# Patient Record
Sex: FEMALE | Race: WHITE | NOT HISPANIC OR LATINO | Employment: OTHER | URBAN - METROPOLITAN AREA
[De-identification: names, ages, dates, MRNs, and addresses within clinical notes are randomized per-mention and may not be internally consistent; named-entity substitution may affect disease eponyms.]

---

## 2017-02-14 ENCOUNTER — OFFICE VISIT (OUTPATIENT)
Dept: FAMILY MEDICINE | Facility: CLINIC | Age: 77
End: 2017-02-14
Attending: FAMILY MEDICINE
Payer: MEDICARE

## 2017-02-14 VITALS
WEIGHT: 121.19 LBS | OXYGEN SATURATION: 99 % | HEIGHT: 63 IN | BODY MASS INDEX: 21.47 KG/M2 | RESPIRATION RATE: 16 BRPM | DIASTOLIC BLOOD PRESSURE: 52 MMHG | SYSTOLIC BLOOD PRESSURE: 124 MMHG | HEART RATE: 77 BPM

## 2017-02-14 DIAGNOSIS — R05.9 COUGH: Primary | ICD-10-CM

## 2017-02-14 PROCEDURE — 99213 OFFICE O/P EST LOW 20 MIN: CPT | Mod: PBBFAC,PO | Performed by: FAMILY MEDICINE

## 2017-02-14 PROCEDURE — 99213 OFFICE O/P EST LOW 20 MIN: CPT | Mod: S$PBB,,, | Performed by: FAMILY MEDICINE

## 2017-02-14 PROCEDURE — 99999 PR PBB SHADOW E&M-EST. PATIENT-LVL III: CPT | Mod: PBBFAC,,, | Performed by: FAMILY MEDICINE

## 2017-02-14 RX ORDER — PROMETHAZINE HYDROCHLORIDE AND DEXTROMETHORPHAN HYDROBROMIDE 6.25; 15 MG/5ML; MG/5ML
SYRUP ORAL
Qty: 180 ML | Refills: 0 | Status: SHIPPED | OUTPATIENT
Start: 2017-02-14 | End: 2017-04-12

## 2017-02-14 RX ORDER — AZITHROMYCIN 250 MG/1
TABLET, FILM COATED ORAL
Qty: 6 TABLET | Refills: 0 | Status: SHIPPED | OUTPATIENT
Start: 2017-02-14 | End: 2017-02-19

## 2017-02-14 NOTE — MR AVS SNAPSHOT
"    [address]  [address]  [facility] [address]  Phone: [phone]                  Katelyn Caba   2017 10:00 AM   Office Visit    Description:  Female : 1940   Provider:  Brooklyn Burnham MD   Department:  [facility]           Reason for Visit     URI     Loss of Consciousness                To Do List           Goals (5 Years of Data)     None      [facility]     [facility] Nurse Care Line -  Assistance  Registered nurses in the [facility] Center provide clinical advisement, health education, appointment booking, and other advisory services.  Call for this free service at [phone].             Medications           Message regarding Medications     Verify the changes and/or additions to your medication regime listed below are the same as discussed with your clinician today.  If any of these changes or additions are incorrect, please notify your healthcare provider.             Verify that the below list of medications is an accurate representation of the medications you are currently taking.  If none reported, the list may be blank. If incorrect, please contact your healthcare provider. Carry this list with you in case of emergency.           Current Medications     pantoprazole (PROTONIX) 20 MG tablet Take 1 tablet (20 mg total) by mouth once daily.           Clinical Reference Information           Your Vitals Were     BP Pulse Resp Height Weight SpO2    124/52 (BP Location: Right arm, Patient Position: Sitting, BP Method: Manual) 77 16 5' 3" (1.6 m) 55 kg (121 lb 3.2 oz) 99%    BMI                21.47 kg/m2          Blood Pressure          Most Recent Value    BP  (!)  124/52      Allergies as of 2017     No Known Allergies      Immunizations Administered on Date of Encounter - 2017     None      [facility] Sign-Up     Activating your [facility] account is as easy as 1-2-3!     1) Visit " [email or web address], select Sign Up Now, enter this activation code and your date of birth, then select Next.  [email or web address]  Expires: 3/31/2017 11:01 AM      2) Create a username and password to use when you visit [facility] in the future and select a security question in case you lose your password and select Next.    3) Enter your e-mail address and click Sign Up!    Additional Information  If you have questions, please e-mail [email or web address] or call [phone] to talk to our [facility] staff. Remember, [facility] is NOT to be used for urgent needs. For medical emergencies, dial 911.         Instructions    Your test results will be communicated to you via : [facility], Telephone or Letter.   If you have not received your test results in one week, please contact the clinic at [phone].       Language Assistance Services     ATTENTION: Language assistance services are available, free of charge. Please call [phone].      ATENCIÓN: Si habla español, tiene a leyva disposición servicios gratuitos de asistencia lingüística. Llame al [phone].     CHÚ Ý: N?u b?n nói Ti?ng Vi?t, có các d?ch v? h? tr? ngôn ng? mi?n phí dành cho b?n. G?i s? [phone].         [facility] complies with applicable Federal civil rights laws and does not discriminate on the basis of race, color, national origin, age, disability, or sex.

## 2017-02-14 NOTE — PATIENT INSTRUCTIONS
Your test results will be communicated to you via : [facility], Telephone or Letter.   If you have not received your test results in one week, please contact the clinic at [phone].

## 2017-02-14 NOTE — PROGRESS NOTES
"Subjective:       Patient ID: Katelyn Caba is a 76 y.o. female.    Chief Complaint: URI    HPI   Pt is here for cough of several weeks duration this is associated with sputum production over the past few days no acute sob/cp   denies fever/chills   Review of Systems   Constitutional: Negative for chills, fatigue and fever.   Respiratory: Positive for cough. Negative for chest tightness and shortness of breath.    Cardiovascular: Negative for chest pain and palpitations.   Gastrointestinal: Negative for abdominal distention, abdominal pain and blood in stool.       Objective:      Physical Exam   Constitutional: She appears well-developed and well-nourished. No distress.   Cardiovascular: Normal rate and regular rhythm.  Exam reveals no gallop.    Pulmonary/Chest: Effort normal and breath sounds normal. No respiratory distress. She has no rales.   Abdominal: Soft. Bowel sounds are normal. She exhibits no distension. There is no tenderness.       Assessment:       1. Cough        Plan:     order cxr pt declines will schedule if worsens  Rest  Increase fluids  Start zpack, cough syrup     rtc prn     "This note will not be shared with the patient."   "

## 2017-04-10 ENCOUNTER — TELEPHONE (OUTPATIENT)
Dept: NEUROLOGY | Facility: HOSPITAL | Age: 77
End: 2017-04-10

## 2017-04-10 NOTE — TELEPHONE ENCOUNTER
----- Message from Jules Stahl MD sent at 4/10/2017 11:03 AM CDT -----  Can you add this patient on to my clinic this Wednesday afternoon? She has abdominal pain. Her son is a friend of mine. Please call him with the appointment.   Lizandro Meza

## 2017-04-10 NOTE — TELEPHONE ENCOUNTER
Called pts son, Lizandro, to inform that pt has been added on to schedule for this Wednesday, 4/12 at 9:30. No answer. LVM for pts son with this information. Awaiting call back.

## 2017-04-12 ENCOUNTER — OFFICE VISIT (OUTPATIENT)
Dept: NEUROLOGY | Facility: HOSPITAL | Age: 77
End: 2017-04-12
Attending: INTERNAL MEDICINE
Payer: MEDICARE

## 2017-04-12 VITALS
HEART RATE: 70 BPM | DIASTOLIC BLOOD PRESSURE: 57 MMHG | WEIGHT: 117 LBS | TEMPERATURE: 98 F | HEIGHT: 63 IN | SYSTOLIC BLOOD PRESSURE: 139 MMHG | BODY MASS INDEX: 20.73 KG/M2

## 2017-04-12 DIAGNOSIS — R19.7 DIARRHEA, UNSPECIFIED TYPE: Primary | ICD-10-CM

## 2017-04-12 DIAGNOSIS — Z12.11 SCREENING FOR COLON CANCER: ICD-10-CM

## 2017-04-12 PROCEDURE — 99214 OFFICE O/P EST MOD 30 MIN: CPT | Performed by: INTERNAL MEDICINE

## 2017-04-12 RX ORDER — ONDANSETRON 4 MG/1
TABLET, FILM COATED ORAL
COMMUNITY
Start: 2017-04-07 | End: 2017-04-12

## 2017-04-12 RX ORDER — CEFUROXIME AXETIL 500 MG/1
TABLET ORAL
COMMUNITY
Start: 2017-04-07 | End: 2017-04-12

## 2017-04-12 NOTE — PROGRESS NOTES
"[facility]    CC: diarrhea     HPI 76 y.o. female with a previous history of basal cell carcinoma but otherwise healthy presents for evaluation of a recent episode of severe diarrhea over a 24 hour period associated with nausea. She was treated in the ED at [facility] with IVF's and diagnosed with a "stomach virus". Her symptoms are now completely resolved and she feels well. She has no other complaints. She has never undergone screening for colon cancer.       Past Medical History:   Diagnosis Date    Cancer     basal cell carcinoma         Review of Systems  General ROS: negative for chills, fever or weight loss  Cardiovascular ROS: no chest pain or dyspnea on exertion  Gastrointestinal ROS: no abdominal pain; positive recent diarrheal illness     Physical Examination  BP (!) 139/57  Pulse 70  Temp 97.5 °F (36.4 °C) (Oral)   Ht 5' 3" (1.6 m)  Wt 53.1 kg (117 lb)  BMI 20.73 kg/m2  General appearance: alert, cooperative, no distress  HENT: Normocephalic, atraumatic, neck symmetrical, no nasal discharge   Lungs: clear to auscultation bilaterally, no dullness to percussion bilaterally  Heart: regular rate and rhythm without rub; no displacement of the PMI   Abdomen: soft, non-tender; bowel sounds normoactive; no organomegaly  Extremities: extremities symmetric; no clubbing, cyanosis, or edema  Neurologic: Alert and oriented X 3, normal strength, normal coordination and gait      Assessment:   Recent acute diarrheal illness - now completely resolved; typical for acute infectious gastroenteritis   Colon cancer screening - not currently interested in colonoscopy but is agreeable to stool FIT test     Plan:  Stool FIT     Note: patient is the mother of my neighbor (Lizandro Meza)     Jules Stahl MD   [address], [address]   [place] [address] [phone]    "

## 2017-04-12 NOTE — MR AVS SNAPSHOT
"    [facility]  [address] [place] [address]  Phone: [phone]  Fax: [phone]                  Katelyn Caba   2017 9:30 AM   Office Visit    Description:  Female : 1940   Provider:  Jules Stahl MD   Department:  [facility]           Reason for Visit     Follow-up           Diagnoses this Visit        Comments    Screening for colon cancer    -  Primary            To Do List           Goals (5 Years of Data)     None      [facility]     [facility] Nurse Care [facility] -  Assistance  Unless otherwise directed by your provider, please contact [facility], our nurse care line that is available for  assistance.     Registered nurses in the [facility] provide: appointment scheduling, clinical advisement, health education, and other advisory services.  Call: [phone] (toll free)               Medications           Message regarding Medications     Verify the changes and/or additions to your medication regime listed below are the same as discussed with your clinician today.  If any of these changes or additions are incorrect, please notify your healthcare provider.        STOP taking these medications     ondansetron (ZOFRAN) 4 MG tablet     cefUROXime (CEFTIN) 500 MG tablet     promethazine-dextromethorphan (PROMETHAZINE-DM) 6.25-15 mg/5 mL Syrp 1-2 tsp po qhs prn           Verify that the below list of medications is an accurate representation of the medications you are currently taking.  If none reported, the list may be blank. If incorrect, please contact your healthcare provider. Carry this list with you in case of emergency.           Current Medications     pantoprazole (PROTONIX) 20 MG tablet Take 1 tablet (20 mg total) by mouth once daily.           Clinical Reference Information           Your Vitals Were     BP Pulse Temp Height Weight BMI    139/57 70 97.5 °F (36.4 °C) (Oral) 5' 3" (1.6 m) 53.1 kg " (117 lb) 20.73 kg/m2      Blood Pressure          Most Recent Value    BP  (!)  139/57      Allergies as of 4/12/2017     No Known Allergies      Immunizations Administered on Date of Encounter - 4/12/2017     None      Orders Placed During Today's Visit     Future Labs/Procedures Expected by Expires    Occult Blood Stool, CA Screen  4/12/2017 4/12/2018      [facility] Sign-Up     Activating your [facility] account is as easy as 1-2-3!     1) Visit [email or web address], select Sign Up Now, enter this activation code and your date of birth, then select Next.  [email or web address]  Expires: 5/27/2017 10:15 AM      2) Create a username and password to use when you visit [facility] in the future and select a security question in case you lose your password and select Next.    3) Enter your e-mail address and click Sign Up!    Additional Information  If you have questions, please e-mail [email or web address] or call [phone] to talk to our [facility] staff. Remember, [facility] is NOT to be used for urgent needs. For medical emergencies, dial 911.         Instructions    Proceed to 1st floor outpatient diagnostic center to  stool collection kit          Language Assistance Services     ATTENTION: Language assistance services are available, free of charge. Please call [phone].      ATENCIÓN: Si habla español, tiene a leyva disposición servicios gratuitos de asistencia lingüística. Llame al [phone].     CHÚ Ý: N?u b?n nói Ti?ng Vi?t, có các d?ch v? h? tr? ngôn ng? mi?n phí dành cho b?n. G?i s? [phone].         [facility] complies with applicable Federal civil rights laws and does not discriminate on the basis of race, color, national origin, age, disability, or sex.

## 2018-11-05 ENCOUNTER — PATIENT OUTREACH (OUTPATIENT)
Dept: ADMINISTRATIVE | Facility: HOSPITAL | Age: 78
End: 2018-11-05

## 2018-11-05 NOTE — PROGRESS NOTES
Patient contacted to schedule appointment with their PCP Brooklyn Burnham MD. Appointment was scheduled and appointment reminder slip mailed.     Patient schedule for annual exam.

## 2018-12-03 ENCOUNTER — OFFICE VISIT (OUTPATIENT)
Dept: FAMILY MEDICINE | Facility: CLINIC | Age: 78
End: 2018-12-03
Attending: FAMILY MEDICINE
Payer: MEDICARE

## 2018-12-03 VITALS
HEART RATE: 76 BPM | DIASTOLIC BLOOD PRESSURE: 72 MMHG | OXYGEN SATURATION: 99 % | SYSTOLIC BLOOD PRESSURE: 134 MMHG | BODY MASS INDEX: 21.74 KG/M2 | WEIGHT: 122.69 LBS | HEIGHT: 63 IN

## 2018-12-03 DIAGNOSIS — R53.83 FATIGUE, UNSPECIFIED TYPE: ICD-10-CM

## 2018-12-03 DIAGNOSIS — J40 BRONCHITIS: ICD-10-CM

## 2018-12-03 DIAGNOSIS — R05.9 COUGH: ICD-10-CM

## 2018-12-03 DIAGNOSIS — Z00.00 ANNUAL PHYSICAL EXAM: Primary | ICD-10-CM

## 2018-12-03 PROCEDURE — 99999 PR PBB SHADOW E&M-EST. PATIENT-LVL III: CPT | Mod: PBBFAC,,, | Performed by: FAMILY MEDICINE

## 2018-12-03 PROCEDURE — 99214 OFFICE O/P EST MOD 30 MIN: CPT | Mod: S$PBB,,, | Performed by: FAMILY MEDICINE

## 2018-12-03 PROCEDURE — 99213 OFFICE O/P EST LOW 20 MIN: CPT | Mod: PBBFAC,PO | Performed by: FAMILY MEDICINE

## 2018-12-03 RX ORDER — METHYLPREDNISOLONE 4 MG/1
TABLET ORAL
Qty: 1 PACKAGE | Refills: 0 | Status: SHIPPED | OUTPATIENT
Start: 2018-12-03 | End: 2018-12-24

## 2018-12-03 RX ORDER — LEVOCETIRIZINE DIHYDROCHLORIDE 5 MG/1
5 TABLET, FILM COATED ORAL NIGHTLY
Qty: 30 TABLET | Refills: 0 | Status: SHIPPED | OUTPATIENT
Start: 2018-12-03 | End: 2019-04-13

## 2018-12-03 NOTE — PATIENT INSTRUCTIONS
Your test results will be communicated to you via : [facility], Telephone or Letter.   If you have not received your test results in one week, please contact the clinic at [phone].

## 2018-12-03 NOTE — PROGRESS NOTES
Subjective:       Patient ID: Katelyn Caba is a 78 y.o. female.    Chief Complaint: Annual Exam    HPI   Pt is here for annual exam pt is well no sob/cp no change in bowel h abits no brbpr no vaginal bleeding no dysuria or hematuria pt is feeling tired coughing over the past few weeks no weight loss     Review of Systems   Constitutional: Positive for fatigue. Negative for activity change, chills, diaphoresis and fever.   HENT: Positive for postnasal drip and sore throat. Negative for congestion, ear discharge, ear pain, hearing loss, rhinorrhea, sinus pressure, sneezing, trouble swallowing and voice change.    Eyes: Negative for photophobia, discharge, redness, itching and visual disturbance.   Respiratory: Positive for cough. Negative for chest tightness, shortness of breath and wheezing.    Cardiovascular: Negative for chest pain, palpitations and leg swelling.   Gastrointestinal: Negative for abdominal distention, abdominal pain, anal bleeding, blood in stool, constipation, diarrhea, nausea, rectal pain and vomiting.   Genitourinary: Negative for dyspareunia, dysuria, flank pain, frequency, hematuria, menstrual problem, pelvic pain, urgency, vaginal bleeding and vaginal discharge.   Musculoskeletal: Negative for arthralgias, back pain, joint swelling and neck pain.   Skin: Negative for color change and rash.   Neurological: Negative for dizziness, speech difficulty, weakness, light-headedness, numbness and headaches.   Hematological: Does not bruise/bleed easily.   Psychiatric/Behavioral: Negative for agitation, confusion, decreased concentration, sleep disturbance and suicidal ideas. The patient is not nervous/anxious.        Objective:      Physical Exam   Constitutional: She appears well-developed and well-nourished. No distress.   HENT:   Head: Normocephalic and atraumatic.   Right Ear: External ear normal.   Left Ear: External ear normal.   Nose: Nose normal.   Mouth/Throat: Oropharynx is clear and  "moist. No oropharyngeal exudate.   Nares patent bilaterally with thin clear mucus no facial tenderness   Eyes: Conjunctivae and EOM are normal. Pupils are equal, round, and reactive to light. Right eye exhibits no discharge. Left eye exhibits no discharge.   Neck: Normal range of motion. Neck supple. No thyromegaly present.   Cardiovascular: Normal rate, regular rhythm and intact distal pulses. Exam reveals no gallop and no friction rub.   Pulmonary/Chest: Effort normal and breath sounds normal. No respiratory distress. She has no wheezes. She has no rales.   Abdominal: Soft. Bowel sounds are normal. She exhibits no distension. There is no tenderness. There is no rebound and no guarding.   Musculoskeletal: Normal range of motion. She exhibits no edema or tenderness.   Lymphadenopathy:     She has no cervical adenopathy.   Neurological: She is alert. No cranial nerve deficit. She exhibits normal muscle tone. Coordination normal.   Skin: Skin is warm and dry. No rash noted. No erythema.   Psychiatric: She has a normal mood and affect. Her behavior is normal. Judgment and thought content normal.       Assessment:       1. Annual physical exam    2. Bronchitis    3. Cough    4. Fatigue, unspecified type        Plan:     orders cmp cbc tsh ua lipid not covered by pt insurance  Start medrol dose pack and xyzal  Low salt diet  Graded exercise  rtc annually and prn    Health maintenance  Colonoscopy discussed  Flu discussed  tetanus q 10 years  Mammogram discussed   pneumonia discussed    "This note will not be shared with the patient."   "

## 2018-12-06 ENCOUNTER — LAB VISIT (OUTPATIENT)
Dept: LAB | Facility: HOSPITAL | Age: 78
End: 2018-12-06
Attending: FAMILY MEDICINE
Payer: MEDICARE

## 2018-12-06 DIAGNOSIS — J40 BRONCHITIS: ICD-10-CM

## 2018-12-06 DIAGNOSIS — Z00.00 ANNUAL PHYSICAL EXAM: ICD-10-CM

## 2018-12-06 LAB
ALBUMIN SERPL BCP-MCNC: 3.8 G/DL
ALP SERPL-CCNC: 77 U/L
ALT SERPL W/O P-5'-P-CCNC: 9 U/L
ANION GAP SERPL CALC-SCNC: 8 MMOL/L
AST SERPL-CCNC: 16 U/L
BASOPHILS # BLD AUTO: 0.06 K/UL
BASOPHILS NFR BLD: 0.5 %
BILIRUB SERPL-MCNC: 0.3 MG/DL
BUN SERPL-MCNC: 17 MG/DL
CALCIUM SERPL-MCNC: 9.5 MG/DL
CHLORIDE SERPL-SCNC: 105 MMOL/L
CO2 SERPL-SCNC: 26 MMOL/L
CREAT SERPL-MCNC: 0.8 MG/DL
DIFFERENTIAL METHOD: ABNORMAL
EOSINOPHIL # BLD AUTO: 0 K/UL
EOSINOPHIL NFR BLD: 0.3 %
ERYTHROCYTE [DISTWIDTH] IN BLOOD BY AUTOMATED COUNT: 16.6 %
EST. GFR  (AFRICAN AMERICAN): >60 ML/MIN/1.73 M^2
EST. GFR  (NON AFRICAN AMERICAN): >60 ML/MIN/1.73 M^2
GLUCOSE SERPL-MCNC: 98 MG/DL
HCT VFR BLD AUTO: 26 %
HGB BLD-MCNC: 6.8 G/DL
IMM GRANULOCYTES # BLD AUTO: 0.06 K/UL
IMM GRANULOCYTES NFR BLD AUTO: 0.5 %
LYMPHOCYTES # BLD AUTO: 2.6 K/UL
LYMPHOCYTES NFR BLD: 22.8 %
MCH RBC QN AUTO: 18 PG
MCHC RBC AUTO-ENTMCNC: 26.2 G/DL
MCV RBC AUTO: 69 FL
MONOCYTES # BLD AUTO: 1.1 K/UL
MONOCYTES NFR BLD: 9.3 %
NEUTROPHILS # BLD AUTO: 7.6 K/UL
NEUTROPHILS NFR BLD: 66.6 %
NRBC BLD-RTO: 0 /100 WBC
PLATELET # BLD AUTO: 619 K/UL
PMV BLD AUTO: 10.4 FL
POTASSIUM SERPL-SCNC: 4 MMOL/L
PROT SERPL-MCNC: 7.3 G/DL
RBC # BLD AUTO: 3.78 M/UL
SODIUM SERPL-SCNC: 139 MMOL/L
WBC # BLD AUTO: 11.45 K/UL

## 2018-12-06 PROCEDURE — 85025 COMPLETE CBC W/AUTO DIFF WBC: CPT

## 2018-12-06 PROCEDURE — 36415 COLL VENOUS BLD VENIPUNCTURE: CPT | Mod: PO

## 2018-12-06 PROCEDURE — 80053 COMPREHEN METABOLIC PANEL: CPT

## 2018-12-13 DIAGNOSIS — Z12.11 SCREEN FOR COLON CANCER: ICD-10-CM

## 2018-12-13 DIAGNOSIS — Z83.2: Primary | ICD-10-CM

## 2018-12-20 ENCOUNTER — TELEPHONE (OUTPATIENT)
Dept: FAMILY MEDICINE | Facility: CLINIC | Age: 78
End: 2018-12-20

## 2018-12-20 NOTE — TELEPHONE ENCOUNTER
----- Message from Brooklyn Burnham MD sent at 12/13/2018  1:03 PM CST -----  Please let pt know her blood count has dropped since last blood test.  Id like her to increase her water intake as well as iron rich foods im sending a script for iron to her pharmacy however she can take it otc as well.  Please have her do a fit kit order is in

## 2018-12-20 NOTE — TELEPHONE ENCOUNTER
Patient was given test results and was also mailed a copy of an approved list with iron foods on them for her to choose from.Patient states she will get an otc iron pill.

## 2019-04-13 ENCOUNTER — OFFICE VISIT (OUTPATIENT)
Dept: FAMILY MEDICINE | Facility: CLINIC | Age: 79
End: 2019-04-13
Attending: FAMILY MEDICINE
Payer: MEDICARE

## 2019-04-13 VITALS
BODY MASS INDEX: 22.26 KG/M2 | RESPIRATION RATE: 16 BRPM | DIASTOLIC BLOOD PRESSURE: 63 MMHG | WEIGHT: 125.63 LBS | HEART RATE: 68 BPM | HEIGHT: 63 IN | SYSTOLIC BLOOD PRESSURE: 142 MMHG

## 2019-04-13 DIAGNOSIS — I83.813 VARICOSE VEINS OF BOTH LOWER EXTREMITIES WITH PAIN: Primary | ICD-10-CM

## 2019-04-13 DIAGNOSIS — D50.9 MICROCYTIC ANEMIA: ICD-10-CM

## 2019-04-13 PROCEDURE — 99214 PR OFFICE/OUTPT VISIT, EST, LEVL IV, 30-39 MIN: ICD-10-PCS | Mod: S$PBB,,, | Performed by: FAMILY MEDICINE

## 2019-04-13 PROCEDURE — 99999 PR PBB SHADOW E&M-EST. PATIENT-LVL IV: ICD-10-PCS | Mod: PBBFAC,,, | Performed by: FAMILY MEDICINE

## 2019-04-13 PROCEDURE — 99214 OFFICE O/P EST MOD 30 MIN: CPT | Mod: S$PBB,,, | Performed by: FAMILY MEDICINE

## 2019-04-13 PROCEDURE — 99999 PR PBB SHADOW E&M-EST. PATIENT-LVL IV: CPT | Mod: PBBFAC,,, | Performed by: FAMILY MEDICINE

## 2019-04-13 PROCEDURE — 99214 OFFICE O/P EST MOD 30 MIN: CPT | Mod: PBBFAC,PO | Performed by: FAMILY MEDICINE

## 2019-04-13 NOTE — PATIENT INSTRUCTIONS
Katelyn,     We are always striving for excellence. Should you receive a patient experience survey in the mail, we would appreciate if you would take a few moments to give us your feedback. These surveys let us know our strengths as well as areas of opportunity for improvement to better serve you.    Thank you for your time,  Macy Mendoza LPN    Your test results will be communicated to you via : [facility], Telephone or Letter.   If you have not received your test results in one week, please contact the clinic at [phone].

## 2019-04-13 NOTE — PROGRESS NOTES
"Subjective:       Patient ID: Katelyn Caba is a 78 y.o. female.    Chief Complaint: Varicose Veins    HPI   Pt is here for c/o painful legs varicose veins with swelling over the past few weeks changing her lifestyle avoiding stairs moving to single level home.   Pt has anemia no sob/cp no palpitations no vaginal bleeding no rectal bleeding no hematuria   Review of Systems   Constitutional: Negative for chills, fatigue and fever.   Respiratory: Negative for cough, chest tightness and shortness of breath.    Cardiovascular: Positive for leg swelling. Negative for chest pain and palpitations.   Gastrointestinal: Negative for abdominal distention and abdominal pain.   Hematological: Negative for adenopathy. Does not bruise/bleed easily.       Objective:      Physical Exam   Constitutional: She appears well-developed and well-nourished. No distress.   Cardiovascular: Normal rate and regular rhythm. Exam reveals no gallop.   Bilateral lower leg/feet prominent veins    Pulmonary/Chest: Effort normal and breath sounds normal. She has no rales.   Abdominal: Soft. Bowel sounds are normal. She exhibits no distension. There is no tenderness.   Musculoskeletal: She exhibits edema.     labs discussed with pt   Assessment:       1. Varicose veins of both lower extremities with pain    2. Microcytic anemia        Plan:     orders cmp cbc iron studies  F/u vasc surgery        "This note will not be shared with the patient."   "

## 2019-04-15 ENCOUNTER — LAB VISIT (OUTPATIENT)
Dept: LAB | Facility: HOSPITAL | Age: 79
End: 2019-04-15
Attending: FAMILY MEDICINE
Payer: MEDICARE

## 2019-04-15 DIAGNOSIS — I83.813 VARICOSE VEINS OF BOTH LOWER EXTREMITIES WITH PAIN: ICD-10-CM

## 2019-04-15 DIAGNOSIS — R53.83 FATIGUE, UNSPECIFIED TYPE: ICD-10-CM

## 2019-04-15 DIAGNOSIS — D50.9 MICROCYTIC ANEMIA: ICD-10-CM

## 2019-04-15 LAB
ALBUMIN SERPL BCP-MCNC: 3.5 G/DL (ref 3.5–5.2)
ALP SERPL-CCNC: 78 U/L (ref 55–135)
ALT SERPL W/O P-5'-P-CCNC: 14 U/L (ref 10–44)
ANION GAP SERPL CALC-SCNC: 7 MMOL/L (ref 8–16)
AST SERPL-CCNC: 22 U/L (ref 10–40)
BASOPHILS # BLD AUTO: 0.07 K/UL (ref 0–0.2)
BASOPHILS NFR BLD: 1.1 % (ref 0–1.9)
BILIRUB SERPL-MCNC: 0.2 MG/DL (ref 0.1–1)
BUN SERPL-MCNC: 17 MG/DL (ref 8–23)
CALCIUM SERPL-MCNC: 9.5 MG/DL (ref 8.7–10.5)
CHLORIDE SERPL-SCNC: 107 MMOL/L (ref 95–110)
CO2 SERPL-SCNC: 27 MMOL/L (ref 23–29)
CREAT SERPL-MCNC: 0.7 MG/DL (ref 0.5–1.4)
DIFFERENTIAL METHOD: ABNORMAL
EOSINOPHIL # BLD AUTO: 0.3 K/UL (ref 0–0.5)
EOSINOPHIL NFR BLD: 4.3 % (ref 0–8)
ERYTHROCYTE [DISTWIDTH] IN BLOOD BY AUTOMATED COUNT: 20.7 % (ref 11.5–14.5)
EST. GFR  (AFRICAN AMERICAN): >60 ML/MIN/1.73 M^2
EST. GFR  (NON AFRICAN AMERICAN): >60 ML/MIN/1.73 M^2
GLUCOSE SERPL-MCNC: 85 MG/DL (ref 70–110)
HCT VFR BLD AUTO: 27.4 % (ref 37–48.5)
HGB BLD-MCNC: 7.9 G/DL (ref 12–16)
IMM GRANULOCYTES # BLD AUTO: 0.03 K/UL (ref 0–0.04)
IMM GRANULOCYTES NFR BLD AUTO: 0.5 % (ref 0–0.5)
LYMPHOCYTES # BLD AUTO: 2.2 K/UL (ref 1–4.8)
LYMPHOCYTES NFR BLD: 32.9 % (ref 18–48)
MCH RBC QN AUTO: 21.6 PG (ref 27–31)
MCHC RBC AUTO-ENTMCNC: 28.8 G/DL (ref 32–36)
MCV RBC AUTO: 75 FL (ref 82–98)
MONOCYTES # BLD AUTO: 0.9 K/UL (ref 0.3–1)
MONOCYTES NFR BLD: 13.4 % (ref 4–15)
NEUTROPHILS # BLD AUTO: 3.1 K/UL (ref 1.8–7.7)
NEUTROPHILS NFR BLD: 47.8 % (ref 38–73)
NRBC BLD-RTO: 0 /100 WBC
PLATELET # BLD AUTO: 493 K/UL (ref 150–350)
PMV BLD AUTO: 10.6 FL (ref 9.2–12.9)
POTASSIUM SERPL-SCNC: 4.4 MMOL/L (ref 3.5–5.1)
PROT SERPL-MCNC: 6.5 G/DL (ref 6–8.4)
RBC # BLD AUTO: 3.65 M/UL (ref 4–5.4)
SODIUM SERPL-SCNC: 141 MMOL/L (ref 136–145)
TSH SERPL DL<=0.005 MIU/L-ACNC: 1.12 UIU/ML (ref 0.4–4)
WBC # BLD AUTO: 6.56 K/UL (ref 3.9–12.7)

## 2019-04-15 PROCEDURE — 85025 COMPLETE CBC W/AUTO DIFF WBC: CPT

## 2019-04-15 PROCEDURE — 36415 COLL VENOUS BLD VENIPUNCTURE: CPT | Mod: PO

## 2019-04-15 PROCEDURE — 84443 ASSAY THYROID STIM HORMONE: CPT

## 2019-04-15 PROCEDURE — 80053 COMPREHEN METABOLIC PANEL: CPT

## 2019-04-18 DIAGNOSIS — D50.9 MICROCYTIC ANEMIA: Primary | ICD-10-CM

## 2019-04-18 RX ORDER — FERROUS SULFATE 325(65) MG
325 TABLET ORAL 2 TIMES DAILY
Qty: 180 TABLET | Refills: 0 | Status: SHIPPED | OUTPATIENT
Start: 2019-04-18 | End: 2021-10-12 | Stop reason: SDUPTHER

## 2019-04-23 ENCOUNTER — TELEPHONE (OUTPATIENT)
Dept: FAMILY MEDICINE | Facility: CLINIC | Age: 79
End: 2019-04-23

## 2019-04-23 NOTE — TELEPHONE ENCOUNTER
"Notified patient per Dr Burnham' note "Let pt know her blood count is still low I sent iron to her pharmacy or she can take OTC equivalent. Please have her see me in 3 months to recheck please verify pt has a fit kit and does her iron studies the orders are already in."    Pt understood and agreed.  "

## 2019-04-23 NOTE — TELEPHONE ENCOUNTER
----- Message from Brooklyn Burnham MD sent at 4/18/2019  4:47 AM CDT -----  Please let pt know her blood count is still low I sent iron to her pharmacy or she can take otc equivalent.  Please have her see me in 3 months to recheck please verify pt has a fit kit and does her iron studies the orders are already in

## 2019-04-25 DIAGNOSIS — I87.2 VENOUS INSUFFICIENCY: Primary | ICD-10-CM

## 2019-04-26 ENCOUNTER — TELEPHONE (OUTPATIENT)
Dept: VASCULAR SURGERY | Facility: CLINIC | Age: 79
End: 2019-04-26

## 2019-04-26 NOTE — TELEPHONE ENCOUNTER
Call patient times two, to schedule an ultrasound for clinic appointment 4/29/2019 no answer left message with a call back number [phone].

## 2019-04-29 ENCOUNTER — HOSPITAL ENCOUNTER (OUTPATIENT)
Dept: VASCULAR SURGERY | Facility: CLINIC | Age: 79
Discharge: HOME | End: 2019-04-29
Attending: SURGERY
Payer: MEDICARE

## 2019-04-29 ENCOUNTER — OFFICE VISIT (OUTPATIENT)
Dept: VASCULAR SURGERY | Facility: CLINIC | Age: 79
End: 2019-04-29
Payer: MEDICARE

## 2019-04-29 VITALS
BODY MASS INDEX: 21.87 KG/M2 | TEMPERATURE: 98 F | WEIGHT: 123.44 LBS | HEART RATE: 65 BPM | SYSTOLIC BLOOD PRESSURE: 137 MMHG | HEIGHT: 63 IN | DIASTOLIC BLOOD PRESSURE: 63 MMHG

## 2019-04-29 DIAGNOSIS — I87.2 VENOUS INSUFFICIENCY: Primary | ICD-10-CM

## 2019-04-29 DIAGNOSIS — I87.2 VENOUS INSUFFICIENCY: ICD-10-CM

## 2019-04-29 PROCEDURE — 99201 PR OFFICE/OUTPT VISIT,NEW,LEVL I: ICD-10-PCS | Mod: S$PBB,,, | Performed by: SURGERY

## 2019-04-29 PROCEDURE — 99999 PR PBB SHADOW E&M-EST. PATIENT-LVL III: CPT | Mod: PBBFAC,,, | Performed by: SURGERY

## 2019-04-29 PROCEDURE — 99201 PR OFFICE/OUTPT VISIT,NEW,LEVL I: CPT | Mod: S$PBB,,, | Performed by: SURGERY

## 2019-04-29 PROCEDURE — 93970 EXTREMITY STUDY: CPT | Mod: PBBFAC | Performed by: SURGERY

## 2019-04-29 PROCEDURE — 99999 PR PBB SHADOW E&M-EST. PATIENT-LVL III: ICD-10-PCS | Mod: PBBFAC,,, | Performed by: SURGERY

## 2019-04-29 PROCEDURE — 93970 PR US DUPLEX, UPPER OR LOWER EXT VENOUS,COMPLETE BILAT: ICD-10-PCS | Mod: 26,S$PBB,, | Performed by: SURGERY

## 2019-04-29 PROCEDURE — 99213 OFFICE O/P EST LOW 20 MIN: CPT | Mod: PBBFAC,25 | Performed by: SURGERY

## 2019-04-29 PROCEDURE — 93970 EXTREMITY STUDY: CPT | Mod: 26,S$PBB,, | Performed by: SURGERY

## 2019-04-29 NOTE — LETTER
April 29, 2019      Brooklyn Burnham MD  [address]  [address]  [facility] [address]           [facility] - Vascular Surgery  [address]  [place] [address]  Phone: [phone]  Fax: [phone]          Patient: Katelyn Caba   MR Number: [id number]   YOB: 1940   Date of Visit: 4/29/2019       Dear Dr. Brooklyn Burnham:    Thank you for referring Katelyn Caba to me for evaluation. Attached you will find relevant portions of my assessment and plan of care.    If you have questions, please do not hesitate to call me. I look forward to following Katelyn Caba along with you.    Sincerely,    Lorin Salvador MD    Enclosure  CC:  No Recipients    If you would like to receive this communication electronically, please contact [email or web address] or [phone] to request more information on [employer] Link access.    For providers and/or their staff who would like to refer a patient to [facility], please contact us through our one-stop-shop provider referral line, [facility], at [phone].    If you feel you have received this communication in error or would no longer like to receive these types of communications, please e-mail [email or web address]

## 2019-04-29 NOTE — PROGRESS NOTES
Katelyn Caba is a 78 y.o. female referred from Dr. Brooklyn Burnham for evaluation and management of lower leg varicose veins. Noticed at a recent physical and wanted to have them checked out. No significant pain or swelling. No history of DVT or prior venous ablation procedures. No history of venous ulceration. Denies claudication or rest pain. Has not tried compression stockings. 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  No inflammation or skin changes  2+ PT bilaterally    Duplex - No DVT, Right GSV reflux >500ms, 4.7mm    Katelyn Caba is a 78 y.o. female with significant venous reflux.   Recommend compression stocking and follow-up as needed.  Continue regular exercise.    Lorin Salvador MD FACS [facility]  Vascular & Endovascular Surgery    >10 minute visit with more than half in counseling

## 2020-05-29 ENCOUNTER — HOSPITAL ENCOUNTER (OUTPATIENT)
Dept: RADIOLOGY | Facility: HOSPITAL | Age: 80
Discharge: HOME | End: 2020-05-29
Attending: NURSE PRACTITIONER
Payer: MEDICARE

## 2020-05-29 ENCOUNTER — OFFICE VISIT (OUTPATIENT)
Dept: FAMILY MEDICINE | Facility: CLINIC | Age: 80
End: 2020-05-29
Payer: MEDICARE

## 2020-05-29 VITALS
TEMPERATURE: 99 F | HEART RATE: 61 BPM | OXYGEN SATURATION: 98 % | DIASTOLIC BLOOD PRESSURE: 60 MMHG | HEIGHT: 63 IN | SYSTOLIC BLOOD PRESSURE: 140 MMHG | WEIGHT: 122.25 LBS | BODY MASS INDEX: 21.66 KG/M2

## 2020-05-29 DIAGNOSIS — M79.672 ACUTE FOOT PAIN, LEFT: Primary | ICD-10-CM

## 2020-05-29 DIAGNOSIS — M79.672 ACUTE FOOT PAIN, LEFT: ICD-10-CM

## 2020-05-29 PROCEDURE — 73630 X-RAY EXAM OF FOOT: CPT | Mod: 26,LT,, | Performed by: RADIOLOGY

## 2020-05-29 PROCEDURE — 73630 X-RAY EXAM OF FOOT: CPT | Mod: TC,FY,PO,LT

## 2020-05-29 PROCEDURE — 99999 PR PBB SHADOW E&M-EST. PATIENT-LVL III: CPT | Mod: PBBFAC,,, | Performed by: NURSE PRACTITIONER

## 2020-05-29 PROCEDURE — 99999 PR PBB SHADOW E&M-EST. PATIENT-LVL III: ICD-10-PCS | Mod: PBBFAC,,, | Performed by: NURSE PRACTITIONER

## 2020-05-29 PROCEDURE — 73630 XR FOOT COMPLETE 3 VIEW LEFT: ICD-10-PCS | Mod: 26,LT,, | Performed by: RADIOLOGY

## 2020-05-29 PROCEDURE — 99214 OFFICE O/P EST MOD 30 MIN: CPT | Mod: S$PBB,,, | Performed by: NURSE PRACTITIONER

## 2020-05-29 PROCEDURE — 99214 PR OFFICE/OUTPT VISIT, EST, LEVL IV, 30-39 MIN: ICD-10-PCS | Mod: S$PBB,,, | Performed by: NURSE PRACTITIONER

## 2020-05-29 PROCEDURE — 99213 OFFICE O/P EST LOW 20 MIN: CPT | Mod: PBBFAC,25,PO | Performed by: NURSE PRACTITIONER

## 2020-05-29 NOTE — PROGRESS NOTES
Subjective:       Patient ID: Katelyn Caba is a 79 y.o. female.    Chief Complaint: Foot Pain (left...sore no pain that started 1 week ago)    Patient who is new to me presents for foot pain. She does not recall any injury. She states the pain has improved but is still present and this concerns her.     Foot Injury    The incident occurred more than 1 week ago. The incident occurred at home. There was no injury mechanism. The pain is present in the left foot. The quality of the pain is described as aching. The pain is mild. Pertinent negatives include no numbness. She reports no foreign bodies present. The symptoms are aggravated by movement and palpation. She has tried elevation and rest for the symptoms. The treatment provided mild relief.     Review of Systems   Constitutional: Negative for chills, fatigue and fever.   HENT: Negative for congestion, sinus pressure, sinus pain, sneezing and sore throat.    Respiratory: Negative for cough, chest tightness, shortness of breath and wheezing.    Cardiovascular: Negative for chest pain, palpitations and leg swelling.   Gastrointestinal: Negative for abdominal distention, abdominal pain, constipation, diarrhea, nausea and vomiting.   Genitourinary: Negative for decreased urine volume, difficulty urinating, dysuria, frequency and urgency.   Musculoskeletal: Positive for arthralgias. Negative for gait problem, joint swelling and myalgias.   Skin: Negative for rash and wound.   Neurological: Negative for dizziness, light-headedness, numbness and headaches.       Objective:      Physical Exam   Constitutional: She is oriented to person, place, and time. She appears well-developed and well-nourished.   HENT:   Head: Normocephalic and atraumatic.   Right Ear: External ear normal.   Left Ear: External ear normal.   Nose: Nose normal.   Mouth/Throat: Oropharynx is clear and moist.   Eyes: Pupils are equal, round, and reactive to light.   Neck: Normal range of motion.    Cardiovascular: Normal rate, regular rhythm, normal heart sounds and intact distal pulses.   Pulmonary/Chest: Effort normal and breath sounds normal.   Abdominal: Soft. Bowel sounds are normal.   Musculoskeletal: Normal range of motion.        Feet:    Neurological: She is alert and oriented to person, place, and time.   Skin: Skin is warm and dry.   Nursing note and vitals reviewed.      Assessment:       1. Acute foot pain, left        Plan:       Katelyn was seen today for foot pain.    Diagnoses and all orders for this visit:    Acute foot pain, left  -     X-Ray Foot Complete Left; Future    Advised RICE therapy.   Discussed worsening signs/symptoms and when to return to clinic or go to ED.   Patient expresses understanding and agrees with treatment plan.

## 2020-06-01 ENCOUNTER — TELEPHONE (OUTPATIENT)
Dept: FAMILY MEDICINE | Facility: CLINIC | Age: 80
End: 2020-06-01

## 2020-06-01 NOTE — TELEPHONE ENCOUNTER
----- Message from Trini Yamil sent at 6/1/2020 11:12 AM CDT -----  Contact: pt  Type: Needs Medical Advice    Who Called:      Best Call Back Number:   Additional Information: Requesting a call back from Nurse, regarding pt would like her test results ,please call back

## 2020-06-02 NOTE — PROGRESS NOTES
She can take tylenol for the pain and use a foot brace to limit the movement of the foot, especially for a ligament injury. If the pain does not improve in 1-2 weeks let us know so we can refer to podiatry. Thanks.

## 2020-06-03 ENCOUNTER — TELEPHONE (OUTPATIENT)
Dept: FAMILY MEDICINE | Facility: CLINIC | Age: 80
End: 2020-06-03

## 2020-06-03 ENCOUNTER — PATIENT OUTREACH (OUTPATIENT)
Dept: ADMINISTRATIVE | Facility: OTHER | Age: 80
End: 2020-06-03

## 2020-06-03 DIAGNOSIS — M79.672 LEFT FOOT PAIN: Primary | ICD-10-CM

## 2020-06-03 NOTE — TELEPHONE ENCOUNTER
----- Message from Kira Camilo sent at 6/3/2020 12:00 PM CDT -----  Contact: pt  Pt states that she just missed a call thinks from the office..[phone]

## 2020-06-04 ENCOUNTER — OFFICE VISIT (OUTPATIENT)
Dept: PODIATRY | Facility: CLINIC | Age: 80
End: 2020-06-04
Payer: MEDICARE

## 2020-06-04 VITALS
TEMPERATURE: 98 F | SYSTOLIC BLOOD PRESSURE: 149 MMHG | BODY MASS INDEX: 20.53 KG/M2 | HEART RATE: 72 BPM | HEIGHT: 63 IN | WEIGHT: 115.88 LBS | DIASTOLIC BLOOD PRESSURE: 61 MMHG

## 2020-06-04 DIAGNOSIS — M84.375A STRESS FRACTURE OF METATARSAL BONE OF LEFT FOOT, INITIAL ENCOUNTER: Primary | ICD-10-CM

## 2020-06-04 DIAGNOSIS — M79.672 LEFT FOOT PAIN: ICD-10-CM

## 2020-06-04 PROCEDURE — 99202 OFFICE O/P NEW SF 15 MIN: CPT | Mod: S$PBB,,, | Performed by: PODIATRIST

## 2020-06-04 PROCEDURE — 99214 OFFICE O/P EST MOD 30 MIN: CPT | Mod: PBBFAC,PN | Performed by: PODIATRIST

## 2020-06-04 PROCEDURE — 99202 PR OFFICE/OUTPT VISIT, NEW, LEVL II, 15-29 MIN: ICD-10-PCS | Mod: S$PBB,,, | Performed by: PODIATRIST

## 2020-06-04 PROCEDURE — 99999 PR PBB SHADOW E&M-EST. PATIENT-LVL IV: CPT | Mod: PBBFAC,,, | Performed by: PODIATRIST

## 2020-06-04 PROCEDURE — 99999 PR PBB SHADOW E&M-EST. PATIENT-LVL IV: ICD-10-PCS | Mod: PBBFAC,,, | Performed by: PODIATRIST

## 2020-06-04 NOTE — LETTER
Honey 15, 2020      Courtney Jacobs, ZENAIDA  [address] [facility] [address] [place] [address]           [facility]  [address] [place] [address]  Phone: [phone]          Patient: Katelyn Caba   MR Number: [id number]   YOB: 1940   Date of Visit: 6/4/2020       Dear Courtney Jacobs:    Thank you for referring Katelyn Caba to me for evaluation. Attached you will find relevant portions of my assessment and plan of care.    If you have questions, please do not hesitate to call me. I look forward to following Katelyn Caba along with you.    Sincerely,    Robert Ryan, VENKAT    Enclosure  CC:  No Recipients    If you would like to receive this communication electronically, please contact [email or web address] or [phone] to request more information on [facility] Link access.    For providers and/or their staff who would like to refer a patient to [facility], please contact us through our one-stop-shop provider referral line, Megan Good, at [phone].    If you feel you have received this communication in error or would no longer like to receive these types of communications, please e-mail [email or web address]

## 2020-06-15 NOTE — PROGRESS NOTES
Subjective:      Patient ID: Katelyn Caba is a 79 y.o. female.    Chief Complaint: Foot Pain (left) and Foot Problem (left - swelling)    Katelyn is a 79 y.o. female who presents to the podiatry clinic  with complaint of  left foot pain. Onset of the symptoms was over a week ago. Precipitating event: none known. Current symptoms include: ability to bear weight, but with some pain, swelling and worsening symptoms after a period of activity. Aggravating factors: any weight bearing. Symptoms have gradually worsened. Patient has had no prior foot problems. Evaluation to date: none. Treatment to date: none. Patients rates pain 3/10 on pain scale.        Review of Systems   Constitution: Negative for chills and fever.   Cardiovascular: Negative for claudication and leg swelling.   Respiratory: Negative for shortness of breath.    Skin: Negative for itching, nail changes and rash.   Musculoskeletal: Negative for muscle cramps, muscle weakness and myalgias.        Left foot pain   Gastrointestinal: Negative for nausea and vomiting.   Neurological: Negative for focal weakness, loss of balance, numbness and paresthesias.           Objective:      Physical Exam  Constitutional:       General: She is not in acute distress.     Appearance: She is well-developed. She is not diaphoretic.   Cardiovascular:      Pulses:           Dorsalis pedis pulses are 2+ on the right side and 2+ on the left side.        Posterior tibial pulses are 2+ on the right side and 2+ on the left side.      Comments: < 3 sec capillary refill time to toes 1-5 bilateral. Toes and feet are warm to touch proximally with normal distal cooling b/l. There is some hair growth on the feet and toes b/l. There is no edema b/l. No spider veins or varicosities present b/l.     Musculoskeletal:      Comments: Equinus noted b/l ankles with < 10 deg DF noted. MMT 5/5 in DF/PF/Inv/Ev resistance with no reproduction of pain in any direction. Passive range of motion of  ankle and pedal joints is painless b/l.    Left foot focal edema to the forefoot, pain with palpation dorsal and plantar around the fourth metatarsal     Skin:     General: Skin is warm and dry.      Coloration: Skin is not pale.      Findings: No abrasion, bruising, burn, ecchymosis, erythema, laceration, lesion, petechiae or rash.      Nails: There is no clubbing.        Comments: Skin temperature, texture and turgor within normal limits.   Neurological:      Mental Status: She is alert and oriented to person, place, and time.      Sensory: No sensory deficit.      Motor: No tremor, atrophy or abnormal muscle tone.      Comments: Negative tinel sign bilateral.   Psychiatric:         Behavior: Behavior normal.               Assessment:       Encounter Diagnoses   Name Primary?    Left foot pain     Stress fracture of metatarsal bone of left foot, initial encounter Yes         Plan:       Katelyn was seen today for foot pain and foot problem.    Diagnoses and all orders for this visit:    Stress fracture of metatarsal bone of left foot, initial encounter    Left foot pain  -     Ambulatory referral/consult to Podiatry      I counseled the patient on her conditions, their implications and medical management.    .Dispensed and fit with a darco shoe to offload the metatarsals with ambulation remain in this for 4-6 weeks until symptoms resolve      I checked x-rays with patient and discussed that x-rays often do not show a stress fracture, if the pain is not resolving with offloading will consider an MRI    Return in 1 month follow up    Robert Ryan DPM

## 2020-06-23 ENCOUNTER — PATIENT OUTREACH (OUTPATIENT)
Dept: ADMINISTRATIVE | Facility: OTHER | Age: 80
End: 2020-06-23

## 2020-06-25 ENCOUNTER — OFFICE VISIT (OUTPATIENT)
Dept: PODIATRY | Facility: CLINIC | Age: 80
End: 2020-06-25
Payer: MEDICARE

## 2020-06-25 VITALS
HEIGHT: 63 IN | SYSTOLIC BLOOD PRESSURE: 160 MMHG | DIASTOLIC BLOOD PRESSURE: 66 MMHG | HEART RATE: 70 BPM | BODY MASS INDEX: 22.27 KG/M2 | WEIGHT: 125.69 LBS

## 2020-06-25 DIAGNOSIS — M77.42 METATARSALGIA, LEFT FOOT: Primary | ICD-10-CM

## 2020-06-25 PROCEDURE — 99999 PR PBB SHADOW E&M-EST. PATIENT-LVL III: CPT | Mod: PBBFAC,,, | Performed by: PODIATRIST

## 2020-06-25 PROCEDURE — 99212 PR OFFICE/OUTPT VISIT, EST, LEVL II, 10-19 MIN: ICD-10-PCS | Mod: S$PBB,,, | Performed by: PODIATRIST

## 2020-06-25 PROCEDURE — 99212 OFFICE O/P EST SF 10 MIN: CPT | Mod: S$PBB,,, | Performed by: PODIATRIST

## 2020-06-25 PROCEDURE — 99999 PR PBB SHADOW E&M-EST. PATIENT-LVL III: ICD-10-PCS | Mod: PBBFAC,,, | Performed by: PODIATRIST

## 2020-06-25 PROCEDURE — 99213 OFFICE O/P EST LOW 20 MIN: CPT | Mod: PBBFAC,PN | Performed by: PODIATRIST

## 2020-06-25 NOTE — PROGRESS NOTES
Subjective:      Patient ID: Katelyn Caba is a 79 y.o. female.    Chief Complaint: Follow-up (1 mo ck) and Foot Pain (left - improved - swelling at times )    Katelyn is a 79 y.o. female who presents to the podiatry clinic  with complaint of  left foot pain. Onset of the symptoms was over a week ago. Precipitating event: none known. Current symptoms include: ability to bear weight, but with some pain, swelling and worsening symptoms after a period of activity. Aggravating factors: any weight bearing. Symptoms have gradually worsened. Patient has had no prior foot problems. Evaluation to date: none. Treatment to date: none. Patients rates pain 3/10 on pain scale.    6/25/20: Patient returns for follow up left foot pain, improved with less swelling and pain, occasionally still hurts 3/10 at times. Admits she only wore the darco shoe on and off, no continuously. No other pedal complaints.    Review of Systems   Constitution: Negative for chills and fever.   Cardiovascular: Negative for claudication and leg swelling.   Respiratory: Negative for shortness of breath.    Skin: Negative for itching, nail changes and rash.   Musculoskeletal: Negative for muscle cramps, muscle weakness and myalgias.        Left foot pain   Gastrointestinal: Negative for nausea and vomiting.   Neurological: Negative for focal weakness, loss of balance, numbness and paresthesias.           Objective:      Physical Exam  Constitutional:       General: She is not in acute distress.     Appearance: She is well-developed. She is not diaphoretic.   Cardiovascular:      Pulses:           Dorsalis pedis pulses are 2+ on the right side and 2+ on the left side.        Posterior tibial pulses are 2+ on the right side and 2+ on the left side.      Comments: < 3 sec capillary refill time to toes 1-5 bilateral. Toes and feet are warm to touch proximally with normal distal cooling b/l. There is some hair growth on the feet and toes b/l. There is no edema  b/l. No spider veins or varicosities present b/l.     Musculoskeletal:      Comments: Equinus noted b/l ankles with < 10 deg DF noted. MMT 5/5 in DF/PF/Inv/Ev resistance with no reproduction of pain in any direction. Passive range of motion of ankle and pedal joints is painless b/l.    Left foot less edema to the forefoot, pain still present with palpation dorsal and plantar around the fourth metatarsal     Skin:     General: Skin is warm and dry.      Coloration: Skin is not pale.      Findings: No abrasion, bruising, burn, ecchymosis, erythema, laceration, lesion, petechiae or rash.      Nails: There is no clubbing.        Comments: Skin temperature, texture and turgor within normal limits.   Neurological:      Mental Status: She is alert and oriented to person, place, and time.      Sensory: No sensory deficit.      Motor: No tremor, atrophy or abnormal muscle tone.      Comments: Negative tinel sign bilateral.   Psychiatric:         Behavior: Behavior normal.               Assessment:       Encounter Diagnosis   Name Primary?    Metatarsalgia, left foot Yes         Plan:       Katelyn was seen today for follow-up and foot pain.    Diagnoses and all orders for this visit:    Metatarsalgia, left foot      I counseled the patient on her conditions, their implications and medical management.      Patient will obtain over the counter arch supports and wear them in shoes whenever possible.  Athletic shoes intended for walking or running are usually best.    Avoid barefoot or flats    Consider MRI if pain persists or worsens    Return SILVANA Ryan DPM

## 2020-07-15 ENCOUNTER — TELEPHONE (OUTPATIENT)
Dept: PODIATRY | Facility: CLINIC | Age: 80
End: 2020-07-15

## 2020-07-15 NOTE — TELEPHONE ENCOUNTER
Patient stated that she is still having soreness and swelling left foot.  Pain about 2 out of 10.  Asking what the next step is due to the swelling. Please advise.

## 2020-07-15 NOTE — TELEPHONE ENCOUNTER
----- Message from Vianca Ott sent at 7/15/2020 12:47 PM CDT -----  Regarding: questions  Pt needing a call regarding her foot is still swelling and pt will like to know what should she do . Please contact pt     Pt contact #[phone]

## 2020-07-15 NOTE — TELEPHONE ENCOUNTER
----- Message from Seema Gregg sent at 7/15/2020 12:54 PM CDT -----  Type: Needs Medical Advice  Who Called:  patient  Best Call Back Number: [phone]  Additional Information: patient had OV on 06/25/2020. Pt states that she was told to call office if problem does not get better. She states that he foot is still swollen. Please give call back

## 2020-07-16 DIAGNOSIS — M77.42 METATARSALGIA, LEFT FOOT: Primary | ICD-10-CM

## 2020-07-16 DIAGNOSIS — M84.375A STRESS FRACTURE OF METATARSAL BONE OF LEFT FOOT, INITIAL ENCOUNTER: ICD-10-CM

## 2020-07-16 DIAGNOSIS — M79.672 LEFT FOOT PAIN: ICD-10-CM

## 2020-07-16 NOTE — TELEPHONE ENCOUNTER
----- Message from Evelin Amaral sent at 7/16/2020 11:02 AM CDT -----  Regarding: returning call  Contact: Patient  Type:  Patient Returning Call    Who Called:  Patient   Who Left Message for Patient:  Justa  Does the patient know what this is regarding?:  No  Best Call Back Number:  [phone] ([place]) or [phone]    Additional Information:  Patient returning call

## 2020-07-16 NOTE — TELEPHONE ENCOUNTER
Spoke with patient.  Appointments made.        Robert Ryan, VENKAT Kim LPN   Caller: Unspecified (Yesterday, 12:50 PM)             I put in an order for an MRI please schedule the MRI and make an appointment to come see me after the MRI

## 2020-07-30 ENCOUNTER — HOSPITAL ENCOUNTER (OUTPATIENT)
Dept: RADIOLOGY | Facility: HOSPITAL | Age: 80
Discharge: HOME | End: 2020-07-30
Attending: PODIATRIST
Payer: MEDICARE

## 2020-07-30 DIAGNOSIS — M77.42 METATARSALGIA, LEFT FOOT: ICD-10-CM

## 2020-07-30 DIAGNOSIS — M84.375A STRESS FRACTURE OF METATARSAL BONE OF LEFT FOOT, INITIAL ENCOUNTER: ICD-10-CM

## 2020-07-30 PROCEDURE — 73718 MRI LOWER EXTREMITY W/O DYE: CPT | Mod: 26,LT,, | Performed by: RADIOLOGY

## 2020-07-30 PROCEDURE — 73718 MRI FOOT (FOREFOOT) LEFT WITHOUT CONTRAST: ICD-10-PCS | Mod: 26,LT,, | Performed by: RADIOLOGY

## 2020-07-30 PROCEDURE — 73718 MRI LOWER EXTREMITY W/O DYE: CPT | Mod: TC,PO,LT

## 2020-08-03 ENCOUNTER — PATIENT OUTREACH (OUTPATIENT)
Dept: ADMINISTRATIVE | Facility: OTHER | Age: 80
End: 2020-08-03

## 2020-08-03 NOTE — PROGRESS NOTES
Requested updates within Care Everywhere.  Patient's chart was reviewed for overdue [facility] topics.  Immunizations reconciled.

## 2020-08-05 ENCOUNTER — OFFICE VISIT (OUTPATIENT)
Dept: PODIATRY | Facility: CLINIC | Age: 80
End: 2020-08-05
Payer: MEDICARE

## 2020-08-05 VITALS — BODY MASS INDEX: 22.27 KG/M2 | WEIGHT: 125.69 LBS | HEIGHT: 63 IN

## 2020-08-05 DIAGNOSIS — M84.375G STRESS FRACTURE OF METATARSAL BONE OF LEFT FOOT WITH DELAYED HEALING, SUBSEQUENT ENCOUNTER: Primary | ICD-10-CM

## 2020-08-05 PROCEDURE — 99213 OFFICE O/P EST LOW 20 MIN: CPT | Mod: S$PBB,,, | Performed by: PODIATRIST

## 2020-08-05 PROCEDURE — 99999 PR PBB SHADOW E&M-EST. PATIENT-LVL III: CPT | Mod: PBBFAC,,, | Performed by: PODIATRIST

## 2020-08-05 PROCEDURE — 99213 PR OFFICE/OUTPT VISIT, EST, LEVL III, 20-29 MIN: ICD-10-PCS | Mod: S$PBB,,, | Performed by: PODIATRIST

## 2020-08-05 PROCEDURE — 99213 OFFICE O/P EST LOW 20 MIN: CPT | Mod: PBBFAC,PN | Performed by: PODIATRIST

## 2020-08-05 PROCEDURE — 99999 PR PBB SHADOW E&M-EST. PATIENT-LVL III: ICD-10-PCS | Mod: PBBFAC,,, | Performed by: PODIATRIST

## 2020-08-05 NOTE — PROGRESS NOTES
Subjective:      Patient ID: Katelyn Caba is a 79 y.o. female.    Chief Complaint: Foot Pain (left - discuss MRI results)    Katelyn is a 79 y.o. female who presents to the podiatry clinic  with complaint of  left foot pain. Onset of the symptoms was over a week ago. Precipitating event: none known. Current symptoms include: ability to bear weight, but with some pain, swelling and worsening symptoms after a period of activity. Aggravating factors: any weight bearing. Symptoms have gradually worsened. Patient has had no prior foot problems. Evaluation to date: none. Treatment to date: none. Patients rates pain 3/10 on pain scale.    6/25/20: Patient returns for follow up left foot pain, improved with less swelling and pain, occasionally still hurts 3/10 at times. Admits she only wore the darco shoe on and off, no continuously. No other pedal complaints.    8/5/20: Patient returns for follow up left foot pain still painful and swells at times. MRI was done and here to discuss results and further treatment options.    Review of Systems   Constitution: Negative for chills and fever.   Cardiovascular: Negative for claudication and leg swelling.   Respiratory: Negative for shortness of breath.    Skin: Negative for itching, nail changes and rash.   Musculoskeletal: Negative for muscle cramps, muscle weakness and myalgias.        Left foot pain   Gastrointestinal: Negative for nausea and vomiting.   Neurological: Negative for focal weakness, loss of balance, numbness and paresthesias.           Objective:      Physical Exam  Constitutional:       General: She is not in acute distress.     Appearance: She is well-developed. She is not diaphoretic.   Cardiovascular:      Pulses:           Dorsalis pedis pulses are 2+ on the right side and 2+ on the left side.        Posterior tibial pulses are 2+ on the right side and 2+ on the left side.      Comments: < 3 sec capillary refill time to toes 1-5 bilateral. Toes and feet  are warm to touch proximally with normal distal cooling b/l. There is some hair growth on the feet and toes b/l. There is no edema b/l. No spider veins or varicosities present b/l.     Musculoskeletal:      Comments: Equinus noted b/l ankles with < 10 deg DF noted. MMT 5/5 in DF/PF/Inv/Ev resistance with no reproduction of pain in any direction. Passive range of motion of ankle and pedal joints is painless b/l.    Left foot pain present with palpation dorsal and plantar around the fourth metatarsal still present     Skin:     General: Skin is warm and dry.      Coloration: Skin is not pale.      Findings: No abrasion, bruising, burn, ecchymosis, erythema, laceration, lesion, petechiae or rash.      Nails: There is no clubbing.        Comments: Skin temperature, texture and turgor within normal limits.   Neurological:      Mental Status: She is alert and oriented to person, place, and time.      Sensory: No sensory deficit.      Motor: No tremor, atrophy or abnormal muscle tone.      Comments: Negative tinel sign bilateral.   Psychiatric:         Behavior: Behavior normal.               Assessment:       Encounter Diagnosis   Name Primary?    Stress fracture of metatarsal bone of left foot with delayed healing, subsequent encounter Yes         Plan:       Katelyn was seen today for foot pain.    Diagnoses and all orders for this visit:    Stress fracture of metatarsal bone of left foot with delayed healing, subsequent encounter      I counseled the patient on her conditions, their implications and medical management.    MRI reveals a stress fracture to the 4-5 metatarsals and the lateral cuneiform.     Dispensed, fitted and gait trained with orthopedic boot left foot to be worn at all times when weight bearing    Return in 1 month follow up    If pain is still present will have to consider casting     Robert Ryan DPM

## 2020-08-12 ENCOUNTER — PATIENT OUTREACH (OUTPATIENT)
Dept: ADMINISTRATIVE | Facility: HOSPITAL | Age: 80
End: 2020-08-12

## 2020-08-12 DIAGNOSIS — Z13.6 ENCOUNTER FOR LIPID SCREENING FOR CARDIOVASCULAR DISEASE: Primary | ICD-10-CM

## 2020-08-12 DIAGNOSIS — Z78.0 POSTMENOPAUSAL: ICD-10-CM

## 2020-08-12 DIAGNOSIS — Z13.220 ENCOUNTER FOR LIPID SCREENING FOR CARDIOVASCULAR DISEASE: Primary | ICD-10-CM

## 2020-09-02 ENCOUNTER — OFFICE VISIT (OUTPATIENT)
Dept: PODIATRY | Facility: CLINIC | Age: 80
End: 2020-09-02
Payer: MEDICARE

## 2020-09-02 VITALS — WEIGHT: 125.69 LBS | BODY MASS INDEX: 22.27 KG/M2 | HEIGHT: 63 IN

## 2020-09-02 DIAGNOSIS — M84.375G STRESS FRACTURE OF METATARSAL BONE OF LEFT FOOT WITH DELAYED HEALING, SUBSEQUENT ENCOUNTER: Primary | ICD-10-CM

## 2020-09-02 PROCEDURE — 99212 OFFICE O/P EST SF 10 MIN: CPT | Mod: S$PBB,,, | Performed by: PODIATRIST

## 2020-09-02 PROCEDURE — 99999 PR PBB SHADOW E&M-EST. PATIENT-LVL III: ICD-10-PCS | Mod: PBBFAC,,, | Performed by: PODIATRIST

## 2020-09-02 PROCEDURE — 99999 PR PBB SHADOW E&M-EST. PATIENT-LVL III: CPT | Mod: PBBFAC,,, | Performed by: PODIATRIST

## 2020-09-02 PROCEDURE — 99212 PR OFFICE/OUTPT VISIT, EST, LEVL II, 10-19 MIN: ICD-10-PCS | Mod: S$PBB,,, | Performed by: PODIATRIST

## 2020-09-02 PROCEDURE — 99213 OFFICE O/P EST LOW 20 MIN: CPT | Mod: PBBFAC,PN | Performed by: PODIATRIST

## 2020-09-02 NOTE — PROGRESS NOTES
Subjective:      Patient ID: Katelyn Caba is a 79 y.o. female.    Chief Complaint: Follow-up (4 wk re-ck - left) and Foot Pain (left - improved)    Katelyn is a 79 y.o. female who presents to the podiatry clinic  with complaint of  left foot pain. Onset of the symptoms was over a week ago. Precipitating event: none known. Current symptoms include: ability to bear weight, but with some pain, swelling and worsening symptoms after a period of activity. Aggravating factors: any weight bearing. Symptoms have gradually worsened. Patient has had no prior foot problems. Evaluation to date: none. Treatment to date: none. Patients rates pain 3/10 on pain scale.    6/25/20: Patient returns for follow up left foot pain, improved with less swelling and pain, occasionally still hurts 3/10 at times. Admits she only wore the darco shoe on and off, no continuously. No other pedal complaints.    8/5/20: Patient returns for follow up left foot pain still painful and swells at times. MRI was done and here to discuss results and further treatment options.    9/2/20: patient returns for follow up left foot stress fractures, has been in the tall orthopedic boot for 4 weeks and is in no pain at this time     Review of Systems   Constitution: Negative for chills and fever.   Cardiovascular: Negative for claudication and leg swelling.   Respiratory: Negative for shortness of breath.    Skin: Negative for itching, nail changes and rash.   Musculoskeletal: Negative for muscle cramps, muscle weakness and myalgias.        Left foot pain   Gastrointestinal: Negative for nausea and vomiting.   Neurological: Negative for focal weakness, loss of balance, numbness and paresthesias.           Objective:      Physical Exam  Constitutional:       General: She is not in acute distress.     Appearance: She is well-developed. She is not diaphoretic.   Cardiovascular:      Pulses:           Dorsalis pedis pulses are 2+ on the right side and 2+ on the  left side.        Posterior tibial pulses are 2+ on the right side and 2+ on the left side.      Comments: < 3 sec capillary refill time to toes 1-5 bilateral. Toes and feet are warm to touch proximally with normal distal cooling b/l. There is some hair growth on the feet and toes b/l. There is no edema b/l. No spider veins or varicosities present b/l.     Musculoskeletal:      Comments: Equinus noted b/l ankles with < 10 deg DF noted. MMT 5/5 in DF/PF/Inv/Ev resistance with no reproduction of pain in any direction. Passive range of motion of ankle and pedal joints is painless b/l.    Left foot pain minimal with palpation     Skin:     General: Skin is warm and dry.      Coloration: Skin is not pale.      Findings: No abrasion, bruising, burn, ecchymosis, erythema, laceration, lesion, petechiae or rash.      Nails: There is no clubbing.        Comments: Skin temperature, texture and turgor within normal limits.   Neurological:      Mental Status: She is alert and oriented to person, place, and time.      Sensory: No sensory deficit.      Motor: No tremor, atrophy or abnormal muscle tone.      Comments: Negative tinel sign bilateral.   Psychiatric:         Behavior: Behavior normal.               Assessment:       Encounter Diagnosis   Name Primary?    Stress fracture of metatarsal bone of left foot with delayed healing, subsequent encounter Yes         Plan:       Katelyn was seen today for follow-up and foot pain.    Diagnoses and all orders for this visit:    Stress fracture of metatarsal bone of left foot with delayed healing, subsequent encounter      I counseled the patient on her conditions, their implications and medical management.    MRI reveals a stress fracture to the 4-5 metatarsals and the lateral cuneiform.     Doing well can begin to transition back to supportive shoes to toleration if the pain returns though I recommend going back into the boot for 2 more weeks and then trying again    Return  SARA DomínguezM

## 2020-12-29 ENCOUNTER — PATIENT OUTREACH (OUTPATIENT)
Dept: ADMINISTRATIVE | Facility: OTHER | Age: 80
End: 2020-12-29

## 2020-12-29 NOTE — PROGRESS NOTES
Health Maintenance Due   Topic Date Due    Shingles Vaccine (1 of 2) 10/08/1988    Pneumococcal Vaccine (65+ Low/Medium Risk) (1 of 2 - PCV13) 10/08/2003    Influenza Vaccine (1) 08/01/2020     Updates were requested from care everywhere.  Chart was reviewed for overdue Proactive [facility] Encounters ([facility]) topics (CRS, Breast Cancer Screening, Eye exam)  Health Maintenance has been updated.  LINKS immunization registry triggered.  Immunizations were reconciled.

## 2021-01-04 ENCOUNTER — HOSPITAL ENCOUNTER (OUTPATIENT)
Dept: RADIOLOGY | Facility: HOSPITAL | Age: 81
Discharge: HOME | End: 2021-01-04
Attending: PODIATRIST
Payer: MEDICARE

## 2021-01-04 ENCOUNTER — TELEPHONE (OUTPATIENT)
Dept: PODIATRY | Facility: CLINIC | Age: 81
End: 2021-01-04

## 2021-01-04 ENCOUNTER — OFFICE VISIT (OUTPATIENT)
Dept: PODIATRY | Facility: CLINIC | Age: 81
End: 2021-01-04
Payer: MEDICARE

## 2021-01-04 VITALS — WEIGHT: 125.69 LBS | HEIGHT: 63 IN | BODY MASS INDEX: 22.27 KG/M2

## 2021-01-04 DIAGNOSIS — M76.822 POSTERIOR TIBIAL TENDONITIS, LEFT: Primary | ICD-10-CM

## 2021-01-04 DIAGNOSIS — M76.822 POSTERIOR TIBIAL TENDONITIS, LEFT: ICD-10-CM

## 2021-01-04 DIAGNOSIS — M84.372A STRESS FRACTURE OF LEFT ANKLE, INITIAL ENCOUNTER: ICD-10-CM

## 2021-01-04 PROCEDURE — 73610 XR ANKLE COMPLETE 3 VIEW LEFT: ICD-10-PCS | Mod: 26,LT,, | Performed by: RADIOLOGY

## 2021-01-04 PROCEDURE — 99213 PR OFFICE/OUTPT VISIT, EST, LEVL III, 20-29 MIN: ICD-10-PCS | Mod: S$PBB,,, | Performed by: PODIATRIST

## 2021-01-04 PROCEDURE — 99999 PR PBB SHADOW E&M-EST. PATIENT-LVL III: ICD-10-PCS | Mod: PBBFAC,,, | Performed by: PODIATRIST

## 2021-01-04 PROCEDURE — 73610 X-RAY EXAM OF ANKLE: CPT | Mod: TC,PO,LT

## 2021-01-04 PROCEDURE — 73610 X-RAY EXAM OF ANKLE: CPT | Mod: 26,LT,, | Performed by: RADIOLOGY

## 2021-01-04 PROCEDURE — 99213 OFFICE O/P EST LOW 20 MIN: CPT | Mod: PBBFAC,PN,25 | Performed by: PODIATRIST

## 2021-01-04 PROCEDURE — 99999 PR PBB SHADOW E&M-EST. PATIENT-LVL III: CPT | Mod: PBBFAC,,, | Performed by: PODIATRIST

## 2021-01-04 PROCEDURE — 99213 OFFICE O/P EST LOW 20 MIN: CPT | Mod: S$PBB,,, | Performed by: PODIATRIST

## 2021-01-25 ENCOUNTER — OFFICE VISIT (OUTPATIENT)
Dept: PODIATRY | Facility: CLINIC | Age: 81
End: 2021-01-25
Payer: MEDICARE

## 2021-01-25 VITALS — HEIGHT: 63 IN | WEIGHT: 125.69 LBS | BODY MASS INDEX: 22.27 KG/M2

## 2021-01-25 DIAGNOSIS — M84.372D STRESS FRACTURE OF LEFT ANKLE WITH ROUTINE HEALING, SUBSEQUENT ENCOUNTER: Primary | ICD-10-CM

## 2021-01-25 PROCEDURE — 99213 OFFICE O/P EST LOW 20 MIN: CPT | Mod: PBBFAC,PN | Performed by: PODIATRIST

## 2021-01-25 PROCEDURE — 99212 PR OFFICE/OUTPT VISIT, EST, LEVL II, 10-19 MIN: ICD-10-PCS | Mod: S$PBB,,, | Performed by: PODIATRIST

## 2021-01-25 PROCEDURE — 99999 PR PBB SHADOW E&M-EST. PATIENT-LVL III: ICD-10-PCS | Mod: PBBFAC,,, | Performed by: PODIATRIST

## 2021-01-25 PROCEDURE — 99212 OFFICE O/P EST SF 10 MIN: CPT | Mod: S$PBB,,, | Performed by: PODIATRIST

## 2021-01-25 PROCEDURE — 99999 PR PBB SHADOW E&M-EST. PATIENT-LVL III: CPT | Mod: PBBFAC,,, | Performed by: PODIATRIST

## 2021-02-01 ENCOUNTER — HOSPITAL ENCOUNTER (OUTPATIENT)
Dept: RADIOLOGY | Facility: HOSPITAL | Age: 81
Discharge: HOME | End: 2021-02-01
Attending: FAMILY MEDICINE
Payer: MEDICARE

## 2021-02-01 DIAGNOSIS — Z78.0 POSTMENOPAUSAL: ICD-10-CM

## 2021-02-01 PROCEDURE — 77080 DXA BONE DENSITY AXIAL: CPT | Mod: TC,PO

## 2021-02-01 PROCEDURE — 77080 DEXA BONE DENSITY SPINE HIP: ICD-10-PCS | Mod: 26,,, | Performed by: RADIOLOGY

## 2021-02-01 PROCEDURE — 77080 DXA BONE DENSITY AXIAL: CPT | Mod: 26,,, | Performed by: RADIOLOGY

## 2021-02-02 RX ORDER — ALENDRONATE SODIUM 70 MG/1
70 TABLET ORAL
Qty: 4 TABLET | Refills: 11 | Status: SHIPPED | OUTPATIENT
Start: 2021-02-02 | End: 2021-10-12 | Stop reason: SDUPTHER

## 2021-02-03 ENCOUNTER — TELEPHONE (OUTPATIENT)
Dept: FAMILY MEDICINE | Facility: CLINIC | Age: 81
End: 2021-02-03

## 2021-10-12 ENCOUNTER — OFFICE VISIT (OUTPATIENT)
Dept: FAMILY MEDICINE | Facility: CLINIC | Age: 81
End: 2021-10-12
Attending: FAMILY MEDICINE
Payer: MEDICARE

## 2021-10-12 ENCOUNTER — LAB VISIT (OUTPATIENT)
Dept: LAB | Facility: HOSPITAL | Age: 81
End: 2021-10-12
Attending: FAMILY MEDICINE
Payer: MEDICARE

## 2021-10-12 VITALS
WEIGHT: 124 LBS | DIASTOLIC BLOOD PRESSURE: 56 MMHG | SYSTOLIC BLOOD PRESSURE: 136 MMHG | HEART RATE: 88 BPM | BODY MASS INDEX: 21.97 KG/M2 | HEIGHT: 63 IN

## 2021-10-12 DIAGNOSIS — D50.9 MICROCYTIC ANEMIA: ICD-10-CM

## 2021-10-12 DIAGNOSIS — Z01.84 ENCOUNTER FOR ANTIBODY RESPONSE EXAMINATION: ICD-10-CM

## 2021-10-12 DIAGNOSIS — Z00.00 ANNUAL PHYSICAL EXAM: Primary | ICD-10-CM

## 2021-10-12 DIAGNOSIS — Z00.00 ANNUAL PHYSICAL EXAM: ICD-10-CM

## 2021-10-12 DIAGNOSIS — Z13.220 SCREENING FOR HYPERCHOLESTEROLEMIA: ICD-10-CM

## 2021-10-12 LAB
ALBUMIN SERPL BCP-MCNC: 4.1 G/DL (ref 3.5–5.2)
ALP SERPL-CCNC: 90 U/L (ref 55–135)
ALT SERPL W/O P-5'-P-CCNC: 8 U/L (ref 10–44)
ANION GAP SERPL CALC-SCNC: 14 MMOL/L (ref 8–16)
AST SERPL-CCNC: 17 U/L (ref 10–40)
BACTERIA #/AREA URNS AUTO: ABNORMAL /HPF
BASOPHILS # BLD AUTO: 0.09 K/UL (ref 0–0.2)
BASOPHILS NFR BLD: 1.5 % (ref 0–1.9)
BILIRUB SERPL-MCNC: 0.2 MG/DL (ref 0.1–1)
BILIRUB UR QL STRIP: NEGATIVE
BUN SERPL-MCNC: 13 MG/DL (ref 8–23)
CALCIUM SERPL-MCNC: 9.7 MG/DL (ref 8.7–10.5)
CHLORIDE SERPL-SCNC: 106 MMOL/L (ref 95–110)
CLARITY UR REFRACT.AUTO: CLEAR
CO2 SERPL-SCNC: 21 MMOL/L (ref 23–29)
COLOR UR AUTO: YELLOW
CREAT SERPL-MCNC: 0.6 MG/DL (ref 0.5–1.4)
DIFFERENTIAL METHOD: ABNORMAL
EOSINOPHIL # BLD AUTO: 0.2 K/UL (ref 0–0.5)
EOSINOPHIL NFR BLD: 3.5 % (ref 0–8)
ERYTHROCYTE [DISTWIDTH] IN BLOOD BY AUTOMATED COUNT: 14.3 % (ref 11.5–14.5)
EST. GFR  (AFRICAN AMERICAN): >60 ML/MIN/1.73 M^2
EST. GFR  (NON AFRICAN AMERICAN): >60 ML/MIN/1.73 M^2
GLUCOSE SERPL-MCNC: 81 MG/DL (ref 70–110)
GLUCOSE UR QL STRIP: NEGATIVE
HCT VFR BLD AUTO: 32.6 % (ref 37–48.5)
HGB BLD-MCNC: 9.9 G/DL (ref 12–16)
HGB UR QL STRIP: NEGATIVE
IMM GRANULOCYTES # BLD AUTO: 0.01 K/UL (ref 0–0.04)
IMM GRANULOCYTES NFR BLD AUTO: 0.2 % (ref 0–0.5)
KETONES UR QL STRIP: NEGATIVE
LEUKOCYTE ESTERASE UR QL STRIP: ABNORMAL
LYMPHOCYTES # BLD AUTO: 1.8 K/UL (ref 1–4.8)
LYMPHOCYTES NFR BLD: 30.3 % (ref 18–48)
MCH RBC QN AUTO: 26.5 PG (ref 27–31)
MCHC RBC AUTO-ENTMCNC: 30.4 G/DL (ref 32–36)
MCV RBC AUTO: 87 FL (ref 82–98)
MICROSCOPIC COMMENT: ABNORMAL
MONOCYTES # BLD AUTO: 0.7 K/UL (ref 0.3–1)
MONOCYTES NFR BLD: 12.2 % (ref 4–15)
NEUTROPHILS # BLD AUTO: 3.2 K/UL (ref 1.8–7.7)
NEUTROPHILS NFR BLD: 52.3 % (ref 38–73)
NITRITE UR QL STRIP: NEGATIVE
NRBC BLD-RTO: 0 /100 WBC
PH UR STRIP: 5 [PH] (ref 5–8)
PLATELET # BLD AUTO: 440 K/UL (ref 150–450)
PMV BLD AUTO: 10.6 FL (ref 9.2–12.9)
POTASSIUM SERPL-SCNC: 4.5 MMOL/L (ref 3.5–5.1)
PROT SERPL-MCNC: 7.4 G/DL (ref 6–8.4)
PROT UR QL STRIP: NEGATIVE
RBC # BLD AUTO: 3.73 M/UL (ref 4–5.4)
RBC #/AREA URNS AUTO: 1 /HPF (ref 0–4)
SODIUM SERPL-SCNC: 141 MMOL/L (ref 136–145)
SP GR UR STRIP: 1.01 (ref 1–1.03)
SQUAMOUS #/AREA URNS AUTO: 1 /HPF
URN SPEC COLLECT METH UR: ABNORMAL
WBC # BLD AUTO: 6.07 K/UL (ref 3.9–12.7)
WBC #/AREA URNS AUTO: 3 /HPF (ref 0–5)

## 2021-10-12 PROCEDURE — 36415 COLL VENOUS BLD VENIPUNCTURE: CPT | Mod: PO | Performed by: FAMILY MEDICINE

## 2021-10-12 PROCEDURE — 80053 COMPREHEN METABOLIC PANEL: CPT | Performed by: FAMILY MEDICINE

## 2021-10-12 PROCEDURE — 99999 PR PBB SHADOW E&M-EST. PATIENT-LVL III: ICD-10-PCS | Mod: PBBFAC,,, | Performed by: FAMILY MEDICINE

## 2021-10-12 PROCEDURE — 99214 OFFICE O/P EST MOD 30 MIN: CPT | Mod: S$PBB,,, | Performed by: FAMILY MEDICINE

## 2021-10-12 PROCEDURE — 99214 PR OFFICE/OUTPT VISIT, EST, LEVL IV, 30-39 MIN: ICD-10-PCS | Mod: S$PBB,,, | Performed by: FAMILY MEDICINE

## 2021-10-12 PROCEDURE — 99999 PR PBB SHADOW E&M-EST. PATIENT-LVL III: CPT | Mod: PBBFAC,,, | Performed by: FAMILY MEDICINE

## 2021-10-12 PROCEDURE — 86769 SARS-COV-2 COVID-19 ANTIBODY: CPT | Performed by: FAMILY MEDICINE

## 2021-10-12 PROCEDURE — 99213 OFFICE O/P EST LOW 20 MIN: CPT | Mod: PBBFAC,PO | Performed by: FAMILY MEDICINE

## 2021-10-12 PROCEDURE — 85025 COMPLETE CBC W/AUTO DIFF WBC: CPT | Performed by: FAMILY MEDICINE

## 2021-10-12 PROCEDURE — 81001 URINALYSIS AUTO W/SCOPE: CPT | Performed by: FAMILY MEDICINE

## 2021-10-12 RX ORDER — ALENDRONATE SODIUM 70 MG/1
70 TABLET ORAL
Qty: 4 TABLET | Refills: 11 | Status: SHIPPED | OUTPATIENT
Start: 2021-10-12 | End: 2023-07-11

## 2021-10-12 RX ORDER — FERROUS SULFATE 325(65) MG
325 TABLET ORAL 2 TIMES DAILY
Qty: 180 TABLET | Refills: 0 | Status: SHIPPED | OUTPATIENT
Start: 2021-10-12 | End: 2022-08-15 | Stop reason: SDUPTHER

## 2021-10-13 LAB
SARS-COV-2 IGG SERPL IA-ACNC: 398.1 AU/ML
SARS-COV-2 IGG SERPL QL IA: POSITIVE

## 2021-10-18 ENCOUNTER — TELEPHONE (OUTPATIENT)
Dept: FAMILY MEDICINE | Facility: CLINIC | Age: 81
End: 2021-10-18

## 2021-10-22 ENCOUNTER — HOSPITAL ENCOUNTER (OUTPATIENT)
Dept: CARDIOLOGY | Facility: OTHER | Age: 81
Discharge: HOME | End: 2021-10-22
Attending: FAMILY MEDICINE
Payer: MEDICARE

## 2021-10-22 ENCOUNTER — HOSPITAL ENCOUNTER (OUTPATIENT)
Dept: RADIOLOGY | Facility: OTHER | Age: 81
Discharge: HOME | End: 2021-10-22
Attending: FAMILY MEDICINE
Payer: MEDICARE

## 2021-10-22 DIAGNOSIS — Z00.00 ANNUAL PHYSICAL EXAM: ICD-10-CM

## 2021-10-22 DIAGNOSIS — D50.9 MICROCYTIC ANEMIA: ICD-10-CM

## 2021-10-22 PROCEDURE — 93005 ELECTROCARDIOGRAM TRACING: CPT

## 2021-10-22 PROCEDURE — 93010 EKG 12-LEAD: ICD-10-PCS | Mod: ,,, | Performed by: INTERNAL MEDICINE

## 2021-10-22 PROCEDURE — 71046 X-RAY EXAM CHEST 2 VIEWS: CPT | Mod: TC,FY

## 2021-10-22 PROCEDURE — 71046 XR CHEST PA AND LATERAL: ICD-10-PCS | Mod: 26,,, | Performed by: STUDENT IN AN ORGANIZED HEALTH CARE EDUCATION/TRAINING PROGRAM

## 2021-10-22 PROCEDURE — 71046 X-RAY EXAM CHEST 2 VIEWS: CPT | Mod: 26,,, | Performed by: STUDENT IN AN ORGANIZED HEALTH CARE EDUCATION/TRAINING PROGRAM

## 2021-10-22 PROCEDURE — 93010 ELECTROCARDIOGRAM REPORT: CPT | Mod: ,,, | Performed by: INTERNAL MEDICINE

## 2022-06-30 DIAGNOSIS — N64.9 DISORDER OF BREAST, UNSPECIFIED: ICD-10-CM

## 2022-06-30 DIAGNOSIS — N63.0 BREAST LUMP IN FEMALE: Primary | ICD-10-CM

## 2022-07-01 ENCOUNTER — TELEPHONE (OUTPATIENT)
Dept: FAMILY MEDICINE | Facility: CLINIC | Age: 82
End: 2022-07-01
Payer: MEDICARE

## 2022-07-01 NOTE — TELEPHONE ENCOUNTER
----- Message from Brooklyn Burnham MD sent at 6/30/2022  3:07 PM CDT -----  Regarding: mammogram  Please help pt schedule a diagnostic mammogram asap

## 2022-07-01 NOTE — TELEPHONE ENCOUNTER
----- Message from Shreya Zamudio sent at 7/1/2022 12:11 PM CDT -----  Regarding: Requesting a sooner appt  Contact: DANIELLE MENDOZA [[id number]]  Name of Who is Calling:DANIELLE MENDOZA [[id number]]          What is the request in detail:   Pt states she is is calling in regards to being seen, she is looking for a sooner appt. Please advise         Can the clinic reply by [facility]:  No         What Number to Call Back if not in [facility]:[phone]

## 2022-07-14 ENCOUNTER — HOSPITAL ENCOUNTER (OUTPATIENT)
Dept: RADIOLOGY | Facility: HOSPITAL | Age: 82
Discharge: HOME | End: 2022-07-14
Attending: FAMILY MEDICINE
Payer: MEDICARE

## 2022-07-14 VITALS — HEIGHT: 63 IN | BODY MASS INDEX: 21.79 KG/M2 | WEIGHT: 123 LBS

## 2022-07-14 DIAGNOSIS — N64.9 DISORDER OF BREAST, UNSPECIFIED: ICD-10-CM

## 2022-07-14 DIAGNOSIS — N63.0 BREAST LUMP IN FEMALE: ICD-10-CM

## 2022-07-14 PROCEDURE — 77062 BREAST TOMOSYNTHESIS BI: CPT | Mod: 26,,, | Performed by: RADIOLOGY

## 2022-07-14 PROCEDURE — 76642 ULTRASOUND BREAST LIMITED: CPT | Mod: 26,RT,, | Performed by: RADIOLOGY

## 2022-07-14 PROCEDURE — 76642 US BREAST RIGHT LIMITED: ICD-10-PCS | Mod: 26,RT,, | Performed by: RADIOLOGY

## 2022-07-14 PROCEDURE — 77062 BREAST TOMOSYNTHESIS BI: CPT | Mod: TC

## 2022-07-14 PROCEDURE — 77066 MAMMO DIGITAL DIAGNOSTIC BILAT WITH TOMO: ICD-10-PCS | Mod: 26,,, | Performed by: RADIOLOGY

## 2022-07-14 PROCEDURE — 77066 DX MAMMO INCL CAD BI: CPT | Mod: 26,,, | Performed by: RADIOLOGY

## 2022-07-14 PROCEDURE — 77062 MAMMO DIGITAL DIAGNOSTIC BILAT WITH TOMO: ICD-10-PCS | Mod: 26,,, | Performed by: RADIOLOGY

## 2022-07-15 ENCOUNTER — TELEPHONE (OUTPATIENT)
Dept: FAMILY MEDICINE | Facility: CLINIC | Age: 82
End: 2022-07-15
Payer: MEDICARE

## 2022-07-15 NOTE — TELEPHONE ENCOUNTER
----- Message from Brooklyn Burnham MD sent at 7/15/2022 12:55 PM CDT -----  Please let pt know the breast ultrasound is normal but a mammogram in 1 year is suggested

## 2022-07-20 ENCOUNTER — TELEPHONE (OUTPATIENT)
Dept: FAMILY MEDICINE | Facility: CLINIC | Age: 82
End: 2022-07-20
Payer: MEDICARE

## 2022-07-20 ENCOUNTER — TELEPHONE (OUTPATIENT)
Dept: NEUROLOGY | Facility: CLINIC | Age: 82
End: 2022-07-20
Payer: MEDICARE

## 2022-07-20 DIAGNOSIS — R47.9 SPEAKING DIFFICULTY: Primary | ICD-10-CM

## 2022-07-20 PROBLEM — G45.9 TIA (TRANSIENT ISCHEMIC ATTACK): Status: ACTIVE | Noted: 2022-07-20

## 2022-07-20 NOTE — TELEPHONE ENCOUNTER
Spoke with patient's son appointment scheduled for July 20 at 8 am with Raisa Mcenal NP. Patient's son v/u of date and location.

## 2022-07-20 NOTE — TELEPHONE ENCOUNTER
----- Message from Scott Reyna sent at 7/20/2022 12:34 PM CDT -----  Regarding: new patient tanvi  Name of Who is Calling: Merry (son)on behalf DANIELLE MENDOZA [[id number]]           What is the request in detail: patient request call back for appointment to be scheduled sooner than next available appointment           Can the clinic reply by [facility]: no           What Number to Call Back if not in [facility]: merry(son) [phone]

## 2022-07-20 NOTE — TELEPHONE ENCOUNTER
----- Message from Brooklyn Burnham MD sent at 7/20/2022 12:28 PM CDT -----  Regarding: neurology appt  Please call son merry [phone] to set up an appt with neurology as his mother zunilda neri is having intermittent trouble speaking she refuses ED visit.

## 2022-07-20 NOTE — TELEPHONE ENCOUNTER
----- Message from Aidee Pichardo sent at 7/20/2022  2:06 PM CDT -----   Name of Who is Calling:     What is the request in detail:  request call back in reference to new patient appointment / patient referred by fidel fine  Please contact to further discuss and advise      Can the clinic reply by [facility]:     [facility]:  merry / son/ [phone]

## 2022-07-21 PROBLEM — E78.00 PURE HYPERCHOLESTEROLEMIA: Chronic | Status: ACTIVE | Noted: 2022-07-21

## 2022-07-21 PROBLEM — I63.512 ACUTE ISCHEMIC LEFT MCA STROKE: Status: ACTIVE | Noted: 2022-07-20

## 2022-07-21 PROBLEM — H53.461: Status: ACTIVE | Noted: 2022-07-21

## 2022-07-22 ENCOUNTER — TELEPHONE (OUTPATIENT)
Dept: NEUROLOGY | Facility: CLINIC | Age: 82
End: 2022-07-22
Payer: MEDICARE

## 2022-07-22 NOTE — TELEPHONE ENCOUNTER
----- Message from Lars Khalil sent at 7/22/2022  3:59 PM CDT -----  Regarding: Hosp F/U [facility] Stroke discharge 7/22/22  Type:  Sooner Appointment Request    Caller is requesting a sooner appointment.  Caller declined first available appointment listed below.  Caller will not accept being placed on the waitlist and is requesting a message be sent to doctor.    Name of Caller:  [facility] Scheduling Discharge    When is the first available appointment?  9/26/22    Symptoms:  Hosp F/U [facility] Stroke discharge 7/22/22    Best Call Back Number:  [phone]      Additional Information:  pt did not make 7/22 appt with ZENAIDA Mcneal b/c was in hospital

## 2022-07-28 ENCOUNTER — OFFICE VISIT (OUTPATIENT)
Dept: NEUROLOGY | Facility: CLINIC | Age: 82
End: 2022-07-28
Payer: MEDICARE

## 2022-07-28 ENCOUNTER — TELEPHONE (OUTPATIENT)
Dept: FAMILY MEDICINE | Facility: CLINIC | Age: 82
End: 2022-07-28
Payer: MEDICARE

## 2022-07-28 VITALS
HEIGHT: 63 IN | SYSTOLIC BLOOD PRESSURE: 141 MMHG | RESPIRATION RATE: 16 BRPM | DIASTOLIC BLOOD PRESSURE: 57 MMHG | BODY MASS INDEX: 21.48 KG/M2 | HEART RATE: 63 BPM | WEIGHT: 121.25 LBS

## 2022-07-28 DIAGNOSIS — R47.9 SPEAKING DIFFICULTY: ICD-10-CM

## 2022-07-28 DIAGNOSIS — E78.00 PURE HYPERCHOLESTEROLEMIA: Chronic | ICD-10-CM

## 2022-07-28 DIAGNOSIS — H53.461 RIGHT HOMONYMOUS INFERIOR QUADRANTANOPIA: ICD-10-CM

## 2022-07-28 DIAGNOSIS — Z86.73 H/O ISCHEMIC LEFT MCA STROKE: Primary | ICD-10-CM

## 2022-07-28 PROCEDURE — 99999 PR PBB SHADOW E&M-EST. PATIENT-LVL V: ICD-10-PCS | Mod: PBBFAC,,, | Performed by: NURSE PRACTITIONER

## 2022-07-28 PROCEDURE — 99215 OFFICE O/P EST HI 40 MIN: CPT | Mod: S$PBB,,, | Performed by: NURSE PRACTITIONER

## 2022-07-28 PROCEDURE — 99999 PR PBB SHADOW E&M-EST. PATIENT-LVL V: CPT | Mod: PBBFAC,,, | Performed by: NURSE PRACTITIONER

## 2022-07-28 PROCEDURE — 99215 PR OFFICE/OUTPT VISIT, EST, LEVL V, 40-54 MIN: ICD-10-PCS | Mod: S$PBB,,, | Performed by: NURSE PRACTITIONER

## 2022-07-28 PROCEDURE — 99215 OFFICE O/P EST HI 40 MIN: CPT | Mod: PBBFAC,PO | Performed by: NURSE PRACTITIONER

## 2022-07-28 NOTE — ASSESSMENT & PLAN NOTE
Patient is a 80 y/o female that presents for hospital follow-up. She presented to the ED with difficulty expressing her thoughts and word finding difficulty.    - now resolved  CTH neg acute  MRI brain with multifocal cortical strokes within the left MCA territory, posterior division   - possibly atheroembolic from focal stenosis verses watershed infarct  CTA without LVO  TTE without PFO or atrial enlargement.     Recommend DAPT x 21 days, followed by ASA monotherapy   Control stroke risk factors:  BP management as per PCP   Cont high intensity statin for LDL goal <70 (149); recommend recheck in 6 mths and consider lower dose. 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    Stroke education provided

## 2022-07-28 NOTE — PATIENT INSTRUCTIONS
You have been diagnosed with a stroke, or with a TIA (transient ischemic attack). Or you have been identified as having a high risk for stroke. During a stroke, blood stops flowing to part of your brain. This can damage areas in the brain that control other parts of the body. Symptoms after a stroke depend on which part of the brain has been affected.      Stroke risk factors  Once youve had a stroke, youre at greater risk for another one. Listed below are some other factors that can increase your risk for a stroke:  High blood pressure  High cholesterol  Cigarette or cigar smoking  Diabetes  Carotid or other artery disease  Atrial fibrillation, atrial flutter, or other heart disease  Not being physically active  Obesity  Certain blood disorders (such as sickle cell anemia)  Excessive alcohol use  Abuse of street drugs  Race  Gender  Family history of stroke  Diet high in salty, fried, or greasy foods    Changes in daily living  Doing your regular tasks may be difficult after youve had a stroke, but you can learn new ways to manage your daily activities. In fact, doing daily activities may help you to regain muscle strength and bring back function to affected limbs. Be patient, give yourself time to adjust, and appreciate the progress you make.    Daily activities  You may be at risk of falling. Make changes to your home to help you walk more easily. A therapist will decide if you need an assistive device to walk safely.  You may need to see an occupational therapist or physical therapist to learn new ways of doing things. For example, you may need to make adjustments when bathing or dressing:    Tips for showering or bathing  Test the water temperature with a hand or foot that was not affected by the stroke.  Use grab bars, a shower seat, a hand-held showerhead, and a long-handled brush.    Tips for getting dressed  Dress while sitting, starting with the affected side or limb.  Wear shirts that pull easily over  your head. Wear pants or skirts with elastic waistbands.  Use zippers with loops attached to the pull tabs.    Lifestyle changes  Take your medicines exactly as directed. Dont skip doses.  Begin an exercise program. Ask your provider how to get started. Also ask how much activity you should try to get on a daily or weekly basis. You can benefit from simple activities such as walking or gardening.  Limit alcohol intake. Men should have no more than 2 alcoholic drinks a day. Women should limit themselves to 1 alcoholic drink per day.  Know your cholesterol level. Follow your providers recommendations about how to keep cholesterol under control.  If you are a smoker, quit now. Join a stop-smoking program to improve your chances of success. Ask your provider about medicines or other methods to help you quit.  Learn stress management techniques to help you deal with stress in your home and work life.    Diet  Your healthcare provider will give you information on dietary changes that you may need to make, based on your situation. Your provider may recommend that you see a registered dietitian for help with diet changes. Changes may include:  Reducing fat and cholesterol intake  Reducing salt (sodium) intake, especially if you have high blood pressure  Eating more fresh vegetables and fruits  Eating more lean proteins, such as fish, poultry, and beans and peas (legumes)  Eating less red meat and processed meats  Using low-fat dairy products  Limiting vegetable oils and nut oils  Limiting sweets and processed foods such as chips, cookies, and baked goods    Follow-up care  Keep your medical appointments. Close follow-up is important to stroke rehabilitation and recovery.  Some medicines require blood tests to check for progress or problems. Keep follow-up appointments for any blood tests ordered by your providers    When to call 911  Call 911 right away if you have any of the following symptoms of stroke:  Weakness,  tingling, or loss of feeling on one side of your face or body  Sudden double vision or trouble seeing in one or both eyes  Sudden trouble talking or slurred speech  Trouble understanding others  Sudden, severe headache  Dizziness, loss of balance, or a sense of falling  Blackouts or seizures      F.A.S.T. is an easy way to remember the signs of stroke. When you see these signs, you know that you need to call 911 fast.  F.A.S.T. stands for:  F is for face drooping. One side of the face is drooping or numb. When the person smiles, the smile is uneven.  A is for arm weakness. One arm is weak or numb. When the person lifts both arms at the same time, one arm may drift downward.  S is for speech difficulty. You may notice slurred speech or trouble speaking. The person can't repeat a simple sentence correctly when asked.  T is for time to call 911. If someone shows any of these symptoms, even if they go away, call 911 immediately. Make note of the time the symptoms first appeared.     © 0459-2738 [employer]. [address] [place] [address], [place] [address]. All rights reserved. This information is not intended as a substitute for professional medical care. Always follow your healthcare professional's instructions.

## 2022-07-28 NOTE — PROGRESS NOTES
"  NEUROLOGY  Outpatient Follow Up    [facility]  [address] [facility], [place] [address]  [phone] (office) / [phone] (fax)    Patient Name:  Katelyn Caba  :  1940  MR #:  [id number]  Acct #:  [id number]    Date of Neurology Visit: 2022  Name of Provider: NEAL Rodríguez    Other Physicians:  Brooklyn Burnham MD (Primary Care Physician); [facility]* (Referring)      Chief Complaint: Stroke      History of Present Illness (HPI):  As per Epic chart review  2022  "Patient presents the emergency department with reports of difficulty expressing her thoughts. History is primarily obtained from the patient's son who is at bedside. Son says that he was with her yesterday and the patient was trying to work on taxes on the computer. About 10 30 in the morning the patient became very anxious but was also having difficulty expressing thoughts and getting words out. Symptoms lasted for few hours but then seemed to resolve. Patient says that since that time she has been still having issues with remembering things and getting certain thoughts out such as remembering her birthday when she arrived. Patient states she has also had problems finding phone numbers on her phone throughout the day today. Patient did not have any episodes of weakness, slurred speech. Patient has no known past medical history."          Interval Hx 2022:   Patient is new to me  Patient is here today for hospital follow-up. She is accompanied by her spouse and son. Since her discharge she reports not ever realizing her vision was affected. Patient believes her speech is improved. Son and spouse state her speech distrubance has resolved. She is currently not driving. All neurological testing discussed with the patient at length.             Past Medical, Surgical, Family & Social History:   Reviewed and updated.    Home Medications:     Current Outpatient Medications:     alendronate " "(FOSAMAX) 70 MG tablet, Take 1 tablet (70 mg total) by mouth every 7 days. (Patient taking differently: Take 70 mg by mouth every Monday.), Disp: 4 tablet, Rfl: 11    aspirin (ECOTRIN) 81 MG EC tablet, Take 1 tablet (81 mg total) by mouth once daily., Disp: , Rfl: 0    atorvastatin (LIPITOR) 80 MG tablet, Take 1 tablet (80 mg total) by mouth every evening., Disp: 30 tablet, Rfl: 3    clopidogreL (PLAVIX) 75 mg tablet, Take 1 tablet (75 mg total) by mouth once daily. for 21 days, Disp: 21 tablet, Rfl: 0    cyanocobalamin (VITAMIN B-12) 1000 MCG tablet, Take 1,000 mcg by mouth once daily., Disp: , Rfl:     ferrous sulfate (FEOSOL) 325 mg (65 mg iron) Tab tablet, Take 1 tablet (325 mg total) by mouth 2 (two) times daily., Disp: 180 tablet, Rfl: 0    mv,Ca,min-folic acid-vit K1 (ONE-A-DAY WOMEN'S 50 PLUS) 400-20 mcg Tab, Take 1 tablet by mouth once daily., Disp: , Rfl:     RESTASIS 0.05 % ophthalmic emulsion, Place 1 drop into both eyes 2 (two) times daily., Disp: , Rfl:     vitamin D (VITAMIN D3) 1000 units Tab, Take 1,000 Units by mouth once daily., Disp: , Rfl:     Physical Examination:  BP (!) 141/57 (BP Location: Left arm, Patient Position: Sitting, BP Method: Medium (Automatic))   Pulse 63   Resp 16   Ht 5' 3" (1.6 m)   Wt 55 kg (121 lb 4.1 oz)   BMI 21.48 kg/m²     GENERAL:  General appearance: Well, non-toxic appearing.  No apparent distress.  Neck: supple.    MENTAL STATUS:  Alertness, attention span & concentration: normal.  Language: normal.  Orientation to self, place & time:  normal.  Memory, recent & remote: normal.  Fund of knowledge: normal.      SPEECH:  Clear and fluent.  Follows complex commands.    CRANIAL NERVES:  Cranial Nerves II-XII were examined.  II - Visual fields: right visual field deficit  III, IV, VI: PERRL, EOMI, No ptosis, No nystagmus.  V - Facial sensation: normal.  VII - Face symmetry & mobility: normal.  VIII - Hearing: normal  IX, X - Palate: mobile & midline.  XI - " Shoulder shrug: normal.  XII - Tongue protrusion: normal.      GROSS MOTOR:  Gait & station: non focal  Tone: normal.  Abnormal movements: none.  Finger-nose : normal.  Rapid alternating movements: normal.  Pronator drift: normal      MUSCLE STRENGTH:   Hand grasp:   - right:5/5   - left:5/5    Fascics Atrophy RIGHT    LEFT Atrophy Fascics     5 Neck Ext. 5       5 Neck Flex 5       5 Deltoids 5       5 Biceps 5       5 Triceps 5       5 Forearm.Pr. 5                5 Iliopsoas flex    5       5 Hip Abduct 5       5 Hip Adduct 5       5 Quads 5       5 Hams 5       5 Dorsiflex 5       5 Plantar Flex 5       REFLEXES:    RIGHT Reflex   LEFT   2+ Biceps 2+   2+ Brachiorad. 2+        2+ Patellar 2+     SENSORY:  Light touch: Normal throughout.             Diagnostic Data Reviewed:   Component      Latest Ref Rng & Units 7/22/2022 7/21/2022 7/20/2022   WBC      3.90 - 12.70 K/uL 8.79     RBC      4.00 - 5.40 M/uL 3.96 (L)     Hemoglobin      12.0 - 16.0 g/dL 10.7 (L)     Hematocrit      37.0 - 48.5 % 33.9 (L)     MCV      82 - 98 fL 86     MCH      27.0 - 31.0 pg 27.0     MCHC      32.0 - 36.0 g/dL 31.6 (L)     RDW      11.5 - 14.5 % 17.4 (H)     Platelets      150 - 450 K/uL 392     MPV      9.2 - 12.9 fL 10.4     Immature Granulocytes      0.0 - 0.5 % 0.2     Gran # (ANC)      1.8 - 7.7 K/uL 5.7     Immature Grans (Abs)      0.00 - 0.04 K/uL 0.02     Lymph #      1.0 - 4.8 K/uL 1.8     Mono #      0.3 - 1.0 K/uL 1.0     Eos #      0.0 - 0.5 K/uL 0.3     Baso #      0.00 - 0.20 K/uL 0.07     nRBC      0 /100 WBC 0     Gran %      38.0 - 73.0 % 65.1     Lymph %      18.0 - 48.0 % 19.9     Mono %      4.0 - 15.0 % 11.0     Eosinophil %      0.0 - 8.0 % 3.0     Basophil %      0.0 - 1.9 % 0.8     Differential Method       Automated     Sodium      136 - 145 mmol/L 142     Potassium      3.5 - 5.1 mmol/L 3.9     Chloride      95 - 110 mmol/L 103     CO2      22 - 31 mmol/L 33 (H)     Glucose      70 - 110 mg/dL 112 (H)      BUN      7 - 18 mg/dL 23 (H)     Creatinine      0.50 - 1.40 mg/dL 0.63     Calcium      8.4 - 10.2 mg/dL 9.2     Anion Gap      8 - 16 mmol/L 6 (L)     eGFR if African American      >60 mL/min/1.73 m:2 >60     eGFR if non African American      >60 mL/min/1.73 m:2 >60     Cholesterol      120 - 199 mg/dL   238 (H)   Triglycerides      30 - 150 mg/dL   167 (H)   HDL      40 - 75 mg/dL   55   LDL Cholesterol External      63.0 - 159.0 mg/dL   149.6   HDL/Cholesterol Ratio      20.0 - 50.0 %   23.1   Total Cholesterol/HDL Ratio      2.0 - 5.0   4.3   Non-HDL Cholesterol      mg/dL   183   Hemoglobin A1C External      0.0 - 5.6 %  5.0    Estimated Avg Glucose      68 - 131 mg/dL  97        CT head 7/20/22:  Impression:     No acute intracranial findings.  Findings are compatible with the preliminary report.     MRI brain 7/20/22:  FINDINGS:  There are multiple left temporoparietal foci of acute diffusion restriction with some mild T2 shine through.  No hemorrhage or mass effect.  Moderate changes of chronic microangiopathy are noted elsewhere.  Ventricles and sulci are proportionate.     No abnormal extra-axial fluid collections.     Flow voids are maintained in the intracranial arteries and dural venous sinuses.     Skull base and calvarium have a normal signal.  Bilateral lens replacement has been performed.     Impression:  1. Multiple acute infarcts in the left temporal and parietal lobes, MCA territory.  No hemorrhage or mass effect.  2. Moderate senescent changes.  3.  Findings are compatible with the preliminary report.         Carotid US 7/20/22:  Impression:  No evidence of a hemodynamically significant carotid stenosis.    CTA head/neck 7/2022:  Impression:     No significant stenosis or abnormalities is seen.             TTE 7/2022:  · There is no evidence of intracardiac shunting.  · The left ventricle is normal in size with normal systolic function.  · The estimated PA systolic pressure is 25  mmHg.  · Normal right ventricular size with normal right ventricular systolic function.  · Normal central venous pressure (3 mmHg).  · The estimated ejection fraction is 55%.  · Normal left ventricular diastolic function.  · Mild tricuspid regurgitation.                   Assessment and Plan:      Problem List Items Addressed This Visit        Neuro    H/O ischemic left MCA stroke - Primary    Current Assessment & Plan     Patient is a 82 y/o female that presents for hospital follow-up. She presented to the ED with difficulty expressing her thoughts and word finding difficulty.    - now resolved  CTH neg acute  MRI brain with multifocal cortical strokes within the left MCA territory, posterior division   - possibly atheroembolic from focal stenosis verses watershed infarct  CTA without LVO  TTE without PFO or atrial enlargement.     Recommend DAPT x 21 days, followed by ASA monotherapy   Control stroke risk factors:  BP management as per PCP   Cont high intensity statin for LDL goal <70 (149); recommend recheck in 6 mths and consider lower dose. 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    Stroke education provided               Speaking difficulty    Current Assessment & Plan     resolved              Ophtho    Right homonymous inferior quadrantanopia    Current Assessment & Plan     Referral to ophtho for Drew visual field testing  No driving for now              Cardiac/Vascular    Pure hypercholesterolemia (Chronic)    Current Assessment & Plan     Stroke risk factor  Cont high dose statin for LDL goal <70  PCP to manage                                   Important to note, also  has a past medical history of Cancer.          The patient will return to clinic as needed.    All questions were answered and patient is comfortable with the plan.         Thank you  very much for the opportunity to assist in this patient's care.    If you have any questions or concerns, please do not hesitate to contact me at any time.      Sincerely,     NEAL Rodríguez  [facility] [place]         I spent a total of 62 minutes on the day of the visit.This includes face to face time and non-face to face time preparing to see the patient (eg, review of tests), Obtaining and/or reviewing separately obtained history, Documenting clinical information in the electronic or other health record, Independently interpreting resultsand communicating results to the patient/family/caregiver, or Care coordination.

## 2022-07-29 ENCOUNTER — OFFICE VISIT (OUTPATIENT)
Dept: FAMILY MEDICINE | Facility: CLINIC | Age: 82
End: 2022-07-29
Payer: MEDICARE

## 2022-07-29 VITALS
BODY MASS INDEX: 21.09 KG/M2 | HEART RATE: 65 BPM | HEIGHT: 63 IN | SYSTOLIC BLOOD PRESSURE: 118 MMHG | DIASTOLIC BLOOD PRESSURE: 64 MMHG | OXYGEN SATURATION: 98 % | WEIGHT: 119 LBS

## 2022-07-29 DIAGNOSIS — E78.00 PURE HYPERCHOLESTEROLEMIA: ICD-10-CM

## 2022-07-29 DIAGNOSIS — Z86.73 H/O ISCHEMIC LEFT MCA STROKE: Primary | ICD-10-CM

## 2022-07-29 PROCEDURE — 99999 PR PBB SHADOW E&M-EST. PATIENT-LVL IV: ICD-10-PCS | Mod: PBBFAC,,, | Performed by: NURSE PRACTITIONER

## 2022-07-29 PROCEDURE — 99214 PR OFFICE/OUTPT VISIT, EST, LEVL IV, 30-39 MIN: ICD-10-PCS | Mod: S$PBB,,, | Performed by: NURSE PRACTITIONER

## 2022-07-29 PROCEDURE — 99999 PR PBB SHADOW E&M-EST. PATIENT-LVL IV: CPT | Mod: PBBFAC,,, | Performed by: NURSE PRACTITIONER

## 2022-07-29 PROCEDURE — 99214 OFFICE O/P EST MOD 30 MIN: CPT | Mod: S$PBB,,, | Performed by: NURSE PRACTITIONER

## 2022-07-29 PROCEDURE — 99214 OFFICE O/P EST MOD 30 MIN: CPT | Mod: PBBFAC,PO | Performed by: NURSE PRACTITIONER

## 2022-07-29 NOTE — PROGRESS NOTES
Subjective:       Patient ID: Katelyn Caba is a 81 y.o. female.    Chief Complaint: Hospital Follow Up  Katelyn Caba presents today for hospital follow-up. Previous patient of FREDY Burnham MD. Last seen in 10/12/2021. This is her first visit with me.     Ms. Caba presented to the ED 7/20/2022 with expressive aphasia. MRI brain with multifocal cortical strokes within the left MCA territory, posterior division   - possibly atheroembolic from focal stenosis verses watershed infarct  CTA without LVO  TTE without PFO or atrial enlargement. She saw neurology yesterday who recommended DAPT x 21 days, followed by asa monotherapy; high intensity statin for LDL goal <70 and BP management.     Patient Active Problem List   Diagnosis    Venous insufficiency    H/O ischemic left MCA stroke    Pure hypercholesterolemia    Right homonymous inferior quadrantanopia    Speaking difficulty       Current Outpatient Medications:     alendronate (FOSAMAX) 70 MG tablet, Take 1 tablet (70 mg total) by mouth every 7 days. (Patient taking differently: Take 70 mg by mouth every Monday.), Disp: 4 tablet, Rfl: 11    aspirin (ECOTRIN) 81 MG EC tablet, Take 1 tablet (81 mg total) by mouth once daily., Disp: , Rfl: 0    atorvastatin (LIPITOR) 80 MG tablet, Take 1 tablet (80 mg total) by mouth every evening., Disp: 30 tablet, Rfl: 3    clopidogreL (PLAVIX) 75 mg tablet, Take 1 tablet (75 mg total) by mouth once daily. for 21 days, Disp: 21 tablet, Rfl: 0    cyanocobalamin (VITAMIN B-12) 1000 MCG tablet, Take 1,000 mcg by mouth once daily., Disp: , Rfl:     ferrous sulfate (FEOSOL) 325 mg (65 mg iron) Tab tablet, Take 1 tablet (325 mg total) by mouth 2 (two) times daily., Disp: 180 tablet, Rfl: 0    mv,Ca,min-folic acid-vit K1 (ONE-A-DAY WOMEN'S 50 PLUS) 400-20 mcg Tab, Take 1 tablet by mouth once daily., Disp: , Rfl:     RESTASIS 0.05 % ophthalmic emulsion, Place 1 drop into both eyes 2 (two) times daily., Disp: , Rfl:      "vitamin D (VITAMIN D3) 1000 units Tab, Take 1,000 Units by mouth once daily., Disp: , Rfl:     The following portions of the patient's history were reviewed and updated as appropriate: allergies, past family history, past medical history, past social history and past surgical history.    Review of Systems   Constitutional: Negative for appetite change, fatigue, fever and unexpected weight change.   HENT: Negative for hearing loss, rhinorrhea, sneezing, sore throat and trouble swallowing.    Eyes: Negative for visual disturbance.   Respiratory: Negative for cough, shortness of breath and wheezing.    Cardiovascular: Negative for chest pain and palpitations.   Gastrointestinal: Negative for abdominal pain, constipation, diarrhea, nausea and vomiting.   Genitourinary: Negative for difficulty urinating, dysuria, frequency and hematuria.   Musculoskeletal: Negative for arthralgias and myalgias.   Neurological: Negative for dizziness, weakness and numbness.   Psychiatric/Behavioral: Negative for sleep disturbance. The patient is not nervous/anxious.        Objective:      /64   Pulse 65   Ht 5' 3" (1.6 m)   Wt 54 kg (119 lb)   SpO2 98%   BMI 21.08 kg/m²     Physical Exam  Constitutional:       General: She is not in acute distress.     Appearance: Normal appearance.   Cardiovascular:      Rate and Rhythm: Normal rate and regular rhythm.      Pulses: Normal pulses.      Heart sounds: Normal heart sounds.   Pulmonary:      Effort: Pulmonary effort is normal.      Breath sounds: Normal breath sounds.   Musculoskeletal:         General: Normal range of motion.   Skin:     General: Skin is warm and dry.   Neurological:      Mental Status: She is alert and oriented to person, place, and time.   Psychiatric:         Mood and Affect: Mood normal.         Behavior: Behavior normal.         Assessment:       1. H/O ischemic left MCA stroke    2. Pure hypercholesterolemia        Plan:   Continue home BP monitoring    F/U " labs in 3-4 mos

## 2022-08-02 ENCOUNTER — TELEPHONE (OUTPATIENT)
Dept: FAMILY MEDICINE | Facility: CLINIC | Age: 82
End: 2022-08-02
Payer: MEDICARE

## 2022-08-02 NOTE — TELEPHONE ENCOUNTER
----- Message from Aidee Pichardo sent at 8/2/2022  3:24 PM CDT -----  Name of Who is Calling:      What is the request in detail: Patient would like a call back regarding a sooner appointment first available....patient  had stroke on  07/21/22  Please contact to further discuss and advise       Can the clinic reply by [facility]:       What Number to Call Back if not in [facility]: [phone]

## 2022-08-04 ENCOUNTER — LAB VISIT (OUTPATIENT)
Dept: LAB | Facility: HOSPITAL | Age: 82
End: 2022-08-04
Attending: FAMILY MEDICINE
Payer: MEDICARE

## 2022-08-04 ENCOUNTER — OFFICE VISIT (OUTPATIENT)
Dept: FAMILY MEDICINE | Facility: CLINIC | Age: 82
End: 2022-08-04
Attending: FAMILY MEDICINE
Payer: MEDICARE

## 2022-08-04 VITALS
HEART RATE: 75 BPM | BODY MASS INDEX: 21.97 KG/M2 | WEIGHT: 124 LBS | OXYGEN SATURATION: 99 % | DIASTOLIC BLOOD PRESSURE: 68 MMHG | HEIGHT: 63 IN | SYSTOLIC BLOOD PRESSURE: 153 MMHG

## 2022-08-04 DIAGNOSIS — R03.0 ELEVATED BP WITHOUT DIAGNOSIS OF HYPERTENSION: ICD-10-CM

## 2022-08-04 DIAGNOSIS — E78.2 MIXED HYPERLIPIDEMIA: ICD-10-CM

## 2022-08-04 DIAGNOSIS — Z12.11 SCREEN FOR COLON CANCER: ICD-10-CM

## 2022-08-04 DIAGNOSIS — D50.9 MICROCYTIC ANEMIA: ICD-10-CM

## 2022-08-04 DIAGNOSIS — E78.2 MIXED HYPERLIPIDEMIA: Primary | ICD-10-CM

## 2022-08-04 LAB
ALBUMIN SERPL BCP-MCNC: 3.5 G/DL (ref 3.5–5.2)
ALP SERPL-CCNC: 63 U/L (ref 55–135)
ALT SERPL W/O P-5'-P-CCNC: 15 U/L (ref 10–44)
ANION GAP SERPL CALC-SCNC: 8 MMOL/L (ref 8–16)
AST SERPL-CCNC: 20 U/L (ref 10–40)
BILIRUB SERPL-MCNC: 0.3 MG/DL (ref 0.1–1)
BUN SERPL-MCNC: 15 MG/DL (ref 8–23)
CALCIUM SERPL-MCNC: 8.8 MG/DL (ref 8.7–10.5)
CHLORIDE SERPL-SCNC: 106 MMOL/L (ref 95–110)
CHOLEST SERPL-MCNC: 123 MG/DL (ref 120–199)
CHOLEST/HDLC SERPL: 2.7 {RATIO} (ref 2–5)
CO2 SERPL-SCNC: 25 MMOL/L (ref 23–29)
CREAT SERPL-MCNC: 0.6 MG/DL (ref 0.5–1.4)
EST. GFR  (NO RACE VARIABLE): >60 ML/MIN/1.73 M^2
FERRITIN SERPL-MCNC: 7 NG/ML (ref 20–300)
GLUCOSE SERPL-MCNC: 91 MG/DL (ref 70–110)
HDLC SERPL-MCNC: 45 MG/DL (ref 40–75)
HDLC SERPL: 36.6 % (ref 20–50)
IRON SERPL-MCNC: <10 UG/DL (ref 30–160)
LDLC SERPL CALC-MCNC: 60.4 MG/DL (ref 63–159)
NONHDLC SERPL-MCNC: 78 MG/DL
POTASSIUM SERPL-SCNC: 4.7 MMOL/L (ref 3.5–5.1)
PROT SERPL-MCNC: 6.1 G/DL (ref 6–8.4)
SATURATED IRON: ABNORMAL % (ref 20–50)
SODIUM SERPL-SCNC: 139 MMOL/L (ref 136–145)
TOTAL IRON BINDING CAPACITY: 423 UG/DL (ref 250–450)
TRANSFERRIN SERPL-MCNC: 286 MG/DL (ref 200–375)
TRIGL SERPL-MCNC: 88 MG/DL (ref 30–150)

## 2022-08-04 PROCEDURE — 84466 ASSAY OF TRANSFERRIN: CPT | Performed by: FAMILY MEDICINE

## 2022-08-04 PROCEDURE — 99999 PR PBB SHADOW E&M-EST. PATIENT-LVL III: CPT | Mod: PBBFAC,,, | Performed by: FAMILY MEDICINE

## 2022-08-04 PROCEDURE — 80053 COMPREHEN METABOLIC PANEL: CPT | Performed by: FAMILY MEDICINE

## 2022-08-04 PROCEDURE — 36415 COLL VENOUS BLD VENIPUNCTURE: CPT | Mod: PO | Performed by: FAMILY MEDICINE

## 2022-08-04 PROCEDURE — 82728 ASSAY OF FERRITIN: CPT | Performed by: FAMILY MEDICINE

## 2022-08-04 PROCEDURE — 99999 PR PBB SHADOW E&M-EST. PATIENT-LVL III: ICD-10-PCS | Mod: PBBFAC,,, | Performed by: FAMILY MEDICINE

## 2022-08-04 PROCEDURE — 99214 OFFICE O/P EST MOD 30 MIN: CPT | Mod: S$PBB,,, | Performed by: FAMILY MEDICINE

## 2022-08-04 PROCEDURE — 99214 PR OFFICE/OUTPT VISIT, EST, LEVL IV, 30-39 MIN: ICD-10-PCS | Mod: S$PBB,,, | Performed by: FAMILY MEDICINE

## 2022-08-04 PROCEDURE — 99213 OFFICE O/P EST LOW 20 MIN: CPT | Mod: PBBFAC,PO | Performed by: FAMILY MEDICINE

## 2022-08-04 PROCEDURE — 80061 LIPID PANEL: CPT | Performed by: FAMILY MEDICINE

## 2022-08-15 ENCOUNTER — TELEPHONE (OUTPATIENT)
Dept: FAMILY MEDICINE | Facility: CLINIC | Age: 82
End: 2022-08-15
Payer: MEDICARE

## 2022-08-15 RX ORDER — FERROUS SULFATE 325(65) MG
325 TABLET ORAL DAILY
Qty: 90 TABLET | Refills: 3 | Status: ON HOLD | OUTPATIENT
Start: 2022-08-15 | End: 2022-08-23 | Stop reason: HOSPADM

## 2022-08-15 NOTE — PROGRESS NOTES
"Subjective:       Patient ID: Katelyn Caba is a 81 y.o. female.    Chief Complaint: Fatigue    HPI   Pt in clinic with c/o of fatigue several weeks pt has anemia she denies vaginal bleeding no brbpr pt has hypercholesterolemia on a statin no muscle aches pos fatigue   Pt has elevated bp pt is not on bp meds her bp is not typically elevated she is not on a low salt diet   Review of Systems   Constitutional: Positive for fatigue. Negative for chills and fever.   Respiratory: Negative for cough, chest tightness and shortness of breath.    Cardiovascular: Negative for chest pain and palpitations.   Gastrointestinal: Negative for abdominal distention, abdominal pain and blood in stool.   Hematological: Negative for adenopathy. Does not bruise/bleed easily.       Objective:     BP (!) 153/68   Pulse 75   Ht 5' 3" (1.6 m)   Wt 56.2 kg (124 lb)   SpO2 99%   BMI 21.97 kg/m²     Physical Exam  Constitutional:       Appearance: Normal appearance. She is not ill-appearing.   Cardiovascular:      Rate and Rhythm: Normal rate and regular rhythm.      Heart sounds:     No gallop.   Pulmonary:      Effort: Pulmonary effort is normal.      Breath sounds: Normal breath sounds. No rales.   Abdominal:      General: There is no distension.      Palpations: Abdomen is soft.      Tenderness: There is no abdominal tenderness.   Neurological:      General: No focal deficit present.      Mental Status: She is alert and oriented to person, place, and time.      Cranial Nerves: No cranial nerve deficit.      Coordination: Coordination normal.   Psychiatric:         Mood and Affect: Mood normal.         Behavior: Behavior normal.         Thought Content: Thought content normal.         Judgment: Judgment normal.         Assessment:       1. Mixed hyperlipidemia    2. Screen for colon cancer    3. Microcytic anemia      4. High bp  Plan:     orders cbc cmp lipid  Cont meds  Increase water intake  Decrease salt in diet  Check bp at " "home  Graded exercise  Recheck labs 3-6 months  rtc virtual bp f/u 2 weeks       "This note will not be shared with the patient."     "

## 2022-08-15 NOTE — TELEPHONE ENCOUNTER
----- Message from Brooklyn Burnham MD sent at 8/15/2022  5:21 AM CDT -----  Regarding: bp follow up  Please help pt schedule a follow up for bp and lab virtual is fine for this

## 2022-08-21 ENCOUNTER — HOSPITAL ENCOUNTER (INPATIENT)
Facility: HOSPITAL | Age: 82
LOS: 1 days | Discharge: HOME | DRG: 377 | End: 2022-08-23
Attending: EMERGENCY MEDICINE | Admitting: STUDENT IN AN ORGANIZED HEALTH CARE EDUCATION/TRAINING PROGRAM
Payer: MEDICARE

## 2022-08-21 DIAGNOSIS — R06.02 SOB (SHORTNESS OF BREATH): ICD-10-CM

## 2022-08-21 DIAGNOSIS — R07.9 CHEST PAIN: ICD-10-CM

## 2022-08-21 DIAGNOSIS — D64.9 SYMPTOMATIC ANEMIA: ICD-10-CM

## 2022-08-21 DIAGNOSIS — E87.70 VOLUME OVERLOAD: ICD-10-CM

## 2022-08-21 DIAGNOSIS — D64.9 SEVERE ANEMIA: Primary | ICD-10-CM

## 2022-08-21 DIAGNOSIS — R06.02 SHORTNESS OF BREATH: ICD-10-CM

## 2022-08-21 PROBLEM — K92.2 GIB (GASTROINTESTINAL BLEEDING): Status: ACTIVE | Noted: 2022-08-21

## 2022-08-21 PROBLEM — J96.01 ACUTE RESPIRATORY FAILURE WITH HYPOXIA AND HYPERCAPNIA: Status: ACTIVE | Noted: 2022-08-21

## 2022-08-21 PROBLEM — R03.0 ELEVATED BLOOD PRESSURE READING: Status: ACTIVE | Noted: 2022-08-21

## 2022-08-21 PROBLEM — J96.02 ACUTE RESPIRATORY FAILURE WITH HYPOXIA AND HYPERCAPNIA: Status: ACTIVE | Noted: 2022-08-21

## 2022-08-21 LAB
ABO + RH BLD: NORMAL
ALBUMIN SERPL BCP-MCNC: 3.7 G/DL (ref 3.5–5.2)
ALP SERPL-CCNC: 88 U/L (ref 55–135)
ALT SERPL W/O P-5'-P-CCNC: 19 U/L (ref 10–44)
ANION GAP SERPL CALC-SCNC: 10 MMOL/L (ref 8–16)
ANION GAP SERPL CALC-SCNC: 8 MMOL/L (ref 8–16)
ANISOCYTOSIS BLD QL SMEAR: SLIGHT
ANISOCYTOSIS BLD QL SMEAR: SLIGHT
ASCENDING AORTA: 2.57 CM
AST SERPL-CCNC: 23 U/L (ref 10–40)
AV INDEX (PROSTH): 0.7
AV MEAN GRADIENT: 7 MMHG
AV PEAK GRADIENT: 12 MMHG
AV VALVE AREA: 2.06 CM2
AV VELOCITY RATIO: 0.78
BACTERIA #/AREA URNS AUTO: ABNORMAL /HPF
BASO STIPL BLD QL SMEAR: ABNORMAL
BASOPHILS # BLD AUTO: 0.06 K/UL (ref 0–0.2)
BASOPHILS # BLD AUTO: 0.07 K/UL (ref 0–0.2)
BASOPHILS # BLD AUTO: 0.09 K/UL (ref 0–0.2)
BASOPHILS # BLD AUTO: 0.12 K/UL (ref 0–0.2)
BASOPHILS NFR BLD: 0.5 % (ref 0–1.9)
BASOPHILS NFR BLD: 0.5 % (ref 0–1.9)
BASOPHILS NFR BLD: 0.9 % (ref 0–1.9)
BASOPHILS NFR BLD: 0.9 % (ref 0–1.9)
BILIRUB SERPL-MCNC: 0.5 MG/DL (ref 0.1–1)
BILIRUB UR QL STRIP: NEGATIVE
BLD GP AB SCN CELLS X3 SERPL QL: NORMAL
BLD PROD TYP BPU: NORMAL
BLD PROD TYP BPU: NORMAL
BLOOD UNIT EXPIRATION DATE: NORMAL
BLOOD UNIT EXPIRATION DATE: NORMAL
BLOOD UNIT TYPE CODE: 600
BLOOD UNIT TYPE CODE: 600
BLOOD UNIT TYPE: NORMAL
BLOOD UNIT TYPE: NORMAL
BNP SERPL-MCNC: 577 PG/ML (ref 0–99)
BSA FOR ECHO PROCEDURE: 1.57 M2
BUN SERPL-MCNC: 14 MG/DL (ref 8–23)
BUN SERPL-MCNC: 15 MG/DL (ref 8–23)
BURR CELLS BLD QL SMEAR: ABNORMAL
CALCIUM SERPL-MCNC: 8.6 MG/DL (ref 8.7–10.5)
CALCIUM SERPL-MCNC: 8.8 MG/DL (ref 8.7–10.5)
CHLORIDE SERPL-SCNC: 104 MMOL/L (ref 95–110)
CHLORIDE SERPL-SCNC: 105 MMOL/L (ref 95–110)
CLARITY UR REFRACT.AUTO: CLEAR
CO2 SERPL-SCNC: 22 MMOL/L (ref 23–29)
CO2 SERPL-SCNC: 23 MMOL/L (ref 23–29)
CODING SYSTEM: NORMAL
CODING SYSTEM: NORMAL
COLOR UR AUTO: YELLOW
CREAT SERPL-MCNC: 0.6 MG/DL (ref 0.5–1.4)
CREAT SERPL-MCNC: 0.6 MG/DL (ref 0.5–1.4)
CV ECHO LV RWT: 0.42 CM
D DIMER PPP IA.FEU-MCNC: 0.95 MG/L FEU
DIFFERENTIAL METHOD: ABNORMAL
DISPENSE STATUS: NORMAL
DISPENSE STATUS: NORMAL
DOP CALC AO PEAK VEL: 1.74 M/S
DOP CALC AO VTI: 41.61 CM
DOP CALC LVOT AREA: 3 CM2
DOP CALC LVOT DIAMETER: 1.94 CM
DOP CALC LVOT PEAK VEL: 1.35 M/S
DOP CALC LVOT STROKE VOLUME: 85.59 CM3
DOP CALCLVOT PEAK VEL VTI: 28.97 CM
E WAVE DECELERATION TIME: 200.34 MSEC
E/A RATIO: 1.83
E/E' RATIO: 19.38 M/S
ECHO LV POSTERIOR WALL: 0.81 CM (ref 0.6–1.1)
EJECTION FRACTION: 65 %
EOSINOPHIL # BLD AUTO: 0.1 K/UL (ref 0–0.5)
EOSINOPHIL # BLD AUTO: 0.2 K/UL (ref 0–0.5)
EOSINOPHIL # BLD AUTO: 0.2 K/UL (ref 0–0.5)
EOSINOPHIL # BLD AUTO: 0.3 K/UL (ref 0–0.5)
EOSINOPHIL NFR BLD: 0.7 % (ref 0–8)
EOSINOPHIL NFR BLD: 0.7 % (ref 0–8)
EOSINOPHIL NFR BLD: 2.7 % (ref 0–8)
EOSINOPHIL NFR BLD: 4.3 % (ref 0–8)
ERYTHROCYTE [DISTWIDTH] IN BLOOD BY AUTOMATED COUNT: 17.8 % (ref 11.5–14.5)
ERYTHROCYTE [DISTWIDTH] IN BLOOD BY AUTOMATED COUNT: 18.3 % (ref 11.5–14.5)
ERYTHROCYTE [DISTWIDTH] IN BLOOD BY AUTOMATED COUNT: 20.6 % (ref 11.5–14.5)
ERYTHROCYTE [DISTWIDTH] IN BLOOD BY AUTOMATED COUNT: 20.6 % (ref 11.5–14.5)
EST. GFR  (NO RACE VARIABLE): >60 ML/MIN/1.73 M^2
EST. GFR  (NO RACE VARIABLE): >60 ML/MIN/1.73 M^2
FERRITIN SERPL-MCNC: 17 NG/ML (ref 20–300)
FRACTIONAL SHORTENING: 32 % (ref 28–44)
GLUCOSE SERPL-MCNC: 103 MG/DL (ref 70–110)
GLUCOSE SERPL-MCNC: 108 MG/DL (ref 70–110)
GLUCOSE UR QL STRIP: NEGATIVE
HCT VFR BLD AUTO: 16.1 % (ref 37–48.5)
HCT VFR BLD AUTO: 17.4 % (ref 37–48.5)
HCT VFR BLD AUTO: 26.3 % (ref 37–48.5)
HCT VFR BLD AUTO: 26.3 % (ref 37–48.5)
HGB BLD-MCNC: 4.6 G/DL (ref 12–16)
HGB BLD-MCNC: 4.9 G/DL (ref 12–16)
HGB BLD-MCNC: 8.3 G/DL (ref 12–16)
HGB BLD-MCNC: 8.4 G/DL (ref 12–16)
HGB UR QL STRIP: NEGATIVE
HYPOCHROMIA BLD QL SMEAR: ABNORMAL
HYPOCHROMIA BLD QL SMEAR: ABNORMAL
IMM GRANULOCYTES # BLD AUTO: 0.03 K/UL (ref 0–0.04)
IMM GRANULOCYTES # BLD AUTO: 0.04 K/UL (ref 0–0.04)
IMM GRANULOCYTES # BLD AUTO: 0.15 K/UL (ref 0–0.04)
IMM GRANULOCYTES # BLD AUTO: 0.35 K/UL (ref 0–0.04)
IMM GRANULOCYTES NFR BLD AUTO: 0.4 % (ref 0–0.5)
IMM GRANULOCYTES NFR BLD AUTO: 0.6 % (ref 0–0.5)
IMM GRANULOCYTES NFR BLD AUTO: 0.9 % (ref 0–0.5)
IMM GRANULOCYTES NFR BLD AUTO: 1.4 % (ref 0–0.5)
INTERVENTRICULAR SEPTUM: 0.92 CM (ref 0.6–1.1)
IRON SERPL-MCNC: <10 UG/DL (ref 30–160)
IVRT: 97.05 MSEC
KETONES UR QL STRIP: NEGATIVE
LA MAJOR: 5.42 CM
LA MINOR: 5.3 CM
LA WIDTH: 3.91 CM
LEFT ATRIUM SIZE: 3.68 CM
LEFT ATRIUM VOLUME INDEX MOD: 35.8 ML/M2
LEFT ATRIUM VOLUME INDEX: 41.7 ML/M2
LEFT ATRIUM VOLUME MOD: 56.14 CM3
LEFT ATRIUM VOLUME: 65.55 CM3
LEFT INTERNAL DIMENSION IN SYSTOLE: 2.64 CM (ref 2.1–4)
LEFT VENTRICLE DIASTOLIC VOLUME INDEX: 41.32 ML/M2
LEFT VENTRICLE DIASTOLIC VOLUME: 64.87 ML
LEFT VENTRICLE MASS INDEX: 63 G/M2
LEFT VENTRICLE SYSTOLIC VOLUME INDEX: 16.2 ML/M2
LEFT VENTRICLE SYSTOLIC VOLUME: 25.45 ML
LEFT VENTRICULAR INTERNAL DIMENSION IN DIASTOLE: 3.87 CM (ref 3.5–6)
LEFT VENTRICULAR MASS: 98.49 G
LEUKOCYTE ESTERASE UR QL STRIP: ABNORMAL
LV LATERAL E/E' RATIO: 18 M/S
LV SEPTAL E/E' RATIO: 21 M/S
LYMPHOCYTES # BLD AUTO: 1 K/UL (ref 1–4.8)
LYMPHOCYTES # BLD AUTO: 1.3 K/UL (ref 1–4.8)
LYMPHOCYTES NFR BLD: 14.5 % (ref 18–48)
LYMPHOCYTES NFR BLD: 16.5 % (ref 18–48)
LYMPHOCYTES NFR BLD: 4.3 % (ref 18–48)
LYMPHOCYTES NFR BLD: 5.6 % (ref 18–48)
MAGNESIUM SERPL-MCNC: 2.2 MG/DL (ref 1.6–2.6)
MCH RBC QN AUTO: 22.6 PG (ref 27–31)
MCH RBC QN AUTO: 23 PG (ref 27–31)
MCH RBC QN AUTO: 25.2 PG (ref 27–31)
MCH RBC QN AUTO: 25.9 PG (ref 27–31)
MCHC RBC AUTO-ENTMCNC: 28.2 G/DL (ref 32–36)
MCHC RBC AUTO-ENTMCNC: 28.6 G/DL (ref 32–36)
MCHC RBC AUTO-ENTMCNC: 31.6 G/DL (ref 32–36)
MCHC RBC AUTO-ENTMCNC: 31.9 G/DL (ref 32–36)
MCV RBC AUTO: 80 FL (ref 82–98)
MCV RBC AUTO: 80 FL (ref 82–98)
MCV RBC AUTO: 81 FL (ref 82–98)
MCV RBC AUTO: 81 FL (ref 82–98)
MICROSCOPIC COMMENT: ABNORMAL
MONOCYTES # BLD AUTO: 0.7 K/UL (ref 0.3–1)
MONOCYTES # BLD AUTO: 0.9 K/UL (ref 0.3–1)
MONOCYTES # BLD AUTO: 1.4 K/UL (ref 0.3–1)
MONOCYTES # BLD AUTO: 1.6 K/UL (ref 0.3–1)
MONOCYTES NFR BLD: 10.8 % (ref 4–15)
MONOCYTES NFR BLD: 6.4 % (ref 4–15)
MONOCYTES NFR BLD: 7.9 % (ref 4–15)
MONOCYTES NFR BLD: 9.8 % (ref 4–15)
MV PEAK A VEL: 0.69 M/S
MV PEAK E VEL: 1.26 M/S
MV STENOSIS PRESSURE HALF TIME: 58.1 MS
MV VALVE AREA P 1/2 METHOD: 3.79 CM2
NEUTROPHILS # BLD AUTO: 14.9 K/UL (ref 1.8–7.7)
NEUTROPHILS # BLD AUTO: 21.1 K/UL (ref 1.8–7.7)
NEUTROPHILS # BLD AUTO: 5 K/UL (ref 1.8–7.7)
NEUTROPHILS # BLD AUTO: 5.3 K/UL (ref 1.8–7.7)
NEUTROPHILS NFR BLD: 67.1 % (ref 38–73)
NEUTROPHILS NFR BLD: 71.5 % (ref 38–73)
NEUTROPHILS NFR BLD: 84.4 % (ref 38–73)
NEUTROPHILS NFR BLD: 86.7 % (ref 38–73)
NITRITE UR QL STRIP: POSITIVE
NRBC BLD-RTO: 0 /100 WBC
OVALOCYTES BLD QL SMEAR: ABNORMAL
PH UR STRIP: 6 [PH] (ref 5–8)
PISA TR MAX VEL: 3.23 M/S
PLATELET # BLD AUTO: 436 K/UL (ref 150–450)
PLATELET # BLD AUTO: 439 K/UL (ref 150–450)
PLATELET # BLD AUTO: 451 K/UL (ref 150–450)
PLATELET # BLD AUTO: 544 K/UL (ref 150–450)
PLATELET BLD QL SMEAR: ABNORMAL
PLATELET BLD QL SMEAR: ABNORMAL
PMV BLD AUTO: 10.1 FL (ref 9.2–12.9)
PMV BLD AUTO: 10.3 FL (ref 9.2–12.9)
PMV BLD AUTO: 10.3 FL (ref 9.2–12.9)
PMV BLD AUTO: 9.8 FL (ref 9.2–12.9)
POIKILOCYTOSIS BLD QL SMEAR: SLIGHT
POIKILOCYTOSIS BLD QL SMEAR: SLIGHT
POLYCHROMASIA BLD QL SMEAR: ABNORMAL
POTASSIUM SERPL-SCNC: 3.7 MMOL/L (ref 3.5–5.1)
POTASSIUM SERPL-SCNC: 3.8 MMOL/L (ref 3.5–5.1)
PROT SERPL-MCNC: 6.6 G/DL (ref 6–8.4)
PROT UR QL STRIP: NEGATIVE
RA MAJOR: 4.36 CM
RA PRESSURE: 3 MMHG
RA WIDTH: 3.59 CM
RBC # BLD AUTO: 2 M/UL (ref 4–5.4)
RBC # BLD AUTO: 2.17 M/UL (ref 4–5.4)
RBC # BLD AUTO: 3.24 M/UL (ref 4–5.4)
RBC # BLD AUTO: 3.3 M/UL (ref 4–5.4)
RBC #/AREA URNS AUTO: 0 /HPF (ref 0–4)
RIGHT VENTRICULAR END-DIASTOLIC DIMENSION: 2.48 CM
RV TISSUE DOPPLER FREE WALL SYSTOLIC VELOCITY 1 (APICAL 4 CHAMBER VIEW): 14.8 CM/S
SARS-COV-2 RDRP RESP QL NAA+PROBE: NEGATIVE
SATURATED IRON: ABNORMAL % (ref 20–50)
SCHISTOCYTES BLD QL SMEAR: ABNORMAL
SCHISTOCYTES BLD QL SMEAR: PRESENT
SINUS: 2.79 CM
SODIUM SERPL-SCNC: 136 MMOL/L (ref 136–145)
SODIUM SERPL-SCNC: 136 MMOL/L (ref 136–145)
SP GR UR STRIP: 1.01 (ref 1–1.03)
STJ: 2.38 CM
TARGETS BLD QL SMEAR: ABNORMAL
TDI LATERAL: 0.07 M/S
TDI SEPTAL: 0.06 M/S
TDI: 0.07 M/S
TOTAL IRON BINDING CAPACITY: 447 UG/DL (ref 250–450)
TR MAX PG: 42 MMHG
TRANS ERYTHROCYTES VOL PATIENT: NORMAL ML
TRANS ERYTHROCYTES VOL PATIENT: NORMAL ML
TRANSFERRIN SERPL-MCNC: 302 MG/DL (ref 200–375)
TRICUSPID ANNULAR PLANE SYSTOLIC EXCURSION: 2.2 CM
TROPONIN I SERPL DL<=0.01 NG/ML-MCNC: 0.01 NG/ML (ref 0–0.03)
TROPONIN I SERPL DL<=0.01 NG/ML-MCNC: 0.01 NG/ML (ref 0–0.03)
TV REST PULMONARY ARTERY PRESSURE: 45 MMHG
URN SPEC COLLECT METH UR: ABNORMAL
WBC # BLD AUTO: 17.59 K/UL (ref 3.9–12.7)
WBC # BLD AUTO: 24.3 K/UL (ref 3.9–12.7)
WBC # BLD AUTO: 7.02 K/UL (ref 3.9–12.7)
WBC # BLD AUTO: 7.93 K/UL (ref 3.9–12.7)
WBC #/AREA URNS AUTO: 2 /HPF (ref 0–5)

## 2022-08-21 PROCEDURE — 99223 PR INITIAL HOSPITAL CARE,LEVL III: ICD-10-PCS | Mod: ,,, | Performed by: INTERNAL MEDICINE

## 2022-08-21 PROCEDURE — G0378 HOSPITAL OBSERVATION PER HR: HCPCS

## 2022-08-21 PROCEDURE — 99291 CRITICAL CARE FIRST HOUR: CPT | Mod: CS,,, | Performed by: EMERGENCY MEDICINE

## 2022-08-21 PROCEDURE — P9021 RED BLOOD CELLS UNIT: HCPCS | Performed by: EMERGENCY MEDICINE

## 2022-08-21 PROCEDURE — 84484 ASSAY OF TROPONIN QUANT: CPT | Mod: 91 | Performed by: PHYSICIAN ASSISTANT

## 2022-08-21 PROCEDURE — 63600175 PHARM REV CODE 636 W HCPCS: Performed by: PHYSICIAN ASSISTANT

## 2022-08-21 PROCEDURE — 93010 EKG 12-LEAD: ICD-10-PCS | Mod: 76,,, | Performed by: INTERNAL MEDICINE

## 2022-08-21 PROCEDURE — 36415 COLL VENOUS BLD VENIPUNCTURE: CPT | Performed by: STUDENT IN AN ORGANIZED HEALTH CARE EDUCATION/TRAINING PROGRAM

## 2022-08-21 PROCEDURE — 99223 1ST HOSP IP/OBS HIGH 75: CPT | Mod: ,,, | Performed by: INTERNAL MEDICINE

## 2022-08-21 PROCEDURE — C9113 INJ PANTOPRAZOLE SODIUM, VIA: HCPCS | Performed by: EMERGENCY MEDICINE

## 2022-08-21 PROCEDURE — C9113 INJ PANTOPRAZOLE SODIUM, VIA: HCPCS | Performed by: PHYSICIAN ASSISTANT

## 2022-08-21 PROCEDURE — 85379 FIBRIN DEGRADATION QUANT: CPT | Performed by: EMERGENCY MEDICINE

## 2022-08-21 PROCEDURE — 82728 ASSAY OF FERRITIN: CPT | Performed by: PHYSICIAN ASSISTANT

## 2022-08-21 PROCEDURE — 93010 ELECTROCARDIOGRAM REPORT: CPT | Mod: 76,,, | Performed by: INTERNAL MEDICINE

## 2022-08-21 PROCEDURE — 80053 COMPREHEN METABOLIC PANEL: CPT | Performed by: EMERGENCY MEDICINE

## 2022-08-21 PROCEDURE — 99220 PR INITIAL OBSERVATION CARE,LEVL III: ICD-10-PCS | Mod: ,,, | Performed by: PHYSICIAN ASSISTANT

## 2022-08-21 PROCEDURE — 84466 ASSAY OF TRANSFERRIN: CPT | Performed by: PHYSICIAN ASSISTANT

## 2022-08-21 PROCEDURE — 93005 ELECTROCARDIOGRAM TRACING: CPT

## 2022-08-21 PROCEDURE — 25000003 PHARM REV CODE 250: Performed by: PHYSICIAN ASSISTANT

## 2022-08-21 PROCEDURE — 85025 COMPLETE CBC W/AUTO DIFF WBC: CPT | Performed by: EMERGENCY MEDICINE

## 2022-08-21 PROCEDURE — 25000003 PHARM REV CODE 250: Performed by: STUDENT IN AN ORGANIZED HEALTH CARE EDUCATION/TRAINING PROGRAM

## 2022-08-21 PROCEDURE — 86920 COMPATIBILITY TEST SPIN: CPT | Performed by: EMERGENCY MEDICINE

## 2022-08-21 PROCEDURE — 99220 PR INITIAL OBSERVATION CARE,LEVL III: CPT | Mod: ,,, | Performed by: PHYSICIAN ASSISTANT

## 2022-08-21 PROCEDURE — 80048 BASIC METABOLIC PNL TOTAL CA: CPT | Mod: XB | Performed by: PHYSICIAN ASSISTANT

## 2022-08-21 PROCEDURE — 84484 ASSAY OF TROPONIN QUANT: CPT | Performed by: EMERGENCY MEDICINE

## 2022-08-21 PROCEDURE — 81001 URINALYSIS AUTO W/SCOPE: CPT | Performed by: PHYSICIAN ASSISTANT

## 2022-08-21 PROCEDURE — 86850 RBC ANTIBODY SCREEN: CPT | Performed by: EMERGENCY MEDICINE

## 2022-08-21 PROCEDURE — 83735 ASSAY OF MAGNESIUM: CPT | Performed by: PHYSICIAN ASSISTANT

## 2022-08-21 PROCEDURE — 85025 COMPLETE CBC W/AUTO DIFF WBC: CPT | Mod: 91 | Performed by: PHYSICIAN ASSISTANT

## 2022-08-21 PROCEDURE — 36430 TRANSFUSION BLD/BLD COMPNT: CPT

## 2022-08-21 PROCEDURE — 86900 BLOOD TYPING SEROLOGIC ABO: CPT | Performed by: EMERGENCY MEDICINE

## 2022-08-21 PROCEDURE — U0002 COVID-19 LAB TEST NON-CDC: HCPCS | Performed by: EMERGENCY MEDICINE

## 2022-08-21 PROCEDURE — 87389 HIV-1 AG W/HIV-1&-2 AB AG IA: CPT | Performed by: PHYSICIAN ASSISTANT

## 2022-08-21 PROCEDURE — 99291 PR CRITICAL CARE, E/M 30-74 MINUTES: ICD-10-PCS | Mod: CS,,, | Performed by: EMERGENCY MEDICINE

## 2022-08-21 PROCEDURE — 99291 CRITICAL CARE FIRST HOUR: CPT | Mod: 25

## 2022-08-21 PROCEDURE — 83880 ASSAY OF NATRIURETIC PEPTIDE: CPT | Performed by: EMERGENCY MEDICINE

## 2022-08-21 PROCEDURE — 85025 COMPLETE CBC W/AUTO DIFF WBC: CPT | Mod: 91 | Performed by: STUDENT IN AN ORGANIZED HEALTH CARE EDUCATION/TRAINING PROGRAM

## 2022-08-21 PROCEDURE — 63600175 PHARM REV CODE 636 W HCPCS: Performed by: EMERGENCY MEDICINE

## 2022-08-21 RX ORDER — FUROSEMIDE 10 MG/ML
40 INJECTION INTRAMUSCULAR; INTRAVENOUS ONCE
Status: DISCONTINUED | OUTPATIENT
Start: 2022-08-21 | End: 2022-08-21

## 2022-08-21 RX ORDER — ONDANSETRON 2 MG/ML
4 INJECTION INTRAMUSCULAR; INTRAVENOUS EVERY 8 HOURS PRN
Status: DISCONTINUED | OUTPATIENT
Start: 2022-08-21 | End: 2022-08-23 | Stop reason: HOSPADM

## 2022-08-21 RX ORDER — IBUPROFEN 200 MG
16 TABLET ORAL
Status: DISCONTINUED | OUTPATIENT
Start: 2022-08-21 | End: 2022-08-23 | Stop reason: HOSPADM

## 2022-08-21 RX ORDER — HYDROCODONE BITARTRATE AND ACETAMINOPHEN 500; 5 MG/1; MG/1
TABLET ORAL
Status: DISCONTINUED | OUTPATIENT
Start: 2022-08-21 | End: 2022-08-23 | Stop reason: HOSPADM

## 2022-08-21 RX ORDER — TALC
6 POWDER (GRAM) TOPICAL NIGHTLY PRN
Status: DISCONTINUED | OUTPATIENT
Start: 2022-08-21 | End: 2022-08-23 | Stop reason: HOSPADM

## 2022-08-21 RX ORDER — HYDRALAZINE HYDROCHLORIDE 25 MG/1
25 TABLET, FILM COATED ORAL EVERY 8 HOURS PRN
Status: DISCONTINUED | OUTPATIENT
Start: 2022-08-21 | End: 2022-08-23 | Stop reason: HOSPADM

## 2022-08-21 RX ORDER — PANTOPRAZOLE SODIUM 40 MG/10ML
80 INJECTION, POWDER, LYOPHILIZED, FOR SOLUTION INTRAVENOUS
Status: COMPLETED | OUTPATIENT
Start: 2022-08-21 | End: 2022-08-21

## 2022-08-21 RX ORDER — NALOXONE HCL 0.4 MG/ML
0.02 VIAL (ML) INJECTION
Status: DISCONTINUED | OUTPATIENT
Start: 2022-08-21 | End: 2022-08-23 | Stop reason: HOSPADM

## 2022-08-21 RX ORDER — ATORVASTATIN CALCIUM 40 MG/1
80 TABLET, FILM COATED ORAL NIGHTLY
Status: DISCONTINUED | OUTPATIENT
Start: 2022-08-21 | End: 2022-08-23 | Stop reason: HOSPADM

## 2022-08-21 RX ORDER — POLYETHYLENE GLYCOL 3350 17 G/17G
17 POWDER, FOR SOLUTION ORAL DAILY PRN
Status: DISCONTINUED | OUTPATIENT
Start: 2022-08-21 | End: 2022-08-23 | Stop reason: HOSPADM

## 2022-08-21 RX ORDER — ATORVASTATIN CALCIUM 40 MG/1
TABLET, FILM COATED ORAL
Status: DISPENSED
Start: 2022-08-21 | End: 2022-08-21

## 2022-08-21 RX ORDER — IBUPROFEN 200 MG
24 TABLET ORAL
Status: DISCONTINUED | OUTPATIENT
Start: 2022-08-21 | End: 2022-08-23 | Stop reason: HOSPADM

## 2022-08-21 RX ORDER — PROCHLORPERAZINE EDISYLATE 5 MG/ML
5 INJECTION INTRAMUSCULAR; INTRAVENOUS EVERY 6 HOURS PRN
Status: DISCONTINUED | OUTPATIENT
Start: 2022-08-21 | End: 2022-08-23 | Stop reason: HOSPADM

## 2022-08-21 RX ORDER — BISACODYL 10 MG
10 SUPPOSITORY, RECTAL RECTAL DAILY PRN
Status: DISCONTINUED | OUTPATIENT
Start: 2022-08-21 | End: 2022-08-23 | Stop reason: HOSPADM

## 2022-08-21 RX ORDER — HYDRALAZINE HYDROCHLORIDE 25 MG/1
25 TABLET, FILM COATED ORAL EVERY 8 HOURS PRN
Status: DISCONTINUED | OUTPATIENT
Start: 2022-08-21 | End: 2022-08-21

## 2022-08-21 RX ORDER — ACETAMINOPHEN 325 MG/1
650 TABLET ORAL EVERY 4 HOURS PRN
Status: DISCONTINUED | OUTPATIENT
Start: 2022-08-21 | End: 2022-08-23 | Stop reason: HOSPADM

## 2022-08-21 RX ORDER — AMLODIPINE BESYLATE 5 MG/1
5 TABLET ORAL DAILY
Status: DISCONTINUED | OUTPATIENT
Start: 2022-08-21 | End: 2022-08-22

## 2022-08-21 RX ORDER — FUROSEMIDE 10 MG/ML
INJECTION INTRAMUSCULAR; INTRAVENOUS
Status: DISPENSED
Start: 2022-08-21 | End: 2022-08-21

## 2022-08-21 RX ORDER — FUROSEMIDE 10 MG/ML
40 INJECTION INTRAMUSCULAR; INTRAVENOUS ONCE
Status: COMPLETED | OUTPATIENT
Start: 2022-08-21 | End: 2022-08-21

## 2022-08-21 RX ORDER — LANOLIN ALCOHOL/MO/W.PET/CERES
1 CREAM (GRAM) TOPICAL DAILY
Refills: 3 | Status: DISCONTINUED | OUTPATIENT
Start: 2022-08-21 | End: 2022-08-22

## 2022-08-21 RX ORDER — IPRATROPIUM BROMIDE AND ALBUTEROL SULFATE 2.5; .5 MG/3ML; MG/3ML
3 SOLUTION RESPIRATORY (INHALATION) EVERY 4 HOURS PRN
Status: DISCONTINUED | OUTPATIENT
Start: 2022-08-21 | End: 2022-08-23 | Stop reason: HOSPADM

## 2022-08-21 RX ORDER — SODIUM CHLORIDE 0.9 % (FLUSH) 0.9 %
10 SYRINGE (ML) INJECTION EVERY 8 HOURS PRN
Status: DISCONTINUED | OUTPATIENT
Start: 2022-08-21 | End: 2022-08-23 | Stop reason: HOSPADM

## 2022-08-21 RX ORDER — PANTOPRAZOLE SODIUM 40 MG/10ML
40 INJECTION, POWDER, LYOPHILIZED, FOR SOLUTION INTRAVENOUS 2 TIMES DAILY
Status: DISCONTINUED | OUTPATIENT
Start: 2022-08-21 | End: 2022-08-22

## 2022-08-21 RX ORDER — CHOLECALCIFEROL (VITAMIN D3) 25 MCG
1000 TABLET ORAL DAILY
Status: DISCONTINUED | OUTPATIENT
Start: 2022-08-21 | End: 2022-08-23 | Stop reason: HOSPADM

## 2022-08-21 RX ORDER — GLUCAGON 1 MG
1 KIT INJECTION
Status: DISCONTINUED | OUTPATIENT
Start: 2022-08-21 | End: 2022-08-23 | Stop reason: HOSPADM

## 2022-08-21 RX ADMIN — ATORVASTATIN CALCIUM 80 MG: 40 TABLET, FILM COATED ORAL at 03:08

## 2022-08-21 RX ADMIN — ATORVASTATIN CALCIUM 80 MG: 40 TABLET, FILM COATED ORAL at 08:08

## 2022-08-21 RX ADMIN — FUROSEMIDE 40 MG: 10 INJECTION, SOLUTION INTRAMUSCULAR; INTRAVENOUS at 11:08

## 2022-08-21 RX ADMIN — FERROUS SULFATE TAB 325 MG (65 MG ELEMENTAL FE) 1 EACH: 325 (65 FE) TAB at 09:08

## 2022-08-21 RX ADMIN — PANTOPRAZOLE SODIUM 40 MG: 40 INJECTION, POWDER, FOR SOLUTION INTRAVENOUS at 09:08

## 2022-08-21 RX ADMIN — Medication 1000 UNITS: at 09:08

## 2022-08-21 RX ADMIN — NITROGLYCERIN 0.5 INCH: 20 OINTMENT TOPICAL at 03:08

## 2022-08-21 RX ADMIN — PANTOPRAZOLE SODIUM 80 MG: 40 INJECTION, POWDER, FOR SOLUTION INTRAVENOUS at 02:08

## 2022-08-21 RX ADMIN — AMLODIPINE BESYLATE 5 MG: 5 TABLET ORAL at 01:08

## 2022-08-21 RX ADMIN — PANTOPRAZOLE SODIUM 40 MG: 40 INJECTION, POWDER, FOR SOLUTION INTRAVENOUS at 08:08

## 2022-08-21 NOTE — PROGRESS NOTES
Hospital Medicine Plan of Care Note     Admission H&P dated earlier this morning reviewed, and agree with assessment and plan as documented in addition to following:     S:  Pt seen and examined this morning on rounds, DELMY.   PRBCs transfusing.  Notes that her shortness of breath and fatigue are improved from admission.   at bedside.  Patient and  updated on plan of care.     O:  Vitals:    08/21/22 1045 08/21/22 1100 08/21/22 1114 08/21/22 1136   BP:   (!) 163/79 (!) 185/75   BP Location:   Right arm    Patient Position:   Lying    Pulse:  75 74 74   Resp:   18 18   Temp:   96.7 °F (35.9 °C) 98 °F (36.7 °C)   TempSrc:   Oral    SpO2: 95% (!) 94% (!) 91% (!) 91%   Weight:       Height:              Physical exam largely unchanged from admission, however tachypnea improved.        A/P:  Contacted GI, no acute intervention today; will initiate on clear liquids and make NPO at midnight  Continue IV Protonix 40 mg b.i.d.  Serial CBC monitoring following transfusions; transfuse as needed for hemoglobin of 7  Iron panel reviewed   Echo pending; hold on further Lasix pending course and workup   Wean supplemental oxygen as clinically tolerated  Will continue to monitor blood pressure trend closely and adjust antihypertensive regimen as clinically indicated and tolerated

## 2022-08-21 NOTE — NURSING
2 units PRBC given, Hgb went up to 8.3. But WBC also went up to 24.39,  notified. Lasix given after 2 units of blood is done. Pt voided with large amount yellow urine after Lasix given.    Check with PCP regarding starting these abx and possibly check INR more frequently in the next few weeks, or consider topical agents such as metrogel 0.75%1 applicator full intravaginally QHS x 5 days 50grams, no refills and terazol 7 day, use as directed.

## 2022-08-21 NOTE — ASSESSMENT & PLAN NOTE
Patient with Hypercapnic and Hypoxic Respiratory failure which is Acute.  she is not on home oxygen. Supplemental oxygen was provided and noted-SpO2 >94% on 2L NC.    Signs/symptoms of respiratory failure include- tachypnea, increased work of breathing and rales. Contributing diagnoses includes - Pleural effusion and symptomatic anemia and pulmonary edema Labs and images were reviewed. Patient Has not had a recent ABG. Will treat underlying causes and adjust management of respiratory failure   - symptomatic anemia vs. Flash edema from HTN contributing to symptoms  - 2 units pRBCs transfusing  - lasix 40mg IV x1 for now, reassess clinically for additional doses  - Last Echo from 7/20/22 - See volume overload; repeat pending  - wean supplemental oxygen as tolerated

## 2022-08-21 NOTE — HPI
Katelyn Caba is a 81 y.o. female with PMHx significant for recent CVA (7/20/22) admitted to hospital medicine for symptomatic anemia and volume overload. Patient reports dyspnea of exertion for ~ 1 month with associated palpitations for the last 2 days. She has been checking her oxygen saturation at home for a few days, and today noted an SpO2 of 90% which prompted her visit to the ED. Endorses nonproductive cough and intermittent chronic BLE edema that is unchanged from baseline. Patient's  at bedside states that he monitors her BP daily, and the highest it has ever gotten was around 140-150 SBP. Denies orthopnea, bloody stool (endorses black stool since start on iron pills), hematemesis, fever/chills, HA, blurred vision, CP, abdominal pain, n/v, diarrhea, constipation, numbness/tingling. Patient completed her 21 day course of plavix on 8/13.     In the ED, hypertensive to max 221/114. Tachypneic to max 28. SpO2 >94% on 2L NC. H/H 4.9/17.4 (H/H 10.7/33.9 ~1 month ago). Plt 544. Co2 22. . Troponin 0.009. EKG with NSR with nonspecific ST wave abnormalities. CXR with cardiomegaly with interstitial edema and new bilateral pleural effusions. Rectal exam by ED provider revealed brown/green stool. 2 units pRBCs ordered.

## 2022-08-21 NOTE — ASSESSMENT & PLAN NOTE
Patient is chronically on statin.will continue for now. Monitor clinically. Last LDL was   Lab Results   Component Value Date    LDLCALC 60.4 (L) 08/04/2022

## 2022-08-21 NOTE — HOSPITAL COURSE
Patient received 2 units packed red blood cells upon admission with improvement of hemoglobin from 4.6-> 8.4.  Gastroenterology consulted.  Patient denies any dysuria, urinary frequency or urgency.  EGD completed 8/22. Notable for  Rodriguez's stage C7-M7 per Eugene criteria (Not biopsied at this time due to anemia and bleeding evaluation with gastric ulcer present), 3 cm type-I sliding hiatal hernia, multiple fundic gland polyps, gastritis (biopsied), non-bleeding gastric ulcer with a clean ulcer base, multiple non-bleeding angioectasias in the duodenum s/p APC.  Patient to continue oral PPI indefinitely and repeat EGD in 12 week with GI. Per GI, okay to resume aspirin and Plavix on 08/22.  Patient weaned off of oxygen to room air.  Walk test negative for home oxygen needs.  Patient deemed medically stable for discharge 08/23.  Hemoglobin stable.  Ambulatory referral placed to Hematology for future consideration of IV iron transfusions and to neurology per patient and family request.  Return precautions advised and questions addressed with patient and family.  Patient with standing PCP appointment on Friday.    Vitals:    08/22/22 2332 08/23/22 0501 08/23/22 0727 08/23/22 1101   BP: 132/60  (!) 165/67 (!) 140/61   BP Location:       Patient Position:       Pulse: 72 73 69 72   Resp: 18 18     Temp: 97.5 °F (36.4 °C) 98.1 °F (36.7 °C)     TempSrc:       SpO2: (!) 94% (!) 94%  (!) 94%   Weight:       Height:           Physical Exam  Vitals and nursing note reviewed.   Constitutional:       General: She is not in acute distress.     Appearance: Normal appearance. She is not ill-appearing.   HENT:      Head: Normocephalic and atraumatic.      Right Ear: External ear normal.      Left Ear: External ear normal.   Eyes:      Extraocular Movements: Extraocular movements intact.      Conjunctiva/sclera: Conjunctivae normal.      Pupils: Pupils are equal, round, and reactive to light.   Cardiovascular:      Rate and Rhythm:  Normal rate and regular rhythm.      Pulses: Normal pulses.      Heart sounds: Normal heart sounds. No murmur heard.  Pulmonary:      Effort: Pulmonary effort is normal. No respiratory distress.      Breath sounds: No wheezing, rhonchi or rales.   Abdominal:      General: Abdomen is flat. Bowel sounds are normal.      Palpations: Abdomen is soft.      Tenderness: There is no abdominal tenderness.   Musculoskeletal:         General: Normal range of motion.      Cervical back: Normal range of motion and neck supple. No tenderness.      Right lower leg: No edema.      Left lower leg: No edema.   Skin:     General: Skin is warm and dry.      Capillary Refill: Capillary refill takes less than 2 seconds.      Coloration: Skin is not jaundiced.   Neurological:      General: No focal deficit present.      Mental Status: She is alert and oriented to person, place, and time. Mental status is at baseline.   Psychiatric:         Mood and Affect: Mood normal.         Behavior: Behavior normal.

## 2022-08-21 NOTE — CONSULTS
[facility]  Gastroenterology  Consult Note    Patient Name: Katelyn Caba  MRN: [id number]  Admission Date: 8/21/2022  Hospital Length of Stay: 0 days  Code Status: Full Code   Attending Provider: Patti Junior MD   Consulting Provider: Taiwo Mendez MD  Primary Care Physician: Brooklyn Burnham MD  Principal Problem:Symptomatic anemia    Inpatient consult to Gastroenterology  Consult performed by: Taiwo Mendez MD  Consult ordered by: Rhea Carlos PA-C        Subjective:     HPI: Katelyn Caba is a 81 y.o. female with history of recent CVA (7/20/22) who presents with SOB, onset shortly after stroke and worsened last night. Worse with ambulation. Associated with palpitations. Has unchanged chronic BLE edema. Found to have Hgb 4.9 down from 10.7 one month ago. CXR shows interstitial edema and new bilateral pleural effusions. Rectal exam in ED reportedly with brown/green stool. Has had black stools for a while while on iron supplementation. Hypertensive to 221/114 in ED, tachypneic, on 2L O2. GI consulted for possible GI bleed.        Past Medical History:   Diagnosis Date    Cancer     basal cell carcinoma       Past Surgical History:   Procedure Laterality Date    CARPAL TUNNEL RELEASE      EYE SURGERY      left eye cataract    TONSILLECTOMY      TUBAL LIGATION         History reviewed. No pertinent family history.    Social History     Socioeconomic History    Marital status:    Tobacco Use    Smoking status: Never Smoker    Smokeless tobacco: Never Used   Substance and Sexual Activity    Alcohol use: No    Drug use: No       No current facility-administered medications on file prior to encounter.     Current Outpatient Medications on File Prior to Encounter   Medication Sig Dispense Refill    alendronate (FOSAMAX) 70 MG tablet Take 1 tablet (70 mg total) by mouth every 7 days. (Patient taking differently: Take 70 mg by mouth every Monday.) 4 tablet 11    aspirin  (ECOTRIN) 81 MG EC tablet Take 1 tablet (81 mg total) by mouth once daily.  0    atorvastatin (LIPITOR) 80 MG tablet Take 1 tablet (80 mg total) by mouth every evening. 30 tablet 3    clopidogreL (PLAVIX) 75 mg tablet Take 1 tablet (75 mg total) by mouth once daily. for 21 days 21 tablet 0    cyanocobalamin (VITAMIN B-12) 1000 MCG tablet Take 1,000 mcg by mouth once daily.      ferrous sulfate (FEOSOL) 325 mg (65 mg iron) Tab tablet Take 1 tablet (325 mg total) by mouth once daily. 90 tablet 3    mv,Ca,min-folic acid-vit K1 (ONE-A-DAY WOMEN'S 50 PLUS) 400-20 mcg Tab Take 1 tablet by mouth once daily.      RESTASIS 0.05 % ophthalmic emulsion Place 1 drop into both eyes 2 (two) times daily.      vitamin D (VITAMIN D3) 1000 units Tab Take 1,000 Units by mouth once daily.         Review of patient's allergies indicates:  No Known Allergies    Review of Systems   Constitutional: Negative for chills and fever.   HENT: Negative for congestion and sore throat.    Eyes: Negative for blurred vision and double vision.   Respiratory: Positive for shortness of breath. Negative for cough.    Cardiovascular: Negative for chest pain and palpitations.   Gastrointestinal: Negative for abdominal pain, nausea and vomiting.   Genitourinary: Negative for frequency and urgency.   Musculoskeletal: Negative for joint pain and myalgias.   Skin: Negative for itching and rash.   Neurological: Negative for sensory change and focal weakness.        Objective:     Vitals:    08/21/22 0600   BP: (!) 148/68   Pulse: 74   Resp: 18   Temp: 97.7 °F (36.5 °C)         Constitutional:  not in acute distress and well developed  HENT: Head: Normal, normocephalic, atraumatic.  Eyes: conjunctiva clear and sclera nonicteric  Cardiovascular: regular rate and rhythm and no murmur  Respiratory: normal chest expansion & respiratory effort   and no accessory muscle use  GI: soft, non-tender, without masses or organomegaly  Musculoskeletal: no muscular  tenderness noted  Skin: normal color  Neurological: alert, oriented x3  Psychiatric: mood and affect are within normal limits, pt is a good historian; no memory problems were noted      Significant Labs:  Recent Labs   Lab 08/21/22  0058 08/21/22  0339   HGB 4.9* 4.6*       Lab Results   Component Value Date    WBC 7.02 08/21/2022    HGB 4.6 (LL) 08/21/2022    HCT 16.1 (LL) 08/21/2022    MCV 81 (L) 08/21/2022     08/21/2022       Lab Results   Component Value Date     08/21/2022    K 3.7 08/21/2022     08/21/2022    CO2 23 08/21/2022    BUN 14 08/21/2022    CREATININE 0.6 08/21/2022    CALCIUM 8.6 (L) 08/21/2022    ANIONGAP 8 08/21/2022    ESTGFRAFRICA >60 07/22/2022    EGFRNONAA >60 07/22/2022       Lab Results   Component Value Date    ALT 19 08/21/2022    AST 23 08/21/2022    ALKPHOS 88 08/21/2022    BILITOT 0.5 08/21/2022       Lab Results   Component Value Date    INR 0.9 07/20/2022    INR 1.0 06/17/2015       Significant Imaging:  Reviewed pertinent radiology findings.       Assessment/Plan:     Katelyn Caba is a 81 y.o. female with history of  recent CVA (7/20/22) who presents for exertional dyspnea. Clinically volume overloaded. GI consulted for black stools in setting of worsened acute on chronic anemia. Reportedly black stools since being on iron. Brown stool on MAGALYS. Recently completed 21d course of DAPT after CVA. NPO at midnight for possible EGD.    Problem List:  1. Acute on chronic anemia        Recommendations:  - Plan for EGD 8/22  - Trend Hgb q8 hrs. Transfuse for Hgb <7, unless otherwise indicated  - Maintain IV access with 2 large bore Ivs  - Intravascular resuscitation/support with IVFs   - NPO at midnight  - Hold all NSAIDs and anticoagulants, unless contraindicated  - Bolus IV pantoprazole 80mg followed by 40mg BID  - Please correct any coagulopathy with platelets and FFP for goal of platelets >50K and INR <2.0  - Please notify GI team if there is significant change in  patient's clinical status      Thank you for involving us in the care of Katelyn Caba. Please call with any additional questions, concerns or changes in the patient's clinical status. We will continue to follow.     Taiwo Mendez MD  Gastroenterology Fellow PGY V  [facility]

## 2022-08-21 NOTE — SUBJECTIVE & OBJECTIVE
Interval History:   Patient seen and examined at bedside.    No acute events overnight. Shortness of breath and fatigue improved status post blood transfusion  Patient has no acute complaints this morning.  Patient and family updated regarding care plan. EGD today w/ GI.    Review of Systems   Constitutional:  Negative for activity change, chills and fever.   HENT:  Negative for nosebleeds, sinus pain and trouble swallowing.    Eyes:  Negative for photophobia and visual disturbance.   Respiratory:  Negative for cough, chest tightness and shortness of breath.    Cardiovascular:  Negative for chest pain, palpitations and leg swelling.   Gastrointestinal:  Negative for abdominal pain, blood in stool, constipation, diarrhea, nausea and vomiting.   Genitourinary:  Negative for dysuria, frequency and hematuria.   Musculoskeletal:  Negative for back pain, gait problem and neck pain.   Skin:  Negative for rash and wound.   Neurological:  Negative for dizziness, syncope, speech difficulty and light-headedness.   Psychiatric/Behavioral:  Negative for agitation and confusion. The patient is not nervous/anxious.      Objective:     Vital Signs (Most Recent):  Temp: 98 °F (36.7 °C) (08/21/22 1608)  Pulse: 73 (08/21/22 1800)  Resp: 18 (08/21/22 1608)  BP: (!) 153/67 (08/21/22 1608)  SpO2: 95 % (08/21/22 1800)   Vital Signs (24h Range):  Temp:  [96.7 °F (35.9 °C)-98 °F (36.7 °C)] 98 °F (36.7 °C)  Pulse:  [69-88] 73  Resp:  [16-28] 18  SpO2:  [90 %-100 %] 95 %  BP: (148-221)/() 153/67     Weight: 55.8 kg (123 lb)  Body mass index is 21.79 kg/m².    Intake/Output Summary (Last 24 hours) at 8/21/2022 1813  Last data filed at 8/21/2022 1805  Gross per 24 hour   Intake 1157.83 ml   Output 2600 ml   Net -1442.17 ml      Physical Exam  Vitals and nursing note reviewed.   Constitutional:       General: She is not in acute distress.     Appearance: Normal appearance. She is not ill-appearing.   HENT:      Head: Normocephalic and  atraumatic.      Right Ear: External ear normal.      Left Ear: External ear normal.   Eyes:      Extraocular Movements: Extraocular movements intact.      Conjunctiva/sclera: Conjunctivae normal.      Pupils: Pupils are equal, round, and reactive to light.   Cardiovascular:      Rate and Rhythm: Normal rate and regular rhythm.      Pulses: Normal pulses.      Heart sounds: Normal heart sounds. No murmur heard.  Pulmonary:      Effort: Pulmonary effort is normal. No respiratory distress.      Breath sounds: No wheezing, rhonchi or rales.   Abdominal:      General: Abdomen is flat. Bowel sounds are normal.      Palpations: Abdomen is soft.      Tenderness: There is no abdominal tenderness.   Musculoskeletal:         General: Normal range of motion.      Cervical back: Normal range of motion and neck supple. No tenderness.      Right lower leg: No edema.      Left lower leg: No edema.   Skin:     General: Skin is warm and dry.      Capillary Refill: Capillary refill takes less than 2 seconds.      Coloration: Skin is not jaundiced.   Neurological:      General: No focal deficit present.      Mental Status: She is alert and oriented to person, place, and time. Mental status is at baseline.   Psychiatric:         Mood and Affect: Mood normal.         Behavior: Behavior normal.       Significant Labs: All pertinent labs within the past 24 hours have been reviewed.    Recent Results (from the past 24 hour(s))   Comprehensive metabolic panel    Collection Time: 08/21/22 12:58 AM   Result Value Ref Range    Sodium 136 136 - 145 mmol/L    Potassium 3.8 3.5 - 5.1 mmol/L    Chloride 104 95 - 110 mmol/L    CO2 22 (L) 23 - 29 mmol/L    Glucose 103 70 - 110 mg/dL    BUN 15 8 - 23 mg/dL    Creatinine 0.6 0.5 - 1.4 mg/dL    Calcium 8.8 8.7 - 10.5 mg/dL    Total Protein 6.6 6.0 - 8.4 g/dL    Albumin 3.7 3.5 - 5.2 g/dL    Total Bilirubin 0.5 0.1 - 1.0 mg/dL    Alkaline Phosphatase 88 55 - 135 U/L    AST 23 10 - 40 U/L    ALT 19 10 -  44 U/L    Anion Gap 10 8 - 16 mmol/L    eGFR >60.0 >60 mL/min/1.73 m^2   CBC auto differential    Collection Time: 08/21/22 12:58 AM   Result Value Ref Range    WBC 7.93 3.90 - 12.70 K/uL    RBC 2.17 (L) 4.00 - 5.40 M/uL    Hemoglobin 4.9 (LL) 12.0 - 16.0 g/dL    Hematocrit 17.4 (LL) 37.0 - 48.5 %    MCV 80 (L) 82 - 98 fL    MCH 22.6 (L) 27.0 - 31.0 pg    MCHC 28.2 (L) 32.0 - 36.0 g/dL    RDW 20.6 (H) 11.5 - 14.5 %    Platelets 544 (H) 150 - 450 K/uL    MPV 10.3 9.2 - 12.9 fL    Immature Granulocytes 0.4 0.0 - 0.5 %    Gran # (ANC) 5.3 1.8 - 7.7 K/uL    Immature Grans (Abs) 0.03 0.00 - 0.04 K/uL    Lymph # 1.3 1.0 - 4.8 K/uL    Mono # 0.9 0.3 - 1.0 K/uL    Eos # 0.3 0.0 - 0.5 K/uL    Baso # 0.07 0.00 - 0.20 K/uL    nRBC 0 0 /100 WBC    Gran % 67.1 38.0 - 73.0 %    Lymph % 16.5 (L) 18.0 - 48.0 %    Mono % 10.8 4.0 - 15.0 %    Eosinophil % 4.3 0.0 - 8.0 %    Basophil % 0.9 0.0 - 1.9 %    Platelet Estimate Increased (A)     Aniso Slight     Poik Slight     Poly Moderate     Hypo Moderate     Reno Cells Occasional     Differential Method Automated    Troponin I    Collection Time: 08/21/22 12:58 AM   Result Value Ref Range    Troponin I 0.009 0.000 - 0.026 ng/mL   Brain natriuretic peptide    Collection Time: 08/21/22 12:58 AM   Result Value Ref Range     (H) 0 - 99 pg/mL   D dimer, quantitative    Collection Time: 08/21/22 12:58 AM   Result Value Ref Range    D-Dimer 0.95 (H) <0.50 mg/L FEU   COVID-19 Rapid Screening    Collection Time: 08/21/22  1:02 AM   Result Value Ref Range    SARS-CoV-2 RNA, Amplification, Qual Negative Negative   Type & Screen    Collection Time: 08/21/22  1:55 AM   Result Value Ref Range    Group & Rh A NEG     Indirect Kimberlyn NEG    Prepare RBC 2 Units; severe anemia    Collection Time: 08/21/22  1:55 AM   Result Value Ref Range    UNIT NUMBER [id number]     Product Code B7435U40     DISPENSE STATUS ISSUED     CODING SYSTEM [id number]     Unit Blood Type Code 0600     Unit Blood Type A  NEG     Unit Expiration [id number]     UNIT NUMBER [id number]     Product Code Q3115S81     DISPENSE STATUS ISSUED     CODING SYSTEM [id number]     Unit Blood Type Code 0600     Unit Blood Type A NEG     Unit Expiration [id number]    Basic Metabolic Panel (BMP)    Collection Time: 08/21/22  3:39 AM   Result Value Ref Range    Sodium 136 136 - 145 mmol/L    Potassium 3.7 3.5 - 5.1 mmol/L    Chloride 105 95 - 110 mmol/L    CO2 23 23 - 29 mmol/L    Glucose 108 70 - 110 mg/dL    BUN 14 8 - 23 mg/dL    Creatinine 0.6 0.5 - 1.4 mg/dL    Calcium 8.6 (L) 8.7 - 10.5 mg/dL    Anion Gap 8 8 - 16 mmol/L    eGFR >60.0 >60 mL/min/1.73 m^2   Magnesium    Collection Time: 08/21/22  3:39 AM   Result Value Ref Range    Magnesium 2.2 1.6 - 2.6 mg/dL   CBC with Automated Differential    Collection Time: 08/21/22  3:39 AM   Result Value Ref Range    WBC 7.02 3.90 - 12.70 K/uL    RBC 2.00 (L) 4.00 - 5.40 M/uL    Hemoglobin 4.6 (LL) 12.0 - 16.0 g/dL    Hematocrit 16.1 (LL) 37.0 - 48.5 %    MCV 81 (L) 82 - 98 fL    MCH 23.0 (L) 27.0 - 31.0 pg    MCHC 28.6 (L) 32.0 - 36.0 g/dL    RDW 20.6 (H) 11.5 - 14.5 %    Platelets 439 150 - 450 K/uL    MPV 10.1 9.2 - 12.9 fL    Immature Granulocytes 0.6 (H) 0.0 - 0.5 %    Gran # (ANC) 5.0 1.8 - 7.7 K/uL    Immature Grans (Abs) 0.04 0.00 - 0.04 K/uL    Lymph # 1.0 1.0 - 4.8 K/uL    Mono # 0.7 0.3 - 1.0 K/uL    Eos # 0.2 0.0 - 0.5 K/uL    Baso # 0.06 0.00 - 0.20 K/uL    nRBC 0 0 /100 WBC    Gran % 71.5 38.0 - 73.0 %    Lymph % 14.5 (L) 18.0 - 48.0 %    Mono % 9.8 4.0 - 15.0 %    Eosinophil % 2.7 0.0 - 8.0 %    Basophil % 0.9 0.0 - 1.9 %    Differential Method Automated    Iron and TIBC    Collection Time: 08/21/22  3:39 AM   Result Value Ref Range    Iron <10 (L) 30 - 160 ug/dL    Transferrin 302 200 - 375 mg/dL    TIBC 447 250 - 450 ug/dL    Saturated Iron Unable to calculate 20 - 50 %   Ferritin    Collection Time: 08/21/22  3:39 AM   Result Value Ref Range    Ferritin 17 (L) 20.0 - 300.0 ng/mL    Troponin I    Collection Time: 08/21/22  3:39 AM   Result Value Ref Range    Troponin I 0.010 0.000 - 0.026 ng/mL   Urinalysis, Reflex to Urine Culture Urine, Clean Catch    Collection Time: 08/21/22  9:15 AM    Specimen: Urine   Result Value Ref Range    Specimen UA Urine, Clean Catch     Color, UA Yellow Yellow, Straw, Alejandra    Appearance, UA Clear Clear    pH, UA 6.0 5.0 - 8.0    Specific Gravity, UA 1.010 1.005 - 1.030    Protein, UA Negative Negative    Glucose, UA Negative Negative    Ketones, UA Negative Negative    Bilirubin (UA) Negative Negative    Occult Blood UA Negative Negative    Nitrite, UA Positive (A) Negative    Leukocytes, UA 1+ (A) Negative   Urinalysis Microscopic    Collection Time: 08/21/22  9:15 AM   Result Value Ref Range    RBC, UA 0 0 - 4 /hpf    WBC, UA 2 0 - 5 /hpf    Bacteria Many (A) None-Occ /hpf    Microscopic Comment SEE COMMENT    CBC auto differential    Collection Time: 08/21/22 12:05 PM   Result Value Ref Range    WBC 24.30 (H) 3.90 - 12.70 K/uL    RBC 3.30 (L) 4.00 - 5.40 M/uL    Hemoglobin 8.3 (L) 12.0 - 16.0 g/dL    Hematocrit 26.3 (L) 37.0 - 48.5 %    MCV 80 (L) 82 - 98 fL    MCH 25.2 (L) 27.0 - 31.0 pg    MCHC 31.6 (L) 32.0 - 36.0 g/dL    RDW 18.3 (H) 11.5 - 14.5 %    Platelets 451 (H) 150 - 450 K/uL    MPV 9.8 9.2 - 12.9 fL    Immature Granulocytes 1.4 (H) 0.0 - 0.5 %    Gran # (ANC) 21.1 (H) 1.8 - 7.7 K/uL    Immature Grans (Abs) 0.35 (H) 0.00 - 0.04 K/uL    Lymph # 1.0 1.0 - 4.8 K/uL    Mono # 1.6 (H) 0.3 - 1.0 K/uL    Eos # 0.2 0.0 - 0.5 K/uL    Baso # 0.12 0.00 - 0.20 K/uL    nRBC 0 0 /100 WBC    Gran % 86.7 (H) 38.0 - 73.0 %    Lymph % 4.3 (L) 18.0 - 48.0 %    Mono % 6.4 4.0 - 15.0 %    Eosinophil % 0.7 0.0 - 8.0 %    Basophil % 0.5 0.0 - 1.9 %    Platelet Estimate Increased (A)     Aniso Slight     Poik Slight     Hypo Occasional     Ovalocytes Occasional     Target Cells Occasional     Schistocytes Present     Basophilic Stippling Occasional     Fragmented Cells  Occasional     Differential Method Automated    Echo    Collection Time: 08/21/22  3:35 PM   Result Value Ref Range    Ascending aorta 2.57 cm    STJ 2.38 cm    AV mean gradient 7 mmHg    Ao peak georges 1.74 m/s    Ao VTI 41.61 cm    IVRT 97.05 msec    IVS 0.92 0.6 - 1.1 cm    LA size 3.68 cm    Left Atrium Major Axis 5.42 cm    Left Atrium Minor Axis 5.30 cm    LVIDd 3.87 3.5 - 6.0 cm    LVIDs 2.64 2.1 - 4.0 cm    LVOT diameter 1.94 cm    LVOT peak VTI 28.97 cm    Posterior Wall 0.81 0.6 - 1.1 cm    MV Peak A Georges 0.69 m/s    E wave deceleration time 200.34 msec    MV Peak E Georges 1.26 m/s    RA Major Axis 4.36 cm    RA Width 3.59 cm    RVDD 2.48 cm    Sinus 2.79 cm    TAPSE 2.20 cm    TR Max Georges 3.23 m/s    TDI LATERAL 0.07 m/s    TDI SEPTAL 0.06 m/s    LA WIDTH 3.91 cm    MV stenosis pressure 1/2 time 58.10 ms    LV Diastolic Volume 64.87 mL    LV Systolic Volume 25.45 mL    RV S' 14.80 cm/s    LVOT peak georges 1.35 m/s    LA volume (mod) 56.14 cm3    LV LATERAL E/E' RATIO 18.00 m/s    LV SEPTAL E/E' RATIO 21.00 m/s    FS 32 %    LA volume 65.55 cm3    LV mass 98.49 g    Left Ventricle Relative Wall Thickness 0.42 cm    AV valve area 2.06 cm2    AV Velocity Ratio 0.78     AV index (prosthetic) 0.70     MV valve area p 1/2 method 3.79 cm2    E/A ratio 1.83     Mean e' 0.07 m/s    LVOT area 3.0 cm2    LVOT stroke volume 85.59 cm3    AV peak gradient 12 mmHg    E/E' ratio 19.38 m/s    LV Systolic Volume Index 16.2 mL/m2    LV Diastolic Volume Index 41.32 mL/m2    LA Volume Index 41.7 mL/m2    LV Mass Index 63 g/m2    Triscuspid Valve Regurgitation Peak Gradient 42 mmHg    LA Volume Index (Mod) 35.8 mL/m2    BSA 1.57 m2   CBC auto differential    Collection Time: 08/21/22  4:12 PM   Result Value Ref Range    WBC 17.59 (H) 3.90 - 12.70 K/uL    RBC 3.24 (L) 4.00 - 5.40 M/uL    Hemoglobin 8.4 (L) 12.0 - 16.0 g/dL    Hematocrit 26.3 (L) 37.0 - 48.5 %    MCV 81 (L) 82 - 98 fL    MCH 25.9 (L) 27.0 - 31.0 pg    MCHC 31.9 (L) 32.0 -  36.0 g/dL    RDW 17.8 (H) 11.5 - 14.5 %    Platelets 436 150 - 450 K/uL    MPV 10.3 9.2 - 12.9 fL    Immature Granulocytes 0.9 (H) 0.0 - 0.5 %    Gran # (ANC) 14.9 (H) 1.8 - 7.7 K/uL    Immature Grans (Abs) 0.15 (H) 0.00 - 0.04 K/uL    Lymph # 1.0 1.0 - 4.8 K/uL    Mono # 1.4 (H) 0.3 - 1.0 K/uL    Eos # 0.1 0.0 - 0.5 K/uL    Baso # 0.09 0.00 - 0.20 K/uL    nRBC 0 0 /100 WBC    Gran % 84.4 (H) 38.0 - 73.0 %    Lymph % 5.6 (L) 18.0 - 48.0 %    Mono % 7.9 4.0 - 15.0 %    Eosinophil % 0.7 0.0 - 8.0 %    Basophil % 0.5 0.0 - 1.9 %    Differential Method Automated          Significant Imaging: I have reviewed all pertinent imaging results/findings within the past 24 hours.    Imaging Results              US Lower Extremity Veins Bilateral (Final result)  Result time 08/21/22 17:09:43      Final result by Troy Yeung MD (08/21/22 17:09:43)                   Impression:      No evidence of acute deep venous thrombosis in the left or right lower extremity veins.    Electronically signed by resident: Carlos Enrique Ugalde  Date:    08/21/2022  Time:    17:04    Electronically signed by: Troy Yeung MD  Date:    08/21/2022  Time:    17:09               Narrative:    EXAMINATION:  US LOWER EXTREMITY VEINS BILATERAL    CLINICAL HISTORY:  Edema with elevated d dimer;    TECHNIQUE:  Duplex and color flow Doppler and dynamic compression was performed of the bilateral lower extremity veins was performed.    COMPARISON:  None    FINDINGS:  Right thigh veins: The common femoral, femoral, popliteal, upper greater saphenous, and deep femoral veins are patent and free of thrombus. The veins are normally compressible and have normal phasic flow and augmentation response.    Right calf veins: The visualized calf veins are patent with normal color flow.    Left thigh veins: The common femoral, femoral, popliteal, upper greater saphenous, and deep femoral veins are patent and free of thrombus. The veins are normally compressible and have normal  "phasic flow and augmentation response.    Left calf veins: The visualized calf veins are patent with normal color flow.    Miscellaneous: None                                       X-Ray Chest AP Portable (Final result)  Result time 08/21/22 01:22:01      Final result by Richard Chavez MD (08/21/22 01:22:01)                   Impression:      Cardiomegaly with interstitial edema and new bilateral pleural effusions, suggestive of CHF exacerbation and/or volume overload.      Electronically signed by: Richard Chavez MD  Date:    08/21/2022  Time:    01:22               Narrative:    EXAMINATION:  XR CHEST AP PORTABLE    CLINICAL HISTORY:  Provided history is "  Shortness of breath".    TECHNIQUE:  One view of the chest.    COMPARISON:  07/20/2022.    FINDINGS:  Cardiac wires overlie the chest.  Cardiac silhouette is enlarged and similar to the prior study.  Atherosclerotic calcifications overlie the aortic arch.  There is central vascular congestion with worsening diffuse bilateral coarsened interstitial lung markings suggestive of interstitial edema.  New bilateral pleural effusions with adjacent passive atelectasis or airspace disease in the lung bases.  No distinct pneumothorax.                                      "

## 2022-08-21 NOTE — ED TRIAGE NOTES
"Katelyn Caba, an 81 y.o. female presents to the ED from home.  Pt c/o SOB with pulse ox "in the low 90's" this evening.  Hx of CVA in 7/22      Chief Complaint   Patient presents with    Shortness of Breath     Pt c/o increased SOB x2 days. Pt.'s O2 sat is 90% on RA in triage. Pt placed on 2 L O2 per NC in triage with an O2 sat of 98%. -chest pain. PMH of CVA in July.      Review of patient's allergies indicates:  No Known Allergies  Past Medical History:   Diagnosis Date    Cancer     basal cell carcinoma     "

## 2022-08-21 NOTE — H&P (VIEW-ONLY)
[facility]  Gastroenterology  Consult Note    Patient Name: Katelyn Caba  MRN: [id number]  Admission Date: 8/21/2022  Hospital Length of Stay: 0 days  Code Status: Full Code   Attending Provider: Patti Junior MD   Consulting Provider: Taiwo Mendez MD  Primary Care Physician: Brooklyn Burnham MD  Principal Problem:Symptomatic anemia    Inpatient consult to Gastroenterology  Consult performed by: Taiwo Mendez MD  Consult ordered by: Rhea Carlos PA-C        Subjective:     HPI: Katelyn Caba is a 81 y.o. female with history of recent CVA (7/20/22) who presents with SOB, onset shortly after stroke and worsened last night. Worse with ambulation. Associated with palpitations. Has unchanged chronic BLE edema. Found to have Hgb 4.9 down from 10.7 one month ago. CXR shows interstitial edema and new bilateral pleural effusions. Rectal exam in ED reportedly with brown/green stool. Has had black stools for a while while on iron supplementation. Hypertensive to 221/114 in ED, tachypneic, on 2L O2. GI consulted for possible GI bleed.        Past Medical History:   Diagnosis Date    Cancer     basal cell carcinoma       Past Surgical History:   Procedure Laterality Date    CARPAL TUNNEL RELEASE      EYE SURGERY      left eye cataract    TONSILLECTOMY      TUBAL LIGATION         History reviewed. No pertinent family history.    Social History     Socioeconomic History    Marital status:    Tobacco Use    Smoking status: Never Smoker    Smokeless tobacco: Never Used   Substance and Sexual Activity    Alcohol use: No    Drug use: No       No current facility-administered medications on file prior to encounter.     Current Outpatient Medications on File Prior to Encounter   Medication Sig Dispense Refill    alendronate (FOSAMAX) 70 MG tablet Take 1 tablet (70 mg total) by mouth every 7 days. (Patient taking differently: Take 70 mg by mouth every Monday.) 4 tablet 11    aspirin  (ECOTRIN) 81 MG EC tablet Take 1 tablet (81 mg total) by mouth once daily.  0    atorvastatin (LIPITOR) 80 MG tablet Take 1 tablet (80 mg total) by mouth every evening. 30 tablet 3    clopidogreL (PLAVIX) 75 mg tablet Take 1 tablet (75 mg total) by mouth once daily. for 21 days 21 tablet 0    cyanocobalamin (VITAMIN B-12) 1000 MCG tablet Take 1,000 mcg by mouth once daily.      ferrous sulfate (FEOSOL) 325 mg (65 mg iron) Tab tablet Take 1 tablet (325 mg total) by mouth once daily. 90 tablet 3    mv,Ca,min-folic acid-vit K1 (ONE-A-DAY WOMEN'S 50 PLUS) 400-20 mcg Tab Take 1 tablet by mouth once daily.      RESTASIS 0.05 % ophthalmic emulsion Place 1 drop into both eyes 2 (two) times daily.      vitamin D (VITAMIN D3) 1000 units Tab Take 1,000 Units by mouth once daily.         Review of patient's allergies indicates:  No Known Allergies    Review of Systems   Constitutional: Negative for chills and fever.   HENT: Negative for congestion and sore throat.    Eyes: Negative for blurred vision and double vision.   Respiratory: Positive for shortness of breath. Negative for cough.    Cardiovascular: Negative for chest pain and palpitations.   Gastrointestinal: Negative for abdominal pain, nausea and vomiting.   Genitourinary: Negative for frequency and urgency.   Musculoskeletal: Negative for joint pain and myalgias.   Skin: Negative for itching and rash.   Neurological: Negative for sensory change and focal weakness.        Objective:     Vitals:    08/21/22 0600   BP: (!) 148/68   Pulse: 74   Resp: 18   Temp: 97.7 °F (36.5 °C)         Constitutional:  not in acute distress and well developed  HENT: Head: Normal, normocephalic, atraumatic.  Eyes: conjunctiva clear and sclera nonicteric  Cardiovascular: regular rate and rhythm and no murmur  Respiratory: normal chest expansion & respiratory effort   and no accessory muscle use  GI: soft, non-tender, without masses or organomegaly  Musculoskeletal: no muscular  tenderness noted  Skin: normal color  Neurological: alert, oriented x3  Psychiatric: mood and affect are within normal limits, pt is a good historian; no memory problems were noted      Significant Labs:  Recent Labs   Lab 08/21/22  0058 08/21/22  0339   HGB 4.9* 4.6*       Lab Results   Component Value Date    WBC 7.02 08/21/2022    HGB 4.6 (LL) 08/21/2022    HCT 16.1 (LL) 08/21/2022    MCV 81 (L) 08/21/2022     08/21/2022       Lab Results   Component Value Date     08/21/2022    K 3.7 08/21/2022     08/21/2022    CO2 23 08/21/2022    BUN 14 08/21/2022    CREATININE 0.6 08/21/2022    CALCIUM 8.6 (L) 08/21/2022    ANIONGAP 8 08/21/2022    ESTGFRAFRICA >60 07/22/2022    EGFRNONAA >60 07/22/2022       Lab Results   Component Value Date    ALT 19 08/21/2022    AST 23 08/21/2022    ALKPHOS 88 08/21/2022    BILITOT 0.5 08/21/2022       Lab Results   Component Value Date    INR 0.9 07/20/2022    INR 1.0 06/17/2015       Significant Imaging:  Reviewed pertinent radiology findings.       Assessment/Plan:     Katelyn Caba is a 81 y.o. female with history of  recent CVA (7/20/22) who presents for exertional dyspnea. Clinically volume overloaded. GI consulted for black stools in setting of worsened acute on chronic anemia. Reportedly black stools since being on iron. Brown stool on MAGALYS. Recently completed 21d course of DAPT after CVA. NPO at midnight for possible EGD.    Problem List:  1. Acute on chronic anemia        Recommendations:  - Plan for EGD 8/22  - Trend Hgb q8 hrs. Transfuse for Hgb <7, unless otherwise indicated  - Maintain IV access with 2 large bore Ivs  - Intravascular resuscitation/support with IVFs   - NPO at midnight  - Hold all NSAIDs and anticoagulants, unless contraindicated  - Bolus IV pantoprazole 80mg followed by 40mg BID  - Please correct any coagulopathy with platelets and FFP for goal of platelets >50K and INR <2.0  - Please notify GI team if there is significant change in  patient's clinical status      Thank you for involving us in the care of Katelyn Caba. Please call with any additional questions, concerns or changes in the patient's clinical status. We will continue to follow.     Taiwo Mendez MD  Gastroenterology Fellow PGY V  [facility]

## 2022-08-21 NOTE — SUBJECTIVE & OBJECTIVE
Past Medical History:   Diagnosis Date    Cancer     basal cell carcinoma       Past Surgical History:   Procedure Laterality Date    CARPAL TUNNEL RELEASE      EYE SURGERY      left eye cataract    TONSILLECTOMY      TUBAL LIGATION         Review of patient's allergies indicates:  No Known Allergies    No current facility-administered medications on file prior to encounter.     Current Outpatient Medications on File Prior to Encounter   Medication Sig    alendronate (FOSAMAX) 70 MG tablet Take 1 tablet (70 mg total) by mouth every 7 days. (Patient taking differently: Take 70 mg by mouth every Monday.)    aspirin (ECOTRIN) 81 MG EC tablet Take 1 tablet (81 mg total) by mouth once daily.    atorvastatin (LIPITOR) 80 MG tablet Take 1 tablet (80 mg total) by mouth every evening.    clopidogreL (PLAVIX) 75 mg tablet Take 1 tablet (75 mg total) by mouth once daily. for 21 days    cyanocobalamin (VITAMIN B-12) 1000 MCG tablet Take 1,000 mcg by mouth once daily.    ferrous sulfate (FEOSOL) 325 mg (65 mg iron) Tab tablet Take 1 tablet (325 mg total) by mouth once daily.    mv,Ca,min-folic acid-vit K1 (ONE-A-DAY WOMEN'S 50 PLUS) 400-20 mcg Tab Take 1 tablet by mouth once daily.    RESTASIS 0.05 % ophthalmic emulsion Place 1 drop into both eyes 2 (two) times daily.    vitamin D (VITAMIN D3) 1000 units Tab Take 1,000 Units by mouth once daily.     Family History    None       Tobacco Use    Smoking status: Never Smoker    Smokeless tobacco: Never Used   Substance and Sexual Activity    Alcohol use: No    Drug use: No    Sexual activity: Not on file     Review of Systems   Constitutional:  Negative for activity change, chills and fever.   HENT:  Negative for nosebleeds, sinus pain and trouble swallowing.    Eyes:  Negative for photophobia and visual disturbance.   Respiratory:  Positive for shortness of breath. Negative for cough and chest tightness.    Cardiovascular:  Positive for palpitations and leg swelling. Negative for  chest pain.   Gastrointestinal:  Negative for abdominal pain, blood in stool, constipation, diarrhea, nausea and vomiting.   Genitourinary:  Negative for dysuria, frequency and hematuria.   Musculoskeletal:  Negative for back pain, gait problem and neck pain.   Skin:  Negative for rash and wound.   Neurological:  Negative for dizziness, syncope, speech difficulty and light-headedness.   Psychiatric/Behavioral:  Negative for agitation and confusion. The patient is not nervous/anxious.    Objective:     Vital Signs (Most Recent):  Temp: 98 °F (36.7 °C) (08/21/22 0015)  Pulse: 75 (08/21/22 0200)  Resp: (!) 28 (08/21/22 0200)  BP: (!) 188/79 (08/21/22 0200)  SpO2: 100 % (08/21/22 0200)   Vital Signs (24h Range):  Temp:  [98 °F (36.7 °C)] 98 °F (36.7 °C)  Pulse:  [69-88] 75  Resp:  [16-28] 28  SpO2:  [90 %-100 %] 100 %  BP: (173-221)/() 188/79     Weight: 56.2 kg (124 lb)  Body mass index is 21.97 kg/m².    Physical Exam  Vitals and nursing note reviewed.   Constitutional:       General: She is not in acute distress.     Appearance: Normal appearance. She is not ill-appearing.   HENT:      Head: Normocephalic and atraumatic.      Right Ear: External ear normal.      Left Ear: External ear normal.   Eyes:      Extraocular Movements: Extraocular movements intact.      Conjunctiva/sclera: Conjunctivae normal.      Pupils: Pupils are equal, round, and reactive to light.   Cardiovascular:      Rate and Rhythm: Normal rate and regular rhythm.      Pulses: Normal pulses.      Heart sounds: Normal heart sounds. No murmur heard.  Pulmonary:      Effort: Pulmonary effort is normal. No respiratory distress.      Breath sounds: Rales present.      Comments: Tachypneic during exam right after ambulating to restroom.  Abdominal:      General: Abdomen is flat. Bowel sounds are normal.      Palpations: Abdomen is soft.      Tenderness: There is no abdominal tenderness.   Musculoskeletal:         General: Normal range of motion.       Cervical back: Normal range of motion and neck supple. No tenderness.      Right lower leg: Edema (trace) present.      Left lower leg: Edema (trace) present.   Skin:     General: Skin is warm and dry.      Capillary Refill: Capillary refill takes less than 2 seconds.      Coloration: Skin is not jaundiced.   Neurological:      General: No focal deficit present.      Mental Status: She is alert and oriented to person, place, and time. Mental status is at baseline.   Psychiatric:         Mood and Affect: Mood normal.         Behavior: Behavior normal.         CRANIAL NERVES     CN III, IV, VI   Pupils are equal, round, and reactive to light.     Significant Labs: All pertinent labs within the past 24 hours have been reviewed.  BMP:   Recent Labs   Lab 08/21/22 0058         K 3.8      CO2 22*   BUN 15   CREATININE 0.6   CALCIUM 8.8     CBC:   Recent Labs   Lab 08/21/22 0058   WBC 7.93   HGB 4.9*   HCT 17.4*   *     CMP:   Recent Labs   Lab 08/21/22 0058      K 3.8      CO2 22*      BUN 15   CREATININE 0.6   CALCIUM 8.8   PROT 6.6   ALBUMIN 3.7   BILITOT 0.5   ALKPHOS 88   AST 23   ALT 19   ANIONGAP 10     Cardiac Markers:   Recent Labs   Lab 08/21/22 0058   *     Troponin:   Recent Labs   Lab 08/21/22 0058   TROPONINI 0.009       Significant Imaging: I have reviewed all pertinent imaging results/findings within the past 24 hours.

## 2022-08-21 NOTE — PLAN OF CARE
Plan of care reviewed with pt:  -AAOx4, on 1L of O2 via NC with SpO2 =92-96%%. VS stable   -pt stated  she only has SOB with walking to the bathroom despites being on 2L of O2 with walking. Pt stated she has been having SOB at home since after she had stroke. Has intermittent dry cough.   -Received 2 units PRBC, latest Hgb is 8.4  -Voided with large amount of urine after Lasix IV given once  -Denied pain and nausea  -Diet changed from NPO to clear liquid and now cardiac diet. Will be NPO after midnight for EGD tmr, pt and family is awared  -No BM  -US of BLE done  -IS encouraged to use, can barely get it up to 500cc.   -Pt cannot tolerate wearing SCD, ROSITA applied  -Call light in reach. WCTM

## 2022-08-21 NOTE — ASSESSMENT & PLAN NOTE
GIB  Patient's anemia is currently uncontrolled. Currently receiving 2 units of PRBCs.   Current CBC reviewed-   Lab Results   Component Value Date    HGB 4.9 (LL) 08/21/2022    HCT 17.4 (LL) 08/21/2022     Monitor serial CBC and transfuse if patient becomes hemodynamically unstable, symptomatic or H/H drops below 7/21.   -Anemia panel  -Continue oral iron supplement  - Keep patient NPO  - Type and screen and blood consent  - IV Protonix 40mg BID  - 2 large bore IV access  - anti-emetics as needed  - GI consulted, appreciate recs

## 2022-08-21 NOTE — H&P
[facility] - Emergency Dept  Hospital Medicine  History & Physical    Patient Name: Katelyn Caba  MRN: [id number]  Patient Class: OP- Observation  Admission Date: 8/21/2022  Attending Physician: Patti Juinor MD   Primary Care Provider: Brooklyn Burnham MD         Patient information was obtained from patient, spouse/SO and ER records.     Subjective:     Principal Problem:Symptomatic anemia    Chief Complaint:   Chief Complaint   Patient presents with    Shortness of Breath     Pt c/o increased SOB x2 days. Pt.'s O2 sat is 90% on RA in triage. Pt placed on 2 L O2 per NC in triage with an O2 sat of 98%. -chest pain. PMH of CVA in July.         HPI: Katelyn Caba is a 81 y.o. female with PMHx significant for recent CVA (7/20/22) admitted to hospital medicine for symptomatic anemia and volume overload. Patient reports dyspnea of exertion for ~ 1 month with associated palpitations for the last 2 days. She has been checking her oxygen saturation at home for a few days, and today noted an SpO2 of 90% which prompted her visit to the ED. Endorses nonproductive cough and intermittent chronic BLE edema that is unchanged from baseline. Patient's  at bedside states that he monitors her BP daily, and the highest it has ever gotten was around 140-150 SBP. Denies orthopnea, bloody stool (endorses black stool since start on iron pills), hematemesis, fever/chills, HA, blurred vision, CP, abdominal pain, n/v, diarrhea, constipation, numbness/tingling. Patient completed her 21 day course of plavix on 8/13.     In the ED, hypertensive to max 221/114. Tachypneic to max 28. SpO2 >94% on 2L NC. H/H 4.9/17.4 (H/H 10.7/33.9 ~1 month ago). Plt 544. Co2 22. . Troponin 0.009. EKG with NSR with nonspecific ST wave abnormalities. CXR with cardiomegaly with interstitial edema and new bilateral pleural effusions. Rectal exam by ED provider revealed brown/green stool. 2 units pRBCs ordered.      Past Medical History:   Diagnosis  Date    Cancer     basal cell carcinoma       Past Surgical History:   Procedure Laterality Date    CARPAL TUNNEL RELEASE      EYE SURGERY      left eye cataract    TONSILLECTOMY      TUBAL LIGATION         Review of patient's allergies indicates:  No Known Allergies    No current facility-administered medications on file prior to encounter.     Current Outpatient Medications on File Prior to Encounter   Medication Sig    alendronate (FOSAMAX) 70 MG tablet Take 1 tablet (70 mg total) by mouth every 7 days. (Patient taking differently: Take 70 mg by mouth every Monday.)    aspirin (ECOTRIN) 81 MG EC tablet Take 1 tablet (81 mg total) by mouth once daily.    atorvastatin (LIPITOR) 80 MG tablet Take 1 tablet (80 mg total) by mouth every evening.    clopidogreL (PLAVIX) 75 mg tablet Take 1 tablet (75 mg total) by mouth once daily. for 21 days    cyanocobalamin (VITAMIN B-12) 1000 MCG tablet Take 1,000 mcg by mouth once daily.    ferrous sulfate (FEOSOL) 325 mg (65 mg iron) Tab tablet Take 1 tablet (325 mg total) by mouth once daily.    mv,Ca,min-folic acid-vit K1 (ONE-A-DAY WOMEN'S 50 PLUS) 400-20 mcg Tab Take 1 tablet by mouth once daily.    RESTASIS 0.05 % ophthalmic emulsion Place 1 drop into both eyes 2 (two) times daily.    vitamin D (VITAMIN D3) 1000 units Tab Take 1,000 Units by mouth once daily.     Family History    None       Tobacco Use    Smoking status: Never Smoker    Smokeless tobacco: Never Used   Substance and Sexual Activity    Alcohol use: No    Drug use: No    Sexual activity: Not on file     Review of Systems   Constitutional:  Negative for activity change, chills and fever.   HENT:  Negative for nosebleeds, sinus pain and trouble swallowing.    Eyes:  Negative for photophobia and visual disturbance.   Respiratory:  Positive for shortness of breath. Negative for cough and chest tightness.    Cardiovascular:  Positive for palpitations and leg swelling. Negative for chest pain.    Gastrointestinal:  Negative for abdominal pain, blood in stool, constipation, diarrhea, nausea and vomiting.   Genitourinary:  Negative for dysuria, frequency and hematuria.   Musculoskeletal:  Negative for back pain, gait problem and neck pain.   Skin:  Negative for rash and wound.   Neurological:  Negative for dizziness, syncope, speech difficulty and light-headedness.   Psychiatric/Behavioral:  Negative for agitation and confusion. The patient is not nervous/anxious.    Objective:     Vital Signs (Most Recent):  Temp: 98 °F (36.7 °C) (08/21/22 0015)  Pulse: 75 (08/21/22 0200)  Resp: (!) 28 (08/21/22 0200)  BP: (!) 188/79 (08/21/22 0200)  SpO2: 100 % (08/21/22 0200)   Vital Signs (24h Range):  Temp:  [98 °F (36.7 °C)] 98 °F (36.7 °C)  Pulse:  [69-88] 75  Resp:  [16-28] 28  SpO2:  [90 %-100 %] 100 %  BP: (173-221)/() 188/79     Weight: 56.2 kg (124 lb)  Body mass index is 21.97 kg/m².    Physical Exam  Vitals and nursing note reviewed.   Constitutional:       General: She is not in acute distress.     Appearance: Normal appearance. She is not ill-appearing.   HENT:      Head: Normocephalic and atraumatic.      Right Ear: External ear normal.      Left Ear: External ear normal.   Eyes:      Extraocular Movements: Extraocular movements intact.      Conjunctiva/sclera: Conjunctivae normal.      Pupils: Pupils are equal, round, and reactive to light.   Cardiovascular:      Rate and Rhythm: Normal rate and regular rhythm.      Pulses: Normal pulses.      Heart sounds: Normal heart sounds. No murmur heard.  Pulmonary:      Effort: Pulmonary effort is normal. No respiratory distress.      Breath sounds: Rales present.      Comments: Tachypneic during exam right after ambulating to restroom.  Abdominal:      General: Abdomen is flat. Bowel sounds are normal.      Palpations: Abdomen is soft.      Tenderness: There is no abdominal tenderness.   Musculoskeletal:         General: Normal range of motion.      Cervical  back: Normal range of motion and neck supple. No tenderness.      Right lower leg: Edema (trace) present.      Left lower leg: Edema (trace) present.   Skin:     General: Skin is warm and dry.      Capillary Refill: Capillary refill takes less than 2 seconds.      Coloration: Skin is not jaundiced.   Neurological:      General: No focal deficit present.      Mental Status: She is alert and oriented to person, place, and time. Mental status is at baseline.   Psychiatric:         Mood and Affect: Mood normal.         Behavior: Behavior normal.         CRANIAL NERVES     CN III, IV, VI   Pupils are equal, round, and reactive to light.     Significant Labs: All pertinent labs within the past 24 hours have been reviewed.  BMP:   Recent Labs   Lab 08/21/22 0058         K 3.8      CO2 22*   BUN 15   CREATININE 0.6   CALCIUM 8.8     CBC:   Recent Labs   Lab 08/21/22 0058   WBC 7.93   HGB 4.9*   HCT 17.4*   *     CMP:   Recent Labs   Lab 08/21/22 0058      K 3.8      CO2 22*      BUN 15   CREATININE 0.6   CALCIUM 8.8   PROT 6.6   ALBUMIN 3.7   BILITOT 0.5   ALKPHOS 88   AST 23   ALT 19   ANIONGAP 10     Cardiac Markers:   Recent Labs   Lab 08/21/22 0058   *     Troponin:   Recent Labs   Lab 08/21/22 0058   TROPONINI 0.009       Significant Imaging: I have reviewed all pertinent imaging results/findings within the past 24 hours.    Assessment/Plan:     * Symptomatic anemia  GIB  Patient's anemia is currently uncontrolled. Currently receiving 2 units of PRBCs.   Current CBC reviewed-   Lab Results   Component Value Date    HGB 4.9 (LL) 08/21/2022    HCT 17.4 (LL) 08/21/2022     Monitor serial CBC and transfuse if patient becomes hemodynamically unstable, symptomatic or H/H drops below 7/21.   -Anemia panel  -Continue oral iron supplement  - Keep patient NPO  - Type and screen and blood consent  - IV Protonix 40mg BID  - 2 large bore IV access  - anti-emetics as needed  -  GI consulted, appreciate recs    Acute respiratory failure with hypoxia and hypercapnia  Patient with Hypercapnic and Hypoxic Respiratory failure which is Acute.  she is not on home oxygen. Supplemental oxygen was provided and noted-SpO2 >94% on 2L NC.    Signs/symptoms of respiratory failure include- tachypnea, increased work of breathing and rales. Contributing diagnoses includes - Pleural effusion and symptomatic anemia and pulmonary edema Labs and images were reviewed. Patient Has not had a recent ABG. Will treat underlying causes and adjust management of respiratory failure   - symptomatic anemia vs. Flash edema from HTN contributing to symptoms  - 2 units pRBCs transfusing  - lasix 40mg IV x1 for now, reassess clinically for additional doses  - Last Echo from 7/20/22 - See volume overload; repeat pending  - wean supplemental oxygen as tolerated    Volume overload  High blood pressure readings without Hx of HTN  -/114 on arrival, improved to 173/70 without intervention  -  -Troponin 0.009, repeat pending  -CXR revealed cardiomegaly with interstitial edema and new bilateral pleural effusions.  -Denies hx of CHF or HTN  -ECHO 7/22/22: EF 55% with normal diastolic function  -Repeat ECHO ordered  -nitro paste  -Will give lasix 40mg IV x1, reassess for additional diuresis    Pure hypercholesterolemia   Patient is chronically on statin.will continue for now. Monitor clinically. Last LDL was   Lab Results   Component Value Date    LDLCALC 60.4 (L) 08/04/2022       H/O ischemic left MCA stroke  - completed plavix on 8/13  - holding daily ASA in setting of anemia  - continue home statin      VTE Risk Mitigation (From admission, onward)         Ordered     Reason for No Pharmacological VTE Prophylaxis  Once        Question:  Reasons:  Answer:  Risk of Bleeding    08/21/22 0233     IP VTE HIGH RISK PATIENT  Once         08/21/22 0233     Place sequential compression device  Until discontinued         08/21/22  0234                   Rhea Carlos PA-C  Department of Hospital Medicine   [facility] - Emergency Dept

## 2022-08-21 NOTE — PLAN OF CARE
[id number]  Pt AO x4, VSS except BP elevated at first. Here d/t SOB x1 mo, anemia. GI consulted. Bilat AC PIVs. Started blood transfusion @ 0545, no SS of reaction. Pt denies N/V/CP/or pain. Still c/o slight SOB, on 2L NC, satting in mid 90s. A x1 OOB.  at bedside attentive to pt. Safety maintained, hourly rounding performed. Bed in lowest position, call light in reach. [facility]      Problem: Adult Inpatient Plan of Care  Goal: Plan of Care Review  Outcome: Ongoing, Progressing  Goal: Patient-Specific Goal (Individualized)  Outcome: Ongoing, Progressing  Goal: Absence of Hospital-Acquired Illness or Injury  Outcome: Ongoing, Progressing  Goal: Optimal Comfort and Wellbeing  Outcome: Ongoing, Progressing  Goal: Readiness for Transition of Care  Outcome: Ongoing, Progressing     Problem: Adjustment to Illness (Gastrointestinal Bleeding)  Goal: Optimal Coping with Acute Illness  Outcome: Ongoing, Progressing     Problem: Bleeding (Gastrointestinal Bleeding)  Goal: Hemostasis  Outcome: Ongoing, Progressing

## 2022-08-21 NOTE — ED PROVIDER NOTES
Encounter Date: 8/21/2022       History     Chief Complaint   Patient presents with    Shortness of Breath     Pt c/o increased SOB x2 days. Pt.'s O2 sat is 90% on RA in triage. Pt placed on 2 L O2 per NC in triage with an O2 sat of 98%. -chest pain. PMH of CVA in July.      Katelyn Root is an 81-year-old female s/p recent stroke ( 7/20/2022) here for SOB.  Sx started shortly after her stroke but became acutely worse last night.  She reports shortness of breath mostly with ambulation, associated with palpitations.  She does report a cough that is nonproductive but no fevers or chills.  Denies chest pain.  Has intermittent chronic leg edema, no changes.  Sleeping well at night lying flat without orthopnea.  Has been checking her oxygen saturation at home for the past couple days, noted to have an oxygen saturation of 90% today which prompted her visit.        Review of patient's allergies indicates:  No Known Allergies  Past Medical History:   Diagnosis Date    Cancer     basal cell carcinoma     Past Surgical History:   Procedure Laterality Date    CARPAL TUNNEL RELEASE      EYE SURGERY      left eye cataract    TONSILLECTOMY      TUBAL LIGATION       History reviewed. No pertinent family history.  Social History     Tobacco Use    Smoking status: Never Smoker    Smokeless tobacco: Never Used   Substance Use Topics    Alcohol use: No    Drug use: No     Review of Systems   Constitutional: Negative for chills and fever.   HENT: Negative for congestion and sore throat.    Respiratory: Positive for cough and shortness of breath. Negative for chest tightness.    Cardiovascular: Positive for palpitations and leg swelling. Negative for chest pain.   Gastrointestinal: Positive for constipation (due to iron). Negative for abdominal pain, nausea and vomiting.   Genitourinary: Negative for dysuria.   Musculoskeletal: Negative for back pain.   Skin: Negative for rash.   Neurological: Negative for weakness.    Hematological: Does not bruise/bleed easily.       Physical Exam     Initial Vitals [08/21/22 0015]   BP Pulse Resp Temp SpO2   (!) 221/114 88 16 98 °F (36.7 °C) (!) 90 %      MAP       --         Physical Exam    Nursing note and vitals reviewed.  Constitutional: She appears well-developed and well-nourished. No distress.   HENT:   Mouth/Throat: Oropharynx is clear and moist.   Eyes: Conjunctivae are normal. No scleral icterus.   Neck: No JVD present.   Cardiovascular: Normal rate, regular rhythm and intact distal pulses.   Pulmonary/Chest: No respiratory distress. She has no wheezes. She has rales.   B/l lower lobes     Abdominal: Abdomen is soft. Bowel sounds are normal. She exhibits no distension. There is no abdominal tenderness. There is no rebound.   Musculoskeletal:         General: No edema.     Lymphadenopathy:     She has no cervical adenopathy.   Neurological: She is alert and oriented to person, place, and time.   Skin: Skin is warm. No rash noted.         ED Course   Procedures  Labs Reviewed   COMPREHENSIVE METABOLIC PANEL - Abnormal; Notable for the following components:       Result Value    CO2 22 (*)     All other components within normal limits    Narrative:     Release to patient->Immediate   CBC W/ AUTO DIFFERENTIAL - Abnormal; Notable for the following components:    RBC 2.17 (*)     Hemoglobin 4.9 (*)     Hematocrit 17.4 (*)     MCV 80 (*)     MCH 22.6 (*)     MCHC 28.2 (*)     RDW 20.6 (*)     Platelets 544 (*)     Lymph % 16.5 (*)     Platelet Estimate Increased (*)     All other components within normal limits    Narrative:     Release to patient->Immediate  H&H    critical result(s) called and verbal readback obtained from   Hesham Nicole RN. by LEDY 08/21/2022 01:29   B-TYPE NATRIURETIC PEPTIDE - Abnormal; Notable for the following components:     (*)     All other components within normal limits    Narrative:     Release to patient->Immediate   D DIMER, QUANTITATIVE - Abnormal;  Notable for the following components:    D-Dimer 0.95 (*)     All other components within normal limits    Narrative:     Release to patient->Immediate   BASIC METABOLIC PANEL - Abnormal; Notable for the following components:    Calcium 8.6 (*)     All other components within normal limits   CBC W/ AUTO DIFFERENTIAL - Abnormal; Notable for the following components:    RBC 2.00 (*)     Hemoglobin 4.6 (*)     Hematocrit 16.1 (*)     MCV 81 (*)     MCH 23.0 (*)     MCHC 28.6 (*)     RDW 20.6 (*)     Immature Granulocytes 0.6 (*)     Lymph % 14.5 (*)     All other components within normal limits    Narrative:     H&H    critical result(s) called and verbal readback obtained from   Hesham Nicole RN. by MKR 08/21/2022 04:12   IRON AND TIBC - Abnormal; Notable for the following components:    Iron <10 (*)     All other components within normal limits   TROPONIN I    Narrative:     Release to patient->Immediate   SARS-COV-2 RNA AMPLIFICATION, QUAL   MAGNESIUM   TROPONIN I   HIV 1 / 2 ANTIBODY   URINALYSIS, REFLEX TO URINE CULTURE   TYPE & SCREEN     EKG Readings: (Independently Interpreted)   EKG:  Sinus rhythm at 76, low-voltage QRS, nonspecific ST wave changes in inferior lateral leads     ECG Results          Repeat EKG 12-lead (Final result)  Result time 08/21/22 10:22:16    Final result by Interface, Lab In [facility] (08/21/22 10:22:16)                 Narrative:    Test Reason :     Vent. Rate : 074 BPM     Atrial Rate : 074 BPM     P-R Int : 136 ms          QRS Dur : 082 ms      QT Int : 398 ms       P-R-T Axes : 070 053 074 degrees     QTc Int : 441 ms    Normal sinus rhythm  Nonspecific ST abnormality  Abnormal ECG  When compared with ECG of 21-AUG-2022 00:19,  No significant change was found  Confirmed by Nikko CORNELL MD ([id number]) on 8/21/2022 10:22:07 AM    Referred By: [email or web address]   SELF           Confirmed By:Nikko CORNELL MD                             EKG 12-lead (Final result)  Result time 08/21/22 10:23:10    Final  "result by Interface, Lab In [facility] (08/21/22 10:23:10)                 Narrative:    Test Reason : R06.02,    Vent. Rate : 076 BPM     Atrial Rate : 076 BPM     P-R Int : 130 ms          QRS Dur : 082 ms      QT Int : 392 ms       P-R-T Axes : 059 056 067 degrees     QTc Int : 441 ms    Normal sinus rhythm  Low voltage QRS  Low anterior forces and R wave progression  Abnormal ECG  When compared with ECG of 20-JUL-2022 20:15,  No significant change was found  Confirmed by Nikko CORNELL MD ([id number]) on 8/21/2022 10:23:02 AM    Referred By: [email or web address]   SELF           Confirmed By:Nikko CORNELL MD                            Imaging Results          X-Ray Chest AP Portable (Final result)  Result time 08/21/22 01:22:01    Final result by Richard Chavez MD (08/21/22 01:22:01)                 Impression:      Cardiomegaly with interstitial edema and new bilateral pleural effusions, suggestive of CHF exacerbation and/or volume overload.      Electronically signed by: Richard hCavez MD  Date:    08/21/2022  Time:    01:22             Narrative:    EXAMINATION:  XR CHEST AP PORTABLE    CLINICAL HISTORY:  Provided history is "  Shortness of breath".    TECHNIQUE:  One view of the chest.    COMPARISON:  07/20/2022.    FINDINGS:  Cardiac wires overlie the chest.  Cardiac silhouette is enlarged and similar to the prior study.  Atherosclerotic calcifications overlie the aortic arch.  There is central vascular congestion with worsening diffuse bilateral coarsened interstitial lung markings suggestive of interstitial edema.  New bilateral pleural effusions with adjacent passive atelectasis or airspace disease in the lung bases.  No distinct pneumothorax.                                 Medications   0.9%  NaCl infusion (for blood administration) (has no administration in time range)   atorvastatin tablet 80 mg (80 mg Oral Given 8/21/22 4270)   ferrous sulfate tablet 1 each (1 each Oral Given 8/21/22 5964)   vitamin D 1000 units tablet " 1,000 Units (1,000 Units Oral Given 8/21/22 0923)   sodium chloride 0.9% flush 10 mL (has no administration in time range)   albuterol-ipratropium 2.5 mg-0.5 mg/3 mL nebulizer solution 3 mL (has no administration in time range)   melatonin tablet 6 mg (has no administration in time range)   polyethylene glycol packet 17 g (has no administration in time range)   bisacodyL suppository 10 mg (has no administration in time range)   acetaminophen tablet 650 mg (has no administration in time range)   naloxone 0.4 mg/mL injection 0.02 mg (has no administration in time range)   glucose chewable tablet 16 g (has no administration in time range)   glucose chewable tablet 24 g (has no administration in time range)   glucagon (human recombinant) injection 1 mg (has no administration in time range)   dextrose 10% bolus 125 mL (has no administration in time range)   dextrose 10% bolus 250 mL (has no administration in time range)   ondansetron injection 4 mg (has no administration in time range)   prochlorperazine injection Soln 5 mg (has no administration in time range)   pantoprazole injection 40 mg (40 mg Intravenous Given 8/21/22 0923)   atorvastatin (LIPITOR) 40 MG tablet (  Canceled Entry 8/21/22 0315)   amLODIPine tablet 5 mg (5 mg Oral Given 8/21/22 1321)   pantoprazole injection 80 mg (80 mg Intravenous Given 8/21/22 0215)   nitroGLYCERIN 2% TD oint ointment 0.5 inch (0.5 inches Topical (Top) Given 8/21/22 0317)   furosemide injection 40 mg (40 mg Intravenous Given 8/21/22 1138)     Medical Decision Making:   History:   Old Medical Records: I decided to obtain old medical records.  Initial Assessment:   81-year-old female history of recent stroke presenting today with shortness of breath, hypoxia.  Vitals reviewed, saturation 90 % on RA, 98 % on 2 L NC. Hypertension noted but feels anxious.    Differential Diagnosis:   PNA, covid, CHF, PE  Independently Interpreted Test(s):   I have ordered and independently interpreted  X-rays - see prior notes.  I have ordered and independently interpreted EKG Reading(s) - see prior notes  Clinical Tests:   Lab Tests: Ordered and Reviewed  Radiological Study: Reviewed and Ordered  Medical Tests: Ordered and Reviewed  ED Management:  - labs  - CXR  - EKG              Attending Attestation:         Attending Critical Care:   Critical Care Times:   ==============================================================  · Total Critical Care Time - exclusive of procedural time: 35 minutes.  ==============================================================  Critical care reasons: severe anemia requiring transfusion.   Critical care was time spent personally by me on the following activities: ordering lab, x-rays, and/or EKG, review of old charts, examination of patient, discussion with consultants and re-evaluation of patient's conition.   Critical Care Condition: potentially life-threatening           ED Course as of 08/21/22 1435   Sun Aug 21, 2022   0135 Hemoglobin(!!): 4.9 [GM]   0136 Hematocrit(!!): 17.4 [GM]   0136 Type and screen order, blood consent obtained.  2 units PRBC ordered   [GM]   0144 Rectal exam performed with small non thrombosed hemorrhoid, green/brown stool. Low suspicion for GI Bleed.    MCV low, is on iron tablets.     Trop negative.  CMP stable.    Admit to  [GM]      ED Course User Index  [GM] Jaqueline Cornejo MD             Clinical Impression:   Final diagnoses:  [R06.02] SOB (shortness of breath)  [R06.02] Shortness of breath  [D64.9] Severe anemia (Primary)          ED Disposition Condition    Observation               Jaqueline Cornejo MD  08/21/22 1436

## 2022-08-21 NOTE — PROVIDER PROGRESS NOTES - EMERGENCY DEPT.
Encounter Date: 8/21/2022    ED Physician Progress Notes         EKG - STEMI Decision  Initial Reading: No STEMI present.

## 2022-08-21 NOTE — ASSESSMENT & PLAN NOTE
Patient with Hypercapnic and Hypoxic Respiratory failure which is Acute.  she is not on home oxygen. Supplemental oxygen was provided and noted-SpO2 >94% on 2L NC.    Signs/symptoms of respiratory failure include- tachypnea, increased work of breathing and rales. Contributing diagnoses includes - Pleural effusion and symptomatic anemia and pulmonary edema Labs and images were reviewed. Patient Has not had a recent ABG. Will treat underlying causes and adjust management of respiratory failure     - symptomatic anemia vs. Flash edema from HTN contributing to symptoms  - s/p 2 units pRBCs upon admission with improvement of hemoglobin  - s/p lasix 40mg IV x1  - Last Echo from 7/20/22 - See volume overload; repeat bellow:  Notable for diastolic dysfunction  -patient euvolemic will hold on further diuresis  - wean supplemental oxygen as tolerated    Results for orders placed during the hospital encounter of 08/21/22    Echo    Interpretation Summary  · Moderate left atrial enlargement.  · The left ventricle is normal in size with normal systolic function.  · The estimated ejection fraction is 65%.  · Grade II left ventricular diastolic dysfunction.  · Normal right ventricular size with normal right ventricular systolic function.  · Mild mitral regurgitation.  · Mild tricuspid regurgitation.  · Normal central venous pressure (3 mmHg).  · There is pulmonary hypertension. The estimated PA systolic pressure is 45 mmHg.

## 2022-08-21 NOTE — NURSING
6815  Pt Tx from ED, Pt AO x4, belongings at bedside, claims no valuables,  at bedside. POC reviewed w/ pt/. Awaiting blood transfusion, pending delivery of blood. Blood bank informed pt is here. [facility]

## 2022-08-21 NOTE — ASSESSMENT & PLAN NOTE
High blood pressure readings without Hx of HTN  -/114 on arrival, improved to 173/70 without intervention  -  -Troponin 0.009, repeat pending  -CXR revealed cardiomegaly with interstitial edema and new bilateral pleural effusions.  -Denies hx of CHF or HTN  -ECHO 7/22/22: EF 55% with normal diastolic function  -Repeat ECHO ordered  -nitro paste  -Will give lasix 40mg IV x1, reassess for additional diuresis

## 2022-08-22 ENCOUNTER — ANESTHESIA EVENT (OUTPATIENT)
Dept: ENDOSCOPY | Facility: HOSPITAL | Age: 82
DRG: 377 | End: 2022-08-22
Payer: MEDICARE

## 2022-08-22 ENCOUNTER — ANESTHESIA (OUTPATIENT)
Dept: ENDOSCOPY | Facility: HOSPITAL | Age: 82
DRG: 377 | End: 2022-08-22
Payer: MEDICARE

## 2022-08-22 LAB
ANION GAP SERPL CALC-SCNC: 8 MMOL/L (ref 8–16)
BACTERIA #/AREA URNS AUTO: ABNORMAL /HPF
BASOPHILS # BLD AUTO: 0.09 K/UL (ref 0–0.2)
BASOPHILS NFR BLD: 1.1 % (ref 0–1.9)
BILIRUB UR QL STRIP: NEGATIVE
BUN SERPL-MCNC: 11 MG/DL (ref 8–23)
CALCIUM SERPL-MCNC: 8.7 MG/DL (ref 8.7–10.5)
CHLORIDE SERPL-SCNC: 102 MMOL/L (ref 95–110)
CLARITY UR REFRACT.AUTO: ABNORMAL
CO2 SERPL-SCNC: 26 MMOL/L (ref 23–29)
COLOR UR AUTO: YELLOW
CREAT SERPL-MCNC: 0.7 MG/DL (ref 0.5–1.4)
DIFFERENTIAL METHOD: ABNORMAL
EOSINOPHIL # BLD AUTO: 0.5 K/UL (ref 0–0.5)
EOSINOPHIL NFR BLD: 5.8 % (ref 0–8)
ERYTHROCYTE [DISTWIDTH] IN BLOOD BY AUTOMATED COUNT: 18 % (ref 11.5–14.5)
EST. GFR  (NO RACE VARIABLE): >60 ML/MIN/1.73 M^2
GLUCOSE SERPL-MCNC: 87 MG/DL (ref 70–110)
GLUCOSE UR QL STRIP: NEGATIVE
HCT VFR BLD AUTO: 25.9 % (ref 37–48.5)
HGB BLD-MCNC: 8.1 G/DL (ref 12–16)
HGB UR QL STRIP: NEGATIVE
HIV 1+2 AB+HIV1 P24 AG SERPL QL IA: NEGATIVE
IMM GRANULOCYTES # BLD AUTO: 0.03 K/UL (ref 0–0.04)
IMM GRANULOCYTES NFR BLD AUTO: 0.4 % (ref 0–0.5)
KETONES UR QL STRIP: NEGATIVE
LEUKOCYTE ESTERASE UR QL STRIP: ABNORMAL
LYMPHOCYTES # BLD AUTO: 1.1 K/UL (ref 1–4.8)
LYMPHOCYTES NFR BLD: 12.5 % (ref 18–48)
MAGNESIUM SERPL-MCNC: 2.2 MG/DL (ref 1.6–2.6)
MCH RBC QN AUTO: 25.8 PG (ref 27–31)
MCHC RBC AUTO-ENTMCNC: 31.3 G/DL (ref 32–36)
MCV RBC AUTO: 83 FL (ref 82–98)
MICROSCOPIC COMMENT: ABNORMAL
MONOCYTES # BLD AUTO: 1.1 K/UL (ref 0.3–1)
MONOCYTES NFR BLD: 12.5 % (ref 4–15)
NEUTROPHILS # BLD AUTO: 5.7 K/UL (ref 1.8–7.7)
NEUTROPHILS NFR BLD: 67.7 % (ref 38–73)
NITRITE UR QL STRIP: POSITIVE
NRBC BLD-RTO: 0 /100 WBC
PH UR STRIP: 7 [PH] (ref 5–8)
PLATELET # BLD AUTO: 392 K/UL (ref 150–450)
PMV BLD AUTO: 10.7 FL (ref 9.2–12.9)
POTASSIUM SERPL-SCNC: 3.4 MMOL/L (ref 3.5–5.1)
PROT UR QL STRIP: ABNORMAL
RBC # BLD AUTO: 3.14 M/UL (ref 4–5.4)
RBC #/AREA URNS AUTO: 1 /HPF (ref 0–4)
SARS-COV-2 RNA RESP QL NAA+PROBE: NOT DETECTED
SODIUM SERPL-SCNC: 136 MMOL/L (ref 136–145)
SP GR UR STRIP: 1.01 (ref 1–1.03)
SQUAMOUS #/AREA URNS AUTO: 0 /HPF
URN SPEC COLLECT METH UR: ABNORMAL
WBC # BLD AUTO: 8.4 K/UL (ref 3.9–12.7)
WBC #/AREA URNS AUTO: 61 /HPF (ref 0–5)

## 2022-08-22 PROCEDURE — 27202087 HC PROBE, APC: Performed by: INTERNAL MEDICINE

## 2022-08-22 PROCEDURE — C9113 INJ PANTOPRAZOLE SODIUM, VIA: HCPCS | Performed by: PHYSICIAN ASSISTANT

## 2022-08-22 PROCEDURE — 63600175 PHARM REV CODE 636 W HCPCS: Performed by: PHYSICIAN ASSISTANT

## 2022-08-22 PROCEDURE — 81001 URINALYSIS AUTO W/SCOPE: CPT | Performed by: EMERGENCY MEDICINE

## 2022-08-22 PROCEDURE — 37000008 HC ANESTHESIA 1ST 15 MINUTES: Performed by: INTERNAL MEDICINE

## 2022-08-22 PROCEDURE — 25000003 PHARM REV CODE 250: Performed by: PHYSICIAN ASSISTANT

## 2022-08-22 PROCEDURE — 88305 TISSUE EXAM BY PATHOLOGIST: CPT | Mod: 26,,, | Performed by: PATHOLOGY

## 2022-08-22 PROCEDURE — 43255 EGD CONTROL BLEEDING ANY: CPT | Mod: 59,,, | Performed by: INTERNAL MEDICINE

## 2022-08-22 PROCEDURE — 87186 SC STD MICRODIL/AGAR DIL: CPT | Performed by: EMERGENCY MEDICINE

## 2022-08-22 PROCEDURE — 36415 COLL VENOUS BLD VENIPUNCTURE: CPT | Performed by: PHYSICIAN ASSISTANT

## 2022-08-22 PROCEDURE — 43239 EGD BIOPSY SINGLE/MULTIPLE: CPT | Performed by: INTERNAL MEDICINE

## 2022-08-22 PROCEDURE — 43255 EGD CONTROL BLEEDING ANY: CPT | Mod: 59 | Performed by: INTERNAL MEDICINE

## 2022-08-22 PROCEDURE — 63600175 PHARM REV CODE 636 W HCPCS: Mod: JG | Performed by: PHYSICIAN ASSISTANT

## 2022-08-22 PROCEDURE — 87088 URINE BACTERIA CULTURE: CPT | Performed by: EMERGENCY MEDICINE

## 2022-08-22 PROCEDURE — 27201012 HC FORCEPS, HOT/COLD, DISP: Performed by: INTERNAL MEDICINE

## 2022-08-22 PROCEDURE — 43255 PR EGD, FLEX, W/CTRL BLEED, ANY METHOD: ICD-10-PCS | Mod: 59,,, | Performed by: INTERNAL MEDICINE

## 2022-08-22 PROCEDURE — 99232 SBSQ HOSP IP/OBS MODERATE 35: CPT | Mod: ,,, | Performed by: STUDENT IN AN ORGANIZED HEALTH CARE EDUCATION/TRAINING PROGRAM

## 2022-08-22 PROCEDURE — D9220A PRA ANESTHESIA: Mod: CRNA,,, | Performed by: NURSE ANESTHETIST, CERTIFIED REGISTERED

## 2022-08-22 PROCEDURE — 80048 BASIC METABOLIC PNL TOTAL CA: CPT | Performed by: PHYSICIAN ASSISTANT

## 2022-08-22 PROCEDURE — 37000009 HC ANESTHESIA EA ADD 15 MINS: Performed by: INTERNAL MEDICINE

## 2022-08-22 PROCEDURE — D9220A PRA ANESTHESIA: ICD-10-PCS | Mod: CRNA,,, | Performed by: NURSE ANESTHETIST, CERTIFIED REGISTERED

## 2022-08-22 PROCEDURE — 85025 COMPLETE CBC W/AUTO DIFF WBC: CPT | Performed by: PHYSICIAN ASSISTANT

## 2022-08-22 PROCEDURE — U0003 INFECTIOUS AGENT DETECTION BY NUCLEIC ACID (DNA OR RNA); SEVERE ACUTE RESPIRATORY SYNDROME CORONAVIRUS 2 (SARS-COV-2) (CORONAVIRUS DISEASE [COVID-19]), AMPLIFIED PROBE TECHNIQUE, MAKING USE OF HIGH THROUGHPUT TECHNOLOGIES AS DESCRIBED BY CMS-2020-01-R: HCPCS | Performed by: INTERNAL MEDICINE

## 2022-08-22 PROCEDURE — 87077 CULTURE AEROBIC IDENTIFY: CPT | Performed by: EMERGENCY MEDICINE

## 2022-08-22 PROCEDURE — 20600001 HC STEP DOWN PRIVATE ROOM

## 2022-08-22 PROCEDURE — 99232 PR SUBSEQUENT HOSPITAL CARE,LEVL II: ICD-10-PCS | Mod: ,,, | Performed by: STUDENT IN AN ORGANIZED HEALTH CARE EDUCATION/TRAINING PROGRAM

## 2022-08-22 PROCEDURE — 43239 PR EGD, FLEX, W/BIOPSY, SGL/MULTI: ICD-10-PCS | Mod: ,,, | Performed by: INTERNAL MEDICINE

## 2022-08-22 PROCEDURE — 25000003 PHARM REV CODE 250: Performed by: NURSE ANESTHETIST, CERTIFIED REGISTERED

## 2022-08-22 PROCEDURE — U0005 INFEC AGEN DETEC AMPLI PROBE: HCPCS | Performed by: INTERNAL MEDICINE

## 2022-08-22 PROCEDURE — 88305 TISSUE EXAM BY PATHOLOGIST: ICD-10-PCS | Mod: 26,,, | Performed by: PATHOLOGY

## 2022-08-22 PROCEDURE — 87086 URINE CULTURE/COLONY COUNT: CPT | Performed by: EMERGENCY MEDICINE

## 2022-08-22 PROCEDURE — D9220A PRA ANESTHESIA: Mod: ANES,,, | Performed by: ANESTHESIOLOGY

## 2022-08-22 PROCEDURE — 63600175 PHARM REV CODE 636 W HCPCS: Performed by: NURSE ANESTHETIST, CERTIFIED REGISTERED

## 2022-08-22 PROCEDURE — 25000003 PHARM REV CODE 250: Performed by: STUDENT IN AN ORGANIZED HEALTH CARE EDUCATION/TRAINING PROGRAM

## 2022-08-22 PROCEDURE — D9220A PRA ANESTHESIA: ICD-10-PCS | Mod: ANES,,, | Performed by: ANESTHESIOLOGY

## 2022-08-22 PROCEDURE — 43239 EGD BIOPSY SINGLE/MULTIPLE: CPT | Mod: ,,, | Performed by: INTERNAL MEDICINE

## 2022-08-22 PROCEDURE — 88305 TISSUE EXAM BY PATHOLOGIST: CPT | Performed by: PATHOLOGY

## 2022-08-22 PROCEDURE — 83735 ASSAY OF MAGNESIUM: CPT | Performed by: PHYSICIAN ASSISTANT

## 2022-08-22 RX ORDER — PROPOFOL 10 MG/ML
VIAL (ML) INTRAVENOUS
Status: DISCONTINUED | OUTPATIENT
Start: 2022-08-22 | End: 2022-08-22

## 2022-08-22 RX ORDER — POTASSIUM CHLORIDE 20 MEQ/1
40 TABLET, EXTENDED RELEASE ORAL ONCE
Status: COMPLETED | OUTPATIENT
Start: 2022-08-22 | End: 2022-08-22

## 2022-08-22 RX ORDER — NAPROXEN SODIUM 220 MG/1
81 TABLET, FILM COATED ORAL DAILY
Status: DISCONTINUED | OUTPATIENT
Start: 2022-08-22 | End: 2022-08-23 | Stop reason: HOSPADM

## 2022-08-22 RX ORDER — SODIUM CHLORIDE 0.9 % (FLUSH) 0.9 %
10 SYRINGE (ML) INJECTION
Status: DISCONTINUED | OUTPATIENT
Start: 2022-08-22 | End: 2022-08-23 | Stop reason: HOSPADM

## 2022-08-22 RX ORDER — HYDROMORPHONE HYDROCHLORIDE 1 MG/ML
0.2 INJECTION, SOLUTION INTRAMUSCULAR; INTRAVENOUS; SUBCUTANEOUS EVERY 5 MIN PRN
Status: DISCONTINUED | OUTPATIENT
Start: 2022-08-22 | End: 2022-08-22 | Stop reason: HOSPADM

## 2022-08-22 RX ORDER — PROPOFOL 10 MG/ML
VIAL (ML) INTRAVENOUS CONTINUOUS PRN
Status: DISCONTINUED | OUTPATIENT
Start: 2022-08-22 | End: 2022-08-22

## 2022-08-22 RX ORDER — LIDOCAINE HYDROCHLORIDE 10 MG/ML
INJECTION, SOLUTION INTRAVENOUS
Status: DISCONTINUED | OUTPATIENT
Start: 2022-08-22 | End: 2022-08-22

## 2022-08-22 RX ORDER — PANTOPRAZOLE SODIUM 40 MG/1
40 FOR SUSPENSION ORAL DAILY
Status: DISCONTINUED | OUTPATIENT
Start: 2022-08-23 | End: 2022-08-23 | Stop reason: HOSPADM

## 2022-08-22 RX ORDER — POTASSIUM CHLORIDE 20 MEQ/1
40 TABLET, EXTENDED RELEASE ORAL ONCE
Status: DISCONTINUED | OUTPATIENT
Start: 2022-08-22 | End: 2022-08-22

## 2022-08-22 RX ORDER — FERROUS SULFATE 300 MG/5ML
300 LIQUID (ML) ORAL DAILY
Status: DISCONTINUED | OUTPATIENT
Start: 2022-08-23 | End: 2022-08-23 | Stop reason: HOSPADM

## 2022-08-22 RX ADMIN — LIDOCAINE HYDROCHLORIDE 50 MG: 10 INJECTION, SOLUTION INTRAVENOUS at 11:08

## 2022-08-22 RX ADMIN — POTASSIUM CHLORIDE 40 MEQ: 1500 TABLET, EXTENDED RELEASE ORAL at 09:08

## 2022-08-22 RX ADMIN — GLUCAGON HYDROCHLORIDE 0.5 MG: KIT at 11:08

## 2022-08-22 RX ADMIN — PROPOFOL 100 MG: 10 INJECTION, EMULSION INTRAVENOUS at 11:08

## 2022-08-22 RX ADMIN — Medication 125 MCG/KG/MIN: at 11:08

## 2022-08-22 RX ADMIN — FERROUS SULFATE TAB 325 MG (65 MG ELEMENTAL FE) 1 EACH: 325 (65 FE) TAB at 09:08

## 2022-08-22 RX ADMIN — ASPIRIN 81 MG CHEWABLE TABLET 81 MG: 81 TABLET CHEWABLE at 01:08

## 2022-08-22 RX ADMIN — SODIUM CHLORIDE: 0.9 INJECTION, SOLUTION INTRAVENOUS at 11:08

## 2022-08-22 RX ADMIN — AMLODIPINE BESYLATE 5 MG: 5 TABLET ORAL at 09:08

## 2022-08-22 RX ADMIN — Medication 1000 UNITS: at 09:08

## 2022-08-22 RX ADMIN — PANTOPRAZOLE SODIUM 40 MG: 40 INJECTION, POWDER, FOR SOLUTION INTRAVENOUS at 09:08

## 2022-08-22 RX ADMIN — ATORVASTATIN CALCIUM 80 MG: 40 TABLET, FILM COATED ORAL at 08:08

## 2022-08-22 NOTE — ANESTHESIA PREPROCEDURE EVALUATION
08/22/2022  Katelyn Caba is a 81 y.o., female.      Pre-op Assessment    I have reviewed the Patient Summary Reports.     I have reviewed the Nursing Notes. I have reviewed the NPO Status.   I have reviewed the Medications.     Review of Systems  Anesthesia Hx:  No problems with previous Anesthesia    Social:  Non-Smoker    Hematology/Oncology:     Oncology Normal    -- Anemia:   EENT/Dental:EENT/Dental Normal   Cardiovascular:   Exercise tolerance: poor hyperlipidemia ECG has been reviewed.    Pulmonary:  Pulmonary Normal    Renal/:  Renal/ Normal     Musculoskeletal:  Musculoskeletal Normal    Neurological:   CVA (7/2022 - no significant weakness/deficits byeond lack of energy), no residual symptoms    Endocrine:  Endocrine Normal    Dermatological:  Skin Normal    Psych:  Psychiatric Normal           Physical Exam  General: Cooperative, Alert and Oriented    Airway:  Mallampati: II   Mouth Opening: Small, but > 3cm  TM Distance: 4 - 6 cm  Tongue: Normal  Neck ROM: Normal ROM    Dental:  Dentures  Top row dentures removed, lower bridge in place.  Denies loose  Chest/Lungs:  Normal Respiratory Rate    Heart:  Rate: Normal        Anesthesia Plan  Type of Anesthesia, risks & benefits discussed:    Anesthesia Type: Gen Natural Airway, MAC  Intra-op Monitoring Plan: Standard ASA Monitors  Post Op Pain Control Plan: multimodal analgesia and IV/PO Opioids PRN  Induction:  IV  Informed Consent: Informed consent signed with the Patient and all parties understand the risks and agree with anesthesia plan.  All questions answered.   ASA Score: 3    Ready For Surgery From Anesthesia Perspective.     .

## 2022-08-22 NOTE — PROGRESS NOTES
[facility] Medicine  Progress Note    Patient Name: Katelyn Caba  MRN: [id number]  Patient Class: IP- Inpatient   Admission Date: 8/21/2022  Length of Stay: 0 days  Attending Physician: Patti Junior MD  Primary Care Provider: Brooklyn Burnham MD        Subjective:     Principal Problem:Symptomatic anemia        HPI:  Katelyn Caba is a 81 y.o. female with PMHx significant for recent CVA (7/20/22) admitted to hospital medicine for symptomatic anemia and volume overload. Patient reports dyspnea of exertion for ~ 1 month with associated palpitations for the last 2 days. She has been checking her oxygen saturation at home for a few days, and today noted an SpO2 of 90% which prompted her visit to the ED. Endorses nonproductive cough and intermittent chronic BLE edema that is unchanged from baseline. Patient's  at bedside states that he monitors her BP daily, and the highest it has ever gotten was around 140-150 SBP. Denies orthopnea, bloody stool (endorses black stool since start on iron pills), hematemesis, fever/chills, HA, blurred vision, CP, abdominal pain, n/v, diarrhea, constipation, numbness/tingling. Patient completed her 21 day course of plavix on 8/13.     In the ED, hypertensive to max 221/114. Tachypneic to max 28. SpO2 >94% on 2L NC. H/H 4.9/17.4 (H/H 10.7/33.9 ~1 month ago). Plt 544. Co2 22. . Troponin 0.009. EKG with NSR with nonspecific ST wave abnormalities. CXR with cardiomegaly with interstitial edema and new bilateral pleural effusions. Rectal exam by ED provider revealed brown/green stool. 2 units pRBCs ordered.      Overview/Hospital Course:  Patient received 2 units packed red blood cells upon admission with improvement of hemoglobin from 4.6-> 8.4.  Gastroenterology consulted. EGD completed 8/22. 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  Patient to continue oral PPI indefinitely and repeat EGD in 12 week with GI. Per GI, okay to resume aspirin and Plavix on 08/22.      Interval History:   Patient seen and examined at bedside.    No acute events overnight. Shortness of breath and fatigue improved status post blood transfusion  Patient has no acute complaints this morning.  Patient and family updated regarding care plan. EGD today w/ GI.    Review of Systems   Constitutional:  Negative for activity change, chills and fever.   HENT:  Negative for nosebleeds, sinus pain and trouble swallowing.    Eyes:  Negative for photophobia and visual disturbance.   Respiratory:  Negative for cough, chest tightness and shortness of breath.    Cardiovascular:  Negative for chest pain, palpitations and leg swelling.   Gastrointestinal:  Negative for abdominal pain, blood in stool, constipation, diarrhea, nausea and vomiting.   Genitourinary:  Negative for dysuria, frequency and hematuria.   Musculoskeletal:  Negative for back pain, gait problem and neck pain.   Skin:  Negative for rash and wound.   Neurological:  Negative for dizziness, syncope, speech difficulty and light-headedness.   Psychiatric/Behavioral:  Negative for agitation and confusion. The patient is not nervous/anxious.      Objective:     Vital Signs (Most Recent):  Temp: 98 °F (36.7 °C) (08/21/22 1608)  Pulse: 73 (08/21/22 1800)  Resp: 18 (08/21/22 1608)  BP: (!) 153/67 (08/21/22 1608)  SpO2: 95 % (08/21/22 1800)   Vital Signs (24h Range):  Temp:  [96.7 °F (35.9 °C)-98 °F (36.7 °C)] 98 °F (36.7 °C)  Pulse:  [69-88] 73  Resp:  [16-28] 18  SpO2:  [90 %-100 %] 95 %  BP: (148-221)/() 153/67     Weight: 55.8 kg (123 lb)  Body mass index is 21.79 kg/m².    Intake/Output Summary (Last 24 hours) at 8/21/2022 1813  Last data  filed at 8/21/2022 1805  Gross per 24 hour   Intake 1157.83 ml   Output 2600 ml   Net -1442.17 ml      Physical Exam  Vitals and nursing note reviewed.   Constitutional:       General: She is not in acute distress.     Appearance: Normal appearance. She is not ill-appearing.   HENT:      Head: Normocephalic and atraumatic.      Right Ear: External ear normal.      Left Ear: External ear normal.   Eyes:      Extraocular Movements: Extraocular movements intact.      Conjunctiva/sclera: Conjunctivae normal.      Pupils: Pupils are equal, round, and reactive to light.   Cardiovascular:      Rate and Rhythm: Normal rate and regular rhythm.      Pulses: Normal pulses.      Heart sounds: Normal heart sounds. No murmur heard.  Pulmonary:      Effort: Pulmonary effort is normal. No respiratory distress.      Breath sounds: No wheezing, rhonchi or rales.   Abdominal:      General: Abdomen is flat. Bowel sounds are normal.      Palpations: Abdomen is soft.      Tenderness: There is no abdominal tenderness.   Musculoskeletal:         General: Normal range of motion.      Cervical back: Normal range of motion and neck supple. No tenderness.      Right lower leg: No edema.      Left lower leg: No edema.   Skin:     General: Skin is warm and dry.      Capillary Refill: Capillary refill takes less than 2 seconds.      Coloration: Skin is not jaundiced.   Neurological:      General: No focal deficit present.      Mental Status: She is alert and oriented to person, place, and time. Mental status is at baseline.   Psychiatric:         Mood and Affect: Mood normal.         Behavior: Behavior normal.       Significant Labs: All pertinent labs within the past 24 hours have been reviewed.    Recent Results (from the past 24 hour(s))   Comprehensive metabolic panel    Collection Time: 08/21/22 12:58 AM   Result Value Ref Range    Sodium 136 136 - 145 mmol/L    Potassium 3.8 3.5 - 5.1 mmol/L    Chloride 104 95 - 110 mmol/L    CO2 22 (L) 23  - 29 mmol/L    Glucose 103 70 - 110 mg/dL    BUN 15 8 - 23 mg/dL    Creatinine 0.6 0.5 - 1.4 mg/dL    Calcium 8.8 8.7 - 10.5 mg/dL    Total Protein 6.6 6.0 - 8.4 g/dL    Albumin 3.7 3.5 - 5.2 g/dL    Total Bilirubin 0.5 0.1 - 1.0 mg/dL    Alkaline Phosphatase 88 55 - 135 U/L    AST 23 10 - 40 U/L    ALT 19 10 - 44 U/L    Anion Gap 10 8 - 16 mmol/L    eGFR >60.0 >60 mL/min/1.73 m^2   CBC auto differential    Collection Time: 08/21/22 12:58 AM   Result Value Ref Range    WBC 7.93 3.90 - 12.70 K/uL    RBC 2.17 (L) 4.00 - 5.40 M/uL    Hemoglobin 4.9 (LL) 12.0 - 16.0 g/dL    Hematocrit 17.4 (LL) 37.0 - 48.5 %    MCV 80 (L) 82 - 98 fL    MCH 22.6 (L) 27.0 - 31.0 pg    MCHC 28.2 (L) 32.0 - 36.0 g/dL    RDW 20.6 (H) 11.5 - 14.5 %    Platelets 544 (H) 150 - 450 K/uL    MPV 10.3 9.2 - 12.9 fL    Immature Granulocytes 0.4 0.0 - 0.5 %    Gran # (ANC) 5.3 1.8 - 7.7 K/uL    Immature Grans (Abs) 0.03 0.00 - 0.04 K/uL    Lymph # 1.3 1.0 - 4.8 K/uL    Mono # 0.9 0.3 - 1.0 K/uL    Eos # 0.3 0.0 - 0.5 K/uL    Baso # 0.07 0.00 - 0.20 K/uL    nRBC 0 0 /100 WBC    Gran % 67.1 38.0 - 73.0 %    Lymph % 16.5 (L) 18.0 - 48.0 %    Mono % 10.8 4.0 - 15.0 %    Eosinophil % 4.3 0.0 - 8.0 %    Basophil % 0.9 0.0 - 1.9 %    Platelet Estimate Increased (A)     Aniso Slight     Poik Slight     Poly Moderate     Hypo Moderate     Reno Cells Occasional     Differential Method Automated    Troponin I    Collection Time: 08/21/22 12:58 AM   Result Value Ref Range    Troponin I 0.009 0.000 - 0.026 ng/mL   Brain natriuretic peptide    Collection Time: 08/21/22 12:58 AM   Result Value Ref Range     (H) 0 - 99 pg/mL   D dimer, quantitative    Collection Time: 08/21/22 12:58 AM   Result Value Ref Range    D-Dimer 0.95 (H) <0.50 mg/L FEU   COVID-19 Rapid Screening    Collection Time: 08/21/22  1:02 AM   Result Value Ref Range    SARS-CoV-2 RNA, Amplification, Qual Negative Negative   Type & Screen    Collection Time: 08/21/22  1:55 AM   Result Value  Ref Range    Group & Rh A NEG     Indirect Kimberlyn NEG    Prepare RBC 2 Units; severe anemia    Collection Time: 08/21/22  1:55 AM   Result Value Ref Range    UNIT NUMBER [id number]     Product Code [id number]     DISPENSE STATUS ISSUED     CODING SYSTEM [id number]     Unit Blood Type Code 0600     Unit Blood Type A NEG     Unit Expiration [id number]     UNIT NUMBER [id number]     Product Code [id number]     DISPENSE STATUS ISSUED     CODING SYSTEM [id number]     Unit Blood Type Code 0600     Unit Blood Type A NEG     Unit Expiration [id number]    Basic Metabolic Panel (BMP)    Collection Time: 08/21/22  3:39 AM   Result Value Ref Range    Sodium 136 136 - 145 mmol/L    Potassium 3.7 3.5 - 5.1 mmol/L    Chloride 105 95 - 110 mmol/L    CO2 23 23 - 29 mmol/L    Glucose 108 70 - 110 mg/dL    BUN 14 8 - 23 mg/dL    Creatinine 0.6 0.5 - 1.4 mg/dL    Calcium 8.6 (L) 8.7 - 10.5 mg/dL    Anion Gap 8 8 - 16 mmol/L    eGFR >60.0 >60 mL/min/1.73 m^2   Magnesium    Collection Time: 08/21/22  3:39 AM   Result Value Ref Range    Magnesium 2.2 1.6 - 2.6 mg/dL   CBC with Automated Differential    Collection Time: 08/21/22  3:39 AM   Result Value Ref Range    WBC 7.02 3.90 - 12.70 K/uL    RBC 2.00 (L) 4.00 - 5.40 M/uL    Hemoglobin 4.6 (LL) 12.0 - 16.0 g/dL    Hematocrit 16.1 (LL) 37.0 - 48.5 %    MCV 81 (L) 82 - 98 fL    MCH 23.0 (L) 27.0 - 31.0 pg    MCHC 28.6 (L) 32.0 - 36.0 g/dL    RDW 20.6 (H) 11.5 - 14.5 %    Platelets 439 150 - 450 K/uL    MPV 10.1 9.2 - 12.9 fL    Immature Granulocytes 0.6 (H) 0.0 - 0.5 %    Gran # (ANC) 5.0 1.8 - 7.7 K/uL    Immature Grans (Abs) 0.04 0.00 - 0.04 K/uL    Lymph # 1.0 1.0 - 4.8 K/uL    Mono # 0.7 0.3 - 1.0 K/uL    Eos # 0.2 0.0 - 0.5 K/uL    Baso # 0.06 0.00 - 0.20 K/uL    nRBC 0 0 /100 WBC    Gran % 71.5 38.0 - 73.0 %    Lymph % 14.5 (L) 18.0 - 48.0 %    Mono % 9.8 4.0 - 15.0 %    Eosinophil % 2.7 0.0 - 8.0 %    Basophil % 0.9 0.0 - 1.9 %    Differential Method Automated    Iron and TIBC     Collection Time: 08/21/22  3:39 AM   Result Value Ref Range    Iron <10 (L) 30 - 160 ug/dL    Transferrin 302 200 - 375 mg/dL    TIBC 447 250 - 450 ug/dL    Saturated Iron Unable to calculate 20 - 50 %   Ferritin    Collection Time: 08/21/22  3:39 AM   Result Value Ref Range    Ferritin 17 (L) 20.0 - 300.0 ng/mL   Troponin I    Collection Time: 08/21/22  3:39 AM   Result Value Ref Range    Troponin I 0.010 0.000 - 0.026 ng/mL   Urinalysis, Reflex to Urine Culture Urine, Clean Catch    Collection Time: 08/21/22  9:15 AM    Specimen: Urine   Result Value Ref Range    Specimen UA Urine, Clean Catch     Color, UA Yellow Yellow, Straw, Alejandra    Appearance, UA Clear Clear    pH, UA 6.0 5.0 - 8.0    Specific Gravity, UA 1.010 1.005 - 1.030    Protein, UA Negative Negative    Glucose, UA Negative Negative    Ketones, UA Negative Negative    Bilirubin (UA) Negative Negative    Occult Blood UA Negative Negative    Nitrite, UA Positive (A) Negative    Leukocytes, UA 1+ (A) Negative   Urinalysis Microscopic    Collection Time: 08/21/22  9:15 AM   Result Value Ref Range    RBC, UA 0 0 - 4 /hpf    WBC, UA 2 0 - 5 /hpf    Bacteria Many (A) None-Occ /hpf    Microscopic Comment SEE COMMENT    CBC auto differential    Collection Time: 08/21/22 12:05 PM   Result Value Ref Range    WBC 24.30 (H) 3.90 - 12.70 K/uL    RBC 3.30 (L) 4.00 - 5.40 M/uL    Hemoglobin 8.3 (L) 12.0 - 16.0 g/dL    Hematocrit 26.3 (L) 37.0 - 48.5 %    MCV 80 (L) 82 - 98 fL    MCH 25.2 (L) 27.0 - 31.0 pg    MCHC 31.6 (L) 32.0 - 36.0 g/dL    RDW 18.3 (H) 11.5 - 14.5 %    Platelets 451 (H) 150 - 450 K/uL    MPV 9.8 9.2 - 12.9 fL    Immature Granulocytes 1.4 (H) 0.0 - 0.5 %    Gran # (ANC) 21.1 (H) 1.8 - 7.7 K/uL    Immature Grans (Abs) 0.35 (H) 0.00 - 0.04 K/uL    Lymph # 1.0 1.0 - 4.8 K/uL    Mono # 1.6 (H) 0.3 - 1.0 K/uL    Eos # 0.2 0.0 - 0.5 K/uL    Baso # 0.12 0.00 - 0.20 K/uL    nRBC 0 0 /100 WBC    Gran % 86.7 (H) 38.0 - 73.0 %    Lymph % 4.3 (L) 18.0 - 48.0 %     Mono % 6.4 4.0 - 15.0 %    Eosinophil % 0.7 0.0 - 8.0 %    Basophil % 0.5 0.0 - 1.9 %    Platelet Estimate Increased (A)     Aniso Slight     Poik Slight     Hypo Occasional     Ovalocytes Occasional     Target Cells Occasional     Schistocytes Present     Basophilic Stippling Occasional     Fragmented Cells Occasional     Differential Method Automated    Echo    Collection Time: 08/21/22  3:35 PM   Result Value Ref Range    Ascending aorta 2.57 cm    STJ 2.38 cm    AV mean gradient 7 mmHg    Ao peak georges 1.74 m/s    Ao VTI 41.61 cm    IVRT 97.05 msec    IVS 0.92 0.6 - 1.1 cm    LA size 3.68 cm    Left Atrium Major Axis 5.42 cm    Left Atrium Minor Axis 5.30 cm    LVIDd 3.87 3.5 - 6.0 cm    LVIDs 2.64 2.1 - 4.0 cm    LVOT diameter 1.94 cm    LVOT peak VTI 28.97 cm    Posterior Wall 0.81 0.6 - 1.1 cm    MV Peak A Georges 0.69 m/s    E wave deceleration time 200.34 msec    MV Peak E Georges 1.26 m/s    RA Major Axis 4.36 cm    RA Width 3.59 cm    RVDD 2.48 cm    Sinus 2.79 cm    TAPSE 2.20 cm    TR Max Georges 3.23 m/s    TDI LATERAL 0.07 m/s    TDI SEPTAL 0.06 m/s    LA WIDTH 3.91 cm    MV stenosis pressure 1/2 time 58.10 ms    LV Diastolic Volume 64.87 mL    LV Systolic Volume 25.45 mL    RV S' 14.80 cm/s    LVOT peak georges 1.35 m/s    LA volume (mod) 56.14 cm3    LV LATERAL E/E' RATIO 18.00 m/s    LV SEPTAL E/E' RATIO 21.00 m/s    FS 32 %    LA volume 65.55 cm3    LV mass 98.49 g    Left Ventricle Relative Wall Thickness 0.42 cm    AV valve area 2.06 cm2    AV Velocity Ratio 0.78     AV index (prosthetic) 0.70     MV valve area p 1/2 method 3.79 cm2    E/A ratio 1.83     Mean e' 0.07 m/s    LVOT area 3.0 cm2    LVOT stroke volume 85.59 cm3    AV peak gradient 12 mmHg    E/E' ratio 19.38 m/s    LV Systolic Volume Index 16.2 mL/m2    LV Diastolic Volume Index 41.32 mL/m2    LA Volume Index 41.7 mL/m2    LV Mass Index 63 g/m2    Triscuspid Valve Regurgitation Peak Gradient 42 mmHg    LA Volume Index (Mod) 35.8 mL/m2    BSA 1.57  m2   CBC auto differential    Collection Time: 08/21/22  4:12 PM   Result Value Ref Range    WBC 17.59 (H) 3.90 - 12.70 K/uL    RBC 3.24 (L) 4.00 - 5.40 M/uL    Hemoglobin 8.4 (L) 12.0 - 16.0 g/dL    Hematocrit 26.3 (L) 37.0 - 48.5 %    MCV 81 (L) 82 - 98 fL    MCH 25.9 (L) 27.0 - 31.0 pg    MCHC 31.9 (L) 32.0 - 36.0 g/dL    RDW 17.8 (H) 11.5 - 14.5 %    Platelets 436 150 - 450 K/uL    MPV 10.3 9.2 - 12.9 fL    Immature Granulocytes 0.9 (H) 0.0 - 0.5 %    Gran # (ANC) 14.9 (H) 1.8 - 7.7 K/uL    Immature Grans (Abs) 0.15 (H) 0.00 - 0.04 K/uL    Lymph # 1.0 1.0 - 4.8 K/uL    Mono # 1.4 (H) 0.3 - 1.0 K/uL    Eos # 0.1 0.0 - 0.5 K/uL    Baso # 0.09 0.00 - 0.20 K/uL    nRBC 0 0 /100 WBC    Gran % 84.4 (H) 38.0 - 73.0 %    Lymph % 5.6 (L) 18.0 - 48.0 %    Mono % 7.9 4.0 - 15.0 %    Eosinophil % 0.7 0.0 - 8.0 %    Basophil % 0.5 0.0 - 1.9 %    Differential Method Automated          Significant Imaging: I have reviewed all pertinent imaging results/findings within the past 24 hours.    Imaging Results              US Lower Extremity Veins Bilateral (Final result)  Result time 08/21/22 17:09:43      Final result by Troy Yeung MD (08/21/22 17:09:43)                   Impression:      No evidence of acute deep venous thrombosis in the left or right lower extremity veins.    Electronically signed by resident: Carlos Enrique Ugalde  Date:    08/21/2022  Time:    17:04    Electronically signed by: Troy Yeung MD  Date:    08/21/2022  Time:    17:09               Narrative:    EXAMINATION:  US LOWER EXTREMITY VEINS BILATERAL    CLINICAL HISTORY:  Edema with elevated d dimer;    TECHNIQUE:  Duplex and color flow Doppler and dynamic compression was performed of the bilateral lower extremity veins was performed.    COMPARISON:  None    FINDINGS:  Right thigh veins: The common femoral, femoral, popliteal, upper greater saphenous, and deep femoral veins are patent and free of thrombus. The veins are normally compressible and have normal  "phasic flow and augmentation response.    Right calf veins: The visualized calf veins are patent with normal color flow.    Left thigh veins: The common femoral, femoral, popliteal, upper greater saphenous, and deep femoral veins are patent and free of thrombus. The veins are normally compressible and have normal phasic flow and augmentation response.    Left calf veins: The visualized calf veins are patent with normal color flow.    Miscellaneous: None                                       X-Ray Chest AP Portable (Final result)  Result time 08/21/22 01:22:01      Final result by Richard Chavez MD (08/21/22 01:22:01)                   Impression:      Cardiomegaly with interstitial edema and new bilateral pleural effusions, suggestive of CHF exacerbation and/or volume overload.      Electronically signed by: Richard Chavez MD  Date:    08/21/2022  Time:    01:22               Narrative:    EXAMINATION:  XR CHEST AP PORTABLE    CLINICAL HISTORY:  Provided history is "  Shortness of breath".    TECHNIQUE:  One view of the chest.    COMPARISON:  07/20/2022.    FINDINGS:  Cardiac wires overlie the chest.  Cardiac silhouette is enlarged and similar to the prior study.  Atherosclerotic calcifications overlie the aortic arch.  There is central vascular congestion with worsening diffuse bilateral coarsened interstitial lung markings suggestive of interstitial edema.  New bilateral pleural effusions with adjacent passive atelectasis or airspace disease in the lung bases.  No distinct pneumothorax.                                          Assessment/Plan:      * Symptomatic anemia  GIB  Patient's anemia is currently uncontrolled.S/p  2 units of PRBCs w/ improvement.   Current CBC reviewed-   Lab Results   Component Value Date    HGB 8.1 (L) 08/22/2022    HCT 25.9 (L) 08/22/2022     -Monitor serial CBC and transfuse if patient becomes hemodynamically unstable, symptomatic or H/H drops below 7/21.   -Anemia panel " reviewed  -Continue oral iron supplement  - GI consulted, appreciate recs  -S/p EGD 8/22 w/ findings per hospital course  -Continue daily PPI    Acute respiratory failure with hypoxia and hypercapnia  Patient with Hypercapnic and Hypoxic Respiratory failure which is Acute.  she is not on home oxygen. Supplemental oxygen was provided and noted-SpO2 >94% on 2L NC.    Signs/symptoms of respiratory failure include- tachypnea, increased work of breathing and rales. Contributing diagnoses includes - Pleural effusion and symptomatic anemia and pulmonary edema Labs and images were reviewed. Patient Has not had a recent ABG. Will treat underlying causes and adjust management of respiratory failure     - symptomatic anemia vs. Flash edema from HTN contributing to symptoms  - s/p 2 units pRBCs upon admission with improvement of hemoglobin  - s/p lasix 40mg IV x1  - Last Echo from 7/20/22 - See volume overload; repeat bellow:  Notable for diastolic dysfunction  -patient euvolemic will hold on further diuresis  - wean supplemental oxygen as tolerated    Results for orders placed during the hospital encounter of 08/21/22    Echo    Interpretation Summary  · Moderate left atrial enlargement.  · The left ventricle is normal in size with normal systolic function.  · The estimated ejection fraction is 65%.  · Grade II left ventricular diastolic dysfunction.  · Normal right ventricular size with normal right ventricular systolic function.  · Mild mitral regurgitation.  · Mild tricuspid regurgitation.  · Normal central venous pressure (3 mmHg).  · There is pulmonary hypertension. The estimated PA systolic pressure is 45 mmHg.      Volume overload  High blood pressure readings without Hx of HTN  -/114 on arrival, improved to 173/70 without intervention  -  -Troponin 0.009, repeat 0.010  -CXR revealed cardiomegaly with interstitial edema and new bilateral pleural effusions.  -Denies hx of CHF or HTN  -ECHO 7/22/22: EF 55%  with normal diastolic function  -Repeat ECHO ordered w/ DD  -S/p diuresis per acute resp failure  -amlodipine initiated   -Will continue to monitor blood pressure trend closely and adjust antihypertensive regimen as clinically indicated and tolerated.      Pure hypercholesterolemia   Patient is chronically on statin.will continue for now. Monitor clinically. Last LDL was   Lab Results   Component Value Date    LDLCALC 60.4 (L) 08/04/2022       H/O ischemic left MCA stroke  - completed plavix on 8/13  - continue home asa  - continue home statin      VTE Risk Mitigation (From admission, onward)         Ordered     Reason for No Pharmacological VTE Prophylaxis  Once        Question:  Reasons:  Answer:  Risk of Bleeding    08/21/22 0233     IP VTE HIGH RISK PATIENT  Once         08/21/22 0233     Place sequential compression device  Until discontinued         08/21/22 0233                Discharge Planning   TERE: 8/23/2022     Code Status: Full Code   Is the patient medically ready for discharge?: No    Reason for patient still in hospital (select all that apply): Patient trending condition and Laboratory test  Discharge Plan A: Home                  Patti Junior MD  Department of Hospital Medicine   [facility]

## 2022-08-22 NOTE — PLAN OF CARE
Vital signs stable. Afebrile. Alert, oriented and following commands. Denies pain/nausea. POC reviewed and understanding verbalized. Dr Lopez at bedside to speak with pt.

## 2022-08-22 NOTE — PLAN OF CARE
[facility]  Initial Discharge Assessment       Primary Care Provider: Brooklyn Burnham MD    Admission Diagnosis: Shortness of breath [R06.02]  SOB (shortness of breath) [R06.02]  Volume overload [E87.70]  Chest pain [R07.9]  Severe anemia [D64.9]    Admission Date: 8/21/2022  Expected Discharge Date: 8/23/2022    Discharge Barriers Identified: None    Payor: MEDICARE / Plan: MEDICARE PART A & B / Product Type: Government /     Extended Emergency Contact Information  Primary Emergency Contact: Keegan Meza  Address: [address]           [place] [address] [place]  Mobile Phone: [phone]  Relation: Son  Secondary Emergency Contact: Serafin Meza   [place]  Mobile Phone: [phone]  Relation: Son    Discharge Plan A: Home  Discharge Plan B: Home Health      [id number] - [place] [id number] [facility] [address] [facility]  [address] [place] [address]  Phone: [phone] Fax: [phone]      Initial Assessment (most recent)     Adult Discharge Assessment - 08/22/22 1149        Discharge Assessment    Assessment Type Discharge Planning Assessment     Confirmed/corrected address, phone number and insurance Yes     Confirmed Demographics Correct on Facesheet     Source of Information family     If unable to respond/provide information was family/caregiver contacted? Yes     Contact Name/Number Patient's      Communicated TERE with patient/caregiver Yes     Lives With spouse     Do you expect to return to your current living situation? Yes     Do you have help at home or someone to help you manage your care at home? Yes     Prior to hospitilization cognitive status: Alert/Oriented     Current cognitive status: Alert/Oriented     Walking or Climbing Stairs Difficulty none     Dressing/Bathing Difficulty none     Equipment Currently Used at Home none     Readmission within 30 days? Yes     Do you currently have  service(s) that help you manage your care at home? No     Do you take prescription medications? Yes     Do you have prescription coverage? Yes     Do you have any problems affording any of your prescribed medications? No     Is the patient taking medications as prescribed? yes     Who is going to help you get home at discharge? Family     Are you on dialysis? No     Do you take coumadin? No     Discharge Plan A Home     Discharge Plan B Home Health     DME Needed Upon Discharge  none     Discharge Plan discussed with: Spouse/sig other     Discharge Barriers Identified None                  Iqra Mcgovern RN, CM   Ext: [phone]

## 2022-08-22 NOTE — PLAN OF CARE
Problem: Adult Inpatient Plan of Care  Goal: Plan of Care Review  Outcome: Ongoing, Progressing     Problem: Bleeding (Gastrointestinal Bleeding)  Goal: Hemostasis  Outcome: Ongoing, Progressing     Problem: Fall Injury Risk  Goal: Absence of Fall and Fall-Related Injury  Outcome: Ongoing, Progressing     Patient AAO x 1-3, with periods of forgetfulness. Patient denies pain and NPO for EGD.  COVID nasal swab ordered and : not detected.  Pills changed to liquids since patient can not swallow pills and refuses for pills to be crushed.  Potassium supplement for K+ level of 3.4.  Patient educated that a UA reflex culture is needed.  New orders:  Cardiac diet, GI consult and chloraseptic spray administered for dry , nonproductive cough after EGD.  Results of EGD in EMR.  Patient informed of 6 minute walk test ordered and patient and family are in agreement.   POC reviewed with patient and family, discharge date anticipated 8/23/22.

## 2022-08-22 NOTE — TREATMENT PLAN
GI Treatment Plan:    Impression:            - Esophageal mucosal changes consistent with                          long-segment Rodriguez's esophagus, classified as                          Rodriguez's stage C7-M7 per [place] criteria. Not                          biopsied at this time due to anemia and bleeding                          evaluation with gastric ulcer present.                          - 3 cm type-I sliding hiatal hernia.                          - Multiple fundic gland polyps.                          - Gastritis. Biopsied.                          - Non-bleeding gastric ulcer with a clean ulcer                          base (Vinh Class III).                          - Three non-bleeding angioectasias in the                          duodenum. Treated with argon plasma coagulation                          (APC).                          - Three non-bleeding angioectasias in the                          duodenum. Treated with argon plasma coagulation                          (APC).   Recommendation:        - Return patient to hospital diop for ongoing care.                          - Advance diet as tolerated.                          - Continue present medications. Continue oral                          daily PPI indefinitely.                          - Await pathology results.                          - Repeat EGD will be needed in 12 weeks to                          follow-up ulcer healing. Rodriguez's esophagus may                          be addressed during repeat EGD. Consider doing                          colonoscopy at same time as EGD. Will have the                          patient come to GI clinic to follow-up prior to                          scheudling procedures.     Please call back with questions or if patient has change in clinical status.    Todd Kaur MD  Gastroenterology/Hepatology, PGY IV  [facility]

## 2022-08-22 NOTE — ASSESSMENT & PLAN NOTE
High blood pressure readings without Hx of HTN  -/114 on arrival, improved to 173/70 without intervention  -  -Troponin 0.009, repeat 0.010  -CXR revealed cardiomegaly with interstitial edema and new bilateral pleural effusions.  -Denies hx of CHF or HTN  -ECHO 7/22/22: EF 55% with normal diastolic function  -Repeat ECHO ordered w/ DD  -S/p diuresis per acute resp failure  -amlodipine initiated   -Will continue to monitor blood pressure trend closely and adjust antihypertensive regimen as clinically indicated and tolerated.

## 2022-08-22 NOTE — PLAN OF CARE
[id number]  Pt AO x4, VSS on 1L NC for comfort w/ SOB. O2 mid 90s. Bilat AC PIVs. Pt denies N/V/CP/or pain. A x1 OOB, BSC at bedside, voiding spontaneously.  at bedside attentive to pt. NPO at MN for EGD in AM. Pt aware. Safety maintained, hourly rounding performed. Bed in lowest position, call light in reach. WCTM        Problem: Adult Inpatient Plan of Care  Goal: Plan of Care Review  8/21/2022 2140 by Shanelle Cuello RN  Outcome: Ongoing, Progressing  8/21/2022 0743 by Shanelle Cuello RN  Outcome: Ongoing, Progressing  Goal: Patient-Specific Goal (Individualized)  8/21/2022 2140 by Shanelle Cuello RN  Outcome: Ongoing, Progressing  8/21/2022 0743 by Shanelle Cuello RN  Outcome: Ongoing, Progressing  Goal: Absence of Hospital-Acquired Illness or Injury  8/21/2022 2140 by Shanelle Cuello RN  Outcome: Ongoing, Progressing  8/21/2022 0743 by Shanelle Cuello RN  Outcome: Ongoing, Progressing  Goal: Optimal Comfort and Wellbeing  8/21/2022 2140 by Shanelle Cuello RN  Outcome: Ongoing, Progressing  8/21/2022 0743 by Shanelle Cuello RN  Outcome: Ongoing, Progressing  Goal: Readiness for Transition of Care  8/21/2022 2140 by Shanelle Cuello, RN  Outcome: Ongoing, Progressing  8/21/2022 0743 by Shanelle Cuello RN  Outcome: Ongoing, Progressing     Problem: Adjustment to Illness (Gastrointestinal Bleeding)  Goal: Optimal Coping with Acute Illness  8/21/2022 2140 by Shanelle Cuello, RN  Outcome: Ongoing, Progressing  8/21/2022 0743 by Shanelle Cuello RN  Outcome: Ongoing, Progressing     Problem: Bleeding (Gastrointestinal Bleeding)  Goal: Hemostasis  8/21/2022 2140 by Shanelle Cuello RN  Outcome: Ongoing, Progressing  8/21/2022 0743 by Shanelle Cuello RN  Outcome: Ongoing, Progressing     Problem: Skin Injury Risk Increased  Goal: Skin Health and Integrity  Outcome: Ongoing, Progressing

## 2022-08-22 NOTE — ASSESSMENT & PLAN NOTE
GIB  Patient's anemia is currently uncontrolled.S/p  2 units of PRBCs w/ improvement.   Current CBC reviewed-   Lab Results   Component Value Date    HGB 8.1 (L) 08/22/2022    HCT 25.9 (L) 08/22/2022     -Monitor serial CBC and transfuse if patient becomes hemodynamically unstable, symptomatic or H/H drops below 7/21.   -Anemia panel reviewed  -Continue oral iron supplement  - GI consulted, appreciate recs  -S/p EGD 8/22 w/ findings per hospital course  -Continue daily PPI

## 2022-08-22 NOTE — NURSING
Patient completed 6 minute walk test. Supplemental O2 applied and patient is lying in bed with family at the bedside.

## 2022-08-22 NOTE — INTERVAL H&P NOTE
The patient has been examined and the H&P has been reviewed:    Pre-Procedure H and P Addendum    Patient seen and examined.  History and exam unchanged from prior history and physical.      Procedure: EGD  Indication: Iron deficiency anemia  ASA Class: per anesthesiology  Airway: normal  Neck Mobility: full range of motion  Mallampatti score: per anesthesia  History of anesthesia problems: no  Family history of anesthesia problems: no  Anesthesia Plan: MAC      Active Hospital Problems    Diagnosis  POA    *Symptomatic anemia [D64.9]  Yes    Volume overload [E87.70]  Yes    GIB (gastrointestinal bleeding) [K92.2]  Yes    Acute respiratory failure with hypoxia and hypercapnia [J96.01, J96.02]  Yes    Elevated blood pressure reading [R03.0]  Yes    Pure hypercholesterolemia [E78.00]  Yes     Chronic    H/O ischemic left MCA stroke [Z86.73]  Not Applicable      Resolved Hospital Problems   No resolved problems to display.

## 2022-08-22 NOTE — PROVATION PATIENT INSTRUCTIONS
Discharge Summary/Instructions after an Endoscopic Procedure  Patient Name: Katelyn Caba  Patient MRN: [id number]  Patient YOB: 1940  Monday, August 22, 2022  Steven Lopez MD  Dear patient,  As a result of recent federal legislation (The Federal Cures Act), you may   receive lab or pathology results from your procedure in your [facility]   account before your physician is able to contact you. Your physician or   their representative will relay the results to you with their   recommendations at their soonest availability.  Thank you,  RESTRICTIONS:  During your procedure today, you received medications for sedation.  These   medications may affect your judgment, balance and coordination.  Therefore,   for 24 hours, you have the following restrictions:   - DO NOT drive a car, operate machinery, make legal/financial decisions,   sign important papers or drink alcohol.    ACTIVITY:  Today: no heavy lifting, straining or running due to procedural   sedation/anesthesia.  The following day: return to full activity including work.  DIET:  Eat and drink normally unless instructed otherwise.     TREATMENT FOR COMMON SIDE EFFECTS:  - Mild abdominal pain, nausea, belching, bloating or excessive gas:  rest,   eat lightly and use a heating pad.  - Sore Throat: treat with throat lozenges and/or gargle with warm salt   water.  - Because air was used during the procedure, expelling large amounts of air   from your rectum or belching is normal.  - If a bowel prep was taken, you may not have a bowel movement for 1-3 days.    This is normal.  SYMPTOMS TO WATCH FOR AND REPORT TO YOUR PHYSICIAN:  1. Abdominal pain or bloating, other than gas cramps.  2. Chest pain.  3. Back pain.  4. Signs of infection such as: chills or fever occurring within 24 hours   after the procedure.  5. Rectal bleeding, which would show as bright red, maroon, or black stools.   (A tablespoon of blood from the rectum is not serious, especially if    hemorrhoids are present.)  6. Vomiting.  7. Weakness or dizziness.  GO DIRECTLY TO THE NEAREST EMERGENCY ROOM IF YOU HAVE ANY OF THE FOLLOWING:      Difficulty breathing              Chills and/or fever over 101 F   Persistent vomiting and/or vomiting blood   Severe abdominal pain   Severe chest pain   Black, tarry stools   Bleeding- more than one tablespoon   Any other symptom or condition that you feel may need urgent attention  Your doctor recommends these additional instructions:  If any biopsies were taken, your doctors clinic will contact you in 1 to 2   weeks with any results.  - Return patient to hospital diop for ongoing care.   - Advance diet as tolerated.   - Continue present medications.  Continue oral daily PPI indefinitely.    - Await pathology results.   - Repeat EGD will be needed in 12 weeks to follow-up ulcer healing.    Rodriguez's esophagus may be addressed during repeat EGD.  Consider doing   colonoscopy at same time as EGD.  Will have the patient come to GI clinic   to follow-up prior to scheudling procedures.  For questions, problems or results please call your physician - Steven Lopez MD at Work:  [phone].  [facility], EMERGENCY ROOM PHONE NUMBER: [phone]  IF A COMPLICATION OR EMERGENCY SITUATION ARISES AND YOU ARE UNABLE TO REACH   YOUR PHYSICIAN - GO DIRECTLY TO THE EMERGENCY ROOM.  Steven Lopez MD  8/22/2022 11:48:32 AM  This report has been verified and signed electronically.  Dear patient,  As a result of recent federal legislation (The Federal Cures Act), you may   receive lab or pathology results from your procedure in your [facility]   account before your physician is able to contact you. Your physician or   their representative will relay the results to you with their   recommendations at their soonest availability.  Thank you,  PROVATION

## 2022-08-22 NOTE — NURSING TRANSFER
Nursing Transfer Note      8/22/2022     Reason patient is being transferred: per md order    Transfer to 1001    Transfer via stretcher    Transfer with mask    Transported by pct    Medicines sent: n/a    Any special needs or follow-up needed: n/a    Chart send with patient: yes    Notified: family via text

## 2022-08-23 ENCOUNTER — TELEPHONE (OUTPATIENT)
Dept: PHARMACY | Facility: CLINIC | Age: 82
End: 2022-08-23
Payer: MEDICARE

## 2022-08-23 VITALS
TEMPERATURE: 98 F | DIASTOLIC BLOOD PRESSURE: 61 MMHG | BODY MASS INDEX: 21.76 KG/M2 | OXYGEN SATURATION: 94 % | HEART RATE: 72 BPM | HEIGHT: 63 IN | RESPIRATION RATE: 18 BRPM | SYSTOLIC BLOOD PRESSURE: 140 MMHG | WEIGHT: 122.81 LBS

## 2022-08-23 DIAGNOSIS — K92.2 GASTROINTESTINAL HEMORRHAGE, UNSPECIFIED GASTROINTESTINAL HEMORRHAGE TYPE: Primary | ICD-10-CM

## 2022-08-23 DIAGNOSIS — R03.0 ELEVATED BLOOD PRESSURE READING: ICD-10-CM

## 2022-08-23 LAB
ANION GAP SERPL CALC-SCNC: 9 MMOL/L (ref 8–16)
BASOPHILS # BLD AUTO: 0.09 K/UL (ref 0–0.2)
BASOPHILS NFR BLD: 0.9 % (ref 0–1.9)
BUN SERPL-MCNC: 14 MG/DL (ref 8–23)
CALCIUM SERPL-MCNC: 8.6 MG/DL (ref 8.7–10.5)
CHLORIDE SERPL-SCNC: 109 MMOL/L (ref 95–110)
CO2 SERPL-SCNC: 21 MMOL/L (ref 23–29)
CREAT SERPL-MCNC: 0.6 MG/DL (ref 0.5–1.4)
DIFFERENTIAL METHOD: ABNORMAL
EOSINOPHIL # BLD AUTO: 0.5 K/UL (ref 0–0.5)
EOSINOPHIL NFR BLD: 4.9 % (ref 0–8)
ERYTHROCYTE [DISTWIDTH] IN BLOOD BY AUTOMATED COUNT: 18.8 % (ref 11.5–14.5)
EST. GFR  (NO RACE VARIABLE): >60 ML/MIN/1.73 M^2
GLUCOSE SERPL-MCNC: 108 MG/DL (ref 70–110)
HCT VFR BLD AUTO: 26.4 % (ref 37–48.5)
HGB BLD-MCNC: 8 G/DL (ref 12–16)
IMM GRANULOCYTES # BLD AUTO: 0.05 K/UL (ref 0–0.04)
IMM GRANULOCYTES NFR BLD AUTO: 0.5 % (ref 0–0.5)
LYMPHOCYTES # BLD AUTO: 0.9 K/UL (ref 1–4.8)
LYMPHOCYTES NFR BLD: 8.8 % (ref 18–48)
MAGNESIUM SERPL-MCNC: 2.3 MG/DL (ref 1.6–2.6)
MCH RBC QN AUTO: 25.3 PG (ref 27–31)
MCHC RBC AUTO-ENTMCNC: 30.3 G/DL (ref 32–36)
MCV RBC AUTO: 84 FL (ref 82–98)
MONOCYTES # BLD AUTO: 1.2 K/UL (ref 0.3–1)
MONOCYTES NFR BLD: 11.5 % (ref 4–15)
NEUTROPHILS # BLD AUTO: 7.6 K/UL (ref 1.8–7.7)
NEUTROPHILS NFR BLD: 73.4 % (ref 38–73)
NRBC BLD-RTO: 0 /100 WBC
PLATELET # BLD AUTO: 362 K/UL (ref 150–450)
PMV BLD AUTO: 10.2 FL (ref 9.2–12.9)
POTASSIUM SERPL-SCNC: 4.2 MMOL/L (ref 3.5–5.1)
RBC # BLD AUTO: 3.16 M/UL (ref 4–5.4)
SODIUM SERPL-SCNC: 139 MMOL/L (ref 136–145)
WBC # BLD AUTO: 10.32 K/UL (ref 3.9–12.7)

## 2022-08-23 PROCEDURE — 83735 ASSAY OF MAGNESIUM: CPT | Performed by: PHYSICIAN ASSISTANT

## 2022-08-23 PROCEDURE — 25000003 PHARM REV CODE 250: Performed by: STUDENT IN AN ORGANIZED HEALTH CARE EDUCATION/TRAINING PROGRAM

## 2022-08-23 PROCEDURE — 99239 HOSP IP/OBS DSCHRG MGMT >30: CPT | Mod: ,,, | Performed by: STUDENT IN AN ORGANIZED HEALTH CARE EDUCATION/TRAINING PROGRAM

## 2022-08-23 PROCEDURE — 80048 BASIC METABOLIC PNL TOTAL CA: CPT | Performed by: PHYSICIAN ASSISTANT

## 2022-08-23 PROCEDURE — 85025 COMPLETE CBC W/AUTO DIFF WBC: CPT | Performed by: PHYSICIAN ASSISTANT

## 2022-08-23 PROCEDURE — 99239 PR HOSPITAL DISCHARGE DAY,>30 MIN: ICD-10-PCS | Mod: ,,, | Performed by: STUDENT IN AN ORGANIZED HEALTH CARE EDUCATION/TRAINING PROGRAM

## 2022-08-23 PROCEDURE — 99232 SBSQ HOSP IP/OBS MODERATE 35: CPT | Mod: ,,, | Performed by: FAMILY MEDICINE

## 2022-08-23 PROCEDURE — 99232 PR SUBSEQUENT HOSPITAL CARE,LEVL II: ICD-10-PCS | Mod: ,,, | Performed by: FAMILY MEDICINE

## 2022-08-23 PROCEDURE — 36415 COLL VENOUS BLD VENIPUNCTURE: CPT | Performed by: PHYSICIAN ASSISTANT

## 2022-08-23 PROCEDURE — 25000003 PHARM REV CODE 250: Performed by: PHYSICIAN ASSISTANT

## 2022-08-23 RX ORDER — PANTOPRAZOLE SODIUM 40 MG/1
40 FOR SUSPENSION ORAL DAILY
Qty: 30 PACKET | Refills: 1 | Status: SHIPPED | OUTPATIENT
Start: 2022-08-24 | End: 2022-08-23 | Stop reason: ALTCHOICE

## 2022-08-23 RX ORDER — PANTOPRAZOLE SODIUM 40 MG/1
40 TABLET, DELAYED RELEASE ORAL DAILY
Qty: 30 TABLET | Refills: 1 | Status: SHIPPED | OUTPATIENT
Start: 2022-08-23 | End: 2023-04-05

## 2022-08-23 RX ORDER — NAPROXEN SODIUM 220 MG/1
81 TABLET, FILM COATED ORAL ONCE
Qty: 30 TABLET | Refills: 1 | Status: ON HOLD | OUTPATIENT
Start: 2022-08-23 | End: 2024-03-27 | Stop reason: HOSPADM

## 2022-08-23 RX ORDER — FERROUS SULFATE 220 (44)/5
300 ELIXIR ORAL DAILY
Qty: 205 ML | Refills: 1 | Status: ON HOLD | OUTPATIENT
Start: 2022-08-24 | End: 2024-03-27 | Stop reason: HOSPADM

## 2022-08-23 RX ORDER — AMLODIPINE BESYLATE 5 MG/1
5 TABLET ORAL DAILY
Qty: 30 TABLET | Refills: 1 | Status: SHIPPED | OUTPATIENT
Start: 2022-08-23 | End: 2022-08-26 | Stop reason: SDUPTHER

## 2022-08-23 RX ADMIN — PANTOPRAZOLE SODIUM 40 MG: 40 GRANULE, DELAYED RELEASE ORAL at 09:08

## 2022-08-23 RX ADMIN — AMLODIPINE 5 MG: 1 SUSPENSION ORAL at 09:08

## 2022-08-23 RX ADMIN — Medication 1000 UNITS: at 09:08

## 2022-08-23 RX ADMIN — MINERAL SUPPLEMENT IRON 300 MG / 5 ML STRENGTH LIQUID 100 PER BOX UNFLAVORED 300 MG: at 09:08

## 2022-08-23 RX ADMIN — ASPIRIN 81 MG CHEWABLE TABLET 81 MG: 81 TABLET CHEWABLE at 09:08

## 2022-08-23 NOTE — HPI
Katelyn Caba is a 81 y.o. female with history of recent CVA (7/20/22) who presents with SOB, onset shortly after stroke and worsened last night. Worse with ambulation. Associated with palpitations. Has unchanged chronic BLE edema. Found to have Hgb 4.9 down from 10.7 one month ago. CXR shows interstitial edema and new bilateral pleural effusions. Rectal exam in ED reportedly with brown/green stool. Has had black stools for a while while on iron supplementation. Hypertensive to 221/114 in ED, tachypneic, on 2L O2. GI consulted for possible GI bleed.

## 2022-08-23 NOTE — PLAN OF CARE
[address] [facility]  Discharge Final Note    Primary Care Provider: Brooklyn Burnham MD    Expected Discharge Date: 8/23/2022    Final Discharge Note (most recent)     Final Note - 08/23/22 0901        Final Note    Assessment Type Final Discharge Note     Anticipated Discharge Disposition Home or Self Care     Hospital Resources/Appts/Education Provided Appointments scheduled and added to AVS        Post-Acute Status    Post-Acute Authorization Other     Other Status No Post-Acute Service Needs               Contact Info     Brooklyn Burnham MD   Specialty: Family Medicine   Relationship: PCP - General    [facility]  [address]  [facility] [address]   Phone: [phone]       Next Steps: Follow up on 8/26/2022    Instructions: hospital follow up at 11AM        Iqra Mcgovern RN, CM   Ext: [phone]

## 2022-08-23 NOTE — SUBJECTIVE & OBJECTIVE
Subjective:   GI initially consulted on 8/21 for anemia.    Interval History: S/p EGD 8/22 with esophageal changes suspecting of Rodriguez's Esophagus, hiatal hernia, gastric antral erythema, non-bleeding cratered gastric ulcer, duodenal AVMs (treated with APC).  Today, reports dark BM overnight.    Hgb this AM 8.0.  Last received 2 units RBCs 8/21.    Review of Systems   Gastrointestinal:  Negative for abdominal pain.        Questionable blood in stool - reports black BM   Objective:     Vital Signs (Most Recent):  Temp: 98.1 °F (36.7 °C) (08/23/22 0501)  Pulse: 72 (08/23/22 1101)  Resp: 18 (08/23/22 0501)  BP: (!) 140/61 (08/23/22 1101)  SpO2: (!) 94 % (08/23/22 1101)   Vital Signs (24h Range):  Temp:  [97.1 °F (36.2 °C)-98.1 °F (36.7 °C)] 98.1 °F (36.7 °C)  Pulse:  [66-80] 72  Resp:  [12-23] 18  SpO2:  [94 %-99 %] 94 %  BP: ()/(49-67) 140/61     Weight: 55.7 kg (122 lb 12.7 oz) (08/22/22 0841)  Body mass index is 21.75 kg/m².      Intake/Output Summary (Last 24 hours) at 8/23/2022 1133  Last data filed at 8/22/2022 1728  Gross per 24 hour   Intake 720 ml   Output 300 ml   Net 420 ml       Lines/Drains/Airways       Peripheral Intravenous Line  Duration                  Peripheral IV - Single Lumen 08/21/22 0153 20 G Right Antecubital 2 days         Peripheral IV - Single Lumen 08/21/22 0300 20 G Left Antecubital 2 days                    Physical Exam  Vitals reviewed.   Constitutional:       General: She is not in acute distress.     Appearance: She is not ill-appearing.   HENT:      Head: Normocephalic and atraumatic.   Eyes:      Extraocular Movements: Extraocular movements intact.   Pulmonary:      Effort: Pulmonary effort is normal. No respiratory distress.   Skin:     General: Skin is warm and dry.      Capillary Refill: Capillary refill takes less than 2 seconds.   Neurological:      General: No focal deficit present.      Mental Status: She is alert and oriented to person, place, and time. Mental  status is at baseline.   Psychiatric:         Mood and Affect: Mood normal.         Behavior: Behavior normal.         Thought Content: Thought content normal.       Significant Labs:  CBC:   Recent Labs   Lab 08/21/22  1612 08/22/22  0353 08/23/22  0250   WBC 17.59* 8.40 10.32   HGB 8.4* 8.1* 8.0*   HCT 26.3* 25.9* 26.4*    392 362     CMP:   Recent Labs   Lab 08/23/22  0250      CALCIUM 8.6*      K 4.2   CO2 21*      BUN 14   CREATININE 0.6     Coagulation: No results for input(s): PT, INR, APTT in the last 48 hours.  All pertinent lab results from the last 24 hours have been reviewed.      Significant Imaging:  Imaging results within the past 24 hours have been reviewed.    8/22/2022 EGD for ISAIAH, Melena  Impression:              - Esophageal mucosal changes consistent with long-segment Rodriguez's esophagus, classified as Rodriguez's stage C7-M7 per [place] criteria. Not biopsied at this time due to anemia and bleeding evaluation with gastric ulcer present.   - 3 cm type-I sliding hiatal hernia.   - Multiple fundic gland polyps.   - Gastritis. Biopsied.   - Non-bleeding gastric ulcer with a clean ulcer base (Vinh Class III).   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 Treated with argon plasma coagulation (APC).   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 Treated with argon plasma coagulation (APC).   Recommendation:          - Return patient to hospital diop for ongoing care.   - Advance diet as tolerated.   - Continue present medications. Continue oral daily PPI indefinitely.   - Await pathology results.   - Repeat EGD will be needed in 12 weeks to follow-up ulcer healing. Rodriguez's esophagus may be addressed during repeat EGD. Consider doing colonoscopy at same time as EGD. Will have the patient come to GI clinic to follow-up prior to scheduling procedures.

## 2022-08-23 NOTE — ANESTHESIA POSTPROCEDURE EVALUATION
Anesthesia Post Evaluation    Patient: Katelyn Caba    Procedure(s) Performed: Procedure(s) (LRB):  EGD (ESOPHAGOGASTRODUODENOSCOPY) (N/A)    Final Anesthesia Type: general      Patient location during evaluation: PACU  Patient participation: Yes- Able to Participate  Level of consciousness: awake  Post-procedure vital signs: reviewed and stable  Pain management: adequate  Airway patency: patent    PONV status at discharge: No PONV  Anesthetic complications: no      Cardiovascular status: blood pressure returned to baseline  Respiratory status: unassisted  Hydration status: euvolemic  Follow-up not needed.          Vitals Value Taken Time   /60 08/22/22 2332   Temp 36.7 °C (98.1 °F) 08/23/22 0501   Pulse 73 08/23/22 0501   Resp 18 08/23/22 0501   SpO2 94 % 08/23/22 0501         Event Time   Out of Recovery 12:07:00         Pain/Salena Score: Salena Score: 10 (8/22/2022 12:00 PM)

## 2022-08-23 NOTE — DISCHARGE SUMMARY
[facility] Medicine  Discharge Summary      Patient Name: Katelyn Caba  MRN: [id number]  Patient Class: IP- Inpatient  Admission Date: 8/21/2022  Hospital Length of Stay: 1 days  Discharge Date and Time:  08/23/2022 2:51 PM  Attending Physician: No att. providers found   Discharging Provider: Patit Junior MD  Primary Care Provider: Brooklyn Burnham MD  [facility] Medicine Team: [facility] [place] [facility] MED D Patti Junior MD    HPI:   Katelyn Caba is a 81 y.o. female with PMHx significant for recent CVA (7/20/22) admitted to hospital medicine for symptomatic anemia and volume overload. Patient reports dyspnea of exertion for ~ 1 month with associated palpitations for the last 2 days. She has been checking her oxygen saturation at home for a few days, and today noted an SpO2 of 90% which prompted her visit to the ED. Endorses nonproductive cough and intermittent chronic BLE edema that is unchanged from baseline. Patient's  at bedside states that he monitors her BP daily, and the highest it has ever gotten was around 140-150 SBP. Denies orthopnea, bloody stool (endorses black stool since start on iron pills), hematemesis, fever/chills, HA, blurred vision, CP, abdominal pain, n/v, diarrhea, constipation, numbness/tingling. Patient completed her 21 day course of plavix on 8/13.     In the ED, hypertensive to max 221/114. Tachypneic to max 28. SpO2 >94% on 2L NC. H/H 4.9/17.4 (H/H 10.7/33.9 ~1 month ago). Plt 544. Co2 22. . Troponin 0.009. EKG with NSR with nonspecific ST wave abnormalities. CXR with cardiomegaly with interstitial edema and new bilateral pleural effusions. Rectal exam by ED provider revealed brown/green stool. 2 units pRBCs ordered.      Procedure(s) (LRB):  EGD (ESOPHAGOGASTRODUODENOSCOPY) (N/A)      Hospital Course:   Patient received 2 units packed red blood cells upon admission with improvement of hemoglobin from 4.6-> 8.4.  Gastroenterology consulted.  Patient denies any dysuria,  urinary frequency or urgency.  EGD completed 8/22. 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  Patient to continue oral PPI indefinitely and repeat EGD in 12 week with GI. Per GI, okay to resume aspirin and Plavix on 08/22.  Patient weaned off of oxygen to room air.  Walk test negative for home oxygen needs.  Patient deemed medically stable for discharge 08/23.  Hemoglobin stable.  Ambulatory referral placed to Hematology for future consideration of IV iron transfusions and to neurology per patient and family request.  Return precautions advised and questions addressed with patient and family.  Patient with standing PCP appointment on Friday.    Vitals:    08/22/22 2332 08/23/22 0501 08/23/22 0727 08/23/22 1101   BP: 132/60  (!) 165/67 (!) 140/61   BP Location:       Patient Position:       Pulse: 72 73 69 72   Resp: 18 18     Temp: 97.5 °F (36.4 °C) 98.1 °F (36.7 °C)     TempSrc:       SpO2: (!) 94% (!) 94%  (!) 94%   Weight:       Height:           Physical Exam  Vitals and nursing note reviewed.   Constitutional:       General: She is not in acute distress.     Appearance: Normal appearance. She is not ill-appearing.   HENT:      Head: Normocephalic and atraumatic.      Right Ear: External ear normal.      Left Ear: External ear normal.   Eyes:      Extraocular Movements: Extraocular movements intact.      Conjunctiva/sclera: Conjunctivae normal.      Pupils: Pupils are equal, round, and reactive to light.   Cardiovascular:      Rate and Rhythm: Normal rate and regular rhythm.      Pulses: Normal pulses.      Heart sounds: Normal heart sounds. No murmur heard.  Pulmonary:      Effort: Pulmonary effort is normal.  No respiratory distress.      Breath sounds: No wheezing, rhonchi or rales.   Abdominal:      General: Abdomen is flat. Bowel sounds are normal.      Palpations: Abdomen is soft.      Tenderness: There is no abdominal tenderness.   Musculoskeletal:         General: Normal range of motion.      Cervical back: Normal range of motion and neck supple. No tenderness.      Right lower leg: No edema.      Left lower leg: No edema.   Skin:     General: Skin is warm and dry.      Capillary Refill: Capillary refill takes less than 2 seconds.      Coloration: Skin is not jaundiced.   Neurological:      General: No focal deficit present.      Mental Status: She is alert and oriented to person, place, and time. Mental status is at baseline.   Psychiatric:         Mood and Affect: Mood normal.         Behavior: Behavior normal.           Goals of Care Treatment Preferences:  Code Status: Full Code      Consults:   Consults (From admission, onward)        Status Ordering Provider     Inpatient consult to Gastroenterology  Once        Provider:  (Not yet assigned)    Completed LUZMARIA MAYNARD          No new Assessment & Plan notes have been filed under this hospital service since the last note was generated.  Service: Hospital Medicine    Final Active Diagnoses:    Diagnosis Date Noted POA    PRINCIPAL PROBLEM:  Symptomatic anemia [D64.9] 08/21/2022 Yes    Volume overload [E87.70] 08/21/2022 Yes    GIB (gastrointestinal bleeding) [K92.2] 08/21/2022 Yes    Acute respiratory failure with hypoxia and hypercapnia [J96.01, J96.02] 08/21/2022 Yes    Elevated blood pressure reading [R03.0] 08/21/2022 Yes    Pure hypercholesterolemia [E78.00] 07/21/2022 Yes     Chronic    H/O ischemic left MCA stroke [Z86.73] 07/20/2022 Not Applicable      Problems Resolved During this Admission:       Discharged Condition: stable    Disposition: Home or Self Care    Follow Up:   Follow-up Information     Brooklyn Burnham MD Follow up on  8/26/2022.    Specialty: Family Medicine  Why: hospital follow up at 11AM  Contact information:  [address]  [address]  [facility] [address]  [phone]                       Patient Instructions:      Ambulatory referral/consult to Gastroenterology   Standing Status: Future   Referral Priority: Urgent Referral Type: Consultation   Referral Reason: Specialty Services Required   Requested Specialty: Gastroenterology     Ambulatory referral/consult to Neurology   Standing Status: Future   Referral Priority: Urgent Referral Type: Consultation   Referral Reason: Specialty Services Required   Requested Specialty: Neurology     Ambulatory referral/consult to Hematology / Oncology   Standing Status: Future   Referral Priority: Urgent Referral Type: Consultation   Referral Reason: Specialty Services Required   Requested Specialty: Hematology and Oncology     Diet Cardiac     Notify your health care provider if you experience any of the following:  difficulty breathing or increased cough     Notify your health care provider if you experience any of the following:  persistent dizziness, light-headedness, or visual disturbances     Notify your health care provider if you experience any of the following:  increased confusion or weakness     Activity as tolerated       Significant Diagnostic Studies: Labs:   CMP   Recent Labs   Lab 08/22/22  0353 08/23/22  0250    139   K 3.4* 4.2    109   CO2 26 21*   GLU 87 108   BUN 11 14   CREATININE 0.7 0.6   CALCIUM 8.7 8.6*   ANIONGAP 8 9    and CBC   Recent Labs   Lab 08/21/22  1612 08/22/22  0353 08/23/22  0250   WBC 17.59* 8.40 10.32   HGB 8.4* 8.1* 8.0*   HCT 26.3* 25.9* 26.4*    392 362     Microbiology: Blood Culture No results found for: LABBLOO    Pending Diagnostic Studies:     Procedure Component Value Units Date/Time    Specimen to Pathology, Surgery Gastrointestinal tract [[id number]] Collected: 08/22/22 1131    Order Status: Sent Lab Status: In  process Updated: 08/22/22 8202    Specimen: Tissue          Medications:  Reconciled Home Medications:      Medication List      START taking these medications    amLODIPine benzoate 1 mg/mL Susp  Take 5 mLs (5 mg total) by mouth once daily.  Start taking on: August 24, 2022     aspirin 81 MG Chew  Chew and swallow 1 tablet (81 mg total) by mouth once. for 1 dose  Replaces: aspirin 81 MG EC tablet     ferrous sulfate 220 mg (44 mg iron)/5 mL Elix  Commonly known as: FEROSUL  Take 6.82 mLs (300.08 mg total) by mouth once daily.  Start taking on: August 24, 2022  Replaces: ferrous sulfate 325 mg (65 mg iron) Tab tablet     pantoprazole 40 mg suspension  Commonly known as: PROTONIX  Mix 1 packet (40 mg total) per package instruction and take by mouth once daily.  Start taking on: August 24, 2022        CHANGE how you take these medications    alendronate 70 MG tablet  Commonly known as: FOSAMAX  Take 1 tablet (70 mg total) by mouth every 7 days.  What changed: when to take this        CONTINUE taking these medications    atorvastatin 80 MG tablet  Commonly known as: LIPITOR  Take 1 tablet (80 mg total) by mouth every evening.     cyanocobalamin 1000 MCG tablet  Commonly known as: VITAMIN B-12  Take 1,000 mcg by mouth once daily.     ONE-A-DAY WOMEN'S 50 PLUS 400-20 mcg Tab  Generic drug: mv,Ca,min-folic acid-vit K1  Take 1 tablet by mouth once daily.     RESTASIS 0.05 % ophthalmic emulsion  Generic drug: cycloSPORINE  Place 1 drop into both eyes 2 (two) times daily.     vitamin D 1000 units Tab  Commonly known as: VITAMIN D3  Take 1,000 Units by mouth once daily.        STOP taking these medications    aspirin 81 MG EC tablet  Commonly known as: ECOTRIN  Replaced by: aspirin 81 MG Chew     clopidogreL 75 mg tablet  Commonly known as: PLAVIX     ferrous sulfate 325 mg (65 mg iron) Tab tablet  Commonly known as: FEOSOL  Replaced by: ferrous sulfate 220 mg (44 mg iron)/5 mL Elix            Indwelling Lines/Drains at time  of discharge:   Lines/Drains/Airways     None                 Time spent on the discharge of patient: 35 minutes         Patti Junior MD  Department of Hospital Medicine  [facility]

## 2022-08-23 NOTE — PROGRESS NOTES
[facility]  Gastroenterology  Progress Note    Patient Name: Katelyn Caba  MRN: [id number]  Admission Date: 8/21/2022  Hospital Length of Stay: 1 days  Code Status: Full Code   Attending Provider: Patti Junior MD  Consulting Provider: Kallie Sanchez DNP  Primary Care Physician: Brooklyn Burnham MD  Principal Problem: Symptomatic anemia      Subjective:   GI initially consulted on 8/21 for anemia.    Interval History: S/p EGD 8/22 with esophageal changes suspecting of Rodriguez's Esophagus, hiatal hernia, gastric antral erythema, non-bleeding cratered gastric ulcer, duodenal AVMs (treated with APC).  Today, reports dark BM overnight.    Hgb this AM 8.0.  Last received 2 units RBCs 8/21.    Review of Systems   Gastrointestinal:  Negative for abdominal pain.        Questionable blood in stool - reports black BM   Objective:     Vital Signs (Most Recent):  Temp: 98.1 °F (36.7 °C) (08/23/22 0501)  Pulse: 72 (08/23/22 1101)  Resp: 18 (08/23/22 0501)  BP: (!) 140/61 (08/23/22 1101)  SpO2: (!) 94 % (08/23/22 1101)   Vital Signs (24h Range):  Temp:  [97.1 °F (36.2 °C)-98.1 °F (36.7 °C)] 98.1 °F (36.7 °C)  Pulse:  [66-80] 72  Resp:  [12-23] 18  SpO2:  [94 %-99 %] 94 %  BP: ()/(49-67) 140/61     Weight: 55.7 kg (122 lb 12.7 oz) (08/22/22 0841)  Body mass index is 21.75 kg/m².      Intake/Output Summary (Last 24 hours) at 8/23/2022 1133  Last data filed at 8/22/2022 1728  Gross per 24 hour   Intake 720 ml   Output 300 ml   Net 420 ml       Lines/Drains/Airways       Peripheral Intravenous Line  Duration                  Peripheral IV - Single Lumen 08/21/22 0153 20 G Right Antecubital 2 days         Peripheral IV - Single Lumen 08/21/22 0300 20 G Left Antecubital 2 days                    Physical Exam  Vitals reviewed.   Constitutional:       General: She is not in acute distress.     Appearance: She is not ill-appearing.   HENT:      Head: Normocephalic and atraumatic.   Eyes:      Extraocular Movements:  Extraocular movements intact.   Pulmonary:      Effort: Pulmonary effort is normal. No respiratory distress.   Skin:     General: Skin is warm and dry.      Capillary Refill: Capillary refill takes less than 2 seconds.   Neurological:      General: No focal deficit present.      Mental Status: She is alert and oriented to person, place, and time. Mental status is at baseline.   Psychiatric:         Mood and Affect: Mood normal.         Behavior: Behavior normal.         Thought Content: Thought content normal.       Significant Labs:  CBC:   Recent Labs   Lab 08/21/22  1612 08/22/22  0353 08/23/22  0250   WBC 17.59* 8.40 10.32   HGB 8.4* 8.1* 8.0*   HCT 26.3* 25.9* 26.4*    392 362     CMP:   Recent Labs   Lab 08/23/22  0250      CALCIUM 8.6*      K 4.2   CO2 21*      BUN 14   CREATININE 0.6     Coagulation: No results for input(s): PT, INR, APTT in the last 48 hours.  All pertinent lab results from the last 24 hours have been reviewed.      Significant Imaging:  Imaging results within the past 24 hours have been reviewed.    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 Not biopsied at this time due to anemia and bleeding evaluation with gastric ulcer present.   - 3 cm type-I sliding hiatal hernia.   - Multiple fundic gland polyps.   - Gastritis. Biopsied.   - Non-bleeding gastric ulcer with a clean ulcer base (Vinh Class III).   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 Treated with argon plasma coagulation (APC).   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 Treated with argon plasma coagulation (APC).   Recommendation:          - Return patient to hospital diop for ongoing care.   - Advance diet as tolerated.   - Continue present medications. Continue oral daily PPI indefinitely.   - Await pathology results.   - Repeat EGD  will be needed in 12 weeks to follow-up ulcer healing. Holloway's esophagus may be addressed during repeat EGD. Consider doing colonoscopy at same time as EGD. Will have the patient come to GI clinic to follow-up prior to scheduling procedures.     Assessment/Plan:     GIB (gastrointestinal bleeding)  Katelyn Caba is a 81 y.o. female with history of  recent CVA (7/20/22) who presents for exertional dyspnea. Clinically volume overloaded. GI consulted for black stools in setting of worsened acute on chronic anemia. Reportedly black stools since being on iron. Brown stool on MAGALYS. Recently completed 21d course of DAPT after CVA. NPO at midnight for possible EGD.     Problem List:  1. Acute on chronic anemia     8/23: s/p EGD 8/22 with findings suggestive of holloway's esophagus, HH, non-bleeding cratered gastric ulcer, duodenal AVMs (APC'ed).  hgb 8.0 today, last received 2 units PRBCs 8/21.  Reports 1 dark stool overnight.       Recommendations:  - PPI daily indefinitely.  - avoid NSAIDs  - if hgb remains stable, OK for d/c with close PCP follow-up to re-check hgb levels.  - follow-up in GI clinic for further discussion of repeat endoscopy, consider screening colonoscopy, holloway's monitoring.  - Please notify GI team if there is significant change in patient's clinical status           Thank you for your consult. I will sign off. Please contact us if you have any additional questions.    Kallie Sanchez, THELMA  Gastroenterology  [facility]

## 2022-08-23 NOTE — ASSESSMENT & PLAN NOTE
Katelyn Caba is a 81 y.o. female with history of  recent CVA (7/20/22) who presents for exertional dyspnea. Clinically volume overloaded. GI consulted for black stools in setting of worsened acute on chronic anemia. Reportedly black stools since being on iron. Brown stool on MAGALYS. Recently completed 21d course of DAPT after CVA. NPO at midnight for possible EGD.     Problem List:  1. Acute on chronic anemia     8/23: s/p EGD 8/22 with findings suggestive of holloway's esophagus, HH, non-bleeding cratered gastric ulcer, duodenal AVMs (APC'ed).  hgb 8.0 today, last received 2 units PRBCs 8/21.  Reports 1 dark stool overnight.       Recommendations:  - PPI daily indefinitely.  - avoid NSAIDs  - if hgb remains stable, OK for d/c with close PCP follow-up to re-check hgb levels.  - follow-up in GI clinic for further discussion of repeat endoscopy, consider screening colonoscopy, holloway's monitoring.  - Please notify GI team if there is significant change in patient's clinical status

## 2022-08-24 ENCOUNTER — PATIENT OUTREACH (OUTPATIENT)
Dept: ADMINISTRATIVE | Facility: CLINIC | Age: 82
End: 2022-08-24
Payer: MEDICARE

## 2022-08-24 LAB — BACTERIA UR CULT: ABNORMAL

## 2022-08-24 NOTE — PROGRESS NOTES
C3 nurse spoke with Katelyn Caba for a TCC post hospital discharge follow up call. The patient has a scheduled HOS appointment with Brooklyn Burnham MD on 08/26/22 @ 1100.

## 2022-08-24 NOTE — PROGRESS NOTES
C3 nurse attempted to contact Katelyn Caba for a TCC post hospital discharge follow up call. No answer. Left voicemail with callback information. The patient has a scheduled HOSFU appointment with Brooklyn Burnham MD on 8/26/22  @ 1100.

## 2022-08-26 ENCOUNTER — OFFICE VISIT (OUTPATIENT)
Dept: FAMILY MEDICINE | Facility: CLINIC | Age: 82
End: 2022-08-26
Attending: FAMILY MEDICINE
Payer: MEDICARE

## 2022-08-26 DIAGNOSIS — D50.9 MICROCYTIC ANEMIA: ICD-10-CM

## 2022-08-26 DIAGNOSIS — Z12.11 SCREEN FOR COLON CANCER: ICD-10-CM

## 2022-08-26 DIAGNOSIS — N30.90 CYSTITIS: ICD-10-CM

## 2022-08-26 DIAGNOSIS — R03.0 ELEVATED BLOOD PRESSURE READING: ICD-10-CM

## 2022-08-26 DIAGNOSIS — D62 ACUTE BLOOD LOSS ANEMIA: Primary | ICD-10-CM

## 2022-08-26 LAB
FINAL PATHOLOGIC DIAGNOSIS: NORMAL
Lab: NORMAL

## 2022-08-26 PROCEDURE — 99443 PR PHYSICIAN TELEPHONE EVALUATION 21-30 MIN: CPT | Mod: 95,,, | Performed by: FAMILY MEDICINE

## 2022-08-26 PROCEDURE — 99443 PR PHYSICIAN TELEPHONE EVALUATION 21-30 MIN: ICD-10-PCS | Mod: 95,,, | Performed by: FAMILY MEDICINE

## 2022-08-26 RX ORDER — CIPROFLOXACIN 500 MG/1
500 TABLET ORAL 2 TIMES DAILY
Qty: 20 TABLET | Refills: 0 | Status: SHIPPED | OUTPATIENT
Start: 2022-08-26 | End: 2023-03-17 | Stop reason: ALTCHOICE

## 2022-08-26 RX ORDER — AMLODIPINE BESYLATE 5 MG/1
5 TABLET ORAL DAILY
Qty: 30 TABLET | Refills: 1 | Status: SHIPPED | OUTPATIENT
Start: 2022-08-26

## 2022-08-26 NOTE — PROGRESS NOTES
Established Patient - Audio Only Telehealth Visit     The patient location is: home  The chief complaint leading to consultation is: anemia  Visit type: Virtual visit with audio only (telephone)  Total time spent with patient:        The reason for the audio only service rather than synchronous audio and video virtual visit was related to technical difficulties or patient preference/necessity.     Each patient to whom I provide medical services by telemedicine is:  (1) informed of the relationship between the physician and patient and the respective role of any other health care provider with respect to management of the patient; and (2) notified that they may decline to receive medical services by telemedicine and may withdraw from such care at any time. Patient verbally consented to receive this service via voice-only telephone call.       HPI: pt in audio visit for follow up of hosp stay for acute h/h drop pt had been tired knew of anemia with fit kit ordered however this was an acute change she is feeling much better post transfusion  Pt has htn pt bp mildly elevated but no sob/cp not on low salt diet consistently  Pt has abnl urine culture no dysuria hematuria no flank pain no n/v/f/c/d/c      Assessment and plan:  anemia - return fit kit colonoscopy not ordered by GI will order today  Increase water intake  Htn - restart meds low salt diet   Cystitis - start abx rtc 2 weeks urine culture pta  Rtc 3 months and 2 weeks audio with bp log                          This service was not originating from a related E/M service provided within the previous 7 days nor will  to an E/M service or procedure within the next 24 hours or my soonest available appointment.  Prevailing standard of care was able to be met in this audio-only visit.

## 2022-08-29 ENCOUNTER — TELEPHONE (OUTPATIENT)
Dept: HEMATOLOGY/ONCOLOGY | Facility: CLINIC | Age: 82
End: 2022-08-29
Payer: MEDICARE

## 2022-08-29 NOTE — TELEPHONE ENCOUNTER
Called and spoke to pt.  Informed pt we will keep her appt as scheduled to tomorrow to disregard the previous msg to cora.  Pt said she will be here, confirmed location.

## 2022-08-30 ENCOUNTER — OFFICE VISIT (OUTPATIENT)
Dept: HEMATOLOGY/ONCOLOGY | Facility: CLINIC | Age: 82
End: 2022-08-30
Payer: MEDICARE

## 2022-08-30 VITALS
OXYGEN SATURATION: 98 % | HEIGHT: 63 IN | BODY MASS INDEX: 21.54 KG/M2 | RESPIRATION RATE: 16 BRPM | WEIGHT: 121.56 LBS | HEART RATE: 70 BPM | TEMPERATURE: 98 F | SYSTOLIC BLOOD PRESSURE: 125 MMHG | DIASTOLIC BLOOD PRESSURE: 64 MMHG

## 2022-08-30 DIAGNOSIS — D50.9 MICROCYTIC ANEMIA: Primary | ICD-10-CM

## 2022-08-30 DIAGNOSIS — K92.2 GASTROINTESTINAL HEMORRHAGE, UNSPECIFIED GASTROINTESTINAL HEMORRHAGE TYPE: ICD-10-CM

## 2022-08-30 DIAGNOSIS — Z86.73 H/O ISCHEMIC LEFT MCA STROKE: ICD-10-CM

## 2022-08-30 PROCEDURE — 99204 OFFICE O/P NEW MOD 45 MIN: CPT | Mod: S$PBB,,, | Performed by: STUDENT IN AN ORGANIZED HEALTH CARE EDUCATION/TRAINING PROGRAM

## 2022-08-30 PROCEDURE — 99204 PR OFFICE/OUTPT VISIT, NEW, LEVL IV, 45-59 MIN: ICD-10-PCS | Mod: S$PBB,,, | Performed by: STUDENT IN AN ORGANIZED HEALTH CARE EDUCATION/TRAINING PROGRAM

## 2022-08-30 PROCEDURE — 99999 PR PBB SHADOW E&M-EST. PATIENT-LVL IV: ICD-10-PCS | Mod: PBBFAC,,, | Performed by: STUDENT IN AN ORGANIZED HEALTH CARE EDUCATION/TRAINING PROGRAM

## 2022-08-30 PROCEDURE — 99999 PR PBB SHADOW E&M-EST. PATIENT-LVL IV: CPT | Mod: PBBFAC,,, | Performed by: STUDENT IN AN ORGANIZED HEALTH CARE EDUCATION/TRAINING PROGRAM

## 2022-08-30 PROCEDURE — 99214 OFFICE O/P EST MOD 30 MIN: CPT | Mod: PBBFAC,PN | Performed by: STUDENT IN AN ORGANIZED HEALTH CARE EDUCATION/TRAINING PROGRAM

## 2022-08-30 RX ORDER — AMOXICILLIN AND CLAVULANATE POTASSIUM 875; 125 MG/1; MG/1
1 TABLET, FILM COATED ORAL 2 TIMES DAILY
COMMUNITY
Start: 2022-08-20 | End: 2023-04-05

## 2022-08-30 NOTE — PROGRESS NOTES
Subjective:                                                                                    Consult note   Patient ID:    Name: Katelyn Caba  : 1940  MRN: [id number]      HPI:   Katelyn Caba is a 81 y.o. female presents for evaluation of Anemia    Patient as referred to us for anemia, sever enough that around 22 needed 2u pRBC blood transfusion, Hb 4.6/4.9.Looking back at her blood work , she is being anemia since 2018, normal CBC in  (no blood work in between). Significant history, 22; had an admissions for TIA/ stroke/expressive aphasia and seems that patient was given plavix for 21 days followed by aspirin and patient noticed that her generalized weakness and fatigued started worsening around that time . 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 Biopsy were negative for any malignancy     Past Medical History:   Diagnosis Date    Cancer     basal cell carcinoma       Past Surgical History:   Procedure Laterality Date    CARPAL TUNNEL RELEASE      ESOPHAGOGASTRODUODENOSCOPY N/A 2022    Procedure: EGD (ESOPHAGOGASTRODUODENOSCOPY);  Surgeon: Steven Lopez MD;  Location: [address];  Service: Endoscopy;  Laterality: N/A;    EYE SURGERY      left eye cataract    TONSILLECTOMY      TUBAL LIGATION         History reviewed. No pertinent family history.    Social History     Socioeconomic History    Marital status:    Tobacco Use    Smoking status: Never    Smokeless tobacco: Never   Substance and Sexual Activity    Alcohol use: No    Drug use: No       Review of patient's allergies indicates:  No Known Allergies    Review of Systems   Constitutional: Positive for malaise/fatigue. Negative for decreased appetite, fever and night sweats.   Eyes:  Negative for blurred vision, double vision, pain and visual disturbance.   Cardiovascular:  Negative for  "chest pain.   Respiratory:  Negative for cough and shortness of breath.    Endocrine: Positive for cold intolerance.   Hematologic/Lymphatic: Negative for adenopathy and bleeding problem. Does not bruise/bleed easily.   Skin:  Negative for rash.   Musculoskeletal:  Negative for back pain.   Gastrointestinal:  Negative for abdominal pain and diarrhea.   Genitourinary:  Negative for frequency.   Neurological:  Negative for headaches.   Psychiatric/Behavioral:  The patient is not nervous/anxious.           Objective:     Vitals:    08/30/22 1247   BP: 125/64   BP Location: Left arm   Patient Position: Sitting   Pulse: 70   Resp: 16   Temp: 97.9 °F (36.6 °C)   TempSrc: Temporal   SpO2: 98%   Weight: 55.2 kg (121 lb 9.3 oz)   Height: 5' 3" (1.6 m)        Physical Exam  Constitutional:       General: She is not in acute distress.     Appearance: Normal appearance. She is normal weight.   HENT:      Head: Normocephalic and atraumatic.   Eyes:      General: No scleral icterus.  Cardiovascular:      Rate and Rhythm: Normal rate and regular rhythm.      Heart sounds: Normal heart sounds.   Pulmonary:      Effort: Pulmonary effort is normal.      Breath sounds: Normal breath sounds. No wheezing.   Chest:      Chest wall: No tenderness.   Abdominal:      General: Bowel sounds are normal. There is no distension.      Palpations: Abdomen is soft. There is no mass.   Musculoskeletal:         General: No swelling or tenderness.   Lymphadenopathy:      Cervical: No cervical adenopathy.   Skin:     Coloration: Skin is not jaundiced or pale.   Neurological:      General: No focal deficit present.      Mental Status: She is oriented to person, place, and time.   Psychiatric:         Mood and Affect: Mood normal.         Behavior: Behavior normal.         Current Outpatient Medications on File Prior to Visit   Medication Sig    alendronate (FOSAMAX) 70 MG tablet Take 1 tablet (70 mg total) by mouth every 7 days.    amLODIPine (NORVASC) " 5 MG tablet Take 1 tablet (5 mg total) by mouth once daily.    amoxicillin-clavulanate 875-125mg (AUGMENTIN) 875-125 mg per tablet Take 1 tablet by mouth 2 (two) times daily.    aspirin 81 MG Chew Chew and swallow 1 tablet (81 mg total) by mouth once. for 1 dose    atorvastatin (LIPITOR) 80 MG tablet Take 1 tablet (80 mg total) by mouth every evening.    cyanocobalamin (VITAMIN B-12) 1000 MCG tablet Take 1,000 mcg by mouth once daily.    ferrous sulfate (FEROSUL) 220 mg (44 mg iron)/5 mL Elix Take 6.82 mLs (300.08 mg total) by mouth once daily.    mv,Ca,min-folic acid-vit K1 (ONE-A-DAY WOMEN'S 50 PLUS) 400-20 mcg Tab Take 1 tablet by mouth once daily.    pantoprazole (PROTONIX) 40 MG tablet Take 1 tablet (40 mg total) by mouth once daily.    RESTASIS 0.05 % ophthalmic emulsion Place 1 drop into both eyes 2 (two) times daily.    vitamin D (VITAMIN D3) 1000 units Tab Take 1,000 Units by mouth once daily.    ciprofloxacin HCl (CIPRO) 500 MG tablet Take 1 tablet (500 mg total) by mouth 2 (two) times daily. (Patient not taking: Reported on 8/30/2022)    [DISCONTINUED] clopidogreL (PLAVIX) 75 mg tablet Take 1 tablet (75 mg total) by mouth once daily. for 21 days     No current facility-administered medications on file prior to visit.       CBC:  Lab Results   Component Value Date    WBC 10.32 08/23/2022    HGB 8.0 (L) 08/23/2022    HCT 26.4 (L) 08/23/2022    MCV 84 08/23/2022     08/23/2022         CMP:  Sodium   Date Value Ref Range Status   08/23/2022 139 136 - 145 mmol/L Final     Potassium   Date Value Ref Range Status   08/23/2022 4.2 3.5 - 5.1 mmol/L Final     Chloride   Date Value Ref Range Status   08/23/2022 109 95 - 110 mmol/L Final     CO2   Date Value Ref Range Status   08/23/2022 21 (L) 23 - 29 mmol/L Final     Glucose   Date Value Ref Range Status   08/23/2022 108 70 - 110 mg/dL Final     BUN   Date Value Ref Range Status   08/23/2022 14 8 - 23 mg/dL Final     Creatinine   Date Value Ref Range Status    08/23/2022 0.6 0.5 - 1.4 mg/dL Final     Calcium   Date Value Ref Range Status   08/23/2022 8.6 (L) 8.7 - 10.5 mg/dL Final     Total Protein   Date Value Ref Range Status   08/21/2022 6.6 6.0 - 8.4 g/dL Final     Albumin   Date Value Ref Range Status   08/21/2022 3.7 3.5 - 5.2 g/dL Final     Total Bilirubin   Date Value Ref Range Status   08/21/2022 0.5 0.1 - 1.0 mg/dL Final     Comment:     For infants and newborns, interpretation of results should be based  on gestational age, weight and in agreement with clinical  observations.    Premature Infant recommended reference ranges:  Up to 24 hours.............<8.0 mg/dL  Up to 48 hours............<12.0 mg/dL  3-5 days..................<15.0 mg/dL  6-29 days.................<15.0 mg/dL       Alkaline Phosphatase   Date Value Ref Range Status   08/21/2022 88 55 - 135 U/L Final     AST   Date Value Ref Range Status   08/21/2022 23 10 - 40 U/L Final     ALT   Date Value Ref Range Status   08/21/2022 19 10 - 44 U/L Final     Anion Gap   Date Value Ref Range Status   08/23/2022 9 8 - 16 mmol/L Final     eGFR if    Date Value Ref Range Status   07/22/2022 >60 >60 mL/min/1.73 m^2 Final     eGFR if non    Date Value Ref Range Status   07/22/2022 >60 >60 mL/min/1.73 m^2 Final     Comment:     Calculation used to obtain the estimated glomerular filtration  rate (eGFR) is the CKD-EPI equation.          Lab Results   Component Value Date    IRON <10 (L) 08/21/2022    TIBC 447 08/21/2022    FERRITIN 17 (L) 08/21/2022       No results found for: KDCOQYBU50,No results found for: FOLATE    Echo  · Moderate left atrial enlargement.  · The left ventricle is normal in size with normal systolic function.  · The estimated ejection fraction is 65%.  · Grade II left ventricular diastolic dysfunction.  · Normal right ventricular size with normal right ventricular systolic   function.  · Mild mitral regurgitation.  · Mild tricuspid regurgitation.  · Normal  "central venous pressure (3 mmHg).  · There is pulmonary hypertension. The estimated PA systolic pressure is   45 mmHg.     US Lower Extremity Veins Bilateral  Narrative: EXAMINATION:  US LOWER EXTREMITY VEINS BILATERAL    CLINICAL HISTORY:  Edema with elevated d dimer;    TECHNIQUE:  Duplex and color flow Doppler and dynamic compression was performed of the bilateral lower extremity veins was performed.    COMPARISON:  None    FINDINGS:  Right thigh veins: The common femoral, femoral, popliteal, upper greater saphenous, and deep femoral veins are patent and free of thrombus. The veins are normally compressible and have normal phasic flow and augmentation response.    Right calf veins: The visualized calf veins are patent with normal color flow.    Left thigh veins: The common femoral, femoral, popliteal, upper greater saphenous, and deep femoral veins are patent and free of thrombus. The veins are normally compressible and have normal phasic flow and augmentation response.    Left calf veins: The visualized calf veins are patent with normal color flow.    Miscellaneous: None  Impression: No evidence of acute deep venous thrombosis in the left or right lower extremity veins.    Electronically signed by resident: Carlos Enrique Ugalde  Date:    08/21/2022  Time:    17:04    Electronically signed by: Troy Yeung MD  Date:    08/21/2022  Time:    17:09  X-Ray Chest AP Portable  Narrative: EXAMINATION:  XR CHEST AP PORTABLE    CLINICAL HISTORY:  Provided history is "  Shortness of breath".    TECHNIQUE:  One view of the chest.    COMPARISON:  07/20/2022.    FINDINGS:  Cardiac wires overlie the chest.  Cardiac silhouette is enlarged and similar to the prior study.  Atherosclerotic calcifications overlie the aortic arch.  There is central vascular congestion with worsening diffuse bilateral coarsened interstitial lung markings suggestive of interstitial edema.  New bilateral pleural effusions with adjacent passive atelectasis or " airspace disease in the lung bases.  No distinct pneumothorax.  Impression: Cardiomegaly with interstitial edema and new bilateral pleural effusions, suggestive of CHF exacerbation and/or volume overload.    Electronically signed by: Richard Chavez MD  Date:    08/21/2022  Time:    01:22       All pertinent labs and imaging reviewed.    Assessment:       1. Microcytic anemia    2. H/O ischemic left MCA stroke    3. Gastrointestinal hemorrhage, unspecified gastrointestinal hemorrhage type      # SEVER /symptomatic microcytic anemia  - likely due to GI bleeding; EGD with gastric ulcer and duodenum angiectasis, path negative for malignancy  - following up with GI closely, still need colonoscopy to rule out colon cancer  - ferritin <20, will need IV iron and possible might need it more than 1 round  - will start with ferumyoxytol x2 and repeat blood work afterward   - no need for transfusions, off  plavix, on aspirin     # Hx of Stroke w a recent TIA  - was on plavix and aspirin, now on on aspirin only   - following up with neurology      Plan:     Microcytic anemia  -     Ambulatory referral/consult to Hematology / Oncology  -     CBC w/ DIFF; Future; Expected date: 08/30/2022  -     Ferritin; Future; Expected date: 08/30/2022  -     Iron and TIBC; Future; Expected date: 08/30/2022    H/O ischemic left MCA stroke    Gastrointestinal hemorrhage, unspecified gastrointestinal hemorrhage type       Patient queried and all questions were answered.        Recommend COVID guidelines being followed     Recommend  exercise, nutrition and weight management and health maintenance activities and follow-up with PCPs recommendations       Route Chart for Scheduling    Med Onc Chart Routing      Follow up with physician 6 weeks. needs IV iron and blood work 2-3 days prior to next tanvi   Follow up with [facility]    Infusion scheduling note    Injection scheduling note    Labs CBC, ferritin and iron and TIBC   Lab interval:     Imaging     Pharmacy appointment    Other referrals          Supportive Plan Information  OP FERUMOXYTOL   Katherine Marie MD   Upcoming Treatment Dates - OP FERUMOXYTOL    8/30/2022       Medications       FERUMOXYTOL 510 MG/117 ML D5W (READY TO MIX SYSTEM) IVPB 510 mg  9/6/2022       Medications       FERUMOXYTOL 510 MG/117 ML D5W (READY TO MIX SYSTEM) IVPB 510 mg    Signed:  Katherine Marie MD  Hematology and Oncology  [facility]  [facility] [place] [facility]

## 2022-09-12 ENCOUNTER — INFUSION (OUTPATIENT)
Dept: INFUSION THERAPY | Facility: HOSPITAL | Age: 82
End: 2022-09-12
Attending: STUDENT IN AN ORGANIZED HEALTH CARE EDUCATION/TRAINING PROGRAM
Payer: MEDICARE

## 2022-09-12 VITALS
DIASTOLIC BLOOD PRESSURE: 64 MMHG | RESPIRATION RATE: 16 BRPM | TEMPERATURE: 99 F | HEART RATE: 64 BPM | SYSTOLIC BLOOD PRESSURE: 131 MMHG | WEIGHT: 123.69 LBS | HEIGHT: 63 IN | BODY MASS INDEX: 21.91 KG/M2

## 2022-09-12 DIAGNOSIS — K92.2 GASTROINTESTINAL HEMORRHAGE, UNSPECIFIED GASTROINTESTINAL HEMORRHAGE TYPE: ICD-10-CM

## 2022-09-12 DIAGNOSIS — D64.9 SYMPTOMATIC ANEMIA: Primary | ICD-10-CM

## 2022-09-12 PROCEDURE — 96365 THER/PROPH/DIAG IV INF INIT: CPT | Mod: PN

## 2022-09-12 PROCEDURE — 63600175 PHARM REV CODE 636 W HCPCS: Mod: JG,PN | Performed by: STUDENT IN AN ORGANIZED HEALTH CARE EDUCATION/TRAINING PROGRAM

## 2022-09-12 PROCEDURE — 25000003 PHARM REV CODE 250: Mod: PN | Performed by: STUDENT IN AN ORGANIZED HEALTH CARE EDUCATION/TRAINING PROGRAM

## 2022-09-12 RX ADMIN — SODIUM CHLORIDE: 0.9 INJECTION, SOLUTION INTRAVENOUS at 09:09

## 2022-09-12 RX ADMIN — FERUMOXYTOL 510 MG: 510 INJECTION INTRAVENOUS at 09:09

## 2022-09-12 NOTE — PLAN OF CARE
PT tolerated 1st Feraheme infusion well.  Pt stayed for observation for 30 minutes post infusion.  No s/s of infusion reaction noted.  Instructed to call MD with any problems

## 2022-09-16 ENCOUNTER — OFFICE VISIT (OUTPATIENT)
Dept: FAMILY MEDICINE | Facility: CLINIC | Age: 82
End: 2022-09-16
Attending: FAMILY MEDICINE
Payer: MEDICARE

## 2022-09-16 ENCOUNTER — LAB VISIT (OUTPATIENT)
Dept: LAB | Facility: HOSPITAL | Age: 82
End: 2022-09-16
Attending: FAMILY MEDICINE
Payer: MEDICARE

## 2022-09-16 VITALS
HEART RATE: 66 BPM | SYSTOLIC BLOOD PRESSURE: 148 MMHG | HEIGHT: 63 IN | WEIGHT: 122.88 LBS | DIASTOLIC BLOOD PRESSURE: 67 MMHG | RESPIRATION RATE: 16 BRPM | BODY MASS INDEX: 21.77 KG/M2

## 2022-09-16 DIAGNOSIS — D50.9 MICROCYTIC ANEMIA: Primary | ICD-10-CM

## 2022-09-16 DIAGNOSIS — D50.9 MICROCYTIC ANEMIA: ICD-10-CM

## 2022-09-16 LAB
ALBUMIN SERPL BCP-MCNC: 3.9 G/DL (ref 3.5–5.2)
ALP SERPL-CCNC: 100 U/L (ref 55–135)
ALT SERPL W/O P-5'-P-CCNC: 15 U/L (ref 10–44)
ANION GAP SERPL CALC-SCNC: 8 MMOL/L (ref 8–16)
AST SERPL-CCNC: 21 U/L (ref 10–40)
BASOPHILS # BLD AUTO: 0.11 K/UL (ref 0–0.2)
BASOPHILS NFR BLD: 1.9 % (ref 0–1.9)
BILIRUB SERPL-MCNC: 0.3 MG/DL (ref 0.1–1)
BUN SERPL-MCNC: 14 MG/DL (ref 8–23)
CALCIUM SERPL-MCNC: 9.8 MG/DL (ref 8.7–10.5)
CHLORIDE SERPL-SCNC: 105 MMOL/L (ref 95–110)
CO2 SERPL-SCNC: 28 MMOL/L (ref 23–29)
CREAT SERPL-MCNC: 0.7 MG/DL (ref 0.5–1.4)
DIFFERENTIAL METHOD: ABNORMAL
EOSINOPHIL # BLD AUTO: 0.2 K/UL (ref 0–0.5)
EOSINOPHIL NFR BLD: 3.6 % (ref 0–8)
ERYTHROCYTE [DISTWIDTH] IN BLOOD BY AUTOMATED COUNT: 18.4 % (ref 11.5–14.5)
EST. GFR  (NO RACE VARIABLE): >60 ML/MIN/1.73 M^2
FERRITIN SERPL-MCNC: 678 NG/ML (ref 20–300)
GLUCOSE SERPL-MCNC: 85 MG/DL (ref 70–110)
HCT VFR BLD AUTO: 29.1 % (ref 37–48.5)
HGB BLD-MCNC: 8.7 G/DL (ref 12–16)
IMM GRANULOCYTES # BLD AUTO: 0.02 K/UL (ref 0–0.04)
IMM GRANULOCYTES NFR BLD AUTO: 0.3 % (ref 0–0.5)
IRON SERPL-MCNC: 216 UG/DL (ref 30–160)
LYMPHOCYTES # BLD AUTO: 1.4 K/UL (ref 1–4.8)
LYMPHOCYTES NFR BLD: 24.7 % (ref 18–48)
MCH RBC QN AUTO: 25.5 PG (ref 27–31)
MCHC RBC AUTO-ENTMCNC: 29.9 G/DL (ref 32–36)
MCV RBC AUTO: 85 FL (ref 82–98)
MONOCYTES # BLD AUTO: 0.7 K/UL (ref 0.3–1)
MONOCYTES NFR BLD: 12.1 % (ref 4–15)
NEUTROPHILS # BLD AUTO: 3.3 K/UL (ref 1.8–7.7)
NEUTROPHILS NFR BLD: 57.4 % (ref 38–73)
NRBC BLD-RTO: 0 /100 WBC
PLATELET # BLD AUTO: 527 K/UL (ref 150–450)
PMV BLD AUTO: 10.2 FL (ref 9.2–12.9)
POTASSIUM SERPL-SCNC: 4.7 MMOL/L (ref 3.5–5.1)
PROT SERPL-MCNC: 6.8 G/DL (ref 6–8.4)
RBC # BLD AUTO: 3.41 M/UL (ref 4–5.4)
SATURATED IRON: 51 % (ref 20–50)
SODIUM SERPL-SCNC: 141 MMOL/L (ref 136–145)
TOTAL IRON BINDING CAPACITY: 422 UG/DL (ref 250–450)
TRANSFERRIN SERPL-MCNC: 285 MG/DL (ref 200–375)
WBC # BLD AUTO: 5.78 K/UL (ref 3.9–12.7)

## 2022-09-16 PROCEDURE — 80053 COMPREHEN METABOLIC PANEL: CPT | Performed by: FAMILY MEDICINE

## 2022-09-16 PROCEDURE — 84466 ASSAY OF TRANSFERRIN: CPT | Performed by: FAMILY MEDICINE

## 2022-09-16 PROCEDURE — 36415 COLL VENOUS BLD VENIPUNCTURE: CPT | Mod: PO | Performed by: FAMILY MEDICINE

## 2022-09-16 PROCEDURE — 99214 OFFICE O/P EST MOD 30 MIN: CPT | Mod: PBBFAC,PO | Performed by: FAMILY MEDICINE

## 2022-09-16 PROCEDURE — 99999 PR PBB SHADOW E&M-EST. PATIENT-LVL IV: CPT | Mod: PBBFAC,,, | Performed by: FAMILY MEDICINE

## 2022-09-16 PROCEDURE — 85025 COMPLETE CBC W/AUTO DIFF WBC: CPT | Performed by: FAMILY MEDICINE

## 2022-09-16 PROCEDURE — 82728 ASSAY OF FERRITIN: CPT | Performed by: FAMILY MEDICINE

## 2022-09-16 PROCEDURE — 99499 UNLISTED E&M SERVICE: CPT | Mod: S$PBB,,, | Performed by: FAMILY MEDICINE

## 2022-09-16 NOTE — PATIENT INSTRUCTIONS
Katelyn,     We are always striving for excellence. Should you receive a patient experience survey via text message, electronically, or by mail, we would appreciate if you would take a few moments to give us your feedback. These surveys let us know our strengths as well as areas of opportunity for improvement to better serve you.    Thank you for your time,  Macy Mendoza LPN    Your test results will be communicated to you via : [facility], Telephone or Letter.   If you have not received your test results in one week, please contact the clinic at [phone].

## 2022-09-19 ENCOUNTER — INFUSION (OUTPATIENT)
Dept: INFUSION THERAPY | Facility: HOSPITAL | Age: 82
End: 2022-09-19
Attending: STUDENT IN AN ORGANIZED HEALTH CARE EDUCATION/TRAINING PROGRAM
Payer: MEDICARE

## 2022-09-19 VITALS
DIASTOLIC BLOOD PRESSURE: 62 MMHG | WEIGHT: 123 LBS | BODY MASS INDEX: 21.79 KG/M2 | HEIGHT: 63 IN | RESPIRATION RATE: 16 BRPM | TEMPERATURE: 98 F | SYSTOLIC BLOOD PRESSURE: 143 MMHG | HEART RATE: 61 BPM

## 2022-09-19 DIAGNOSIS — D64.9 SYMPTOMATIC ANEMIA: Primary | ICD-10-CM

## 2022-09-19 DIAGNOSIS — K92.2 GASTROINTESTINAL HEMORRHAGE, UNSPECIFIED GASTROINTESTINAL HEMORRHAGE TYPE: ICD-10-CM

## 2022-09-19 PROCEDURE — 63600175 PHARM REV CODE 636 W HCPCS: Mod: JG,PN | Performed by: STUDENT IN AN ORGANIZED HEALTH CARE EDUCATION/TRAINING PROGRAM

## 2022-09-19 PROCEDURE — 25000003 PHARM REV CODE 250: Mod: PN | Performed by: STUDENT IN AN ORGANIZED HEALTH CARE EDUCATION/TRAINING PROGRAM

## 2022-09-19 PROCEDURE — 96365 THER/PROPH/DIAG IV INF INIT: CPT | Mod: PN

## 2022-09-19 RX ORDER — SODIUM CHLORIDE 0.9 % (FLUSH) 0.9 %
10 SYRINGE (ML) INJECTION
Status: DISCONTINUED | OUTPATIENT
Start: 2022-09-19 | End: 2022-09-19 | Stop reason: HOSPADM

## 2022-09-19 RX ADMIN — SODIUM CHLORIDE: 0.9 INJECTION, SOLUTION INTRAVENOUS at 10:09

## 2022-09-19 RX ADMIN — FERUMOXYTOL 510 MG: 510 INJECTION INTRAVENOUS at 10:09

## 2022-09-19 NOTE — PLAN OF CARE
Problem: Adult Inpatient Plan of Care  Goal: Plan of Care Review  Outcome: Ongoing, Progressing  Flowsheets (Taken 9/19/2022 1000)  Plan of Care Reviewed With:   patient   spouse  Goal: Patient-Specific Goal (Individualized)  Outcome: Ongoing, Progressing  Flowsheets (Taken 9/19/2022 1000)  Anxieties, Fears or Concerns: None  Individualized Care Needs: Recliner, coffee, tv, education  Patient-Specific Goals (Include Timeframe): Free of S/S of reaction with infusion.     Problem: Fatigue  Goal: Improved Activity Tolerance  Outcome: Ongoing, Progressing  Intervention: Promote Improved Energy  Flowsheets (Taken 9/19/2022 1000)  Fatigue Management:   frequent rest breaks encouraged   paced activity encouraged  Sleep/Rest Enhancement: regular sleep/rest pattern promoted  Activity Management: Ambulated -L4    Patient to Infusion for Feraheme. Accompanied by her . Treatment plan reviewed with patient. VSS. Tolerated infusion. Provided with copy of upcoming appointment schedule. Escorted to the front lobby for discharge to home.

## 2022-09-20 ENCOUNTER — TELEPHONE (OUTPATIENT)
Dept: ENDOSCOPY | Facility: HOSPITAL | Age: 82
End: 2022-09-20
Payer: MEDICARE

## 2022-09-20 NOTE — TELEPHONE ENCOUNTER
Patient refused to schedule for a colonoscopy. Patient states a colonoscopy is not recommended at my age.  I will be 82 next month.  Informed patient I will cancel order/referral for a colonoscopy.

## 2022-09-23 ENCOUNTER — LAB VISIT (OUTPATIENT)
Dept: LAB | Facility: HOSPITAL | Age: 82
End: 2022-09-23
Attending: FAMILY MEDICINE
Payer: MEDICARE

## 2022-09-23 DIAGNOSIS — D50.9 MICROCYTIC ANEMIA: ICD-10-CM

## 2022-09-23 PROCEDURE — 82274 ASSAY TEST FOR BLOOD FECAL: CPT | Performed by: FAMILY MEDICINE

## 2022-09-27 LAB — HEMOCCULT STL QL IA: NEGATIVE

## 2022-10-13 ENCOUNTER — OFFICE VISIT (OUTPATIENT)
Dept: GASTROENTEROLOGY | Facility: CLINIC | Age: 82
End: 2022-10-13
Payer: MEDICARE

## 2022-10-13 VITALS
WEIGHT: 124.31 LBS | HEIGHT: 63 IN | SYSTOLIC BLOOD PRESSURE: 149 MMHG | DIASTOLIC BLOOD PRESSURE: 80 MMHG | BODY MASS INDEX: 22.03 KG/M2 | HEART RATE: 69 BPM

## 2022-10-13 DIAGNOSIS — K21.9 GASTROESOPHAGEAL REFLUX DISEASE, UNSPECIFIED WHETHER ESOPHAGITIS PRESENT: ICD-10-CM

## 2022-10-13 DIAGNOSIS — D64.9 NORMOCYTIC ANEMIA: Primary | ICD-10-CM

## 2022-10-13 DIAGNOSIS — K25.9 GASTRIC ULCER, UNSPECIFIED CHRONICITY, UNSPECIFIED WHETHER GASTRIC ULCER HEMORRHAGE OR PERFORATION PRESENT: ICD-10-CM

## 2022-10-13 PROCEDURE — 99214 OFFICE O/P EST MOD 30 MIN: CPT | Mod: PBBFAC | Performed by: STUDENT IN AN ORGANIZED HEALTH CARE EDUCATION/TRAINING PROGRAM

## 2022-10-13 PROCEDURE — 99999 PR PBB SHADOW E&M-EST. PATIENT-LVL IV: ICD-10-PCS | Mod: PBBFAC,GC,, | Performed by: STUDENT IN AN ORGANIZED HEALTH CARE EDUCATION/TRAINING PROGRAM

## 2022-10-13 PROCEDURE — 99214 PR OFFICE/OUTPT VISIT, EST, LEVL IV, 30-39 MIN: ICD-10-PCS | Mod: S$PBB,GC,, | Performed by: STUDENT IN AN ORGANIZED HEALTH CARE EDUCATION/TRAINING PROGRAM

## 2022-10-13 PROCEDURE — 99214 OFFICE O/P EST MOD 30 MIN: CPT | Mod: S$PBB,GC,, | Performed by: STUDENT IN AN ORGANIZED HEALTH CARE EDUCATION/TRAINING PROGRAM

## 2022-10-13 PROCEDURE — 99999 PR PBB SHADOW E&M-EST. PATIENT-LVL IV: CPT | Mod: PBBFAC,GC,, | Performed by: STUDENT IN AN ORGANIZED HEALTH CARE EDUCATION/TRAINING PROGRAM

## 2022-10-13 NOTE — PATIENT INSTRUCTIONS
We will schedule you for an EGD next month. If you change your mind and would like a colonoscopy as well, please notify our schedulers ASAP.    Start taking the pantoprazole once daily.

## 2022-10-13 NOTE — PROGRESS NOTES
[facility]    Reason for visit: The primary encounter diagnosis was Normocytic anemia. Diagnoses of Gastric ulcer, unspecified chronicity, unspecified whether gastric ulcer hemorrhage or perforation present and Gastroesophageal reflux disease, unspecified whether esophagitis present were also pertinent to this visit.  Referring Provider/PCP: Brooklyn Burnham MD    History of Present Illness:  Katelyn Caba is a 82 y.o. female with a history of HTN, recent TIA who is presenting for post-hospitalization follow-up of melena and Rodriguez's esophagus. Admitted in 08/2022 for acute on chronic anemia (Hgb 4.9 from around 10), was on ASA/plavix for 21d course after recent TIA at that time. EGD showed duodenal AVMs, single nonbleeding gastric ulcer, and hiatal hernia with long segment Rodriguez's esophagus (C7M7).     Since then, she feels back to baseline. CBC checked last month showed Hgb around 8. Reports heartburn in the evenings that is relieved by avoiding any meals after 4pm. Does not take PPI. Denies dyspepsia. Denies any recurrent melena or hematochezia. Had FIT test in 09/2022 that was negative. Never had colonoscopy    PEndoHx:  EGD as above 08/2022  No prior colonoscopies    Review of Systems   Constitutional:  Negative for chills and fever.   HENT:  Negative for congestion and sore throat.    Eyes:  Negative for blurred vision and double vision.   Respiratory:  Negative for cough and shortness of breath.    Cardiovascular:  Negative for chest pain and palpitations.   Gastrointestinal:         See HPI   Genitourinary:  Negative for frequency and urgency.   Musculoskeletal:  Negative for joint pain and myalgias.   Skin:  Negative for itching and rash.   Neurological:  Negative for sensory change and focal weakness.     Medical History:  Past Medical History:   Diagnosis Date    Cancer     basal cell carcinoma       Past Surgical History:   Procedure Laterality Date    CARPAL TUNNEL RELEASE       ESOPHAGOGASTRODUODENOSCOPY N/A 8/22/2022    Procedure: EGD (ESOPHAGOGASTRODUODENOSCOPY);  Surgeon: Steven Lopez MD;  Location: [address]);  Service: Endoscopy;  Laterality: N/A;    EYE SURGERY      left eye cataract    TONSILLECTOMY      TUBAL LIGATION         History reviewed. No pertinent family history.    Social History     Socioeconomic History    Marital status:    Tobacco Use    Smoking status: Never    Smokeless tobacco: Never   Substance and Sexual Activity    Alcohol use: No    Drug use: No       Current Outpatient Medications on File Prior to Visit   Medication Sig Dispense Refill    alendronate (FOSAMAX) 70 MG tablet Take 1 tablet (70 mg total) by mouth every 7 days. 4 tablet 11    amLODIPine (NORVASC) 5 MG tablet Take 1 tablet (5 mg total) by mouth once daily. 30 tablet 1    amoxicillin-clavulanate 875-125mg (AUGMENTIN) 875-125 mg per tablet Take 1 tablet by mouth 2 (two) times daily.      aspirin 81 MG Chew Chew and swallow 1 tablet (81 mg total) by mouth once. for 1 dose 30 tablet 1    atorvastatin (LIPITOR) 80 MG tablet Take 1 tablet (80 mg total) by mouth every evening. 30 tablet 3    cyanocobalamin (VITAMIN B-12) 1000 MCG tablet Take 1,000 mcg by mouth once daily.      mv,Ca,min-folic acid-vit K1 (ONE-A-DAY WOMEN'S 50 PLUS) 400-20 mcg Tab Take 1 tablet by mouth once daily.      pantoprazole (PROTONIX) 40 MG tablet Take 1 tablet (40 mg total) by mouth once daily. 30 tablet 1    RESTASIS 0.05 % ophthalmic emulsion Place 1 drop into both eyes 2 (two) times daily.      vitamin D (VITAMIN D3) 1000 units Tab Take 1,000 Units by mouth once daily.      ciprofloxacin HCl (CIPRO) 500 MG tablet Take 1 tablet (500 mg total) by mouth 2 (two) times daily. (Patient not taking: Reported on 10/13/2022) 20 tablet 0    ferrous sulfate (FEROSUL) 220 mg (44 mg iron)/5 mL Elix Take 6.82 mLs (300.08 mg total) by mouth once daily. (Patient not taking: No sig reported) 205 mL 1    [DISCONTINUED]  clopidogreL (PLAVIX) 75 mg tablet Take 1 tablet (75 mg total) by mouth once daily. for 21 days 21 tablet 0     No current facility-administered medications on file prior to visit.       Review of patient's allergies indicates:  No Known Allergies    Physical Exam:  Constitutional:  not in acute distress and well developed  HENT: Head: Normal, normocephalic, atraumatic.  Eyes: conjunctiva clear and sclera nonicteric  Cardiovascular: regular rate and rhythm and no murmur  Respiratory: normal chest expansion & respiratory effort   and no accessory muscle use  GI: soft, non-tender, without masses or organomegaly  Musculoskeletal: no muscular tenderness noted  Skin: normal color  Neurological: alert, oriented x3  Psychiatric: mood and affect are within normal limits, pt is a good historian; no memory problems were noted    Laboratory:  Lab Results   Component Value Date     09/16/2022    K 4.7 09/16/2022     09/16/2022    CO2 28 09/16/2022    BUN 14 09/16/2022    CREATININE 0.7 09/16/2022    CALCIUM 9.8 09/16/2022    ANIONGAP 8 09/16/2022    ESTGFRAFRICA >60 07/22/2022    EGFRNONAA >60 07/22/2022       Lab Results   Component Value Date    ALT 15 09/16/2022    AST 21 09/16/2022    ALKPHOS 100 09/16/2022    BILITOT 0.3 09/16/2022       Lab Results   Component Value Date    WBC 5.78 09/16/2022    HGB 8.7 (L) 09/16/2022    HCT 29.1 (L) 09/16/2022    MCV 85 09/16/2022     (H) 09/16/2022       Microbiology:  No Pertinent Microbiology    Imaging:  No Pertinent Imaging    Assessment:  Katelyn Caba is a 82 y.o. female who is presenting for post-hospitalization follow-up of melena and acute blood loss anemia requiring hospitalization. EGD showed duodenal AVMs and single non-bleeding gastric ulcer (Braxton III) along with long-segment esophageal changes concerning for Rodriguez's although biopsies negative for metaplasia/dysplasia. Has persistent chronic anemia, now s/p outpatient IV iron by heme/onc. No prior  colonoscopies, offered to schedule along with EGD but patient declined colonoscopy. Will proceed with repeat EGD to evaluate gastric ulcer healing and evaluate possible Rodriguez's esophagus.    Problems:  Esophageal changes suspicious for Rodriguez's  Persistent chronic normocytic anemia  Prior non-bleeding [place] Class III gastric ulcer  Duodenal AVMs      Plan:  Repeat EGD with Dr. Lopez for 11/28/22 (roughly 12 weeks from initial EGD). Checking for gastric ulcer healing and re-evaluate esophageal changes  Offered colonoscopy for persistent anemia, prior iron studies showing iron deficiency, but patient declined. Never had colonoscopy.  Continue daily PPI  Follow-up pending EGD results    Taiwo Mendez MD  Gastroenterology Fellow    Orders Placed This Encounter   Procedures    Ambulatory referral/consult to Endo Procedure

## 2022-10-14 NOTE — PROGRESS NOTES
I have seen the patient, reviewed Taiwo Mendez MD the GI fellow's history and physical, assessment, and plan. I have personally interviewed and examined the patient at bedside and I discussed the case with the GI fellow and I agree with the findings and plan as documented in the fellow's note.

## 2022-11-01 ENCOUNTER — PATIENT MESSAGE (OUTPATIENT)
Dept: ENDOSCOPY | Facility: HOSPITAL | Age: 82
End: 2022-11-01

## 2022-11-01 ENCOUNTER — CLINICAL SUPPORT (OUTPATIENT)
Dept: ENDOSCOPY | Facility: HOSPITAL | Age: 82
End: 2022-11-01
Payer: MEDICARE

## 2022-11-01 VITALS — BODY MASS INDEX: 21.26 KG/M2 | WEIGHT: 120 LBS | HEIGHT: 63 IN

## 2022-11-01 DIAGNOSIS — K25.9 GASTRIC ULCER, UNSPECIFIED CHRONICITY, UNSPECIFIED WHETHER GASTRIC ULCER HEMORRHAGE OR PERFORATION PRESENT: ICD-10-CM

## 2022-11-01 DIAGNOSIS — K21.9 GASTROESOPHAGEAL REFLUX DISEASE, UNSPECIFIED WHETHER ESOPHAGITIS PRESENT: ICD-10-CM

## 2022-11-01 DIAGNOSIS — D64.9 NORMOCYTIC ANEMIA: ICD-10-CM

## 2022-11-04 ENCOUNTER — LAB VISIT (OUTPATIENT)
Dept: LAB | Facility: HOSPITAL | Age: 82
End: 2022-11-04
Attending: STUDENT IN AN ORGANIZED HEALTH CARE EDUCATION/TRAINING PROGRAM
Payer: MEDICARE

## 2022-11-04 DIAGNOSIS — D50.9 MICROCYTIC ANEMIA: ICD-10-CM

## 2022-11-04 LAB
BASOPHILS # BLD AUTO: 0.07 K/UL (ref 0–0.2)
BASOPHILS NFR BLD: 1.6 % (ref 0–1.9)
DIFFERENTIAL METHOD: ABNORMAL
EOSINOPHIL # BLD AUTO: 0.2 K/UL (ref 0–0.5)
EOSINOPHIL NFR BLD: 5.6 % (ref 0–8)
ERYTHROCYTE [DISTWIDTH] IN BLOOD BY AUTOMATED COUNT: 17.4 % (ref 11.5–14.5)
HCT VFR BLD AUTO: 34.2 % (ref 37–48.5)
HGB BLD-MCNC: 11.3 G/DL (ref 12–16)
IMM GRANULOCYTES # BLD AUTO: 0.02 K/UL (ref 0–0.04)
IMM GRANULOCYTES NFR BLD AUTO: 0.5 % (ref 0–0.5)
LYMPHOCYTES # BLD AUTO: 1.2 K/UL (ref 1–4.8)
LYMPHOCYTES NFR BLD: 26.9 % (ref 18–48)
MCH RBC QN AUTO: 29.4 PG (ref 27–31)
MCHC RBC AUTO-ENTMCNC: 33 G/DL (ref 32–36)
MCV RBC AUTO: 89 FL (ref 82–98)
MONOCYTES # BLD AUTO: 0.6 K/UL (ref 0.3–1)
MONOCYTES NFR BLD: 12.7 % (ref 4–15)
NEUTROPHILS # BLD AUTO: 2.3 K/UL (ref 1.8–7.7)
NEUTROPHILS NFR BLD: 52.7 % (ref 38–73)
NRBC BLD-RTO: 0 /100 WBC
PLATELET # BLD AUTO: 342 K/UL (ref 150–450)
PMV BLD AUTO: 10 FL (ref 9.2–12.9)
RBC # BLD AUTO: 3.84 M/UL (ref 4–5.4)
WBC # BLD AUTO: 4.32 K/UL (ref 3.9–12.7)

## 2022-11-04 PROCEDURE — 36415 COLL VENOUS BLD VENIPUNCTURE: CPT | Mod: PN | Performed by: STUDENT IN AN ORGANIZED HEALTH CARE EDUCATION/TRAINING PROGRAM

## 2022-11-04 PROCEDURE — 85025 COMPLETE CBC W/AUTO DIFF WBC: CPT | Mod: PN | Performed by: STUDENT IN AN ORGANIZED HEALTH CARE EDUCATION/TRAINING PROGRAM

## 2022-11-09 ENCOUNTER — TELEPHONE (OUTPATIENT)
Dept: HEMATOLOGY/ONCOLOGY | Facility: CLINIC | Age: 82
End: 2022-11-09
Payer: MEDICARE

## 2022-11-09 ENCOUNTER — OFFICE VISIT (OUTPATIENT)
Dept: HEMATOLOGY/ONCOLOGY | Facility: CLINIC | Age: 82
End: 2022-11-09
Payer: MEDICARE

## 2022-11-09 VITALS
HEIGHT: 63 IN | WEIGHT: 125.25 LBS | RESPIRATION RATE: 18 BRPM | SYSTOLIC BLOOD PRESSURE: 138 MMHG | HEART RATE: 77 BPM | OXYGEN SATURATION: 97 % | DIASTOLIC BLOOD PRESSURE: 60 MMHG | BODY MASS INDEX: 22.19 KG/M2

## 2022-11-09 DIAGNOSIS — Z86.73 H/O ISCHEMIC LEFT MCA STROKE: ICD-10-CM

## 2022-11-09 DIAGNOSIS — K92.2 GASTROINTESTINAL HEMORRHAGE, UNSPECIFIED GASTROINTESTINAL HEMORRHAGE TYPE: ICD-10-CM

## 2022-11-09 DIAGNOSIS — D64.9 SEVERE ANEMIA: Primary | ICD-10-CM

## 2022-11-09 PROCEDURE — 99999 PR PBB SHADOW E&M-EST. PATIENT-LVL V: ICD-10-PCS | Mod: PBBFAC,,, | Performed by: STUDENT IN AN ORGANIZED HEALTH CARE EDUCATION/TRAINING PROGRAM

## 2022-11-09 PROCEDURE — 99214 OFFICE O/P EST MOD 30 MIN: CPT | Mod: S$PBB,,, | Performed by: STUDENT IN AN ORGANIZED HEALTH CARE EDUCATION/TRAINING PROGRAM

## 2022-11-09 PROCEDURE — 99999 PR PBB SHADOW E&M-EST. PATIENT-LVL V: CPT | Mod: PBBFAC,,, | Performed by: STUDENT IN AN ORGANIZED HEALTH CARE EDUCATION/TRAINING PROGRAM

## 2022-11-09 PROCEDURE — 99215 OFFICE O/P EST HI 40 MIN: CPT | Mod: PBBFAC,PN | Performed by: STUDENT IN AN ORGANIZED HEALTH CARE EDUCATION/TRAINING PROGRAM

## 2022-11-09 PROCEDURE — 99214 PR OFFICE/OUTPT VISIT, EST, LEVL IV, 30-39 MIN: ICD-10-PCS | Mod: S$PBB,,, | Performed by: STUDENT IN AN ORGANIZED HEALTH CARE EDUCATION/TRAINING PROGRAM

## 2022-11-09 NOTE — TELEPHONE ENCOUNTER
F/U with Geovanna from Dr Auguste office regarding patients dental clearance that needed to be signed and faxed back to our office., Dr Jones will not be in the office today per his office , when he gets in the office tomorrow morning Geovanna will have him sign dental clearance and fax back to our office .

## 2022-11-15 NOTE — PROGRESS NOTES
Answers submitted by the patient for this visit:  Review of Systems Questionnaire (Submitted on 11/7/2022)  appetite change : No  mouth sores: No  cough: No  shortness of breath: No  chest pain: No  diarrhea: No  rash: No  headaches: No  adenopathy: No

## 2022-11-15 NOTE — PROGRESS NOTES
Subjective:                                                                                     Patient ID:    Name: Katelyn Caba  : 1940  MRN: [id number]      HPI:   Katelyn Caba is a 82 y.o. female presents for evaluation of Microcytic anemia    Patient as referred to us for anemia, sever enough that around 22 needed 2u pRBC blood transfusion, Hb 4.6/4.9.Looking back at her blood work , she is being anemia since 2018, normal CBC in 2015 (no blood work in between). Significant history, 22; had an admissions for TIA/ stroke/expressive aphasia and seems that patient was given plavix for 21 days followed by aspirin and patient noticed that her generalized weakness and fatigued started worsening around that time . 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 Biopsy were negative for any malignancy     Today, s/p IV iron ferumoxytol 2022, stable Hb/ferritin/Hct, symptoms improved significantly, getting  next month.    Past Medical History:   Diagnosis Date    Cancer     basal cell carcinoma       Past Surgical History:   Procedure Laterality Date    CARPAL TUNNEL RELEASE      ESOPHAGOGASTRODUODENOSCOPY N/A 2022    Procedure: EGD (ESOPHAGOGASTRODUODENOSCOPY);  Surgeon: Steven Lopez MD;  Location: [address];  Service: Endoscopy;  Laterality: N/A;    EYE SURGERY      left eye cataract    TONSILLECTOMY      TUBAL LIGATION         History reviewed. No pertinent family history.    Social History     Socioeconomic History    Marital status:    Tobacco Use    Smoking status: Never    Smokeless tobacco: Never   Substance and Sexual Activity    Alcohol use: No    Drug use: No       Review of patient's allergies indicates:  No Known Allergies    Review of Systems   Constitutional: Positive for malaise/fatigue. Negative for decreased appetite, fever and night sweats.  "  Eyes:  Negative for blurred vision, double vision, pain and visual disturbance.   Cardiovascular:  Negative for chest pain.   Respiratory:  Negative for cough and shortness of breath.    Endocrine: Positive for cold intolerance.   Hematologic/Lymphatic: Negative for adenopathy and bleeding problem. Does not bruise/bleed easily.   Skin:  Negative for rash.   Musculoskeletal:  Negative for back pain.   Gastrointestinal:  Negative for abdominal pain and diarrhea.   Genitourinary:  Negative for frequency.   Neurological:  Negative for headaches.   Psychiatric/Behavioral:  The patient is not nervous/anxious.           Objective:     Vitals:    11/09/22 1416   BP: 138/60   BP Location: Right arm   Patient Position: Sitting   BP Method: Medium (Manual)   Pulse: 77   Resp: 18   SpO2: 97%   Weight: 56.8 kg (125 lb 3.5 oz)   Height: 5' 3" (1.6 m)          Physical Exam  Constitutional:       General: She is not in acute distress.     Appearance: Normal appearance. She is normal weight.   HENT:      Head: Normocephalic and atraumatic.   Eyes:      General: No scleral icterus.  Cardiovascular:      Rate and Rhythm: Normal rate and regular rhythm.      Heart sounds: Normal heart sounds.   Pulmonary:      Effort: Pulmonary effort is normal.      Breath sounds: Normal breath sounds. No wheezing.   Chest:      Chest wall: No tenderness.   Abdominal:      General: Bowel sounds are normal. There is no distension.      Palpations: Abdomen is soft. There is no mass.   Musculoskeletal:         General: No swelling or tenderness.   Lymphadenopathy:      Cervical: No cervical adenopathy.   Skin:     Coloration: Skin is not jaundiced or pale.   Neurological:      General: No focal deficit present.      Mental Status: She is oriented to person, place, and time.   Psychiatric:         Mood and Affect: Mood normal.         Behavior: Behavior normal.         Current Outpatient Medications on File Prior to Visit   Medication Sig    amLODIPine " (NORVASC) 5 MG tablet Take 1 tablet (5 mg total) by mouth once daily.    atorvastatin (LIPITOR) 80 MG tablet Take 1 tablet (80 mg total) by mouth every evening.    cyanocobalamin (VITAMIN B-12) 1000 MCG tablet Take 1,000 mcg by mouth once daily.    mv,Ca,min-folic acid-vit K1 (ONE-A-DAY WOMEN'S 50 PLUS) 400-20 mcg Tab Take 1 tablet by mouth once daily.    pantoprazole (PROTONIX) 40 MG tablet Take 1 tablet (40 mg total) by mouth once daily.    RESTASIS 0.05 % ophthalmic emulsion Place 1 drop into both eyes 2 (two) times daily.    vitamin D (VITAMIN D3) 1000 units Tab Take 1,000 Units by mouth once daily.    alendronate (FOSAMAX) 70 MG tablet Take 1 tablet (70 mg total) by mouth every 7 days.    amoxicillin-clavulanate 875-125mg (AUGMENTIN) 875-125 mg per tablet Take 1 tablet by mouth 2 (two) times daily.    aspirin 81 MG Chew Chew and swallow 1 tablet (81 mg total) by mouth once. for 1 dose    ciprofloxacin HCl (CIPRO) 500 MG tablet Take 1 tablet (500 mg total) by mouth 2 (two) times daily. (Patient not taking: Reported on 11/9/2022)    ferrous sulfate (FEROSUL) 220 mg (44 mg iron)/5 mL Elix Take 6.82 mLs (300.08 mg total) by mouth once daily. (Patient not taking: No sig reported)    [DISCONTINUED] clopidogreL (PLAVIX) 75 mg tablet Take 1 tablet (75 mg total) by mouth once daily. for 21 days     No current facility-administered medications on file prior to visit.       CBC:  Lab Results   Component Value Date    WBC 4.32 11/04/2022    HGB 11.3 (L) 11/04/2022    HCT 34.2 (L) 11/04/2022    MCV 89 11/04/2022     11/04/2022         CMP:  Sodium   Date Value Ref Range Status   09/16/2022 141 136 - 145 mmol/L Final     Potassium   Date Value Ref Range Status   09/16/2022 4.7 3.5 - 5.1 mmol/L Final     Chloride   Date Value Ref Range Status   09/16/2022 105 95 - 110 mmol/L Final     CO2   Date Value Ref Range Status   09/16/2022 28 23 - 29 mmol/L Final     Glucose   Date Value Ref Range Status   09/16/2022 85 70 -  110 mg/dL Final     BUN   Date Value Ref Range Status   09/16/2022 14 8 - 23 mg/dL Final     Creatinine   Date Value Ref Range Status   09/16/2022 0.7 0.5 - 1.4 mg/dL Final     Calcium   Date Value Ref Range Status   09/16/2022 9.8 8.7 - 10.5 mg/dL Final     Total Protein   Date Value Ref Range Status   09/16/2022 6.8 6.0 - 8.4 g/dL Final     Albumin   Date Value Ref Range Status   09/16/2022 3.9 3.5 - 5.2 g/dL Final     Total Bilirubin   Date Value Ref Range Status   09/16/2022 0.3 0.1 - 1.0 mg/dL Final     Comment:     For infants and newborns, interpretation of results should be based  on gestational age, weight and in agreement with clinical  observations.    Premature Infant recommended reference ranges:  Up to 24 hours.............<8.0 mg/dL  Up to 48 hours............<12.0 mg/dL  3-5 days..................<15.0 mg/dL  6-29 days.................<15.0 mg/dL       Alkaline Phosphatase   Date Value Ref Range Status   09/16/2022 100 55 - 135 U/L Final     AST   Date Value Ref Range Status   09/16/2022 21 10 - 40 U/L Final     ALT   Date Value Ref Range Status   09/16/2022 15 10 - 44 U/L Final     Anion Gap   Date Value Ref Range Status   09/16/2022 8 8 - 16 mmol/L Final     eGFR if    Date Value Ref Range Status   07/22/2022 >60 >60 mL/min/1.73 m^2 Final     eGFR if non    Date Value Ref Range Status   07/22/2022 >60 >60 mL/min/1.73 m^2 Final     Comment:     Calculation used to obtain the estimated glomerular filtration  rate (eGFR) is the CKD-EPI equation.          Lab Results   Component Value Date    IRON 216 (H) 09/16/2022    TIBC 422 09/16/2022    FERRITIN 678 (H) 09/16/2022       No results found for: GIAFHZRS71,No results found for: FOLATE    Echo  · Moderate left atrial enlargement.  · The left ventricle is normal in size with normal systolic function.  · The estimated ejection fraction is 65%.  · Grade II left ventricular diastolic dysfunction.  · Normal right ventricular  "size with normal right ventricular systolic   function.  · Mild mitral regurgitation.  · Mild tricuspid regurgitation.  · Normal central venous pressure (3 mmHg).  · There is pulmonary hypertension. The estimated PA systolic pressure is   45 mmHg.     US Lower Extremity Veins Bilateral  Narrative: EXAMINATION:  US LOWER EXTREMITY VEINS BILATERAL    CLINICAL HISTORY:  Edema with elevated d dimer;    TECHNIQUE:  Duplex and color flow Doppler and dynamic compression was performed of the bilateral lower extremity veins was performed.    COMPARISON:  None    FINDINGS:  Right thigh veins: The common femoral, femoral, popliteal, upper greater saphenous, and deep femoral veins are patent and free of thrombus. The veins are normally compressible and have normal phasic flow and augmentation response.    Right calf veins: The visualized calf veins are patent with normal color flow.    Left thigh veins: The common femoral, femoral, popliteal, upper greater saphenous, and deep femoral veins are patent and free of thrombus. The veins are normally compressible and have normal phasic flow and augmentation response.    Left calf veins: The visualized calf veins are patent with normal color flow.    Miscellaneous: None  Impression: No evidence of acute deep venous thrombosis in the left or right lower extremity veins.    Electronically signed by resident: Carlos Enrique Ugalde  Date:    08/21/2022  Time:    17:04    Electronically signed by: Troy Yeung MD  Date:    08/21/2022  Time:    17:09  X-Ray Chest AP Portable  Narrative: EXAMINATION:  XR CHEST AP PORTABLE    CLINICAL HISTORY:  Provided history is "  Shortness of breath".    TECHNIQUE:  One view of the chest.    COMPARISON:  07/20/2022.    FINDINGS:  Cardiac wires overlie the chest.  Cardiac silhouette is enlarged and similar to the prior study.  Atherosclerotic calcifications overlie the aortic arch.  There is central vascular congestion with worsening diffuse bilateral coarsened " interstitial lung markings suggestive of interstitial edema.  New bilateral pleural effusions with adjacent passive atelectasis or airspace disease in the lung bases.  No distinct pneumothorax.  Impression: Cardiomegaly with interstitial edema and new bilateral pleural effusions, suggestive of CHF exacerbation and/or volume overload.    Electronically signed by: Richard Chavez MD  Date:    08/21/2022  Time:    01:22       All pertinent labs and imaging reviewed.    Assessment:       1. Severe anemia    2. H/O ischemic left MCA stroke    3. Gastrointestinal hemorrhage, unspecified gastrointestinal hemorrhage type      # SEVER /symptomatic microcytic anemia  - likely due to GI bleeding; EGD with gastric ulcer and duodenum angiectasis, path negative for malignancy  - following up with GI closely, still need colonoscopy to rule out colon cancer  - ferritin <20, will need IV iron and possible might need it more than 1 round  - s/p ferumyoxytol x2 in Sep/2022, still stable Hb/hct/Ferritin   - no need for transfusions, off  plavix, on aspirin  - getting EGD next month      # Hx of Stroke w a recent TIA  - was on plavix and aspirin, now on on aspirin only   - following up with neurology      Plan:     Severe anemia  -     Ambulatory referral/consult to Hematology / Oncology  -     CBC w/ DIFF; Future; Expected date: 11/09/2022  -     Ferritin; Future; Expected date: 11/09/2022  -     Iron and TIBC; Future; Expected date: 11/09/2022    H/O ischemic left MCA stroke    Gastrointestinal hemorrhage, unspecified gastrointestinal hemorrhage type       Patient queried and all questions were answered.        Recommend COVID guidelines being followed     Recommend  exercise, nutrition and weight management and health maintenance activities and follow-up with PCPs recommendations       Route Chart for Scheduling  Med Onc Route Chart for Scheduling      Supportive Plan Information  OP JULITAUMOXYTOL   Katherine Marie MD   Upcoming  Treatment Dates - OP FERUMOXYTOL    No upcoming days in selected categories.    Signed:  Katherine Marie MD  Hematology and Oncology  [facility]  [facility] [place] [facility]      Answers submitted by the patient for this visit:  Review of Systems Questionnaire (Submitted on 11/7/2022)  appetite change : No  mouth sores: No  cough: No  shortness of breath: No  chest pain: No  diarrhea: No  rash: No  headaches: No  adenopathy: No

## 2022-11-15 NOTE — PROGRESS NOTES
Subjective:                                                                                     Patient ID:    Name: Katelyn Caba  : 1940  MRN: [id number]      HPI:   Katelyn Caba is a 82 y.o. female presents for evaluation of Microcytic anemia    Patient as referred to us for anemia, sever enough that around 22 needed 2u pRBC blood transfusion, Hb 4.6/4.9.Looking back at her blood work , she is being anemia since 2018, normal CBC in 2015 (no blood work in between). Significant history, 22; had an admissions for TIA/ stroke/expressive aphasia and seems that patient was given plavix for 21 days followed by aspirin and patient noticed that her generalized weakness and fatigued started worsening around that time . 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 Biopsy were negative for any malignancy     Today, s/p IV iron ferumoxytol 2022, stable Hb/ferritin/Hct, symptoms improved significantly, getting  next month.    Past Medical History:   Diagnosis Date    Cancer     basal cell carcinoma       Past Surgical History:   Procedure Laterality Date    CARPAL TUNNEL RELEASE      ESOPHAGOGASTRODUODENOSCOPY N/A 2022    Procedure: EGD (ESOPHAGOGASTRODUODENOSCOPY);  Surgeon: Steven Lopez MD;  Location: [address];  Service: Endoscopy;  Laterality: N/A;    EYE SURGERY      left eye cataract    TONSILLECTOMY      TUBAL LIGATION         History reviewed. No pertinent family history.    Social History     Socioeconomic History    Marital status:    Tobacco Use    Smoking status: Never    Smokeless tobacco: Never   Substance and Sexual Activity    Alcohol use: No    Drug use: No       Review of patient's allergies indicates:  No Known Allergies    Review of Systems   Constitutional: Positive for malaise/fatigue. Negative for decreased appetite, fever and night sweats.  "  Eyes:  Negative for blurred vision, double vision, pain and visual disturbance.   Cardiovascular:  Negative for chest pain.   Respiratory:  Negative for cough and shortness of breath.    Endocrine: Positive for cold intolerance.   Hematologic/Lymphatic: Negative for adenopathy and bleeding problem. Does not bruise/bleed easily.   Skin:  Negative for rash.   Musculoskeletal:  Negative for back pain.   Gastrointestinal:  Negative for abdominal pain and diarrhea.   Genitourinary:  Negative for frequency.   Neurological:  Negative for headaches.   Psychiatric/Behavioral:  The patient is not nervous/anxious.           Objective:     Vitals:    11/09/22 1416   BP: 138/60   BP Location: Right arm   Patient Position: Sitting   BP Method: Medium (Manual)   Pulse: 77   Resp: 18   SpO2: 97%   Weight: 56.8 kg (125 lb 3.5 oz)   Height: 5' 3" (1.6 m)          Physical Exam  Constitutional:       General: She is not in acute distress.     Appearance: Normal appearance. She is normal weight.   HENT:      Head: Normocephalic and atraumatic.   Eyes:      General: No scleral icterus.  Cardiovascular:      Rate and Rhythm: Normal rate and regular rhythm.      Heart sounds: Normal heart sounds.   Pulmonary:      Effort: Pulmonary effort is normal.      Breath sounds: Normal breath sounds. No wheezing.   Chest:      Chest wall: No tenderness.   Abdominal:      General: Bowel sounds are normal. There is no distension.      Palpations: Abdomen is soft. There is no mass.   Musculoskeletal:         General: No swelling or tenderness.   Lymphadenopathy:      Cervical: No cervical adenopathy.   Skin:     Coloration: Skin is not jaundiced or pale.   Neurological:      General: No focal deficit present.      Mental Status: She is oriented to person, place, and time.   Psychiatric:         Mood and Affect: Mood normal.         Behavior: Behavior normal.         Current Outpatient Medications on File Prior to Visit   Medication Sig    amLODIPine " (NORVASC) 5 MG tablet Take 1 tablet (5 mg total) by mouth once daily.    atorvastatin (LIPITOR) 80 MG tablet Take 1 tablet (80 mg total) by mouth every evening.    cyanocobalamin (VITAMIN B-12) 1000 MCG tablet Take 1,000 mcg by mouth once daily.    mv,Ca,min-folic acid-vit K1 (ONE-A-DAY WOMEN'S 50 PLUS) 400-20 mcg Tab Take 1 tablet by mouth once daily.    pantoprazole (PROTONIX) 40 MG tablet Take 1 tablet (40 mg total) by mouth once daily.    RESTASIS 0.05 % ophthalmic emulsion Place 1 drop into both eyes 2 (two) times daily.    vitamin D (VITAMIN D3) 1000 units Tab Take 1,000 Units by mouth once daily.    alendronate (FOSAMAX) 70 MG tablet Take 1 tablet (70 mg total) by mouth every 7 days.    amoxicillin-clavulanate 875-125mg (AUGMENTIN) 875-125 mg per tablet Take 1 tablet by mouth 2 (two) times daily.    aspirin 81 MG Chew Chew and swallow 1 tablet (81 mg total) by mouth once. for 1 dose    ciprofloxacin HCl (CIPRO) 500 MG tablet Take 1 tablet (500 mg total) by mouth 2 (two) times daily. (Patient not taking: Reported on 11/9/2022)    ferrous sulfate (FEROSUL) 220 mg (44 mg iron)/5 mL Elix Take 6.82 mLs (300.08 mg total) by mouth once daily. (Patient not taking: No sig reported)    [DISCONTINUED] clopidogreL (PLAVIX) 75 mg tablet Take 1 tablet (75 mg total) by mouth once daily. for 21 days     No current facility-administered medications on file prior to visit.       CBC:  Lab Results   Component Value Date    WBC 4.32 11/04/2022    HGB 11.3 (L) 11/04/2022    HCT 34.2 (L) 11/04/2022    MCV 89 11/04/2022     11/04/2022         CMP:  Sodium   Date Value Ref Range Status   09/16/2022 141 136 - 145 mmol/L Final     Potassium   Date Value Ref Range Status   09/16/2022 4.7 3.5 - 5.1 mmol/L Final     Chloride   Date Value Ref Range Status   09/16/2022 105 95 - 110 mmol/L Final     CO2   Date Value Ref Range Status   09/16/2022 28 23 - 29 mmol/L Final     Glucose   Date Value Ref Range Status   09/16/2022 85 70 -  110 mg/dL Final     BUN   Date Value Ref Range Status   09/16/2022 14 8 - 23 mg/dL Final     Creatinine   Date Value Ref Range Status   09/16/2022 0.7 0.5 - 1.4 mg/dL Final     Calcium   Date Value Ref Range Status   09/16/2022 9.8 8.7 - 10.5 mg/dL Final     Total Protein   Date Value Ref Range Status   09/16/2022 6.8 6.0 - 8.4 g/dL Final     Albumin   Date Value Ref Range Status   09/16/2022 3.9 3.5 - 5.2 g/dL Final     Total Bilirubin   Date Value Ref Range Status   09/16/2022 0.3 0.1 - 1.0 mg/dL Final     Comment:     For infants and newborns, interpretation of results should be based  on gestational age, weight and in agreement with clinical  observations.    Premature Infant recommended reference ranges:  Up to 24 hours.............<8.0 mg/dL  Up to 48 hours............<12.0 mg/dL  3-5 days..................<15.0 mg/dL  6-29 days.................<15.0 mg/dL       Alkaline Phosphatase   Date Value Ref Range Status   09/16/2022 100 55 - 135 U/L Final     AST   Date Value Ref Range Status   09/16/2022 21 10 - 40 U/L Final     ALT   Date Value Ref Range Status   09/16/2022 15 10 - 44 U/L Final     Anion Gap   Date Value Ref Range Status   09/16/2022 8 8 - 16 mmol/L Final     eGFR if    Date Value Ref Range Status   07/22/2022 >60 >60 mL/min/1.73 m^2 Final     eGFR if non    Date Value Ref Range Status   07/22/2022 >60 >60 mL/min/1.73 m^2 Final     Comment:     Calculation used to obtain the estimated glomerular filtration  rate (eGFR) is the CKD-EPI equation.          Lab Results   Component Value Date    IRON 216 (H) 09/16/2022    TIBC 422 09/16/2022    FERRITIN 678 (H) 09/16/2022       No results found for: OSOKMSWP56,No results found for: FOLATE    Echo  · Moderate left atrial enlargement.  · The left ventricle is normal in size with normal systolic function.  · The estimated ejection fraction is 65%.  · Grade II left ventricular diastolic dysfunction.  · Normal right ventricular  "size with normal right ventricular systolic   function.  · Mild mitral regurgitation.  · Mild tricuspid regurgitation.  · Normal central venous pressure (3 mmHg).  · There is pulmonary hypertension. The estimated PA systolic pressure is   45 mmHg.     US Lower Extremity Veins Bilateral  Narrative: EXAMINATION:  US LOWER EXTREMITY VEINS BILATERAL    CLINICAL HISTORY:  Edema with elevated d dimer;    TECHNIQUE:  Duplex and color flow Doppler and dynamic compression was performed of the bilateral lower extremity veins was performed.    COMPARISON:  None    FINDINGS:  Right thigh veins: The common femoral, femoral, popliteal, upper greater saphenous, and deep femoral veins are patent and free of thrombus. The veins are normally compressible and have normal phasic flow and augmentation response.    Right calf veins: The visualized calf veins are patent with normal color flow.    Left thigh veins: The common femoral, femoral, popliteal, upper greater saphenous, and deep femoral veins are patent and free of thrombus. The veins are normally compressible and have normal phasic flow and augmentation response.    Left calf veins: The visualized calf veins are patent with normal color flow.    Miscellaneous: None  Impression: No evidence of acute deep venous thrombosis in the left or right lower extremity veins.    Electronically signed by resident: Carlos Enrique Ugalde  Date:    08/21/2022  Time:    17:04    Electronically signed by: Troy Yeung MD  Date:    08/21/2022  Time:    17:09  X-Ray Chest AP Portable  Narrative: EXAMINATION:  XR CHEST AP PORTABLE    CLINICAL HISTORY:  Provided history is "  Shortness of breath".    TECHNIQUE:  One view of the chest.    COMPARISON:  07/20/2022.    FINDINGS:  Cardiac wires overlie the chest.  Cardiac silhouette is enlarged and similar to the prior study.  Atherosclerotic calcifications overlie the aortic arch.  There is central vascular congestion with worsening diffuse bilateral coarsened " interstitial lung markings suggestive of interstitial edema.  New bilateral pleural effusions with adjacent passive atelectasis or airspace disease in the lung bases.  No distinct pneumothorax.  Impression: Cardiomegaly with interstitial edema and new bilateral pleural effusions, suggestive of CHF exacerbation and/or volume overload.    Electronically signed by: Richard Chavez MD  Date:    08/21/2022  Time:    01:22       All pertinent labs and imaging reviewed.    Assessment:       1. Severe anemia    2. H/O ischemic left MCA stroke    3. Gastrointestinal hemorrhage, unspecified gastrointestinal hemorrhage type      # SEVER /symptomatic microcytic anemia  - likely due to GI bleeding; EGD with gastric ulcer and duodenum angiectasis, path negative for malignancy  - following up with GI closely, still need colonoscopy to rule out colon cancer  - ferritin <20, will need IV iron and possible might need it more than 1 round  - s/p ferumyoxytol x2 in Sep/2022, still stable Hb/hct/Ferritin   - no need for transfusions, off  plavix, on aspirin  - getting EGD next month      # Hx of Stroke w a recent TIA  - was on plavix and aspirin, now on on aspirin only   - following up with neurology      Plan:     Severe anemia  -     Ambulatory referral/consult to Hematology / Oncology  -     CBC w/ DIFF; Future; Expected date: 11/09/2022  -     Ferritin; Future; Expected date: 11/09/2022  -     Iron and TIBC; Future; Expected date: 11/09/2022    H/O ischemic left MCA stroke    Gastrointestinal hemorrhage, unspecified gastrointestinal hemorrhage type       Patient queried and all questions were answered.        Recommend COVID guidelines being followed     Recommend  exercise, nutrition and weight management and health maintenance activities and follow-up with PCPs recommendations       Route Chart for Scheduling  Med Onc Route Chart for Scheduling      Supportive Plan Information  OP JULITAUMOXYTOL   Katherine Marie MD   Upcoming  Treatment Dates - OP FERUMOXYTOL    No upcoming days in selected categories.    Signed:  Katherine Marie MD  Hematology and Oncology  [facility]  [facility] [place] [facility]      Answers submitted by the patient for this visit:  Review of Systems Questionnaire (Submitted on 11/7/2022)  appetite change : No  mouth sores: No  cough: No  shortness of breath: No  chest pain: No  diarrhea: No  rash: No  headaches: No  adenopathy: No

## 2022-11-15 NOTE — PROGRESS NOTES
Subjective:                                                                                     Patient ID:    Name: Katelyn Caba  : 1940  MRN: [id number]      HPI:   Katelyn Caba is a 82 y.o. female presents for evaluation of Microcytic anemia    Patient as referred to us for anemia, sever enough that around 22 needed 2u pRBC blood transfusion, Hb 4.6/4.9.Looking back at her blood work , she is being anemia since 2018, normal CBC in 2015 (no blood work in between). Significant history, 22; had an admissions for TIA/ stroke/expressive aphasia and seems that patient was given plavix for 21 days followed by aspirin and patient noticed that her generalized weakness and fatigued started worsening around that time . 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 Biopsy were negative for any malignancy     Today, s/p IV iron ferumoxytol 2022, stable Hb/ferritin/Hct, symptoms improved significantly, getting  next month. EGD.     Past Medical History:   Diagnosis Date    Cancer     basal cell carcinoma       Past Surgical History:   Procedure Laterality Date    CARPAL TUNNEL RELEASE      ESOPHAGOGASTRODUODENOSCOPY N/A 2022    Procedure: EGD (ESOPHAGOGASTRODUODENOSCOPY);  Surgeon: Steven Lopez MD;  Location: [address];  Service: Endoscopy;  Laterality: N/A;    EYE SURGERY      left eye cataract    TONSILLECTOMY      TUBAL LIGATION         History reviewed. No pertinent family history.    Social History     Socioeconomic History    Marital status:    Tobacco Use    Smoking status: Never    Smokeless tobacco: Never   Substance and Sexual Activity    Alcohol use: No    Drug use: No       Review of patient's allergies indicates:  No Known Allergies    Review of Systems   Constitutional: Positive for malaise/fatigue. Negative for decreased appetite, fever and night  "sweats.   Eyes:  Negative for blurred vision, double vision, pain and visual disturbance.   Cardiovascular:  Negative for chest pain.   Respiratory:  Negative for cough and shortness of breath.    Endocrine: Positive for cold intolerance.   Hematologic/Lymphatic: Negative for adenopathy and bleeding problem. Does not bruise/bleed easily.   Skin:  Negative for rash.   Musculoskeletal:  Negative for back pain.   Gastrointestinal:  Negative for abdominal pain and diarrhea.   Genitourinary:  Negative for frequency.   Neurological:  Negative for headaches.   Psychiatric/Behavioral:  The patient is not nervous/anxious.           Objective:     Vitals:    11/09/22 1416   BP: 138/60   BP Location: Right arm   Patient Position: Sitting   BP Method: Medium (Manual)   Pulse: 77   Resp: 18   SpO2: 97%   Weight: 56.8 kg (125 lb 3.5 oz)   Height: 5' 3" (1.6 m)          Physical Exam  Constitutional:       General: She is not in acute distress.     Appearance: Normal appearance. She is normal weight.   HENT:      Head: Normocephalic and atraumatic.   Eyes:      General: No scleral icterus.  Cardiovascular:      Rate and Rhythm: Normal rate and regular rhythm.      Heart sounds: Normal heart sounds.   Pulmonary:      Effort: Pulmonary effort is normal.      Breath sounds: Normal breath sounds. No wheezing.   Chest:      Chest wall: No tenderness.   Abdominal:      General: Bowel sounds are normal. There is no distension.      Palpations: Abdomen is soft. There is no mass.   Musculoskeletal:         General: No swelling or tenderness.   Lymphadenopathy:      Cervical: No cervical adenopathy.   Skin:     Coloration: Skin is not jaundiced or pale.   Neurological:      General: No focal deficit present.      Mental Status: She is oriented to person, place, and time.   Psychiatric:         Mood and Affect: Mood normal.         Behavior: Behavior normal.         Current Outpatient Medications on File Prior to Visit   Medication Sig    " amLODIPine (NORVASC) 5 MG tablet Take 1 tablet (5 mg total) by mouth once daily.    atorvastatin (LIPITOR) 80 MG tablet Take 1 tablet (80 mg total) by mouth every evening.    cyanocobalamin (VITAMIN B-12) 1000 MCG tablet Take 1,000 mcg by mouth once daily.    mv,Ca,min-folic acid-vit K1 (ONE-A-DAY WOMEN'S 50 PLUS) 400-20 mcg Tab Take 1 tablet by mouth once daily.    pantoprazole (PROTONIX) 40 MG tablet Take 1 tablet (40 mg total) by mouth once daily.    RESTASIS 0.05 % ophthalmic emulsion Place 1 drop into both eyes 2 (two) times daily.    vitamin D (VITAMIN D3) 1000 units Tab Take 1,000 Units by mouth once daily.    alendronate (FOSAMAX) 70 MG tablet Take 1 tablet (70 mg total) by mouth every 7 days.    amoxicillin-clavulanate 875-125mg (AUGMENTIN) 875-125 mg per tablet Take 1 tablet by mouth 2 (two) times daily.    aspirin 81 MG Chew Chew and swallow 1 tablet (81 mg total) by mouth once. for 1 dose    ciprofloxacin HCl (CIPRO) 500 MG tablet Take 1 tablet (500 mg total) by mouth 2 (two) times daily. (Patient not taking: Reported on 11/9/2022)    ferrous sulfate (FEROSUL) 220 mg (44 mg iron)/5 mL Elix Take 6.82 mLs (300.08 mg total) by mouth once daily. (Patient not taking: No sig reported)    [DISCONTINUED] clopidogreL (PLAVIX) 75 mg tablet Take 1 tablet (75 mg total) by mouth once daily. for 21 days     No current facility-administered medications on file prior to visit.       CBC:  Lab Results   Component Value Date    WBC 4.32 11/04/2022    HGB 11.3 (L) 11/04/2022    HCT 34.2 (L) 11/04/2022    MCV 89 11/04/2022     11/04/2022         CMP:  Sodium   Date Value Ref Range Status   09/16/2022 141 136 - 145 mmol/L Final     Potassium   Date Value Ref Range Status   09/16/2022 4.7 3.5 - 5.1 mmol/L Final     Chloride   Date Value Ref Range Status   09/16/2022 105 95 - 110 mmol/L Final     CO2   Date Value Ref Range Status   09/16/2022 28 23 - 29 mmol/L Final     Glucose   Date Value Ref Range Status   09/16/2022  85 70 - 110 mg/dL Final     BUN   Date Value Ref Range Status   09/16/2022 14 8 - 23 mg/dL Final     Creatinine   Date Value Ref Range Status   09/16/2022 0.7 0.5 - 1.4 mg/dL Final     Calcium   Date Value Ref Range Status   09/16/2022 9.8 8.7 - 10.5 mg/dL Final     Total Protein   Date Value Ref Range Status   09/16/2022 6.8 6.0 - 8.4 g/dL Final     Albumin   Date Value Ref Range Status   09/16/2022 3.9 3.5 - 5.2 g/dL Final     Total Bilirubin   Date Value Ref Range Status   09/16/2022 0.3 0.1 - 1.0 mg/dL Final     Comment:     For infants and newborns, interpretation of results should be based  on gestational age, weight and in agreement with clinical  observations.    Premature Infant recommended reference ranges:  Up to 24 hours.............<8.0 mg/dL  Up to 48 hours............<12.0 mg/dL  3-5 days..................<15.0 mg/dL  6-29 days.................<15.0 mg/dL       Alkaline Phosphatase   Date Value Ref Range Status   09/16/2022 100 55 - 135 U/L Final     AST   Date Value Ref Range Status   09/16/2022 21 10 - 40 U/L Final     ALT   Date Value Ref Range Status   09/16/2022 15 10 - 44 U/L Final     Anion Gap   Date Value Ref Range Status   09/16/2022 8 8 - 16 mmol/L Final     eGFR if    Date Value Ref Range Status   07/22/2022 >60 >60 mL/min/1.73 m^2 Final     eGFR if non    Date Value Ref Range Status   07/22/2022 >60 >60 mL/min/1.73 m^2 Final     Comment:     Calculation used to obtain the estimated glomerular filtration  rate (eGFR) is the CKD-EPI equation.          Lab Results   Component Value Date    IRON 216 (H) 09/16/2022    TIBC 422 09/16/2022    FERRITIN 678 (H) 09/16/2022       No results found for: ITOKVDOV51,No results found for: FOLATE    Echo  · Moderate left atrial enlargement.  · The left ventricle is normal in size with normal systolic function.  · The estimated ejection fraction is 65%.  · Grade II left ventricular diastolic dysfunction.  · Normal right  "ventricular size with normal right ventricular systolic   function.  · Mild mitral regurgitation.  · Mild tricuspid regurgitation.  · Normal central venous pressure (3 mmHg).  · There is pulmonary hypertension. The estimated PA systolic pressure is   45 mmHg.     US Lower Extremity Veins Bilateral  Narrative: EXAMINATION:  US LOWER EXTREMITY VEINS BILATERAL    CLINICAL HISTORY:  Edema with elevated d dimer;    TECHNIQUE:  Duplex and color flow Doppler and dynamic compression was performed of the bilateral lower extremity veins was performed.    COMPARISON:  None    FINDINGS:  Right thigh veins: The common femoral, femoral, popliteal, upper greater saphenous, and deep femoral veins are patent and free of thrombus. The veins are normally compressible and have normal phasic flow and augmentation response.    Right calf veins: The visualized calf veins are patent with normal color flow.    Left thigh veins: The common femoral, femoral, popliteal, upper greater saphenous, and deep femoral veins are patent and free of thrombus. The veins are normally compressible and have normal phasic flow and augmentation response.    Left calf veins: The visualized calf veins are patent with normal color flow.    Miscellaneous: None  Impression: No evidence of acute deep venous thrombosis in the left or right lower extremity veins.    Electronically signed by resident: Carlos Enrique Ugalde  Date:    08/21/2022  Time:    17:04    Electronically signed by: Troy Yeung MD  Date:    08/21/2022  Time:    17:09  X-Ray Chest AP Portable  Narrative: EXAMINATION:  XR CHEST AP PORTABLE    CLINICAL HISTORY:  Provided history is "  Shortness of breath".    TECHNIQUE:  One view of the chest.    COMPARISON:  07/20/2022.    FINDINGS:  Cardiac wires overlie the chest.  Cardiac silhouette is enlarged and similar to the prior study.  Atherosclerotic calcifications overlie the aortic arch.  There is central vascular congestion with worsening diffuse bilateral " coarsened interstitial lung markings suggestive of interstitial edema.  New bilateral pleural effusions with adjacent passive atelectasis or airspace disease in the lung bases.  No distinct pneumothorax.  Impression: Cardiomegaly with interstitial edema and new bilateral pleural effusions, suggestive of CHF exacerbation and/or volume overload.    Electronically signed by: Richard Chavez MD  Date:    08/21/2022  Time:    01:22       All pertinent labs and imaging reviewed.    Assessment:       1. Severe anemia    2. H/O ischemic left MCA stroke    3. Gastrointestinal hemorrhage, unspecified gastrointestinal hemorrhage type      # SEVER /symptomatic microcytic anemia  - likely due to GI bleeding; EGD with gastric ulcer and duodenum angiectasis, path negative for malignancy  - following up with GI closely, still need colonoscopy to rule out colon cancer  - ferritin <20, will need IV iron and possible might need it more than 1 round  - s/p ferumyoxytol x2 in Sep/2022, still stable Hb/hct/Ferritin   - no need for transfusions, off  plavix, on aspirin  - getting EGD next month      # Hx of Stroke w a recent TIA  - was on plavix and aspirin, now on on aspirin only   - following up with neurology      Plan:     Severe anemia  -     Ambulatory referral/consult to Hematology / Oncology  -     CBC w/ DIFF; Future; Expected date: 11/09/2022  -     Ferritin; Future; Expected date: 11/09/2022  -     Iron and TIBC; Future; Expected date: 11/09/2022    H/O ischemic left MCA stroke    Gastrointestinal hemorrhage, unspecified gastrointestinal hemorrhage type       Patient queried and all questions were answered.        Recommend COVID guidelines being followed     Recommend  exercise, nutrition and weight management and health maintenance activities and follow-up with PCPs recommendations       Route Chart for Scheduling  Med Onc Route Chart for Scheduling      Supportive Plan Information  OP JULITAUMOXYTOL   Katherine Marie MD    Upcoming Treatment Dates - OP FERUMOXYTOL    No upcoming days in selected categories.    Signed:  Katherine Marie MD  Hematology and Oncology  [facility]  [facility] [place] [facility]      Answers submitted by the patient for this visit:  Review of Systems Questionnaire (Submitted on 11/7/2022)  appetite change : No  mouth sores: No  cough: No  shortness of breath: No  chest pain: No  diarrhea: No  rash: No  headaches: No  adenopathy: No

## 2022-11-21 PROBLEM — J96.01 ACUTE RESPIRATORY FAILURE WITH HYPOXIA AND HYPERCAPNIA: Status: RESOLVED | Noted: 2022-08-21 | Resolved: 2022-11-21

## 2022-11-21 PROBLEM — J96.02 ACUTE RESPIRATORY FAILURE WITH HYPOXIA AND HYPERCAPNIA: Status: RESOLVED | Noted: 2022-08-21 | Resolved: 2022-11-21

## 2022-12-05 ENCOUNTER — HOSPITAL ENCOUNTER (OUTPATIENT)
Facility: HOSPITAL | Age: 82
Discharge: HOME | End: 2022-12-05
Attending: INTERNAL MEDICINE | Admitting: INTERNAL MEDICINE
Payer: MEDICARE

## 2022-12-05 ENCOUNTER — ANESTHESIA (OUTPATIENT)
Dept: ENDOSCOPY | Facility: HOSPITAL | Age: 82
End: 2022-12-05
Payer: MEDICARE

## 2022-12-05 ENCOUNTER — ANESTHESIA EVENT (OUTPATIENT)
Dept: ENDOSCOPY | Facility: HOSPITAL | Age: 82
End: 2022-12-05
Payer: MEDICARE

## 2022-12-05 VITALS
OXYGEN SATURATION: 98 % | HEIGHT: 63 IN | HEART RATE: 64 BPM | SYSTOLIC BLOOD PRESSURE: 159 MMHG | WEIGHT: 122 LBS | DIASTOLIC BLOOD PRESSURE: 70 MMHG | BODY MASS INDEX: 21.62 KG/M2 | TEMPERATURE: 98 F | RESPIRATION RATE: 16 BRPM

## 2022-12-05 DIAGNOSIS — K22.70 BARRETT'S ESOPHAGUS WITHOUT DYSPLASIA: ICD-10-CM

## 2022-12-05 DIAGNOSIS — Z87.11 HISTORY OF GASTRIC ULCER: Primary | ICD-10-CM

## 2022-12-05 PROCEDURE — D9220A PRA ANESTHESIA: Mod: ANES,,, | Performed by: ANESTHESIOLOGY

## 2022-12-05 PROCEDURE — D9220A PRA ANESTHESIA: Mod: CRNA,,, | Performed by: NURSE ANESTHETIST, CERTIFIED REGISTERED

## 2022-12-05 PROCEDURE — 43239 EGD BIOPSY SINGLE/MULTIPLE: CPT | Performed by: INTERNAL MEDICINE

## 2022-12-05 PROCEDURE — 25000003 PHARM REV CODE 250: Performed by: NURSE ANESTHETIST, CERTIFIED REGISTERED

## 2022-12-05 PROCEDURE — 88305 TISSUE EXAM BY PATHOLOGIST: CPT | Performed by: STUDENT IN AN ORGANIZED HEALTH CARE EDUCATION/TRAINING PROGRAM

## 2022-12-05 PROCEDURE — 63600175 PHARM REV CODE 636 W HCPCS: Performed by: NURSE ANESTHETIST, CERTIFIED REGISTERED

## 2022-12-05 PROCEDURE — 43239 EGD BIOPSY SINGLE/MULTIPLE: CPT | Mod: ,,, | Performed by: INTERNAL MEDICINE

## 2022-12-05 PROCEDURE — 37000009 HC ANESTHESIA EA ADD 15 MINS: Performed by: INTERNAL MEDICINE

## 2022-12-05 PROCEDURE — 88305 TISSUE EXAM BY PATHOLOGIST: CPT | Mod: 26,,, | Performed by: STUDENT IN AN ORGANIZED HEALTH CARE EDUCATION/TRAINING PROGRAM

## 2022-12-05 PROCEDURE — D9220A PRA ANESTHESIA: ICD-10-PCS | Mod: CRNA,,, | Performed by: NURSE ANESTHETIST, CERTIFIED REGISTERED

## 2022-12-05 PROCEDURE — 37000008 HC ANESTHESIA 1ST 15 MINUTES: Performed by: INTERNAL MEDICINE

## 2022-12-05 PROCEDURE — 88305 TISSUE EXAM BY PATHOLOGIST: ICD-10-PCS | Mod: 26,,, | Performed by: STUDENT IN AN ORGANIZED HEALTH CARE EDUCATION/TRAINING PROGRAM

## 2022-12-05 PROCEDURE — 27201012 HC FORCEPS, HOT/COLD, DISP: Performed by: INTERNAL MEDICINE

## 2022-12-05 PROCEDURE — 43239 PR EGD, FLEX, W/BIOPSY, SGL/MULTI: ICD-10-PCS | Mod: ,,, | Performed by: INTERNAL MEDICINE

## 2022-12-05 PROCEDURE — D9220A PRA ANESTHESIA: ICD-10-PCS | Mod: ANES,,, | Performed by: ANESTHESIOLOGY

## 2022-12-05 RX ORDER — SODIUM CHLORIDE 9 MG/ML
INJECTION, SOLUTION INTRAVENOUS CONTINUOUS
Status: DISCONTINUED | OUTPATIENT
Start: 2022-12-05 | End: 2022-12-05 | Stop reason: HOSPADM

## 2022-12-05 RX ORDER — LIDOCAINE HYDROCHLORIDE 20 MG/ML
INJECTION INTRAVENOUS
Status: DISCONTINUED | OUTPATIENT
Start: 2022-12-05 | End: 2022-12-05

## 2022-12-05 RX ORDER — DROPERIDOL 2.5 MG/ML
0.62 INJECTION, SOLUTION INTRAMUSCULAR; INTRAVENOUS ONCE AS NEEDED
Status: DISCONTINUED | OUTPATIENT
Start: 2022-12-05 | End: 2022-12-05 | Stop reason: HOSPADM

## 2022-12-05 RX ORDER — PROPOFOL 10 MG/ML
VIAL (ML) INTRAVENOUS
Status: DISCONTINUED | OUTPATIENT
Start: 2022-12-05 | End: 2022-12-05

## 2022-12-05 RX ORDER — HYDROMORPHONE HYDROCHLORIDE 1 MG/ML
0.2 INJECTION, SOLUTION INTRAMUSCULAR; INTRAVENOUS; SUBCUTANEOUS EVERY 5 MIN PRN
Status: DISCONTINUED | OUTPATIENT
Start: 2022-12-05 | End: 2022-12-05 | Stop reason: HOSPADM

## 2022-12-05 RX ORDER — PROPOFOL 10 MG/ML
VIAL (ML) INTRAVENOUS CONTINUOUS PRN
Status: DISCONTINUED | OUTPATIENT
Start: 2022-12-05 | End: 2022-12-05

## 2022-12-05 RX ORDER — ONDANSETRON 2 MG/ML
4 INJECTION INTRAMUSCULAR; INTRAVENOUS ONCE AS NEEDED
Status: DISCONTINUED | OUTPATIENT
Start: 2022-12-05 | End: 2022-12-05 | Stop reason: HOSPADM

## 2022-12-05 RX ADMIN — Medication 175 MCG/KG/MIN: at 01:12

## 2022-12-05 RX ADMIN — LIDOCAINE HYDROCHLORIDE 50 MG: 20 INJECTION INTRAVENOUS at 01:12

## 2022-12-05 RX ADMIN — SODIUM CHLORIDE: 0.9 INJECTION, SOLUTION INTRAVENOUS at 01:12

## 2022-12-05 RX ADMIN — PROPOFOL 100 MG: 10 INJECTION, EMULSION INTRAVENOUS at 01:12

## 2022-12-05 NOTE — TRANSFER OF CARE
"Anesthesia Transfer of Care Note    Patient: Katelyn Caba    Procedure(s) Performed: Procedure(s) (LRB):  EGD (ESOPHAGOGASTRODUODENOSCOPY) (N/A)    Patient location: PACU    Anesthesia Type: general    Transport from OR: Transported from OR on room air with adequate spontaneous ventilation    Post pain: adequate analgesia    Post assessment: no apparent anesthetic complications and tolerated procedure well    Post vital signs: stable    Level of consciousness: sedated and responds to stimulation    Nausea/Vomiting: no nausea/vomiting    Complications: none    Transfer of care protocol was followed      Last vitals:   Visit Vitals  BP (!) 98/47 (BP Location: Left arm, Patient Position: Lying)   Pulse 62   Temp 36.5 °C (97.7 °F) (Temporal)   Resp 16   Ht 5' 3" (1.6 m)   Wt 55.3 kg (122 lb)   SpO2 95%   Breastfeeding No   BMI 21.61 kg/m²     "

## 2022-12-05 NOTE — ANESTHESIA PREPROCEDURE EVALUATION
12/05/2022  Katelyn Caba is a 82 y.o., female.      Pre-op Assessment          Review of Systems  Anesthesia Hx:  No problems with previous Anesthesia    Cardiovascular:   Hypertension Denies CAD.     Functional Capacity Can you climb two flights of stairs? ==> Yes    Pulmonary:   Denies Asthma.  Denies Sleep Apnea.    Renal/:   Denies Chronic Renal Disease.     Hepatic/GI:   Denies PUD. GERD Denies Liver Disease.    Neurological:   Denies CVA. Denies Seizures.    Endocrine:   Denies Diabetes. Denies Hypothyroidism.        Physical Exam  General: Alert    Airway:  Mallampati: I   Mouth Opening: Normal  TM Distance: Normal  Tongue: Normal  Neck ROM: Normal ROM    Dental:  Intact, Dentures        Anesthesia Plan  Type of Anesthesia, risks & benefits discussed:    Anesthesia Type: Gen ETT  Intra-op Monitoring Plan: Standard ASA Monitors  Post Op Pain Control Plan: multimodal analgesia and IV/PO Opioids PRN  Induction:  IV  Airway Plan: Direct  Informed Consent: Informed consent signed with the Patient and all parties understand the risks and agree with anesthesia plan.  All questions answered.   ASA Score: 2    Ready For Surgery From Anesthesia Perspective.     .

## 2022-12-05 NOTE — H&P
Short Stay Endoscopy History and Physical    PCP - Brooklyn Burnham MD    Procedure - EGD  ASA - per anesthesia  Mallampati - per anesthesia  History of Anesthesia problems - no  Family history Anesthesia problems -  no   Plan of anesthesia - MAC    HPI:  This is a 82 y.o. female here for evaluation of suspected Rodriguez's esophagus, and also for follow-up of acute gastric ulcer.  She is no longer on Plavix, with no plans to resume.  She takes Protonix intermittently, about 3 times per week.  She has intermittent heartburn symptoms.         ROS:  Constitutional: No fevers, chills  CV: No chest pain  Pulm: No cough, No shortness of breath  Ophtho: No vision changes  GI: see HPI    Medical History:  has a past medical history of Cancer.    Surgical History:  has a past surgical history that includes Carpal tunnel release; Eye surgery; Tonsillectomy; Tubal ligation; and Esophagogastroduodenoscopy (N/A, 8/22/2022).    Family History: family history is not on file.. Otherwise no colon cancer, inflammatory bowel disease, or GI malignancies.    Social History:  reports that she has never smoked. She has never used smokeless tobacco. She reports that she does not drink alcohol and does not use drugs.    Review of patient's allergies indicates:  No Known Allergies    Medications:   Medications Prior to Admission   Medication Sig Dispense Refill Last Dose    amLODIPine (NORVASC) 5 MG tablet Take 1 tablet (5 mg total) by mouth once daily. 30 tablet 1 12/4/2022    amoxicillin-clavulanate 875-125mg (AUGMENTIN) 875-125 mg per tablet Take 1 tablet by mouth 2 (two) times daily.   12/4/2022    atorvastatin (LIPITOR) 80 MG tablet Take 1 tablet (80 mg total) by mouth every evening. 30 tablet 3 12/4/2022    ciprofloxacin HCl (CIPRO) 500 MG tablet Take 1 tablet (500 mg total) by mouth 2 (two) times daily. 20 tablet 0 12/4/2022    cyanocobalamin (VITAMIN B-12) 1000 MCG tablet Take 1,000 mcg by mouth once daily.   12/4/2022    ferrous  sulfate (FEROSUL) 220 mg (44 mg iron)/5 mL Elix Take 6.82 mLs (300.08 mg total) by mouth once daily. 205 mL 1 12/4/2022    mv,Ca,min-folic acid-vit K1 (ONE-A-DAY WOMEN'S 50 PLUS) 400-20 mcg Tab Take 1 tablet by mouth once daily.   12/4/2022    pantoprazole (PROTONIX) 40 MG tablet Take 1 tablet (40 mg total) by mouth once daily. 30 tablet 1 12/4/2022    RESTASIS 0.05 % ophthalmic emulsion Place 1 drop into both eyes 2 (two) times daily.   12/4/2022    vitamin D (VITAMIN D3) 1000 units Tab Take 1,000 Units by mouth once daily.   12/4/2022    alendronate (FOSAMAX) 70 MG tablet Take 1 tablet (70 mg total) by mouth every 7 days. 4 tablet 11     aspirin 81 MG Chew Chew and swallow 1 tablet (81 mg total) by mouth once. for 1 dose 30 tablet 1        Physical Exam:    Vital Signs:   Vitals:    12/05/22 1246   BP: (!) 118/57   Pulse: 63   Resp: 16   Temp: 98.1 °F (36.7 °C)       General Appearance: Well appearing in no acute distress  Eyes:    No scleral icterus  Lungs: CTA anteriorly  Heart:  Regular rate and rhythm   Abdomen: Soft, non tender, non distended with normal bowel sounds.      Labs:  Lab Results   Component Value Date    WBC 4.32 11/04/2022    HGB 11.3 (L) 11/04/2022    HCT 34.2 (L) 11/04/2022    MCV 89 11/04/2022     11/04/2022        BMP  Lab Results   Component Value Date     09/16/2022    K 4.7 09/16/2022     09/16/2022    CO2 28 09/16/2022    BUN 14 09/16/2022    CREATININE 0.7 09/16/2022    CALCIUM 9.8 09/16/2022    ANIONGAP 8 09/16/2022    ESTGFRAFRICA >60 07/22/2022    EGFRNONAA >60 07/22/2022     Lab Results   Component Value Date    INR 0.9 07/20/2022    INR 1.0 06/17/2015          Assessment:  82 y.o. female with GERD, suspected Rodriguez's esophagus, and history of acute gastric ulcer.     Plan:  Proceed with EGD today.  I have explained the risks and benefits of endoscopy procedures to the patient including but not limited to bleeding, perforation, infection, and death.  All  questions answered.      Steven Lopez MD

## 2022-12-05 NOTE — PROVATION PATIENT INSTRUCTIONS
Discharge Summary/Instructions after an Endoscopic Procedure  Patient Name: Katelyn Caba  Patient MRN: [id number]  Patient YOB: 1940  Monday, December 5, 2022  Steven Lopez MD  Dear patient,  As a result of recent federal legislation (The Federal Cures Act), you may   receive lab or pathology results from your procedure in your [facility]   account before your physician is able to contact you. Your physician or   their representative will relay the results to you with their   recommendations at their soonest availability.  Thank you,  RESTRICTIONS:  During your procedure today, you received medications for sedation.  These   medications may affect your judgment, balance and coordination.  Therefore,   for 24 hours, you have the following restrictions:   - DO NOT drive a car, operate machinery, make legal/financial decisions,   sign important papers or drink alcohol.    ACTIVITY:  Today: no heavy lifting, straining or running due to procedural   sedation/anesthesia.  The following day: return to full activity including work.  DIET:  Eat and drink normally unless instructed otherwise.     TREATMENT FOR COMMON SIDE EFFECTS:  - Mild abdominal pain, nausea, belching, bloating or excessive gas:  rest,   eat lightly and use a heating pad.  - Sore Throat: treat with throat lozenges and/or gargle with warm salt   water.  - Because air was used during the procedure, expelling large amounts of air   from your rectum or belching is normal.  - If a bowel prep was taken, you may not have a bowel movement for 1-3 days.    This is normal.  SYMPTOMS TO WATCH FOR AND REPORT TO YOUR PHYSICIAN:  1. Abdominal pain or bloating, other than gas cramps.  2. Chest pain.  3. Back pain.  4. Signs of infection such as: chills or fever occurring within 24 hours   after the procedure.  5. Rectal bleeding, which would show as bright red, maroon, or black stools.   (A tablespoon of blood from the rectum is not serious, especially if    hemorrhoids are present.)  6. Vomiting.  7. Weakness or dizziness.  GO DIRECTLY TO THE NEAREST EMERGENCY ROOM IF YOU HAVE ANY OF THE FOLLOWING:      Difficulty breathing              Chills and/or fever over 101 F   Persistent vomiting and/or vomiting blood   Severe abdominal pain   Severe chest pain   Black, tarry stools   Bleeding- more than one tablespoon   Any other symptom or condition that you feel may need urgent attention  Your doctor recommends these additional instructions:  If any biopsies were taken, your doctors clinic will contact you in 1 to 2   weeks with any results.  - Discharge patient to home.   - Patient has a contact number available for emergencies.  The signs and   symptoms of potential delayed complications were discussed with the   patient.  Return to normal activities tomorrow.  Written discharge   instructions were provided to the patient.   - Resume previous diet.   - Continue present medications.   - Await pathology results.   - Repeat upper endoscopy in 3 years for surveillance of Rodriguez's esophagus.     For questions, problems or results please call your physician - Steven Lopez MD at Work:  [phone].  [facility], EMERGENCY ROOM PHONE NUMBER: [phone]  IF A COMPLICATION OR EMERGENCY SITUATION ARISES AND YOU ARE UNABLE TO REACH   YOUR PHYSICIAN - GO DIRECTLY TO THE EMERGENCY ROOM.  Steven Lopez MD  12/5/2022 2:26:03 PM  This report has been verified and signed electronically.  Dear patient,  As a result of recent federal legislation (The Federal Cures Act), you may   receive lab or pathology results from your procedure in your [facility]   account before your physician is able to contact you. Your physician or   their representative will relay the results to you with their   recommendations at their soonest availability.  Thank you,  PROVATION

## 2022-12-06 NOTE — ANESTHESIA POSTPROCEDURE EVALUATION
Anesthesia Post Evaluation    Patient: Katelyn Caba    Procedure(s) Performed: Procedure(s) (LRB):  EGD (ESOPHAGOGASTRODUODENOSCOPY) (N/A)    Final Anesthesia Type: general      Patient location during evaluation: PACU  Patient participation: Yes- Able to Participate  Level of consciousness: awake  Post-procedure vital signs: reviewed and stable  Pain management: adequate  Airway patency: patent    PONV status at discharge: No PONV  Anesthetic complications: no      Cardiovascular status: blood pressure returned to baseline  Respiratory status: unassisted  Hydration status: euvolemic  Follow-up not needed.          Vitals Value Taken Time   /70 12/05/22 1502   Temp 36.5 °C (97.7 °F) 12/05/22 1409   Pulse 58 12/05/22 1513   Resp 16 12/05/22 1409   SpO2 99 % 12/05/22 1513   Vitals shown include unvalidated device data.      No case tracking events are documented in the log.      Pain/Salena Score: Salena Score: 9 (12/5/2022  3:00 PM)

## 2022-12-16 ENCOUNTER — LAB VISIT (OUTPATIENT)
Dept: LAB | Facility: HOSPITAL | Age: 82
End: 2022-12-16
Attending: STUDENT IN AN ORGANIZED HEALTH CARE EDUCATION/TRAINING PROGRAM
Payer: MEDICARE

## 2022-12-16 DIAGNOSIS — D64.9 SEVERE ANEMIA: ICD-10-CM

## 2022-12-16 LAB
BASOPHILS # BLD AUTO: 0.08 K/UL (ref 0–0.2)
BASOPHILS NFR BLD: 1.7 % (ref 0–1.9)
DIFFERENTIAL METHOD: ABNORMAL
EOSINOPHIL # BLD AUTO: 0.2 K/UL (ref 0–0.5)
EOSINOPHIL NFR BLD: 4.1 % (ref 0–8)
ERYTHROCYTE [DISTWIDTH] IN BLOOD BY AUTOMATED COUNT: 13.7 % (ref 11.5–14.5)
FERRITIN SERPL-MCNC: 14 NG/ML (ref 20–300)
HCT VFR BLD AUTO: 31.6 % (ref 37–48.5)
HGB BLD-MCNC: 10.4 G/DL (ref 12–16)
IMM GRANULOCYTES # BLD AUTO: 0.01 K/UL (ref 0–0.04)
IMM GRANULOCYTES NFR BLD AUTO: 0.2 % (ref 0–0.5)
IRON SERPL-MCNC: 28 UG/DL (ref 30–160)
LYMPHOCYTES # BLD AUTO: 1.3 K/UL (ref 1–4.8)
LYMPHOCYTES NFR BLD: 29 % (ref 18–48)
MCH RBC QN AUTO: 28.9 PG (ref 27–31)
MCHC RBC AUTO-ENTMCNC: 32.9 G/DL (ref 32–36)
MCV RBC AUTO: 88 FL (ref 82–98)
MONOCYTES # BLD AUTO: 0.6 K/UL (ref 0.3–1)
MONOCYTES NFR BLD: 12.8 % (ref 4–15)
NEUTROPHILS # BLD AUTO: 2.4 K/UL (ref 1.8–7.7)
NEUTROPHILS NFR BLD: 52.2 % (ref 38–73)
NRBC BLD-RTO: 0 /100 WBC
PLATELET # BLD AUTO: 366 K/UL (ref 150–450)
PMV BLD AUTO: 9.6 FL (ref 9.2–12.9)
RBC # BLD AUTO: 3.6 M/UL (ref 4–5.4)
SATURATED IRON: 7 % (ref 20–50)
TOTAL IRON BINDING CAPACITY: 388 UG/DL (ref 250–450)
TRANSFERRIN SERPL-MCNC: 262 MG/DL (ref 200–375)
WBC # BLD AUTO: 4.62 K/UL (ref 3.9–12.7)

## 2022-12-16 PROCEDURE — 36415 COLL VENOUS BLD VENIPUNCTURE: CPT | Mod: PN | Performed by: STUDENT IN AN ORGANIZED HEALTH CARE EDUCATION/TRAINING PROGRAM

## 2022-12-16 PROCEDURE — 84466 ASSAY OF TRANSFERRIN: CPT | Performed by: STUDENT IN AN ORGANIZED HEALTH CARE EDUCATION/TRAINING PROGRAM

## 2022-12-16 PROCEDURE — 85025 COMPLETE CBC W/AUTO DIFF WBC: CPT | Mod: PN | Performed by: STUDENT IN AN ORGANIZED HEALTH CARE EDUCATION/TRAINING PROGRAM

## 2022-12-16 PROCEDURE — 82728 ASSAY OF FERRITIN: CPT | Performed by: STUDENT IN AN ORGANIZED HEALTH CARE EDUCATION/TRAINING PROGRAM

## 2022-12-19 ENCOUNTER — TELEPHONE (OUTPATIENT)
Dept: HEMATOLOGY/ONCOLOGY | Facility: CLINIC | Age: 82
End: 2022-12-19
Payer: MEDICARE

## 2022-12-20 ENCOUNTER — TELEPHONE (OUTPATIENT)
Dept: INFUSION THERAPY | Facility: HOSPITAL | Age: 82
End: 2022-12-20
Payer: MEDICARE

## 2022-12-20 ENCOUNTER — TELEPHONE (OUTPATIENT)
Dept: ENDOSCOPY | Facility: HOSPITAL | Age: 82
End: 2022-12-20
Payer: MEDICARE

## 2022-12-20 ENCOUNTER — OFFICE VISIT (OUTPATIENT)
Dept: HEMATOLOGY/ONCOLOGY | Facility: CLINIC | Age: 82
End: 2022-12-20
Payer: MEDICARE

## 2022-12-20 VITALS
TEMPERATURE: 98 F | DIASTOLIC BLOOD PRESSURE: 63 MMHG | OXYGEN SATURATION: 97 % | SYSTOLIC BLOOD PRESSURE: 147 MMHG | HEART RATE: 64 BPM | RESPIRATION RATE: 16 BRPM | BODY MASS INDEX: 23.01 KG/M2 | HEIGHT: 63 IN | WEIGHT: 129.88 LBS

## 2022-12-20 DIAGNOSIS — Z86.73 H/O ISCHEMIC LEFT MCA STROKE: ICD-10-CM

## 2022-12-20 DIAGNOSIS — K92.2 GASTROINTESTINAL HEMORRHAGE, UNSPECIFIED GASTROINTESTINAL HEMORRHAGE TYPE: ICD-10-CM

## 2022-12-20 DIAGNOSIS — D64.9 SEVERE ANEMIA: Primary | ICD-10-CM

## 2022-12-20 LAB
FINAL PATHOLOGIC DIAGNOSIS: NORMAL
GROSS: NORMAL
Lab: NORMAL

## 2022-12-20 PROCEDURE — 99214 PR OFFICE/OUTPT VISIT, EST, LEVL IV, 30-39 MIN: ICD-10-PCS | Mod: S$PBB,,, | Performed by: STUDENT IN AN ORGANIZED HEALTH CARE EDUCATION/TRAINING PROGRAM

## 2022-12-20 PROCEDURE — 99999 PR PBB SHADOW E&M-EST. PATIENT-LVL IV: ICD-10-PCS | Mod: PBBFAC,,, | Performed by: STUDENT IN AN ORGANIZED HEALTH CARE EDUCATION/TRAINING PROGRAM

## 2022-12-20 PROCEDURE — 99214 OFFICE O/P EST MOD 30 MIN: CPT | Mod: PBBFAC,PN | Performed by: STUDENT IN AN ORGANIZED HEALTH CARE EDUCATION/TRAINING PROGRAM

## 2022-12-20 PROCEDURE — 99214 OFFICE O/P EST MOD 30 MIN: CPT | Mod: S$PBB,,, | Performed by: STUDENT IN AN ORGANIZED HEALTH CARE EDUCATION/TRAINING PROGRAM

## 2022-12-20 PROCEDURE — 99999 PR PBB SHADOW E&M-EST. PATIENT-LVL IV: CPT | Mod: PBBFAC,,, | Performed by: STUDENT IN AN ORGANIZED HEALTH CARE EDUCATION/TRAINING PROGRAM

## 2022-12-20 NOTE — TELEPHONE ENCOUNTER
----- Message from Yisel Trevino sent at 12/20/2022  3:18 PM CST -----  Regarding: Appointment  Contact: [phone]  Calling to schedule a follow up appointment. Please call to schedule

## 2022-12-20 NOTE — PROGRESS NOTES
Subjective:                                                                                     Patient ID:    Name: Katelyn Caba  : 1940  MRN: [id number]      HPI:   Katelyn Caba is a 82 y.o. female presents for evaluation of Follow-up and Anemia      Patient as referred to us for anemia, sever enough that around 22 needed 2u pRBC blood transfusion, Hb 4.6/4.9.Looking back at her blood work , she is being anemia since 2018, normal CBC in  (no blood work in between). Significant history, 22; had an admissions for TIA/ stroke/expressive aphasia and seems that patient was given plavix for 21 days followed by aspirin and patient noticed that her generalized weakness and fatigued started worsening around that time . 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 Biopsy were negative for any malignancy, never had a colonoscopy      s/p IV iron ferumoxytol 2022, stable Hb/ferritin/Hct, symptoms improved significantly, planning on getting EGD.     Today, s/p EGD 22; martin's esophagus, hiatal hernia and fundic gland polyps, path metaplasia, no dysplasia , we discussed the drop again in her ferritin and trending down Hb and the need for colonoscopy and possible repeating IV iron , patient denies any dark stool or bleeding or worsening of her symptoms ,explain that we dont have to wait until hb <7 for her to show symptoms like last time     Past Medical History:   Diagnosis Date    Cancer     basal cell carcinoma       Past Surgical History:   Procedure Laterality Date    CARPAL TUNNEL RELEASE      ESOPHAGOGASTRODUODENOSCOPY N/A 2022    Procedure: EGD (ESOPHAGOGASTRODUODENOSCOPY);  Surgeon: Steven Lopez MD;  Location: [address];  Service: Endoscopy;  Laterality: N/A;    ESOPHAGOGASTRODUODENOSCOPY N/A 2022    Procedure: EGD (ESOPHAGOGASTRODUODENOSCOPY);   "Surgeon: Steven Lopez MD;  Location: [facility] ([address] [place] [address]);  Service: Endoscopy;  Laterality: N/A;  Has estimated pulmonary artery pressure 45, schedule location per protocol.   Please schedule with Dr. Darryl Lopez-  Plavix stopped 8/23/22  pt requested to schedule after Thanksgiving/ inst portal-RB  pre call complete; [facility] 11/28/22    EYE SURGERY      left eye cataract    TONSILLECTOMY      TUBAL LIGATION         History reviewed. No pertinent family history.    Social History     Socioeconomic History    Marital status:    Tobacco Use    Smoking status: Never    Smokeless tobacco: Never   Substance and Sexual Activity    Alcohol use: No    Drug use: No       Review of patient's allergies indicates:  No Known Allergies    Review of Systems   Constitutional: Positive for malaise/fatigue. Negative for decreased appetite, fever and night sweats.   Eyes:  Negative for blurred vision, double vision, pain and visual disturbance.   Cardiovascular:  Negative for chest pain.   Respiratory:  Negative for cough and shortness of breath.    Endocrine: Positive for cold intolerance.   Hematologic/Lymphatic: Negative for adenopathy and bleeding problem. Does not bruise/bleed easily.   Skin:  Negative for rash.   Musculoskeletal:  Negative for back pain.   Gastrointestinal:  Negative for abdominal pain and diarrhea.   Genitourinary:  Negative for frequency.   Neurological:  Negative for headaches.   Psychiatric/Behavioral:  The patient is not nervous/anxious.           Objective:     Vitals:    12/20/22 1436   BP: (!) 147/63   BP Location: Left arm   Patient Position: Sitting   BP Method: Medium (Automatic)   Pulse: 64   Resp: 16   Temp: 97.5 °F (36.4 °C)   TempSrc: Temporal   SpO2: 97%   Weight: 58.9 kg (129 lb 13.6 oz)   Height: 5' 3" (1.6 m)          Physical Exam  Constitutional:       General: She is not in acute distress.     Appearance: Normal appearance. She is normal weight.   HENT:      Head: " Normocephalic and atraumatic.   Eyes:      General: No scleral icterus.  Cardiovascular:      Rate and Rhythm: Normal rate and regular rhythm.      Heart sounds: Normal heart sounds.   Pulmonary:      Effort: Pulmonary effort is normal.      Breath sounds: Normal breath sounds. No wheezing.   Chest:      Chest wall: No tenderness.   Abdominal:      General: Bowel sounds are normal. There is no distension.      Palpations: Abdomen is soft. There is no mass.   Musculoskeletal:         General: No swelling or tenderness.   Lymphadenopathy:      Cervical: No cervical adenopathy.   Skin:     Coloration: Skin is not jaundiced or pale.   Neurological:      General: No focal deficit present.      Mental Status: She is oriented to person, place, and time.   Psychiatric:         Mood and Affect: Mood normal.         Behavior: Behavior normal.         Current Outpatient Medications on File Prior to Visit   Medication Sig    amLODIPine (NORVASC) 5 MG tablet Take 1 tablet (5 mg total) by mouth once daily.    atorvastatin (LIPITOR) 80 MG tablet Take 1 tablet (80 mg total) by mouth every evening.    cyanocobalamin (VITAMIN B-12) 1000 MCG tablet Take 1,000 mcg by mouth once daily.    mv,Ca,min-folic acid-vit K1 (ONE-A-DAY WOMEN'S 50 PLUS) 400-20 mcg Tab Take 1 tablet by mouth once daily.    pantoprazole (PROTONIX) 40 MG tablet Take 1 tablet (40 mg total) by mouth once daily.    RESTASIS 0.05 % ophthalmic emulsion Place 1 drop into both eyes 2 (two) times daily.    vitamin D (VITAMIN D3) 1000 units Tab Take 1,000 Units by mouth once daily.    alendronate (FOSAMAX) 70 MG tablet Take 1 tablet (70 mg total) by mouth every 7 days.    amoxicillin-clavulanate 875-125mg (AUGMENTIN) 875-125 mg per tablet Take 1 tablet by mouth 2 (two) times daily.    aspirin 81 MG Chew Chew and swallow 1 tablet (81 mg total) by mouth once. for 1 dose    ciprofloxacin HCl (CIPRO) 500 MG tablet Take 1 tablet (500 mg total) by mouth 2 (two) times daily.     ferrous sulfate (FEROSUL) 220 mg (44 mg iron)/5 mL Elix Take 6.82 mLs (300.08 mg total) by mouth once daily.    [DISCONTINUED] clopidogreL (PLAVIX) 75 mg tablet Take 1 tablet (75 mg total) by mouth once daily. for 21 days     No current facility-administered medications on file prior to visit.       CBC:  Lab Results   Component Value Date    WBC 4.62 12/16/2022    HGB 10.4 (L) 12/16/2022    HCT 31.6 (L) 12/16/2022    MCV 88 12/16/2022     12/16/2022         CMP:  Sodium   Date Value Ref Range Status   09/16/2022 141 136 - 145 mmol/L Final     Potassium   Date Value Ref Range Status   09/16/2022 4.7 3.5 - 5.1 mmol/L Final     Chloride   Date Value Ref Range Status   09/16/2022 105 95 - 110 mmol/L Final     CO2   Date Value Ref Range Status   09/16/2022 28 23 - 29 mmol/L Final     Glucose   Date Value Ref Range Status   09/16/2022 85 70 - 110 mg/dL Final     BUN   Date Value Ref Range Status   09/16/2022 14 8 - 23 mg/dL Final     Creatinine   Date Value Ref Range Status   09/16/2022 0.7 0.5 - 1.4 mg/dL Final     Calcium   Date Value Ref Range Status   09/16/2022 9.8 8.7 - 10.5 mg/dL Final     Total Protein   Date Value Ref Range Status   09/16/2022 6.8 6.0 - 8.4 g/dL Final     Albumin   Date Value Ref Range Status   09/16/2022 3.9 3.5 - 5.2 g/dL Final     Total Bilirubin   Date Value Ref Range Status   09/16/2022 0.3 0.1 - 1.0 mg/dL Final     Comment:     For infants and newborns, interpretation of results should be based  on gestational age, weight and in agreement with clinical  observations.    Premature Infant recommended reference ranges:  Up to 24 hours.............<8.0 mg/dL  Up to 48 hours............<12.0 mg/dL  3-5 days..................<15.0 mg/dL  6-29 days.................<15.0 mg/dL       Alkaline Phosphatase   Date Value Ref Range Status   09/16/2022 100 55 - 135 U/L Final     AST   Date Value Ref Range Status   09/16/2022 21 10 - 40 U/L Final     ALT   Date Value Ref Range Status   09/16/2022  15 10 - 44 U/L Final     Anion Gap   Date Value Ref Range Status   09/16/2022 8 8 - 16 mmol/L Final     eGFR if    Date Value Ref Range Status   07/22/2022 >60 >60 mL/min/1.73 m^2 Final     eGFR if non    Date Value Ref Range Status   07/22/2022 >60 >60 mL/min/1.73 m^2 Final     Comment:     Calculation used to obtain the estimated glomerular filtration  rate (eGFR) is the CKD-EPI equation.          Lab Results   Component Value Date    IRON 28 (L) 12/16/2022    TIBC 388 12/16/2022    FERRITIN 14 (L) 12/16/2022       No results found for: LWHJMBLA09,No results found for: FOLATE    Echo  · Moderate left atrial enlargement.  · The left ventricle is normal in size with normal systolic function.  · The estimated ejection fraction is 65%.  · Grade II left ventricular diastolic dysfunction.  · Normal right ventricular size with normal right ventricular systolic   function.  · Mild mitral regurgitation.  · Mild tricuspid regurgitation.  · Normal central venous pressure (3 mmHg).  · There is pulmonary hypertension. The estimated PA systolic pressure is   45 mmHg.     US Lower Extremity Veins Bilateral  Narrative: EXAMINATION:  US LOWER EXTREMITY VEINS BILATERAL    CLINICAL HISTORY:  Edema with elevated d dimer;    TECHNIQUE:  Duplex and color flow Doppler and dynamic compression was performed of the bilateral lower extremity veins was performed.    COMPARISON:  None    FINDINGS:  Right thigh veins: The common femoral, femoral, popliteal, upper greater saphenous, and deep femoral veins are patent and free of thrombus. The veins are normally compressible and have normal phasic flow and augmentation response.    Right calf veins: The visualized calf veins are patent with normal color flow.    Left thigh veins: The common femoral, femoral, popliteal, upper greater saphenous, and deep femoral veins are patent and free of thrombus. The veins are normally compressible and have normal phasic flow and  "augmentation response.    Left calf veins: The visualized calf veins are patent with normal color flow.    Miscellaneous: None  Impression: No evidence of acute deep venous thrombosis in the left or right lower extremity veins.    Electronically signed by resident: Carlos Enrique Ugalde  Date:    08/21/2022  Time:    17:04    Electronically signed by: Troy Yeung MD  Date:    08/21/2022  Time:    17:09  X-Ray Chest AP Portable  Narrative: EXAMINATION:  XR CHEST AP PORTABLE    CLINICAL HISTORY:  Provided history is "  Shortness of breath".    TECHNIQUE:  One view of the chest.    COMPARISON:  07/20/2022.    FINDINGS:  Cardiac wires overlie the chest.  Cardiac silhouette is enlarged and similar to the prior study.  Atherosclerotic calcifications overlie the aortic arch.  There is central vascular congestion with worsening diffuse bilateral coarsened interstitial lung markings suggestive of interstitial edema.  New bilateral pleural effusions with adjacent passive atelectasis or airspace disease in the lung bases.  No distinct pneumothorax.  Impression: Cardiomegaly with interstitial edema and new bilateral pleural effusions, suggestive of CHF exacerbation and/or volume overload.    Electronically signed by: Richard Chavez MD  Date:    08/21/2022  Time:    01:22         All pertinent labs and imaging reviewed.    Assessment:       1. Severe anemia    2. H/O ischemic left MCA stroke    3. Gastrointestinal hemorrhage, unspecified gastrointestinal hemorrhage type        # SEVER /symptomatic microcytic anemia  - likely due to GI bleeding; EGD with gastric ulcer and duodenum angiectasis, path negative for malignancy  - following up with GI closely, still need colonoscopy to rule out colon cancer  - ferritin <20, will need IV iron and possible might need it more than 1 round  - s/p ferumyoxytol x2 in Sep/2022, still stable Hb/hct/Ferritin   - no need for transfusions, off  plavix, on aspirin  - EGD  with no active bleeding but " polyps, hernia and barrette esophagitis   - Never did a colonoscopy, worried about her age and complication with getting it done from perforation , we discussed that we definitely need to rule out colon cancer , will reach out to GI team if they can at least discuss it with her and if she declined maybe a sigmoidoscopy. Will defer to GI   - scheduling another IV iron and blood work afterwards     # Hx of Stroke w a recent TIA  - was on plavix and aspirin, now on on aspirin only   - following up with neurology      Plan:     Severe anemia  -     heparin, porcine (PF) 100 unit/mL injection flush 500 Units  -     alteplase injection 2 mg  -     heparin, porcine (PF) 100 unit/mL injection flush 500 Units  -     alteplase injection 2 mg    H/O ischemic left MCA stroke    Gastrointestinal hemorrhage, unspecified gastrointestinal hemorrhage type  -     ferumoxytoL (FERAHEME) 510 mg in dextrose 5 % 100 mL IVPB  -     sodium chloride 0.9% 250 mL flush bag  -     sodium chloride 0.9% flush 10 mL  -     heparin, porcine (PF) 100 unit/mL injection flush 500 Units  -     alteplase injection 2 mg  -     ferumoxytoL (FERAHEME) 510 mg in dextrose 5 % 100 mL IVPB  -     sodium chloride 0.9% 250 mL flush bag  -     sodium chloride 0.9% flush 10 mL  -     heparin, porcine (PF) 100 unit/mL injection flush 500 Units  -     alteplase injection 2 mg           Patient queried and all questions were answered.        Recommend COVID guidelines being followed     Recommend  exercise, nutrition and weight management and health maintenance activities and follow-up with PCPs recommendations       Signed:  Katherine Marie MD  Hematology and Oncology  [facility]  [facility] [place] [facility]  Route Chart for Scheduling    Med Onc Chart Routing      Follow up with physician 6 weeks. IV iron as soon as possible . blood work 2-3 days prior to next tanvi CBC/Ferritin/TIBC, need to reach out to Dr Pendleton (Gi) for follow up tanvi and  discussion about colonoscopy   Follow up with LU[facility]    Infusion scheduling note    Injection scheduling note    Labs    Imaging    Pharmacy appointment    Other referrals          Supportive Plan Information  OP FERUMOXYTOL   Katherine Marie MD   Upcoming Treatment Dates - OP FERUMOXYTOL    12/27/2022       Medications       ferumoxytoL (FERAHEME) 510 mg in dextrose 5 % 100 mL IVPB  1/3/2023       Medications       ferumoxytoL (FERAHEME) 510 mg in dextrose 5 % 100 mL IVPB

## 2022-12-21 ENCOUNTER — TELEPHONE (OUTPATIENT)
Dept: ENDOSCOPY | Facility: HOSPITAL | Age: 82
End: 2022-12-21
Payer: MEDICARE

## 2022-12-21 NOTE — TELEPHONE ENCOUNTER
----- Message from Taiwo Mendez MD sent at 12/21/2022  1:15 PM CST -----  Regarding: RE: Appointment  I tried to call her back to discuss if she would now be amenable to a colonoscopy but could not get in touch with her. This would be the next step in her evaluation. She declined a colonoscopy when we discussed it in October but we could certainly schedule her for urgent colonoscopy if she is now amenable to it. She can also schedule an appointment with Dr. Lopez or myself as well, but can happen whenever availability is. Colonoscopy can get done before that.    ----- Message -----  From: Joan Arteaga MA  Sent: 12/20/2022   4:27 PM CST  To: Taiwo Mendez MD  Subject: RE: Appointment                                  You just saw this patient in Oct and she had an EGD  Please see below and advise    Adriana  ----- Message -----  From: Amy Hernandez RN  Sent: 12/20/2022   3:33 PM CST  To: John Harris Staff  Subject: Appointment                                      Good Afternoon, Dr Marie saw the attached patient in clinic today and asked that I reach out to you to see if this patient can get scheduled as soon as possible for an in office visit with Dr Lopez. The patient's ferritin and iron are dropping and she is concerned that she is bleeding and possibly has colon cancer. Please call the patient to get her scheduled. Thank you.

## 2022-12-26 RX ORDER — SODIUM CHLORIDE 0.9 % (FLUSH) 0.9 %
10 SYRINGE (ML) INJECTION
Status: CANCELLED | OUTPATIENT
Start: 2023-01-09

## 2022-12-26 RX ORDER — SODIUM CHLORIDE 0.9 % (FLUSH) 0.9 %
10 SYRINGE (ML) INJECTION
Status: CANCELLED | OUTPATIENT
Start: 2023-01-04

## 2022-12-26 RX ORDER — HEPARIN 100 UNIT/ML
500 SYRINGE INTRAVENOUS
Status: CANCELLED | OUTPATIENT
Start: 2023-01-09

## 2022-12-26 RX ORDER — HEPARIN 100 UNIT/ML
500 SYRINGE INTRAVENOUS
Status: CANCELLED | OUTPATIENT
Start: 2023-01-04

## 2022-12-29 ENCOUNTER — TELEPHONE (OUTPATIENT)
Dept: INFUSION THERAPY | Facility: HOSPITAL | Age: 82
End: 2022-12-29
Payer: MEDICARE

## 2022-12-29 ENCOUNTER — TELEPHONE (OUTPATIENT)
Dept: HEMATOLOGY/ONCOLOGY | Facility: CLINIC | Age: 82
End: 2022-12-29
Payer: MEDICARE

## 2022-12-30 ENCOUNTER — TELEPHONE (OUTPATIENT)
Dept: HEMATOLOGY/ONCOLOGY | Facility: CLINIC | Age: 82
End: 2022-12-30
Payer: MEDICARE

## 2022-12-30 NOTE — TELEPHONE ENCOUNTER
Spoke with the pt and she stated on 12/29 that she needed to change her infusion time on 01/04. I spoke with the pt on 12/30 and she stated that she will keep the appt time for infusion that she already have scheduled. Pt verbalized and understanding.

## 2023-01-04 ENCOUNTER — INFUSION (OUTPATIENT)
Dept: INFUSION THERAPY | Facility: HOSPITAL | Age: 83
End: 2023-01-04
Attending: STUDENT IN AN ORGANIZED HEALTH CARE EDUCATION/TRAINING PROGRAM
Payer: MEDICARE

## 2023-01-04 VITALS
DIASTOLIC BLOOD PRESSURE: 64 MMHG | RESPIRATION RATE: 18 BRPM | TEMPERATURE: 98 F | SYSTOLIC BLOOD PRESSURE: 140 MMHG | HEART RATE: 63 BPM

## 2023-01-04 DIAGNOSIS — K92.2 GASTROINTESTINAL HEMORRHAGE, UNSPECIFIED GASTROINTESTINAL HEMORRHAGE TYPE: ICD-10-CM

## 2023-01-04 DIAGNOSIS — D64.9 SYMPTOMATIC ANEMIA: Primary | ICD-10-CM

## 2023-01-04 DIAGNOSIS — D64.9 SEVERE ANEMIA: ICD-10-CM

## 2023-01-04 PROCEDURE — 96365 THER/PROPH/DIAG IV INF INIT: CPT | Mod: PN

## 2023-01-04 PROCEDURE — 25000003 PHARM REV CODE 250: Mod: PN | Performed by: STUDENT IN AN ORGANIZED HEALTH CARE EDUCATION/TRAINING PROGRAM

## 2023-01-04 PROCEDURE — 63600175 PHARM REV CODE 636 W HCPCS: Mod: JG,PN | Performed by: STUDENT IN AN ORGANIZED HEALTH CARE EDUCATION/TRAINING PROGRAM

## 2023-01-04 RX ORDER — SODIUM CHLORIDE 0.9 % (FLUSH) 0.9 %
10 SYRINGE (ML) INJECTION
Status: DISCONTINUED | OUTPATIENT
Start: 2023-01-04 | End: 2023-01-04 | Stop reason: HOSPADM

## 2023-01-04 RX ORDER — HEPARIN 100 UNIT/ML
500 SYRINGE INTRAVENOUS
Status: DISCONTINUED | OUTPATIENT
Start: 2023-01-04 | End: 2023-01-04 | Stop reason: HOSPADM

## 2023-01-04 RX ADMIN — FERUMOXYTOL 510 MG: 510 INJECTION INTRAVENOUS at 02:01

## 2023-01-04 RX ADMIN — SODIUM CHLORIDE: 9 INJECTION, SOLUTION INTRAVENOUS at 02:01

## 2023-01-04 NOTE — PLAN OF CARE
Tolerated feraheme well.  Observed pt 30 minutes post infusion, no reactions noted.  No questions or concerns at this time.  Ambulated off unit in NAD.

## 2023-01-09 ENCOUNTER — INFUSION (OUTPATIENT)
Dept: INFUSION THERAPY | Facility: HOSPITAL | Age: 83
End: 2023-01-09
Attending: STUDENT IN AN ORGANIZED HEALTH CARE EDUCATION/TRAINING PROGRAM
Payer: MEDICARE

## 2023-01-09 VITALS
SYSTOLIC BLOOD PRESSURE: 134 MMHG | DIASTOLIC BLOOD PRESSURE: 50 MMHG | HEART RATE: 64 BPM | HEIGHT: 63 IN | WEIGHT: 126.13 LBS | TEMPERATURE: 98 F | RESPIRATION RATE: 18 BRPM | BODY MASS INDEX: 22.35 KG/M2

## 2023-01-09 DIAGNOSIS — D64.9 SEVERE ANEMIA: ICD-10-CM

## 2023-01-09 DIAGNOSIS — D64.9 SYMPTOMATIC ANEMIA: Primary | ICD-10-CM

## 2023-01-09 DIAGNOSIS — K92.2 GASTROINTESTINAL HEMORRHAGE, UNSPECIFIED GASTROINTESTINAL HEMORRHAGE TYPE: ICD-10-CM

## 2023-01-09 PROCEDURE — 63600175 PHARM REV CODE 636 W HCPCS: Mod: JG,PN | Performed by: STUDENT IN AN ORGANIZED HEALTH CARE EDUCATION/TRAINING PROGRAM

## 2023-01-09 PROCEDURE — 96365 THER/PROPH/DIAG IV INF INIT: CPT | Mod: PN

## 2023-01-09 PROCEDURE — 25000003 PHARM REV CODE 250: Mod: PN | Performed by: STUDENT IN AN ORGANIZED HEALTH CARE EDUCATION/TRAINING PROGRAM

## 2023-01-09 RX ORDER — HEPARIN 100 UNIT/ML
500 SYRINGE INTRAVENOUS
Status: DISCONTINUED | OUTPATIENT
Start: 2023-01-09 | End: 2023-01-09 | Stop reason: HOSPADM

## 2023-01-09 RX ORDER — SODIUM CHLORIDE 0.9 % (FLUSH) 0.9 %
10 SYRINGE (ML) INJECTION
Status: DISCONTINUED | OUTPATIENT
Start: 2023-01-09 | End: 2023-01-09 | Stop reason: HOSPADM

## 2023-01-09 RX ADMIN — SODIUM CHLORIDE: 9 INJECTION, SOLUTION INTRAVENOUS at 01:01

## 2023-01-09 RX ADMIN — FERUMOXYTOL 510 MG: 510 INJECTION INTRAVENOUS at 01:01

## 2023-01-09 NOTE — PLAN OF CARE
Pt tolerated feraheme well today. Reviewed follow-up appointments. All questions were answered, ambulated independently at d/c.

## 2023-01-09 NOTE — PLAN OF CARE
Problem: Adult Inpatient Plan of Care  Goal: Plan of Care Review  Outcome: Ongoing, Progressing  Flowsheets (Taken 1/9/2023 1429)  Plan of Care Reviewed With: patient  Goal: Patient-Specific Goal (Individualized)  Outcome: Ongoing, Progressing  Flowsheets (Taken 1/9/2023 1429)  Anxieties, Fears or Concerns: none voiced  Individualized Care Needs: recliner, television, conversation  Goal: Absence of Hospital-Acquired Illness or Injury  Outcome: Ongoing, Progressing  Intervention: Identify and Manage Fall Risk  Flowsheets (Taken 1/9/2023 1429)  Safety Promotion/Fall Prevention:   assistive device/personal item within reach   in recliner, wheels locked   nonskid shoes/socks when out of bed   instructed to call staff for mobility  Goal: Optimal Comfort and Wellbeing  Outcome: Ongoing, Progressing  Intervention: Provide Person-Centered Care  Flowsheets (Taken 1/9/2023 1429)  Trust Relationship/Rapport:   care explained   questions encouraged   choices provided   reassurance provided   emotional support provided   thoughts/feelings acknowledged   empathic listening provided   questions answered  Goal: Readiness for Transition of Care  Outcome: Ongoing, Progressing   Pt arrived to clinic today for Feraheme infusion and tolerated well. No changes throughout therapy. Pt aware of follow up appointments and side effects of drugs. Discharged to home. NAD.

## 2023-01-26 ENCOUNTER — LAB VISIT (OUTPATIENT)
Dept: LAB | Facility: HOSPITAL | Age: 83
End: 2023-01-26
Attending: STUDENT IN AN ORGANIZED HEALTH CARE EDUCATION/TRAINING PROGRAM
Payer: MEDICARE

## 2023-01-26 DIAGNOSIS — D50.9 MICROCYTIC ANEMIA: ICD-10-CM

## 2023-01-26 LAB
FERRITIN SERPL-MCNC: 724 NG/ML (ref 20–300)
IRON SERPL-MCNC: 58 UG/DL (ref 30–160)
SATURATED IRON: 21 % (ref 20–50)
TOTAL IRON BINDING CAPACITY: 278 UG/DL (ref 250–450)
TRANSFERRIN SERPL-MCNC: 188 MG/DL (ref 200–375)

## 2023-01-26 PROCEDURE — 36415 COLL VENOUS BLD VENIPUNCTURE: CPT | Mod: PN | Performed by: STUDENT IN AN ORGANIZED HEALTH CARE EDUCATION/TRAINING PROGRAM

## 2023-01-26 PROCEDURE — 84466 ASSAY OF TRANSFERRIN: CPT | Performed by: STUDENT IN AN ORGANIZED HEALTH CARE EDUCATION/TRAINING PROGRAM

## 2023-01-26 PROCEDURE — 82728 ASSAY OF FERRITIN: CPT | Performed by: STUDENT IN AN ORGANIZED HEALTH CARE EDUCATION/TRAINING PROGRAM

## 2023-02-01 ENCOUNTER — OFFICE VISIT (OUTPATIENT)
Dept: HEMATOLOGY/ONCOLOGY | Facility: CLINIC | Age: 83
End: 2023-02-01
Payer: MEDICARE

## 2023-02-01 VITALS
RESPIRATION RATE: 16 BRPM | WEIGHT: 127.31 LBS | SYSTOLIC BLOOD PRESSURE: 150 MMHG | HEART RATE: 68 BPM | DIASTOLIC BLOOD PRESSURE: 59 MMHG | BODY MASS INDEX: 22.56 KG/M2 | HEIGHT: 63 IN | OXYGEN SATURATION: 97 % | TEMPERATURE: 97 F

## 2023-02-01 DIAGNOSIS — K92.2 GASTROINTESTINAL HEMORRHAGE, UNSPECIFIED GASTROINTESTINAL HEMORRHAGE TYPE: ICD-10-CM

## 2023-02-01 DIAGNOSIS — R79.1 ABNORMAL COAGULATION PROFILE: ICD-10-CM

## 2023-02-01 DIAGNOSIS — D64.9 SEVERE ANEMIA: Primary | ICD-10-CM

## 2023-02-01 DIAGNOSIS — R58 BLEEDING: ICD-10-CM

## 2023-02-01 DIAGNOSIS — D53.9 NUTRITIONAL ANEMIA, UNSPECIFIED: ICD-10-CM

## 2023-02-01 PROCEDURE — 99999 PR PBB SHADOW E&M-EST. PATIENT-LVL IV: ICD-10-PCS | Mod: PBBFAC,,, | Performed by: STUDENT IN AN ORGANIZED HEALTH CARE EDUCATION/TRAINING PROGRAM

## 2023-02-01 PROCEDURE — 99999 PR PBB SHADOW E&M-EST. PATIENT-LVL IV: CPT | Mod: PBBFAC,,, | Performed by: STUDENT IN AN ORGANIZED HEALTH CARE EDUCATION/TRAINING PROGRAM

## 2023-02-01 PROCEDURE — 99214 PR OFFICE/OUTPT VISIT, EST, LEVL IV, 30-39 MIN: ICD-10-PCS | Mod: S$PBB,,, | Performed by: STUDENT IN AN ORGANIZED HEALTH CARE EDUCATION/TRAINING PROGRAM

## 2023-02-01 PROCEDURE — 99214 OFFICE O/P EST MOD 30 MIN: CPT | Mod: S$PBB,,, | Performed by: STUDENT IN AN ORGANIZED HEALTH CARE EDUCATION/TRAINING PROGRAM

## 2023-02-01 PROCEDURE — 99214 OFFICE O/P EST MOD 30 MIN: CPT | Mod: PBBFAC,PN | Performed by: STUDENT IN AN ORGANIZED HEALTH CARE EDUCATION/TRAINING PROGRAM

## 2023-02-01 NOTE — PROGRESS NOTES
Subjective:                                                                                     Patient ID:    Name: Katelyn Caba  : 1940  MRN: [id number]      HPI:   Katelyn Caba is a 82 y.o. female presents for evaluation of Severe anemia      Patient as referred to us for anemia, sever enough that around 22 needed 2u pRBC blood transfusion, Hb 4.6/4.9.Looking back at her blood work , she is being anemia since 2018, normal CBC in  (no blood work in between). Significant history, 22; had an admissions for TIA/ stroke/expressive aphasia and seems that patient was given plavix for 21 days followed by aspirin and patient noticed that her generalized weakness and fatigued started worsening around that time . 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 Biopsy were negative for any malignancy, never had a colonoscopy      s/p IV iron ferumoxytol 2022, stable Hb/ferritin/Hct, symptoms improved significant,  EGD 22; rafa's esophagus, hiatal hernia and fundic gland polyps, path metaplasia, no dysplasia . S/p another IV ferumoxytol  and 2023     Today, patient is doing well, symptoms improved, denies any dark stool or bleeding or worsening of her symptoms ,explain that we dont have to wait until hb <7 for her to show symptoms like last time , also patient mention that a lot of her family member have similar situation as her, her cousin is under the care of Dr Orozco for ISAIAH and was wondering if they have a familiar bleeding disorder     Past Medical History:   Diagnosis Date    Cancer     basal cell carcinoma       Past Surgical History:   Procedure Laterality Date    CARPAL TUNNEL RELEASE      ESOPHAGOGASTRODUODENOSCOPY N/A 2022    Procedure: EGD (ESOPHAGOGASTRODUODENOSCOPY);  Surgeon: Steven Lopez MD;  Location: [address];  Service: Endoscopy;   "Laterality: N/A;    ESOPHAGOGASTRODUODENOSCOPY N/A 12/5/2022    Procedure: EGD (ESOPHAGOGASTRODUODENOSCOPY);  Surgeon: Steven Lopez MD;  Location: [address]);  Service: Endoscopy;  Laterality: N/A;  Has estimated pulmonary artery pressure 45, schedule location per protocol.   Please schedule with Dr. Darryl Lopez-  Plavix stopped 8/23/22  pt requested to schedule after Thanksgiving/ inst portal-  pre call complete; [facility] 11/28/22    EYE SURGERY      left eye cataract    TONSILLECTOMY      TUBAL LIGATION         History reviewed. No pertinent family history.    Social History     Socioeconomic History    Marital status:    Tobacco Use    Smoking status: Never    Smokeless tobacco: Never   Substance and Sexual Activity    Alcohol use: No    Drug use: No       Review of patient's allergies indicates:  No Known Allergies    Review of Systems   Constitutional: Positive for malaise/fatigue. Negative for decreased appetite, fever and night sweats.   Eyes:  Negative for blurred vision, double vision, pain and visual disturbance.   Cardiovascular:  Negative for chest pain.   Respiratory:  Negative for cough and shortness of breath.    Endocrine: Positive for cold intolerance.   Hematologic/Lymphatic: Negative for adenopathy and bleeding problem. Does not bruise/bleed easily.   Skin:  Negative for rash.   Musculoskeletal:  Negative for back pain.   Gastrointestinal:  Negative for abdominal pain and diarrhea.   Genitourinary:  Negative for frequency.   Neurological:  Negative for headaches.   Psychiatric/Behavioral:  The patient is not nervous/anxious.           Objective:     Vitals:    02/01/23 1254   BP: (!) 150/59   Pulse: 68   Resp: 16   Temp: 97.3 °F (36.3 °C)   TempSrc: Temporal   SpO2: 97%   Weight: 57.7 kg (127 lb 5.1 oz)   Height: 5' 3" (1.6 m)          Physical Exam  Constitutional:       General: She is not in acute distress.     Appearance: Normal appearance. She is normal weight. "   HENT:      Head: Normocephalic and atraumatic.   Eyes:      General: No scleral icterus.  Cardiovascular:      Rate and Rhythm: Normal rate and regular rhythm.      Heart sounds: Normal heart sounds.   Pulmonary:      Effort: Pulmonary effort is normal.      Breath sounds: Normal breath sounds. No wheezing.   Chest:      Chest wall: No tenderness.   Abdominal:      General: Bowel sounds are normal. There is no distension.      Palpations: Abdomen is soft. There is no mass.   Musculoskeletal:         General: No swelling or tenderness.   Lymphadenopathy:      Cervical: No cervical adenopathy.   Skin:     Coloration: Skin is not jaundiced or pale.   Neurological:      General: No focal deficit present.      Mental Status: She is oriented to person, place, and time.   Psychiatric:         Mood and Affect: Mood normal.         Behavior: Behavior normal.         Current Outpatient Medications on File Prior to Visit   Medication Sig    amLODIPine (NORVASC) 5 MG tablet Take 1 tablet (5 mg total) by mouth once daily.    atorvastatin (LIPITOR) 80 MG tablet Take 1 tablet (80 mg total) by mouth every evening.    cyanocobalamin (VITAMIN B-12) 1000 MCG tablet Take 1,000 mcg by mouth once daily.    mv,Ca,min-folic acid-vit K1 (ONE-A-DAY WOMEN'S 50 PLUS) 400-20 mcg Tab Take 1 tablet by mouth once daily.    pantoprazole (PROTONIX) 40 MG tablet Take 1 tablet (40 mg total) by mouth once daily.    RESTASIS 0.05 % ophthalmic emulsion Place 1 drop into both eyes 2 (two) times daily.    vitamin D (VITAMIN D3) 1000 units Tab Take 1,000 Units by mouth once daily.    alendronate (FOSAMAX) 70 MG tablet Take 1 tablet (70 mg total) by mouth every 7 days.    amoxicillin-clavulanate 875-125mg (AUGMENTIN) 875-125 mg per tablet Take 1 tablet by mouth 2 (two) times daily.    aspirin 81 MG Chew Chew and swallow 1 tablet (81 mg total) by mouth once. for 1 dose    ciprofloxacin HCl (CIPRO) 500 MG tablet Take 1 tablet (500 mg total) by mouth 2 (two)  times daily. (Patient not taking: Reported on 2/1/2023)    ferrous sulfate (FEROSUL) 220 mg (44 mg iron)/5 mL Elix Take 6.82 mLs (300.08 mg total) by mouth once daily.    [DISCONTINUED] clopidogreL (PLAVIX) 75 mg tablet Take 1 tablet (75 mg total) by mouth once daily. for 21 days     No current facility-administered medications on file prior to visit.       CBC:  Lab Results   Component Value Date    WBC 4.62 12/16/2022    HGB 10.4 (L) 12/16/2022    HCT 31.6 (L) 12/16/2022    MCV 88 12/16/2022     12/16/2022         CMP:  Sodium   Date Value Ref Range Status   09/16/2022 141 136 - 145 mmol/L Final     Potassium   Date Value Ref Range Status   09/16/2022 4.7 3.5 - 5.1 mmol/L Final     Chloride   Date Value Ref Range Status   09/16/2022 105 95 - 110 mmol/L Final     CO2   Date Value Ref Range Status   09/16/2022 28 23 - 29 mmol/L Final     Glucose   Date Value Ref Range Status   09/16/2022 85 70 - 110 mg/dL Final     BUN   Date Value Ref Range Status   09/16/2022 14 8 - 23 mg/dL Final     Creatinine   Date Value Ref Range Status   09/16/2022 0.7 0.5 - 1.4 mg/dL Final     Calcium   Date Value Ref Range Status   09/16/2022 9.8 8.7 - 10.5 mg/dL Final     Total Protein   Date Value Ref Range Status   09/16/2022 6.8 6.0 - 8.4 g/dL Final     Albumin   Date Value Ref Range Status   09/16/2022 3.9 3.5 - 5.2 g/dL Final     Total Bilirubin   Date Value Ref Range Status   09/16/2022 0.3 0.1 - 1.0 mg/dL Final     Comment:     For infants and newborns, interpretation of results should be based  on gestational age, weight and in agreement with clinical  observations.    Premature Infant recommended reference ranges:  Up to 24 hours.............<8.0 mg/dL  Up to 48 hours............<12.0 mg/dL  3-5 days..................<15.0 mg/dL  6-29 days.................<15.0 mg/dL       Alkaline Phosphatase   Date Value Ref Range Status   09/16/2022 100 55 - 135 U/L Final     AST   Date Value Ref Range Status   09/16/2022 21 10 - 40 U/L  Final     ALT   Date Value Ref Range Status   09/16/2022 15 10 - 44 U/L Final     Anion Gap   Date Value Ref Range Status   09/16/2022 8 8 - 16 mmol/L Final     eGFR if    Date Value Ref Range Status   07/22/2022 >60 >60 mL/min/1.73 m^2 Final     eGFR if non    Date Value Ref Range Status   07/22/2022 >60 >60 mL/min/1.73 m^2 Final     Comment:     Calculation used to obtain the estimated glomerular filtration  rate (eGFR) is the CKD-EPI equation.          Lab Results   Component Value Date    IRON 58 01/26/2023    TIBC 278 01/26/2023    FERRITIN 724 (H) 01/26/2023       No results found for: RNJYZDHV65,No results found for: FOLATE    Echo  · Moderate left atrial enlargement.  · The left ventricle is normal in size with normal systolic function.  · The estimated ejection fraction is 65%.  · Grade II left ventricular diastolic dysfunction.  · Normal right ventricular size with normal right ventricular systolic   function.  · Mild mitral regurgitation.  · Mild tricuspid regurgitation.  · Normal central venous pressure (3 mmHg).  · There is pulmonary hypertension. The estimated PA systolic pressure is   45 mmHg.     US Lower Extremity Veins Bilateral  Narrative: EXAMINATION:  US LOWER EXTREMITY VEINS BILATERAL    CLINICAL HISTORY:  Edema with elevated d dimer;    TECHNIQUE:  Duplex and color flow Doppler and dynamic compression was performed of the bilateral lower extremity veins was performed.    COMPARISON:  None    FINDINGS:  Right thigh veins: The common femoral, femoral, popliteal, upper greater saphenous, and deep femoral veins are patent and free of thrombus. The veins are normally compressible and have normal phasic flow and augmentation response.    Right calf veins: The visualized calf veins are patent with normal color flow.    Left thigh veins: The common femoral, femoral, popliteal, upper greater saphenous, and deep femoral veins are patent and free of thrombus. The veins are  "normally compressible and have normal phasic flow and augmentation response.    Left calf veins: The visualized calf veins are patent with normal color flow.    Miscellaneous: None  Impression: No evidence of acute deep venous thrombosis in the left or right lower extremity veins.    Electronically signed by resident: Carlos Enrique Ugalde  Date:    08/21/2022  Time:    17:04    Electronically signed by: Troy Yeung MD  Date:    08/21/2022  Time:    17:09  X-Ray Chest AP Portable  Narrative: EXAMINATION:  XR CHEST AP PORTABLE    CLINICAL HISTORY:  Provided history is "  Shortness of breath".    TECHNIQUE:  One view of the chest.    COMPARISON:  07/20/2022.    FINDINGS:  Cardiac wires overlie the chest.  Cardiac silhouette is enlarged and similar to the prior study.  Atherosclerotic calcifications overlie the aortic arch.  There is central vascular congestion with worsening diffuse bilateral coarsened interstitial lung markings suggestive of interstitial edema.  New bilateral pleural effusions with adjacent passive atelectasis or airspace disease in the lung bases.  No distinct pneumothorax.  Impression: Cardiomegaly with interstitial edema and new bilateral pleural effusions, suggestive of CHF exacerbation and/or volume overload.    Electronically signed by: Richard Chavez MD  Date:    08/21/2022  Time:    01:22         All pertinent labs and imaging reviewed.    Assessment:       1. Severe anemia    2. Nutritional anemia, unspecified    3. Bleeding    4. Abnormal coagulation profile    5. Gastrointestinal hemorrhage, unspecified gastrointestinal hemorrhage type        # SEVER /symptomatic microcytic anemia  - likely due to GI bleeding; EGD with gastric ulcer and duodenum angiectasis, path negative for malignancy 8/2022   - following up with GI closely, still need colonoscopy to rule out colon cancer; patient never had one and worry about her age   - ferritin <20, will need IV iron and possible might need it more than 1 " round  - s/p ferumyoxytol x2 in Sep/2022, dropped again s/p IV ferumyoxytol 1/2023   - no need for transfusions, off  plavix, on aspirin  - EGD  with no active bleeding but polyps, hernia and barrette esophagitis   - Never did a colonoscopy, worried about her age and complication with getting it done from perforation , we discussed that we definitely need to rule out colon cancer , will reach out to GI team if they can at least discuss it with her and if she declined maybe a sigmoidoscopy. Will defer to GI, especially if she  cont to trend down and requiring IV iron   - checking bleeding disorder, tho rare at her age but will rule out vWD/PT/PTT     # Hx of Stroke w a recent TIA  - was on plavix and aspirin, now on on aspirin only   - following up with neurology      Plan:     Severe anemia  -     CBC w/ DIFF; Future; Expected date: 02/01/2023  -     PT/INR; Future; Expected date: 02/01/2023  -     PTT; Future; Expected date: 02/01/2023  -     von Willebrand Profile; Future; Expected date: 02/01/2023  -     Ferritin; Future; Expected date: 02/01/2023  -     Iron and TIBC; Future; Expected date: 02/01/2023    Nutritional anemia, unspecified  -     CBC w/ DIFF; Future; Expected date: 02/01/2023  -     PT/INR; Future; Expected date: 02/01/2023  -     PTT; Future; Expected date: 02/01/2023  -     von Willebrand Profile; Future; Expected date: 02/01/2023  -     Ferritin; Future; Expected date: 02/01/2023  -     Iron and TIBC; Future; Expected date: 02/01/2023    Bleeding  -     CBC w/ DIFF; Future; Expected date: 02/01/2023  -     PT/INR; Future; Expected date: 02/01/2023  -     PTT; Future; Expected date: 02/01/2023  -     von Willebrand Profile; Future; Expected date: 02/01/2023  -     Ferritin; Future; Expected date: 02/01/2023  -     Iron and TIBC; Future; Expected date: 02/01/2023    Abnormal coagulation profile  -     PTT; Future; Expected date: 02/01/2023    Gastrointestinal hemorrhage, unspecified  gastrointestinal hemorrhage type  -     CT Abdomen Pelvis  Without Contrast; Future; Expected date: 02/01/2023             Patient queried and all questions were answered.        Recommend COVID guidelines being followed     Recommend  exercise, nutrition and weight management and health maintenance activities and follow-up with PCPs recommendations       Signed:  Katherine Marie MD  Hematology and Oncology  [facility]  [facility]  Route Chart for Scheduling    Med Onc Chart Routing      Follow up with physician 2 months. with CBC/Ferritin/TIBC/iron/PT/PTT/VWD/ CT a/p w oral contrast prior to next tanvi   Follow up with TANVI    Infusion scheduling note    Injection scheduling note    Labs    Imaging    Pharmacy appointment    Other referrals          Supportive Plan Information  OP FERUMOXYTOL   Katherine Marie MD   Upcoming Treatment Dates - OP FERUMOXYTOL    No upcoming days in selected categories.

## 2023-02-24 ENCOUNTER — PATIENT MESSAGE (OUTPATIENT)
Dept: RESEARCH | Facility: HOSPITAL | Age: 83
End: 2023-02-24
Payer: MEDICARE

## 2023-03-02 ENCOUNTER — PATIENT MESSAGE (OUTPATIENT)
Dept: RESEARCH | Facility: HOSPITAL | Age: 83
End: 2023-03-02
Payer: MEDICARE

## 2023-03-10 ENCOUNTER — TELEPHONE (OUTPATIENT)
Dept: FAMILY MEDICINE | Facility: CLINIC | Age: 83
End: 2023-03-10
Payer: MEDICARE

## 2023-03-10 NOTE — TELEPHONE ENCOUNTER
----- Message from Gary Wadsworth sent at 3/10/2023  8:50 AM CST -----  Regarding: CALL BACK  ..Type: Patient Call Back    Who called:  DANIELLE MENDOZA [[id number]]    What is the request in detail: Nurse stated that she is needing some images faxed [place] [facility] or someone to call the office in regards to more notes needed.Please contact to further discuss and advise.     Can the clinic reply by [facility]? NO    Would the patient rather a call back or a response via [facility]? CALL     Best call back number: [phone]     Additional Information: .[phone]

## 2023-03-10 NOTE — TELEPHONE ENCOUNTER
Spoke with representative from [facility] she informed she needed notes and imaging for the pt.     Fax number [phone]    Thanks, Ty

## 2023-03-10 NOTE — TELEPHONE ENCOUNTER
----- Message from Ashley Brooks sent at 3/10/2023 10:10 AM CST -----  Regarding: missed call       [place] [facility]         Who Left Message for Patient:Emmanuel         Does the patient know what this is regarding? Yes         Best Call Back Number: [phone]          Additional Information:

## 2023-03-10 NOTE — TELEPHONE ENCOUNTER
Tried calling [facility] about pt no answering machine to leave message.    Thanks, Ty

## 2023-03-10 NOTE — TELEPHONE ENCOUNTER
----- Message from Ashley Brooks sent at 3/10/2023 10:10 AM CST -----  Regarding: missed call       Who Called: Kami [facility]         Who Left Message for Patient:Emmanuel         Does the patient know what this is regarding? Yes         Best Call Back Number: [phone]          Additional Information:

## 2023-03-17 ENCOUNTER — PATIENT MESSAGE (OUTPATIENT)
Dept: RESEARCH | Facility: HOSPITAL | Age: 83
End: 2023-03-17
Payer: MEDICARE

## 2023-03-17 ENCOUNTER — OFFICE VISIT (OUTPATIENT)
Dept: FAMILY MEDICINE | Facility: CLINIC | Age: 83
End: 2023-03-17
Payer: MEDICARE

## 2023-03-17 VITALS
BODY MASS INDEX: 21.83 KG/M2 | HEART RATE: 78 BPM | DIASTOLIC BLOOD PRESSURE: 70 MMHG | SYSTOLIC BLOOD PRESSURE: 155 MMHG | WEIGHT: 123.19 LBS | RESPIRATION RATE: 16 BRPM | TEMPERATURE: 98 F | HEIGHT: 63 IN

## 2023-03-17 DIAGNOSIS — R05.1 ACUTE COUGH: ICD-10-CM

## 2023-03-17 DIAGNOSIS — J18.9 COMMUNITY ACQUIRED PNEUMONIA OF LEFT LOWER LOBE OF LUNG: Primary | ICD-10-CM

## 2023-03-17 PROCEDURE — 99999 PR PBB SHADOW E&M-EST. PATIENT-LVL V: ICD-10-PCS | Mod: PBBFAC,,, | Performed by: NURSE PRACTITIONER

## 2023-03-17 PROCEDURE — 99213 PR OFFICE/OUTPT VISIT, EST, LEVL III, 20-29 MIN: ICD-10-PCS | Mod: S$PBB,,, | Performed by: NURSE PRACTITIONER

## 2023-03-17 PROCEDURE — 71046 X-RAY EXAM CHEST 2 VIEWS: CPT | Mod: S$GLB,,, | Performed by: RADIOLOGY

## 2023-03-17 PROCEDURE — 71046 XR CHEST PA AND LATERAL: ICD-10-PCS | Mod: S$GLB,,, | Performed by: RADIOLOGY

## 2023-03-17 PROCEDURE — 99999 PR PBB SHADOW E&M-EST. PATIENT-LVL V: CPT | Mod: PBBFAC,,, | Performed by: NURSE PRACTITIONER

## 2023-03-17 PROCEDURE — 99215 OFFICE O/P EST HI 40 MIN: CPT | Mod: PBBFAC,PO | Performed by: NURSE PRACTITIONER

## 2023-03-17 PROCEDURE — 99213 OFFICE O/P EST LOW 20 MIN: CPT | Mod: S$PBB,,, | Performed by: NURSE PRACTITIONER

## 2023-03-17 RX ORDER — BENZONATATE 100 MG/1
100 CAPSULE ORAL 3 TIMES DAILY PRN
Qty: 30 CAPSULE | Refills: 1 | Status: SHIPPED | OUTPATIENT
Start: 2023-03-17 | End: 2023-03-27

## 2023-03-17 RX ORDER — PROMETHAZINE HYDROCHLORIDE AND DEXTROMETHORPHAN HYDROBROMIDE 6.25; 15 MG/5ML; MG/5ML
5 SYRUP ORAL NIGHTLY PRN
Qty: 240 ML | Refills: 0 | Status: SHIPPED | OUTPATIENT
Start: 2023-03-17 | End: 2023-03-27

## 2023-03-17 RX ORDER — LEVOFLOXACIN 750 MG/1
750 TABLET ORAL DAILY
Qty: 5 TABLET | Refills: 0 | Status: SHIPPED | OUTPATIENT
Start: 2023-03-17 | End: 2023-03-22

## 2023-03-17 NOTE — PATIENT INSTRUCTIONS
Katelyn,     We are always striving for excellence. Should you receive a patient experience survey via text message, electronically, or by mail, we would appreciate if you would take a few moments to give us your feedback. These surveys let us know our strengths as well as areas of opportunity for improvement to better serve you.    Thank you for your time,  Macy Mendoza LPN     Your test results will be communicated to you via : [facility], Telephone or Letter.   If you have not received your test results in one week, please contact the clinic at [phone].

## 2023-03-17 NOTE — PROGRESS NOTES
Subjective:       Patient ID: Katelyn Caba is a 82 y.o. female.    Chief Complaint: Cough  Katelyn Caba presents today for Evaluation & management of cough. Last seen in 9/16/2022 by PCP, FREDY Burnham MD.    Cough  This is a new problem. The current episode started 1 to 4 weeks ago. The problem has been gradually improving. The problem occurs every few minutes. The cough is Non-productive. Associated symptoms include postnasal drip. Pertinent negatives include no chest pain, fever, myalgias, rhinorrhea, sore throat, shortness of breath or wheezing. Nothing aggravates the symptoms. She has tried nothing for the symptoms.     Patient Active Problem List   Diagnosis    Venous insufficiency    H/O ischemic left MCA stroke    Pure hypercholesterolemia    Right homonymous inferior quadrantanopia    Speaking difficulty    Severe anemia    Volume overload    Gastrointestinal hemorrhage    Elevated blood pressure reading       Current Outpatient Medications:     alendronate (FOSAMAX) 70 MG tablet, Take 1 tablet (70 mg total) by mouth every 7 days., Disp: 4 tablet, Rfl: 11    amLODIPine (NORVASC) 5 MG tablet, Take 1 tablet (5 mg total) by mouth once daily., Disp: 30 tablet, Rfl: 1    aspirin 81 MG Chew, Chew and swallow 1 tablet (81 mg total) by mouth once. for 1 dose, Disp: 30 tablet, Rfl: 1    atorvastatin (LIPITOR) 80 MG tablet, Take 1 tablet (80 mg total) by mouth every evening., Disp: 30 tablet, Rfl: 3    cyanocobalamin (VITAMIN B-12) 1000 MCG tablet, Take 1,000 mcg by mouth once daily., Disp: , Rfl:     mv,Ca,min-folic acid-vit K1 (ONE-A-DAY WOMEN'S 50 PLUS) 400-20 mcg Tab, Take 1 tablet by mouth once daily., Disp: , Rfl:     pantoprazole (PROTONIX) 40 MG tablet, Take 1 tablet (40 mg total) by mouth once daily., Disp: 30 tablet, Rfl: 1    RESTASIS 0.05 % ophthalmic emulsion, Place 1 drop into both eyes 2 (two) times daily., Disp: , Rfl:     vitamin D (VITAMIN D3) 1000 units Tab, Take 1,000 Units by mouth once  "daily., Disp: , Rfl:     amoxicillin-clavulanate 875-125mg (AUGMENTIN) 875-125 mg per tablet, Take 1 tablet by mouth 2 (two) times daily., Disp: , Rfl:     benzonatate (TESSALON) 100 MG capsule, Take 1 capsule (100 mg total) by mouth 3 (three) times daily as needed for Cough., Disp: 30 capsule, Rfl: 1    ferrous sulfate (FEROSUL) 220 mg (44 mg iron)/5 mL Elix, Take 6.82 mLs (300.08 mg total) by mouth once daily., Disp: 205 mL, Rfl: 1    levoFLOXacin (LEVAQUIN) 750 MG tablet, Take 1 tablet (750 mg total) by mouth once daily. for 5 days, Disp: 5 tablet, Rfl: 0    promethazine-dextromethorphan (PROMETHAZINE-DM) 6.25-15 mg/5 mL Syrp, Take 5 mLs by mouth nightly as needed., Disp: 240 mL, Rfl: 0    The following portions of the patient's history were reviewed and updated as appropriate: allergies, past family history, past medical history, past social history and past surgical history.    Review of Systems   Constitutional:  Negative for appetite change, fatigue, fever and unexpected weight change.   HENT:  Positive for postnasal drip. Negative for hearing loss, rhinorrhea, sneezing, sore throat and trouble swallowing.    Eyes:  Negative for visual disturbance.   Respiratory:  Positive for cough. Negative for shortness of breath and wheezing.    Cardiovascular:  Negative for chest pain and palpitations.   Gastrointestinal:  Negative for abdominal pain, constipation, diarrhea, nausea and vomiting.   Genitourinary:  Negative for difficulty urinating, dysuria, frequency and hematuria.   Musculoskeletal:  Negative for arthralgias and myalgias.   Neurological:  Negative for dizziness, weakness and numbness.   Psychiatric/Behavioral:  Negative for sleep disturbance. The patient is not nervous/anxious.      Objective:      BP (!) 155/70 (BP Location: Right arm, Patient Position: Sitting, BP Method: Medium (Automatic))   Pulse 78   Temp 98.3 °F (36.8 °C) (Oral)   Resp 16   Ht 5' 3" (1.6 m)   Wt 55.9 kg (123 lb 3.2 oz)   BMI " 21.82 kg/m²     Physical Exam  Constitutional:       General: She is not in acute distress.     Appearance: Normal appearance.   Cardiovascular:      Rate and Rhythm: Normal rate and regular rhythm.      Pulses: Normal pulses.      Heart sounds: Normal heart sounds.   Pulmonary:      Effort: Pulmonary effort is normal.      Breath sounds: Normal breath sounds.   Musculoskeletal:         General: Normal range of motion.   Skin:     General: Skin is warm and dry.   Neurological:      Mental Status: She is alert and oriented to person, place, and time.   Psychiatric:         Mood and Affect: Mood normal.         Behavior: Behavior normal.       Assessment:       1. Community acquired pneumonia of left lower lobe of lung    2. Acute cough        Plan:   Katelyn was seen today for cough.    Diagnoses and all orders for this visit:    Community acquired pneumonia of left lower lobe of lung  -     X-Ray Chest PA And Lateral; Future  -     levoFLOXacin (LEVAQUIN) 750 MG tablet; Take 1 tablet (750 mg total) by mouth once daily. for 5 days    Acute cough  -     benzonatate (TESSALON) 100 MG capsule; Take 1 capsule (100 mg total) by mouth 3 (three) times daily as needed for Cough.  -     promethazine-dextromethorphan (PROMETHAZINE-DM) 6.25-15 mg/5 mL Syrp; Take 5 mLs by mouth nightly as needed.    F/U with me in clinic 72 hours.

## 2023-03-20 ENCOUNTER — OFFICE VISIT (OUTPATIENT)
Dept: FAMILY MEDICINE | Facility: CLINIC | Age: 83
End: 2023-03-20
Payer: MEDICARE

## 2023-03-20 VITALS
OXYGEN SATURATION: 98 % | WEIGHT: 121.5 LBS | DIASTOLIC BLOOD PRESSURE: 67 MMHG | HEART RATE: 87 BPM | HEIGHT: 63 IN | BODY MASS INDEX: 21.53 KG/M2 | SYSTOLIC BLOOD PRESSURE: 140 MMHG

## 2023-03-20 DIAGNOSIS — J18.9 COMMUNITY ACQUIRED PNEUMONIA OF LEFT LOWER LOBE OF LUNG: Primary | ICD-10-CM

## 2023-03-20 PROCEDURE — 99999 PR PBB SHADOW E&M-EST. PATIENT-LVL IV: CPT | Mod: PBBFAC,,, | Performed by: NURSE PRACTITIONER

## 2023-03-20 PROCEDURE — 99213 OFFICE O/P EST LOW 20 MIN: CPT | Mod: S$PBB,,, | Performed by: NURSE PRACTITIONER

## 2023-03-20 PROCEDURE — 99999 PR PBB SHADOW E&M-EST. PATIENT-LVL IV: ICD-10-PCS | Mod: PBBFAC,,, | Performed by: NURSE PRACTITIONER

## 2023-03-20 PROCEDURE — 99213 PR OFFICE/OUTPT VISIT, EST, LEVL III, 20-29 MIN: ICD-10-PCS | Mod: S$PBB,,, | Performed by: NURSE PRACTITIONER

## 2023-03-20 PROCEDURE — 99214 OFFICE O/P EST MOD 30 MIN: CPT | Mod: PBBFAC,PO | Performed by: NURSE PRACTITIONER

## 2023-03-20 NOTE — PROGRESS NOTES
Subjective:       Patient ID: Katelyn Caba is a 82 y.o. female.    Chief Complaint: Cough  Katelyn Caba presents today for follow up of cough. Last seen in 3/17/2023.    Cough  This is a new problem. The current episode started 1 to 4 weeks ago. The problem has been gradually improving. Pertinent negatives include no chest pain, chills, ear congestion, ear pain, fever, headaches, nasal congestion, postnasal drip, rash, rhinorrhea, sore throat, shortness of breath or wheezing. Nothing aggravates the symptoms. She has tried prescription cough suppressant (antibiotic) for the symptoms. The treatment provided significant relief. Her past medical history is significant for pneumonia.     [facility].    Patient Active Problem List   Diagnosis    Venous insufficiency    H/O ischemic left MCA stroke    Pure hypercholesterolemia    Right homonymous inferior quadrantanopia    Speaking difficulty    Severe anemia    Volume overload    Gastrointestinal hemorrhage    Elevated blood pressure reading       Current Outpatient Medications:     alendronate (FOSAMAX) 70 MG tablet, Take 1 tablet (70 mg total) by mouth every 7 days., Disp: 4 tablet, Rfl: 11    amLODIPine (NORVASC) 5 MG tablet, Take 1 tablet (5 mg total) by mouth once daily., Disp: 30 tablet, Rfl: 1    amoxicillin-clavulanate 875-125mg (AUGMENTIN) 875-125 mg per tablet, Take 1 tablet by mouth 2 (two) times daily., Disp: , Rfl:     aspirin 81 MG Chew, Chew and swallow 1 tablet (81 mg total) by mouth once. for 1 dose, Disp: 30 tablet, Rfl: 1    atorvastatin (LIPITOR) 80 MG tablet, Take 1 tablet (80 mg total) by mouth every evening., Disp: 30 tablet, Rfl: 3    benzonatate (TESSALON) 100 MG capsule, Take 1 capsule (100 mg total) by mouth 3 (three) times daily as needed for Cough., Disp: 30 capsule, Rfl: 1    cyanocobalamin (VITAMIN B-12) 1000 MCG tablet, Take 1,000 mcg by mouth once daily., Disp: , Rfl:     ferrous sulfate (FEROSUL) 220 mg (44 mg  "iron)/5 mL Elix, Take 6.82 mLs (300.08 mg total) by mouth once daily., Disp: 205 mL, Rfl: 1    levoFLOXacin (LEVAQUIN) 750 MG tablet, Take 1 tablet (750 mg total) by mouth once daily. for 5 days, Disp: 5 tablet, Rfl: 0    mv,Ca,min-folic acid-vit K1 (ONE-A-DAY WOMEN'S 50 PLUS) 400-20 mcg Tab, Take 1 tablet by mouth once daily., Disp: , Rfl:     pantoprazole (PROTONIX) 40 MG tablet, Take 1 tablet (40 mg total) by mouth once daily., Disp: 30 tablet, Rfl: 1    promethazine-dextromethorphan (PROMETHAZINE-DM) 6.25-15 mg/5 mL Syrp, Take 5 mLs by mouth nightly as needed., Disp: 240 mL, Rfl: 0    RESTASIS 0.05 % ophthalmic emulsion, Place 1 drop into both eyes 2 (two) times daily., Disp: , Rfl:     vitamin D (VITAMIN D3) 1000 units Tab, Take 1,000 Units by mouth once daily., Disp: , Rfl:     The following portions of the patient's history were reviewed and updated as appropriate: allergies, past family history, past medical history, past social history and past surgical history.    Review of Systems   Constitutional:  Negative for chills and fever.   HENT:  Negative for ear pain, postnasal drip, rhinorrhea and sore throat.    Respiratory:  Positive for cough. Negative for shortness of breath and wheezing.    Cardiovascular:  Negative for chest pain.   Skin:  Negative for rash.   Neurological:  Negative for headaches.     Objective:      BP (!) 140/67 (BP Location: Left arm, Patient Position: Sitting, BP Method: Small (Manual))   Pulse 87   Ht 5' 3" (1.6 m)   Wt 55.1 kg (121 lb 8 oz)   SpO2 98%   BMI 21.52 kg/m²     Physical Exam  Constitutional:       General: She is not in acute distress.     Appearance: Normal appearance.   Cardiovascular:      Rate and Rhythm: Normal rate and regular rhythm.      Pulses: Normal pulses.      Heart sounds: Normal heart sounds.   Pulmonary:      Effort: Pulmonary effort is normal.      Breath sounds: Normal breath sounds. No wheezing or rhonchi.   Musculoskeletal:         General: " Normal range of motion.   Skin:     General: Skin is warm and dry.   Neurological:      Mental Status: She is alert and oriented to person, place, and time.   Psychiatric:         Mood and Affect: Mood normal.         Behavior: Behavior normal.       Assessment:       1. Community acquired pneumonia of left lower lobe of lung        Plan:   Symptoms have greatly improved; cough has resolved; PE benign.  Complete course of antibiotics.    Follow up if symptoms do not improve and/or worsen.

## 2023-03-29 ENCOUNTER — HOSPITAL ENCOUNTER (OUTPATIENT)
Dept: RADIOLOGY | Facility: HOSPITAL | Age: 83
Discharge: HOME | End: 2023-03-29
Attending: STUDENT IN AN ORGANIZED HEALTH CARE EDUCATION/TRAINING PROGRAM
Payer: MEDICARE

## 2023-03-29 DIAGNOSIS — K92.2 GASTROINTESTINAL HEMORRHAGE, UNSPECIFIED GASTROINTESTINAL HEMORRHAGE TYPE: ICD-10-CM

## 2023-03-29 PROCEDURE — A9698 NON-RAD CONTRAST MATERIALNOC: HCPCS | Mod: PO | Performed by: STUDENT IN AN ORGANIZED HEALTH CARE EDUCATION/TRAINING PROGRAM

## 2023-03-29 PROCEDURE — 74176 CT ABDOMEN PELVIS WITHOUT CONTRAST: ICD-10-PCS | Mod: 26,,, | Performed by: RADIOLOGY

## 2023-03-29 PROCEDURE — 25500020 PHARM REV CODE 255: Mod: PO | Performed by: STUDENT IN AN ORGANIZED HEALTH CARE EDUCATION/TRAINING PROGRAM

## 2023-03-29 PROCEDURE — 74176 CT ABD & PELVIS W/O CONTRAST: CPT | Mod: TC,PO

## 2023-03-29 PROCEDURE — 74176 CT ABD & PELVIS W/O CONTRAST: CPT | Mod: 26,,, | Performed by: RADIOLOGY

## 2023-03-29 RX ADMIN — IOHEXOL 1000 ML: 9 SOLUTION ORAL at 11:03

## 2023-04-05 ENCOUNTER — OFFICE VISIT (OUTPATIENT)
Dept: HEMATOLOGY/ONCOLOGY | Facility: CLINIC | Age: 83
End: 2023-04-05
Payer: MEDICARE

## 2023-04-05 VITALS
TEMPERATURE: 98 F | HEIGHT: 63 IN | DIASTOLIC BLOOD PRESSURE: 66 MMHG | SYSTOLIC BLOOD PRESSURE: 145 MMHG | RESPIRATION RATE: 16 BRPM | BODY MASS INDEX: 21.87 KG/M2 | HEART RATE: 71 BPM | OXYGEN SATURATION: 96 % | WEIGHT: 123.44 LBS

## 2023-04-05 DIAGNOSIS — Z86.73 H/O ISCHEMIC LEFT MCA STROKE: ICD-10-CM

## 2023-04-05 DIAGNOSIS — D64.9 SEVERE ANEMIA: ICD-10-CM

## 2023-04-05 DIAGNOSIS — D64.9 SEVERE ANEMIA: Primary | ICD-10-CM

## 2023-04-05 DIAGNOSIS — R91.1 SOLITARY PULMONARY NODULE: Primary | ICD-10-CM

## 2023-04-05 DIAGNOSIS — D53.9 NUTRITIONAL ANEMIA, UNSPECIFIED: ICD-10-CM

## 2023-04-05 PROCEDURE — 99214 OFFICE O/P EST MOD 30 MIN: CPT | Mod: S$PBB,,, | Performed by: STUDENT IN AN ORGANIZED HEALTH CARE EDUCATION/TRAINING PROGRAM

## 2023-04-05 PROCEDURE — 99999 PR PBB SHADOW E&M-EST. PATIENT-LVL IV: ICD-10-PCS | Mod: PBBFAC,,, | Performed by: STUDENT IN AN ORGANIZED HEALTH CARE EDUCATION/TRAINING PROGRAM

## 2023-04-05 PROCEDURE — 99214 OFFICE O/P EST MOD 30 MIN: CPT | Mod: PBBFAC,PN | Performed by: STUDENT IN AN ORGANIZED HEALTH CARE EDUCATION/TRAINING PROGRAM

## 2023-04-05 PROCEDURE — 99214 PR OFFICE/OUTPT VISIT, EST, LEVL IV, 30-39 MIN: ICD-10-PCS | Mod: S$PBB,,, | Performed by: STUDENT IN AN ORGANIZED HEALTH CARE EDUCATION/TRAINING PROGRAM

## 2023-04-05 PROCEDURE — 99999 PR PBB SHADOW E&M-EST. PATIENT-LVL IV: CPT | Mod: PBBFAC,,, | Performed by: STUDENT IN AN ORGANIZED HEALTH CARE EDUCATION/TRAINING PROGRAM

## 2023-04-05 NOTE — PROGRESS NOTES
Subjective:                                                                                     Patient ID:    Name: Katelyn Caba  : 1940  MRN: [id number]      HPI:   Katelyn Caba is a 82 y.o. female presents for evaluation of Severe anemia  (2 month follow up with labs)      Patient as referred to us for anemia, sever enough that around 22 needed 2u pRBC blood transfusion, Hb 4.6/4.9.Looking back at her blood work , she is being anemia since 2018, normal CBC in  (no blood work in between). Significant history, 22; had an admissions for TIA/ stroke/expressive aphasia and seems that patient was given plavix for 21 days followed by aspirin and patient noticed that her generalized weakness and fatigued started worsening around that time . 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 Biopsy were negative for any malignancy, never had a colonoscopy      s/p IV iron ferumoxytol 2022, stable Hb/ferritin/Hct, symptoms improved significant,  EGD 22; rafa's esophagus, hiatal hernia and fundic gland polyps, path metaplasia, no dysplasia . S/p another IV ferumoxytol  and 2023.   Also patient mention that a lot of her family member have similar situation as her, her cousin is under the care of Dr Orozco for ISAIAH and was wondering if they have a familiar bleeding disorder      Today, patient is doing well, symptoms improved, denies any dark stool or bleeding or worsening of her symptoms ,explain that we dont have to wait until hb <7 for her to show symptoms like last time. Discused her blood results and imaging    Past Medical History:   Diagnosis Date    Cancer     basal cell carcinoma       Past Surgical History:   Procedure Laterality Date    CARPAL TUNNEL RELEASE      ESOPHAGOGASTRODUODENOSCOPY N/A 2022    Procedure: EGD (ESOPHAGOGASTRODUODENOSCOPY);  Surgeon: Steven  "SACHA Lopez MD;  Location: [facility] ([address] [place] [address]);  Service: Endoscopy;  Laterality: N/A;    ESOPHAGOGASTRODUODENOSCOPY N/A 12/5/2022    Procedure: EGD (ESOPHAGOGASTRODUODENOSCOPY);  Surgeon: Steven Lopez MD;  Location: [facility] ([address] [place] [address]);  Service: Endoscopy;  Laterality: N/A;  Has estimated pulmonary artery pressure 45, schedule location per protocol.   Please schedule with Dr. Darryl Lopez-  Plavix stopped 8/23/22  pt requested to schedule after Thanksgiving/ inst portal-[facility]  pre call complete; [facility] 11/28/22    EYE SURGERY      left eye cataract    TONSILLECTOMY      TUBAL LIGATION         History reviewed. No pertinent family history.    Social History     Socioeconomic History    Marital status:    Tobacco Use    Smoking status: Never    Smokeless tobacco: Never   Substance and Sexual Activity    Alcohol use: No    Drug use: No       Review of patient's allergies indicates:  No Known Allergies    Review of Systems   Constitutional: Positive for malaise/fatigue. Negative for decreased appetite, fever and night sweats.   Eyes:  Negative for blurred vision, double vision, pain and visual disturbance.   Cardiovascular:  Negative for chest pain.   Respiratory:  Negative for cough and shortness of breath.    Endocrine: Positive for cold intolerance.   Hematologic/Lymphatic: Negative for adenopathy and bleeding problem. Does not bruise/bleed easily.   Skin:  Negative for rash.   Musculoskeletal:  Negative for back pain.   Gastrointestinal:  Negative for abdominal pain and diarrhea.   Genitourinary:  Negative for frequency.   Neurological:  Negative for headaches.   Psychiatric/Behavioral:  The patient is not nervous/anxious.           Objective:     Vitals:    04/05/23 1425   BP: (!) 145/66   BP Location: Left arm   Patient Position: Sitting   BP Method: Medium (Automatic)   Pulse: 71   Resp: 16   Temp: 97.5 °F (36.4 °C)   TempSrc: Temporal   SpO2: 96%   Weight: 56 kg (123 lb 7.3 oz)   Height: 5' 3" (1.6 " m)          Physical Exam  Constitutional:       General: She is not in acute distress.     Appearance: Normal appearance. She is normal weight.   HENT:      Head: Normocephalic and atraumatic.   Eyes:      General: No scleral icterus.  Cardiovascular:      Rate and Rhythm: Normal rate and regular rhythm.      Heart sounds: Normal heart sounds.   Pulmonary:      Effort: Pulmonary effort is normal.      Breath sounds: Normal breath sounds. No wheezing.   Chest:      Chest wall: No tenderness.   Abdominal:      General: Bowel sounds are normal. There is no distension.      Palpations: Abdomen is soft. There is no mass.   Musculoskeletal:         General: No swelling or tenderness.   Lymphadenopathy:      Cervical: No cervical adenopathy.   Skin:     Coloration: Skin is not jaundiced or pale.   Neurological:      General: No focal deficit present.      Mental Status: She is oriented to person, place, and time.   Psychiatric:         Mood and Affect: Mood normal.         Behavior: Behavior normal.         Current Outpatient Medications on File Prior to Visit   Medication Sig    amLODIPine (NORVASC) 5 MG tablet Take 1 tablet (5 mg total) by mouth once daily.    cyanocobalamin (VITAMIN B-12) 1000 MCG tablet Take 1,000 mcg by mouth once daily.    mv,Ca,min-folic acid-vit K1 (ONE-A-DAY WOMEN'S 50 PLUS) 400-20 mcg Tab Take 1 tablet by mouth once daily.    RESTASIS 0.05 % ophthalmic emulsion Place 1 drop into both eyes 2 (two) times daily.    vitamin D (VITAMIN D3) 1000 units Tab Take 1,000 Units by mouth once daily.    alendronate (FOSAMAX) 70 MG tablet Take 1 tablet (70 mg total) by mouth every 7 days.    aspirin 81 MG Chew Chew and swallow 1 tablet (81 mg total) by mouth once. for 1 dose    ferrous sulfate (FEROSUL) 220 mg (44 mg iron)/5 mL Elix Take 6.82 mLs (300.08 mg total) by mouth once daily. (Patient not taking: Reported on 4/5/2023)    [DISCONTINUED] clopidogreL (PLAVIX) 75 mg tablet Take 1 tablet (75 mg total) by  mouth once daily. for 21 days     No current facility-administered medications on file prior to visit.       CBC:  Lab Results   Component Value Date    WBC 8.57 03/29/2023    HGB 12.1 03/29/2023    HCT 36.7 (L) 03/29/2023    MCV 88 03/29/2023     03/29/2023         CMP:  Sodium   Date Value Ref Range Status   09/16/2022 141 136 - 145 mmol/L Final     Potassium   Date Value Ref Range Status   09/16/2022 4.7 3.5 - 5.1 mmol/L Final     Chloride   Date Value Ref Range Status   09/16/2022 105 95 - 110 mmol/L Final     CO2   Date Value Ref Range Status   09/16/2022 28 23 - 29 mmol/L Final     Glucose   Date Value Ref Range Status   09/16/2022 85 70 - 110 mg/dL Final     BUN   Date Value Ref Range Status   09/16/2022 14 8 - 23 mg/dL Final     Creatinine   Date Value Ref Range Status   09/16/2022 0.7 0.5 - 1.4 mg/dL Final     Calcium   Date Value Ref Range Status   09/16/2022 9.8 8.7 - 10.5 mg/dL Final     Total Protein   Date Value Ref Range Status   09/16/2022 6.8 6.0 - 8.4 g/dL Final     Albumin   Date Value Ref Range Status   09/16/2022 3.9 3.5 - 5.2 g/dL Final     Total Bilirubin   Date Value Ref Range Status   09/16/2022 0.3 0.1 - 1.0 mg/dL Final     Comment:     For infants and newborns, interpretation of results should be based  on gestational age, weight and in agreement with clinical  observations.    Premature Infant recommended reference ranges:  Up to 24 hours.............<8.0 mg/dL  Up to 48 hours............<12.0 mg/dL  3-5 days..................<15.0 mg/dL  6-29 days.................<15.0 mg/dL       Alkaline Phosphatase   Date Value Ref Range Status   09/16/2022 100 55 - 135 U/L Final     AST   Date Value Ref Range Status   09/16/2022 21 10 - 40 U/L Final     ALT   Date Value Ref Range Status   09/16/2022 15 10 - 44 U/L Final     Anion Gap   Date Value Ref Range Status   09/16/2022 8 8 - 16 mmol/L Final     eGFR if    Date Value Ref Range Status   07/22/2022 >60 >60 mL/min/1.73 m^2  Final     eGFR if non    Date Value Ref Range Status   07/22/2022 >60 >60 mL/min/1.73 m^2 Final     Comment:     Calculation used to obtain the estimated glomerular filtration  rate (eGFR) is the CKD-EPI equation.          Lab Results   Component Value Date    IRON 32 03/29/2023    TIBC 283 03/29/2023    FERRITIN 101 03/29/2023       No results found for: KZESDAMM38,No results found for: FOLATE    CT Abdomen Pelvis  Without Contrast  Narrative: EXAMINATION:  CT ABDOMEN PELVIS WITHOUT CONTRAST    CLINICAL HISTORY:  GI bleed; Gastrointestinal hemorrhage, unspecified    TECHNIQUE:  Low dose axial images, sagittal and coronal reformations were obtained from the lung bases to the pubic symphysis.  1000 mL of oral Omnipaque 9 was administered.    COMPARISON:  None    FINDINGS:  Lack of intravenous contrast limits detection for parenchymal organ disease.    Abdomen:    - Lower thorax:Heart is normal in size.  No pericardial effusion.  Coronary artery calcification.    - Lung bases: Patchy bandlike, ground-glass and nodular opacities within the inferior lingula, with the largest nodule measuring 0.7 cm (series 2, image 3), nonspecific and could reflect subsegmental atelectasis or scarring.  Mild bilateral lower lobe tubular bronchiectasis.  Scattered bandlike densities at the left lung base suggestive of subsegmental atelectasis versus scarring.  Superimposed 0.8 cm nodular opacity at the left lung base (series 2, image 22).  No pleural effusion.    - Esophagus/stomach: Mild sliding-type hiatal hernia.  Diffuse wall thickening of the distal esophagus, with some asymmetric wall thickening anteriorly at the level of the hernia.    -Liver: Liver is normal in size and contour.  No definite focal hepatic mass on this limited noncontrast exam.    - Gallbladder: Multiple calcified gallstones are layering within the gallbladder lumen.  No abnormal gallbladder wall thickening or pericholecystic inflammatory  change.    - Bile Ducts: No evidence of intrahepatic or extra hepatic biliary ductal dilation.    - Pancreas: No obvious mass or peripancreatic fat stranding.    - Spleen: Normal size and attenuation.    - Adrenals: Adrenal glands appear within normal limits.    - Kidneys/Ureters: Kidneys are normal in size.  Exophytic 4.4 cm hypodensity arising from the upper pole of the right kidney, which measures fluid attenuation is most compatible with a cyst.  No nephrolithiasis or hydronephrosis.    - Bladder: No abnormal bladder wall thickening.    - Reproductive: No significant abnormality.    - Bowel/Mesentery: Sigmoid diverticulosis.  No evidence of bowel obstruction or inflammation.  No ascites or pneumoperitoneum.  No evidence of appendicitis.  Hazy stranding of the central mesentery with scattered nonenlarged lymph nodes.    - Lymph nodes: No pathologically enlarged lymph nodes.    - Vascular:  No abdominal aortic aneurysm.    - Abdominal Wall/Soft Tissues: Small fat-containing umbilical hernia.    - Bones: Age-appropriate degenerative changes of the osseous structures.  No acute osseous abnormality and no suspicious lytic or blastic lesion.  Impression: 1.  Nonspecific small bowel mesenteric fat-stranding with multiple small bowel mesenteric lymph nodes nonspecific and while this may be idiopathic, may reflect mild enteritis or mesenteric panniculitis, note that mesenteric lymphoma is not excluded. Consider correlation with clinical symptoms, laboratory values and possible follow up in 6-12 months as this is usually self-limiting.  2. Mild sliding-type hiatal hernia.  Diffuse thickening of the distal esophagus with some asymmetric wall thickening anteriorly at the level of the hiatal hernia, nonspecific and may reflect sequelae of gastroesophageal reflux disease.  Correlation with upper endoscopy recommended to exclude underlying lesion.  3. Bronchiectasis with scattered bandlike, ground-glass and nodular opacities  within the inferior lingula and left lung base, nonspecific and could reflect subsegmental atelectasis, scarring, or chronic aspiration.  Largest nodule at the left lung base measures 0.8 cm.  4. Diverticulosis coli without evidence of diverticulitis.  5. Cholelithiasis.  Yellow Pulmonary Nodule 2017 Alert:    Yellow Actionable Finding (Radiology: Volume 284: Number 1-July 2017)    Fleischner Guidelines do not apply in patients younger than 35 years, immunocompromised patients or patients with cancer. Risk assignment based on ACCP with low risk being less than 5% and high risk combining intermediate and high risk categories.    UNEXPECTED FINDINGS:  Incidental Pulmonary Nodule Multiple Solid most suspicious 6-8 mm (4)    RECOMMENDATIONS:  If Low Risk CT Chest without contrast at 3-6 months, then consider CT at 18-24 months, If High Risk CT at 3-6 months, then at 18-24 months    Electronically signed by: Gio Javed  Date:    03/29/2023  Time:    12:28         All pertinent labs and imaging reviewed.    Assessment:       1. Solitary pulmonary nodule    2. Severe anemia    3. Nutritional anemia, unspecified    4. H/O ischemic left MCA stroke          # SEVER /symptomatic microcytic anemia  - likely due to GI bleeding; EGD with gastric ulcer and duodenum angiectasis, path negative for malignancy 8/2022   - following up with GI closely, still need colonoscopy to rule out colon cancer; patient never had one and worry about her age   - ferritin <20, will need IV iron and possible might need it more than 1 round  - s/p ferumyoxytol x2 in Sep/2022, dropped again s/p IV ferumyoxytol 1/2023   - no need for transfusions, off  plavix, on aspirin  - EGD  with no active bleeding but polyps, hernia and barrette esophagitis   - Never did a colonoscopy, worried about her age and complication with getting it done from perforation , we discussed that we definitely need to rule out colon cancer   - checking bleeding disorder, (rule out  vWD/PT/PTT)  -cont montioring if trend down again, will refer to GI     # Hx of Stroke w a recent TIA  - was on plavix and aspirin, now on on aspirin only   - following up with neurology      # Lung nodule, getting CT chest for monitoring   # Abnormal uptake at the esophageal on CT:  requesting EGD, barrette esophagitis     Plan:     Solitary pulmonary nodule  -     CT Chest Without Contrast; Future; Expected date: 04/05/2023    Severe anemia  -     CT Chest Without Contrast; Future; Expected date: 04/05/2023  -     CBC w/ DIFF; Future; Expected date: 04/05/2023  -     Ferritin; Future; Expected date: 04/05/2023  -     Iron and TIBC; Future; Expected date: 04/05/2023    Nutritional anemia, unspecified    H/O ischemic left MCA stroke               Patient queried and all questions were answered.        Recommend COVID guidelines being followed     Recommend  exercise, nutrition and weight management and health maintenance activities and follow-up with PCPs recommendations       Signed:  Katherine Marie MD  Hematology and Oncology  [facility]  [facility] [place] [facility]  Route Chart for Scheduling    Med Onc Chart Routing      Follow up with physician 3 months. with CT chest without contrast and EGD and CBC/FErritin/TIBC/iron   Follow up with [facility]    Infusion scheduling note    Injection scheduling note    Labs    Imaging    Pharmacy appointment    Other referrals             Supportive Plan Information  OP FERUMOXYTOL   Katherine Marie MD   Upcoming Treatment Dates - OP FERUMOXYTOL    No upcoming days in selected categories.

## 2023-04-12 PROBLEM — R91.1 SOLITARY PULMONARY NODULE: Status: ACTIVE | Noted: 2023-04-12

## 2023-04-12 PROBLEM — D53.9 NUTRITIONAL ANEMIA, UNSPECIFIED: Status: ACTIVE | Noted: 2022-08-21

## 2023-05-04 ENCOUNTER — TELEPHONE (OUTPATIENT)
Dept: GASTROENTEROLOGY | Facility: CLINIC | Age: 83
End: 2023-05-04
Payer: MEDICARE

## 2023-05-08 ENCOUNTER — PATIENT OUTREACH (OUTPATIENT)
Dept: ADMINISTRATIVE | Facility: HOSPITAL | Age: 83
End: 2023-05-08
Payer: MEDICARE

## 2023-05-08 DIAGNOSIS — Z00.00 ANNUAL PHYSICAL EXAM: ICD-10-CM

## 2023-05-08 DIAGNOSIS — D50.9 MICROCYTIC ANEMIA: Primary | ICD-10-CM

## 2023-05-17 ENCOUNTER — TELEPHONE (OUTPATIENT)
Dept: GASTROENTEROLOGY | Facility: CLINIC | Age: 83
End: 2023-05-17
Payer: MEDICARE

## 2023-05-18 ENCOUNTER — OFFICE VISIT (OUTPATIENT)
Dept: FAMILY MEDICINE | Facility: CLINIC | Age: 83
End: 2023-05-18
Attending: FAMILY MEDICINE
Payer: MEDICARE

## 2023-05-18 ENCOUNTER — LAB VISIT (OUTPATIENT)
Dept: LAB | Facility: HOSPITAL | Age: 83
End: 2023-05-18
Attending: FAMILY MEDICINE
Payer: MEDICARE

## 2023-05-18 VITALS
BODY MASS INDEX: 21.83 KG/M2 | SYSTOLIC BLOOD PRESSURE: 131 MMHG | WEIGHT: 123.19 LBS | HEART RATE: 62 BPM | HEIGHT: 63 IN | OXYGEN SATURATION: 98 % | DIASTOLIC BLOOD PRESSURE: 60 MMHG

## 2023-05-18 DIAGNOSIS — D50.9 MICROCYTIC ANEMIA: ICD-10-CM

## 2023-05-18 DIAGNOSIS — I10 ESSENTIAL HYPERTENSION: ICD-10-CM

## 2023-05-18 DIAGNOSIS — I70.0 AORTIC ATHEROSCLEROSIS: ICD-10-CM

## 2023-05-18 DIAGNOSIS — Z00.00 ANNUAL PHYSICAL EXAM: Primary | ICD-10-CM

## 2023-05-18 DIAGNOSIS — E78.2 MIXED HYPERLIPIDEMIA: ICD-10-CM

## 2023-05-18 LAB
ALBUMIN SERPL BCP-MCNC: 4.1 G/DL (ref 3.5–5.2)
ALP SERPL-CCNC: 102 U/L (ref 55–135)
ALT SERPL W/O P-5'-P-CCNC: 12 U/L (ref 10–44)
ANION GAP SERPL CALC-SCNC: 12 MMOL/L (ref 8–16)
AST SERPL-CCNC: 21 U/L (ref 10–40)
BACTERIA #/AREA URNS AUTO: NORMAL /HPF
BASOPHILS # BLD AUTO: 0.08 K/UL (ref 0–0.2)
BASOPHILS NFR BLD: 1.2 % (ref 0–1.9)
BILIRUB SERPL-MCNC: 0.3 MG/DL (ref 0.1–1)
BILIRUB UR QL STRIP: NEGATIVE
BUN SERPL-MCNC: 14 MG/DL (ref 8–23)
CALCIUM SERPL-MCNC: 9.7 MG/DL (ref 8.7–10.5)
CAOX CRY UR QL COMP ASSIST: NORMAL
CHLORIDE SERPL-SCNC: 105 MMOL/L (ref 95–110)
CHOLEST SERPL-MCNC: 237 MG/DL (ref 120–199)
CHOLEST/HDLC SERPL: 4.6 {RATIO} (ref 2–5)
CLARITY UR REFRACT.AUTO: CLEAR
CO2 SERPL-SCNC: 24 MMOL/L (ref 23–29)
COLOR UR AUTO: YELLOW
CREAT SERPL-MCNC: 0.7 MG/DL (ref 0.5–1.4)
DIFFERENTIAL METHOD: ABNORMAL
EOSINOPHIL # BLD AUTO: 0.4 K/UL (ref 0–0.5)
EOSINOPHIL NFR BLD: 5.3 % (ref 0–8)
ERYTHROCYTE [DISTWIDTH] IN BLOOD BY AUTOMATED COUNT: 14.2 % (ref 11.5–14.5)
EST. GFR  (NO RACE VARIABLE): >60 ML/MIN/1.73 M^2
FERRITIN SERPL-MCNC: 43 NG/ML (ref 20–300)
GLUCOSE SERPL-MCNC: 92 MG/DL (ref 70–110)
GLUCOSE UR QL STRIP: NEGATIVE
HCT VFR BLD AUTO: 39.9 % (ref 37–48.5)
HDLC SERPL-MCNC: 51 MG/DL (ref 40–75)
HDLC SERPL: 21.5 % (ref 20–50)
HGB BLD-MCNC: 12.5 G/DL (ref 12–16)
HGB UR QL STRIP: NEGATIVE
IMM GRANULOCYTES # BLD AUTO: 0.01 K/UL (ref 0–0.04)
IMM GRANULOCYTES NFR BLD AUTO: 0.2 % (ref 0–0.5)
IRON SERPL-MCNC: 39 UG/DL (ref 30–160)
KETONES UR QL STRIP: NEGATIVE
LDLC SERPL CALC-MCNC: 162.2 MG/DL (ref 63–159)
LEUKOCYTE ESTERASE UR QL STRIP: ABNORMAL
LYMPHOCYTES # BLD AUTO: 1.9 K/UL (ref 1–4.8)
LYMPHOCYTES NFR BLD: 28.5 % (ref 18–48)
MCH RBC QN AUTO: 29.3 PG (ref 27–31)
MCHC RBC AUTO-ENTMCNC: 31.3 G/DL (ref 32–36)
MCV RBC AUTO: 94 FL (ref 82–98)
MICROSCOPIC COMMENT: NORMAL
MONOCYTES # BLD AUTO: 0.7 K/UL (ref 0.3–1)
MONOCYTES NFR BLD: 10.2 % (ref 4–15)
NEUTROPHILS # BLD AUTO: 3.6 K/UL (ref 1.8–7.7)
NEUTROPHILS NFR BLD: 54.6 % (ref 38–73)
NITRITE UR QL STRIP: NEGATIVE
NONHDLC SERPL-MCNC: 186 MG/DL
NRBC BLD-RTO: 0 /100 WBC
PH UR STRIP: 5 [PH] (ref 5–8)
PLATELET # BLD AUTO: 374 K/UL (ref 150–450)
PMV BLD AUTO: 11.3 FL (ref 9.2–12.9)
POTASSIUM SERPL-SCNC: 4.1 MMOL/L (ref 3.5–5.1)
PROT SERPL-MCNC: 7.4 G/DL (ref 6–8.4)
PROT UR QL STRIP: NEGATIVE
RBC # BLD AUTO: 4.26 M/UL (ref 4–5.4)
RBC #/AREA URNS AUTO: 1 /HPF (ref 0–4)
SATURATED IRON: 11 % (ref 20–50)
SODIUM SERPL-SCNC: 141 MMOL/L (ref 136–145)
SP GR UR STRIP: 1.02 (ref 1–1.03)
SQUAMOUS #/AREA URNS AUTO: 2 /HPF
TOTAL IRON BINDING CAPACITY: 360 UG/DL (ref 250–450)
TRANSFERRIN SERPL-MCNC: 243 MG/DL (ref 200–375)
TRIGL SERPL-MCNC: 119 MG/DL (ref 30–150)
TSH SERPL DL<=0.005 MIU/L-ACNC: 0.99 UIU/ML (ref 0.4–4)
URN SPEC COLLECT METH UR: ABNORMAL
WBC # BLD AUTO: 6.57 K/UL (ref 3.9–12.7)
WBC #/AREA URNS AUTO: 3 /HPF (ref 0–5)

## 2023-05-18 PROCEDURE — 99214 PR OFFICE/OUTPT VISIT, EST, LEVL IV, 30-39 MIN: ICD-10-PCS | Mod: S$PBB,,, | Performed by: FAMILY MEDICINE

## 2023-05-18 PROCEDURE — 99214 OFFICE O/P EST MOD 30 MIN: CPT | Mod: PBBFAC,PO | Performed by: FAMILY MEDICINE

## 2023-05-18 PROCEDURE — 99999 PR PBB SHADOW E&M-EST. PATIENT-LVL IV: ICD-10-PCS | Mod: PBBFAC,,, | Performed by: FAMILY MEDICINE

## 2023-05-18 PROCEDURE — 82728 ASSAY OF FERRITIN: CPT | Performed by: FAMILY MEDICINE

## 2023-05-18 PROCEDURE — 99999 PR PBB SHADOW E&M-EST. PATIENT-LVL IV: CPT | Mod: PBBFAC,,, | Performed by: FAMILY MEDICINE

## 2023-05-18 PROCEDURE — 80053 COMPREHEN METABOLIC PANEL: CPT | Performed by: FAMILY MEDICINE

## 2023-05-18 PROCEDURE — 36415 COLL VENOUS BLD VENIPUNCTURE: CPT | Mod: PO | Performed by: FAMILY MEDICINE

## 2023-05-18 PROCEDURE — 84443 ASSAY THYROID STIM HORMONE: CPT | Performed by: FAMILY MEDICINE

## 2023-05-18 PROCEDURE — 99214 OFFICE O/P EST MOD 30 MIN: CPT | Mod: S$PBB,,, | Performed by: FAMILY MEDICINE

## 2023-05-18 PROCEDURE — 81001 URINALYSIS AUTO W/SCOPE: CPT | Performed by: FAMILY MEDICINE

## 2023-05-18 PROCEDURE — 80061 LIPID PANEL: CPT | Performed by: FAMILY MEDICINE

## 2023-05-18 PROCEDURE — 84466 ASSAY OF TRANSFERRIN: CPT | Performed by: FAMILY MEDICINE

## 2023-05-18 PROCEDURE — 85025 COMPLETE CBC W/AUTO DIFF WBC: CPT | Performed by: FAMILY MEDICINE

## 2023-05-18 NOTE — PROGRESS NOTES
"Subjective:       Patient ID: Katelyn Caba is a 82 y.o. female.    Chief Complaint: Annual Exam    HPI  Pt is here for annual exam pt is generally well no sob/cp no cough chest congestion no sore throat no uri symptoms  Pt denies dysuria hematuria no vaginal bleeding  Pt denies n/v/f/c/d/c no change in bowel habits no brbpr    Pt has htn vascular disease no sob/cp stable bp on norvasc  Pt has hypercholesterolemia on statin   Pt has anemia stable on iron supplement no light headedness no vaginal bleeding no blood in stool   Review of Systems   Constitutional:  Negative for activity change, chills, diaphoresis, fatigue and fever.   HENT:  Negative for congestion, ear discharge, ear pain, hearing loss, postnasal drip, rhinorrhea, sinus pressure, sneezing, sore throat, trouble swallowing and voice change.    Eyes:  Negative for photophobia, discharge, redness, itching and visual disturbance.   Respiratory:  Negative for cough, chest tightness, shortness of breath and wheezing.    Cardiovascular:  Negative for chest pain, palpitations and leg swelling.   Gastrointestinal:  Negative for abdominal pain, anal bleeding, blood in stool, constipation, diarrhea, nausea, rectal pain and vomiting.   Genitourinary:  Negative for dyspareunia, dysuria, flank pain, frequency, hematuria, menstrual problem, pelvic pain, urgency, vaginal bleeding and vaginal discharge.   Musculoskeletal:  Negative for arthralgias, back pain, joint swelling and neck pain.   Skin:  Negative for color change and rash.   Neurological:  Negative for dizziness, speech difficulty, weakness, light-headedness, numbness and headaches.   Hematological:  Does not bruise/bleed easily.   Psychiatric/Behavioral:  Negative for agitation, confusion, decreased concentration, sleep disturbance and suicidal ideas. The patient is not nervous/anxious.      Objective:    /60   Pulse 62   Ht 5' 3" (1.6 m)   Wt 55.9 kg (123 lb 3.2 oz)   SpO2 98%   BMI 21.82 kg/m² "     Physical Exam  Constitutional:       Appearance: Normal appearance. She is well-developed. She is not ill-appearing.   HENT:      Head: Normocephalic and atraumatic.      Right Ear: External ear normal.      Left Ear: External ear normal.      Nose: Nose normal.   Eyes:      General:         Right eye: No discharge.         Left eye: No discharge.      Conjunctiva/sclera: Conjunctivae normal.      Pupils: Pupils are equal, round, and reactive to light.   Neck:      Thyroid: No thyromegaly.   Cardiovascular:      Rate and Rhythm: Normal rate and regular rhythm.      Heart sounds: Normal heart sounds. No murmur heard.    No friction rub. No gallop.   Pulmonary:      Effort: Pulmonary effort is normal.      Breath sounds: Normal breath sounds. No wheezing or rales.   Abdominal:      General: Abdomen is flat. Bowel sounds are normal. There is no distension.      Palpations: Abdomen is soft.      Tenderness: There is no abdominal tenderness. There is no guarding or rebound.   Genitourinary:     Vagina: Normal. No vaginal discharge.   Musculoskeletal:         General: No tenderness. Normal range of motion.      Cervical back: Normal range of motion and neck supple.   Lymphadenopathy:      Cervical: No cervical adenopathy.   Skin:     General: Skin is warm and dry.      Findings: No erythema or rash.   Neurological:      General: No focal deficit present.      Mental Status: She is alert and oriented to person, place, and time.      Cranial Nerves: No cranial nerve deficit.      Motor: No abnormal muscle tone.      Coordination: Coordination normal.   Psychiatric:         Mood and Affect: Mood normal.         Behavior: Behavior normal.         Thought Content: Thought content normal.         Judgment: Judgment normal.       Assessment:       1. Annual physical exam    2. Aortic atherosclerosis    3. Microcytic anemia    4. Essential hypertension    5. Mixed hyperlipidemia        Plan:     Orders cbc cmp lipid tsh  "urine  Cont meds  Low fat low salt diet  Graded exercise  Rtc 6 months    Health maintenance  Care gaps discussed       "This note will not be shared with the patient."     "

## 2023-07-06 ENCOUNTER — HOSPITAL ENCOUNTER (OUTPATIENT)
Dept: RADIOLOGY | Facility: HOSPITAL | Age: 83
Discharge: HOME | End: 2023-07-06
Attending: STUDENT IN AN ORGANIZED HEALTH CARE EDUCATION/TRAINING PROGRAM
Payer: MEDICARE

## 2023-07-06 DIAGNOSIS — R91.1 SOLITARY PULMONARY NODULE: ICD-10-CM

## 2023-07-06 DIAGNOSIS — D64.9 SEVERE ANEMIA: ICD-10-CM

## 2023-07-06 PROCEDURE — 71250 CT CHEST WITHOUT CONTRAST: ICD-10-PCS | Mod: 26,,, | Performed by: RADIOLOGY

## 2023-07-06 PROCEDURE — 71250 CT THORAX DX C-: CPT | Mod: 26,,, | Performed by: RADIOLOGY

## 2023-07-06 PROCEDURE — 71250 CT THORAX DX C-: CPT | Mod: TC,PO

## 2023-07-10 ENCOUNTER — TELEPHONE (OUTPATIENT)
Dept: GASTROENTEROLOGY | Facility: CLINIC | Age: 83
End: 2023-07-10
Payer: MEDICARE

## 2023-07-10 NOTE — TELEPHONE ENCOUNTER
Patient:   DAYO TYLER            MRN: [id number]            FIN: [id number]               Age:   41 years     Sex:  MALE     :  [birth year]   Associated Diagnoses:   None   Author:   SANTANA LÓPEZ      Postoperative Information      Postoperative Information   Postoperative Information:          Preoperative Diagnosis:    appendicitis.         Postoperative Diagnosis: Same As Pre-Op Dx,    .         Performed by:    FER HUDSON (Surgeon - Primary)  .         Assistant:    SANTANA LÓPEZ (Resident)  .         Procedures Performed:    Appendectomy Laparoscopic, laparoscopic appendectomy  .         Findings: see operative report.         Specimens Removed:    appendix.         Estimated Blood Loss: 30  ml,    .         Blood Administered: No.         Complications: None.         Grafts/Implants: None.    Anesthetic utilized:     General  .             Electronically Signed On 2018 21:55  __________________________________________________   SANTANA LÓPEZ     [facility] notified

## 2023-07-10 NOTE — TELEPHONE ENCOUNTER
----- Message from Bibi Almanza sent at 7/10/2023 10:50 AM CDT -----  Contact: Patient  Type: Needs Medical Advice    Who Called:  Patient  What is this regarding?:  Pt needs to know what time to come for her endoscopy tomorrow.  Best Call Back Number:  [phone]  Additional Information:  Please call the patient back at the phone number listed above to advise. Thank you!

## 2023-07-11 ENCOUNTER — ANESTHESIA EVENT (OUTPATIENT)
Dept: ENDOSCOPY | Facility: HOSPITAL | Age: 83
End: 2023-07-11
Payer: MEDICARE

## 2023-07-11 ENCOUNTER — ANESTHESIA (OUTPATIENT)
Dept: ENDOSCOPY | Facility: HOSPITAL | Age: 83
End: 2023-07-11
Payer: MEDICARE

## 2023-07-11 ENCOUNTER — HOSPITAL ENCOUNTER (OUTPATIENT)
Facility: HOSPITAL | Age: 83
Discharge: HOME | End: 2023-07-11
Attending: INTERNAL MEDICINE | Admitting: INTERNAL MEDICINE
Payer: MEDICARE

## 2023-07-11 DIAGNOSIS — D64.9 ANEMIA: ICD-10-CM

## 2023-07-11 PROCEDURE — 37000009 HC ANESTHESIA EA ADD 15 MINS: Mod: PO | Performed by: INTERNAL MEDICINE

## 2023-07-11 PROCEDURE — 43239 PR EGD, FLEX, W/BIOPSY, SGL/MULTI: ICD-10-PCS | Mod: ,,, | Performed by: INTERNAL MEDICINE

## 2023-07-11 PROCEDURE — D9220A PRA ANESTHESIA: ICD-10-PCS | Mod: ANES,,, | Performed by: ANESTHESIOLOGY

## 2023-07-11 PROCEDURE — 88305 TISSUE EXAM BY PATHOLOGIST: CPT | Mod: PO | Performed by: PATHOLOGY

## 2023-07-11 PROCEDURE — 88305 TISSUE EXAM BY PATHOLOGIST: ICD-10-PCS | Mod: 26,,, | Performed by: PATHOLOGY

## 2023-07-11 PROCEDURE — 88305 TISSUE EXAM BY PATHOLOGIST: CPT | Mod: 26,,, | Performed by: PATHOLOGY

## 2023-07-11 PROCEDURE — 27201012 HC FORCEPS, HOT/COLD, DISP: Mod: PO | Performed by: INTERNAL MEDICINE

## 2023-07-11 PROCEDURE — D9220A PRA ANESTHESIA: Mod: ANES,,, | Performed by: ANESTHESIOLOGY

## 2023-07-11 PROCEDURE — 43239 EGD BIOPSY SINGLE/MULTIPLE: CPT | Mod: PO | Performed by: INTERNAL MEDICINE

## 2023-07-11 PROCEDURE — 37000008 HC ANESTHESIA 1ST 15 MINUTES: Mod: PO | Performed by: INTERNAL MEDICINE

## 2023-07-11 PROCEDURE — 25000003 PHARM REV CODE 250: Mod: PO | Performed by: NURSE ANESTHETIST, CERTIFIED REGISTERED

## 2023-07-11 PROCEDURE — 63600175 PHARM REV CODE 636 W HCPCS: Mod: PO | Performed by: NURSE ANESTHETIST, CERTIFIED REGISTERED

## 2023-07-11 PROCEDURE — 43239 EGD BIOPSY SINGLE/MULTIPLE: CPT | Mod: ,,, | Performed by: INTERNAL MEDICINE

## 2023-07-11 PROCEDURE — D9220A PRA ANESTHESIA: ICD-10-PCS | Mod: CRNA,,, | Performed by: NURSE ANESTHETIST, CERTIFIED REGISTERED

## 2023-07-11 PROCEDURE — 63600175 PHARM REV CODE 636 W HCPCS: Mod: PO | Performed by: INTERNAL MEDICINE

## 2023-07-11 PROCEDURE — D9220A PRA ANESTHESIA: Mod: CRNA,,, | Performed by: NURSE ANESTHETIST, CERTIFIED REGISTERED

## 2023-07-11 RX ORDER — SODIUM CHLORIDE 0.9 % (FLUSH) 0.9 %
10 SYRINGE (ML) INJECTION
Status: DISCONTINUED | OUTPATIENT
Start: 2023-07-11 | End: 2023-07-11 | Stop reason: HOSPADM

## 2023-07-11 RX ORDER — PROPOFOL 10 MG/ML
VIAL (ML) INTRAVENOUS
Status: DISCONTINUED | OUTPATIENT
Start: 2023-07-11 | End: 2023-07-11

## 2023-07-11 RX ORDER — LIDOCAINE HYDROCHLORIDE 20 MG/ML
INJECTION INTRAVENOUS
Status: DISCONTINUED | OUTPATIENT
Start: 2023-07-11 | End: 2023-07-11

## 2023-07-11 RX ORDER — OMEPRAZOLE 40 MG/1
40 CAPSULE, DELAYED RELEASE ORAL DAILY
Qty: 90 CAPSULE | Refills: 3 | Status: ON HOLD | OUTPATIENT
Start: 2023-07-11 | End: 2024-03-27 | Stop reason: HOSPADM

## 2023-07-11 RX ORDER — SODIUM CHLORIDE, SODIUM LACTATE, POTASSIUM CHLORIDE, CALCIUM CHLORIDE 600; 310; 30; 20 MG/100ML; MG/100ML; MG/100ML; MG/100ML
INJECTION, SOLUTION INTRAVENOUS CONTINUOUS
Status: DISCONTINUED | OUTPATIENT
Start: 2023-07-11 | End: 2023-07-11 | Stop reason: HOSPADM

## 2023-07-11 RX ADMIN — SODIUM CHLORIDE, POTASSIUM CHLORIDE, SODIUM LACTATE AND CALCIUM CHLORIDE: 600; 310; 30; 20 INJECTION, SOLUTION INTRAVENOUS at 09:07

## 2023-07-11 RX ADMIN — PROPOFOL 50 MG: 10 INJECTION, EMULSION INTRAVENOUS at 10:07

## 2023-07-11 RX ADMIN — PROPOFOL 100 MG: 10 INJECTION, EMULSION INTRAVENOUS at 10:07

## 2023-07-11 RX ADMIN — LIDOCAINE HYDROCHLORIDE 100 MG: 20 INJECTION INTRAVENOUS at 10:07

## 2023-07-11 NOTE — TRANSFER OF CARE
"Anesthesia Transfer of Care Note    Patient: Katelyn Caba    Procedure(s) Performed: Procedure(s) (LRB):  EGD (ESOPHAGOGASTRODUODENOSCOPY) (N/A)    Patient location: PACU    Anesthesia Type: general    Transport from OR: Transported from OR on room air with adequate spontaneous ventilation    Post pain: adequate analgesia    Post assessment: no apparent anesthetic complications and tolerated procedure well    Post vital signs: stable    Level of consciousness: sedated    Nausea/Vomiting: no nausea/vomiting    Complications: none    Transfer of care protocol was followed      Last vitals:   Visit Vitals  BP (!) 168/76   Pulse 78   Temp 36.8 °C (98.2 °F)   Resp 16   Ht 5' 3" (1.6 m)   Wt 55.8 kg (123 lb)   SpO2 97%   Breastfeeding No   BMI 21.79 kg/m²     "

## 2023-07-11 NOTE — ANESTHESIA PREPROCEDURE EVALUATION
07/11/2023  Katelyn Caba is a 82 y.o., female.      Pre-op Assessment    I have reviewed the Patient Summary Reports.     I have reviewed the Nursing Notes. I have reviewed the NPO Status.   I have reviewed the Medications.     Review of Systems  Anesthesia Hx:  Denies Family Hx of Anesthesia complications.   Denies Personal Hx of Anesthesia complications.   Hematology/Oncology:         -- Anemia:   Cardiovascular:   hyperlipidemia    Neurological:   TIA, Denies CVA.        Physical Exam  General: Well nourished, Cooperative, Alert and Oriented    Airway:  Mallampati: II   Mouth Opening: Normal  TM Distance: 4 - 6 cm  Tongue: Normal        Anesthesia Plan  Type of Anesthesia, risks & benefits discussed:    Anesthesia Type: Gen Natural Airway  Intra-op Monitoring Plan: Standard ASA Monitors  Induction:  IV  Informed Consent: Informed consent signed with the Patient and all parties understand the risks and agree with anesthesia plan.  All questions answered.   ASA Score: 3    Ready For Surgery From Anesthesia Perspective.     .

## 2023-07-11 NOTE — H&P
"[facility] Note    CC: anemia    HPI 82 y.o. female presents for evaluation of anemia    Past Medical History:   Diagnosis Date    Cancer     basal cell carcinoma       Allergies and Medications reviewed     Review of Systems  General ROS: negative for - chills, fever or weight loss  Cardiovascular ROS: no chest pain or dyspnea on exertion  Gastrointestinal ROS: no hematemesis    Physical Examination  Ht 5' 3" (1.6 m)   Wt 55.8 kg (123 lb)   BMI 21.79 kg/m²   General appearance: alert, cooperative, no distress  HENT: Normocephalic, atraumatic, neck symmetrical, no nasal discharge, sclera anicteric   Lungs: clear to auscultation bilaterally, symmetric chest wall expansion bilaterally  Heart: regular rate and rhythm without rub; no displacement of the PMI   Abdomen: soft  Extremities: extremities symmetric; no clubbing, cyanosis, or edema        Labs:  Lab Results   Component Value Date    WBC 6.44 07/06/2023    HGB 12.0 07/06/2023    HCT 35.8 (L) 07/06/2023    MCV 88 07/06/2023     07/06/2023             Assessment:   82 y.o. female presents for EGD    Plan:  -proceed to EGD    Natalie Fall MD  [facility]  [address], [address]  [place] [address]  Office: [phone]  Fax: [phone]   "

## 2023-07-11 NOTE — [employer]OVATION PATIENT INSTRUCTIONS
Discharge Summary/Instructions after an Endoscopic Procedure  Patient Name: Katelyn Caba  Patient MRN: [id number]  Patient YOB: 1940  Tuesday, July 11, 2023  Natalie Fall MD  Dear patient,  As a result of recent federal legislation (The Federal Cures Act), you may   receive lab or pathology results from your procedure in your [facility]   account before your physician is able to contact you. Your physician or   their representative will relay the results to you with their   recommendations at their soonest availability.  Thank you,  RESTRICTIONS:  During your procedure today, you received medications for sedation.  These   medications may affect your judgment, balance and coordination.  Therefore,   for 24 hours, you have the following restrictions:   - DO NOT drive a car, operate machinery, make legal/financial decisions,   sign important papers or drink alcohol.    ACTIVITY:  Today: no heavy lifting, straining or running due to procedural   sedation/anesthesia.  The following day: return to full activity including work.  DIET:  Eat and drink normally unless instructed otherwise.     TREATMENT FOR COMMON SIDE EFFECTS:  - Mild abdominal pain, nausea, belching, bloating or excessive gas:  rest,   eat lightly and use a heating pad.  - Sore Throat: treat with throat lozenges and/or gargle with warm salt   water.  - Because air was used during the procedure, expelling large amounts of air   from your rectum or belching is normal.  - If a bowel prep was taken, you may not have a bowel movement for 1-3 days.    This is normal.  SYMPTOMS TO WATCH FOR AND REPORT TO YOUR PHYSICIAN:  1. Abdominal pain or bloating, other than gas cramps.  2. Chest pain.  3. Back pain.  4. Signs of infection such as: chills or fever occurring within 24 hours   after the procedure.  5. Rectal bleeding, which would show as bright red, maroon, or black stools.   (A tablespoon of blood from the rectum is not serious,  especially if   hemorrhoids are present.)  6. Vomiting.  7. Weakness or dizziness.  GO DIRECTLY TO THE NEAREST EMERGENCY ROOM IF YOU HAVE ANY OF THE FOLLOWING:      Difficulty breathing              Chills and/or fever over 101 F   Persistent vomiting and/or vomiting blood   Severe abdominal pain   Severe chest pain   Black, tarry stools   Bleeding- more than one tablespoon   Any other symptom or condition that you feel may need urgent attention  Your doctor recommends these additional instructions:  If any biopsies were taken, your doctors clinic will contact you in 1 to 2   weeks with any results.  We are waiting for your pathology results.   You are being discharged to home.   You have a contact number available for emergencies.  The signs and symptoms   of potential delayed complications were discussed with you.  You may return   to normal activities tomorrow.  Written discharge instructions were   provided to you.   Resume your previous diet.   Continue your present medications.  For questions, problems or results please call your physician - Natalie Fall MD at Work:  [phone].  EMERGENCY PHONE NUMBER: [phone], LAB RESULTS: [phone]  IF A COMPLICATION OR EMERGENCY SITUATION ARISES AND YOU ARE UNABLE TO REACH   YOUR PHYSICIAN - GO DIRECTLY TO THE EMERGENCY ROOM.  ___________________________________________  Nurse Signature  ___________________________________________  Patient/Designated Responsible Party Signature  Natalie Fall MD  7/11/2023 10:23:52 AM  This report has been verified and signed electronically.  Dear patient,  As a result of recent federal legislation (The Federal Cures Act), you may   receive lab or pathology results from your procedure in your [facility]   account before your physician is able to contact you. Your physician or   their representative will relay the results to you with their   recommendations at their soonest availability.  Thank  you.  PROVATION

## 2023-07-11 NOTE — ANESTHESIA POSTPROCEDURE EVALUATION
Anesthesia Post Evaluation    Patient: Katelyn Caba    Procedure(s) Performed: Procedure(s) (LRB):  EGD (ESOPHAGOGASTRODUODENOSCOPY) (N/A)    Final Anesthesia Type: general      Patient location during evaluation: PACU  Patient participation: Yes- Able to Participate  Level of consciousness: awake and alert  Post-procedure vital signs: reviewed and stable  Pain management: adequate  Airway patency: patent    PONV status at discharge: No PONV  Anesthetic complications: no      Cardiovascular status: blood pressure returned to baseline  Respiratory status: unassisted  Hydration status: euvolemic  Follow-up not needed.          Vitals Value Taken Time   /72 07/11/23 1050   Temp   07/11/23 1112   Pulse 55 07/11/23 1050   Resp 16 07/11/23 1050   SpO2 98 % 07/11/23 1050         No case tracking events are documented in the log.      Pain/Salena Score: Salena Score: 10 (7/11/2023 10:51 AM)

## 2023-07-12 ENCOUNTER — OFFICE VISIT (OUTPATIENT)
Dept: HEMATOLOGY/ONCOLOGY | Facility: CLINIC | Age: 83
End: 2023-07-12
Payer: MEDICARE

## 2023-07-12 VITALS
HEIGHT: 63 IN | HEART RATE: 63 BPM | RESPIRATION RATE: 16 BRPM | SYSTOLIC BLOOD PRESSURE: 139 MMHG | TEMPERATURE: 98 F | OXYGEN SATURATION: 96 % | WEIGHT: 123.88 LBS | DIASTOLIC BLOOD PRESSURE: 58 MMHG | BODY MASS INDEX: 21.95 KG/M2

## 2023-07-12 VITALS
BODY MASS INDEX: 21.79 KG/M2 | DIASTOLIC BLOOD PRESSURE: 72 MMHG | SYSTOLIC BLOOD PRESSURE: 160 MMHG | TEMPERATURE: 98 F | HEIGHT: 63 IN | WEIGHT: 123 LBS | OXYGEN SATURATION: 98 % | RESPIRATION RATE: 16 BRPM | HEART RATE: 55 BPM

## 2023-07-12 DIAGNOSIS — R91.1 SOLITARY PULMONARY NODULE: ICD-10-CM

## 2023-07-12 DIAGNOSIS — D64.9 SEVERE ANEMIA: Primary | ICD-10-CM

## 2023-07-12 DIAGNOSIS — D53.9 NUTRITIONAL ANEMIA, UNSPECIFIED: ICD-10-CM

## 2023-07-12 PROCEDURE — 99214 OFFICE O/P EST MOD 30 MIN: CPT | Mod: PBBFAC,PN | Performed by: STUDENT IN AN ORGANIZED HEALTH CARE EDUCATION/TRAINING PROGRAM

## 2023-07-12 PROCEDURE — 99999 PR PBB SHADOW E&M-EST. PATIENT-LVL IV: CPT | Mod: PBBFAC,,, | Performed by: STUDENT IN AN ORGANIZED HEALTH CARE EDUCATION/TRAINING PROGRAM

## 2023-07-12 PROCEDURE — 99214 PR OFFICE/OUTPT VISIT, EST, LEVL IV, 30-39 MIN: ICD-10-PCS | Mod: S$PBB,,, | Performed by: STUDENT IN AN ORGANIZED HEALTH CARE EDUCATION/TRAINING PROGRAM

## 2023-07-12 PROCEDURE — 99214 OFFICE O/P EST MOD 30 MIN: CPT | Mod: S$PBB,,, | Performed by: STUDENT IN AN ORGANIZED HEALTH CARE EDUCATION/TRAINING PROGRAM

## 2023-07-12 PROCEDURE — 99999 PR PBB SHADOW E&M-EST. PATIENT-LVL IV: ICD-10-PCS | Mod: PBBFAC,,, | Performed by: STUDENT IN AN ORGANIZED HEALTH CARE EDUCATION/TRAINING PROGRAM

## 2023-07-12 NOTE — PROGRESS NOTES
Subjective:                                                                                     Patient ID:    Name: Katelyn Caba  : 1940  MRN: [id number]      HPI:   Katelyn Caba is a 82 y.o. female presents for evaluation of Anemia      Patient as referred to us for anemia, sever enough that around 22 needed 2u pRBC blood transfusion, Hb 4.6/4.9.Looking back at her blood work , she is being anemia since 2018, normal CBC in  (no blood work in between). Significant history, 22; had an admissions for TIA/ stroke/expressive aphasia and seems that patient was given plavix for 21 days followed by aspirin and patient noticed that her generalized weakness and fatigued started worsening around that time . 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 Biopsy were negative for any malignancy, never had a colonoscopy      s/p IV iron ferumoxytol 2022, stable Hb/ferritin/Hct, symptoms improved significant,  EGD 22; rafa's esophagus, hiatal hernia and fundic gland polyps, path metaplasia, no dysplasia . S/p another IV ferumoxytol  and 2023.   Also patient mention that a lot of her family member have similar situation as her, her cousin is under the care of Dr Orozco for ISAIAH and was wondering if they have a familiar bleeding disorder      Today, patient is doing well, symptoms improved, denies any dark stool or bleeding or worsening of her symptoms doing well, EGD result review, patient not interested in colonoscopy     Past Medical History:   Diagnosis Date    Cancer     basal cell carcinoma       Past Surgical History:   Procedure Laterality Date    CARPAL TUNNEL RELEASE      ESOPHAGOGASTRODUODENOSCOPY N/A 2022    Procedure: EGD (ESOPHAGOGASTRODUODENOSCOPY);  Surgeon: Steven Lopez MD;  Location: [address];  Service: Endoscopy;  Laterality: N/A;     ESOPHAGOGASTRODUODENOSCOPY N/A 12/5/2022    Procedure: EGD (ESOPHAGOGASTRODUODENOSCOPY);  Surgeon: Steven Lopez MD;  Location: [facility] ([address] [place] [address]);  Service: Endoscopy;  Laterality: N/A;  Has estimated pulmonary artery pressure 45, schedule location per protocol.   Please schedule with Dr. Darryl Lopez-  Plavix stopped 8/23/22  pt requested to schedule after Thanksgiving/ inst portal-RB  pre call complete; [facility] 11/28/22    ESOPHAGOGASTRODUODENOSCOPY N/A 7/11/2023    Procedure: EGD (ESOPHAGOGASTRODUODENOSCOPY);  Surgeon: Natalie Fall MD;  Location: [facility];  Service: Endoscopy;  Laterality: N/A;    EYE SURGERY      left eye cataract    TONSILLECTOMY      TUBAL LIGATION         History reviewed. No pertinent family history.    Social History     Socioeconomic History    Marital status:    Tobacco Use    Smoking status: Never    Smokeless tobacco: Never   Substance and Sexual Activity    Alcohol use: No    Drug use: No       Review of patient's allergies indicates:  No Known Allergies    Review of Systems   Constitutional: Positive for malaise/fatigue. Negative for decreased appetite, fever and night sweats.   Eyes:  Negative for blurred vision, double vision, pain and visual disturbance.   Cardiovascular:  Negative for chest pain.   Respiratory:  Negative for cough and shortness of breath.    Endocrine: Positive for cold intolerance.   Hematologic/Lymphatic: Negative for adenopathy and bleeding problem. Does not bruise/bleed easily.   Skin:  Negative for rash.   Musculoskeletal:  Negative for back pain.   Gastrointestinal:  Negative for abdominal pain and diarrhea.   Genitourinary:  Negative for frequency.   Neurological:  Negative for headaches.   Psychiatric/Behavioral:  The patient is not nervous/anxious.           Objective:     Vitals:    07/12/23 1425   BP: (!) 139/58   BP Location: Right arm   Patient Position: Sitting   BP Method: Medium (Manual)   Pulse: 63   Resp: 16   Temp: 97.8  "°F (36.6 °C)   TempSrc: Temporal   SpO2: 96%   Weight: 56.2 kg (123 lb 14.4 oz)   Height: 5' 3" (1.6 m)          Physical Exam  Constitutional:       General: She is not in acute distress.     Appearance: Normal appearance. She is normal weight.   HENT:      Head: Normocephalic and atraumatic.   Eyes:      General: No scleral icterus.  Cardiovascular:      Rate and Rhythm: Normal rate and regular rhythm.      Heart sounds: Normal heart sounds.   Pulmonary:      Effort: Pulmonary effort is normal.      Breath sounds: Normal breath sounds. No wheezing.   Chest:      Chest wall: No tenderness.   Abdominal:      General: Bowel sounds are normal. There is no distension.      Palpations: Abdomen is soft. There is no mass.   Musculoskeletal:         General: No swelling or tenderness.   Lymphadenopathy:      Cervical: No cervical adenopathy.   Skin:     Coloration: Skin is not jaundiced or pale.   Neurological:      General: No focal deficit present.      Mental Status: She is oriented to person, place, and time.   Psychiatric:         Mood and Affect: Mood normal.         Behavior: Behavior normal.         Current Outpatient Medications on File Prior to Visit   Medication Sig    amLODIPine (NORVASC) 5 MG tablet Take 1 tablet (5 mg total) by mouth once daily.    cyanocobalamin (VITAMIN B-12) 1000 MCG tablet Take 1,000 mcg by mouth once daily.    ferrous sulfate (FEROSUL) 220 mg (44 mg iron)/5 mL Elix Take 6.82 mLs (300.08 mg total) by mouth once daily.    mv,Ca,min-folic acid-vit K1 (ONE-A-DAY WOMEN'S 50 PLUS) 400-20 mcg Tab Take 1 tablet by mouth once daily.    omeprazole (PRILOSEC) 40 MG capsule Take 1 capsule (40 mg total) by mouth once daily.    RESTASIS 0.05 % ophthalmic emulsion Place 1 drop into both eyes 2 (two) times daily.    vitamin D (VITAMIN D3) 1000 units Tab Take 1,000 Units by mouth once daily.    alendronate (FOSAMAX) 70 MG tablet Take 1 tablet (70 mg total) by mouth every 7 days.    aspirin 81 MG Chew " Chew and swallow 1 tablet (81 mg total) by mouth once. for 1 dose    [DISCONTINUED] clopidogreL (PLAVIX) 75 mg tablet Take 1 tablet (75 mg total) by mouth once daily. for 21 days     No current facility-administered medications on file prior to visit.       CBC:  Lab Results   Component Value Date    WBC 6.44 07/06/2023    HGB 12.0 07/06/2023    HCT 35.8 (L) 07/06/2023    MCV 88 07/06/2023     07/06/2023         CMP:  Sodium   Date Value Ref Range Status   05/18/2023 141 136 - 145 mmol/L Final     Potassium   Date Value Ref Range Status   05/18/2023 4.1 3.5 - 5.1 mmol/L Final     Chloride   Date Value Ref Range Status   05/18/2023 105 95 - 110 mmol/L Final     CO2   Date Value Ref Range Status   05/18/2023 24 23 - 29 mmol/L Final     Glucose   Date Value Ref Range Status   05/18/2023 92 70 - 110 mg/dL Final     BUN   Date Value Ref Range Status   05/18/2023 14 8 - 23 mg/dL Final     Creatinine   Date Value Ref Range Status   05/18/2023 0.7 0.5 - 1.4 mg/dL Final     Calcium   Date Value Ref Range Status   05/18/2023 9.7 8.7 - 10.5 mg/dL Final     Total Protein   Date Value Ref Range Status   05/18/2023 7.4 6.0 - 8.4 g/dL Final     Albumin   Date Value Ref Range Status   05/18/2023 4.1 3.5 - 5.2 g/dL Final     Total Bilirubin   Date Value Ref Range Status   05/18/2023 0.3 0.1 - 1.0 mg/dL Final     Comment:     For infants and newborns, interpretation of results should be based  on gestational age, weight and in agreement with clinical  observations.    Premature Infant recommended reference ranges:  Up to 24 hours.............<8.0 mg/dL  Up to 48 hours............<12.0 mg/dL  3-5 days..................<15.0 mg/dL  6-29 days.................<15.0 mg/dL       Alkaline Phosphatase   Date Value Ref Range Status   05/18/2023 102 55 - 135 U/L Final     AST   Date Value Ref Range Status   05/18/2023 21 10 - 40 U/L Final     ALT   Date Value Ref Range Status   05/18/2023 12 10 - 44 U/L Final     Anion Gap   Date Value  Ref Range Status   05/18/2023 12 8 - 16 mmol/L Final     eGFR if    Date Value Ref Range Status   07/22/2022 >60 >60 mL/min/1.73 m^2 Final     eGFR if non    Date Value Ref Range Status   07/22/2022 >60 >60 mL/min/1.73 m^2 Final     Comment:     Calculation used to obtain the estimated glomerular filtration  rate (eGFR) is the CKD-EPI equation.          Lab Results   Component Value Date    IRON 47 07/06/2023    TIBC 388 07/06/2023    FERRITIN 22 07/06/2023       No results found for: HRYBIYJJ85,No results found for: FOLATE    CT Chest Without Contrast  Narrative: EXAMINATION:  CT CHEST WITHOUT CONTRAST    CLINICAL HISTORY:  Abnormal xray - lung nodule, < 1 cm, mod-high risk; Solitary pulmonary nodule    TECHNIQUE:  Low dose axial images, sagittal and coronal reformations were obtained from the thoracic inlet to the lung bases. Contrast was not administered.    COMPARISON:  CT abdomen and pelvis of 03/29/2023    FINDINGS:  Normal size axillary nodes are noted bilaterally.  Atherosclerotic calcifications are noted at the aortic arch.  The pulmonary artery appears prominent in comparison to the adjacent aorta measuring 3.2 versus 2.9 cm for the aorta.  Please correlate for possible pulmonary arterial hypertension.  Moderate coronary calcifications are noted increasing the patient's coronary risk.    A hiatal hernia appears to be present that is moderate in size.  Some density may be present along the distal esophagus as was described on the prior examination for which endoscopy was suggested.  Cholelithiasis is noted with multiple stones within the gallbladder.    A hypodensity is noted at the upper pole of the right kidney suggestive a cyst of 4.6 cm without worrisome characteristics.    Mild intra-abdominal fat haziness is noted partially visualized as can be seen with sclerosing mesenteritis as was described on 3/29/23, this can be from multiple etiologies, please refer to that  examination for a description.  This can be from multiple etiologies including history of neoplasm or infection.    Ground-glass consolidation is noted at the left lung apex of 1.8 cm image 65 sequence 4    A part solid ground-glass consolidation is noted in the left lower lobe image 169 above the field of view on the prior of 1.4 cm.  Further follow-up will be necessary.    A ground-glass consolidation is noted image 172 in the left lower lobe of 7 mm.    A part solid ground-glass process is noted in the left lower lobe of 1.5 cm image 192.  This is above the field of view on the prior.    Within the left upper lobe image 192 a nodule is noted of 6 mm favored to be above the field of view on the prior    A 5 mm nodule is noted image 216 sequence 4 in the left upper lobe favored to be stable since the prior.  The 7 mm consolidation in the lingula on the prior appears is if it may have resolved.  A stable 6 mm nodule is noted in the left lower lobe image 263 sequence 4    A stable 5 mm nodule is noted image 275 sequence 4 in the left lower lobe    The band like nodule seen at the left lung base along the hemidiaphragm on the prior that was measured at 8 mm on the prior can be measured at 9 mm in a similar manner to the prior.  This is likely stable given inter study variance.  Continued follow-up for size stability would be necessary.    A 6 mm nodule is noted at the right lung apex image 88 sequence 4.    Multiple smaller pulmonary nodules are noted within the right lung as well that were above the field of view on the prior.  This includes a ground-glass nodule of 6 mm image 147 sequence 4    A ground-glass nodule is noted in the right lower lobe stable since the prior of 9 mm image 265  Impression: 1. Multiple pulmonary nodules some ground-glass in some solid as described above.  The largest ground-glass consolidation is of 1.8 cm, the largest part solid ground-glass nodule is of 1.5 cm, the largest solid nodule  is of approximately 9 mm.  The solid consolidative nodule of 9 mm was within the field of view on the prior and is questioned to be minimally larger.  Continued close follow-up for size stability is necessary.  This is within inter study variance.  The part solid ground-glass nodule will require follow mint in 3-6 months.  Infectious and inflammatory etiologies are on the differential.  Neoplastic disease is not excluded  2. Hiatal hernia with the question of some thickening in the distal esophagus, endoscopy was suggested on the prior as this could be inflammatory or neoplastic and would be reasonable  3. Cholelithiasis  4. Mesenteric haziness partially visualized similar to on the prior described in detail on that examination a can be from multiple etiologies, please correlate for sclerosing mesenteritis.  A history of infectious inflammatory and neoplastic etiologies can give this appearance  5. Prominent pulmonary artery in comparison to the aorta, please correlate for pulmonary arterial hypertension  Yellow Pulmonary Nodule 2017 Alert:    Yellow Actionable Finding (Radiology: Volume 284: Number 1-July 2017)    Fleischner Guidelines do not apply in patients younger than 35 years, immunocompromised patients or patients with cancer. Risk assignment based on ACCP with low risk being less than 5% and high risk combining intermediate and high risk categories.    UNEXPECTED FINDINGS:  Incidental Pulmonary Part Solid Nodule Single >=6mm (7)    RECOMMENDATIONS:  CT Chest without contrast at 3-6 months to confirm persistence.  If unchanged and solid component remains <6 mm, annual CT for 5 years    Electronically signed by: Stephen Stacy MD  Date:    07/06/2023  Time:    12:48         All pertinent labs and imaging reviewed.    Assessment:       1. Severe anemia    2. Nutritional anemia, unspecified    3. Solitary pulmonary nodule        # SEVER /symptomatic microcytic anemia  - likely due to GI bleeding; EGD with  gastric ulcer and duodenum angiectasis, path negative for malignancy 8/2022   - following up with GI closely, still need colonoscopy to rule out colon cancer; patient never had one and worry about her age   - ferritin <20, will need IV iron and possible might need it more than 1 round  - s/p ferumyoxytol x2 in Sep/2022, dropped again s/p IV ferumyoxytol 1/2023   - no need for transfusions, off  plavix, on aspirin  - EGD  with no active bleeding but polyps, hernia and barrette esophagitis   - Never did a colonoscopy, worried about her age and complication with getting it done from perforation , we discussed that we definitely need to rule out colon cancer   - checking bleeding disorder, (rule out vWD/PT/PTT)  -cont montioring if trend down again, will need more IV iron, if ferritin <20, patient is not interested in getting colonoscopy, had an EGD yesterday, no bleeding ulcer but gastric polyps pending biopsy     # Hx of Stroke w a recent TIA  - was on plavix and aspirin, now on on aspirin only   - following up with neurology      # Lung nodule, getting CT chest for monitoring   # Abnormal uptake at the esophageal on CT:  had an EGD yesterday, no bleeding ulcer but gastric polyps pending biopsy      Plan:     Severe anemia  -     CBC w/ DIFF; Future; Expected date: 07/12/2023  -     CMP; Future; Expected date: 07/12/2023  -     Iron and TIBC; Future; Expected date: 07/12/2023  -     Ferritin; Future; Expected date: 07/12/2023    Nutritional anemia, unspecified  -     CBC w/ DIFF; Future; Expected date: 07/12/2023  -     CMP; Future; Expected date: 07/12/2023  -     Iron and TIBC; Future; Expected date: 07/12/2023  -     Ferritin; Future; Expected date: 07/12/2023    Solitary pulmonary nodule        Patient queried and all questions were answered.        Recommend COVID guidelines being followed     Recommend  exercise, nutrition and weight management and health maintenance activities and follow-up with PCPs  recommendations       Signed:  Katherine Marie MD  Hematology and Oncology  [facility]  [facility]  Route Chart for Scheduling    Med Onc Chart Routing      Follow up with physician    Follow up with LUZ 3 months. with Luis, CBC/CMP/Ferritin/TIBC/iron   Infusion scheduling note    Injection scheduling note    Labs    Imaging    Pharmacy appointment    Other referrals              Supportive Plan Information  OP FERUMOXYTOL   Katherine Marie MD   Upcoming Treatment Dates - OP FERUMOXYTOL    No upcoming days in selected categories.

## 2023-07-13 LAB
FINAL PATHOLOGIC DIAGNOSIS: NORMAL
GROSS: NORMAL
Lab: NORMAL

## 2023-09-28 ENCOUNTER — PATIENT MESSAGE (OUTPATIENT)
Dept: HEMATOLOGY/ONCOLOGY | Facility: CLINIC | Age: 83
End: 2023-09-28
Payer: MEDICARE

## 2023-10-19 ENCOUNTER — PATIENT MESSAGE (OUTPATIENT)
Dept: HEMATOLOGY/ONCOLOGY | Facility: CLINIC | Age: 83
End: 2023-10-19
Payer: MEDICARE

## 2023-11-01 ENCOUNTER — TELEPHONE (OUTPATIENT)
Dept: FAMILY MEDICINE | Facility: CLINIC | Age: 83
End: 2023-11-01
Payer: MEDICARE

## 2023-11-01 NOTE — TELEPHONE ENCOUNTER
----- Message from Reinaldo Anand sent at 11/1/2023  2:47 PM CDT -----  Type: Need Medical Advice   Who Called: Patient   Best callback number: [phone]  Additional Information: Patient called to ask about the appointment on 12/19 who scheduled it and why, she was not aware of it  Please call to further assist, Thanks.

## 2023-11-11 ENCOUNTER — HOSPITAL ENCOUNTER (EMERGENCY)
Facility: HOSPITAL | Age: 83
Discharge: HOME | End: 2023-11-11
Attending: EMERGENCY MEDICINE
Payer: MEDICARE

## 2023-11-11 VITALS
BODY MASS INDEX: 21.26 KG/M2 | RESPIRATION RATE: 20 BRPM | OXYGEN SATURATION: 98 % | SYSTOLIC BLOOD PRESSURE: 154 MMHG | TEMPERATURE: 98 F | DIASTOLIC BLOOD PRESSURE: 68 MMHG | WEIGHT: 120 LBS | HEART RATE: 68 BPM

## 2023-11-11 DIAGNOSIS — M79.661 PAIN IN RIGHT LOWER LEG: Primary | ICD-10-CM

## 2023-11-11 DIAGNOSIS — S92.101A CLOSED NONDISPLACED FRACTURE OF RIGHT TALUS, UNSPECIFIED PORTION OF TALUS, INITIAL ENCOUNTER: ICD-10-CM
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
## 2023-11-11 PROCEDURE — 99283 EMERGENCY DEPT VISIT LOW MDM: CPT

## 2023-11-11 RX ORDER — ACETAMINOPHEN 325 MG/1
650 TABLET ORAL
Status: DISCONTINUED | OUTPATIENT
Start: 2023-11-11 | End: 2023-11-11 | Stop reason: HOSPADM

## 2023-11-11 NOTE — ED PROVIDER NOTES
"Encounter Date: 11/11/2023       History     Chief Complaint   Patient presents with    Fall     Pt sustained a mechanical fall injuring her RLE. 8/10 aching pain upon ambulation. Swelling noted without deformity. -LOC or hitting her head. - blood thinners.     This patient is a very pleasant 83-year-old female who presents to ED after ground level fall with injury sustained to her right shin.  She denies loss of consciousness and was witnessed by her .  Negative for blood thinners and denies trauma to any other area of her body other than her right leg.  Patient denies syncopal episode or faintness/dizziness.  She states that the injury was sustained around roughly 7:00 p.m. 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  Patient does not appear acutely distressed and only complaint she has is an 8/10 pain in her right lower leg that is worse when ambulating and accompanied swelling.    The history is provided by the patient, medical records and the spouse. No  was used.     Review of patient's allergies indicates:  No Known Allergies  Past Medical History:   Diagnosis Date    Cancer     basal cell carcinoma     Past Surgical History:   Procedure Laterality Date    CARPAL TUNNEL RELEASE      ESOPHAGOGASTRODUODENOSCOPY N/A 8/22/2022    Procedure: EGD (ESOPHAGOGASTRODUODENOSCOPY);  Surgeon: Steven Lopez MD;  Location: [address]);  Service: Endoscopy;  Laterality: N/A;    ESOPHAGOGASTRODUODENOSCOPY N/A 12/5/2022    Procedure: EGD (ESOPHAGOGASTRODUODENOSCOPY);  Surgeon: Steven Lopez MD;  Location: [address]);  Service: Endoscopy;  Laterality: N/A;  Has estimated pulmonary artery pressure 45, schedule location per protocol.   Please schedule with Dr. Darryl Lopez-  Plavix stopped 8/23/22  pt requested to schedule after Thanksgiving/ [facility] portal-[facility]  pre call " complete; [facility] 11/28/22    ESOPHAGOGASTRODUODENOSCOPY N/A 7/11/2023    Procedure: EGD (ESOPHAGOGASTRODUODENOSCOPY);  Surgeon: Natalie Fall MD;  Location: [facility];  Service: Endoscopy;  Laterality: N/A;    EYE SURGERY      left eye cataract    TONSILLECTOMY      TUBAL LIGATION       History reviewed. No pertinent family history.  Social History     Tobacco Use    Smoking status: Never    Smokeless tobacco: Never   Substance Use Topics    Alcohol use: No    Drug use: No     Review of Systems   All other systems reviewed and are negative.      Physical Exam     Initial Vitals   BP Pulse Resp Temp SpO2   11/11/23 0421 11/11/23 0421 11/11/23 0421 11/11/23 0423 11/11/23 0421   (!) 193/76 70 16 98.3 °F (36.8 °C) 97 %      MAP       --                Physical Exam    Nursing note and vitals reviewed.  Constitutional: She appears well-developed and well-nourished.   Eyes: EOM are normal. Pupils are equal, round, and reactive to light.   Neck: Neck supple.   Normal range of motion.  Cardiovascular:  Normal heart sounds.           Pulmonary/Chest: Breath sounds normal.   Abdominal: Abdomen is soft. Bowel sounds are normal.   Musculoskeletal:         General: Normal range of motion.      Cervical back: Normal range of motion and neck supple.      Comments: Swelling appreciated on right lower extremity and pain elicited with palpation but not passive motion.  No skin breaks or gross deformities appreciated.  Pedal pulses strong and present bilaterally.      Neurological: She is alert and oriented to person, place, and time. She has normal strength.   Skin: Skin is warm and dry. Capillary refill takes less than 2 seconds.   Psychiatric: She has a normal mood and affect. Her behavior is normal. Judgment and thought content normal.         ED Course   Procedures  Labs Reviewed - No data to display       Imaging Results    None       X-Rays:   Independently Interpreted Readings:   Other Readings:  Tibia and fibula x-ray of  right extremity:  Grossly normal with no fractures appreciated on initial read  Ankle x-ray of right extremity:  Mortise without gross separation or appreciable fractures on initial read    Medications - No data to display  Medical Decision Making  83 F status post GLF, negative LOC, negative anticoagulation, negative head trauma non syncopal type episode.  No skin breaks or gross deformities appreciated on physical exam with marked swelling of right lower calf.  Strong pedal pulses bilaterally.  Tylenol for pain control.  Plain films of right lower extremity.  VSS/HDS.     Re-evaluation:  And pain well controlled with ibuprofen.  Patient is signed out to incoming team, Dr. Gramajo and Sonido pending x-ray reads and final dispo.  Likely discharge home depending on imaging.  VSS/HDS.     Amount and/or Complexity of Data Reviewed  Radiology: ordered. Decision-making details documented in ED Course.    Risk  OTC drugs.                               Clinical Impression:     1. Pain in right lower leg    2. Fall                  Blanquita Hernandez MD  Resident  11/11/23 4713

## 2023-11-11 NOTE — ED TRIAGE NOTES
Katelyn Caba, a 83 y.o. female presents to the ED w/ complaint of mechanical fall injuring her RLE. 8/10 aching pain upon ambulation. Swelling noted without deformity. -LOC or hitting her head. - blood thinners.     Triage note:  Chief Complaint   Patient presents with    Fall     Pt sustained a mechanical fall injuring her RLE. 8/10 aching pain upon ambulation. Swelling noted without deformity. -LOC or hitting her head. - blood thinners.     Review of patient's allergies indicates:  No Known Allergies  Past Medical History:   Diagnosis Date    Cancer     basal cell carcinoma   Patient identifiers for Katelyn Caba checked and correct.    LOC: The patient is awake, alert and aware of environment with an appropriate affect, the patient is oriented x 4 and speaking appropriately.    APPEARANCE: Patient resting comfortably and in no acute distress, patient is clean and well groomed, patient's clothing is properly fastened.    SKIN: The skin is warm and dry, color consistent with ethnicity, patient has normal skin turgor and moist mucus membranes, skin intact, no breakdown or bruising noted.    MUSCULOSKELETAL: Patient moving all extremities well, no obvious swelling or deformities noted. RLE pain r/t mechanical fall.    RESPIRATORY: Airway is open and patent, respirations are spontaneous and even, patient has a normal effort and rate.    CARDIAC: Patient has a normal rate and rhythm, no periphreal edema noted, capillary refill < 3 seconds.    ABDOMEN: Soft and non tender to palpation, no distention noted. Patient denies any nausea, vomiting, diarrhea, or constipation.     NEUROLOGIC: Eyes open spontaneously, PERRL, behavior appropriate to situation, follows commands, facial expression symmetrical, bilateral hand grasp equal and even, purposeful motor response noted, normal sensation in all extremities.     HEENT: No abnormalities noted. White sclera and pupils equal round and reactive to light. Denies headache,  dizziness.     : Pt voids independently, denies dysuria, hematuria, frequency.

## 2023-11-11 NOTE — PROVIDER PROGRESS NOTES - EMERGENCY DEPT.
Encounter Date: 11/11/2023    ED Physician Progress Notes          ED Resident HAND-OFF NOTE:  Katelyn Caba is a 83 y.o. female who presented to the ED on 11/11/2023, patient C/O fall. I assumed care of patient from off-going ED physician team patient pending x-ray imaging.    Briefly, 83 female with history of osteopenia presenting after fall.  Patient does pain on the lateral surface of her right shin.  Pending x-rays.    On my evaluation, Katelyn Caba appears well, hemodynamically stable and in NAD. Thus far, Katelyn Caba has received:  Medications   acetaminophen tablet 650 mg (650 mg Oral Not Given 11/11/23 0530)       BP (!) 193/76 (BP Location: Right arm, Patient Position: Sitting)   Pulse 70   Temp 98.3 °F (36.8 °C) (Oral)   Resp 16   Wt 54.4 kg (120 lb)   SpO2 97%   Breastfeeding No   BMI 21.26 kg/m²         Disposition: I anticipate patient will depend on x-rays  ______________________  Payton Head MD   Emergency Medicine Resident      UPDATE:     X-ray imaging showing remote distal talus fracture.  Upon my re-examination, patient exhibits point tenderness in right lateral surface of the shin.  No tibial ridge tenderness.  Leg is neurovascularly intact.  Physical Exam  Musculoskeletal:        Legs:        Will provide patient with walking boot to assist with ambulation.  Discussed strict return precautions, patient voices understanding.  Discussed the importance of following up with primary care so the patient may obtain repeat x-ray imaging due to history of osteopenia.  Patient voices understanding of all these instructions.  Patient is hemodynamically stable well-appearing, she is appropriate for discharge at this time.      :  Fall  Pain in right lower leg (Primary)  Closed nondisplaced fracture of right talus, unspecified portion of talus, initial encounter

## 2023-11-11 NOTE — Clinical Note
"Katelyn"Eva Caba was seen and treated in our emergency department on 11/11/2023.  She may return to work on 11/14/2023.       If you have any questions or concerns, please don't hesitate to call.      Blanquita Hernandez MD"

## 2023-11-11 NOTE — DISCHARGE INSTRUCTIONS
Diagnosis:   1. Pain in right lower leg    2. Fall        Please see your primary care doctor to obtain x-rays of your leg in the next 7-10 days.    Please use walking boot until you are able to obtain follow-up x-rays.    Tests you had showed:   Labs Reviewed - No data to display   X-Ray Tibia Fibula 2 View Right   Final Result      This exam is normal.  Given the diffuse osteopenia, if symptoms persist, consider follow-up imaging in 7-10 days.         Electronically signed by: Jennifer Bowers MD   Date:    11/11/2023   Time:    06:30      X-Ray Ankle Complete Right   Final Result      1. Well corticated osseous fragment off of the distal talus which appears to be degenerative or remote.  Clinical correlation is recommended for pain at this site.   2. Otherwise this exam is within normal limits.  Secondary to the osteopenia, if symptoms persist, repeat radiographs can be performed in 7-10 days.         Electronically signed by: Jennifer Bowers MD   Date:    11/11/2023   Time:    06:32          Treatments you received were:   Medications   acetaminophen tablet 650 mg (650 mg Oral Not Given 11/11/23 0530)       Home Care Instructions:  - Medications: Continue taking your home medications as prescribed. You may use over the counter pain reliever as needed for symptom relief.    Follow-Up Plan:  - Follow-up with: Primary care doctor within 7  days  - Additional testing and/or evaluation will be directed by your primary doctor    Return to the Emergency Department for symptoms including but not limited to: worsening symptoms, severe lower leg pain, shortness of breath or chest pain, fevers greater than 100.4°F, passing out/fainting/unconsciousness, increased swelling, color changes or inability to walk on right leg.     If you do not have a primary care doctor, you may contact the one listed on your discharge paperwork or you may also call the [facility] Appointment Desk at [phone] to schedule an  appointment and establish care with one. It is important to your health that you have a primary care doctor.    Please take all medications as directed. All medications may potentially have side-effects and it is impossible to predict which medications may give you side-effects or what side-effects (if any) they will give you. If you feel that you are having a negative effect or side-effect of any medication you should immediately stop taking them and seek medical attention. If you feel that you are having a life-threatening reaction call 911.

## 2023-12-23 ENCOUNTER — HOSPITAL ENCOUNTER (EMERGENCY)
Facility: HOSPITAL | Age: 83
Discharge: HOME | End: 2023-12-23
Attending: EMERGENCY MEDICINE
Payer: MEDICARE

## 2023-12-23 VITALS
HEIGHT: 63 IN | SYSTOLIC BLOOD PRESSURE: 167 MMHG | OXYGEN SATURATION: 95 % | BODY MASS INDEX: 21.26 KG/M2 | DIASTOLIC BLOOD PRESSURE: 75 MMHG | RESPIRATION RATE: 18 BRPM | HEART RATE: 76 BPM | TEMPERATURE: 98 F | WEIGHT: 120 LBS

## 2023-12-23 DIAGNOSIS — S32.511A CLOSED FRACTURE OF SUPERIOR RAMUS OF RIGHT PUBIS, INITIAL ENCOUNTER: Primary | ICD-10-CM

## 2023-12-23 LAB
ALBUMIN SERPL BCP-MCNC: 3.8 G/DL (ref 3.5–5.2)
ALP SERPL-CCNC: 129 U/L (ref 55–135)
ALT SERPL W/O P-5'-P-CCNC: 14 U/L (ref 10–44)
ANION GAP SERPL CALC-SCNC: 15 MMOL/L (ref 8–16)
AST SERPL-CCNC: 19 U/L (ref 10–40)
BASOPHILS # BLD AUTO: 0.06 K/UL (ref 0–0.2)
BASOPHILS NFR BLD: 0.4 % (ref 0–1.9)
BILIRUB SERPL-MCNC: 0.5 MG/DL (ref 0.1–1)
BUN SERPL-MCNC: 13 MG/DL (ref 8–23)
CALCIUM SERPL-MCNC: 9.1 MG/DL (ref 8.7–10.5)
CHLORIDE SERPL-SCNC: 104 MMOL/L (ref 95–110)
CO2 SERPL-SCNC: 24 MMOL/L (ref 23–29)
CREAT SERPL-MCNC: 0.7 MG/DL (ref 0.5–1.4)
DIFFERENTIAL METHOD: ABNORMAL
EOSINOPHIL # BLD AUTO: 0.1 K/UL (ref 0–0.5)
EOSINOPHIL NFR BLD: 0.6 % (ref 0–8)
ERYTHROCYTE [DISTWIDTH] IN BLOOD BY AUTOMATED COUNT: 13.1 % (ref 11.5–14.5)
EST. GFR  (NO RACE VARIABLE): >60 ML/MIN/1.73 M^2
GLUCOSE SERPL-MCNC: 98 MG/DL (ref 70–110)
HCT VFR BLD AUTO: 29.9 % (ref 37–48.5)
HCV AB SERPL QL IA: NORMAL
HGB BLD-MCNC: 9.5 G/DL (ref 12–16)
HIV 1+2 AB+HIV1 P24 AG SERPL QL IA: NORMAL
IMM GRANULOCYTES # BLD AUTO: 0.09 K/UL (ref 0–0.04)
IMM GRANULOCYTES NFR BLD AUTO: 0.6 % (ref 0–0.5)
LYMPHOCYTES # BLD AUTO: 0.9 K/UL (ref 1–4.8)
LYMPHOCYTES NFR BLD: 5.9 % (ref 18–48)
MCH RBC QN AUTO: 26.8 PG (ref 27–31)
MCHC RBC AUTO-ENTMCNC: 31.8 G/DL (ref 32–36)
MCV RBC AUTO: 85 FL (ref 82–98)
MONOCYTES # BLD AUTO: 0.9 K/UL (ref 0.3–1)
MONOCYTES NFR BLD: 6.2 % (ref 4–15)
NEUTROPHILS # BLD AUTO: 12.4 K/UL (ref 1.8–7.7)
NEUTROPHILS NFR BLD: 86.3 % (ref 38–73)
NRBC BLD-RTO: 0 /100 WBC
PLATELET # BLD AUTO: 363 K/UL (ref 150–450)
PMV BLD AUTO: 10.3 FL (ref 9.2–12.9)
POTASSIUM SERPL-SCNC: 3.6 MMOL/L (ref 3.5–5.1)
PROT SERPL-MCNC: 7.1 G/DL (ref 6–8.4)
RBC # BLD AUTO: 3.54 M/UL (ref 4–5.4)
SODIUM SERPL-SCNC: 143 MMOL/L (ref 136–145)
WBC # BLD AUTO: 14.33 K/UL (ref 3.9–12.7)

## 2023-12-23 PROCEDURE — 25000003 PHARM REV CODE 250: Performed by: EMERGENCY MEDICINE

## 2023-12-23 PROCEDURE — 86803 HEPATITIS C AB TEST: CPT | Performed by: PHYSICIAN ASSISTANT

## 2023-12-23 PROCEDURE — 99285 EMERGENCY DEPT VISIT HI MDM: CPT | Mod: 25

## 2023-12-23 PROCEDURE — 87389 HIV-1 AG W/HIV-1&-2 AB AG IA: CPT | Performed by: PHYSICIAN ASSISTANT

## 2023-12-23 PROCEDURE — 80053 COMPREHEN METABOLIC PANEL: CPT | Performed by: EMERGENCY MEDICINE

## 2023-12-23 PROCEDURE — 85025 COMPLETE CBC W/AUTO DIFF WBC: CPT | Performed by: EMERGENCY MEDICINE

## 2023-12-23 RX ORDER — HYDROCODONE BITARTRATE AND ACETAMINOPHEN 5; 325 MG/1; MG/1
1 TABLET ORAL EVERY 6 HOURS PRN
Qty: 12 TABLET | Refills: 0 | Status: SHIPPED | OUTPATIENT
Start: 2023-12-23

## 2023-12-23 RX ORDER — IBUPROFEN 400 MG/1
800 TABLET ORAL
Status: COMPLETED | OUTPATIENT
Start: 2023-12-23 | End: 2023-12-23

## 2023-12-23 RX ADMIN — IBUPROFEN 800 MG: 400 TABLET ORAL at 08:12

## 2023-12-23 NOTE — ED PROVIDER NOTES
Encounter Date: 12/23/2023       History     Chief Complaint   Patient presents with    Fall     Mechanical trip and fall onto right hip. Patient reporting the inability to ambulate since fall. Denies hitting head, LOC, or blood thinner use      HPI  83-year-old female with history of prior basal cell carcinoma otherwise  healthy presents after mechanical fall. Pt states she was putting something in the car and stepped down a curb from side to street but then when she went to go back up to sidewalk she forgot to step up and then fell onto her right side. States she did not hit her head. No LOC. She was helped up by  and bystanders and was able to ambulate. She went to have lunch after at [employer] but then when she went to get up she had pain and had a hard time bearing weight on her right leg.  No other complaints    Review of patient's allergies indicates:  No Known Allergies    Past Medical History:   Diagnosis Date    Cancer     basal cell carcinoma     Past Surgical History:   Procedure Laterality Date    CARPAL TUNNEL RELEASE      ESOPHAGOGASTRODUODENOSCOPY N/A 8/22/2022    Procedure: EGD (ESOPHAGOGASTRODUODENOSCOPY);  Surgeon: Steven Lopez MD;  Location: [facility] ([address] [place] [address]);  Service: Endoscopy;  Laterality: N/A;    ESOPHAGOGASTRODUODENOSCOPY N/A 12/5/2022    Procedure: EGD (ESOPHAGOGASTRODUODENOSCOPY);  Surgeon: Steven Lopez MD;  Location: [address]);  Service: Endoscopy;  Laterality: N/A;  Has estimated pulmonary artery pressure 45, schedule location per protocol.   Please schedule with Dr. Darryl Lopez-  Plavix stopped 8/23/22  pt requested to schedule after [facility] portal-[facility]  pre call complete; [facility] 11/28/22    ESOPHAGOGASTRODUODENOSCOPY N/A 7/11/2023    Procedure: EGD (ESOPHAGOGASTRODUODENOSCOPY);  Surgeon: Natalie Fall MD;  Location: [facility];  Service: Endoscopy;  Laterality: N/A;    EYE SURGERY      left eye cataract    TONSILLECTOMY      TUBAL LIGATION        History reviewed. No pertinent family history.  Social History     Tobacco Use    Smoking status: Never    Smokeless tobacco: Never   Substance Use Topics    Alcohol use: No    Drug use: No     Review of Systems   Constitutional:  Negative for fatigue.   HENT:  Negative for sore throat.    Respiratory:  Negative for shortness of breath.    Cardiovascular:  Negative for chest pain and leg swelling.   Gastrointestinal:  Negative for abdominal pain.   Musculoskeletal:  Negative for back pain, neck pain and neck stiffness.   Neurological:  Negative for weakness and numbness.       Physical Exam     Initial Vitals [12/23/23 1334]   BP Pulse Resp Temp SpO2   (!) 160/69 72 17 98.1 °F (36.7 °C) 96 %      MAP       --         Physical Exam    Nursing note and vitals reviewed.  Constitutional: She appears well-developed and well-nourished. She is not diaphoretic. No distress.   HENT:   Head: Normocephalic and atraumatic.   Eyes: Conjunctivae are normal.   Neck: Neck supple.   No midline cervical ttp   Cardiovascular:  Normal rate, regular rhythm, normal heart sounds and intact distal pulses.           Pulmonary/Chest: Breath sounds normal. No respiratory distress. She has no wheezes.   Abdominal: Abdomen is soft. She exhibits no distension. There is no abdominal tenderness. There is no rebound.   Musculoskeletal:         General: No edema.      Cervical back: Neck supple.      Comments: No shortening or internal or external rotation, ttp at right lateral hip and in right groin  No ttp or deformity noted to bilateral thigh, knee, calf and ankle     Neurological: She is alert and oriented to person, place, and time. She has normal strength. GCS score is 15. GCS eye subscore is 4. GCS verbal subscore is 5. GCS motor subscore is 6.   Skin: Skin is warm and dry.   Superficial abrasion to Rt mid calf         ED Course   Procedures  Labs Reviewed   CBC W/ AUTO DIFFERENTIAL - Abnormal; Notable for the following components:        Result Value    WBC 14.33 (*)     RBC 3.54 (*)     Hemoglobin 9.5 (*)     Hematocrit 29.9 (*)     MCH 26.8 (*)     MCHC 31.8 (*)     Immature Granulocytes 0.6 (*)     Gran # (ANC) 12.4 (*)     Immature Grans (Abs) 0.09 (*)     Lymph # 0.9 (*)     Gran % 86.3 (*)     Lymph % 5.9 (*)     All other components within normal limits   HIV 1 / 2 ANTIBODY    Narrative:     Release to patient->Immediate   HEPATITIS C ANTIBODY    Narrative:     Release to patient->Immediate   COMPREHENSIVE METABOLIC PANEL          Imaging Results               CT Hip Without Contrast Right (Final result)  Result time 12/23/23 19:30:27      Final result by Epi Guzmán MD (12/23/23 19:30:27)                   Impression:      Acute nondisplaced fracture involving the right superior pubic ramus.  Additional remote fractures involving the right superior and inferior pubic rami.    Partially visualized mesenteric haziness and adenopathy.  While this may be idiopathic, differential considerations are extensive and may be seen in the setting of mild enteritis or mesenteric panniculitis, among other considerations.    Other findings are detailed above.    This report was flagged in Epic as abnormal.    Electronically signed by resident: Jolene Gillespie  Date:    12/23/2023  Time:    19:09    Electronically signed by: Epi Guzmán MD  Date:    12/23/2023  Time:    19:30               Narrative:    EXAMINATION:  CT HIP WITHOUT CONTRAST RIGHT    CLINICAL HISTORY:  Hip trauma, fracture suspected, xray done;    TECHNIQUE:  Axial 0.625-mm images of the right hip obtained without intravenous contrast.  Data submitted for coronal and sagittal reformats.  3D reconstructions were also performed.    COMPARISON:  CT 03/29/2023, 08/24/2023    FINDINGS:  Thin lucency along the right superior pubic ramus adjacent to a remote appearing superior pubic ramus fracture.  This was not definitively visualized on prior CT and is concerning for acute nondisplaced  fracture.  Remote right inferior pubic ramus fracture.  No acute fracture involving the right hip.  Mild degenerative changes of the spine.    Joints maintain appropriate alignment. Bony mineralization is diminished.  Mild DJD at the pubic symphysis and right hip/sacroiliac joint.    Soft tissues: There is mild soft tissue stranding at the space of Retzius on the right which results in slight mass effect upon the anterior right aspect of the urinary bladder which is otherwise moderately distended without wall thickening.  Partially visualized morris mesentery, which is nonspecific finding and appears unchanged from prior exams.  Partially visualized colonic diverticulosis.  Moderate calcific atherosclerosis of the abdominal aorta and iliac vessels.                                       X-Ray Hip 2 or 3 views Right (with Pelvis when performed) (Final result)  Result time 12/23/23 16:20:58      Final result by Epi Guzmán MD (12/23/23 16:20:58)                   Impression:      No acute displaced fracture-dislocation identified.    Right superior and inferior pubic rami remote fractures.      Electronically signed by: Epi Guzmán MD  Date:    12/23/2023  Time:    16:20               Narrative:    EXAMINATION:  XR HIP WITH PELVIS WHEN PERFORMED, 2 OR 3  VIEWS RIGHT    CLINICAL HISTORY:  fall;    TECHNIQUE:  AP view of the pelvis and frog leg lateral view of the right hip were performed.    COMPARISON:  CT abdomen and pelvis 08/24/2023    FINDINGS:  Generalized osteopenia.  Overall alignment is within normal limits.  Healed remote fracture of the right superior and inferior pubic rami.  No acute displaced fracture, dislocation or destructive osseous process.  Age-related minimal to mild DJD.  No subcutaneous emphysema or radiodense retained foreign body.                                    X-Rays:   Independently Interpreted Readings:   Other Readings:  XR rt hip and pelvis no obvious acute hip  fracture    Medications   ibuprofen tablet 800 mg (800 mg Oral Given 12/23/23 2003)       Medical Decision Making  83-year-old female with history of prior basal cell carcinoma otherwise  healthy presents after mechanical fall.  Ddx: hip fracture, contusion, musculoskeletal pain  XR right hip    4:40 PM  XR Rt hip no acute fracture.   Pt reassessed able to stand up but not able to bear weight and walk. Pt still refusing pain meds. Will order CT hip to rule out impacted hip fracture    8:00 PM  CT shows acute superior ramus fracture. Pt offered admission for pain control and PT but pt wants to go home. Pt able to ambulate but painful. Amenable to motrin for pain control at this time. Given rx for norco.  Discharge  home with f/u PCP and ortho. Routine return precautions    Amount and/or Complexity of Data Reviewed  Labs: ordered.  Radiology: ordered and independent interpretation performed. Decision-making details documented in ED Course.    Risk  Prescription drug management.                                  Clinical Impression:  Final diagnoses:  [S32.448A] Closed fracture of superior ramus of right pubis, initial encounter (Primary)          ED Disposition Condition    Discharge Stable          ED Prescriptions       Medication Sig Dispense Start Date End Date Auth. Provider    HYDROcodone-acetaminophen (NORCO) 5-325 mg per tablet Take 1 tablet by mouth every 6 (six) hours as needed for Pain. 12 tablet 12/23/2023 -- Hollie Elliott MD          Follow-up Information       Follow up With Specialties Details Why Contact Info Additional Information    Brooklyn Burnham MD Family Medicine Schedule an appointment as soon as possible for a visit   [address]  [address]  [facility] [address]  [phone]       [facility] Schedule an appointment as soon as possible for a visit   [address]  [facility] [address] [phone] [facility] - Main Building, 5th Floor Please park in [facility] and take Atrium elevator             Hollie Elliott MD  12/23/23 2702

## 2023-12-23 NOTE — ED TRIAGE NOTES
Katelyn Caba, a 83 y.o. female presents to the ED w/ complaint of fall from standing.  Patient states she was not able to get up at the bank without assistance.  States she heard no pops or cracks, denies LOC and hitting head.  Patient states pain to right hip with minor abrasion to right lower leg, pain 3/10 but wants no medication.  Denies blood thinner use.    Triage note:  Chief Complaint   Patient presents with    Fall     Mechanical trip and fall onto right hip. Patient reporting the inability to ambulate since fall. Denies hitting head, LOC, or blood thinner use      Review of patient's allergies indicates:  No Known Allergies  Past Medical History:   Diagnosis Date    Cancer     basal cell carcinoma

## 2023-12-23 NOTE — ED NOTES
Assumed care of pt at this time. VSS, RR even and unlabored. Resting in bed comfortably. No voiced compaints of pain or discomfort at this time. Safety protocols remain intact.  Patient was able to ambulate with assistance to bed and bear some weight on right leg.  Patient changed into gown and placed on pulse ox and blood pressure cuff.

## 2023-12-24 NOTE — DISCHARGE INSTRUCTIONS
Take motrin if needed for pain  Take norco if needed for severe pain  Follow up with your doctor for blood pressure check in 1-2 weeks  Follow up with orthopedic surgery  Return to ED for worsening pain, weakness, inability to walk or any other concerns

## 2024-02-08 ENCOUNTER — OFFICE VISIT (OUTPATIENT)
Dept: FAMILY MEDICINE | Facility: CLINIC | Age: 84
End: 2024-02-08
Attending: FAMILY MEDICINE
Payer: MEDICARE

## 2024-02-08 VITALS
WEIGHT: 117.88 LBS | SYSTOLIC BLOOD PRESSURE: 135 MMHG | DIASTOLIC BLOOD PRESSURE: 63 MMHG | HEART RATE: 70 BPM | OXYGEN SATURATION: 98 % | BODY MASS INDEX: 20.89 KG/M2

## 2024-02-08 DIAGNOSIS — R05.1 ACUTE COUGH: ICD-10-CM

## 2024-02-08 DIAGNOSIS — I70.0 AORTIC ATHEROSCLEROSIS: ICD-10-CM

## 2024-02-08 DIAGNOSIS — I10 ESSENTIAL HYPERTENSION: Primary | ICD-10-CM

## 2024-02-08 PROCEDURE — 99214 OFFICE O/P EST MOD 30 MIN: CPT | Mod: S$PBB,,, | Performed by: FAMILY MEDICINE

## 2024-02-08 PROCEDURE — 99999 PR PBB SHADOW E&M-EST. PATIENT-LVL III: CPT | Mod: PBBFAC,,, | Performed by: FAMILY MEDICINE

## 2024-02-08 PROCEDURE — 99213 OFFICE O/P EST LOW 20 MIN: CPT | Mod: PBBFAC,PO | Performed by: FAMILY MEDICINE

## 2024-02-08 RX ORDER — LEVOCETIRIZINE DIHYDROCHLORIDE 5 MG/1
5 TABLET, FILM COATED ORAL NIGHTLY
Qty: 30 TABLET | Refills: 3 | Status: SHIPPED | OUTPATIENT
Start: 2024-02-08 | End: 2024-04-09 | Stop reason: ALTCHOICE

## 2024-02-08 RX ORDER — AZITHROMYCIN 250 MG/1
TABLET, FILM COATED ORAL
Qty: 6 TABLET | Refills: 0 | Status: SHIPPED | OUTPATIENT
Start: 2024-02-08 | End: 2024-02-13

## 2024-02-08 RX ORDER — PROMETHAZINE HYDROCHLORIDE AND DEXTROMETHORPHAN HYDROBROMIDE 6.25; 15 MG/5ML; MG/5ML
5 SYRUP ORAL EVERY 8 HOURS PRN
Qty: 180 ML | Refills: 0 | Status: SHIPPED | OUTPATIENT
Start: 2024-02-08 | End: 2024-02-18

## 2024-02-09 ENCOUNTER — LAB VISIT (OUTPATIENT)
Dept: LAB | Facility: HOSPITAL | Age: 84
End: 2024-02-09
Attending: FAMILY MEDICINE
Payer: MEDICARE

## 2024-02-09 DIAGNOSIS — I10 ESSENTIAL HYPERTENSION: ICD-10-CM

## 2024-02-09 LAB
ALBUMIN SERPL BCP-MCNC: 3.6 G/DL (ref 3.5–5.2)
ALP SERPL-CCNC: 146 U/L (ref 55–135)
ALT SERPL W/O P-5'-P-CCNC: 9 U/L (ref 10–44)
ANION GAP SERPL CALC-SCNC: 11 MMOL/L (ref 8–16)
AST SERPL-CCNC: 16 U/L (ref 10–40)
BILIRUB SERPL-MCNC: 0.4 MG/DL (ref 0.1–1)
BUN SERPL-MCNC: 11 MG/DL (ref 8–23)
CALCIUM SERPL-MCNC: 9.8 MG/DL (ref 8.7–10.5)
CHLORIDE SERPL-SCNC: 106 MMOL/L (ref 95–110)
CHOLEST SERPL-MCNC: 196 MG/DL (ref 120–199)
CHOLEST/HDLC SERPL: 4 {RATIO} (ref 2–5)
CO2 SERPL-SCNC: 24 MMOL/L (ref 23–29)
CREAT SERPL-MCNC: 0.7 MG/DL (ref 0.5–1.4)
EST. GFR  (NO RACE VARIABLE): >60 ML/MIN/1.73 M^2
GLUCOSE SERPL-MCNC: 85 MG/DL (ref 70–110)
HDLC SERPL-MCNC: 49 MG/DL (ref 40–75)
HDLC SERPL: 25 % (ref 20–50)
LDLC SERPL CALC-MCNC: 130.2 MG/DL (ref 63–159)
NONHDLC SERPL-MCNC: 147 MG/DL
POTASSIUM SERPL-SCNC: 4.3 MMOL/L (ref 3.5–5.1)
PROT SERPL-MCNC: 6.6 G/DL (ref 6–8.4)
SODIUM SERPL-SCNC: 141 MMOL/L (ref 136–145)
TRIGL SERPL-MCNC: 84 MG/DL (ref 30–150)

## 2024-02-09 PROCEDURE — 36415 COLL VENOUS BLD VENIPUNCTURE: CPT | Mod: PO | Performed by: FAMILY MEDICINE

## 2024-02-09 PROCEDURE — 80053 COMPREHEN METABOLIC PANEL: CPT | Performed by: FAMILY MEDICINE

## 2024-02-09 PROCEDURE — 80061 LIPID PANEL: CPT | Performed by: FAMILY MEDICINE

## 2024-02-13 ENCOUNTER — PATIENT MESSAGE (OUTPATIENT)
Dept: FAMILY MEDICINE | Facility: CLINIC | Age: 84
End: 2024-02-13
Payer: MEDICARE

## 2024-02-13 NOTE — PROGRESS NOTES
Subjective:       Patient ID: Katelyn Caba is a 83 y.o. female.    Chief Complaint: Hypertension    Hypertension  Pertinent negatives include no chest pain, palpitations or shortness of breath.     Pt is here for follow up of htn vascular disease atherosclerosis declines statin bp fine on norvasc no sob/cp   Pt has a cough productive at times she denies acute sob/cp but the cough is debilitating.  Been there several weeks but is getting worse and is now productive   No n/v/f/c/d/c it does not keep her from her typical activities.  No excessive fatigue body aches   Review of Systems   Constitutional:  Negative for chills, fatigue and fever.   HENT:  Positive for postnasal drip. Negative for congestion, sinus pressure and sinus pain.    Respiratory:  Positive for cough. Negative for chest tightness and shortness of breath.    Cardiovascular:  Negative for chest pain and palpitations.       Objective:    /63   Pulse 70   Wt 53.5 kg (117 lb 14.4 oz)   SpO2 98%   BMI 20.89 kg/m²     Physical Exam  Constitutional:       Appearance: Normal appearance. She is not ill-appearing.   HENT:      Head: Normocephalic and atraumatic.   Cardiovascular:      Rate and Rhythm: Normal rate and regular rhythm.      Heart sounds:      No gallop.   Pulmonary:      Effort: Pulmonary effort is normal. No respiratory distress.   Neurological:      General: No focal deficit present.      Mental Status: She is alert and oriented to person, place, and time.      Cranial Nerves: No cranial nerve deficit.      Coordination: Coordination normal.   Psychiatric:         Mood and Affect: Mood normal.         Behavior: Behavior normal.         Thought Content: Thought content normal.         Judgment: Judgment normal.       Ldl 160 in 5/2023  Assessment:       1. Essential hypertension    2. Acute cough    3. Aortic atherosclerosis        Plan:     Orders cmp lipid cxr  Cont meds  Low salt diet  Start zpack medrol dose pack  Graded  "exercise  Increase fluids  Rtc 6 months and prn       "This note will not be shared with the patient."     "

## 2024-03-19 ENCOUNTER — TELEPHONE (OUTPATIENT)
Dept: FAMILY MEDICINE | Facility: CLINIC | Age: 84
End: 2024-03-19
Payer: MEDICARE

## 2024-03-20 DIAGNOSIS — Z78.0 MENOPAUSE: ICD-10-CM

## 2024-03-22 ENCOUNTER — PATIENT MESSAGE (OUTPATIENT)
Dept: FAMILY MEDICINE | Facility: CLINIC | Age: 84
End: 2024-03-22
Payer: MEDICARE

## 2024-03-22 DIAGNOSIS — D50.9 MICROCYTIC ANEMIA: Primary | ICD-10-CM

## 2024-03-26 ENCOUNTER — HOSPITAL ENCOUNTER (INPATIENT)
Facility: HOSPITAL | Age: 84
LOS: 1 days | Discharge: HOME | DRG: 812 | End: 2024-03-27
Attending: EMERGENCY MEDICINE | Admitting: FAMILY MEDICINE
Payer: MEDICARE

## 2024-03-26 DIAGNOSIS — D50.9 MICROCYTIC ANEMIA: Primary | ICD-10-CM

## 2024-03-26 DIAGNOSIS — R07.9 CHEST PAIN: ICD-10-CM

## 2024-03-26 DIAGNOSIS — K22.719 BARRETT'S ESOPHAGUS WITH DYSPLASIA: Primary | ICD-10-CM

## 2024-03-26 DIAGNOSIS — R53.1 WEAKNESS: ICD-10-CM

## 2024-03-26 PROBLEM — K22.70 BARRETT'S ESOPHAGUS: Status: ACTIVE | Noted: 2024-03-26

## 2024-03-26 PROBLEM — Z87.11 HISTORY OF GASTRIC ULCER: Status: ACTIVE | Noted: 2024-03-26

## 2024-03-26 PROBLEM — D64.9 SYMPTOMATIC ANEMIA: Status: ACTIVE | Noted: 2024-03-26

## 2024-03-26 PROBLEM — I10 HYPERTENSION: Status: ACTIVE | Noted: 2024-03-26

## 2024-03-26 LAB
ABO + RH BLD: NORMAL
ALBUMIN SERPL BCP-MCNC: 3.8 G/DL (ref 3.5–5.2)
ALP SERPL-CCNC: 136 U/L (ref 55–135)
ALT SERPL W/O P-5'-P-CCNC: 9 U/L (ref 10–44)
ANION GAP SERPL CALC-SCNC: 8 MMOL/L (ref 8–16)
AST SERPL-CCNC: 16 U/L (ref 10–40)
BASOPHILS # BLD AUTO: 0.11 K/UL (ref 0–0.2)
BASOPHILS NFR BLD: 1.5 % (ref 0–1.9)
BILIRUB SERPL-MCNC: 0.3 MG/DL (ref 0.1–1)
BLD GP AB SCN CELLS X3 SERPL QL: NORMAL
BLD PROD TYP BPU: NORMAL
BLOOD UNIT EXPIRATION DATE: NORMAL
BLOOD UNIT TYPE CODE: 600
BLOOD UNIT TYPE: NORMAL
BUN SERPL-MCNC: 19 MG/DL (ref 8–23)
CALCIUM SERPL-MCNC: 9.5 MG/DL (ref 8.7–10.5)
CHLORIDE SERPL-SCNC: 107 MMOL/L (ref 95–110)
CO2 SERPL-SCNC: 23 MMOL/L (ref 23–29)
CODING SYSTEM: NORMAL
CREAT SERPL-MCNC: 0.7 MG/DL (ref 0.5–1.4)
CROSSMATCH INTERPRETATION: NORMAL
DIFFERENTIAL METHOD BLD: ABNORMAL
DISPENSE STATUS: NORMAL
EOSINOPHIL # BLD AUTO: 0.2 K/UL (ref 0–0.5)
EOSINOPHIL NFR BLD: 3.1 % (ref 0–8)
ERYTHROCYTE [DISTWIDTH] IN BLOOD BY AUTOMATED COUNT: 14.8 % (ref 11.5–14.5)
EST. GFR  (NO RACE VARIABLE): >60 ML/MIN/1.73 M^2
GLUCOSE SERPL-MCNC: 93 MG/DL (ref 70–110)
HCT VFR BLD AUTO: 22.9 % (ref 37–48.5)
HGB BLD-MCNC: 6.6 G/DL (ref 12–16)
IMM GRANULOCYTES # BLD AUTO: 0.01 K/UL (ref 0–0.04)
IMM GRANULOCYTES NFR BLD AUTO: 0.1 % (ref 0–0.5)
LYMPHOCYTES # BLD AUTO: 1.7 K/UL (ref 1–4.8)
LYMPHOCYTES NFR BLD: 23.4 % (ref 18–48)
MCH RBC QN AUTO: 19.8 PG (ref 27–31)
MCHC RBC AUTO-ENTMCNC: 28.8 G/DL (ref 32–36)
MCV RBC AUTO: 69 FL (ref 82–98)
MONOCYTES # BLD AUTO: 1 K/UL (ref 0.3–1)
MONOCYTES NFR BLD: 13 % (ref 4–15)
NEUTROPHILS # BLD AUTO: 4.4 K/UL (ref 1.8–7.7)
NEUTROPHILS NFR BLD: 58.9 % (ref 38–73)
NRBC BLD-RTO: 0 /100 WBC
NUM UNITS TRANS PACKED RBC: NORMAL
PLATELET # BLD AUTO: 560 K/UL (ref 150–450)
PMV BLD AUTO: 10.2 FL (ref 9.2–12.9)
POTASSIUM SERPL-SCNC: 3.9 MMOL/L (ref 3.5–5.1)
PROT SERPL-MCNC: 7.2 G/DL (ref 6–8.4)
RBC # BLD AUTO: 3.33 M/UL (ref 4–5.4)
SODIUM SERPL-SCNC: 138 MMOL/L (ref 136–145)
SPECIMEN OUTDATE: NORMAL
WBC # BLD AUTO: 7.44 K/UL (ref 3.9–12.7)

## 2024-03-26 PROCEDURE — 30233N1 TRANSFUSION OF NONAUTOLOGOUS RED BLOOD CELLS INTO PERIPHERAL VEIN, PERCUTANEOUS APPROACH: ICD-10-PCS | Performed by: INTERNAL MEDICINE

## 2024-03-26 PROCEDURE — 99285 EMERGENCY DEPT VISIT HI MDM: CPT | Mod: 25

## 2024-03-26 PROCEDURE — 86920 COMPATIBILITY TEST SPIN: CPT | Performed by: EMERGENCY MEDICINE

## 2024-03-26 PROCEDURE — 80053 COMPREHEN METABOLIC PANEL: CPT | Performed by: PHYSICIAN ASSISTANT

## 2024-03-26 PROCEDURE — 86850 RBC ANTIBODY SCREEN: CPT | Performed by: PHYSICIAN ASSISTANT

## 2024-03-26 PROCEDURE — 12000002 HC ACUTE/MED SURGE SEMI-PRIVATE ROOM

## 2024-03-26 PROCEDURE — 93005 ELECTROCARDIOGRAM TRACING: CPT

## 2024-03-26 PROCEDURE — 93010 ELECTROCARDIOGRAM REPORT: CPT | Mod: ,,, | Performed by: INTERNAL MEDICINE

## 2024-03-26 PROCEDURE — 85025 COMPLETE CBC W/AUTO DIFF WBC: CPT | Performed by: PHYSICIAN ASSISTANT

## 2024-03-26 PROCEDURE — 36430 TRANSFUSION BLD/BLD COMPNT: CPT

## 2024-03-26 PROCEDURE — P9016 RBC LEUKOCYTES REDUCED: HCPCS | Performed by: EMERGENCY MEDICINE

## 2024-03-26 PROCEDURE — 63600175 PHARM REV CODE 636 W HCPCS: Performed by: EMERGENCY MEDICINE

## 2024-03-26 PROCEDURE — C9113 INJ PANTOPRAZOLE SODIUM, VIA: HCPCS | Performed by: EMERGENCY MEDICINE

## 2024-03-26 RX ORDER — IPRATROPIUM BROMIDE AND ALBUTEROL SULFATE 2.5; .5 MG/3ML; MG/3ML
3 SOLUTION RESPIRATORY (INHALATION) EVERY 4 HOURS PRN
Status: DISCONTINUED | OUTPATIENT
Start: 2024-03-27 | End: 2024-03-27 | Stop reason: HOSPADM

## 2024-03-26 RX ORDER — IBUPROFEN 200 MG
24 TABLET ORAL
Status: DISCONTINUED | OUTPATIENT
Start: 2024-03-27 | End: 2024-03-27 | Stop reason: HOSPADM

## 2024-03-26 RX ORDER — POLYETHYLENE GLYCOL 3350 17 G/17G
17 POWDER, FOR SOLUTION ORAL DAILY PRN
Status: DISCONTINUED | OUTPATIENT
Start: 2024-03-27 | End: 2024-03-27 | Stop reason: HOSPADM

## 2024-03-26 RX ORDER — GLUCAGON 1 MG
1 KIT INJECTION
Status: DISCONTINUED | OUTPATIENT
Start: 2024-03-27 | End: 2024-03-27 | Stop reason: HOSPADM

## 2024-03-26 RX ORDER — ONDANSETRON HYDROCHLORIDE 2 MG/ML
4 INJECTION, SOLUTION INTRAVENOUS EVERY 8 HOURS PRN
Status: DISCONTINUED | OUTPATIENT
Start: 2024-03-27 | End: 2024-03-27 | Stop reason: HOSPADM

## 2024-03-26 RX ORDER — PROCHLORPERAZINE EDISYLATE 5 MG/ML
5 INJECTION INTRAMUSCULAR; INTRAVENOUS EVERY 6 HOURS PRN
Status: DISCONTINUED | OUTPATIENT
Start: 2024-03-27 | End: 2024-03-27 | Stop reason: HOSPADM

## 2024-03-26 RX ORDER — PANTOPRAZOLE SODIUM 40 MG/10ML
80 INJECTION, POWDER, LYOPHILIZED, FOR SOLUTION INTRAVENOUS
Status: COMPLETED | OUTPATIENT
Start: 2024-03-26 | End: 2024-03-26

## 2024-03-26 RX ORDER — HYDROCODONE BITARTRATE AND ACETAMINOPHEN 500; 5 MG/1; MG/1
TABLET ORAL
Status: DISCONTINUED | OUTPATIENT
Start: 2024-03-26 | End: 2024-03-27 | Stop reason: HOSPADM

## 2024-03-26 RX ORDER — ACETAMINOPHEN 325 MG/1
650 TABLET ORAL EVERY 4 HOURS PRN
Status: DISCONTINUED | OUTPATIENT
Start: 2024-03-27 | End: 2024-03-27 | Stop reason: HOSPADM

## 2024-03-26 RX ORDER — IBUPROFEN 200 MG
16 TABLET ORAL
Status: DISCONTINUED | OUTPATIENT
Start: 2024-03-27 | End: 2024-03-27 | Stop reason: HOSPADM

## 2024-03-26 RX ORDER — TALC
6 POWDER (GRAM) TOPICAL NIGHTLY PRN
Status: DISCONTINUED | OUTPATIENT
Start: 2024-03-27 | End: 2024-03-27 | Stop reason: HOSPADM

## 2024-03-26 RX ORDER — PANTOPRAZOLE SODIUM 40 MG/10ML
40 INJECTION, POWDER, LYOPHILIZED, FOR SOLUTION INTRAVENOUS 2 TIMES DAILY
Status: DISCONTINUED | OUTPATIENT
Start: 2024-03-27 | End: 2024-03-27 | Stop reason: HOSPADM

## 2024-03-26 RX ORDER — BISACODYL 10 MG/1
10 SUPPOSITORY RECTAL DAILY PRN
Status: DISCONTINUED | OUTPATIENT
Start: 2024-03-27 | End: 2024-03-27 | Stop reason: HOSPADM

## 2024-03-26 RX ORDER — PANTOPRAZOLE SODIUM 40 MG/10ML
80 INJECTION, POWDER, LYOPHILIZED, FOR SOLUTION INTRAVENOUS DAILY
Status: DISCONTINUED | OUTPATIENT
Start: 2024-03-27 | End: 2024-03-26

## 2024-03-26 RX ADMIN — PANTOPRAZOLE SODIUM 80 MG: 40 INJECTION, POWDER, FOR SOLUTION INTRAVENOUS at 11:03

## 2024-03-27 ENCOUNTER — TELEPHONE (OUTPATIENT)
Dept: GASTROENTEROLOGY | Facility: CLINIC | Age: 84
End: 2024-03-27
Payer: MEDICARE

## 2024-03-27 VITALS
TEMPERATURE: 99 F | RESPIRATION RATE: 18 BRPM | OXYGEN SATURATION: 96 % | DIASTOLIC BLOOD PRESSURE: 70 MMHG | HEIGHT: 63 IN | BODY MASS INDEX: 20.9 KG/M2 | HEART RATE: 69 BPM | WEIGHT: 117.94 LBS | SYSTOLIC BLOOD PRESSURE: 157 MMHG

## 2024-03-27 PROBLEM — Z87.19 HISTORY OF GI BLEED: Status: ACTIVE | Noted: 2024-03-27

## 2024-03-27 LAB
ANION GAP SERPL CALC-SCNC: 8 MMOL/L (ref 8–16)
BASOPHILS # BLD AUTO: 0.09 K/UL (ref 0–0.2)
BASOPHILS # BLD AUTO: 0.11 K/UL (ref 0–0.2)
BASOPHILS NFR BLD: 1.2 % (ref 0–1.9)
BASOPHILS NFR BLD: 1.5 % (ref 0–1.9)
BUN SERPL-MCNC: 16 MG/DL (ref 8–23)
CALCIUM SERPL-MCNC: 9.3 MG/DL (ref 8.7–10.5)
CHLORIDE SERPL-SCNC: 109 MMOL/L (ref 95–110)
CO2 SERPL-SCNC: 22 MMOL/L (ref 23–29)
CREAT SERPL-MCNC: 0.7 MG/DL (ref 0.5–1.4)
DIFFERENTIAL METHOD BLD: ABNORMAL
DIFFERENTIAL METHOD BLD: ABNORMAL
EOSINOPHIL # BLD AUTO: 0.2 K/UL (ref 0–0.5)
EOSINOPHIL # BLD AUTO: 0.3 K/UL (ref 0–0.5)
EOSINOPHIL NFR BLD: 2.9 % (ref 0–8)
EOSINOPHIL NFR BLD: 3.9 % (ref 0–8)
ERYTHROCYTE [DISTWIDTH] IN BLOOD BY AUTOMATED COUNT: 16.3 % (ref 11.5–14.5)
ERYTHROCYTE [DISTWIDTH] IN BLOOD BY AUTOMATED COUNT: 16.9 % (ref 11.5–14.5)
EST. GFR  (NO RACE VARIABLE): >60 ML/MIN/1.73 M^2
ESTIMATED AVG GLUCOSE: 105 MG/DL (ref 68–131)
GLUCOSE SERPL-MCNC: 77 MG/DL (ref 70–110)
HBA1C MFR BLD: 5.3 % (ref 4–5.6)
HCT VFR BLD AUTO: 27.9 % (ref 37–48.5)
HCT VFR BLD AUTO: 29.9 % (ref 37–48.5)
HGB BLD-MCNC: 8.1 G/DL (ref 12–16)
HGB BLD-MCNC: 9 G/DL (ref 12–16)
IMM GRANULOCYTES # BLD AUTO: 0.02 K/UL (ref 0–0.04)
IMM GRANULOCYTES # BLD AUTO: 0.04 K/UL (ref 0–0.04)
IMM GRANULOCYTES NFR BLD AUTO: 0.3 % (ref 0–0.5)
IMM GRANULOCYTES NFR BLD AUTO: 0.6 % (ref 0–0.5)
LYMPHOCYTES # BLD AUTO: 1.5 K/UL (ref 1–4.8)
LYMPHOCYTES # BLD AUTO: 1.6 K/UL (ref 1–4.8)
LYMPHOCYTES NFR BLD: 20.5 % (ref 18–48)
LYMPHOCYTES NFR BLD: 22 % (ref 18–48)
MAGNESIUM SERPL-MCNC: 2.1 MG/DL (ref 1.6–2.6)
MCH RBC QN AUTO: 20.8 PG (ref 27–31)
MCH RBC QN AUTO: 21.5 PG (ref 27–31)
MCHC RBC AUTO-ENTMCNC: 29 G/DL (ref 32–36)
MCHC RBC AUTO-ENTMCNC: 30.1 G/DL (ref 32–36)
MCV RBC AUTO: 71 FL (ref 82–98)
MCV RBC AUTO: 72 FL (ref 82–98)
MONOCYTES # BLD AUTO: 0.8 K/UL (ref 0.3–1)
MONOCYTES # BLD AUTO: 1.2 K/UL (ref 0.3–1)
MONOCYTES NFR BLD: 11.3 % (ref 4–15)
MONOCYTES NFR BLD: 15.4 % (ref 4–15)
NEUTROPHILS # BLD AUTO: 4.4 K/UL (ref 1.8–7.7)
NEUTROPHILS # BLD AUTO: 4.4 K/UL (ref 1.8–7.7)
NEUTROPHILS NFR BLD: 58.7 % (ref 38–73)
NEUTROPHILS NFR BLD: 61.7 % (ref 38–73)
NRBC BLD-RTO: 0 /100 WBC
NRBC BLD-RTO: 0 /100 WBC
OHS QRS DURATION: 78 MS
OHS QTC CALCULATION: 442 MS
PHOSPHATE SERPL-MCNC: 3.9 MG/DL (ref 2.7–4.5)
PLATELET # BLD AUTO: 468 K/UL (ref 150–450)
PLATELET # BLD AUTO: 500 K/UL (ref 150–450)
PMV BLD AUTO: 10.2 FL (ref 9.2–12.9)
PMV BLD AUTO: 9.9 FL (ref 9.2–12.9)
POTASSIUM SERPL-SCNC: 4.1 MMOL/L (ref 3.5–5.1)
RBC # BLD AUTO: 3.9 M/UL (ref 4–5.4)
RBC # BLD AUTO: 4.19 M/UL (ref 4–5.4)
SODIUM SERPL-SCNC: 139 MMOL/L (ref 136–145)
WBC # BLD AUTO: 7.18 K/UL (ref 3.9–12.7)
WBC # BLD AUTO: 7.51 K/UL (ref 3.9–12.7)

## 2024-03-27 PROCEDURE — C9113 INJ PANTOPRAZOLE SODIUM, VIA: HCPCS | Performed by: PHYSICIAN ASSISTANT

## 2024-03-27 PROCEDURE — 83036 HEMOGLOBIN GLYCOSYLATED A1C: CPT | Performed by: PHYSICIAN ASSISTANT

## 2024-03-27 PROCEDURE — 80048 BASIC METABOLIC PNL TOTAL CA: CPT | Performed by: PHYSICIAN ASSISTANT

## 2024-03-27 PROCEDURE — 25000003 PHARM REV CODE 250: Performed by: INTERNAL MEDICINE

## 2024-03-27 PROCEDURE — 36415 COLL VENOUS BLD VENIPUNCTURE: CPT | Mod: XB | Performed by: PHYSICIAN ASSISTANT

## 2024-03-27 PROCEDURE — 99222 1ST HOSP IP/OBS MODERATE 55: CPT | Mod: ,,, | Performed by: INTERNAL MEDICINE

## 2024-03-27 PROCEDURE — 83735 ASSAY OF MAGNESIUM: CPT | Performed by: PHYSICIAN ASSISTANT

## 2024-03-27 PROCEDURE — 84100 ASSAY OF PHOSPHORUS: CPT | Performed by: PHYSICIAN ASSISTANT

## 2024-03-27 PROCEDURE — 63600175 PHARM REV CODE 636 W HCPCS: Performed by: PHYSICIAN ASSISTANT

## 2024-03-27 PROCEDURE — 85025 COMPLETE CBC W/AUTO DIFF WBC: CPT | Mod: 91 | Performed by: PHYSICIAN ASSISTANT

## 2024-03-27 PROCEDURE — 63600175 PHARM REV CODE 636 W HCPCS: Performed by: INTERNAL MEDICINE

## 2024-03-27 PROCEDURE — 25000003 PHARM REV CODE 250: Performed by: PHYSICIAN ASSISTANT

## 2024-03-27 RX ORDER — PANTOPRAZOLE SODIUM 40 MG/1
40 TABLET, DELAYED RELEASE ORAL DAILY
Qty: 60 TABLET | Refills: 5 | Status: SHIPPED | OUTPATIENT
Start: 2024-03-27 | End: 2025-03-27

## 2024-03-27 RX ORDER — FERROUS SULFATE 325(65) MG
325 TABLET ORAL
Qty: 90 TABLET | Refills: 0 | Status: SHIPPED | OUTPATIENT
Start: 2024-03-27 | End: 2024-06-25

## 2024-03-27 RX ORDER — AMLODIPINE BESYLATE 5 MG/1
5 TABLET ORAL DAILY
Status: DISCONTINUED | OUTPATIENT
Start: 2024-03-27 | End: 2024-03-27 | Stop reason: HOSPADM

## 2024-03-27 RX ORDER — PANTOPRAZOLE SODIUM 40 MG/1
TABLET, DELAYED RELEASE ORAL
Qty: 118 TABLET | Refills: 0 | Status: CANCELLED | OUTPATIENT
Start: 2024-03-27 | End: 2024-07-09

## 2024-03-27 RX ADMIN — SODIUM CHLORIDE 125 MG: 9 INJECTION, SOLUTION INTRAVENOUS at 10:03

## 2024-03-27 RX ADMIN — PANTOPRAZOLE SODIUM 40 MG: 40 INJECTION, POWDER, FOR SOLUTION INTRAVENOUS at 09:03

## 2024-03-27 RX ADMIN — AMLODIPINE BESYLATE 5 MG: 5 TABLET ORAL at 08:03

## 2024-03-27 NOTE — TELEPHONE ENCOUNTER
Spoke with patient. She stated that she did not call and did not know of a need for a Gastro appointment.

## 2024-03-27 NOTE — SUBJECTIVE & OBJECTIVE
Past Medical History:   Diagnosis Date    Cancer     basal cell carcinoma       Past Surgical History:   Procedure Laterality Date    CARPAL TUNNEL RELEASE      ESOPHAGOGASTRODUODENOSCOPY N/A 8/22/2022    Procedure: EGD (ESOPHAGOGASTRODUODENOSCOPY);  Surgeon: Steven Lopez MD;  Location: [facility] (2ND FLR);  Service: Endoscopy;  Laterality: N/A;    ESOPHAGOGASTRODUODENOSCOPY N/A 12/5/2022    Procedure: EGD (ESOPHAGOGASTRODUODENOSCOPY);  Surgeon: Steven Lopez MD;  [facility]  Service: Endoscopy;  Laterality: N/A;  Has estimated pulmonary artery pressure 45, schedule location per protocol.   Please schedule with Dr. Darryl Lopez-  Plavix stopped 8/23/22  pt requested to schedule after Thanksgiving/ inst portal-RB  pre call complete; [facility] 11/28/22    ESOPHAGOGASTRODUODENOSCOPY N/A 7/11/2023    Procedure: EGD (ESOPHAGOGASTRODUODENOSCOPY);  Surgeon: Natalie Fall MD;  Location: [facility];  Service: Endoscopy;  Laterality: N/A;    EYE SURGERY      left eye cataract    TONSILLECTOMY      TUBAL LIGATION         Review of patient's allergies indicates:  No Known Allergies  Family History    None       Tobacco Use    Smoking status: Never    Smokeless tobacco: Never   Substance and Sexual Activity    Alcohol use: No    Drug use: No    Sexual activity: Not on file     Review of Systems   Constitutional:  Negative for chills, fatigue and unexpected weight change.   Respiratory:  Negative for shortness of breath.    Cardiovascular:  Negative for chest pain.   Gastrointestinal:  Negative for abdominal pain, diarrhea, nausea and vomiting.   Neurological:  Negative for weakness and light-headedness.     Objective:     Vital Signs (Most Recent):  Temp: 98 °F (36.7 °C) (03/27/24 0738)  Pulse: 72 (03/27/24 0738)  Resp: 18 (03/27/24 0738)  BP: (!) 164/68 (03/27/24 0738)  SpO2: (!) 93 % (03/27/24 0738) Vital Signs (24h Range):  Temp:  [97.7 °F (36.5 °C)-98.6 °F (37 °C)] 98 °F (36.7 °C)  Pulse:   [69-80] 72  Resp:  [16-19] 18  SpO2:  [93 %-100 %] 93 %  BP: (152-201)/(68-77) 164/68     Weight: 53.5 kg (117 lb 15.1 oz) (03/27/24 0239)  Body mass index is 20.89 kg/m².      Intake/Output Summary (Last 24 hours) at 3/27/2024 0932  Last data filed at 3/27/2024 0307  Gross per 24 hour   Intake 394.17 ml   Output --   Net 394.17 ml       Lines/Drains/Airways       Peripheral Intravenous Line  Duration                  Peripheral IV - Single Lumen 03/26/24 2112 20 G Right Antecubital <1 day         Peripheral IV - Single Lumen 03/27/24 0043 20 G Left Antecubital <1 day                     Physical Exam  Constitutional:       General: She is not in acute distress.     Appearance: Normal appearance. She is not ill-appearing.   HENT:      Head: Normocephalic.      Right Ear: External ear normal.      Left Ear: External ear normal.      Mouth/Throat:      Mouth: Mucous membranes are moist.      Pharynx: Oropharynx is clear.   Eyes:      General: No scleral icterus.     Extraocular Movements: Extraocular movements intact.   Cardiovascular:      Pulses: Normal pulses.   Pulmonary:      Effort: Pulmonary effort is normal. No respiratory distress.      Breath sounds: Normal breath sounds.   Abdominal:      General: Abdomen is flat. There is no distension.      Palpations: Abdomen is soft.      Tenderness: There is no abdominal tenderness.   Skin:     General: Skin is warm.      Coloration: Skin is not jaundiced.      Findings: No erythema or rash.   Neurological:      General: No focal deficit present.      Mental Status: She is alert and oriented to person, place, and time. Mental status is at baseline.   Psychiatric:         Mood and Affect: Mood normal.         Behavior: Behavior normal.         Thought Content: Thought content normal.          Significant Labs:  CBC:   Recent Labs   Lab 03/25/24  1232 03/26/24  2112 03/27/24  0431   WBC 6.48 7.44 7.51   HGB 6.8* 6.6* 8.1*   HCT 23.7* 22.9* 27.9*   * 560* 468*  "    Coagulation: No results for input(s): "PT", "INR", "APTT" in the last 48 hours.  All pertinent lab results from the last 24 hours have been reviewed.    Significant Imaging:  Imaging results within the past 24 hours have been reviewed.  "

## 2024-03-27 NOTE — HPI
Katelyn Caba is a 83 y.o. female with a PMHx of Rodriguez's esophagus, gastric ulcer, AVM, anemia, iron-deficiency, HTN who presents to [facility] for evaluation of symptomatic anemia. Patient reports worsneing generalized weakness, fatigue and decreased energy levels for the past month. Also endorse craving for ice. She had outpatient labs done yesterday which showed Hgb of 6.8. She was referred to a hematologist but her friend who is a nurse advised patient to presents to the ED for blood transfusion. No bloody/ dark stools recently. Symptoms were similar when she had a gastric ulcer a few years ago but more severe at that time. Denies shortness of breath, chest pain, nausea, vomiting, diarrhea, fevers, chills or syncope. Of note, patient had a fall in December and fractured her pelvis. She is concerned that this caused/ contributed to her anemia. Denies pelvic pain, flank/ abdominal bruising of difficulty ambulating.    ED: hypertensive to /77, improved to BP 160s without any intervention. Hgb 6.6, baseline ~10-12. 1U pRBCs ordered. Admitted to .

## 2024-03-27 NOTE — NURSING
Pt discharged, AVS given , pt verbalizes understanding... Ivs removed, no acute distress at this time.

## 2024-03-27 NOTE — ED NOTES
Telemetry Verification   Patient placed on Telemetry Box  Verified with War Room  Box # [id number]   Monitor Tech War Room   Rate 75   Rhythm NSR

## 2024-03-27 NOTE — HOSPITAL COURSE
Patient admitted for anemia with Hgb 6.6. Received 1u pRBC with increase of Hgb to 8.1. GI consulted. Iron panel with severe iron deficiency. Patient given one dose of IV iron. No evidence of GI blled. GI recommending outpatient colonoscopy, however patient ambivalent. Will refer to outpatient GI. Discharged on pantoprazole and iron. Patient seen and examined on day of discharge. Patient feels much better after unit of blood. Tolerating diet, ambulating well. Pt deemed appropriate for discharge. Plan discussed with pt, who was agreeable and amenable; medications were discussed and reviewed, outpatient follow-up scheduled, ER precautions were given, all questions were answered to the pt's satisfaction, and patient was subsequently discharged.

## 2024-03-27 NOTE — NURSING
.Nurses Note -- 4 Eyes      3/27/2024   6:38 AM      Skin assessed during: Admit      [x] No Altered Skin Integrity Present    []Prevention Measures Documented      [] Yes- Altered Skin Integrity Present or Discovered   [] LDA Added if Not in Epic (Describe Wound)   [] New Altered Skin Integrity was Present on Admit and Documented in LDA   [] Wound Image Taken    Wound Care Consulted? No    Attending Nurse:  Zhao Pérez RN/Staff Member:Curt

## 2024-03-27 NOTE — CONSULTS
[facility] - Telemetry Stepdown  Gastroenterology  Consult Note    Patient Name: Katelyn Caba  MRN: [id number]  Admission Date: 3/26/2024  Hospital Length of Stay: 1 days  Code Status: Full Code   Attending Provider: Epi Bhatti MD   Consulting Provider: Patti Staley MD  Primary Care Physician: Brooklyn Burnham MD  Principal Problem:Symptomatic anemia    Inpatient consult to Gastroenterology  Consult performed by: Patti Staley MD  Consult ordered by: Iman Lopez PA-C  Reason for consult: GI Bleed        Subjective:     HPI:  83yoF w/hx of Rodriguez's esophagus, gastric ulcer, AVM, ISAIAH, HTN who presented to the hospital for symptomatic anemia. She reports generalized weakness and fatigue. She also reports craving ice recently. She had outpatient labs done which revealed hgb 6.8. She denies any recent bloody/dark stools. GI was consulted GI bleed.     Followed by [facility] GI outpatient (last seen in 2022). First diagnosed with Rodriguez's Esophagus in August 2022 after being hospitalized for acute on chronic anemia (Hgb 4.9 on admission). 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 Most recent EGD (7/2023) revealed multiple gastric polyp and gastritis. Is not taking a daily PPI. She denies abdominal pain, N/V/D, hematemesis, hematochezia.     Patient is afebrile and hypertensive. Other VSS. Hgb 8.1, up from 6.8 on admission s/p 1u pRBC. Receiving IV iron infusion.       Past Medical History:   Diagnosis Date    Cancer     basal cell carcinoma       Past Surgical History:   Procedure Laterality Date    CARPAL TUNNEL RELEASE      ESOPHAGOGASTRODUODENOSCOPY N/A 8/22/2022    Procedure: EGD (ESOPHAGOGASTRODUODENOSCOPY);  Surgeon: Steven Lopez MD;  Location: [address];  Service: Endoscopy;  Laterality: N/A;    ESOPHAGOGASTRODUODENOSCOPY N/A 12/5/2022    Procedure: EGD (ESOPHAGOGASTRODUODENOSCOPY);  Surgeon: Steven  SACHA Lopez MD;  Location: [facility] ENDO (2ND FLR);  Service: Endoscopy;  Laterality: N/A;  Has estimated pulmonary artery pressure 45, schedule location per protocol.   Please schedule with Dr. Darryl Lopez-  Plavix stopped 8/23/22  pt requested to schedule after Thanksgiving/ inst portal-RB  pre call complete; [facility] 11/28/22    ESOPHAGOGASTRODUODENOSCOPY N/A 7/11/2023    Procedure: EGD (ESOPHAGOGASTRODUODENOSCOPY);  Surgeon: Natalie Fall MD;  Location: [facility];  Service: Endoscopy;  Laterality: N/A;    EYE SURGERY      left eye cataract    TONSILLECTOMY      TUBAL LIGATION         Review of patient's allergies indicates:  No Known Allergies  Family History    None       Tobacco Use    Smoking status: Never    Smokeless tobacco: Never   Substance and Sexual Activity    Alcohol use: No    Drug use: No    Sexual activity: Not on file     Review of Systems   Constitutional:  Negative for chills, fatigue and unexpected weight change.   Respiratory:  Negative for shortness of breath.    Cardiovascular:  Negative for chest pain.   Gastrointestinal:  Negative for abdominal pain, diarrhea, nausea and vomiting.   Neurological:  Negative for weakness and light-headedness.     Objective:     Vital Signs (Most Recent):  Temp: 98 °F (36.7 °C) (03/27/24 0738)  Pulse: 72 (03/27/24 0738)  Resp: 18 (03/27/24 0738)  BP: (!) 164/68 (03/27/24 0738)  SpO2: (!) 93 % (03/27/24 0738) Vital Signs (24h Range):  Temp:  [97.7 °F (36.5 °C)-98.6 °F (37 °C)] 98 °F (36.7 °C)  Pulse:  [69-80] 72  Resp:  [16-19] 18  SpO2:  [93 %-100 %] 93 %  BP: (152-201)/(68-77) 164/68     Weight: 53.5 kg (117 lb 15.1 oz) (03/27/24 0239)  Body mass index is 20.89 kg/m².      Intake/Output Summary (Last 24 hours) at 3/27/2024 0932  Last data filed at 3/27/2024 0307  Gross per 24 hour   Intake 394.17 ml   Output --   Net 394.17 ml       Lines/Drains/Airways       Peripheral Intravenous Line  Duration                  Peripheral IV - Single Lumen 03/26/24  "2112 20 G Right Antecubital <1 day         Peripheral IV - Single Lumen 03/27/24 0043 20 G Left Antecubital <1 day                     Physical Exam  Constitutional:       General: She is not in acute distress.     Appearance: Normal appearance. She is not ill-appearing.   HENT:      Head: Normocephalic.      Right Ear: External ear normal.      Left Ear: External ear normal.      Mouth/Throat:      Mouth: Mucous membranes are moist.      Pharynx: Oropharynx is clear.   Eyes:      General: No scleral icterus.     Extraocular Movements: Extraocular movements intact.   Cardiovascular:      Pulses: Normal pulses.   Pulmonary:      Effort: Pulmonary effort is normal. No respiratory distress.      Breath sounds: Normal breath sounds.   Abdominal:      General: Abdomen is flat. There is no distension.      Palpations: Abdomen is soft.      Tenderness: There is no abdominal tenderness.   Skin:     General: Skin is warm.      Coloration: Skin is not jaundiced.      Findings: No erythema or rash.   Neurological:      General: No focal deficit present.      Mental Status: She is alert and oriented to person, place, and time. Mental status is at baseline.   Psychiatric:         Mood and Affect: Mood normal.         Behavior: Behavior normal.         Thought Content: Thought content normal.          Significant Labs:  CBC:   Recent Labs   Lab 03/25/24  1232 03/26/24  2112 03/27/24  0431   WBC 6.48 7.44 7.51   HGB 6.8* 6.6* 8.1*   HCT 23.7* 22.9* 27.9*   * 560* 468*     Coagulation: No results for input(s): "PT", "INR", "APTT" in the last 48 hours.  All pertinent lab results from the last 24 hours have been reviewed.    Significant Imaging:  Imaging results within the past 24 hours have been reviewed.  Assessment/Plan:     GI  History of GI bleed  83yoF w/hx of Rodriguez's esophagus, gastric ulcer, AVMs who presented to the hospital with fatigue and weakness after she was found to be anemic (Hgb 6.8). S/p 1u pRBC with " adequate response in hemoglobin. Iron 12, TIBC 475, Saturated Iron 3, Transferrin 321, Ferritin 8 on admission. Last EGD in July 2023 revealed Rodriguez's disease, small hiatal hernia, gastritis, and multiple gastric polyps consistent with fundic gland polyps. No hx of prior colonoscopy. Given her persistent ISAIAH, would benefit from a colonoscopy but continues to decline at this time.     Plan:   - No acute endoscopic evaluation at this time  - Agree with IV Iron infusion   - Would benefit from PPI daily indefinitely   - Encouraged patient to schedule outpatient colonoscopy when she is ready         Thank you for your consult. I will sign off. Please contact us if you have any additional questions.    Patti Jordan MD  Gastroenterology  [facility] - Telemetry Stepdown

## 2024-03-27 NOTE — PLAN OF CARE
[facility] scheduled pt's hospital f/u visit on 4/8/24 @ 11:00 am.   sent a message to Gastroenterology to make contact with pt to schedule her hospital f/u appointment.

## 2024-03-27 NOTE — HPI
83yoF w/hx of Rodriguez's esophagus, gastric ulcer, AVM, ISAIAH, HTN who presented to the hospital for symptomatic anemia. She reports generalized weakness and fatigue. She also reports craving ice recently. She had outpatient labs done which revealed hgb 6.8. She denies any recent bloody/dark stools. GI was consulted GI bleed.     Followed by [facility] (last seen in 2022). First diagnosed with Rodriguez's Esophagus in August 2022 after being hospitalized for acute on chronic anemia (Hgb 4.9 on admission). 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 Most recent EGD (7/2023) revealed multiple gastric polyp and gastritis. Is not taking a daily PPI. She denies abdominal pain, N/V/D, hematemesis, hematochezia.     Patient is afebrile and hypertensive. Other VSS. Hgb 8.1, up from 6.8 on admission s/p 1u pRBC. Receiving IV iron infusion.

## 2024-03-27 NOTE — ED PROVIDER NOTES
Encounter Date: 3/26/2024       History     Chief Complaint   Patient presents with    Weakness     Low h/h 6.8; told to come in by dr     83-year-old past medical history of Rodriguez's esophagus, gastric ulcer, AVM, anemia, iron-deficiency presenting with worsening generalized fatigue and weakness for 1 month.  Patient mentions recently having a want to to eat ice as well.  Patient additionally mentions dyspnea on exertion.  Denies any current shortness of breath, chest pain, nausea, vomiting, diarrhea, fevers chills no orbital dark or black stools no bright red blood per rectum.  Patient denies currently being on anticoagulation.  Patient had lab tests noted for anemia with hemoglobin 6.4.        Review of patient's allergies indicates:  No Known Allergies  Past Medical History:   Diagnosis Date    Cancer     basal cell carcinoma     Past Surgical History:   Procedure Laterality Date    CARPAL TUNNEL RELEASE      ESOPHAGOGASTRODUODENOSCOPY N/A 8/22/2022    Procedure: EGD (ESOPHAGOGASTRODUODENOSCOPY);  Surgeon: Steven Lopez MD;  Location: [facility] ([address] [place] [address]);  Service: Endoscopy;  Laterality: N/A;    ESOPHAGOGASTRODUODENOSCOPY N/A 12/5/2022    Procedure: EGD (ESOPHAGOGASTRODUODENOSCOPY);  Surgeon: Steven Lopez MD;  Location: [facility] ([address] [place] [address]);  Service: Endoscopy;  Laterality: N/A;  Has estimated pulmonary artery pressure 45, schedule location per protocol.   Please schedule with Dr. Darryl Lopez-  Plavix stopped 8/23/22  [facility]-  pre call complete; [facility] 11/28/22    ESOPHAGOGASTRODUODENOSCOPY N/A 7/11/2023    Procedure: EGD (ESOPHAGOGASTRODUODENOSCOPY);  Surgeon: Natalie Fall MD;  Location: [facility];  Service: Endoscopy;  Laterality: N/A;    EYE SURGERY      left eye cataract    TONSILLECTOMY      TUBAL LIGATION       No family history on file.  Social History     Tobacco Use    Smoking status: Never    Smokeless tobacco: Never   Substance  Use Topics    Alcohol use: No    Drug use: No     Review of Systems    Physical Exam     Initial Vitals [03/26/24 2029]   BP Pulse Resp Temp SpO2   (!) 201/77 80 16 98 °F (36.7 °C) 99 %      MAP       --         Physical Exam    Constitutional: She appears well-developed and well-nourished. She is not diaphoretic. No distress.   HENT:   Head: Normocephalic and atraumatic.   Eyes: Conjunctivae and EOM are normal. Pupils are equal, round, and reactive to light. Right eye exhibits no discharge. Left eye exhibits no discharge. No scleral icterus.   Neck: Neck supple.   Normal range of motion.  Cardiovascular:  Normal rate and regular rhythm.     Exam reveals no friction rub.       No murmur heard.  Pulmonary/Chest: Breath sounds normal. No respiratory distress. She has no wheezes. She has no rales.   Abdominal: Abdomen is soft. Bowel sounds are normal. She exhibits no distension. There is no abdominal tenderness. There is no rebound and no guarding.   Musculoskeletal:         General: Normal range of motion.      Cervical back: Normal range of motion and neck supple.     Neurological: She is alert and oriented to person, place, and time. No cranial nerve deficit or sensory deficit. GCS score is 15. GCS eye subscore is 4. GCS verbal subscore is 5. GCS motor subscore is 6.   Skin: Skin is warm and dry. No erythema. No pallor.   Psychiatric: She has a normal mood and affect. Her behavior is normal. Judgment and thought content normal.         ED Course   Procedures  Labs Reviewed   CBC W/ AUTO DIFFERENTIAL - Abnormal; Notable for the following components:       Result Value    RBC 3.33 (*)     Hemoglobin 6.6 (*)     Hematocrit 22.9 (*)     MCV 69 (*)     MCH 19.8 (*)     MCHC 28.8 (*)     RDW 14.8 (*)     Platelets 560 (*)     All other components within normal limits   COMPREHENSIVE METABOLIC PANEL - Abnormal; Notable for the following components:    Alkaline Phosphatase 136 (*)     ALT 9 (*)     All other components  within normal limits   TYPE & SCREEN   PREPARE RBC SOFT        ECG Results              EKG 12-lead (Final result)        Collection Time Result Time QRS Duration OHS QTC Calculation    03/26/24 20:33:01 03/27/24 14:06:04 78 442                     Final result by Interface, Lab In [facility] (03/27/24 14:06:11)                   Narrative:    Test Reason : R53.1,    Vent. Rate : 079 BPM     Atrial Rate : 079 BPM     P-R Int : 132 ms          QRS Dur : 078 ms      QT Int : 386 ms       P-R-T Axes : 076 064 041 degrees     QTc Int : 442 ms    Normal sinus rhythm  Nonspecific ST abnormality  Abnormal ECG  When compared with ECG of 21-AUG-2022 00:42,  No significant change was found  Confirmed by Lisandra Barbosa MD ([id number]) on 3/27/2024 2:05:54 PM    Referred By: LORENA   SELF           Confirmed By:Lisandra Barbosa MD                                  Imaging Results    None          Medications   pantoprazole injection 80 mg (80 mg Intravenous Given 3/26/24 4921)   ferric gluconate (FERRLECIT) 125 mg in sodium chloride 0.9% 100 mL IVPB (0 mg Intravenous Stopped 3/27/24 1137)     Medical Decision Making  83-year-old past medical history of gastric ulcer, iron-deficiency anemia presenting with abnormal lab low hemoglobin with generalized weakness.    DX includes iron-deficiency anemia, upper GI bleed low likelihood lower GI bleed, electrolyte derangement, ELIOT    Plan: Will obtain CBC, CMP, type and screen for.  For blood transfusion reassess.    Risk  Prescription drug management.  Decision regarding hospitalization.                     Spoke with Hospital Medicine plan for hospital admission for further management evaluation of anemia possible iron-deficiency versus GI bleed.    Critical Care   Date: 03/28/2024  Performed by: Nu Jean-Baptiste   Authorized by: Nu Jean-Baptiste   Total critical care time (exclusive of procedural time) : 40 minutes  Critical care was necessary to treat or prevent imminent or life-threatening  deterioration of the following conditions:  symptomatic anmeia                    Clinical Impression:  Final diagnoses:  [R53.1] Weakness          ED Disposition Condition    Admit                 Nu Jean-Baptiste Jr., MD  03/28/24 3270

## 2024-03-27 NOTE — FIRST PROVIDER EVALUATION
Emergency Department TeleTriage Encounter Note      CHIEF COMPLAINT    Chief Complaint   Patient presents with    Weakness     Low h/h 6.8; told to come in by        VITAL SIGNS   Initial Vitals [03/26/24 2029]   BP Pulse Resp Temp SpO2   (!) 201/77 80 16 98 °F (36.7 °C) 99 %      MAP       --            ALLERGIES    Review of patient's allergies indicates:  No Known Allergies    PROVIDER TRIAGE NOTE  This is a teletriage evaluation of a 83 y.o. female presenting to the ED complaining of generalized weakness.     Initial orders will be placed and care will be transferred to an alternate provider when patient is roomed for a full evaluation. Any additional orders and the final disposition will be determined by that provider.       ORDERS  Labs Reviewed   CBC W/ AUTO DIFFERENTIAL   COMPREHENSIVE METABOLIC PANEL   TYPE & SCREEN       ED Orders (720h ago, onward)      Start Ordered     Status Ordering Provider    03/26/24 2105 03/26/24 2104  Cardiac Monitoring - Adult  Continuous        Comments: Notify Physician If:    Acknowledged STEVENSON BRADSHAW    03/26/24 2105 03/26/24 2104  Pulse Oximetry Continuous  Continuous         Acknowledged STEVENSON BRADSHAW    03/26/24 2104 03/26/24 2103  Saline lock IV  Once         Acknowledged STEVENSON BRADSHAW    03/26/24 2104 03/26/24 2103  CBC auto differential  STAT         Acknowledged STEVENSON BRADSHAW    03/26/24 2104 03/26/24 2103  Comprehensive metabolic panel  STAT         Acknowledged STEVENSON BRADSHAW    03/26/24 2104 03/26/24 2103  Type & Screen  STAT         Acknowledged STEVENSON BRADSHAW    03/26/24 2031 03/26/24 2031  EKG 12-lead  Once         Completed by SHANNEN SOUZA on 3/26/2024 at  8:36 PM ROMERO BROWN              Virtual Visit Note: The provider triage portion of this emergency department evaluation and documentation was performed via [employer], a HIPAA-compliant telemedicine  application, in concert with a tele-presenter in the room. A face to face patient evaluation with one of my colleagues will occur once the patient is placed in an emergency department room.      DISCLAIMER: This note was prepared with [employer] voice recognition transcription software. Garbled syntax, mangled pronouns, and other bizarre constructions may be attributed to that software system.

## 2024-03-27 NOTE — PLAN OF CARE
Problem: Adult Inpatient Plan of Care  Goal: Plan of Care Review  Outcome: Ongoing, Progressing  Goal: Patient-Specific Goal (Individualized)  Outcome: Ongoing, Progressing  Goal: Absence of Hospital-Acquired Illness or Injury  Outcome: Ongoing, Progressing  Goal: Optimal Comfort and Wellbeing  Outcome: Ongoing, Progressing  Goal: Readiness for Transition of Care  Outcome: Ongoing, Progressing     Problem: Adjustment to Illness (Gastrointestinal Bleeding)  Goal: Optimal Coping with Acute Illness  Outcome: Ongoing, Progressing     Problem: Bleeding (Gastrointestinal Bleeding)  Goal: Hemostasis  Outcome: Ongoing, Progressing     Problem: Infection  Goal: Absence of Infection Signs and Symptoms  Outcome: Ongoing, Progressing     Problem: Fall Injury Risk  Goal: Absence of Fall and Fall-Related Injury  Outcome: Ongoing, Progressing

## 2024-03-27 NOTE — PLAN OF CARE
[facility] - Telemetry Stepdown  Initial Discharge Assessment       Primary Care Provider: Brooklyn Burnham MD    Admission Diagnosis: Weakness [R53.1]  Chest pain [R07.9]    Admission Date: 3/26/2024  Expected Discharge Date: 3/27/2024         Payor: MEDICARE / Plan: MEDICARE PART A & B / Product Type: Government /     Extended Emergency Contact Information  Primary Emergency Contact: Eder Doe  Mobile Phone: [phone]  Relation: Spouse   needed? No  Secondary Emergency Contact: Keegan Meza  Address: [address]           [place] [address] [place]  Mobile Phone: [phone]  Relation: Son    Discharge Plan A: Home with family, Home  Discharge Plan B: Home, Home with family      [id number] - [place] - [address]  [address]  [place] [address]  Phone: [phone] Fax: [phone]    [facility][id number] - [place] - [address] [facility] AT [facility]  [address] [place] [address]  [facility] [place] [address]  Phone: [phone] Fax: [phone]      Initial Assessment (most recent)       Adult Discharge Assessment - 03/27/24 1149          Discharge Assessment    Assessment Type Discharge Planning Assessment     Confirmed/corrected address, phone number and insurance Yes     Confirmed Demographics Correct on Facesheet     Source of Information patient     Does patient/caregiver understand observation status Yes     Communicated TERE with patient/caregiver Date not available/Unable to determine   Projected d/c 3/27/2024    Reason For Admission Symptomatic anemia     Facility Arrived From: Home     Do you expect to return to your current living situation? Yes     Do you have help at home or someone to help you manage your care at home? Yes     Who are your caregiver(s) and their phone number(s)? () Eder [phone]     Prior to hospitilization cognitive  "status: Alert/Oriented     Current cognitive status: Alert/Oriented     Walking or Climbing Stairs Difficulty no     Dressing/Bathing Difficulty no     Home Layout Able to live on 1st floor     Equipment Currently Used at Home walker, rolling;cane, quad   Per pt has 6canes    Readmission within 30 days? No     Patient currently being followed by outpatient case management? No     Do you currently have service(s) that help you manage your care at home? No     Do you take prescription medications? Yes     Do you have prescription coverage? Yes     Coverage MEDICARE - MEDICARE PART A & B     Do you have any problems affording any of your prescribed medications? No     Is the patient taking medications as prescribed? no   Per pt "I just don't take them sometimes"    Who is going to help you get home at discharge? () [facility] [phone]     How do you get to doctors appointments? car, drives self;family or friend will provide     Are you on dialysis? No     Do you take coumadin? No     Discharge Plan A Home with family;Home     Discharge Plan B Home;Home with family     DME Needed Upon Discharge  --   TBD    Discharge Plan discussed with: Patient;Spouse/sig other     Name(s) and Number(s) () [facility] [phone]        Physical Activity    On average, how many days per week do you engage in moderate to strenuous exercise (like a brisk walk)? 7 days     On average, how many minutes do you engage in exercise at this level? 60 min        Financial Resource Strain    How hard is it for you to pay for the very basics like food, housing, medical care, and heating? Not hard at all        Housing Stability    In the last 12 months, was there a time when you were not able to pay the mortgage or rent on time? No     In the last 12 months, was there a time when you did not have a steady place to sleep or slept in a shelter (including now)? No        Transportation Needs    In the past 12 months, has lack of " transportation kept you from medical appointments or from getting medications? No     In the past 12 months, has lack of transportation kept you from meetings, work, or from getting things needed for daily living? No        Food Insecurity    Within the past 12 months, you worried that your food would run out before you got the money to buy more. Never true     Within the past 12 months, the food you bought just didn't last and you didn't have money to get more. Never true        Stress    Do you feel stress - tense, restless, nervous, or anxious, or unable to sleep at night because your mind is troubled all the time - these days? Not at all        Social Connections    In a typical week, how many times do you talk on the phone with family, friends, or neighbors? More than three times a week     How often do you get together with friends or relatives? More than three times a week     How often do you attend Holiness or Anabaptism services? Patient declined     Do you belong to any clubs or organizations such as Holiness groups, unions, fraternal or athletic groups, or school groups? Patient declined     How often do you attend meetings of the clubs or organizations you belong to? Patient declined     Are you , , , , never , or living with a partner? Patient declined        Alcohol Use    Q1: How often do you have a drink containing alcohol? Patient unable to answer     Q2: How many drinks containing alcohol do you have on a typical day when you are drinking? Patient declined     Q3: How often do you have six or more drinks on one occasion? Patient declined                   SW met with patient and pt  Eder in room to complete DPA. Questions answered / contact numbers provided.  Use PREFERRED PHARMACY / BEDSIDE DELIVERY for any necessary medications at time of discharge. Per pt she is independent with all ADLs  however if needed she has in-home equipment (rolling walker and  6 canes). Pt is not on HD, BTs or home oxygen. Pt  Eder will be assisting with help upon discharge.  He also, will be providing transportation home. Hospital follow up will be scheduled with PCP. Will continue to follow for course of hospitalization.     Discharge Plan A and Plan B have been determined by review of patient's clinical status, future medical and therapeutic needs, and coverage/benefits for post-acute care in coordination with multidisciplinary team members.    MERCED White   [facility]    Case Management Dept  [phone]

## 2024-03-27 NOTE — TELEPHONE ENCOUNTER
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 Please call [phone]

## 2024-03-27 NOTE — SUBJECTIVE & OBJECTIVE
Past Medical History:   Diagnosis Date    Cancer     basal cell carcinoma       Past Surgical History:   Procedure Laterality Date    CARPAL TUNNEL RELEASE      ESOPHAGOGASTRODUODENOSCOPY N/A 8/22/2022    Procedure: EGD (ESOPHAGOGASTRODUODENOSCOPY);  Surgeon: Steven Lopez MD;  Location: [facility] F[facility]);  Service: Endoscopy;  Laterality: N/A;    ESOPHAGOGASTRODUODENOSCOPY N/A 12/5/2022    Procedure: EGD (ESOPHAGOGASTRODUODENOSCOPY);  Surgeon: Steven Lopez MD;  Location: [facility] (2ND FLR);  Service: Endoscopy;  Laterality: N/A;  Has estimated pulmonary artery pressure 45, schedule location per protocol.   Please schedule with Dr. Darryl Lopez-  Plavix stopped 8/23/22  pt requested to schedule after Thanksgiving/ inst portal-RB  pre call complete; [facility] 11/28/22    ESOPHAGOGASTRODUODENOSCOPY N/A 7/11/2023    Procedure: EGD (ESOPHAGOGASTRODUODENOSCOPY);  Surgeon: Natalie Fall MD;  Location: [facility];  Service: Endoscopy;  Laterality: N/A;    EYE SURGERY      left eye cataract    TONSILLECTOMY      TUBAL LIGATION         Review of patient's allergies indicates:  No Known Allergies    No current facility-administered medications on file prior to encounter.     Current Outpatient Medications on File Prior to Encounter   Medication Sig    alendronate (FOSAMAX) 70 MG tablet Take 1 tablet (70 mg total) by mouth every 7 days.    amLODIPine (NORVASC) 5 MG tablet Take 1 tablet (5 mg total) by mouth once daily.    aspirin 81 MG Chew Chew and swallow 1 tablet (81 mg total) by mouth once. for 1 dose    cyanocobalamin (VITAMIN B-12) 1000 MCG tablet Take 1,000 mcg by mouth once daily.    ferrous sulfate (FEROSUL) 220 mg (44 mg iron)/5 mL Elix Take 6.82 mLs (300.08 mg total) by mouth once daily.    HYDROcodone-acetaminophen (NORCO) 5-325 mg per tablet Take 1 tablet by mouth every 6 (six) hours as needed for Pain.    levocetirizine (XYZAL) 5 MG tablet Take 1 tablet (5 mg total) by mouth every evening.     mv,Ca,min-folic acid-vit K1 (ONE-A-DAY WOMEN'S 50 PLUS) 400-20 mcg Tab Take 1 tablet by mouth once daily.    omeprazole (PRILOSEC) 40 MG capsule Take 1 capsule (40 mg total) by mouth once daily.    ondansetron (ZOFRAN-ODT) 4 MG TbDL Take 1 tablet (4 mg total) by mouth every 8 (eight) hours as needed (nausea).    RESTASIS 0.05 % ophthalmic emulsion Place 1 drop into both eyes 2 (two) times daily.    vitamin D (VITAMIN D3) 1000 units Tab Take 1,000 Units by mouth once daily.    [DISCONTINUED] clopidogreL (PLAVIX) 75 mg tablet Take 1 tablet (75 mg total) by mouth once daily. for 21 days     Family History    None       Tobacco Use    Smoking status: Never    Smokeless tobacco: Never   Substance and Sexual Activity    Alcohol use: No    Drug use: No    Sexual activity: Not on file     Review of Systems   Constitutional:  Positive for activity change and fatigue. Negative for chills and fever.   HENT:  Negative for congestion, trouble swallowing and voice change.    Eyes:  Negative for photophobia and visual disturbance.   Respiratory:  Negative for chest tightness, shortness of breath and wheezing.    Cardiovascular:  Negative for chest pain, palpitations and leg swelling.   Gastrointestinal:  Negative for abdominal distention, abdominal pain, blood in stool, constipation, nausea and vomiting.   Genitourinary:  Negative for decreased urine volume, difficulty urinating, dysuria, flank pain and hematuria.   Musculoskeletal:  Negative for arthralgias, back pain and gait problem.   Skin:  Negative for color change and wound.   Neurological:  Positive for weakness. Negative for dizziness, speech difficulty, light-headedness and headaches.   Psychiatric/Behavioral:  Negative for behavioral problems, confusion and decreased concentration.      Objective:     Vital Signs (Most Recent):  Temp: 97.7 °F (36.5 °C) (03/27/24 0005)  Pulse: 78 (03/27/24 0005)  Resp: 18 (03/27/24 0005)  BP: (!) 161/74 (03/27/24 0005)  SpO2: 98 %  (03/27/24 0005) Vital Signs (24h Range):  Temp:  [97.7 °F (36.5 °C)-98 °F (36.7 °C)] 97.7 °F (36.5 °C)  Pulse:  [69-80] 78  Resp:  [16-18] 18  SpO2:  [98 %-100 %] 98 %  BP: (152-201)/(69-77) 161/74     Weight: 53.5 kg (117 lb 15.1 oz)  Body mass index is 20.89 kg/m².     Physical Exam  Vitals and nursing note reviewed.   Constitutional:       General: She is not in acute distress.     Appearance: She is well-developed.   HENT:      Head: Normocephalic and atraumatic.      Mouth/Throat:      Pharynx: No oropharyngeal exudate.   Eyes:      General: No scleral icterus.     Conjunctiva/sclera: Conjunctivae normal.   Cardiovascular:      Rate and Rhythm: Normal rate and regular rhythm.      Heart sounds: Normal heart sounds.   Pulmonary:      Effort: Pulmonary effort is normal. No respiratory distress.      Breath sounds: Normal breath sounds. No wheezing.   Abdominal:      General: Bowel sounds are normal. There is no distension.      Palpations: Abdomen is soft.      Tenderness: There is no abdominal tenderness.   Musculoskeletal:         General: No tenderness. Normal range of motion.      Cervical back: Normal range of motion.      Right lower leg: No edema.      Left lower leg: No edema.   Skin:     General: Skin is warm and dry.      Capillary Refill: Capillary refill takes less than 2 seconds.      Findings: No rash.   Neurological:      Mental Status: She is alert and oriented to person, place, and time.      Cranial Nerves: No cranial nerve deficit.      Sensory: No sensory deficit.      Coordination: Coordination normal.   Psychiatric:         Mood and Affect: Affect is flat.         Behavior: Behavior normal.         Thought Content: Thought content normal.         Judgment: Judgment normal.                Significant Labs: All pertinent labs within the past 24 hours have been reviewed.  CBC:   Recent Labs   Lab 03/25/24  1232 03/26/24  2112   WBC 6.48 7.44   HGB 6.8* 6.6*   HCT 23.7* 22.9*   * 560*      CMP:   Recent Labs   Lab 03/26/24  2112      K 3.9      CO2 23   GLU 93   BUN 19   CREATININE 0.7   CALCIUM 9.5   PROT 7.2   ALBUMIN 3.8   BILITOT 0.3   ALKPHOS 136*   AST 16   ALT 9*   ANIONGAP 8       Significant Imaging: I have reviewed all pertinent imaging results/findings within the past 24 hours.

## 2024-03-27 NOTE — DISCHARGE SUMMARY
[facility] - Telemetry [facility] Medicine  Discharge Summary      Patient Name: Katelyn Caba  MRN: [id number]  BRY: [id number]  Patient Class: IP- Inpatient  Admission Date: 3/26/2024  Hospital Length of Stay: 1 days  Discharge Date and Time:  03/27/2024 9:35 AM  Attending Physician: Abhinav Tay MD   Discharging Provider: Abhinav Tay MD  Primary Care Provider: Brooklyn Burnham MD  [facility] Medicine Team: [facility] MED R Abhinav Tay MD  Primary Care Team: [facility] MED R    HPI:   Katelyn Caba is a 83 y.o. female with a PMHx of Rodriguez's esophagus, gastric ulcer, AVM, anemia, iron-deficiency, HTN who [place] [facility] [place] of symptomatic anemia. Patient reports worsneing generalized weakness, fatigue and decreased energy levels for the past month. Also endorse craving for ice. She had outpatient labs done yesterday which showed Hgb of 6.8. She was referred to a hematologist but her friend who is a nurse advised patient to presents to the ED for blood transfusion. No bloody/ dark stools recently. Symptoms were similar when she had a gastric ulcer a few years ago but more severe at that time. Denies shortness of breath, chest pain, nausea, vomiting, diarrhea, fevers, chills or syncope. Of note, patient had a fall in December and fractured her pelvis. She is concerned that this caused/ contributed to her anemia. Denies pelvic pain, flank/ abdominal bruising of difficulty ambulating.    ED: hypertensive to /77, improved to BP 160s without any intervention. Hgb 6.6, baseline ~10-12. 1U pRBCs ordered. Admitted to .     * No surgery found *      Hospital Course:   Patient admitted for anemia with Hgb 6.6. Received 1u pRBC with increase of Hgb to 8.1. GI consulted. Iron panel with severe iron deficiency. Patient given one dose of IV iron. No evidence of GI blled. GI recommending outpatient colonoscopy, however patient ambivalent. Will refer to outpatient GI. Discharged on  pantoprazole and iron. Patient seen and examined on day of discharge. Patient feels much better after unit of blood. Tolerating diet, ambulating well. Pt deemed appropriate for discharge. Plan discussed with pt, who was agreeable and amenable; medications were discussed and reviewed, outpatient follow-up scheduled, ER precautions were given, all questions were answered to the pt's satisfaction, and patient was subsequently discharged.       Goals of Care Treatment Preferences:  Code Status: Full Code      Consults:   Consults (From admission, onward)          Status Ordering Provider     Inpatient consult to Gastroenterology  Once        Provider:  (Not yet assigned)    NICOLÁS Avilez            No new Assessment & Plan notes have been filed under this hospital service since the last note was generated.  Service: Hospital Medicine    Final Active Diagnoses:    Diagnosis Date Noted POA    PRINCIPAL PROBLEM:  Symptomatic anemia [D64.9] 03/26/2024 Yes    History of GI bleed [Z87.19] 03/27/2024 Not Applicable    History of gastric ulcer [Z87.11] 03/26/2024 Not Applicable    Rodriguez's esophagus [K22.70] 03/26/2024 Yes    Hypertension [I10] 03/26/2024 Yes    Aortic atherosclerosis [I70.0] 05/18/2023 Yes    Pure hypercholesterolemia [E78.00] 07/21/2022 Yes     Chronic      Problems Resolved During this Admission:       Discharged Condition: good    Disposition: Home or Self Care    Follow Up:    Patient Instructions:      Ambulatory referral/consult to Gastroenterology   Standing Status: Future   Referral Priority: Routine Referral Type: Consultation   Referral Reason: Specialty Services Required   Requested Specialty: Gastroenterology   Number of Visits Requested: 1       Significant Diagnostic Studies: N/A    Pending Diagnostic Studies:       Procedure Component Value Units Date/Time    CBC auto differential [[id number]]     Order Status: Sent Lab Status: No result     Specimen: Blood             Medications:  Reconciled Home Medications:      Medication List        START taking these medications      ferrous sulfate 325 mg (65 mg iron) Tab tablet  Commonly known as: IRON  Take 1 tablet (325 mg total) by mouth daily with breakfast.  Replaces: ferrous sulfate 220 mg (44 mg iron)/5 mL Elix     pantoprazole 40 MG tablet  Commonly known as: PROTONIX  Take 1 tablet (40 mg total) by mouth once daily.            CONTINUE taking these medications      alendronate 70 MG tablet  Commonly known as: FOSAMAX  Take 1 tablet (70 mg total) by mouth every 7 days.     amLODIPine 5 MG tablet  Commonly known as: NORVASC  Take 1 tablet (5 mg total) by mouth once daily.     cyanocobalamin 1000 MCG tablet  Commonly known as: VITAMIN B-12  Take 1,000 mcg by mouth once daily.     HYDROcodone-acetaminophen 5-325 mg per tablet  Commonly known as: NORCO  Take 1 tablet by mouth every 6 (six) hours as needed for Pain.     levocetirizine 5 MG tablet  Commonly known as: XYZAL  Take 1 tablet (5 mg total) by mouth every evening.     ondansetron 4 MG Tbdl  Commonly known as: ZOFRAN-ODT  Take 1 tablet (4 mg total) by mouth every 8 (eight) hours as needed (nausea).     ONE-A-DAY WOMEN'S 50 PLUS 400-20 mcg Tab  Generic drug: mv,Ca,min-folic acid-vit K1  Take 1 tablet by mouth once daily.     RESTASIS 0.05 % ophthalmic emulsion  Generic drug: cycloSPORINE  Place 1 drop into both eyes 2 (two) times daily.     vitamin D 1000 units Tab  Commonly known as: VITAMIN D3  Take 1,000 Units by mouth once daily.            STOP taking these medications      aspirin 81 MG Chew     ferrous sulfate 220 mg (44 mg iron)/5 mL Elix  Commonly known as: FEROSUL  Replaced by: ferrous sulfate 325 mg (65 mg iron) Tab tablet     omeprazole 40 MG capsule  Commonly known as: PRILOSEC              Indwelling Lines/Drains at time of discharge:   Lines/Drains/Airways       None                   Time spent on the discharge of patient: 35 minutes         Abhinav Tay  MD  Department of Hospital Medicine  [facility] - Telemetry Stepdown

## 2024-03-27 NOTE — ASSESSMENT & PLAN NOTE
- noncompliant with home amlodipine  - resume amlodipine 5mg daily  - monitor BP closely iso symptomatic anemia/ possible GIB

## 2024-03-27 NOTE — ASSESSMENT & PLAN NOTE
History of gastric ulcer   Rodriguez's esophagus   Concern for occult GI bleed given hx gastric ulcer/ Rodriguez's esophagus & noncompliance with PPI.    - Hgb 6.6, baseline ~10-12  - s/p IV protonix 80mg x1 and 1U pRBCs in the ED  - GI consulted; appreciate recs  - NPO at MN  - continue IV protonix 40mg BID  - IVFs prn to maintain hemodynamic stability  - transfuse for Hgb <7  - avoid NSAIDs, anticoagulants/ antiplatelets

## 2024-03-27 NOTE — ASSESSMENT & PLAN NOTE
83yoF w/hx of Rodriguez's esophagus, gastric ulcer, AVMs who presented to the hospital with fatigue and weakness after she was found to be anemic (Hgb 6.8). S/p 1u pRBC with adequate response in hemoglobin. Iron 12, TIBC 475, Saturated Iron 3, Transferrin 321, Ferritin 8 on admission. Last EGD in July 2023 revealed Rodriguez's disease, small hiatal hernia, gastritis, and multiple gastric polyps consistent with fundic gland polyps. No hx of prior colonoscopy. Given her persistent ISAIAH, would benefit from a colonoscopy but continues to decline at this time.     Plan:   - No acute endoscopic evaluation at this time  - Agree with IV Iron infusion   - Would benefit from PPI daily indefinitely   - Encouraged patient to schedule outpatient colonoscopy when she is ready

## 2024-03-27 NOTE — H&P
[facility] - Emergency Dept  [facility] Medicine  History & Physical    Patient Name: Katelyn Caba  MRN: [id number]  Patient Class: IP- Inpatient  Admission Date: 3/26/2024  Attending Physician: Epi Bhatti MD   Primary Care Provider: Brooklyn Burnham MD         Patient information was obtained from patient, spouse/SO, past medical records, and ER records.     Subjective:     Principal Problem:Symptomatic anemia    Chief Complaint:   Chief Complaint   Patient presents with    Weakness     Low h/h 6.8; told to come in by         HPI: Katelyn Caba is a 83 y.o. female with a PMHx of Rodriguez's esophagus, gastric ulcer, AVM, anemia, iron-deficiency, HTN who presents to [facility] for evaluation of symptomatic anemia. Patient reports worsneing generalized weakness, fatigue and decreased energy levels for the past month. Also endorse craving for ice. She had outpatient labs done yesterday which showed Hgb of 6.8. She was referred to a hematologist but her friend who is a nurse advised patient to presents to the ED for blood transfusion. No bloody/ dark stools recently. Symptoms were similar when she had a gastric ulcer a few years ago but more severe at that time. Denies shortness of breath, chest pain, nausea, vomiting, diarrhea, fevers, chills or syncope. Of note, patient had a fall in December and fractured her pelvis. She is concerned that this caused/ contributed to her anemia. Denies pelvic pain, flank/ abdominal bruising of difficulty ambulating.    ED: hypertensive to /77, improved to BP 160s without any intervention. Hgb 6.6, baseline ~10-12. 1U pRBCs ordered. Admitted to .     Past Medical History:   Diagnosis Date    Cancer     basal cell carcinoma       Past Surgical History:   Procedure Laterality Date    CARPAL TUNNEL RELEASE      ESOPHAGOGASTRODUODENOSCOPY N/A 8/22/2022    Procedure: EGD (ESOPHAGOGASTRODUODENOSCOPY);  Surgeon: Steven Lopez MD;  Location: [address]);  Service:  Endoscopy;  Laterality: N/A;    ESOPHAGOGASTRODUODENOSCOPY N/A 12/5/2022    Procedure: EGD (ESOPHAGOGASTRODUODENOSCOPY);  Surgeon: Steven Lopez MD;  Location: [facility] ([address] [place] [address]);  Service: Endoscopy;  Laterality: N/A;  Has estimated pulmonary artery pressure 45, schedule location per protocol.   Please schedule with Dr. Darryl Lopez-  Plavix stopped 8/23/22  pt requested to schedule after Thanksgiving/ [facility] portal-RB  pre call complete; [facility] 11/28/22    ESOPHAGOGASTRODUODENOSCOPY N/A 7/11/2023    Procedure: EGD (ESOPHAGOGASTRODUODENOSCOPY);  Surgeon: Natalie Fall MD;  Location: [facility];  Service: Endoscopy;  Laterality: N/A;    EYE SURGERY      left eye cataract    TONSILLECTOMY      TUBAL LIGATION         Review of patient's allergies indicates:  No Known Allergies    No current facility-administered medications on file prior to encounter.     Current Outpatient Medications on File Prior to Encounter   Medication Sig    alendronate (FOSAMAX) 70 MG tablet Take 1 tablet (70 mg total) by mouth every 7 days.    amLODIPine (NORVASC) 5 MG tablet Take 1 tablet (5 mg total) by mouth once daily.    aspirin 81 MG Chew Chew and swallow 1 tablet (81 mg total) by mouth once. for 1 dose    cyanocobalamin (VITAMIN B-12) 1000 MCG tablet Take 1,000 mcg by mouth once daily.    ferrous sulfate (FEROSUL) 220 mg (44 mg iron)/5 mL Elix Take 6.82 mLs (300.08 mg total) by mouth once daily.    HYDROcodone-acetaminophen (NORCO) 5-325 mg per tablet Take 1 tablet by mouth every 6 (six) hours as needed for Pain.    levocetirizine (XYZAL) 5 MG tablet Take 1 tablet (5 mg total) by mouth every evening.    mv,Ca,min-folic acid-vit K1 (ONE-A-DAY WOMEN'S 50 PLUS) 400-20 mcg Tab Take 1 tablet by mouth once daily.    omeprazole (PRILOSEC) 40 MG capsule Take 1 capsule (40 mg total) by mouth once daily.    ondansetron (ZOFRAN-ODT) 4 MG TbDL Take 1 tablet (4 mg total) by mouth every 8 (eight) hours as needed (nausea).    RESTASIS  0.05 % ophthalmic emulsion Place 1 drop into both eyes 2 (two) times daily.    vitamin D (VITAMIN D3) 1000 units Tab Take 1,000 Units by mouth once daily.    [DISCONTINUED] clopidogreL (PLAVIX) 75 mg tablet Take 1 tablet (75 mg total) by mouth once daily. for 21 days     Family History    None       Tobacco Use    Smoking status: Never    Smokeless tobacco: Never   Substance and Sexual Activity    Alcohol use: No    Drug use: No    Sexual activity: Not on file     Review of Systems   Constitutional:  Positive for activity change and fatigue. Negative for chills and fever.   HENT:  Negative for congestion, trouble swallowing and voice change.    Eyes:  Negative for photophobia and visual disturbance.   Respiratory:  Negative for chest tightness, shortness of breath and wheezing.    Cardiovascular:  Negative for chest pain, palpitations and leg swelling.   Gastrointestinal:  Negative for abdominal distention, abdominal pain, blood in stool, constipation, nausea and vomiting.   Genitourinary:  Negative for decreased urine volume, difficulty urinating, dysuria, flank pain and hematuria.   Musculoskeletal:  Negative for arthralgias, back pain and gait problem.   Skin:  Negative for color change and wound.   Neurological:  Positive for weakness. Negative for dizziness, speech difficulty, light-headedness and headaches.   Psychiatric/Behavioral:  Negative for behavioral problems, confusion and decreased concentration.      Objective:     Vital Signs (Most Recent):  Temp: 97.7 °F (36.5 °C) (03/27/24 0005)  Pulse: 78 (03/27/24 0005)  Resp: 18 (03/27/24 0005)  BP: (!) 161/74 (03/27/24 0005)  SpO2: 98 % (03/27/24 0005) Vital Signs (24h Range):  Temp:  [97.7 °F (36.5 °C)-98 °F (36.7 °C)] 97.7 °F (36.5 °C)  Pulse:  [69-80] 78  Resp:  [16-18] 18  SpO2:  [98 %-100 %] 98 %  BP: (152-201)/(69-77) 161/74     Weight: 53.5 kg (117 lb 15.1 oz)  Body mass index is 20.89 kg/m².     Physical Exam  Vitals and nursing note reviewed.    Constitutional:       General: She is not in acute distress.     Appearance: She is well-developed.   HENT:      Head: Normocephalic and atraumatic.      Mouth/Throat:      Pharynx: No oropharyngeal exudate.   Eyes:      General: No scleral icterus.     Conjunctiva/sclera: Conjunctivae normal.   Cardiovascular:      Rate and Rhythm: Normal rate and regular rhythm.      Heart sounds: Normal heart sounds.   Pulmonary:      Effort: Pulmonary effort is normal. No respiratory distress.      Breath sounds: Normal breath sounds. No wheezing.   Abdominal:      General: Bowel sounds are normal. There is no distension.      Palpations: Abdomen is soft.      Tenderness: There is no abdominal tenderness.   Musculoskeletal:         General: No tenderness. Normal range of motion.      Cervical back: Normal range of motion.      Right lower leg: No edema.      Left lower leg: No edema.   Skin:     General: Skin is warm and dry.      Capillary Refill: Capillary refill takes less than 2 seconds.      Findings: No rash.   Neurological:      Mental Status: She is alert and oriented to person, place, and time.      Cranial Nerves: No cranial nerve deficit.      Sensory: No sensory deficit.      Coordination: Coordination normal.   Psychiatric:         Mood and Affect: Affect is flat.         Behavior: Behavior normal.         Thought Content: Thought content normal.         Judgment: Judgment normal.                Significant Labs: All pertinent labs within the past 24 hours have been reviewed.  CBC:   Recent Labs   Lab 03/25/24  1232 03/26/24  2112   WBC 6.48 7.44   HGB 6.8* 6.6*   HCT 23.7* 22.9*   * 560*     CMP:   Recent Labs   Lab 03/26/24  2112      K 3.9      CO2 23   GLU 93   BUN 19   CREATININE 0.7   CALCIUM 9.5   PROT 7.2   ALBUMIN 3.8   BILITOT 0.3   ALKPHOS 136*   AST 16   ALT 9*   ANIONGAP 8       Significant Imaging: I have reviewed all pertinent imaging results/findings within the past 24  hours.    Assessment/Plan:     * Symptomatic anemia  History of gastric ulcer   Rodriguez's esophagus   Concern for occult GI bleed given hx gastric ulcer/ Rodriguez's esophagus & noncompliance with PPI.    - Hgb 6.6, baseline ~10-12  - s/p IV protonix 80mg x1 and 1U pRBCs in the ED  - GI consulted; appreciate recs  - NPO at [facility]  - continue IV protonix 40mg BID  - IVFs prn to maintain hemodynamic stability  - transfuse for Hgb <7  - avoid NSAIDs, anticoagulants/ antiplatelets     Hypertension  - noncompliant with home amlodipine  - resume amlodipine 5mg daily  - monitor BP closely iso symptomatic anemia/ possible GIB    Aortic atherosclerosis  - only takes ASA 81 occasionally- hold     Pure hypercholesterolemia  - not on statin   - lipid panel in AM      VTE Risk Mitigation (From admission, onward)           Ordered     Reason for No Pharmacological VTE Prophylaxis  Once        Question:  Reasons:  Answer:  Active Bleeding    03/26/24 2344     IP VTE HIGH RISK PATIENT  Once         03/26/24 2344     Place sequential compression device  Until discontinued         03/26/24 2342                                    Iman Lopez PA-C  Department of Hospital Medicine  [facility] - Emergency Dept

## 2024-03-28 NOTE — PLAN OF CARE
[facility] - Telemetry Stepdown  Discharge Final Note    Primary Care Provider: Brooklyn Burnham MD    Expected Discharge Date: 3/27/2024    Future Appointments   Date Time Provider Department Center   4/8/2024 11:00 AM Ophelia Head, NP [facility]        Final Discharge Note (most recent)       Final Note - 03/28/24 0847          Final Note    Assessment Type Final Discharge Note     Anticipated Discharge Disposition Home or Self Care     What phone number can be called within the next 1-3 days to see how you are doing after discharge? [phone]        Post-Acute Status    Coverage MEDICARE - MEDICARE PART A & B     Discharge Delays None known at this time                 Pt was d/c 3/37/2024 home w/ no needs. Pt  provided pt transportation home.     MERCED White   [facility]    Case Management Dept  [phone]    Important Message from Medicare

## 2024-04-01 ENCOUNTER — TELEPHONE (OUTPATIENT)
Dept: FAMILY MEDICINE | Facility: CLINIC | Age: 84
End: 2024-04-01
Payer: MEDICARE

## 2024-04-01 NOTE — TELEPHONE ENCOUNTER
----- Message from Lesley Ruano sent at 4/1/2024 10:08 AM CDT -----  Contact: Pt  The pt request a call concerning a Hematology appointment, per pt she may have been given an appt day but doesn't remember when or where the appt will be,  no additional info given and can be reached at [phone]///[id number]

## 2024-04-08 ENCOUNTER — OFFICE VISIT (OUTPATIENT)
Dept: FAMILY MEDICINE | Facility: CLINIC | Age: 84
End: 2024-04-08
Payer: MEDICARE

## 2024-04-08 VITALS
WEIGHT: 119 LBS | DIASTOLIC BLOOD PRESSURE: 62 MMHG | OXYGEN SATURATION: 97 % | SYSTOLIC BLOOD PRESSURE: 146 MMHG | HEART RATE: 68 BPM | BODY MASS INDEX: 21.08 KG/M2

## 2024-04-08 DIAGNOSIS — K22.719 BARRETT'S ESOPHAGUS WITH DYSPLASIA: ICD-10-CM

## 2024-04-08 DIAGNOSIS — D50.9 MICROCYTIC ANEMIA: Primary | ICD-10-CM

## 2024-04-08 PROCEDURE — 99213 OFFICE O/P EST LOW 20 MIN: CPT | Mod: S$PBB,,, | Performed by: NURSE PRACTITIONER

## 2024-04-08 PROCEDURE — 99213 OFFICE O/P EST LOW 20 MIN: CPT | Mod: PBBFAC,PO | Performed by: NURSE PRACTITIONER

## 2024-04-08 PROCEDURE — 99999 PR PBB SHADOW E&M-EST. PATIENT-LVL III: CPT | Mod: PBBFAC,,, | Performed by: NURSE PRACTITIONER

## 2024-04-08 NOTE — PROGRESS NOTES
Subjective:       Patient ID: Katelyn Caba is a 83 y.o. female.    Chief Complaint: Hospital Follow Up  Katelyn Caba presents today for hospital follow up. Last seen in 3/27/2024.    Per discharge summary 3/27/2024:    Katelyn Caba is a 83 y.o. female with a PMHx of Rodriguez's esophagus, gastric ulcer, AVM, anemia, iron-deficiency, HTN who presents to [facility] for evaluation of symptomatic anemia. Patient reports worsneing generalized weakness, fatigue and decreased energy levels for the past month. Also endorse craving for ice. She had outpatient labs done yesterday which showed Hgb of 6.8. She was referred to a hematologist but her friend who is a nurse advised patient to presents to the ED for blood transfusion. No bloody/ dark stools recently. Symptoms were similar when she had a gastric ulcer a few years ago but more severe at that time. Denies shortness of breath, chest pain, nausea, vomiting, diarrhea, fevers, chills or syncope. Of note, patient had a fall in December and fractured her pelvis. She is concerned that this caused/ contributed to her anemia. Denies pelvic pain, flank/ abdominal bruising of difficulty ambulating.  ED: hypertensive to /77, improved to BP 160s without any intervention. Hgb 6.6, baseline ~10-12. 1U pRBCs ordered. Admitted to .   Hospital Course:   Patient admitted for anemia with Hgb 6.6. Received 1u pRBC with increase of Hgb to 8.1. GI consulted. Iron panel with severe iron deficiency. Patient given one dose of IV iron. No evidence of GI blled. GI recommending outpatient colonoscopy, however patient ambivalent. Will refer to outpatient GI. Discharged on pantoprazole and iron. Patient seen and examined on day of discharge. Patient feels much better after unit of blood. Tolerating diet, ambulating well. Pt deemed appropriate for discharge. Plan discussed with pt, who was agreeable and amenable; medications were discussed and reviewed, outpatient follow-up scheduled, ER  precautions were given, all questions were answered to the pt's satisfaction, and patient was subsequently discharged.     Transitional Care Note    Family and/or Caretaker present at visit?  Yes.  Diagnostic tests reviewed/disposition: No diagnosic tests pending after this hospitalization.  Disease/illness education: iron supplementation; f/u with specialties as scheduled   Home health/community services discussion/referrals: Patient does not have home health established from hospital visit.  They do not need home health.  If needed, we will set up home health for the patient.   Establishment or re-establishment of referral orders for community resources: No other necessary community resources.   Discussion with other health care providers: No discussion with other health care providers necessary.     Patient Active Problem List   Diagnosis    Venous insufficiency    H/O ischemic left MCA stroke    Pure hypercholesterolemia    Right homonymous inferior quadrantanopia    Speaking difficulty    Nutritional anemia, unspecified    Volume overload    Gastrointestinal hemorrhage    Elevated blood pressure reading    Solitary pulmonary nodule    Aortic atherosclerosis    Symptomatic anemia    History of gastric ulcer    Rodriguez's esophagus    Hypertension    History of GI bleed       Current Outpatient Medications:     alendronate (FOSAMAX) 70 MG tablet, Take 1 tablet (70 mg total) by mouth every 7 days., Disp: 4 tablet, Rfl: 11    amLODIPine (NORVASC) 5 MG tablet, Take 1 tablet (5 mg total) by mouth once daily., Disp: 30 tablet, Rfl: 1    cyanocobalamin (VITAMIN B-12) 1000 MCG tablet, Take 1,000 mcg by mouth once daily., Disp: , Rfl:     ferrous sulfate (IRON) 325 mg (65 mg iron) Tab tablet, Take 1 tablet (325 mg total) by mouth daily with breakfast., Disp: 90 tablet, Rfl: 0    HYDROcodone-acetaminophen (NORCO) 5-325 mg per tablet, Take 1 tablet by mouth every 6 (six) hours as needed for Pain., Disp: 12 tablet, Rfl: 0     levocetirizine (XYZAL) 5 MG tablet, Take 1 tablet (5 mg total) by mouth every evening., Disp: 30 tablet, Rfl: 3    mv,Ca,min-folic acid-vit K1 (ONE-A-DAY WOMEN'S 50 PLUS) 400-20 mcg Tab, Take 1 tablet by mouth once daily., Disp: , Rfl:     ondansetron (ZOFRAN-ODT) 4 MG TbDL, Take 1 tablet (4 mg total) by mouth every 8 (eight) hours as needed (nausea)., Disp: 20 tablet, Rfl: 0    pantoprazole (PROTONIX) 40 MG tablet, Take 1 tablet (40 mg total) by mouth once daily., Disp: 60 tablet, Rfl: 5    RESTASIS 0.05 % ophthalmic emulsion, Place 1 drop into both eyes 2 (two) times daily., Disp: , Rfl:     vitamin D (VITAMIN D3) 1000 units Tab, Take 1,000 Units by mouth once daily., Disp: , Rfl:     The following portions of the patient's history were reviewed and updated as appropriate: allergies, past family history, past medical history, past social history and past surgical history.    Review of Systems   Constitutional:  Negative for appetite change, fatigue, fever and unexpected weight change.   HENT:  Negative for hearing loss, rhinorrhea, sneezing, sore throat and trouble swallowing.    Eyes:  Negative for visual disturbance.   Respiratory:  Negative for cough, shortness of breath and wheezing.    Cardiovascular:  Negative for chest pain and palpitations.   Gastrointestinal:  Negative for abdominal pain, constipation, diarrhea, nausea and vomiting.   Genitourinary:  Negative for difficulty urinating, dysuria, frequency and hematuria.   Musculoskeletal:  Negative for arthralgias and myalgias.   Neurological:  Negative for dizziness, weakness and numbness.   Psychiatric/Behavioral:  Negative for sleep disturbance. The patient is not nervous/anxious.        Objective:      BP (!) 146/62   Pulse 68   Wt 54 kg (119 lb)   SpO2 97%   BMI 21.08 kg/m²     Physical Exam  Constitutional:       General: She is not in acute distress.     Appearance: Normal appearance.   Cardiovascular:      Rate and Rhythm: Normal rate and  regular rhythm.      Pulses: Normal pulses.      Heart sounds: Normal heart sounds.   Pulmonary:      Effort: Pulmonary effort is normal.      Breath sounds: Normal breath sounds.   Musculoskeletal:         General: Normal range of motion.   Skin:     General: Skin is warm and dry.   Neurological:      Mental Status: She is alert and oriented to person, place, and time.   Psychiatric:         Mood and Affect: Mood normal.         Behavior: Behavior normal.         Assessment:       1. Microcytic anemia    2. Rodriguez's esophagus with dysplasia        Plan:   Katelyn was seen today for hospital follow up.    Diagnoses and all orders for this visit:    Microcytic anemia    Rodriguez's esophagus with dysplasia      F/U with hematology and gastroenterology as scheduled.

## 2024-04-09 ENCOUNTER — OFFICE VISIT (OUTPATIENT)
Dept: HEMATOLOGY/ONCOLOGY | Facility: CLINIC | Age: 84
End: 2024-04-09
Payer: MEDICARE

## 2024-04-09 ENCOUNTER — TELEPHONE (OUTPATIENT)
Dept: HEMATOLOGY/ONCOLOGY | Facility: CLINIC | Age: 84
End: 2024-04-09
Payer: MEDICARE

## 2024-04-09 VITALS
HEIGHT: 63 IN | OXYGEN SATURATION: 98 % | BODY MASS INDEX: 21.05 KG/M2 | TEMPERATURE: 97 F | RESPIRATION RATE: 20 BRPM | HEART RATE: 70 BPM | SYSTOLIC BLOOD PRESSURE: 144 MMHG | DIASTOLIC BLOOD PRESSURE: 64 MMHG | WEIGHT: 118.81 LBS

## 2024-04-09 DIAGNOSIS — S32.9XXD CLOSED NONDISPLACED FRACTURE OF PELVIS WITH ROUTINE HEALING, UNSPECIFIED PART OF PELVIS, SUBSEQUENT ENCOUNTER: ICD-10-CM

## 2024-04-09 DIAGNOSIS — K92.2 GASTROINTESTINAL HEMORRHAGE, UNSPECIFIED GASTROINTESTINAL HEMORRHAGE TYPE: ICD-10-CM

## 2024-04-09 DIAGNOSIS — Z86.73 H/O ISCHEMIC LEFT MCA STROKE: Primary | ICD-10-CM

## 2024-04-09 DIAGNOSIS — Z86.73 H/O ISCHEMIC LEFT MCA STROKE: ICD-10-CM

## 2024-04-09 DIAGNOSIS — D50.9 MICROCYTIC ANEMIA: Primary | ICD-10-CM

## 2024-04-09 DIAGNOSIS — D50.9 MICROCYTIC ANEMIA: ICD-10-CM

## 2024-04-09 PROCEDURE — 99214 OFFICE O/P EST MOD 30 MIN: CPT | Mod: S$PBB,,, | Performed by: STUDENT IN AN ORGANIZED HEALTH CARE EDUCATION/TRAINING PROGRAM

## 2024-04-09 PROCEDURE — 99214 OFFICE O/P EST MOD 30 MIN: CPT | Mod: PBBFAC,PN | Performed by: STUDENT IN AN ORGANIZED HEALTH CARE EDUCATION/TRAINING PROGRAM

## 2024-04-09 PROCEDURE — 99999 PR PBB SHADOW E&M-EST. PATIENT-LVL IV: CPT | Mod: PBBFAC,,, | Performed by: STUDENT IN AN ORGANIZED HEALTH CARE EDUCATION/TRAINING PROGRAM

## 2024-04-09 NOTE — TELEPHONE ENCOUNTER
Est pt of Dr Marie.   Pt needing f/u for anemia and to est with a MD here in the clinic, since Dr Marie is no longer here.    Called and spoke to pt.  Was able to schedule pt for 4/9/24; confirmed the appt date time and location.

## 2024-04-18 PROBLEM — I21.3 ST ELEVATION MYOCARDIAL INFARCTION (STEMI): Status: ACTIVE | Noted: 2024-04-18

## 2024-04-24 ENCOUNTER — OFFICE VISIT (OUTPATIENT)
Dept: FAMILY MEDICINE | Facility: CLINIC | Age: 84
End: 2024-04-24
Attending: FAMILY MEDICINE
Payer: MEDICARE

## 2024-04-24 VITALS
OXYGEN SATURATION: 99 % | HEART RATE: 71 BPM | BODY MASS INDEX: 20.02 KG/M2 | SYSTOLIC BLOOD PRESSURE: 136 MMHG | DIASTOLIC BLOOD PRESSURE: 60 MMHG | WEIGHT: 113 LBS

## 2024-04-24 DIAGNOSIS — I10 ESSENTIAL HYPERTENSION: ICD-10-CM

## 2024-04-24 DIAGNOSIS — I25.10 CORONARY ARTERY DISEASE INVOLVING NATIVE CORONARY ARTERY OF NATIVE HEART, UNSPECIFIED WHETHER ANGINA PRESENT: Primary | ICD-10-CM

## 2024-04-24 PROCEDURE — 99999 PR PBB SHADOW E&M-EST. PATIENT-LVL IV: CPT | Mod: PBBFAC,,, | Performed by: FAMILY MEDICINE

## 2024-04-24 PROCEDURE — 99214 OFFICE O/P EST MOD 30 MIN: CPT | Mod: PBBFAC,PO | Performed by: FAMILY MEDICINE

## 2024-04-24 PROCEDURE — 99214 OFFICE O/P EST MOD 30 MIN: CPT | Mod: S$PBB,,, | Performed by: FAMILY MEDICINE

## 2024-04-29 NOTE — PROGRESS NOTES
"Subjective:       Patient ID: Katelyn Caba is a 83 y.o. female.    Chief Complaint: Hospital Follow Up    HPI  Pt is here for follow up of hospital stay for STEMI pt denies sob/cp she is on b blocker statin brilinta   Pt has htn bp fine no sob/cp no excessive fatigue on b blocker     Review of Systems   Constitutional:  Negative for chills, fatigue, fever and unexpected weight change.   Respiratory:  Negative for cough, chest tightness and shortness of breath.    Cardiovascular:  Negative for chest pain and palpitations.   Gastrointestinal:  Negative for abdominal distention, abdominal pain and blood in stool.       Objective:    /60   Pulse 71   Wt 51.3 kg (113 lb)   SpO2 99%   BMI 20.02 kg/m²     Physical Exam  Constitutional:       Appearance: Normal appearance. She is not ill-appearing.   Cardiovascular:      Rate and Rhythm: Normal rate and regular rhythm.      Heart sounds:      No gallop.   Pulmonary:      Effort: Pulmonary effort is normal. No respiratory distress.   Neurological:      General: No focal deficit present.      Mental Status: She is alert and oriented to person, place, and time.      Cranial Nerves: No cranial nerve deficit.      Coordination: Coordination normal.   Psychiatric:         Mood and Affect: Mood normal.         Behavior: Behavior normal.         Thought Content: Thought content normal.         Judgment: Judgment normal.       Ldl 130 in 2/2024  Assessment:       1. Coronary artery disease involving native coronary artery of native heart, unspecified whether angina present    2. Essential hypertension        Plan:     Orders declined  Cont meds  Cont statin  Low fat diet  Graded exercise  Rtc 6 months           "This note will not be shared with the patient."     "

## 2024-05-01 ENCOUNTER — OUTPATIENT CASE MANAGEMENT (OUTPATIENT)
Dept: ADMINISTRATIVE | Facility: OTHER | Age: 84
End: 2024-05-01
Payer: MEDICARE

## 2024-05-01 NOTE — LETTER
Katelyn Caba  [id number] [address] [address]      Dear Katelyn Caba,     I am your nurse with [facility] Out[facility] Management Department. I was unsuccessful in reaching you today. At your earliest convenience, I would like to discuss your healthcare progress.      Please contact me at [phone] from 8:00AM to 4:30 PM on Monday thru Friday.     As you know, [facility] is a program offered to you through [facility] where a nurse is available 24/7 to answer questions or provide medical advice, their number is [phone].    Thanks,    Ana Cristina Pop RN  Outpatient Care Management

## 2024-05-06 RX ORDER — LEVOCETIRIZINE DIHYDROCHLORIDE 5 MG/1
5 TABLET, FILM COATED ORAL NIGHTLY
Qty: 90 TABLET | OUTPATIENT
Start: 2024-05-06

## 2024-05-06 NOTE — TELEPHONE ENCOUNTER
Refill Decision Note  Aaron DC. Inappropriate Request       Katelyn Caba  is requesting a refill authorization.  Brief Assessment and Rationale for Refill:  Quick Discontinue     Medication Therapy Plan:  levocetirizine (XYZAL) 5 MG tablet   The original prescription was discontinued on 4/9/2024 by Shea Ha MD for the following reason: Therapy completed.      Comments:     Note composed:7:21 AM 05/06/2024

## 2024-05-06 NOTE — TELEPHONE ENCOUNTER
No care due was identified.  [facility] Embedded Care Due Messages. Reference number: [id number].   5/06/2024 6:22:53 AM CDT

## 2024-05-08 ENCOUNTER — OUTPATIENT CASE MANAGEMENT (OUTPATIENT)
Dept: ADMINISTRATIVE | Facility: OTHER | Age: 84
End: 2024-05-08
Payer: MEDICARE

## 2024-05-08 NOTE — PROGRESS NOTES
Outpatient Care Management  Patient Does Not Consent    Patient: Katelyn Caba  MRN:  [id number]  Date of Service:  5/8/2024  Completed by:  Mayra Pop RN    Chief Complaint   Patient presents with    Case Closure       Patient Summary           Consent Received:  Decline  Decline Reason:  Not interested

## 2024-05-17 ENCOUNTER — OFFICE VISIT (OUTPATIENT)
Dept: CARDIOLOGY | Facility: CLINIC | Age: 84
End: 2024-05-17
Payer: MEDICARE

## 2024-05-17 ENCOUNTER — LAB VISIT (OUTPATIENT)
Dept: LAB | Facility: HOSPITAL | Age: 84
End: 2024-05-17
Payer: MEDICARE

## 2024-05-17 VITALS
SYSTOLIC BLOOD PRESSURE: 139 MMHG | HEART RATE: 63 BPM | BODY MASS INDEX: 21.28 KG/M2 | DIASTOLIC BLOOD PRESSURE: 58 MMHG | WEIGHT: 120.13 LBS

## 2024-05-17 DIAGNOSIS — I21.3 ST ELEVATION MYOCARDIAL INFARCTION (STEMI), UNSPECIFIED ARTERY: ICD-10-CM

## 2024-05-17 DIAGNOSIS — R79.9 ABNORMAL FINDING OF BLOOD CHEMISTRY, UNSPECIFIED: ICD-10-CM

## 2024-05-17 DIAGNOSIS — I21.3 ST ELEVATION MYOCARDIAL INFARCTION (STEMI), UNSPECIFIED ARTERY: Primary | ICD-10-CM

## 2024-05-17 DIAGNOSIS — I87.2 VENOUS INSUFFICIENCY: ICD-10-CM

## 2024-05-17 DIAGNOSIS — E78.00 PURE HYPERCHOLESTEROLEMIA: Chronic | ICD-10-CM

## 2024-05-17 DIAGNOSIS — I70.0 AORTIC ATHEROSCLEROSIS: ICD-10-CM

## 2024-05-17 DIAGNOSIS — I10 HYPERTENSION, UNSPECIFIED TYPE: ICD-10-CM

## 2024-05-17 PROBLEM — R03.0 ELEVATED BLOOD PRESSURE READING: Status: RESOLVED | Noted: 2022-08-21 | Resolved: 2024-05-17

## 2024-05-17 LAB
ANISOCYTOSIS BLD QL SMEAR: ABNORMAL
BASOPHILS # BLD AUTO: 0.07 K/UL (ref 0–0.2)
BASOPHILS NFR BLD: 1.1 % (ref 0–1.9)
CHOLEST SERPL-MCNC: 136 MG/DL (ref 120–199)
CHOLEST/HDLC SERPL: 3.2 {RATIO} (ref 2–5)
DIFFERENTIAL METHOD BLD: ABNORMAL
EOSINOPHIL # BLD AUTO: 0.3 K/UL (ref 0–0.5)
EOSINOPHIL NFR BLD: 4.2 % (ref 0–8)
ERYTHROCYTE [DISTWIDTH] IN BLOOD BY AUTOMATED COUNT: ABNORMAL % (ref 11.5–14.5)
HCT VFR BLD AUTO: 28.4 % (ref 37–48.5)
HDLC SERPL-MCNC: 42 MG/DL (ref 40–75)
HDLC SERPL: 30.9 % (ref 20–50)
HGB BLD-MCNC: 8.3 G/DL (ref 12–16)
HYPOCHROMIA BLD QL SMEAR: ABNORMAL
IMM GRANULOCYTES # BLD AUTO: 0.01 K/UL (ref 0–0.04)
IMM GRANULOCYTES NFR BLD AUTO: 0.2 % (ref 0–0.5)
IRON SERPL-MCNC: 20 UG/DL (ref 30–160)
LDLC SERPL CALC-MCNC: 78.2 MG/DL (ref 63–159)
LYMPHOCYTES # BLD AUTO: 1.8 K/UL (ref 1–4.8)
LYMPHOCYTES NFR BLD: 27.7 % (ref 18–48)
MCH RBC QN AUTO: 24.8 PG (ref 27–31)
MCHC RBC AUTO-ENTMCNC: 29.2 G/DL (ref 32–36)
MCV RBC AUTO: 85 FL (ref 82–98)
MONOCYTES # BLD AUTO: 0.8 K/UL (ref 0.3–1)
MONOCYTES NFR BLD: 12.2 % (ref 4–15)
NEUTROPHILS # BLD AUTO: 3.6 K/UL (ref 1.8–7.7)
NEUTROPHILS NFR BLD: 54.6 % (ref 38–73)
NONHDLC SERPL-MCNC: 94 MG/DL
NRBC BLD-RTO: 0 /100 WBC
OVALOCYTES BLD QL SMEAR: ABNORMAL
PLATELET # BLD AUTO: 482 K/UL (ref 150–450)
PMV BLD AUTO: 10.3 FL (ref 9.2–12.9)
POIKILOCYTOSIS BLD QL SMEAR: SLIGHT
POLYCHROMASIA BLD QL SMEAR: ABNORMAL
RBC # BLD AUTO: 3.35 M/UL (ref 4–5.4)
SATURATED IRON: 5 % (ref 20–50)
SCHISTOCYTES BLD QL SMEAR: ABNORMAL
SCHISTOCYTES BLD QL SMEAR: PRESENT
SPHEROCYTES BLD QL SMEAR: ABNORMAL
TOTAL IRON BINDING CAPACITY: 395 UG/DL (ref 250–450)
TRANSFERRIN SERPL-MCNC: 267 MG/DL (ref 200–375)
TRIGL SERPL-MCNC: 79 MG/DL (ref 30–150)
WBC # BLD AUTO: 6.49 K/UL (ref 3.9–12.7)

## 2024-05-17 PROCEDURE — 99999 PR PBB SHADOW E&M-EST. PATIENT-LVL III: CPT | Mod: PBBFAC,,,

## 2024-05-17 PROCEDURE — 85025 COMPLETE CBC W/AUTO DIFF WBC: CPT

## 2024-05-17 PROCEDURE — 99215 OFFICE O/P EST HI 40 MIN: CPT | Mod: S$PBB,,,

## 2024-05-17 PROCEDURE — 36415 COLL VENOUS BLD VENIPUNCTURE: CPT | Mod: PO

## 2024-05-17 PROCEDURE — 83540 ASSAY OF IRON: CPT

## 2024-05-17 PROCEDURE — 99213 OFFICE O/P EST LOW 20 MIN: CPT | Mod: PBBFAC,PO

## 2024-05-17 PROCEDURE — 80061 LIPID PANEL: CPT

## 2024-05-17 RX ORDER — LEVOCETIRIZINE DIHYDROCHLORIDE 5 MG/1
5 TABLET, FILM COATED ORAL
COMMUNITY
Start: 2024-05-06

## 2024-05-17 NOTE — PROGRESS NOTES
Subjective:    Patient ID:  Katelyn Caba is a 83 y.o. female patient here for evaluation Follow-up    History of Present Illness:     Mrs. Zepeda is an 83 year old F who will establish care with Dr. De La Cruz here today for a hospital follow up. She presented to [facility] on 4/17/2024 with inferolateral STEMI but when taken to the cath lab her chest pain and ST elevations had resolved and there was nothing amendable to PCI. Differential included plaque erosion or distal occlusion of the LPL.   She has no complaints today, her main concern is that she has not received the IV iron infusion she was supposed to get prior to having a STEMI. She does not want to end up in the ER again requiring a transfusion like she did in March.   BP has been 1302/50s at home.       Most Recent Echocardiogram Results  Results for orders placed during the hospital encounter of 04/17/24    Echo Saline Bubble? No    Interpretation Summary    Left Ventricle: The left ventricle is normal in size. Normal wall thickness. There is normal systolic function with a visually estimated ejection fraction of 55 - 60%.    Right Ventricle: Normal right ventricular cavity size. Systolic function is normal.    Left Atrium: Left atrium is mildly dilated.    Mitral Valve: There is mild regurgitation.    Pulmonary Artery: There is mild pulmonary hypertension. The estimated pulmonary artery systolic pressure is 41 mmHg.    IVC/SVC: Normal venous pressure at 3 mmHg.      Most Recent Nuclear Stress Test Results  No results found for this or any previous visit.      Most Recent Cardiac PET Stress Test Results  No results found for this or any previous visit.      Most Recent Cardiovascular Angiogram results  Results for orders placed during the hospital encounter of 04/17/24    Cardiac catheterization    Conclusion  Procedures:  Moderate sedation  Left heart cath  Coronary angiogram    Conclusions:  No obvious culprit for MI. the patient's chest pain and ST  elevations were almost entirely resolved by the end of the diagnostic angiogram.  Left ventriculogram with EF of 60-70% and normal anterior and inferior wall motion.  Possible diagnoses include plaque erosion with resolved thrombus or distal occlusion in the LPL.  PCI deferred  Elevated left filling pressure    Recommendations:  Medical management including single versus dual antiplatelet therapy (the patient has moderate chronic anemia and is dependent on iron transfusions)  High-intensity statin  Beta Blocker  TTE  Monitor for at least 48 hours in hospital          Description of procedure:  The patient presented to the Cath Lab in a(n) emergent fashion.  The patient was prepped and draped in a sterile manner.  Timeout, airway and ASA assessment were completed.  Local anesthesia with 2% lidocaine was administered and sedation/analgesia were administered as above.    Access was obtained using the modified Seldinger technique and a micropuncture needle with ultrasound guidance in the right radial artery. Diagnostic angiography was performed using JL3.5, JR4.0, and Pigtail catheter(s).      Coronary anatomy:  Dominance: right  Left main:  Angiographically normal  Left anterior descending:  Large vessel that supplies the apex.  It has mild diffuse disease overall.  The mid segment after the large 1st diagonal has some moderate disease.  Left circumflex:  Large vessel with mild diffuse disease and multiple small obtuse marginal branches.  There is a left posterolateral moderate-sized branch with DOM 3 flow.  There is a hint of an abrupt cut off possibly in the mid LPL  Right coronary artery:  Tortuous large dominant vessel with mild diffuse disease and large RPDA and RPL.  The proximal RCA has a 50% stenosis and DOM 3 flow.      Hemodynamics:  /31        Hemostasis:  TR band        Complications:  None  Estimated blood loss:  Minimal      The patient tolerated the procedure well and left the cath lab in stable  condition.      Elisabeth De La Cruz MD, [facility]  Interventional Cardiology/Structural Heart Disease  [facility]  Office: [phone]    The clinically important portion of this report has been dictated in the section above. Our Epic reporting system does not allow us to provide a clinically meaningful report without this dictation. Please beware that there may be errors and discrepancies in the computer transcription and all of the clicks required to finalize the report.  The procedure log was documented by No documenter listed and verified by Elisabeth De La Cruz MD.    Date: 4/21/2024  Time: 5:52 AM      Other Most Recent Cardiology Results  Results for orders placed during the hospital encounter of 04/17/24    Cardiac monitoring strips      REVIEW OF SYSTEMS: As noted in HPI   CARDIOVASCULAR: No recent chest pain, palpitations, arm/neck/jaw pain, or edema.  RESPIRATORY: No recent fever, cough, SOB.  : No blood in the urine  GI: No reflux, nausea, vomiting, or blood in stool.   MUSCULOSKELETAL: No falls.   NEURO: No headaches, syncope, or dizziness.  EYES: No sudden changes in vision.     Past Medical History:   Diagnosis Date    Cancer     basal cell carcinoma     Past Surgical History:   Procedure Laterality Date    CARPAL TUNNEL RELEASE      ESOPHAGOGASTRODUODENOSCOPY N/A 8/22/2022    Procedure: EGD (ESOPHAGOGASTRODUODENOSCOPY);  Surgeon: Steven Lopez MD;  Location: [address]);  Service: Endoscopy;  Laterality: N/A;    ESOPHAGOGASTRODUODENOSCOPY N/A 12/5/2022    Procedure: EGD (ESOPHAGOGASTRODUODENOSCOPY);  Surgeon: Steven Lopez MD;  Location: [address]);  Service: Endoscopy;  Laterality: N/A;  Has estimated pulmonary artery pressure 45, schedule location per protocol.   Please schedule with Dr. Darryl Lopez-  Plavix stopped 8/23/22  [facility]  pre call complete; [facility] 11/28/22     ESOPHAGOGASTRODUODENOSCOPY N/A 7/11/2023    Procedure: EGD (ESOPHAGOGASTRODUODENOSCOPY);  Surgeon: Natalie Fall MD;  Location: [facility];  Service: Endoscopy;  Laterality: N/A;    EYE SURGERY      left eye cataract    LEFT HEART CATHETERIZATION  4/17/2024    Procedure: Left heart cath;  Surgeon: Elisabeth De La Cruz MD;  Location: [facility];  Service: Cardiology;;    TONSILLECTOMY      TUBAL LIGATION       Social History     Tobacco Use    Smoking status: Never     Passive exposure: Never    Smokeless tobacco: Never   Substance Use Topics    Alcohol use: No    Drug use: No         Objective      Vitals:    05/17/24 0814   BP: (!) 139/58   Pulse: 63       LAST EKG  Results for orders placed or performed during the hospital encounter of 04/17/24   EKG 12-lead    Collection Time: 04/17/24 12:47 PM   Result Value Ref Range    QRS Duration 80 ms    [facility] QTC Calculation 442 ms    Narrative    Test Reason : Z98.890,    Vent. Rate : 063 BPM     Atrial Rate : 063 BPM     P-R Int : 144 ms          QRS Dur : 080 ms      QT Int : 432 ms       P-R-T Axes : 067 058 078 degrees     QTc Int : 442 ms    Normal sinus rhythm  ST elevation, consider early repolarization, pericarditis, or injury  Abnormal ECG  When compared with ECG of 17-APR-2024 12:06,  No significant change was found  Confirmed by LIZZY HOPE MD ([id number]) on 4/19/2024 7:58:03 AM    Referred By: [email or web address]   SELF           Confirmed By:LIZZY HOPE MD     LIPIDS - LAST 2   Lab Results   Component Value Date    CHOL 196 02/09/2024    CHOL 237 (H) 05/18/2023    HDL 49 02/09/2024    HDL 51 05/18/2023    LDLCALC 130.2 02/09/2024    LDLCALC 162.2 (H) 05/18/2023    TRIG 84 02/09/2024    TRIG 119 05/18/2023    CHOLHDL 25.0 02/09/2024    CHOLHDL 21.5 05/18/2023     CARDIAC PROFILE - LAST 2  Lab Results   Component Value Date     (H) 08/21/2022    TROPONINI 26.400 (HH) 04/18/2024    TROPONINI 58.100 (HH) 04/17/2024      CBC - LAST 2  Lab Results   Component Value Date    WBC 8.09  04/19/2024    WBC 8.40 04/18/2024    HGB 10.1 (L) 04/19/2024    HGB 9.4 (L) 04/18/2024    HCT 33.5 (L) 04/19/2024    HCT 29.4 (L) 04/18/2024     (H) 04/19/2024     (H) 04/18/2024     Lab Results   Component Value Date    LABPT 13.2 04/17/2024    LABPT 12.6 07/20/2022    INR 1.0 04/17/2024    INR 0.9 03/29/2023    APTT 27.4 04/17/2024    APTT 26.3 03/29/2023     CHEMISTRY - LAST 2  Lab Results   Component Value Date     04/19/2024     (L) 04/18/2024    K 3.5 04/19/2024    K 4.5 04/18/2024    CO2 28 04/19/2024    CO2 25 04/18/2024    BUN 16 04/19/2024    BUN 15 04/18/2024    CREATININE 0.69 04/19/2024    CREATININE 0.64 04/18/2024     (H) 04/19/2024    GLU 97 04/18/2024    CALCIUM 9.3 04/19/2024    CALCIUM 9.1 04/18/2024    MG 2.1 04/17/2024    MG 2.1 03/27/2024    ALBUMIN 3.7 04/18/2024    ALBUMIN 4.1 04/17/2024    ALT 33 04/18/2024    ALT 19 04/17/2024     (H) 04/18/2024    AST 30 04/17/2024      ENDOCRINE - LAST 2  Lab Results   Component Value Date    HGBA1C 5.3 03/27/2024    HGBA1C 5.0 07/21/2022    TSH 0.988 05/18/2023    TSH 3.060 07/20/2022        PHYSICAL EXAM  CONSTITUTIONAL: Well built, well nourished in no apparent distress  NECK: no carotid bruit, no JVD  LUNGS: CTA  CHEST WALL: no tenderness  HEART: regular rate and rhythm, S1, S2 normal, no murmur, click, rub or gallop   ABDOMEN: soft, non-tender; bowel sounds normal; no masses,  no organomegaly  EXTREMITIES: Extremities normal, no edema, no calf tenderness noted  NEURO: AAO X 3    I HAVE REVIEWED :    The vital signs, most recent cardiac testing, and most recent pertinent non-cardiology provider notes.    Current Outpatient Medications   Medication Instructions    alendronate (FOSAMAX) 70 mg, Oral, Every 7 days    atorvastatin (LIPITOR) 40 mg, Oral, Daily    cyanocobalamin (VITAMIN B-12) 1,000 mcg, Oral, Daily    FeroSuL 325 mg, Oral, With breakfast    levocetirizine (XYZAL) 5 mg, Oral    metoprolol tartrate  (LOPRESSOR) 12.5 mg, Oral, 3 times daily    mv,Ca,min-folic acid-vit K1 (ONE-A-DAY WOMEN'S 50 PLUS) 400-20 mcg Tab 1 tablet, Oral, Daily    pantoprazole (PROTONIX) 40 mg, Oral, Daily    ticagrelor (BRILINTA) 90 mg, Oral, 2 times daily    vitamin D (VITAMIN D3) 1,000 Units, Oral, Daily        Assessment & Plan   1. ST elevation myocardial infarction (STEMI), unspecified artery  Doing well   Compliant with Brilitna alone due to hx of GI bleed and ISAIAH   Continue statin and BB   - Cardiac Rehab Phase II; Future    2. Pure hypercholesterolemia  Continue lipitor 40 mg daily   Check lipid panel today, she has not eaten    3. Venous insufficiency  Stable     4. Aortic atherosclerosis  Continue statin     5. Hypertension, unspecified type  BP seems to be at goal <140/90 mmHg   Continue lopressor 25 mg BID      6. Chronic iron deficiency anemia  Check CBC and iron panel today     4 months Dr. Kafa Naomi Peters, PA-C [facility]   Office: [phone]

## 2024-05-22 RX ORDER — SODIUM CHLORIDE 0.9 % (FLUSH) 0.9 %
10 SYRINGE (ML) INJECTION
Status: CANCELLED | OUTPATIENT
Start: 2024-05-22

## 2024-05-22 RX ORDER — DIPHENHYDRAMINE HYDROCHLORIDE 50 MG/ML
50 INJECTION INTRAMUSCULAR; INTRAVENOUS ONCE AS NEEDED
Status: CANCELLED | OUTPATIENT
Start: 2024-05-22

## 2024-05-22 RX ORDER — HEPARIN 100 UNIT/ML
500 SYRINGE INTRAVENOUS
Status: CANCELLED | OUTPATIENT
Start: 2024-05-22

## 2024-05-22 RX ORDER — EPINEPHRINE 0.3 MG/.3ML
0.3 INJECTION SUBCUTANEOUS ONCE AS NEEDED
Status: CANCELLED | OUTPATIENT
Start: 2024-05-22

## 2024-05-23 ENCOUNTER — INFUSION (OUTPATIENT)
Dept: INFUSION THERAPY | Facility: HOSPITAL | Age: 84
End: 2024-05-23
Attending: STUDENT IN AN ORGANIZED HEALTH CARE EDUCATION/TRAINING PROGRAM
Payer: MEDICARE

## 2024-05-23 VITALS
DIASTOLIC BLOOD PRESSURE: 65 MMHG | RESPIRATION RATE: 18 BRPM | OXYGEN SATURATION: 97 % | HEART RATE: 69 BPM | HEIGHT: 63 IN | TEMPERATURE: 98 F | SYSTOLIC BLOOD PRESSURE: 158 MMHG | BODY MASS INDEX: 20.59 KG/M2 | WEIGHT: 116.19 LBS

## 2024-05-23 DIAGNOSIS — K92.2 GASTROINTESTINAL HEMORRHAGE, UNSPECIFIED GASTROINTESTINAL HEMORRHAGE TYPE: Primary | ICD-10-CM

## 2024-05-23 DIAGNOSIS — D53.9 NUTRITIONAL ANEMIA, UNSPECIFIED: ICD-10-CM

## 2024-05-23 PROCEDURE — 25000003 PHARM REV CODE 250: Mod: PN | Performed by: STUDENT IN AN ORGANIZED HEALTH CARE EDUCATION/TRAINING PROGRAM

## 2024-05-23 PROCEDURE — 96365 THER/PROPH/DIAG IV INF INIT: CPT | Mod: PN

## 2024-05-23 PROCEDURE — 63600175 PHARM REV CODE 636 W HCPCS: Mod: JZ,JG,PN | Performed by: STUDENT IN AN ORGANIZED HEALTH CARE EDUCATION/TRAINING PROGRAM

## 2024-05-23 RX ORDER — SODIUM CHLORIDE 0.9 % (FLUSH) 0.9 %
10 SYRINGE (ML) INJECTION
Status: DISCONTINUED | OUTPATIENT
Start: 2024-05-23 | End: 2024-05-23 | Stop reason: HOSPADM

## 2024-05-23 RX ORDER — EPINEPHRINE 0.3 MG/.3ML
0.3 INJECTION SUBCUTANEOUS ONCE AS NEEDED
Status: DISCONTINUED | OUTPATIENT
Start: 2024-05-23 | End: 2024-05-23 | Stop reason: HOSPADM

## 2024-05-23 RX ORDER — DIPHENHYDRAMINE HYDROCHLORIDE 50 MG/ML
50 INJECTION INTRAMUSCULAR; INTRAVENOUS ONCE AS NEEDED
Status: DISCONTINUED | OUTPATIENT
Start: 2024-05-23 | End: 2024-05-23 | Stop reason: HOSPADM

## 2024-05-23 RX ADMIN — SODIUM CHLORIDE: 9 INJECTION, SOLUTION INTRAVENOUS at 08:05

## 2024-05-23 RX ADMIN — FERRIC CARBOXYMALTOSE INJECTION 750 MG: 50 INJECTION, SOLUTION INTRAVENOUS at 08:05

## 2024-05-23 NOTE — PLAN OF CARE
Problem: Adult Inpatient Plan of Care  Goal: Plan of Care Review  Outcome: Progressing  Flowsheets (Taken 5/23/2024 7315)  Plan of Care Reviewed With:   patient   spouse   Pt tolerated injectafer infusion well.  No adverse reaction noted.   IV flushed with NS and D/C per protocol.  Patient left clinic in no acute distress.

## 2024-05-23 NOTE — PLAN OF CARE
Problem: Adult Inpatient Plan of Care  Goal: Patient-Specific Goal (Individualized)  Outcome: Progressing  Flowsheets (Taken 5/23/2024 0838)  Anxieties, Fears or Concerns: piv  Individualized Care Needs: recliner, warm blanket     Problem: Fatigue  Goal: Improved Activity Tolerance  Outcome: Progressing  Intervention: Promote Improved Energy  Flowsheets (Taken 5/23/2024 0838)  Fatigue Management: frequent rest breaks encouraged  Sleep/Rest Enhancement: regular sleep/rest pattern promoted  Activity Management: Ambulated -L4  Environmental Support: rest periods encouraged

## 2024-07-05 ENCOUNTER — LAB VISIT (OUTPATIENT)
Dept: LAB | Facility: HOSPITAL | Age: 84
End: 2024-07-05
Attending: FAMILY MEDICINE
Payer: MEDICARE

## 2024-07-05 DIAGNOSIS — R91.1 SOLITARY PULMONARY NODULE: ICD-10-CM

## 2024-07-05 DIAGNOSIS — D64.9 SEVERE ANEMIA: ICD-10-CM

## 2024-07-05 DIAGNOSIS — D50.9 MICROCYTIC ANEMIA: ICD-10-CM

## 2024-07-05 DIAGNOSIS — D53.9 NUTRITIONAL ANEMIA, UNSPECIFIED: ICD-10-CM

## 2024-07-05 DIAGNOSIS — Z86.73 H/O ISCHEMIC LEFT MCA STROKE: ICD-10-CM

## 2024-07-05 LAB
ALBUMIN SERPL BCP-MCNC: 3.6 G/DL (ref 3.5–5.2)
ALP SERPL-CCNC: 133 U/L (ref 55–135)
ALT SERPL W/O P-5'-P-CCNC: 12 U/L (ref 10–44)
ANION GAP SERPL CALC-SCNC: 11 MMOL/L (ref 8–16)
AST SERPL-CCNC: 14 U/L (ref 10–40)
BASOPHILS # BLD AUTO: 0.07 K/UL (ref 0–0.2)
BASOPHILS NFR BLD: 1.2 % (ref 0–1.9)
BILIRUB SERPL-MCNC: 0.3 MG/DL (ref 0.1–1)
BUN SERPL-MCNC: 12 MG/DL (ref 8–23)
CALCIUM SERPL-MCNC: 8.9 MG/DL (ref 8.7–10.5)
CHLORIDE SERPL-SCNC: 106 MMOL/L (ref 95–110)
CO2 SERPL-SCNC: 24 MMOL/L (ref 23–29)
CREAT SERPL-MCNC: 0.7 MG/DL (ref 0.5–1.4)
DIFFERENTIAL METHOD BLD: ABNORMAL
EOSINOPHIL # BLD AUTO: 0.3 K/UL (ref 0–0.5)
EOSINOPHIL NFR BLD: 5.1 % (ref 0–8)
ERYTHROCYTE [DISTWIDTH] IN BLOOD BY AUTOMATED COUNT: 14.7 % (ref 11.5–14.5)
EST. GFR  (NO RACE VARIABLE): >60 ML/MIN/1.73 M^2
FERRITIN SERPL-MCNC: 58 NG/ML (ref 20–300)
GLUCOSE SERPL-MCNC: 92 MG/DL (ref 70–110)
HCT VFR BLD AUTO: 29.1 % (ref 37–48.5)
HGB BLD-MCNC: 9.6 G/DL (ref 12–16)
IMM GRANULOCYTES # BLD AUTO: 0.02 K/UL (ref 0–0.04)
IMM GRANULOCYTES NFR BLD AUTO: 0.3 % (ref 0–0.5)
IRON SERPL-MCNC: 25 UG/DL (ref 30–160)
LYMPHOCYTES # BLD AUTO: 1.7 K/UL (ref 1–4.8)
LYMPHOCYTES NFR BLD: 28.1 % (ref 18–48)
MCH RBC QN AUTO: 29.9 PG (ref 27–31)
MCHC RBC AUTO-ENTMCNC: 33 G/DL (ref 32–36)
MCV RBC AUTO: 91 FL (ref 82–98)
MONOCYTES # BLD AUTO: 0.6 K/UL (ref 0.3–1)
MONOCYTES NFR BLD: 10.4 % (ref 4–15)
NEUTROPHILS # BLD AUTO: 3.2 K/UL (ref 1.8–7.7)
NEUTROPHILS NFR BLD: 54.9 % (ref 38–73)
NRBC BLD-RTO: 0 /100 WBC
PLATELET # BLD AUTO: 435 K/UL (ref 150–450)
PMV BLD AUTO: 9.3 FL (ref 9.2–12.9)
POTASSIUM SERPL-SCNC: 4.1 MMOL/L (ref 3.5–5.1)
PROT SERPL-MCNC: 6.3 G/DL (ref 6–8.4)
RBC # BLD AUTO: 3.21 M/UL (ref 4–5.4)
SATURATED IRON: 8 % (ref 20–50)
SODIUM SERPL-SCNC: 141 MMOL/L (ref 136–145)
TOTAL IRON BINDING CAPACITY: 306 UG/DL (ref 250–450)
TRANSFERRIN SERPL-MCNC: 207 MG/DL (ref 200–375)
WBC # BLD AUTO: 5.87 K/UL (ref 3.9–12.7)

## 2024-07-05 PROCEDURE — 36415 COLL VENOUS BLD VENIPUNCTURE: CPT | Mod: PN | Performed by: STUDENT IN AN ORGANIZED HEALTH CARE EDUCATION/TRAINING PROGRAM

## 2024-07-05 PROCEDURE — 83540 ASSAY OF IRON: CPT | Performed by: STUDENT IN AN ORGANIZED HEALTH CARE EDUCATION/TRAINING PROGRAM

## 2024-07-05 PROCEDURE — 85025 COMPLETE CBC W/AUTO DIFF WBC: CPT | Mod: PN | Performed by: STUDENT IN AN ORGANIZED HEALTH CARE EDUCATION/TRAINING PROGRAM

## 2024-07-05 PROCEDURE — 80053 COMPREHEN METABOLIC PANEL: CPT | Mod: PN | Performed by: STUDENT IN AN ORGANIZED HEALTH CARE EDUCATION/TRAINING PROGRAM

## 2024-07-05 PROCEDURE — 82728 ASSAY OF FERRITIN: CPT | Performed by: STUDENT IN AN ORGANIZED HEALTH CARE EDUCATION/TRAINING PROGRAM

## 2024-07-09 ENCOUNTER — OFFICE VISIT (OUTPATIENT)
Dept: HEMATOLOGY/ONCOLOGY | Facility: CLINIC | Age: 84
End: 2024-07-09
Payer: MEDICARE

## 2024-07-09 VITALS
HEART RATE: 70 BPM | SYSTOLIC BLOOD PRESSURE: 146 MMHG | TEMPERATURE: 97 F | HEIGHT: 65 IN | WEIGHT: 118.19 LBS | DIASTOLIC BLOOD PRESSURE: 63 MMHG | RESPIRATION RATE: 18 BRPM | OXYGEN SATURATION: 98 % | BODY MASS INDEX: 19.69 KG/M2

## 2024-07-09 DIAGNOSIS — Z87.19 HISTORY OF GI BLEED: ICD-10-CM

## 2024-07-09 DIAGNOSIS — I21.3 ST ELEVATION MYOCARDIAL INFARCTION (STEMI), UNSPECIFIED ARTERY: ICD-10-CM

## 2024-07-09 DIAGNOSIS — D50.9 IRON DEFICIENCY ANEMIA, UNSPECIFIED IRON DEFICIENCY ANEMIA TYPE: Primary | ICD-10-CM

## 2024-07-09 DIAGNOSIS — I10 HYPERTENSION, UNSPECIFIED TYPE: ICD-10-CM

## 2024-07-09 PROCEDURE — 99214 OFFICE O/P EST MOD 30 MIN: CPT | Mod: S$PBB,,, | Performed by: NURSE PRACTITIONER

## 2024-07-09 PROCEDURE — 99999 PR PBB SHADOW E&M-EST. PATIENT-LVL IV: CPT | Mod: PBBFAC,,, | Performed by: NURSE PRACTITIONER

## 2024-07-09 PROCEDURE — 99214 OFFICE O/P EST MOD 30 MIN: CPT | Mod: PBBFAC,PN | Performed by: NURSE PRACTITIONER

## 2024-07-09 RX ORDER — EPINEPHRINE 0.3 MG/.3ML
0.3 INJECTION SUBCUTANEOUS ONCE AS NEEDED
OUTPATIENT
Start: 2024-07-09 | End: 2035-12-05

## 2024-07-09 RX ORDER — DIPHENHYDRAMINE HYDROCHLORIDE 50 MG/ML
50 INJECTION INTRAMUSCULAR; INTRAVENOUS ONCE AS NEEDED
OUTPATIENT
Start: 2024-07-09 | End: 2035-12-05

## 2024-07-09 RX ORDER — HEPARIN 100 UNIT/ML
500 SYRINGE INTRAVENOUS
OUTPATIENT
Start: 2024-07-09

## 2024-07-09 RX ORDER — SODIUM CHLORIDE 0.9 % (FLUSH) 0.9 %
10 SYRINGE (ML) INJECTION
OUTPATIENT
Start: 2024-07-09

## 2024-07-09 NOTE — PROGRESS NOTES
[facility] - Established patient    Reason for visit: follow up on ISAIAH    Best Contact Phone Number(s): [phone] (home)      Anemia  2023: Iron Def Anemia, received IV iron 9/2022 - 1/2023 05/23/2024:  Injectafer 750 mg IV    HPI: Katelyn Caba is a 83 y.o. female with history of BCC, HTN, HLD, Aortic atherosclerosis, CVA, GI bleed presents to the clinic today with her  for evaluation of ISAIAH.  Since her last visit, she presented to the ED on 04/17/24 with CP, dyspnea.  She was evaluated & found to have a STEMI & underwent a heart cath.  Placed on Brillinta thereafter. She received (1) infusion of Injectafer 750 mg on 05/23/24.  She reports compliance with Cardiac rehab 3 times a week (MWF) without difficulty.  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  Remains on Brillinta. Denies any surgeries, bleeding, melena, cold extremities, HA's, fatigue, CP, palpitations, etc.    History:  Patient hospitalized 3/26 - 3/27/24 with Hgb 6.6 sp 1u pRBC. GI evaluation plan for outpatient colonoscopy but patient does not wish to proceed with colonoscopy. Patient recalls fall around [place] with pelvic fracture. Patient was taking iron supplement daily and increasing iron rich foods. Weight is stable. 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 In ED, troponin elevated x3 and an inferolateral STEMI was confirmed on EKG. [facility] with evidence of moderate nonobstructive CAD for which no intervention was required. Patient placed on GDMT with atorvastatin 40 mg qd and metoprolol tartrate 12.5 mg TID. Antiplatelet therapy with ticagrelor recommended, however, patient apprehensive of medication secondary to history of GI bleed. Patient agreed to medication regimen. Patient without anginal equivalents  and R radial access site well healed upon discharge.     Procedure(s):  Left heart cath      Started on Brillinta    Patient presents with , Eder.  ECOG PS is 1.  History has been obtained by chart review and discussion with the patient.    ROS:   A complete 12-point review of systems was reviewed and is negative except as mentioned above.     Past Medical History:   Past Medical History:   Diagnosis Date    Cancer     basal cell carcinoma        Past Surgical History:   Past Surgical History:   Procedure Laterality Date    CARPAL TUNNEL RELEASE      ESOPHAGOGASTRODUODENOSCOPY N/A 8/22/2022    Procedure: EGD (ESOPHAGOGASTRODUODENOSCOPY);  Surgeon: Steven Lopez MD;  Location: [facility] ([address] [place] [address]);  Service: Endoscopy;  Laterality: N/A;    ESOPHAGOGASTRODUODENOSCOPY N/A 12/5/2022    Procedure: EGD (ESOPHAGOGASTRODUODENOSCOPY);  Surgeon: Steven Lopez MD;  Location: [facility] ([address] [place] [address]);  Service: Endoscopy;  Laterality: N/A;  Has estimated pulmonary artery pressure 45, schedule location per protocol.   Please schedule with Dr. Darryl Lopez-  Plavix stopped 8/23/22  pt requested to schedule after Thanksgiving/ [facility] portal-RB  pre call complete; [facility] 11/28/22    ESOPHAGOGASTRODUODENOSCOPY N/A 7/11/2023    Procedure: EGD (ESOPHAGOGASTRODUODENOSCOPY);  Surgeon: Natalie Fall MD;  Location[facility] ENDO;  Service: Endoscopy;  Laterality: N/A;    EYE SURGERY      left eye cataract    LEFT HEART CATHETERIZATION  4/17/2024    Procedure: Left heart cath;  Surgeon: Elisabeth De La Cruz MD;  Location: [facility] CATH;  Service: Cardiology;;    TONSILLECTOMY      TUBAL LIGATION          Family History:   No family history on file.     Social History:   Social History     Tobacco Use    Smoking status: Never     Passive exposure: Never    Smokeless tobacco: Never   Substance Use Topics    Alcohol use: No      Patient splits time between [address] and apartment in [place]    I have reviewed and updated the patient's  "past medical, surgical, family and social histories.    Allergies:   Review of patient's allergies indicates:  No Known Allergies     Medications:   Current Outpatient Medications   Medication Sig Dispense Refill    atorvastatin (LIPITOR) 40 MG tablet Take 1 tablet (40 mg total) by mouth once daily. 90 tablet 3    cyanocobalamin (VITAMIN B-12) 1000 MCG tablet Take 1,000 mcg by mouth once daily.      levocetirizine (XYZAL) 5 MG tablet Take 5 mg by mouth.      metoprolol tartrate (LOPRESSOR) 25 MG tablet Take 0.5 tablets (12.5 mg total) by mouth 3 (three) times daily. 135 tablet 3    mv,Ca,min-folic acid-vit K1 (ONE-A-DAY WOMEN'S 50 PLUS) 400-20 mcg Tab Take 1 tablet by mouth once daily.      pantoprazole (PROTONIX) 40 MG tablet Take 1 tablet (40 mg total) by mouth once daily. 60 tablet 5    ticagrelor (BRILINTA) 90 mg tablet Take 1 tablet (90 mg total) by mouth 2 (two) times daily. 60 tablet 11    vitamin D (VITAMIN D3) 1000 units Tab Take 1,000 Units by mouth once daily.      alendronate (FOSAMAX) 70 MG tablet Take 1 tablet (70 mg total) by mouth every 7 days. 4 tablet 11     No current facility-administered medications for this visit.        Physical Exam:   BP (!) 146/63 (BP Location: Left arm, Patient Position: Sitting, BP Method: Medium (Automatic))   Pulse 70   Temp 97.4 °F (36.3 °C) (Temporal)   Resp 18   Ht 5' 5" (1.651 m)   Wt 53.6 kg (118 lb 2.7 oz)   SpO2 98%   BMI 19.66 kg/m²                Physical Exam  Vitals reviewed.   Constitutional:       General: She is not in acute distress.  HENT:      Head: Normocephalic and atraumatic.      Mouth/Throat:      Mouth: Mucous membranes are moist.   Eyes:      Extraocular Movements: Extraocular movements intact.      Pupils: Pupils are equal, round, and reactive to light.   Cardiovascular:      Rate and Rhythm: Normal rate and regular rhythm.      Pulses: Normal pulses.      Heart sounds: No murmur heard.  Pulmonary:      Effort: Pulmonary effort is normal. " No respiratory distress.      Breath sounds: Normal breath sounds.   Abdominal:      General: Abdomen is flat. There is no distension.      Palpations: Abdomen is soft.      Tenderness: There is no abdominal tenderness.   Musculoskeletal:         General: No swelling or tenderness. Normal range of motion.      Cervical back: Neck supple. No rigidity.   Lymphadenopathy:      Cervical: No cervical adenopathy.   Skin:     General: Skin is warm and dry.      Coloration: Skin is pale. Skin is not jaundiced.   Neurological:      General: No focal deficit present.      Mental Status: She is alert and oriented to person, place, and time.   Psychiatric:         Mood and Affect: Mood normal.         Behavior: Behavior normal.         Thought Content: Thought content normal.           Labs:   Lab Results   Component Value Date    WBC 5.87 07/05/2024    HGB 9.6 (L) 07/05/2024    HCT 29.1 (L) 07/05/2024    MCV 91 07/05/2024     07/05/2024      Lab Results   Component Value Date    IRON 25 (L) 07/05/2024    TRANSFERRIN 207 07/05/2024    TIBC 306 07/05/2024    FESATURATED 8 (L) 07/05/2024      Lab Results   Component Value Date    FERRITIN 58 07/05/2024         Lab Results   Component Value Date     07/05/2024    K 4.1 07/05/2024     07/05/2024    CO2 24 07/05/2024    BUN 12 07/05/2024    CREATININE 0.7 07/05/2024    ALBUMIN 3.6 07/05/2024    BILITOT 0.3 07/05/2024    ALKPHOS 133 07/05/2024    AST 14 07/05/2024    ALT 12 07/05/2024       Imaging:   Cardiac catheterization  Procedures:  Moderate sedation  Left heart cath  Coronary angiogram    Conclusions:  No obvious culprit for MI. the patient's chest pain and ST elevations were   almost entirely resolved by the end of the diagnostic angiogram.  Left   ventriculogram with EF of 60-70% and normal anterior and inferior wall   motion.  Possible diagnoses include plaque erosion with resolved thrombus   or distal occlusion in the LPL.  PCI deferred  Elevated left  filling pressure    Recommendations:  Medical management including single versus dual antiplatelet therapy (the   patient has moderate chronic anemia and is dependent on iron transfusions)  High-intensity statin  Beta Blocker   TTE  Monitor for at least 48 hours in hospital    Description of procedure:  The patient presented to the Cath Lab in a(n) emergent fashion.  The   patient was prepped and draped in a sterile manner.  Timeout, airway and   ASA assessment were completed.  Local anesthesia with 2% lidocaine was   administered and sedation/analgesia were administered as above.     Access was obtained using the modified Seldinger technique and a   micropuncture needle with ultrasound guidance in the right radial artery.   Diagnostic angiography was performed using JL3.5, JR4.0, and Pigtail   catheter(s).        Coronary anatomy:  Dominance: right  Left main:  Angiographically normal  Left anterior descending:  Large vessel that supplies the apex.  It has   mild diffuse disease overall.  The mid segment after the large 1st   diagonal has some moderate disease.    Left circumflex:  Large vessel with mild diffuse disease and multiple   small obtuse marginal branches.  There is a left posterolateral   moderate-sized branch with DOM 3 flow.  There is a hint of an abrupt cut   off possibly in the mid LPL    Right coronary artery:  Tortuous large dominant vessel with mild diffuse   disease and large RPDA and RPL.  The proximal RCA has a 50% stenosis and   DOM 3 flow.         Hemodynamics:  /31       Hemostasis:  TR band       Complications:  None  Estimated blood loss:  Minimal    The patient tolerated the procedure well and left the cath lab in stable   condition.         Elisabeth De La Cruz MD, [facility]  Interventional Cardiology/Structural Heart Disease  [facility]  Office: [phone]     The clinically important portion of this report has been dictated in the   section  above. Our Epic reporting system does not allow us to provide a   clinically meaningful report without this dictation. Please beware that   there may be errors and discrepancies in the computer transcription and   all of the clicks required to finalize the report.  The procedure log was documented by No documenter listed and verified by   Elisabeth De La Cruz MD.    Date: 4/21/2024  Time: 5:52 AM            Assessment:       1. Iron deficiency anemia, unspecified iron deficiency anemia type    2. ST elevation myocardial infarction (STEMI), unspecified artery    3. Hypertension, unspecified type    4. History of GI bleed          Plan:             # Iron Def Anemia - Never had an abnormal colonoscopy, no history of GIB with extensive previous ISAIAH work-up. Most likely cause following recent injury to pelvis, causing consumption with bruising + decreased nutrition with bedrest. Hgb is now improved after 1u pRBC and symptomatically patient feels better. Recommend to proceed with additional dose of IV iron to correct deficit, ~750 mg.   - injectafer x1  - return to clinic in 3-4m with repeat iron serology   07/09/24:  Arrange for additional Injectafer 750 mg on Monday, 07/15 @ 2:30 pm; f/u in 2 months with CBC, iron studies/ferritin prior.    #STEMI/heart cath - 04/17/24, Cath per Dr. De La Cruz; presently on Brillinta    # TIA - Patient took anticoagulation and noted worsening of symptoms and has since discontinued    # GIB - Upcoming visit with GI to rule out bleeding, patient is not currently on anticoagulation  07/09/24:  On Brillinta s/p cath for STEMI    # Pelvic Fracture - Improved with physical therapy, no surgical intervention     The above information has been reviewed with the patient and all questions have been answered to their apparent satisfaction.  They understand that they can call the clinic with any questions.     GABBY Ruth, FNP-C  [facility]  35 minutes were spent in  coordination of patient's care, record review and counseling.      Med Onc Chart Routing      Follow up with physician    Follow up with [facility]Z 2 months. f/u in 2 months with cbc, Iron studies/ferritin   Infusion scheduling note   Injectafer 750 mg on Monday; 07/15 @ 230   Injection scheduling note    Labs    Imaging    Pharmacy appointment    Other referrals

## 2024-07-15 ENCOUNTER — INFUSION (OUTPATIENT)
Dept: INFUSION THERAPY | Facility: HOSPITAL | Age: 84
End: 2024-07-15
Attending: STUDENT IN AN ORGANIZED HEALTH CARE EDUCATION/TRAINING PROGRAM
Payer: MEDICARE

## 2024-07-15 VITALS
TEMPERATURE: 98 F | WEIGHT: 119.25 LBS | DIASTOLIC BLOOD PRESSURE: 68 MMHG | BODY MASS INDEX: 19.87 KG/M2 | SYSTOLIC BLOOD PRESSURE: 139 MMHG | OXYGEN SATURATION: 98 % | RESPIRATION RATE: 18 BRPM | HEART RATE: 62 BPM | HEIGHT: 65 IN

## 2024-07-15 DIAGNOSIS — D53.9 NUTRITIONAL ANEMIA, UNSPECIFIED: ICD-10-CM

## 2024-07-15 DIAGNOSIS — K92.2 GASTROINTESTINAL HEMORRHAGE, UNSPECIFIED GASTROINTESTINAL HEMORRHAGE TYPE: Primary | ICD-10-CM

## 2024-07-15 PROCEDURE — 96365 THER/PROPH/DIAG IV INF INIT: CPT | Mod: PN

## 2024-07-15 PROCEDURE — 25000003 PHARM REV CODE 250: Mod: PN | Performed by: NURSE PRACTITIONER

## 2024-07-15 PROCEDURE — 63600175 PHARM REV CODE 636 W HCPCS: Mod: JZ,JG,PN | Performed by: NURSE PRACTITIONER

## 2024-07-15 RX ADMIN — SODIUM CHLORIDE: 9 INJECTION, SOLUTION INTRAVENOUS at 03:07

## 2024-07-15 RX ADMIN — FERRIC CARBOXYMALTOSE INJECTION 750 MG: 50 INJECTION, SOLUTION INTRAVENOUS at 04:07

## 2024-07-15 NOTE — NURSING
Pt arrived to infusion clinic for injectafer. Tolerated well. Instructed pt to wait 30 minutes to be observed. Pt verbalized understanding.  Left unit NAD.  VSS

## 2024-07-15 NOTE — PLAN OF CARE
Problem: Adult Inpatient Plan of Care  Goal: Plan of Care Review  Outcome: Progressing  Goal: Patient-Specific Goal (Individualized)  Outcome: Progressing  Goal: Absence of Hospital-Acquired Illness or Injury  Outcome: Progressing  Goal: Optimal Comfort and Wellbeing  Description: Provide warm blanket  Outcome: Progressing  Goal: Readiness for Transition of Care  Outcome: Progressing     Problem: Fatigue  Goal: Improved Activity Tolerance  Description: Frequent rest periods  Outcome: Progressing

## 2024-07-22 PROBLEM — I21.3 ST ELEVATION MYOCARDIAL INFARCTION (STEMI): Status: RESOLVED | Noted: 2024-04-18 | Resolved: 2024-07-22

## 2024-07-23 ENCOUNTER — TELEPHONE (OUTPATIENT)
Dept: INFUSION THERAPY | Facility: HOSPITAL | Age: 84
End: 2024-07-23
Payer: MEDICARE

## 2024-07-23 NOTE — TELEPHONE ENCOUNTER
----- Message from Becky Negron, Patient Care Assistant sent at 7/23/2024 10:27 AM CDT -----  Type: Needs Medical Advice  Who Called:  Katelyn Ramsay Call Back Number: [phone]    Additional Information: Katelyn states she didn't know she had a Injectafer  appointment yesterday  , please call to further discuss thank you .

## 2024-07-24 ENCOUNTER — TELEPHONE (OUTPATIENT)
Dept: INFUSION THERAPY | Facility: HOSPITAL | Age: 84
End: 2024-07-24
Payer: MEDICARE

## 2024-07-24 NOTE — TELEPHONE ENCOUNTER
Left voicemail for Mrs Caba that the appointment that was scheduled for 7/25/24 was incorrect. According to provider, she was to only receive 1 dose of Injectafer.    Will cancel her appointment for 7/25/24.

## 2024-09-06 ENCOUNTER — LAB VISIT (OUTPATIENT)
Dept: LAB | Facility: HOSPITAL | Age: 84
End: 2024-09-06
Attending: NURSE PRACTITIONER
Payer: MEDICARE

## 2024-09-06 DIAGNOSIS — D50.9 IRON DEFICIENCY ANEMIA, UNSPECIFIED IRON DEFICIENCY ANEMIA TYPE: ICD-10-CM

## 2024-09-06 LAB
BASOPHILS # BLD AUTO: 0.07 K/UL (ref 0–0.2)
BASOPHILS NFR BLD: 1.3 % (ref 0–1.9)
DIFFERENTIAL METHOD BLD: ABNORMAL
EOSINOPHIL # BLD AUTO: 0.2 K/UL (ref 0–0.5)
EOSINOPHIL NFR BLD: 3.9 % (ref 0–8)
ERYTHROCYTE [DISTWIDTH] IN BLOOD BY AUTOMATED COUNT: 13.6 % (ref 11.5–14.5)
FERRITIN SERPL-MCNC: 56 NG/ML (ref 20–300)
HCT VFR BLD AUTO: 30.4 % (ref 37–48.5)
HGB BLD-MCNC: 9.9 G/DL (ref 12–16)
IMM GRANULOCYTES # BLD AUTO: 0.01 K/UL (ref 0–0.04)
IMM GRANULOCYTES NFR BLD AUTO: 0.2 % (ref 0–0.5)
IRON SERPL-MCNC: 26 UG/DL (ref 30–160)
LYMPHOCYTES # BLD AUTO: 1.7 K/UL (ref 1–4.8)
LYMPHOCYTES NFR BLD: 30.7 % (ref 18–48)
MCH RBC QN AUTO: 30.2 PG (ref 27–31)
MCHC RBC AUTO-ENTMCNC: 32.6 G/DL (ref 32–36)
MCV RBC AUTO: 93 FL (ref 82–98)
MONOCYTES # BLD AUTO: 0.6 K/UL (ref 0.3–1)
MONOCYTES NFR BLD: 10.6 % (ref 4–15)
NEUTROPHILS # BLD AUTO: 3 K/UL (ref 1.8–7.7)
NEUTROPHILS NFR BLD: 53.3 % (ref 38–73)
NRBC BLD-RTO: 0 /100 WBC
PLATELET # BLD AUTO: 374 K/UL (ref 150–450)
PMV BLD AUTO: 9.9 FL (ref 9.2–12.9)
RBC # BLD AUTO: 3.28 M/UL (ref 4–5.4)
SATURATED IRON: 8 % (ref 20–50)
TOTAL IRON BINDING CAPACITY: 306 UG/DL (ref 250–450)
TRANSFERRIN SERPL-MCNC: 207 MG/DL (ref 200–375)
WBC # BLD AUTO: 5.57 K/UL (ref 3.9–12.7)

## 2024-09-06 PROCEDURE — 83540 ASSAY OF IRON: CPT | Performed by: NURSE PRACTITIONER

## 2024-09-06 PROCEDURE — 36415 COLL VENOUS BLD VENIPUNCTURE: CPT | Mod: PN | Performed by: NURSE PRACTITIONER

## 2024-09-06 PROCEDURE — 82728 ASSAY OF FERRITIN: CPT | Performed by: NURSE PRACTITIONER

## 2024-09-06 PROCEDURE — 85025 COMPLETE CBC W/AUTO DIFF WBC: CPT | Mod: PN | Performed by: NURSE PRACTITIONER

## 2024-09-12 ENCOUNTER — TELEPHONE (OUTPATIENT)
Dept: HEMATOLOGY/ONCOLOGY | Facility: CLINIC | Age: 84
End: 2024-09-12
Payer: MEDICARE

## 2024-09-12 NOTE — TELEPHONE ENCOUNTER
Returned pt's phone call.  Pt decided to keep appt as is, and declined being placed on wait list.

## 2024-09-23 ENCOUNTER — OFFICE VISIT (OUTPATIENT)
Dept: HEMATOLOGY/ONCOLOGY | Facility: CLINIC | Age: 84
End: 2024-09-23
Payer: MEDICARE

## 2024-09-23 VITALS
RESPIRATION RATE: 16 BRPM | TEMPERATURE: 98 F | WEIGHT: 118.63 LBS | BODY MASS INDEX: 19.76 KG/M2 | HEIGHT: 65 IN | OXYGEN SATURATION: 98 % | HEART RATE: 72 BPM | SYSTOLIC BLOOD PRESSURE: 141 MMHG | DIASTOLIC BLOOD PRESSURE: 64 MMHG

## 2024-09-23 DIAGNOSIS — D50.9 IRON DEFICIENCY ANEMIA, UNSPECIFIED IRON DEFICIENCY ANEMIA TYPE: Primary | ICD-10-CM

## 2024-09-23 DIAGNOSIS — D64.9 SYMPTOMATIC ANEMIA: ICD-10-CM

## 2024-09-23 PROCEDURE — 99213 OFFICE O/P EST LOW 20 MIN: CPT | Mod: S$PBB,,, | Performed by: NURSE PRACTITIONER

## 2024-09-23 PROCEDURE — 99999 PR PBB SHADOW E&M-EST. PATIENT-LVL IV: CPT | Mod: PBBFAC,,, | Performed by: NURSE PRACTITIONER

## 2024-09-23 PROCEDURE — 99214 OFFICE O/P EST MOD 30 MIN: CPT | Mod: PBBFAC,PN | Performed by: NURSE PRACTITIONER

## 2024-09-23 RX ORDER — SODIUM CHLORIDE 0.9 % (FLUSH) 0.9 %
10 SYRINGE (ML) INJECTION
OUTPATIENT
Start: 2024-09-23

## 2024-09-23 RX ORDER — EPINEPHRINE 0.3 MG/.3ML
0.3 INJECTION SUBCUTANEOUS ONCE AS NEEDED
OUTPATIENT
Start: 2024-09-23

## 2024-09-23 RX ORDER — HEPARIN 100 UNIT/ML
5 SYRINGE INTRAVENOUS
OUTPATIENT
Start: 2024-09-23

## 2024-09-23 RX ORDER — DIPHENHYDRAMINE HYDROCHLORIDE 50 MG/ML
50 INJECTION INTRAMUSCULAR; INTRAVENOUS ONCE AS NEEDED
OUTPATIENT
Start: 2024-09-23

## 2024-09-23 RX ORDER — LEVOCETIRIZINE DIHYDROCHLORIDE 5 MG/1
5 TABLET, FILM COATED ORAL NIGHTLY
Qty: 90 TABLET | Refills: 2 | Status: SHIPPED | OUTPATIENT
Start: 2024-09-23

## 2024-09-23 RX ORDER — SODIUM CHLORIDE 9 MG/ML
INJECTION, SOLUTION INTRAVENOUS CONTINUOUS
OUTPATIENT
Start: 2024-09-23

## 2024-09-23 NOTE — TELEPHONE ENCOUNTER
No care due was identified.  [facility] Embedded Care Due Messages. Reference number: [id number].   9/23/2024 3:44:11 AM CDT

## 2024-09-23 NOTE — PROGRESS NOTES
[facility] - Established patient    Reason for visit: follow up on ISAIAH    Best Contact Phone Number(s): [phone] (home)      Anemia  2023: Iron Def Anemia, received IV iron 9/2022 - 1/2023 05/23/2024:  Injectafer 750 mg IV    HPI: Katelyn Caba is a 83 y.o. female with history of BCC, HTN, HLD, Aortic atherosclerosis, CVA, GI bleed presents to the clinic today with her  for evaluation of ISAIAH.  Since her last visit, she has been in relatively stable health.  She completed cardiac rehab in August. She remains on Brillinta. She reports some fatigue, but denies any surgeries, bleeding, melena, cold extremities, HA's, fatigue, CP, palpitations, etc. Recent fall at home with bruising to her posterior left knee.  Walks without pain.    History:  Patient hospitalized 3/26 - 3/27/24 with Hgb 6.6 sp 1u pRBC. GI evaluation plan for outpatient colonoscopy but patient does not wish to proceed with colonoscopy. Patient recalls fall around [place] with pelvic fracture. Patient was taking iron supplement daily and increasing iron rich foods. Weight is stable. 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 In ED, troponin elevated x3 and an inferolateral STEMI was confirmed on EKG. C with evidence of moderate nonobstructive CAD for which no intervention was required. Patient placed on GDMT with atorvastatin 40 mg qd and metoprolol tartrate 12.5 mg TID. Antiplatelet therapy with ticagrelor recommended, however, patient apprehensive of medication secondary to history of GI bleed. Patient agreed to medication regimen. Patient without anginal equivalents and R radial access site well healed upon discharge.     Procedure(s):  Left heart cath      Started on Brillinta    Patient presents with  , Eder.  ECOG PS is 1.  History has been obtained by chart review and discussion with the patient.    ROS:   A complete 12-point review of systems was reviewed and is negative except as mentioned above.     Past Medical History:   Past Medical History:   Diagnosis Date    Cancer     basal cell carcinoma        Past Surgical History:   Past Surgical History:   Procedure Laterality Date    CARPAL TUNNEL RELEASE      ESOPHAGOGASTRODUODENOSCOPY N/A 8/22/2022    Procedure: EGD (ESOPHAGOGASTRODUODENOSCOPY);  Surgeon: Steven Lopez MD;  Location: [facility] ([address] [place] [address]);  Service: Endoscopy;  Laterality: N/A;    ESOPHAGOGASTRODUODENOSCOPY N/A 12/5/2022    Procedure: EGD (ESOPHAGOGASTRODUODENOSCOPY);  Surgeon: Steven Lopez MD;  Location: [facility] ([address] [place] [address]);  Service: Endoscopy;  Laterality: N/A;  Has estimated pulmonary artery pressure 45, schedule location per protocol.   Please schedule with Dr. Darryl Lopez-  Plavix stopped 8/23/22  [facility]-  pre call complete; [facility] 11/28/22    ESOPHAGOGASTRODUODENOSCOPY N/A 7/11/2023    Procedure: EGD (ESOPHAGOGASTRODUODENOSCOPY);  Surgeon: Natalie Fall MD;  Location: [facility];  Service: Endoscopy;  Laterality: N/A;    EYE SURGERY      left eye cataract    LEFT HEART CATHETERIZATION  4/17/2024    Procedure: Left heart cath;  Surgeon: Elisabeth De La Cruz MD;  Location: [facility] CATH;  Service: Cardiology;;    TONSILLECTOMY      TUBAL LIGATION          Family History:   No family history on file.     Social History:   Social History     Tobacco Use    Smoking status: Never     Passive exposure: Never    Smokeless tobacco: Never   Substance Use Topics    Alcohol use: No      Patient splits time between [address] and apartment in [place]    I have reviewed and updated the patient's past medical, surgical, family and social histories.    Allergies:   Review of patient's allergies indicates:  No Known Allergies  "    Medications:   Current Outpatient Medications   Medication Sig Dispense Refill    atorvastatin (LIPITOR) 40 MG tablet Take 1 tablet (40 mg total) by mouth once daily. 90 tablet 3    cyanocobalamin (VITAMIN B-12) 1000 MCG tablet Take 1,000 mcg by mouth once daily.      levocetirizine (XYZAL) 5 MG tablet Take 5 mg by mouth.      metoprolol tartrate (LOPRESSOR) 25 MG tablet Take 0.5 tablets (12.5 mg total) by mouth 3 (three) times daily. 135 tablet 3    mv,Ca,min-folic acid-vit K1 (ONE-A-DAY WOMEN'S 50 PLUS) 400-20 mcg Tab Take 1 tablet by mouth once daily.      pantoprazole (PROTONIX) 40 MG tablet Take 1 tablet (40 mg total) by mouth once daily. 60 tablet 5    ticagrelor (BRILINTA) 90 mg tablet Take 1 tablet (90 mg total) by mouth 2 (two) times daily. 60 tablet 11    vitamin D (VITAMIN D3) 1000 units Tab Take 1,000 Units by mouth once daily.      alendronate (FOSAMAX) 70 MG tablet Take 1 tablet (70 mg total) by mouth every 7 days. 4 tablet 11     No current facility-administered medications for this visit.        Physical Exam:   BP (!) 141/64 (BP Location: Left arm, Patient Position: Sitting, BP Method: Medium (Automatic))   Pulse 72   Temp 97.5 °F (36.4 °C) (Temporal)   Resp 16   Ht 5' 5" (1.651 m)   Wt 53.8 kg (118 lb 9.7 oz)   SpO2 98%   BMI 19.74 kg/m²                Physical Exam  Vitals reviewed.   Constitutional:       General: She is not in acute distress.  HENT:      Head: Normocephalic and atraumatic.      Mouth/Throat:      Mouth: Mucous membranes are moist.   Eyes:      Extraocular Movements: Extraocular movements intact.      Pupils: Pupils are equal, round, and reactive to light.   Cardiovascular:      Rate and Rhythm: Normal rate and regular rhythm.      Pulses: Normal pulses.      Heart sounds: No murmur heard.  Pulmonary:      Effort: Pulmonary effort is normal. No respiratory distress.      Breath sounds: Normal breath sounds.   Abdominal:      General: Abdomen is flat. There is no " distension.      Palpations: Abdomen is soft.      Tenderness: There is no abdominal tenderness.   Musculoskeletal:         General: No swelling or tenderness. Normal range of motion.      Cervical back: Neck supple. No rigidity.   Lymphadenopathy:      Cervical: No cervical adenopathy.   Skin:     General: Skin is warm and dry.      Coloration: Skin is pale. Skin is not jaundiced.   Neurological:      General: No focal deficit present.      Mental Status: She is alert and oriented to person, place, and time.   Psychiatric:         Mood and Affect: Mood normal.         Behavior: Behavior normal.         Thought Content: Thought content normal.           Labs:   Lab Results   Component Value Date    WBC 5.57 09/06/2024    HGB 9.9 (L) 09/06/2024    HCT 30.4 (L) 09/06/2024    MCV 93 09/06/2024     09/06/2024      Lab Results   Component Value Date    IRON 26 (L) 09/06/2024    TRANSFERRIN 207 09/06/2024    TIBC 306 09/06/2024    FESATURATED 8 (L) 09/06/2024      Lab Results   Component Value Date    FERRITIN 56 09/06/2024         Lab Results   Component Value Date     07/05/2024    K 4.1 07/05/2024     07/05/2024    CO2 24 07/05/2024    BUN 12 07/05/2024    CREATININE 0.7 07/05/2024    ALBUMIN 3.6 07/05/2024    BILITOT 0.3 07/05/2024    ALKPHOS 133 07/05/2024    AST 14 07/05/2024    ALT 12 07/05/2024       Imaging:   Cardiac catheterization  Procedures:  Moderate sedation  Left heart cath  Coronary angiogram    Conclusions:  No obvious culprit for MI. the patient's chest pain and ST elevations were   almost entirely resolved by the end of the diagnostic angiogram.  Left   ventriculogram with EF of 60-70% and normal anterior and inferior wall   motion.  Possible diagnoses include plaque erosion with resolved thrombus   or distal occlusion in the LPL.  PCI deferred  Elevated left filling pressure    Recommendations:  Medical management including single versus dual antiplatelet therapy (the   patient has  moderate chronic anemia and is dependent on iron transfusions)  High-intensity statin  Beta Blocker   TTE  Monitor for at least 48 hours in hospital    Description of procedure:  The patient presented to the Cath Lab in a(n) emergent fashion.  The   patient was prepped and draped in a sterile manner.  Timeout, airway and   ASA assessment were completed.  Local anesthesia with 2% lidocaine was   administered and sedation/analgesia were administered as above.     Access was obtained using the modified Seldinger technique and a   micropuncture needle with ultrasound guidance in the right radial artery.   Diagnostic angiography was performed using JL3.5, JR4.0, and Pigtail   catheter(s).        Coronary anatomy:  Dominance: right  Left main:  Angiographically normal  Left anterior descending:  Large vessel that supplies the apex.  It has   mild diffuse disease overall.  The mid segment after the large 1st   diagonal has some moderate disease.    Left circumflex:  Large vessel with mild diffuse disease and multiple   small obtuse marginal branches.  There is a left posterolateral   moderate-sized branch with DOM 3 flow.  There is a hint of an abrupt cut   off possibly in the mid LPL    Right coronary artery:  Tortuous large dominant vessel with mild diffuse   disease and large RPDA and RPL.  The proximal RCA has a 50% stenosis and   DOM 3 flow.         Hemodynamics:  /31       Hemostasis:  TR band       Complications:  None  Estimated blood loss:  Minimal    The patient tolerated the procedure well and left the cath lab in stable   condition.         Elisabeth De La Cruz MD, [facility]  Interventional Cardiology/Structural Heart Disease  [facility]  Office: [phone]     The clinically important portion of this report has been dictated in the   section above. Our Epic reporting system does not allow us to provide a   clinically meaningful report without this dictation. Please  beware that   there may be errors and discrepancies in the computer transcription and   all of the clicks required to finalize the report.  The procedure log was documented by No documenter listed and verified by   Elisabeth De La Cruz MD.    Date: 4/21/2024  Time: 5:52 AM            Assessment:       1. Iron deficiency anemia, unspecified iron deficiency anemia type    2. Symptomatic anemia            Plan:             # Iron Def Anemia - Never had an abnormal colonoscopy, no history of GIB with extensive previous ISAIAH work-up. Most likely cause following recent injury to pelvis, causing consumption with bruising + decreased nutrition with bedrest. Hgb is now improved after 1u pRBC and symptomatically patient feels better. Recommend to proceed with additional dose of IV iron to correct deficit, ~750 mg.   - injectafer x1  - return to clinic in 3-4m with repeat iron serology   07/09/24:  Arrange for additional Injectafer 750 mg on Monday, 07/15 @ 2:30 pm; f/u in 2 months with CBC, iron studies/ferritin prior.  09/23/24:  Symptomatic; Injectafer 750 mg weekly x 2; f/u in 3 months with CBC, BMP, iron studies/ferritin prior.    #STEMI/heart cath - 04/17/24, Cath per Dr. De La Cruz; presently on Brillinta    # TIA - Patient took anticoagulation and noted worsening of symptoms and has since discontinued    # GIB - Upcoming visit with GI to rule out bleeding, patient is not currently on anticoagulation  07/09/24:  On Brillinta s/p cath for STEMI    # Pelvic Fracture - Improved with physical therapy, no surgical intervention     The above information has been reviewed with the patient and all questions have been answered to their apparent satisfaction.  They understand that they can call the clinic with any questions.     GABBY Ruth, FNP-C  [facility]  25 minutes were spent in coordination of patient's care, record review and counseling.      Med Onc Chart Routing      Follow up with  physician    Follow up with LUZ 3 months. f/u in 3 months with CBC, BMP, Iron studies/ferritin prior   Infusion scheduling note New or changed treatment   Injectafer 750 mg IV weekly x 2   Injection scheduling note    Labs    Imaging    Pharmacy appointment    Other referrals                    Answers submitted by the patient for this visit:  Review of Systems Questionnaire (Submitted on 9/23/2024)  appetite change : No  unexpected weight change: No  mouth sores: No  visual disturbance: No  cough: No  shortness of breath: No  chest pain: No  abdominal pain: No  diarrhea: No  frequency: No  back pain: No  rash: No  headaches: No  adenopathy: No  nervous/ anxious: No

## 2024-09-25 DIAGNOSIS — Z00.00 ENCOUNTER FOR MEDICARE ANNUAL WELLNESS EXAM: ICD-10-CM

## 2024-09-26 ENCOUNTER — OFFICE VISIT (OUTPATIENT)
Dept: CARDIOLOGY | Facility: CLINIC | Age: 84
End: 2024-09-26
Payer: MEDICARE

## 2024-09-26 VITALS
DIASTOLIC BLOOD PRESSURE: 63 MMHG | WEIGHT: 117.5 LBS | HEART RATE: 64 BPM | BODY MASS INDEX: 19.58 KG/M2 | SYSTOLIC BLOOD PRESSURE: 155 MMHG | HEIGHT: 65 IN

## 2024-09-26 DIAGNOSIS — Z86.73 HISTORY OF STROKE: ICD-10-CM

## 2024-09-26 DIAGNOSIS — I10 PRIMARY HYPERTENSION: ICD-10-CM

## 2024-09-26 DIAGNOSIS — I25.2 HISTORY OF MI (MYOCARDIAL INFARCTION): Primary | ICD-10-CM

## 2024-09-26 LAB
OHS QRS DURATION: 76 MS
OHS QTC CALCULATION: 435 MS

## 2024-09-26 PROCEDURE — 93010 ELECTROCARDIOGRAM REPORT: CPT | Mod: ,,, | Performed by: INTERNAL MEDICINE

## 2024-09-26 PROCEDURE — 99999 PR PBB SHADOW E&M-EST. PATIENT-LVL III: CPT | Mod: PBBFAC,,, | Performed by: INTERNAL MEDICINE

## 2024-09-26 PROCEDURE — 93005 ELECTROCARDIOGRAM TRACING: CPT | Mod: PO

## 2024-09-26 PROCEDURE — 99214 OFFICE O/P EST MOD 30 MIN: CPT | Mod: S$PBB,,, | Performed by: INTERNAL MEDICINE

## 2024-09-26 PROCEDURE — 99213 OFFICE O/P EST LOW 20 MIN: CPT | Mod: PBBFAC,25,PO | Performed by: INTERNAL MEDICINE

## 2024-09-26 RX ORDER — CARVEDILOL 6.25 MG/1
6.25 TABLET ORAL 2 TIMES DAILY WITH MEALS
Qty: 180 TABLET | Refills: 3 | Status: SHIPPED | OUTPATIENT
Start: 2024-09-26 | End: 2025-09-26

## 2024-09-26 NOTE — PATIENT INSTRUCTIONS
Switch metoprolol to carvedilol 6.25 mg (1 tab) twice daily  Switch the Brilinta to 60 mg tablet twice daily  Everything else stays the same  Blood pressure at home should be less than 140   Return in 4-6 months

## 2024-09-26 NOTE — PROGRESS NOTES
[facility]   Cardiology Clinic Note  Date: 9/26/24    Patient: Katelyn Caba, 1940, [id number]  Primary Care Provider: Brooklyn Burnham MD     Chief Complaint/Reason for Referral: MI    Subjective   History of Present Illness    Ms. Caba presents today for follow up after a heart attack in April.    She denies chest pain since the April heart attack. She reports palpitations. She denies leg swelling or nocturnal dyspnea. She reports no trouble breathing during daily activities, including climbing stairs. Home blood pressure readings are consistently elevated, typically in the range of 150-160.    Current medications include:  - Brilinta 90mg twice daily  - Atorvastatin 40mg daily  - Metoprolol (half pill) 3 times daily  - Protonix for stomach  - Medication for osteoporosis  She denies any issues with medication affordability and confirms not taking any other blood pressure medications besides metoprolol.    She recently received a blood transfusion and is currently scheduled for iron infusions. Iron pills have been discontinued. She reports a history of anemia predating her April heart attack.    She reports tripping on carpet while carrying milk, denying a fall or head injury. She reports knee pain following the incident, which has interrupted her cardiac rehabilitation program.    History of stroke around 2022 with no apparent deficits. MRI revealed multiple infarcts in the left MCA territory. She also has a history of osteoporosis.    History of wrist injury, which has healed well without complications.    She denies blood in stools, melena (except when on iron medicine), hematuria, hemoptysis, hematemesis, or epistaxis. She reports pain in her legs when walking.    She has stairs at home but does not use them frequently.      ROS:  10-system ROS is negative unless otherwise indicated in the HPI.           Active problem list includes:  Transient ST elevation MI 04/17/2024.  Angiogram with  patent epicardial coronaries and DOM 3 flow throughout.  Possible plaque erosion or distal embolism in the LPL.  PCI deferred.  Normal EF and wall motion.  Troponin I peaked at 58.1  TTE with EF 55-60%, normal RV, mild left atrial dilation, mild MR.  PASP 41  Stroke circa 2022, no deficit  Iron-deficiency anemia         Current Outpatient Medications   Medication Sig    alendronate (FOSAMAX) 70 MG tablet Take 1 tablet (70 mg total) by mouth every 7 days.    atorvastatin (LIPITOR) 40 MG tablet Take 1 tablet (40 mg total) by mouth once daily.    cyanocobalamin (VITAMIN B-12) 1000 MCG tablet Take 1,000 mcg by mouth once daily.    levocetirizine (XYZAL) 5 MG tablet TAKE 1 TABLET(5 MG) BY MOUTH EVERY EVENING    mv,Ca,min-folic acid-vit K1 (ONE-A-DAY WOMEN'S 50 PLUS) 400-20 mcg Tab Take 1 tablet by mouth once daily.    pantoprazole (PROTONIX) 40 MG tablet Take 1 tablet (40 mg total) by mouth once daily.    vitamin D (VITAMIN D3) 1000 units Tab Take 1,000 Units by mouth once daily.    carvediloL (COREG) 6.25 MG tablet Take 1 tablet (6.25 mg total) by mouth 2 (two) times daily with meals.    ticagrelor (BRILINTA) 60 mg tablet Take 1 tablet (60 mg total) by mouth 2 (two) times daily.     No current facility-administered medications for this visit.                   -------------------------------------    Cancer    basal cell carcinoma     ----------------------------    Carpal tunnel release    Esophagogastroduodenoscopy    Procedure: EGD (ESOPHAGOGASTRODUODENOSCOPY);  Surgeon: Steven Lopez MD;  Location: [facility] ([address] [place] [address]);  Service: Endoscopy;  Laterality: N/A;    Esophagogastroduodenoscopy    Procedure: EGD (ESOPHAGOGASTRODUODENOSCOPY);  Surgeon: Steven Lopez MD;  Location: [facility] ([address] [place] [address]);  Service: Endoscopy;  Laterality: N/A;  Has estimated pulmonary artery pressure 45, schedule location per protocol.   Please schedule with Dr. Darryl Lopez-  Plavix stopped 8/23/22  pt requested to schedule  "after Thanksgiving/ inst portal-RB  pre call complete; [facility] 11/28/22    Esophagogastroduodenoscopy    Procedure: EGD (ESOPHAGOGASTRODUODENOSCOPY);  Surgeon: Natalie Fall MD;  Location: [facility];  Service: Endoscopy;  Laterality: N/A;    Eye surgery    left eye cataract    Left heart catheterization    Procedure: Left heart cath;  Surgeon: Elisabeth De La Cruz MD;  Location: [facility] CATH;  Service: Cardiology;;    Tonsillectomy    Tubal ligation        No family history on file.  Social History     Tobacco Use    Smoking status: Never     Passive exposure: Never    Smokeless tobacco: Never   Substance Use Topics    Alcohol use: No    Drug use: No         Physical Exam  BP (!) 155/63   Pulse 64   Ht 5' 5" (1.651 m)   Wt 53.3 kg (117 lb 8.1 oz)   BMI 19.55 kg/m²   Body surface area is 1.56 meters squared.  Body mass index is 19.55 kg/m².    General appearance: alert, appears stated age, cooperative, and no distress  Head: Normocephalic, without obvious abnormality, atraumatic  Neck: no carotid bruit, no JVD, and supple, symmetrical, trachea midline  Lungs: clear to auscultation bilaterally  Heart: regular rate and rhythm; S1, S2 normal, no murmur, click, rub or gallop  Abdomen: soft, non-tender, no distended  Extremities: extremities atraumatic, no pitting edema  Skin: warm, no cyanosis, no pathologic ecchymosis in exposed portions  Neurologic: Grossly normal. A&O x3      Labs Reviewed     Lab Results   Component Value Date    WBC 5.57 09/06/2024    HGB 9.9 (L) 09/06/2024     09/06/2024    INR 1.0 04/17/2024    INR 0.9 03/29/2023    APTT 27.4 04/17/2024       Lab Results   Component Value Date     07/05/2024    K 4.1 07/05/2024     07/05/2024    CO2 24 07/05/2024    CALCIUM 8.9 07/05/2024    MG 2.1 04/17/2024    GLU 92 07/05/2024    PROT 6.3 07/05/2024       Lab Results   Component Value Date    BUN 12 07/05/2024    BUN 16 04/19/2024    BUN 15 04/18/2024    CREATININE 0.7 07/05/2024    CREATININE 0.69 " 04/19/2024    CREATININE 0.64 04/18/2024    EGFRNORACEVR >60.0 07/05/2024    EGFRNORACEVR >60 04/19/2024    EGFRNORACEVR >60 04/18/2024       Lab Results   Component Value Date    ALBUMIN 3.6 07/05/2024    BILITOT 0.3 07/05/2024    ALKPHOS 133 07/05/2024    ALT 12 07/05/2024       Lab Results   Component Value Date    TRIG 79 05/17/2024    CHOL 136 05/17/2024    HDL 42 05/17/2024    LDLCALC 78.2 05/17/2024    LDLCALC 130.2 02/09/2024       Lab Results   Component Value Date    HGBA1C 5.3 03/27/2024    HGBA1C 5.0 07/21/2022    TSH 0.988 05/18/2023       Lab Results   Component Value Date    NTPROBNP 1180 04/17/2024    NTPROBNP 586 07/20/2022     (H) 08/21/2022         EKG today:  NSR, anterior infarct pattern            ASSESSMENT & PLAN:  1. History of MI (myocardial infarction)  IN OFFICE EKG 12-LEAD (to Muse)      2. Primary hypertension        3. History of stroke             Assessment & Plan    Diagnosed patient with MINOCA (myocardial infarction with nonobstructed coronary arteries) based on April presentation  Reviewed angiogram again, confirming wide open arteries and normal heart function  Determined patient's risk for future heart attack remains elevated despite lack of blockage  Will adjust medications to reduce pill burden and minimize bleeding risk, given patient's age and petite stature  Noted patient's history of stroke in 2022 with multiple infarcts in left MCA territory, reinforcing need for aggressive treatment     Explained MINOCA as a heart attack without blockage, emphasizing treatment approach similar to typical heart attack but without stents or surgery   Discussed that women, especially older women, may have unusual heart attack symptoms     Decreased Brilinta from 90 mg twice daily to 60 mg twice daily   Started carvedilol 6.25 mg, 1 tablet twice daily with food   Discontinued metoprolol   Continued atorvastatin 40 mg daily   Continued Protonix     EKG ordered to be performed  today     Ms. Caba to monitor blood pressure at home, aiming for top number to be less than 140   Ms. Caba to report to doctor if blood pressure remains 140s-150s after medication change   Ms. Caba to go to the emergency room if similar symptoms occur again     Follow up in 4-6 months if EKG looks okay   Contact the office if blood pressure remains elevated after medication changes         Emphasized the importance of modifying lifestyle related risk factors including tobacco avoidance, limiting alcohol intake, aerobic exercise, weight management and Mediterranean diet.    I appreciate the opportunity to participate in Katelyn Caba 's care today.        Elisabeth De La Cruz MD, [facility]  Interventional Cardiology/Structural Heart Disease  [facility]  Office: [phone]            Parts of this note were completed using voice recognition software. Please excuse any misspellings or syntax errors and reach out to me with questions.

## 2024-10-14 ENCOUNTER — INFUSION (OUTPATIENT)
Dept: INFUSION THERAPY | Facility: HOSPITAL | Age: 84
End: 2024-10-14
Attending: NURSE PRACTITIONER
Payer: MEDICARE

## 2024-10-14 VITALS
BODY MASS INDEX: 19.83 KG/M2 | WEIGHT: 119.06 LBS | HEART RATE: 68 BPM | DIASTOLIC BLOOD PRESSURE: 61 MMHG | RESPIRATION RATE: 16 BRPM | TEMPERATURE: 98 F | SYSTOLIC BLOOD PRESSURE: 143 MMHG | HEIGHT: 65 IN | OXYGEN SATURATION: 97 %

## 2024-10-14 DIAGNOSIS — D53.9 NUTRITIONAL ANEMIA, UNSPECIFIED: Primary | ICD-10-CM

## 2024-10-14 PROCEDURE — 63600175 PHARM REV CODE 636 W HCPCS: Mod: JZ,JG,PN | Performed by: NURSE PRACTITIONER

## 2024-10-14 PROCEDURE — A4216 STERILE WATER/SALINE, 10 ML: HCPCS | Mod: PN | Performed by: NURSE PRACTITIONER

## 2024-10-14 PROCEDURE — 25000003 PHARM REV CODE 250: Mod: PN | Performed by: NURSE PRACTITIONER

## 2024-10-14 PROCEDURE — 96365 THER/PROPH/DIAG IV INF INIT: CPT | Mod: PN

## 2024-10-14 RX ORDER — DIPHENHYDRAMINE HYDROCHLORIDE 50 MG/ML
50 INJECTION INTRAMUSCULAR; INTRAVENOUS ONCE AS NEEDED
OUTPATIENT
Start: 2024-10-21

## 2024-10-14 RX ORDER — HEPARIN 100 UNIT/ML
5 SYRINGE INTRAVENOUS
OUTPATIENT
Start: 2024-10-21

## 2024-10-14 RX ORDER — DIPHENHYDRAMINE HYDROCHLORIDE 50 MG/ML
50 INJECTION INTRAMUSCULAR; INTRAVENOUS ONCE AS NEEDED
Status: DISCONTINUED | OUTPATIENT
Start: 2024-10-14 | End: 2024-10-14 | Stop reason: HOSPADM

## 2024-10-14 RX ORDER — EPINEPHRINE 0.3 MG/.3ML
0.3 INJECTION SUBCUTANEOUS ONCE AS NEEDED
Status: DISCONTINUED | OUTPATIENT
Start: 2024-10-14 | End: 2024-10-14 | Stop reason: HOSPADM

## 2024-10-14 RX ORDER — SODIUM CHLORIDE 9 MG/ML
INJECTION, SOLUTION INTRAVENOUS CONTINUOUS
Status: DISCONTINUED | OUTPATIENT
Start: 2024-10-14 | End: 2024-10-14 | Stop reason: HOSPADM

## 2024-10-14 RX ORDER — EPINEPHRINE 0.3 MG/.3ML
0.3 INJECTION SUBCUTANEOUS ONCE AS NEEDED
OUTPATIENT
Start: 2024-10-21

## 2024-10-14 RX ORDER — SODIUM CHLORIDE 9 MG/ML
INJECTION, SOLUTION INTRAVENOUS CONTINUOUS
OUTPATIENT
Start: 2024-10-21

## 2024-10-14 RX ORDER — SODIUM CHLORIDE 0.9 % (FLUSH) 0.9 %
10 SYRINGE (ML) INJECTION
Status: DISCONTINUED | OUTPATIENT
Start: 2024-10-14 | End: 2024-10-14 | Stop reason: HOSPADM

## 2024-10-14 RX ORDER — SODIUM CHLORIDE 0.9 % (FLUSH) 0.9 %
10 SYRINGE (ML) INJECTION
OUTPATIENT
Start: 2024-10-21

## 2024-10-14 RX ADMIN — Medication 10 ML: at 01:10

## 2024-10-14 RX ADMIN — FERRIC CARBOXYMALTOSE INJECTION 750 MG: 50 INJECTION, SOLUTION INTRAVENOUS at 01:10

## 2024-10-14 RX ADMIN — SODIUM CHLORIDE: 9 INJECTION, SOLUTION INTRAVENOUS at 01:10

## 2024-10-14 NOTE — PLAN OF CARE
Pt arrived to clinic for Injectafer #1/2 infusion and tolerated well with no changes throughout therapy, with being monitored 30 min post infusion. Pt aware of side effects and f/u appts and discharged to home in NAD with .

## 2024-10-14 NOTE — PLAN OF CARE
Problem: Adult Inpatient Plan of Care  Goal: Plan of Care Review  Outcome: Progressing  Flowsheets (Taken 10/14/2024 1421)  Plan of Care Reviewed With:   patient   spouse  Goal: Patient-Specific Goal (Individualized)  Outcome: Progressing  Flowsheets (Taken 10/14/2024 1421)  Individualized Care Needs: education, conversation, recliner, tv,  at chairside  Anxieties, Fears or Concerns: PIV stick  Goal: Optimal Comfort and Wellbeing  Outcome: Progressing  Intervention: Provide Person-Centered Care  Flowsheets (Taken 10/14/2024 1421)  Trust Relationship/Rapport:   care explained   questions answered   choices provided   questions encouraged   emotional support provided   reassurance provided   empathic listening provided   thoughts/feelings acknowledged     Problem: Fatigue  Goal: Improved Activity Tolerance  Outcome: Progressing  Intervention: Promote Improved Energy  Flowsheets (Taken 10/14/2024 1421)  Fatigue Management:   paced activity encouraged   fatigue-related activity identified   frequent rest breaks encouraged  Sleep/Rest Enhancement:   therapeutic touch utilized   relaxation techniques promoted   noise level reduced   family presence promoted   consistent schedule promoted   natural light exposure provided  Activity Management:   Ambulated -L4   Up in chair - L3  Environmental Support:   rest periods encouraged   distractions minimized   calm environment promoted   personal routine supported

## 2024-10-21 ENCOUNTER — INFUSION (OUTPATIENT)
Dept: INFUSION THERAPY | Facility: HOSPITAL | Age: 84
End: 2024-10-21
Attending: NURSE PRACTITIONER
Payer: MEDICARE

## 2024-10-21 VITALS
SYSTOLIC BLOOD PRESSURE: 142 MMHG | TEMPERATURE: 98 F | WEIGHT: 118.81 LBS | BODY MASS INDEX: 19.77 KG/M2 | DIASTOLIC BLOOD PRESSURE: 67 MMHG | HEART RATE: 72 BPM | RESPIRATION RATE: 16 BRPM

## 2024-10-21 DIAGNOSIS — D53.9 NUTRITIONAL ANEMIA, UNSPECIFIED: Primary | ICD-10-CM

## 2024-10-21 PROCEDURE — 25000003 PHARM REV CODE 250: Mod: PN | Performed by: NURSE PRACTITIONER

## 2024-10-21 PROCEDURE — 96365 THER/PROPH/DIAG IV INF INIT: CPT | Mod: PN

## 2024-10-21 PROCEDURE — 63600175 PHARM REV CODE 636 W HCPCS: Mod: JZ,JG,PN | Performed by: NURSE PRACTITIONER

## 2024-10-21 RX ORDER — DIPHENHYDRAMINE HYDROCHLORIDE 50 MG/ML
50 INJECTION INTRAMUSCULAR; INTRAVENOUS ONCE AS NEEDED
OUTPATIENT
Start: 2024-10-28

## 2024-10-21 RX ORDER — SODIUM CHLORIDE 9 MG/ML
INJECTION, SOLUTION INTRAVENOUS CONTINUOUS
Status: DISCONTINUED | OUTPATIENT
Start: 2024-10-21 | End: 2024-10-21 | Stop reason: HOSPADM

## 2024-10-21 RX ORDER — HEPARIN 100 UNIT/ML
5 SYRINGE INTRAVENOUS
OUTPATIENT
Start: 2024-10-28

## 2024-10-21 RX ORDER — EPINEPHRINE 0.3 MG/.3ML
0.3 INJECTION SUBCUTANEOUS ONCE AS NEEDED
OUTPATIENT
Start: 2024-10-28

## 2024-10-21 RX ORDER — SODIUM CHLORIDE 0.9 % (FLUSH) 0.9 %
10 SYRINGE (ML) INJECTION
Status: CANCELLED | OUTPATIENT
Start: 2024-10-28

## 2024-10-21 RX ORDER — SODIUM CHLORIDE 0.9 % (FLUSH) 0.9 %
10 SYRINGE (ML) INJECTION
Status: DISCONTINUED | OUTPATIENT
Start: 2024-10-21 | End: 2024-10-21 | Stop reason: HOSPADM

## 2024-10-21 RX ORDER — SODIUM CHLORIDE 9 MG/ML
INJECTION, SOLUTION INTRAVENOUS CONTINUOUS
Status: CANCELLED | OUTPATIENT
Start: 2024-10-28

## 2024-10-21 RX ORDER — HEPARIN 100 UNIT/ML
5 SYRINGE INTRAVENOUS
Status: DISCONTINUED | OUTPATIENT
Start: 2024-10-21 | End: 2024-10-21 | Stop reason: HOSPADM

## 2024-10-21 RX ADMIN — SODIUM CHLORIDE: 9 INJECTION, SOLUTION INTRAVENOUS at 02:10

## 2024-10-21 RX ADMIN — FERRIC CARBOXYMALTOSE INJECTION 750 MG: 50 INJECTION, SOLUTION INTRAVENOUS at 03:10

## 2024-10-31 ENCOUNTER — OFFICE VISIT (OUTPATIENT)
Dept: FAMILY MEDICINE | Facility: CLINIC | Age: 84
End: 2024-10-31
Attending: FAMILY MEDICINE
Payer: MEDICARE

## 2024-10-31 VITALS
BODY MASS INDEX: 18.3 KG/M2 | SYSTOLIC BLOOD PRESSURE: 136 MMHG | WEIGHT: 110 LBS | DIASTOLIC BLOOD PRESSURE: 67 MMHG | OXYGEN SATURATION: 99 % | HEART RATE: 67 BPM

## 2024-10-31 DIAGNOSIS — I10 ESSENTIAL HYPERTENSION: ICD-10-CM

## 2024-10-31 DIAGNOSIS — K21.00 GASTROESOPHAGEAL REFLUX DISEASE WITH ESOPHAGITIS WITHOUT HEMORRHAGE: ICD-10-CM

## 2024-10-31 DIAGNOSIS — R79.9 ABNORMAL FINDING OF BLOOD CHEMISTRY, UNSPECIFIED: ICD-10-CM

## 2024-10-31 DIAGNOSIS — E78.2 MIXED HYPERLIPIDEMIA: ICD-10-CM

## 2024-10-31 DIAGNOSIS — Z00.00 ANNUAL PHYSICAL EXAM: Primary | ICD-10-CM

## 2024-10-31 PROCEDURE — 99999 PR PBB SHADOW E&M-EST. PATIENT-LVL III: CPT | Mod: PBBFAC,,, | Performed by: FAMILY MEDICINE

## 2024-10-31 PROCEDURE — 99213 OFFICE O/P EST LOW 20 MIN: CPT | Mod: PBBFAC,PO | Performed by: FAMILY MEDICINE

## 2024-11-01 ENCOUNTER — LAB VISIT (OUTPATIENT)
Dept: LAB | Facility: HOSPITAL | Age: 84
End: 2024-11-01
Attending: FAMILY MEDICINE
Payer: MEDICARE

## 2024-11-01 DIAGNOSIS — Z00.00 ANNUAL PHYSICAL EXAM: ICD-10-CM

## 2024-11-01 DIAGNOSIS — K21.00 GASTROESOPHAGEAL REFLUX DISEASE WITH ESOPHAGITIS WITHOUT HEMORRHAGE: ICD-10-CM

## 2024-11-01 DIAGNOSIS — I10 ESSENTIAL HYPERTENSION: ICD-10-CM

## 2024-11-01 DIAGNOSIS — R79.9 ABNORMAL FINDING OF BLOOD CHEMISTRY, UNSPECIFIED: ICD-10-CM

## 2024-11-01 LAB
ALBUMIN SERPL BCP-MCNC: 3.5 G/DL (ref 3.5–5.2)
ALP SERPL-CCNC: 115 U/L (ref 40–150)
ALT SERPL W/O P-5'-P-CCNC: 14 U/L (ref 10–44)
ANION GAP SERPL CALC-SCNC: 8 MMOL/L (ref 8–16)
AST SERPL-CCNC: 21 U/L (ref 10–40)
BASOPHILS # BLD AUTO: 0.09 K/UL (ref 0–0.2)
BASOPHILS NFR BLD: 2 % (ref 0–1.9)
BILIRUB SERPL-MCNC: 0.3 MG/DL (ref 0.1–1)
BUN SERPL-MCNC: 11 MG/DL (ref 8–23)
CALCIUM SERPL-MCNC: 8.8 MG/DL (ref 8.7–10.5)
CHLORIDE SERPL-SCNC: 109 MMOL/L (ref 95–110)
CHOLEST SERPL-MCNC: 171 MG/DL (ref 120–199)
CHOLEST/HDLC SERPL: 4.2 {RATIO} (ref 2–5)
CO2 SERPL-SCNC: 24 MMOL/L (ref 23–29)
CREAT SERPL-MCNC: 0.7 MG/DL (ref 0.5–1.4)
DIFFERENTIAL METHOD BLD: ABNORMAL
EOSINOPHIL # BLD AUTO: 0.3 K/UL (ref 0–0.5)
EOSINOPHIL NFR BLD: 6.7 % (ref 0–8)
ERYTHROCYTE [DISTWIDTH] IN BLOOD BY AUTOMATED COUNT: 19.9 % (ref 11.5–14.5)
EST. GFR  (NO RACE VARIABLE): >60 ML/MIN/1.73 M^2
ESTIMATED AVG GLUCOSE: 74 MG/DL (ref 68–131)
GLUCOSE SERPL-MCNC: 84 MG/DL (ref 70–110)
HBA1C MFR BLD: 4.2 % (ref 4–5.6)
HCT VFR BLD AUTO: 32.7 % (ref 37–48.5)
HDLC SERPL-MCNC: 41 MG/DL (ref 40–75)
HDLC SERPL: 24 % (ref 20–50)
HGB BLD-MCNC: 9.7 G/DL (ref 12–16)
IMM GRANULOCYTES # BLD AUTO: 0 K/UL (ref 0–0.04)
IMM GRANULOCYTES NFR BLD AUTO: 0 % (ref 0–0.5)
LDLC SERPL CALC-MCNC: 105.2 MG/DL (ref 63–159)
LYMPHOCYTES # BLD AUTO: 1.5 K/UL (ref 1–4.8)
LYMPHOCYTES NFR BLD: 32.4 % (ref 18–48)
MCH RBC QN AUTO: 28.8 PG (ref 27–31)
MCHC RBC AUTO-ENTMCNC: 29.7 G/DL (ref 32–36)
MCV RBC AUTO: 97 FL (ref 82–98)
MONOCYTES # BLD AUTO: 0.7 K/UL (ref 0.3–1)
MONOCYTES NFR BLD: 14.1 % (ref 4–15)
NEUTROPHILS # BLD AUTO: 2.1 K/UL (ref 1.8–7.7)
NEUTROPHILS NFR BLD: 44.8 % (ref 38–73)
NONHDLC SERPL-MCNC: 130 MG/DL
NRBC BLD-RTO: 0 /100 WBC
PLATELET # BLD AUTO: 372 K/UL (ref 150–450)
PMV BLD AUTO: 10.6 FL (ref 9.2–12.9)
POTASSIUM SERPL-SCNC: 4.1 MMOL/L (ref 3.5–5.1)
PROT SERPL-MCNC: 5.9 G/DL (ref 6–8.4)
RBC # BLD AUTO: 3.37 M/UL (ref 4–5.4)
SODIUM SERPL-SCNC: 141 MMOL/L (ref 136–145)
TRIGL SERPL-MCNC: 124 MG/DL (ref 30–150)
TSH SERPL DL<=0.005 MIU/L-ACNC: 1.52 UIU/ML (ref 0.4–4)
WBC # BLD AUTO: 4.6 K/UL (ref 3.9–12.7)

## 2024-11-01 PROCEDURE — 80053 COMPREHEN METABOLIC PANEL: CPT | Performed by: FAMILY MEDICINE

## 2024-11-01 PROCEDURE — 36415 COLL VENOUS BLD VENIPUNCTURE: CPT | Mod: PO | Performed by: FAMILY MEDICINE

## 2024-11-01 PROCEDURE — 85025 COMPLETE CBC W/AUTO DIFF WBC: CPT | Performed by: FAMILY MEDICINE

## 2024-11-01 PROCEDURE — 80061 LIPID PANEL: CPT | Performed by: FAMILY MEDICINE

## 2024-11-01 PROCEDURE — 83036 HEMOGLOBIN GLYCOSYLATED A1C: CPT | Performed by: FAMILY MEDICINE

## 2024-11-01 PROCEDURE — 84443 ASSAY THYROID STIM HORMONE: CPT | Performed by: FAMILY MEDICINE

## 2024-11-13 ENCOUNTER — PATIENT MESSAGE (OUTPATIENT)
Dept: FAMILY MEDICINE | Facility: CLINIC | Age: 84
End: 2024-11-13
Payer: MEDICARE

## 2025-01-07 ENCOUNTER — LAB VISIT (OUTPATIENT)
Dept: LAB | Facility: HOSPITAL | Age: 85
End: 2025-01-07
Attending: INTERNAL MEDICINE
Payer: MEDICARE

## 2025-01-07 DIAGNOSIS — D50.9 IRON DEFICIENCY ANEMIA, UNSPECIFIED IRON DEFICIENCY ANEMIA TYPE: ICD-10-CM

## 2025-01-07 LAB
ANION GAP SERPL CALC-SCNC: 10 MMOL/L (ref 8–16)
BASOPHILS # BLD AUTO: 0.08 K/UL (ref 0–0.2)
BASOPHILS NFR BLD: 0.7 % (ref 0–1.9)
BUN SERPL-MCNC: 10 MG/DL (ref 8–23)
CALCIUM SERPL-MCNC: 9.1 MG/DL (ref 8.7–10.5)
CHLORIDE SERPL-SCNC: 105 MMOL/L (ref 95–110)
CO2 SERPL-SCNC: 25 MMOL/L (ref 23–29)
CREAT SERPL-MCNC: 0.7 MG/DL (ref 0.5–1.4)
DIFFERENTIAL METHOD BLD: ABNORMAL
EOSINOPHIL # BLD AUTO: 0.1 K/UL (ref 0–0.5)
EOSINOPHIL NFR BLD: 1.2 % (ref 0–8)
ERYTHROCYTE [DISTWIDTH] IN BLOOD BY AUTOMATED COUNT: 14.8 % (ref 11.5–14.5)
EST. GFR  (NO RACE VARIABLE): >60 ML/MIN/1.73 M^2
FERRITIN SERPL-MCNC: 113 NG/ML (ref 20–300)
GLUCOSE SERPL-MCNC: 96 MG/DL (ref 70–110)
HCT VFR BLD AUTO: 35.7 % (ref 37–48.5)
HGB BLD-MCNC: 11.9 G/DL (ref 12–16)
IMM GRANULOCYTES # BLD AUTO: 0.04 K/UL (ref 0–0.04)
IMM GRANULOCYTES NFR BLD AUTO: 0.4 % (ref 0–0.5)
IRON SERPL-MCNC: 17 UG/DL (ref 30–160)
LYMPHOCYTES # BLD AUTO: 2 K/UL (ref 1–4.8)
LYMPHOCYTES NFR BLD: 18.2 % (ref 18–48)
MCH RBC QN AUTO: 29.2 PG (ref 27–31)
MCHC RBC AUTO-ENTMCNC: 33.3 G/DL (ref 32–36)
MCV RBC AUTO: 88 FL (ref 82–98)
MONOCYTES # BLD AUTO: 0.8 K/UL (ref 0.3–1)
MONOCYTES NFR BLD: 7.7 % (ref 4–15)
NEUTROPHILS # BLD AUTO: 7.7 K/UL (ref 1.8–7.7)
NEUTROPHILS NFR BLD: 71.8 % (ref 38–73)
NRBC BLD-RTO: 0 /100 WBC
PLATELET # BLD AUTO: 367 K/UL (ref 150–450)
PMV BLD AUTO: 9.6 FL (ref 9.2–12.9)
POTASSIUM SERPL-SCNC: 4.1 MMOL/L (ref 3.5–5.1)
RBC # BLD AUTO: 4.07 M/UL (ref 4–5.4)
SATURATED IRON: 6 % (ref 20–50)
SODIUM SERPL-SCNC: 140 MMOL/L (ref 136–145)
TOTAL IRON BINDING CAPACITY: 284 UG/DL (ref 250–450)
TRANSFERRIN SERPL-MCNC: 192 MG/DL (ref 200–375)
WBC # BLD AUTO: 10.73 K/UL (ref 3.9–12.7)

## 2025-01-07 PROCEDURE — 80048 BASIC METABOLIC PNL TOTAL CA: CPT | Mod: PN | Performed by: NURSE PRACTITIONER

## 2025-01-07 PROCEDURE — 83540 ASSAY OF IRON: CPT | Performed by: NURSE PRACTITIONER

## 2025-01-07 PROCEDURE — 85025 COMPLETE CBC W/AUTO DIFF WBC: CPT | Mod: PN | Performed by: NURSE PRACTITIONER

## 2025-01-07 PROCEDURE — 82728 ASSAY OF FERRITIN: CPT | Performed by: NURSE PRACTITIONER

## 2025-01-07 PROCEDURE — 36415 COLL VENOUS BLD VENIPUNCTURE: CPT | Mod: PN | Performed by: NURSE PRACTITIONER

## 2025-01-10 ENCOUNTER — OFFICE VISIT (OUTPATIENT)
Dept: HEMATOLOGY/ONCOLOGY | Facility: CLINIC | Age: 85
End: 2025-01-10
Payer: MEDICARE

## 2025-01-10 VITALS
RESPIRATION RATE: 18 BRPM | OXYGEN SATURATION: 95 % | HEART RATE: 64 BPM | HEIGHT: 65 IN | BODY MASS INDEX: 19.72 KG/M2 | DIASTOLIC BLOOD PRESSURE: 58 MMHG | WEIGHT: 118.38 LBS | TEMPERATURE: 97 F | SYSTOLIC BLOOD PRESSURE: 103 MMHG

## 2025-01-10 DIAGNOSIS — D50.0 IRON DEFICIENCY ANEMIA DUE TO CHRONIC BLOOD LOSS: Primary | ICD-10-CM

## 2025-01-10 DIAGNOSIS — Z87.19 HISTORY OF GI BLEED: ICD-10-CM

## 2025-01-10 DIAGNOSIS — Z87.11 HISTORY OF GASTRIC ULCER: ICD-10-CM

## 2025-01-10 PROCEDURE — 99213 OFFICE O/P EST LOW 20 MIN: CPT | Mod: PBBFAC,PN | Performed by: NURSE PRACTITIONER

## 2025-01-10 PROCEDURE — 99999 PR PBB SHADOW E&M-EST. PATIENT-LVL III: CPT | Mod: PBBFAC,,, | Performed by: NURSE PRACTITIONER

## 2025-01-10 NOTE — PROGRESS NOTES
Subjective:        [facility] - Established patient    Reason for visit: follow up on ISAIAH    Anemia  2023: Iron Def Anemia, received IV iron 9/2022 - 1/2023 05/23/2024:  Injectafer 750 mg IV  10/14 & 10/21/24:  Injectafer 750 mg IV each    HPI: Katelyn Caba is a 84 y.o. female with history of BCC, HTN, HLD, Aortic atherosclerosis, CVA, GI bleed presents to the clinic today with her  for evaluation of ISAIAH.  Since her last visit, she has been in relatively stable health.  She remains on Brillinta. She reports SOB with exertion only. She is eating more meat, added Vitamin C to supplements & stopped tea in her diet.  She denies fatigue, recent surgeries, bleeding, melena, cold extremities, HA's, fatigue, CP, palpitations, etc.     History:  Patient hospitalized 3/26 - 3/27/24 with Hgb 6.6 sp 1u pRBC. GI evaluation plan for outpatient colonoscopy but patient does not wish to proceed with colonoscopy. Patient recalls fall around [place] with pelvic fracture. Patient was taking iron supplement daily and increasing iron rich foods. Weight is stable. 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 In ED, troponin elevated x3 and an inferolateral STEMI was confirmed on EKG. LHC with evidence of moderate nonobstructive CAD for which no intervention was required. Patient placed on GDMT with atorvastatin 40 mg qd and metoprolol tartrate 12.5 mg TID. Antiplatelet therapy with ticagrelor recommended, however, patient apprehensive of medication secondary to history of GI bleed. Patient agreed to medication regimen. Patient without anginal equivalents and R radial access site well healed upon discharge.     Procedure(s):  Left heart cath      Started on Brillinta    Patient presents with ,  [facility].  ECOG PS is 1.  History has been obtained by chart review and discussion with the patient.    ROS:   A complete 12-point review of systems was reviewed and is negative except as mentioned above.     Past Medical History:   Past Medical History:   Diagnosis Date    Cancer     basal cell carcinoma        Past Surgical History:   Past Surgical History:   Procedure Laterality Date    CARPAL TUNNEL RELEASE      ESOPHAGOGASTRODUODENOSCOPY N/A 8/22/2022    Procedure: EGD (ESOPHAGOGASTRODUODENOSCOPY);  Surgeon: Steven Lopez MD;  Location: [facility] ([address] [place] [address]);  Service: Endoscopy;  Laterality: N/A;    ESOPHAGOGASTRODUODENOSCOPY N/A 12/5/2022    Procedure: EGD (ESOPHAGOGASTRODUODENOSCOPY);  Surgeon: Steven Lopez MD;  Location: [facility] ([address] [place] [address]);  Service: Endoscopy;  Laterality: N/A;  Has estimated pulmonary artery pressure 45, schedule location per protocol.   Please schedule with Dr. Darryl Lopez-  Plavix stopped 8/23/22  [facility]-  pre call complete; [facility] 11/28/22    ESOPHAGOGASTRODUODENOSCOPY N/A 7/11/2023    Procedure: EGD (ESOPHAGOGASTRODUODENOSCOPY);  Surgeon: Natalie Fall MD;  Location: [facility];  Service: Endoscopy;  Laterality: N/A;    EYE SURGERY      left eye cataract    LEFT HEART CATHETERIZATION  4/17/2024    Procedure: Left heart cath;  Surgeon: Elisabeth De La Cruz MD;  Location: [facility] CATH;  Service: Cardiology;;    TONSILLECTOMY      TUBAL LIGATION          Family History:   No family history on file.     Social History:   Social History     Tobacco Use    Smoking status: Never     Passive exposure: Never    Smokeless tobacco: Never   Substance Use Topics    Alcohol use: No      Patient splits time between [address] and apartment in [place]    Allergies:   Review of patient's allergies indicates:  No Known Allergies     Medications:   Current Outpatient Medications   Medication Sig Dispense Refill    alendronate (FOSAMAX) 70 MG  "tablet Take 1 tablet (70 mg total) by mouth every 7 days. 4 tablet 11    atorvastatin (LIPITOR) 40 MG tablet Take 1 tablet (40 mg total) by mouth once daily. 90 tablet 3    carvediloL (COREG) 6.25 MG tablet Take 1 tablet (6.25 mg total) by mouth 2 (two) times daily with meals. 180 tablet 3    cyanocobalamin (VITAMIN B-12) 1000 MCG tablet Take 1,000 mcg by mouth once daily.      pantoprazole (PROTONIX) 40 MG tablet Take 1 tablet (40 mg total) by mouth once daily. 60 tablet 5    ticagrelor (BRILINTA) 60 mg tablet Take 1 tablet (60 mg total) by mouth 2 (two) times daily. 180 tablet 1    vitamin D (VITAMIN D3) 1000 units Tab Take 1,000 Units by mouth once daily.       No current facility-administered medications for this visit.        Physical Exam:   BP (!) 103/58 (BP Location: Left arm, Patient Position: Sitting)   Pulse 64   Temp 96.9 °F (36.1 °C) (Temporal)   Resp 18   Ht 5' 5" (1.651 m)   Wt 53.7 kg (118 lb 6.2 oz)   SpO2 95%   BMI 19.70 kg/m²                Physical Exam  Vitals reviewed.   Constitutional:       General: She is not in acute distress.  HENT:      Head: Normocephalic and atraumatic.      Mouth/Throat:      Mouth: Mucous membranes are moist.      Pharynx: Oropharynx is clear.   Eyes:      Conjunctiva/sclera: Conjunctivae normal.   Cardiovascular:      Rate and Rhythm: Normal rate and regular rhythm.      Pulses: Normal pulses.      Heart sounds: No murmur heard.  Pulmonary:      Effort: Pulmonary effort is normal. No respiratory distress.      Breath sounds: Normal breath sounds.   Abdominal:      General: Abdomen is flat. There is no distension.      Palpations: Abdomen is soft.      Tenderness: There is no abdominal tenderness.   Musculoskeletal:         General: No swelling or tenderness. Normal range of motion.      Cervical back: Neck supple. No rigidity.   Lymphadenopathy:      Cervical: No cervical adenopathy.   Skin:     General: Skin is warm and dry.      Coloration: Skin is not " jaundiced or pale.   Neurological:      General: No focal deficit present.      Mental Status: She is alert and oriented to person, place, and time.   Psychiatric:         Mood and Affect: Mood normal.         Behavior: Behavior normal.         Thought Content: Thought content normal.           Labs:   Lab Results   Component Value Date    WBC 10.73 01/07/2025    HGB 11.9 (L) 01/07/2025    HCT 35.7 (L) 01/07/2025    MCV 88 01/07/2025     01/07/2025      Lab Results   Component Value Date    IRON 17 (L) 01/07/2025    TRANSFERRIN 192 (L) 01/07/2025    TIBC 284 01/07/2025    FESATURATED 6 (L) 01/07/2025      Lab Results   Component Value Date    FERRITIN 113 01/07/2025         Lab Results   Component Value Date     01/07/2025    K 4.1 01/07/2025     01/07/2025    CO2 25 01/07/2025    BUN 10 01/07/2025    CREATININE 0.7 01/07/2025    ALBUMIN 3.5 11/01/2024    BILITOT 0.3 11/01/2024    ALKPHOS 115 11/01/2024    AST 21 11/01/2024    ALT 14 11/01/2024       Imaging:   Cardiac catheterization  Procedures:  Moderate sedation  Left heart cath  Coronary angiogram    Conclusions:  No obvious culprit for MI. the patient's chest pain and ST elevations were   almost entirely resolved by the end of the diagnostic angiogram.  Left   ventriculogram with EF of 60-70% and normal anterior and inferior wall   motion.  Possible diagnoses include plaque erosion with resolved thrombus   or distal occlusion in the LPL.  PCI deferred  Elevated left filling pressure    Recommendations:  Medical management including single versus dual antiplatelet therapy (the   patient has moderate chronic anemia and is dependent on iron transfusions)  High-intensity statin  Beta Blocker   TTE  Monitor for at least 48 hours in hospital    Description of procedure:  The patient presented to the Cath Lab in a(n) emergent fashion.  The   patient was prepped and draped in a sterile manner.  Timeout, airway and   ASA assessment were completed.   Local anesthesia with 2% lidocaine was   administered and sedation/analgesia were administered as above.     Access was obtained using the modified Seldinger technique and a   micropuncture needle with ultrasound guidance in the right radial artery.   Diagnostic angiography was performed using JL3.5, JR4.0, and Pigtail   catheter(s).        Coronary anatomy:  Dominance: right  Left main:  Angiographically normal  Left anterior descending:  Large vessel that supplies the apex.  It has   mild diffuse disease overall.  The mid segment after the large 1st   diagonal has some moderate disease.    Left circumflex:  Large vessel with mild diffuse disease and multiple   small obtuse marginal branches.  There is a left posterolateral   moderate-sized branch with DOM 3 flow.  There is a hint of an abrupt cut   off possibly in the mid LPL    Right coronary artery:  Tortuous large dominant vessel with mild diffuse   disease and large RPDA and RPL.  The proximal RCA has a 50% stenosis and   DOM 3 flow.         Hemodynamics:  /31       Hemostasis:  TR band       Complications:  None  Estimated blood loss:  Minimal    The patient tolerated the procedure well and left the cath lab in stable   condition.         Elisabeth De La Cruz MD, [facility]  Interventional Cardiology/Structural Heart Disease  [facility]  Office: [phone]     The clinically important portion of this report has been dictated in the   section above. Our Epic reporting system does not allow us to provide a   clinically meaningful report without this dictation. Please beware that   there may be errors and discrepancies in the computer transcription and   all of the clicks required to finalize the report.  The procedure log was documented by No documenter listed and verified by   Elisabeth De La Cruz MD.    Date: 4/21/2024  Time: 5:52 AM            Assessment:       1. Iron deficiency anemia due to chronic blood loss            Plan:              # Iron Def Anemia - Never had an abnormal colonoscopy, no history of GIB with extensive previous ISAIAH work-up. Most likely cause following recent injury to pelvis, causing consumption with bruising + decreased nutrition with bedrest. Hgb is now improved after 1u pRBC and symptomatically patient feels better. Recommend to proceed with additional dose of IV iron to correct deficit, ~750 mg.   - injectafer x1  - return to clinic in 3-4m with repeat iron serology   07/09/24:  Arrange for additional Injectafer 750 mg on Monday, 07/15 @ 2:30 pm; f/u in 2 months with CBC, iron studies/ferritin prior.  09/23/24:  Symptomatic; Injectafer 750 mg weekly x 2; f/u in 3 months with CBC, BMP, iron studies/ferritin prior.  01/10/25:  S/P Injectafer 750 mg x 2 since last visit; Ferritin 113; improved Hgb from 9.9 to 11.9 g/dl, iron studies low; asymptomatic; Labs in 2 months; phone review    #STEMI/heart cath - 04/17/24, Cath per Dr. De La Cruz; presently on Brillinta    # TIA - Patient took anticoagulation and noted worsening of symptoms and has since discontinued    # GIB - Upcoming visit with GI to rule out bleeding, patient is not currently on anticoagulation  07/09/24:  On Brillinta s/p cath for STEMI    # Pelvic Fracture (12/2023) - Improved with physical therapy, no surgical intervention     The above information has been reviewed with the patient and all questions have been answered to their apparent satisfaction.  They understand that they can call the clinic with any questions.     GABBY Ruth, FNP-C  [facility]  30 minutes were spent in coordination of patient's care, record review and counseling.      Med Onc Chart Routing      Follow up with physician    Follow up with LUZ    Infusion scheduling note    Injection scheduling note    Labs   Scheduling:  Preferred lab:  Lab interval:  Labs in 2 months:  CBC, iron studies/ferritin/stfr - please schedule in clinic [place]:   Kaela Haines; phone review   Imaging    Pharmacy appointment    Other referrals

## 2025-02-18 ENCOUNTER — HOSPITAL ENCOUNTER (INPATIENT)
Facility: HOSPITAL | Age: 85
LOS: 2 days | Discharge: HOME | DRG: 065 | End: 2025-02-20
Attending: EMERGENCY MEDICINE | Admitting: PSYCHIATRY & NEUROLOGY
Payer: MEDICARE

## 2025-02-18 ENCOUNTER — ANESTHESIA (OUTPATIENT)
Dept: INTERVENTIONAL RADIOLOGY/VASCULAR | Facility: HOSPITAL | Age: 85
End: 2025-02-18
Payer: MEDICARE

## 2025-02-18 DIAGNOSIS — I63.9 CEREBRAL INFARCTION, UNSPECIFIED MECHANISM: ICD-10-CM

## 2025-02-18 DIAGNOSIS — I63.511 ACUTE RIGHT MCA STROKE: ICD-10-CM

## 2025-02-18 DIAGNOSIS — I63.512 ACUTE ISCHEMIC LEFT MCA STROKE: ICD-10-CM

## 2025-02-18 DIAGNOSIS — I82.409 DVT (DEEP VENOUS THROMBOSIS): ICD-10-CM

## 2025-02-18 DIAGNOSIS — I63.411 STROKE DUE TO EMBOLISM OF RIGHT MIDDLE CEREBRAL ARTERY: Primary | ICD-10-CM

## 2025-02-18 LAB
ABO + RH BLD: NORMAL
BLD GP AB SCN CELLS X3 SERPL QL: NORMAL
CHOLEST SERPL-MCNC: 193 MG/DL (ref 120–199)
CHOLEST/HDLC SERPL: 4.9 {RATIO} (ref 2–5)
ESTIMATED AVG GLUCOSE: 94 MG/DL (ref 68–131)
HBA1C MFR BLD: 4.9 % (ref 4–5.6)
HDLC SERPL-MCNC: 39 MG/DL (ref 40–75)
HDLC SERPL: 20.2 % (ref 20–50)
LDLC SERPL CALC-MCNC: 133.2 MG/DL (ref 63–159)
NONHDLC SERPL-MCNC: 154 MG/DL
POCT GLUCOSE: 125 MG/DL (ref 70–110)
SPECIMEN OUTDATE: NORMAL
TRIGL SERPL-MCNC: 104 MG/DL (ref 30–150)
TROPONIN I SERPL DL<=0.01 NG/ML-MCNC: 85 NG/L (ref 0–14)
TSH SERPL DL<=0.005 MIU/L-ACNC: 0.77 UIU/ML (ref 0.4–4)

## 2025-02-18 PROCEDURE — 84443 ASSAY THYROID STIM HORMONE: CPT

## 2025-02-18 PROCEDURE — 20000000 HC ICU ROOM

## 2025-02-18 PROCEDURE — 25500020 PHARM REV CODE 255: Performed by: PSYCHIATRY & NEUROLOGY

## 2025-02-18 PROCEDURE — 80061 LIPID PANEL: CPT

## 2025-02-18 PROCEDURE — 63600175 PHARM REV CODE 636 W HCPCS: Performed by: STUDENT IN AN ORGANIZED HEALTH CARE EDUCATION/TRAINING PROGRAM

## 2025-02-18 PROCEDURE — 99285 EMERGENCY DEPT VISIT HI MDM: CPT | Mod: 25

## 2025-02-18 PROCEDURE — 83036 HEMOGLOBIN GLYCOSYLATED A1C: CPT

## 2025-02-18 PROCEDURE — 99223 1ST HOSP IP/OBS HIGH 75: CPT | Mod: GC,,, | Performed by: PSYCHIATRY & NEUROLOGY

## 2025-02-18 PROCEDURE — 84484 ASSAY OF TROPONIN QUANT: CPT

## 2025-02-18 PROCEDURE — 99291 CRITICAL CARE FIRST HOUR: CPT | Mod: ,,, | Performed by: PSYCHIATRY & NEUROLOGY

## 2025-02-18 PROCEDURE — 25000003 PHARM REV CODE 250: Performed by: STUDENT IN AN ORGANIZED HEALTH CARE EDUCATION/TRAINING PROGRAM

## 2025-02-18 PROCEDURE — B315YZZ FLUOROSCOPY OF BILATERAL COMMON CAROTID ARTERIES USING OTHER CONTRAST: ICD-10-PCS | Performed by: RADIOLOGY

## 2025-02-18 PROCEDURE — 86900 BLOOD TYPING SEROLOGIC ABO: CPT | Performed by: PSYCHIATRY & NEUROLOGY

## 2025-02-18 PROCEDURE — 25000003 PHARM REV CODE 250

## 2025-02-18 PROCEDURE — B31FYZZ FLUOROSCOPY OF LEFT VERTEBRAL ARTERY USING OTHER CONTRAST: ICD-10-PCS | Performed by: RADIOLOGY

## 2025-02-18 PROCEDURE — B318YZZ FLUOROSCOPY OF BILATERAL INTERNAL CAROTID ARTERIES USING OTHER CONTRAST: ICD-10-PCS | Performed by: RADIOLOGY

## 2025-02-18 RX ORDER — SODIUM,POTASSIUM PHOSPHATES 280-250MG
2 POWDER IN PACKET (EA) ORAL
Status: DISCONTINUED | OUTPATIENT
Start: 2025-02-18 | End: 2025-02-20

## 2025-02-18 RX ORDER — LABETALOL HCL 20 MG/4 ML
10 SYRINGE (ML) INTRAVENOUS EVERY 6 HOURS PRN
Status: DISCONTINUED | OUTPATIENT
Start: 2025-02-18 | End: 2025-02-20 | Stop reason: HOSPADM

## 2025-02-18 RX ORDER — LIDOCAINE HYDROCHLORIDE 20 MG/ML
INJECTION INTRAVENOUS
Status: DISCONTINUED | OUTPATIENT
Start: 2025-02-18 | End: 2025-02-18

## 2025-02-18 RX ORDER — PANTOPRAZOLE SODIUM 20 MG/1
40 TABLET, DELAYED RELEASE ORAL DAILY
Status: DISCONTINUED | OUTPATIENT
Start: 2025-02-19 | End: 2025-02-20 | Stop reason: HOSPADM

## 2025-02-18 RX ORDER — LANOLIN ALCOHOL/MO/W.PET/CERES
800 CREAM (GRAM) TOPICAL
Status: DISCONTINUED | OUTPATIENT
Start: 2025-02-18 | End: 2025-02-20

## 2025-02-18 RX ORDER — ONDANSETRON HYDROCHLORIDE 2 MG/ML
4 INJECTION, SOLUTION INTRAVENOUS EVERY 8 HOURS PRN
Status: DISCONTINUED | OUTPATIENT
Start: 2025-02-18 | End: 2025-02-20 | Stop reason: HOSPADM

## 2025-02-18 RX ORDER — AMOXICILLIN 250 MG
1 CAPSULE ORAL 2 TIMES DAILY
Status: DISCONTINUED | OUTPATIENT
Start: 2025-02-18 | End: 2025-02-20 | Stop reason: HOSPADM

## 2025-02-18 RX ORDER — SODIUM CHLORIDE 0.9 % (FLUSH) 0.9 %
10 SYRINGE (ML) INJECTION
Status: DISCONTINUED | OUTPATIENT
Start: 2025-02-18 | End: 2025-02-19

## 2025-02-18 RX ORDER — ONDANSETRON HYDROCHLORIDE 2 MG/ML
INJECTION, SOLUTION INTRAVENOUS
Status: DISCONTINUED | OUTPATIENT
Start: 2025-02-18 | End: 2025-02-18

## 2025-02-18 RX ORDER — SODIUM CHLORIDE 0.9 % (FLUSH) 0.9 %
10 SYRINGE (ML) INJECTION
Status: DISCONTINUED | OUTPATIENT
Start: 2025-02-18 | End: 2025-02-20 | Stop reason: HOSPADM

## 2025-02-18 RX ORDER — BISACODYL 10 MG/1
10 SUPPOSITORY RECTAL DAILY PRN
Status: DISCONTINUED | OUTPATIENT
Start: 2025-02-18 | End: 2025-02-20 | Stop reason: HOSPADM

## 2025-02-18 RX ORDER — ROCURONIUM BROMIDE 10 MG/ML
INJECTION, SOLUTION INTRAVENOUS
Status: DISCONTINUED | OUTPATIENT
Start: 2025-02-18 | End: 2025-02-18

## 2025-02-18 RX ORDER — OXYCODONE HYDROCHLORIDE 10 MG/1
10 TABLET ORAL EVERY 4 HOURS PRN
Refills: 0 | Status: DISCONTINUED | OUTPATIENT
Start: 2025-02-18 | End: 2025-02-20 | Stop reason: HOSPADM

## 2025-02-18 RX ORDER — DEXAMETHASONE SODIUM PHOSPHATE 4 MG/ML
INJECTION, SOLUTION INTRA-ARTICULAR; INTRALESIONAL; INTRAMUSCULAR; INTRAVENOUS; SOFT TISSUE
Status: DISCONTINUED | OUTPATIENT
Start: 2025-02-18 | End: 2025-02-18

## 2025-02-18 RX ORDER — NICARDIPINE HYDROCHLORIDE 0.2 MG/ML
INJECTION INTRAVENOUS CONTINUOUS PRN
Status: DISCONTINUED | OUTPATIENT
Start: 2025-02-18 | End: 2025-02-18

## 2025-02-18 RX ORDER — IODIXANOL 320 MG/ML
100 INJECTION, SOLUTION INTRAVASCULAR
Status: COMPLETED | OUTPATIENT
Start: 2025-02-18 | End: 2025-02-18

## 2025-02-18 RX ORDER — PROPOFOL 10 MG/ML
VIAL (ML) INTRAVENOUS
Status: DISCONTINUED | OUTPATIENT
Start: 2025-02-18 | End: 2025-02-18

## 2025-02-18 RX ORDER — OXYCODONE HYDROCHLORIDE 5 MG/1
5 TABLET ORAL EVERY 4 HOURS PRN
Refills: 0 | Status: DISCONTINUED | OUTPATIENT
Start: 2025-02-18 | End: 2025-02-20 | Stop reason: HOSPADM

## 2025-02-18 RX ORDER — ACETAMINOPHEN 325 MG/1
650 TABLET ORAL EVERY 6 HOURS
Status: DISCONTINUED | OUTPATIENT
Start: 2025-02-18 | End: 2025-02-20 | Stop reason: HOSPADM

## 2025-02-18 RX ORDER — ATORVASTATIN CALCIUM 40 MG/1
40 TABLET, FILM COATED ORAL DAILY
Status: DISCONTINUED | OUTPATIENT
Start: 2025-02-18 | End: 2025-02-19

## 2025-02-18 RX ORDER — HYDRALAZINE HYDROCHLORIDE 20 MG/ML
10 INJECTION INTRAMUSCULAR; INTRAVENOUS EVERY 6 HOURS PRN
Status: DISCONTINUED | OUTPATIENT
Start: 2025-02-18 | End: 2025-02-20 | Stop reason: HOSPADM

## 2025-02-18 RX ADMIN — SUGAMMADEX 200 MG: 100 INJECTION, SOLUTION INTRAVENOUS at 12:02

## 2025-02-18 RX ADMIN — ONDANSETRON 4 MG: 2 INJECTION INTRAMUSCULAR; INTRAVENOUS at 12:02

## 2025-02-18 RX ADMIN — DEXAMETHASONE SODIUM PHOSPHATE 4 MG: 4 INJECTION, SOLUTION INTRAMUSCULAR; INTRAVENOUS at 12:02

## 2025-02-18 RX ADMIN — PROPOFOL 100 MG: 10 INJECTION, EMULSION INTRAVENOUS at 12:02

## 2025-02-18 RX ADMIN — ROCURONIUM BROMIDE 50 MG: 10 INJECTION, SOLUTION INTRAVENOUS at 12:02

## 2025-02-18 RX ADMIN — SODIUM CHLORIDE, SODIUM GLUCONATE, SODIUM ACETATE, POTASSIUM CHLORIDE, MAGNESIUM CHLORIDE, SODIUM PHOSPHATE, DIBASIC, AND POTASSIUM PHOSPHATE: .53; .5; .37; .037; .03; .012; .00082 INJECTION, SOLUTION INTRAVENOUS at 12:02

## 2025-02-18 RX ADMIN — OXYCODONE 5 MG: 5 TABLET ORAL at 02:02

## 2025-02-18 RX ADMIN — LIDOCAINE HYDROCHLORIDE 70 MG: 20 INJECTION INTRAVENOUS at 12:02

## 2025-02-18 RX ADMIN — NICARDIPINE HYDROCHLORIDE 2.5 MG/HR: 0.2 INJECTION, SOLUTION INTRAVENOUS at 12:02

## 2025-02-18 RX ADMIN — IODIXANOL 70 ML: 320 INJECTION, SOLUTION INTRAVASCULAR at 12:02

## 2025-02-18 NOTE — ASSESSMENT & PLAN NOTE
84 year old woman with PMH BCC, HTN, HLD, aortic atherosclerosis, GI bleed, prior left MCA infarct 2022 who presented to [facility] ED 2/18 for left sided weakness, left facial droop, and dysarthria. LKW 2/17 2030, woke up 0830 with symptoms. CTA with right M1 occlusion. Telestroke NIHSS 10. Received TNK prior to transfer at 1104. Transferred to [facility] for IR evaluation. On arrival NIHSS 7. Repeat CTH stable. Went to to IR. To be admitted to [facility] afterward.    Antithrombotics for secondary stroke prevention: Antiplatelets: Aspirin: 81 mg daily  Clopidogrel: 75 mg daily    Statins for secondary stroke prevention and hyperlipidemia, if present:   Statins: Atorvastatin- 40 mg daily    Aggressive risk factor modification: HTN, HLD, AAD     Rehab efforts: The patient has been evaluated by a stroke team provider and the therapy needs have been fully considered based off the presenting complaints and exam findings. The following therapy evaluations are needed: PT evaluate and treat, OT evaluate and treat, SLP evaluate and treat    Diagnostics ordered/pending: TTE to assess cardiac function/status     VTE prophylaxis: None: Reason for No Pharmacological VTE Prophylaxis: Holding x 24 hours s/p treatment with Thrombolytic therapy    BP parameters: Infarct: Post sucessful thrombectomy, SBP <140  Post unsucessful thrombectomy, SBP <220

## 2025-02-18 NOTE — ANESTHESIA PROCEDURE NOTES
Intubation    Date/Time: 2/18/2025 12:17 PM    Performed by: Naila Barker CRNA  Authorized by: Epi Rivera MD    Intubation:     Induction:  Intravenous    Intubated:  Postinduction    Mask Ventilation:  Easy mask    Attempts:  1    Attempted By:  CRNA    Method of Intubation:  Video laryngoscopy    Blade:  Santana 3    Laryngeal View Grade: Grade I - full view of cords      Difficult Airway Encountered?: No      Complications:  None    Airway Device:  Oral endotracheal tube    Airway Device Size:  7.5    Style/Cuff Inflation:  Cuffed (inflated to minimal occlusive pressure)    Tube secured:  20    Secured at:  The lips    Placement Verified By:  Capnometry    Complicating Factors:  None    Findings Post-Intubation:  BS equal bilateral and atraumatic/condition of teeth unchanged

## 2025-02-18 NOTE — HPI
Patient is a 84-year-old female with past medical history basal cell carcinoma, hypercholesterolemia that is presenting for evaluation for further evaluation for LVO.  Patient is a transfer from outside facility 2/18 for large vessel occlusion.  Last known normal as per EMS report and medical records 8:00 p.m..  Patient woke up this morning at 8:30 a.m. secondary to left-sided weakness and facial droop.  Patient had right-sided gaze.  Patient was found to have large vessel occlusion.  TNK was given prior to arrival at 11:03 a.m..  As per EMS report, patient began to have improved to movement left side, blood pressure controlled with Cardene prior to arrival.  Patient states that she feels improved on her left side, weakness improving.  Patient denies any new symptoms. On arrival to [facility], patient taken for cerebral angiogram and RUDY, which showed two distal occlusions in M3 and M4. Patient TICI 2C after TNK.

## 2025-02-18 NOTE — SUBJECTIVE & OBJECTIVE
Past Medical History:   Diagnosis Date    Cancer     basal cell carcinoma     Past Surgical History:   Procedure Laterality Date    CARPAL TUNNEL RELEASE      ESOPHAGOGASTRODUODENOSCOPY N/A 8/22/2022    Procedure: EGD (ESOPHAGOGASTRODUODENOSCOPY);  Surgeon: Steven Lopez MD;  [address]  Service: Endoscopy;  Laterality: N/A;    ESOPHAGOGASTRODUODENOSCOPY N/A 12/5/2022    Procedure: EGD (ESOPHAGOGASTRODUODENOSCOPY);  Surgeon: Steven Lopez MD;  [address]  Service: Endoscopy;  Laterality: N/A;  Has estimated pulmonary artery pressure 45, schedule location per protocol.   Please schedule with Dr. Darryl Lopez-  Plavix stopped 8/23/22  pt requested to schedule after Thanksgiving/ inst portal-RB  pre call complete; [facility] 11/28/22    ESOPHAGOGASTRODUODENOSCOPY N/A 7/11/2023    Procedure: EGD (ESOPHAGOGASTRODUODENOSCOPY);  Surgeon: Natalie Fall MD;  Location: [facility];  Service: Endoscopy;  Laterality: N/A;    EYE SURGERY      left eye cataract    LEFT HEART CATHETERIZATION  4/17/2024    Procedure: Left heart cath;  Surgeon: Elisabeth De La Cruz MD;  Location: [facility] CATH;  Service: Cardiology;;    TONSILLECTOMY      TUBAL LIGATION       Social History[1]  Review of patient's allergies indicates:  No Known Allergies    Medications: I have reviewed the current medication administration record.    Prescriptions Prior to Admission[2]    Review of Systems   Constitutional:  Negative for diaphoresis and fever.   HENT:  Negative for nosebleeds and rhinorrhea.    Eyes:  Negative for discharge and redness.   Respiratory:  Negative for cough and wheezing.    Cardiovascular:  Negative for leg swelling.   Gastrointestinal:  Negative for abdominal distention and vomiting.   Neurological:  Positive for speech difficulty and weakness.     Objective:     Vital Signs (Most Recent):  Pulse: 82 (02/18/25 1154)  Resp: (!) 22 (02/18/25 1443)  BP: (!) 160/100 (02/18/25 1154)  SpO2: 96 %  (02/18/25 1154)    Vital Signs Range (Last 24H):  Temp:  [98 °F (36.7 °C)]   Pulse:  [79-92]   Resp:  [18-25]   BP: (160-209)/()   SpO2:  [83 %-96 %]        Physical Exam  Vitals and nursing note reviewed.   Constitutional:       Appearance: She is not diaphoretic.   HENT:      Head: Normocephalic and atraumatic.      Nose: Nose normal. No rhinorrhea.   Eyes:      General:         Right eye: No discharge.         Left eye: No discharge.      Conjunctiva/sclera: Conjunctivae normal.   Pulmonary:      Effort: Pulmonary effort is normal. No respiratory distress.   Skin:     General: Skin is warm and dry.   Neurological:      Mental Status: She is alert.              Neurological Exam:   LOC: alert  Attention Span: Good   Language: No aphasia  Articulation: Dysarthria  Orientation: Person, Place, Time   EOM (CN III, IV, VI): Gaze preference  right  Facial Movement (CN VII): Lower facial weakness on the Left  Motor: no drift in left or right extremities  Sensation: Unable to sense light touch on left extremities      Laboratory:  CMP:   Recent Labs   Lab 02/18/25  1026   CALCIUM 8.4*   ALBUMIN 3.2*   PROT 5.8*      K 3.9   CO2 24      BUN 13   CREATININE 0.55   ALKPHOS 137   ALT <7*   AST 19   BILITOT 0.4     CBC:   Recent Labs   Lab 02/18/25  1026   WBC 4.15   RBC 3.33*   HGB 9.6*   HCT 29.2*      MCV 88   MCH 28.8   MCHC 32.9       Diagnostic Results:  Brain/Vessel Imaging:  CTA Stroke Multiphase 2/18/25 1025  1. Occlusion of the right MCA M1 segment distal aspect.  2. Approximately 50% stenosis at the origin of the left cervical ICA.    MRI Brain w/o contrast 2/18/25 1046  Acute ischemia right MCA territory involving the frontal, parietal, and temporal lobes inclusive of the right insular cortex.     CTH 2/18/25 1207  Evidence of early ischemic change in the right MCA distribution noting some cortical contrast enhancement corresponding to the area of diffusion restriction from the MRI  performed earlier on the same date.     No new parenchymal hemorrhage or major vascular distribution infarct elsewhere.     Mild chronic small vessel ischemic change with remote left parietal infarct.     Extra-axial mass along the left petroclival ligament, presumed to reflect a meningioma.  Recommend a contrast enhanced MRI for further characterization.    Cardiac Evaluation:   EKG 2/18/25: NSR         [1]   Social History  Tobacco Use    Smoking status: Never     Passive exposure: Never    Smokeless tobacco: Never   Substance Use Topics    Alcohol use: No    Drug use: No   [2]   Medications Prior to Admission   Medication Sig Dispense Refill Last Dose/Taking    alendronate (FOSAMAX) 70 MG tablet Take 1 tablet (70 mg total) by mouth every 7 days. 4 tablet 11     atorvastatin (LIPITOR) 40 MG tablet Take 1 tablet (40 mg total) by mouth once daily. 90 tablet 3     carvediloL (COREG) 6.25 MG tablet Take 1 tablet (6.25 mg total) by mouth 2 (two) times daily with meals. 180 tablet 3     cyanocobalamin (VITAMIN B-12) 1000 MCG tablet Take 1,000 mcg by mouth once daily.       pantoprazole (PROTONIX) 40 MG tablet Take 1 tablet (40 mg total) by mouth once daily. 60 tablet 5     ticagrelor (BRILINTA) 60 mg tablet Take 1 tablet (60 mg total) by mouth 2 (two) times daily. 180 tablet 1     vitamin D (VITAMIN D3) 1000 units Tab Take 1,000 Units by mouth once daily.       [DISCONTINUED] clopidogreL (PLAVIX) 75 mg tablet Take 1 tablet (75 mg total) by mouth once daily. for 21 days 21 tablet 0

## 2025-02-18 NOTE — PROCEDURES
Interventional Neuroradiology Post-Thrombectomy Note    Pre Op Diagnosis: Occlusion of Right Distal M1    Post Op Diagnosis: Refer to the full angio report     Procedure: Cerebral angiogram and mechanical thrombectomy    Procedure performed by:  Flori LEW, Roberto; Sindy LEW, Vasquez; Tapan LEW, Alex    Written Informed Consent Obtained: Yes    Specimen Removed: NO    Estimated Blood Loss: Minimal    Procedure report:   A 8F sheath was placed into the right femoral artery and an 088 Zoom guide catheter was advanced over a 5F Felix catheter through the aortic arch and into the affected vessel.  The right common carotid, right ICA, left ICA and left vertebral arteries were subselected and angiography of the brain was performed after contrast injection into the vessel.    Preliminary interpretation: TICI2C recanalization secondary to TNK with slower flow in the right MCA territory. There are two secondary occlusions (likely due to fragmentation from TNK) in the parietal M3- and M4 branches that were not chased given very distal location.  Please see Imaging report for full details.     A right femoral artery angiogram was performed, the sheath removed and hemostasis achieved using 8F Angioseal.  No hematoma was present at the time of hemostasis.     The patient tolerated the procedure well.      Pre-RUDY TICI grade: 2C  Post-RUDY TICI grade: 2C  Number of passes: 0    The patient tolerated the procedure well.      Plan:  -To NCC for monitoring  -Goal SBP<160  -Bed rest for 2h  -Groin check and pulse check q2h   -Remainder of care, per vascular neurology and NCC                    Vasquez Callahan MD, MHA  Fellow, NeuroEndovascular Surgery, [facility] Thanh Williamson  Neurologist, [facility], [place]

## 2025-02-18 NOTE — SEDATION DOCUMENTATION
Hemostasis achieved via right groin with use of Angio-seal closure device at 1250. Pt to lay flat for 2 hours until 1450

## 2025-02-18 NOTE — SUBJECTIVE & OBJECTIVE
Past Medical History:   Diagnosis Date    Cancer     basal cell carcinoma     Past Surgical History:   Procedure Laterality Date    CARPAL TUNNEL RELEASE      ESOPHAGOGASTRODUODENOSCOPY N/A 8/22/2022    Procedure: EGD (ESOPHAGOGASTRODUODENOSCOPY);  Surgeon: Steven Lopez MD;  [address]  Service: Endoscopy;  Laterality: N/A;    ESOPHAGOGASTRODUODENOSCOPY N/A 12/5/2022    Procedure: EGD (ESOPHAGOGASTRODUODENOSCOPY);  Surgeon: Steven Lopez MD;  [address]  Service: Endoscopy;  Laterality: N/A;  Has estimated pulmonary artery pressure 45, schedule location per protocol.   Please schedule with Dr. Darryl Lopez-  Plavix stopped 8/23/22  pt requested to schedule after Thanksgiving/ inst portal-RB  pre call complete; [facility] 11/28/22    ESOPHAGOGASTRODUODENOSCOPY N/A 7/11/2023    Procedure: EGD (ESOPHAGOGASTRODUODENOSCOPY);  Surgeon: Natalie Fall MD;  Location: [facility];  Service: Endoscopy;  Laterality: N/A;    EYE SURGERY      left eye cataract    LEFT HEART CATHETERIZATION  4/17/2024    Procedure: Left heart cath;  Surgeon: Elisabeth De La Cruz MD;  Location: [facility] CATH;  Service: Cardiology;;    TONSILLECTOMY      TUBAL LIGATION        Medications Ordered Prior to Encounter[1]   Allergies: Patient has no known allergies.  No family history on file.  Social History[2]  Review of Systems   Respiratory:  Negative for cough and shortness of breath.    Cardiovascular:  Negative for chest pain and palpitations.   Gastrointestinal:  Negative for abdominal pain and vomiting.   Neurological:  Positive for weakness (right side) and headaches.     Objective:     Vitals:    Temp: 96.9 °F (36.1 °C)  Pulse: 74  BP: (!) 123/58  MAP (mmHg): 83  Resp: (!) 21  SpO2: 100 %    Temp  Min: 96.9 °F (36.1 °C)  Max: 98 °F (36.7 °C)  Pulse  Min: 72  Max: 92  BP  Min: 118/59  Max: 209/83  MAP (mmHg)  Min: 83  Max: 116  Resp  Min: 18  Max: 27  SpO2  Min: 83 %  Max: 100 %    No intake/output data  recorded.            Physical Exam  Vitals and nursing note reviewed. Exam conducted with a chaperone present.   HENT:      Head: Normocephalic and atraumatic.   Eyes:      Conjunctiva/sclera: Conjunctivae normal.      Pupils: Pupils are equal, round, and reactive to light.   Cardiovascular:      Rate and Rhythm: Normal rate and regular rhythm.   Pulmonary:      Effort: Pulmonary effort is normal.      Breath sounds: Normal breath sounds.   Abdominal:      Palpations: Abdomen is soft.      Tenderness: There is no abdominal tenderness.   Musculoskeletal:      Comments: DP pulses intact by doppler   Skin:     Comments: Dressing in place over R groin, c/d/i without swelling   Neurological:      Mental Status: She is alert.      Sensory: Sensation is intact.      Comments: Oriented to person and place but not to time  Follows commands  5/5 strength in RUE and RLE  3/5 strength in LUE  2/5 strength in LLE, family states baseline after previous fall  Right gaze preference              Today I personally reviewed pertinent medications, lines/drains/airways, imaging, cardiology results, laboratory results, microbiology results,             [1]   Current Facility-Administered Medications on File Prior to Encounter   Medication Dose Route Frequency Provider Last Rate Last Admin    [COMPLETED] iohexoL (OMNIPAQUE 350) injection 80 mL  80 mL Intravenous ONCE PRN Isabelle Victoria MD   80 mL at 02/18/25 1016    [COMPLETED] tenecteplase (TNKase) IV KIT 13.5 mg  0.25 mg/kg Intravenous Once Isabelle Victoria MD   13.5 mg at 02/18/25 1104    [DISCONTINUED] niCARdipine 40 mg/200 mL (0.2 mg/mL) infusion  0-15 mg/hr Intravenous Continuous Isabelle Victoria MD 25 mL/hr at 02/18/25 1105 5 mg/hr at 02/18/25 1105    [DISCONTINUED] sodium chloride 0.9% flush 10 mL  10 mL Intravenous Once Isabelle Victoria MD         Current Outpatient Medications on File Prior to Encounter   Medication Sig Dispense Refill    atorvastatin (LIPITOR) 40 MG tablet Take  1 tablet (40 mg total) by mouth once daily. 90 tablet 3    carvediloL (COREG) 6.25 MG tablet Take 1 tablet (6.25 mg total) by mouth 2 (two) times daily with meals. 180 tablet 3    cyanocobalamin (VITAMIN B-12) 1000 MCG tablet Take 1,000 mcg by mouth once daily.      pantoprazole (PROTONIX) 40 MG tablet Take 1 tablet (40 mg total) by mouth once daily. 60 tablet 5    ticagrelor (BRILINTA) 60 mg tablet Take 1 tablet (60 mg total) by mouth 2 (two) times daily. 180 tablet 1    vitamin D (VITAMIN D3) 1000 units Tab Take 1,000 Units by mouth once daily.      [DISCONTINUED] alendronate (FOSAMAX) 70 MG tablet Take 1 tablet (70 mg total) by mouth every 7 days. 4 tablet 11    [DISCONTINUED] clopidogreL (PLAVIX) 75 mg tablet Take 1 tablet (75 mg total) by mouth once daily. for 21 days 21 tablet 0   [2]   Social History  Tobacco Use    Smoking status: Never     Passive exposure: Never    Smokeless tobacco: Never   Substance Use Topics    Alcohol use: No    Drug use: No

## 2025-02-18 NOTE — CONSULTS
[facility] - Neuro Critical Care  Vascular Neurology  Comprehensive Stroke Center  Consult Note    Inpatient consult to Neurology Services (Vascular Neurology)  Consult performed by: Jacki Case MD  Consult ordered by: Robert Houston MD  Reason for consult: stroke transfer        Assessment/Plan:     Patient is a 84 y.o. 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 LKW 2/17 2030, woke up 0830 with symptoms. CTA with right M1 occlusion. Telestroke NIHSS 10. Received TNK prior to transfer at 1104. Transferred to [facility] for IR evaluation. On arrival NIHSS 7. Repeat CTH stable. Went to to IR. To be admitted to [facility] afterward.    Antithrombotics for secondary stroke prevention: Antiplatelets: Aspirin: 81 mg daily  Clopidogrel: 75 mg daily    Statins for secondary stroke prevention and hyperlipidemia, if present:   Statins: Atorvastatin- 40 mg daily    Aggressive risk factor modification: HTN, HLD, AAD     Rehab efforts: The patient has been evaluated by a stroke team provider and the therapy needs have been fully considered based off the presenting complaints and exam findings. The following therapy evaluations are needed: PT evaluate and treat, OT evaluate and treat, SLP evaluate and treat    Diagnostics ordered/pending: TTE to assess cardiac function/status     VTE prophylaxis: None: Reason for No Pharmacological VTE Prophylaxis: Holding x 24 hours s/p treatment with Thrombolytic therapy    BP parameters: Infarct: Post sucessful thrombectomy, SBP <140  Post unsucessful thrombectomy, SBP <220            STROKE DOCUMENTATION     Acute Stroke Times   Last Known Normal Date: 02/17/25  Last Known Normal Time: 2030  Unknown Symptom Onset Date: Unknown Date  Unknown Symptom Onset Time: Unknown  Time  Stroke Team Called Date: 02/18/25  Stroke Team Called Time: 1156  Stroke Team Arrival Date: 02/18/25  Stroke Team Arrival Time: 1156  CT Interpretation Time: 1200  Thrombolytic Therapy Recommended: Yes  CTA Interpretation Time:  (prior to arrival)  Thrombectomy Recommended: Yes  Decision to Treat Time for Tenecteplase:  (prior to arrival at outside facility)  Decision to Treat Time for IR:  (prior to arrival)    NIH Scale:  1a. Level of Consciousness: 0-->Alert, keenly responsive  1b. LOC Questions: 0-->Answers both questions correctly  1c. LOC Commands: 0-->Performs both tasks correctly  2. Best Gaze: 2-->Forced deviation, or total gaze paresis not overcome by the oculocephalic maneuver  3. Visual: 0-->No visual loss  4. Facial Palsy: 2-->Partial paralysis (total or near-total paralysis of lower face)  5a. Motor Arm, Left: 0-->No drift, limb holds 90 (or 45) degrees for full 10 secs  5b. Motor Arm, Right: 0-->No drift, limb holds 90 (or 45) degrees for full 10 secs  6a. Motor Leg, Left: 0-->No drift, leg holds 30 degree position for full 5 secs  6b. Motor Leg, Right: 0-->No drift, leg holds 30 degree position for full 5 secs  7. Limb Ataxia: 0-->Absent  8. Sensory: 1-->Mild-to-moderate sensory loss, patient feels pinprick is less sharp or is dull on the affected side, or there is a loss of superficial pain with pinprick, but patient is aware of being touched  9. Best Language: 0-->No aphasia, normal  10. Dysarthria: 1-->Mild-to-moderate dysarthria, patient slurs at least some words and, at worst, can be understood with some difficulty  11. Extinction and Inattention (formerly Neglect): 1-->Visual, tactile, auditory, spatial, or personal inattention or extinction to bilateral simultaneous stimulation in one of the sensory modalities  Total (NIH Stroke Scale): 7    Modified [place]    Gabriel Coma Scale:    ABCD2 Score:    [id number] Score:   HAS -BLED Score:   ICH Score:   Hunt & Hoffman Classification:        Thrombolysis Candidate? Yes, given prior to arrival at outside hospital    Delays to Thrombolysis?  No    Interventional Revascularization Candidate?   Is the patient eligible for mechanical endovascular reperfusion (RUDY)?  Yes    Delays to Thrombectomy? No    Hemorrhagic change of an Ischemic Stroke: Does this patient have an ischemic stroke with hemorrhagic changes? No     Subjective:     History of Present Illness:  Katelyn Caba is an 84 year old woman with PMH BCC, HTN, HLD, Aortic atherosclerosis, GI bleed, prior left MCA infarct 2022 who presented to [facility] ED 2/18 for left sided weakness, left facial droop, and dysarthria. LKW 2/17 2030 when she went to bed, woke up with symptoms present at 0830. Received TNK prior to transfer at 1104. Transferred to [facility] for IR evaluation.           Past Medical History:   Diagnosis Date    Cancer     basal cell carcinoma     Past Surgical History:   Procedure Laterality Date    CARPAL TUNNEL RELEASE      ESOPHAGOGASTRODUODENOSCOPY N/A 8/22/2022    Procedure: EGD (ESOPHAGOGASTRODUODENOSCOPY);  Surgeon: Steven Lopez MD;  Location: [facility] ([address] [place] [address]);  Service: Endoscopy;  Laterality: N/A;    ESOPHAGOGASTRODUODENOSCOPY N/A 12/5/2022    Procedure: EGD (ESOPHAGOGASTRODUODENOSCOPY);  Surgeon: Steven Lopez MD;  Location: [facility] ([address] [place] [address]);  Service: Endoscopy;  Laterality: N/A;  Has estimated pulmonary artery pressure 45, schedule location per protocol.   Please schedule with Dr. Darryl Lopez-  Plavix stopped 8/23/22  [facility]  [facility]; [facility]    ESOPHAGOGASTRODUODENOSCOPY N/A 7/11/2023    Procedure: EGD (ESOPHAGOGASTRODUODENOSCOPY);  Surgeon: Natalie Fall MD;  Location: [facility];  Service: Endoscopy;  Laterality: N/A;    EYE SURGERY      left eye cataract    LEFT HEART CATHETERIZATION  4/17/2024    Procedure: Left heart cath;  Surgeon: Elisabeth De La Cruz MD;  Location: [facility] CATH;   Service: Cardiology;;    TONSILLECTOMY      TUBAL LIGATION       Social History[1]  Review of patient's allergies indicates:  No Known Allergies    Medications: I have reviewed the current medication administration record.    Prescriptions Prior to Admission[2]    Review of Systems   Constitutional:  Negative for diaphoresis and fever.   HENT:  Negative for nosebleeds and rhinorrhea.    Eyes:  Negative for discharge and redness.   Respiratory:  Negative for cough and wheezing.    Cardiovascular:  Negative for leg swelling.   Gastrointestinal:  Negative for abdominal distention and vomiting.   Neurological:  Positive for speech difficulty and weakness.     Objective:     Vital Signs (Most Recent):  Pulse: 82 (02/18/25 1154)  Resp: (!) 22 (02/18/25 1443)  BP: (!) 160/100 (02/18/25 1154)  SpO2: 96 % (02/18/25 1154)    Vital Signs Range (Last 24H):  Temp:  [98 °F (36.7 °C)]   Pulse:  [79-92]   Resp:  [18-25]   BP: (160-209)/()   SpO2:  [83 %-96 %]        Physical Exam  Vitals and nursing note reviewed.   Constitutional:       Appearance: She is not diaphoretic.   HENT:      Head: Normocephalic and atraumatic.      Nose: Nose normal. No rhinorrhea.   Eyes:      General:         Right eye: No discharge.         Left eye: No discharge.      Conjunctiva/sclera: Conjunctivae normal.   Pulmonary:      Effort: Pulmonary effort is normal. No respiratory distress.   Skin:     General: Skin is warm and dry.   Neurological:      Mental Status: She is alert.              Neurological Exam:   LOC: alert  Attention Span: Good   Language: No aphasia  Articulation: Dysarthria  Orientation: Person, Place, Time   EOM (CN III, IV, VI): Gaze preference  right  Facial Movement (CN VII): Lower facial weakness on the Left  Motor: no drift in left or right extremities  Sensation: Unable to sense light touch on left extremities      Laboratory:  CMP:   Recent Labs   Lab 02/18/25  1026   CALCIUM 8.4*   ALBUMIN 3.2*   PROT 5.8*      K  3.9   CO2 24      BUN 13   CREATININE 0.55   ALKPHOS 137   ALT <7*   AST 19   BILITOT 0.4     CBC:   Recent Labs   Lab 02/18/25  1026   WBC 4.15   RBC 3.33*   HGB 9.6*   HCT 29.2*      MCV 88   MCH 28.8   MCHC 32.9       Diagnostic Results:  Brain/Vessel Imaging:  CTA Stroke Multiphase 2/18/25 1025  1. Occlusion of the right MCA M1 segment distal aspect.  2. Approximately 50% stenosis at the origin of the left cervical ICA.    MRI Brain w/o contrast 2/18/25 1046  Acute ischemia right MCA territory involving the frontal, parietal, and temporal lobes inclusive of the right insular cortex.     CTH 2/18/25 1207  Evidence of early ischemic change in the right MCA distribution noting some cortical contrast enhancement corresponding to the area of diffusion restriction from the MRI performed earlier on the same date.     No new parenchymal hemorrhage or major vascular distribution infarct elsewhere.     Mild chronic small vessel ischemic change with remote left parietal infarct.     Extra-axial mass along the left petroclival ligament, presumed to reflect a meningioma.  Recommend a contrast enhanced MRI for further characterization.    Cardiac Evaluation:   EKG 2/18/25: NSR      Jacki Case MD  Comprehensive Stroke Center  Department of Vascular Neurology   [facility] - Neuro Critical Care        [1]   Social History  Tobacco Use    Smoking status: Never     Passive exposure: Never    Smokeless tobacco: Never   Substance Use Topics    Alcohol use: No    Drug use: No   [2]   Medications Prior to Admission   Medication Sig Dispense Refill Last Dose/Taking    alendronate (FOSAMAX) 70 MG tablet Take 1 tablet (70 mg total) by mouth every 7 days. 4 tablet 11     atorvastatin (LIPITOR) 40 MG tablet Take 1 tablet (40 mg total) by mouth once daily. 90 tablet 3     carvediloL (COREG) 6.25 MG tablet Take 1 tablet (6.25 mg total) by mouth 2 (two) times daily with meals. 180 tablet 3     cyanocobalamin (VITAMIN B-12) 1000  MCG tablet Take 1,000 mcg by mouth once daily.       pantoprazole (PROTONIX) 40 MG tablet Take 1 tablet (40 mg total) by mouth once daily. 60 tablet 5     ticagrelor (BRILINTA) 60 mg tablet Take 1 tablet (60 mg total) by mouth 2 (two) times daily. 180 tablet 1     vitamin D (VITAMIN D3) 1000 units Tab Take 1,000 Units by mouth once daily.       [DISCONTINUED] clopidogreL (PLAVIX) 75 mg tablet Take 1 tablet (75 mg total) by mouth once daily. for 21 days 21 tablet 0

## 2025-02-18 NOTE — ED NOTES
Patient to IR with RN and ED paramedic. All patient belongings going upstairs with the patient. Anesthesia at bedside. IR fellow at bedside. Neuro at bedside. ED provider at bedside.

## 2025-02-18 NOTE — Clinical Note
Diagnosis: Acute right MCA stroke [147830]   Reason for IP Medical Treatment  (Clinical interventions that can only be accomplished in the IP setting? ) :: Stroke   Plans for Post-Acute care--if anticipated (pick the single best option):: A. No post acute care anticipated at this time

## 2025-02-18 NOTE — ED NOTES
Patient is an 83 yo female presents via flight care for evaluation of CVA s/p TNK. Received TNK at 11:04. Patient last known normal 0830 this morning. Has decreased Left side sensation, left facial droop, slurred speech. Arrives as [facility] transfer. No family at bedside. Patient arrives with 2.5mg of cardene infusing. BP goal <180 systolic per neuro resident.

## 2025-02-18 NOTE — ED PROVIDER NOTES
Encounter Date: 2/18/2025       History     Chief Complaint   Patient presents with    transfer     [facility] night. Left sided weakness and facial droop.  Right gaze. TNK 1103a.  No hx blood thinners 2.5mg cardene with flight.  Also has pelvic frx from previous fall.      HPI    Patient is a 84-year-old female with past medical history basal cell carcinoma, hypercholesterolemia that is presenting for evaluation for further evaluation for LVO.  Patient is a transfer from outside facility for large vessel occlusion.  Last known normal as per EMS report and medical records 8:00 p.m..  Patient woke up this morning at 8:30 a.m. secondary to left-sided weakness and facial droop.  Patient had right-sided gaze.  Patient was found to have large vessel occlusion.  TNK was given prior to arrival at 11:03 a.m..  As per EMS report, patient began to have improved to movement left side, blood pressure controlled with Cardene prior to arrival.  Patient states that she feels improved on her left side, weakness improving.  Patient denies any new symptoms.    Review of patient's allergies indicates:  No Known Allergies  Past Medical History:   Diagnosis Date    Cancer     basal cell carcinoma     Past Surgical History:   Procedure Laterality Date    CARPAL TUNNEL RELEASE      ESOPHAGOGASTRODUODENOSCOPY N/A 8/22/2022    Procedure: EGD (ESOPHAGOGASTRODUODENOSCOPY);  Surgeon: Steven Lopez MD;  Location: [address]);  Service: Endoscopy;  Laterality: N/A;    ESOPHAGOGASTRODUODENOSCOPY N/A 12/5/2022    Procedure: EGD (ESOPHAGOGASTRODUODENOSCOPY);  Surgeon: Steven Lopez MD;  Location: [address]);  Service: Endoscopy;  Laterality: N/A;  Has estimated pulmonary artery pressure 45, schedule location per protocol.   Please schedule with Dr. Darryl Lopez-  Plavix stopped 8/23/22  [facility]  pre call complete; [facility] 11/28/22     ESOPHAGOGASTRODUODENOSCOPY N/A 7/11/2023    Procedure: EGD (ESOPHAGOGASTRODUODENOSCOPY);  Surgeon: Natalie Fall MD;  Location: [facility];  Service: Endoscopy;  Laterality: N/A;    EYE SURGERY      left eye cataract    LEFT HEART CATHETERIZATION  4/17/2024    Procedure: Left heart cath;  Surgeon: Elisabeth De La Cruz MD;  Location: [facility] CATH;  Service: Cardiology;;    TONSILLECTOMY      TUBAL LIGATION       No family history on file.  Social History[1]  Review of Systems   Constitutional:         No other system positives other than aforementioned as reported by patient       Physical Exam     Initial Vitals [02/18/25 1154]   BP Pulse Resp Temp SpO2   (!) 160/100 82 18 -- 96 %      MAP       --         Physical Exam    Vitals reviewed.  Constitutional: She appears well-developed and well-nourished. She is not diaphoretic. No distress.   Patient is awake, alert, appropriately conversational at this time.   Eyes: EOM are normal.   Cardiovascular:  Normal rate, regular rhythm, normal heart sounds and intact distal pulses.     Exam reveals no gallop and no friction rub.       No murmur heard.  Pulmonary/Chest: Breath sounds normal. No respiratory distress. She has no wheezes. She has no rales.   Breathing comfortably on nasal cannula.  No respiratory distress.  No additional sounds on auscultation.   Musculoskeletal:         General: No tenderness or edema. Normal range of motion.     Neurological: She is alert and oriented to person, place, and time. She has normal strength. GCS eye subscore is 4. GCS verbal subscore is 5. GCS motor subscore is 6.   Awake, alert, appropriately conversational.  Left-sided upper extremity drift has improved as compared to previous ED records.  Patient has no obvious drift on left upper extremity.  See neurology evaluation for complete NIH.   Skin: Skin is warm and dry. No rash noted. No erythema. No pallor.         ED Course   Procedures  Labs Reviewed - No data to display       Imaging  Results              IR Angiogram Carotid Cerebral Bilateral inc Arch (In process)                       CT Head Without Contrast (Final result)  Result time 02/18/25 12:14:16      Final result by Will Wei MD (02/18/25 12:14:16)                   Impression:      Evidence of early ischemic change in the right MCA distribution noting some cortical contrast enhancement corresponding to the area of diffusion restriction from the MRI performed earlier on the same date.    No new parenchymal hemorrhage or major vascular distribution infarct elsewhere.    Mild chronic small vessel ischemic change with remote left parietal infarct.    Extra-axial mass along the left petroclival ligament, presumed to reflect a meningioma.  Recommend a contrast enhanced MRI for further characterization.    This report was flagged in Epic as abnormal.      Electronically signed by: Will Wei MD  Date:    02/18/2025  Time:    12:14               Narrative:    EXAMINATION:  CT HEAD WITHOUT CONTRAST    CLINICAL HISTORY:  Stroke, follow up;    TECHNIQUE:  Low dose axial CT images obtained throughout the head without the use of intravenous contrast.  Axial, sagittal and coronal reconstructions were performed.    COMPARISON:  CTA 02/18/2025 at 10:17, MRI 02/18/2025 at 10:33    FINDINGS:  Intracranial compartment:    Probable subtle contrast enhancement in the cortex of the right cerebral hemisphere, including the posterior insular region and right parietal lobe, which corresponds to the area of diffusion abnormality on the recent MRI.  No new parenchymal changes to suggest progression of a major vascular distribution infarct.  No significant mass effect.  No new parenchymal hemorrhage..    Patchy chronic small vessel ischemic change with remote infarct in the left parietal lobe.    Ventricles are stable in size.  Mild cerebral volume loss.  No hydrocephalus.    Extra-axial mass centered in the region of petroclival ligament with some  extension of the posterior fossa.  Lesion measuring up to 2.5 by 1.7 cm in maximal transverse dimension (series 2, image 10) no new extra-axial blood or fluid collections.    Skull/extracranial contents (limited evaluation):    No displaced calvarial fracture.    The mastoid air cells and visualized paranasal sinuses are essentially clear.    Bilateral pseudophakia.                                       Medications   sodium chloride 0.9% flush 10 mL (has no administration in time range)     Medical Decision Making  Patient is an 84-year-old female presenting as a transfer from outside facility for large vessel occlusion.  Patient was given TNK prior to arrival, had improving symptoms.  Patient had a short stay in ED, unable to complete NIH stroke scale as patient was moved quickly for large vessel occlusion treatment as appropriate specialties were at bedside including Neurology.  Patient otherwise appeared to have improving symptoms overall, as patient was awake and alert and appropriately conversational to simple questions with improving left-sided symptoms, no obvious drift on exam.    Risk  Decision regarding hospitalization.               ED Course as of 02/18/25 1218   Tue Feb 18, 2025   1156 Consulted vascular Neurology [RT]      ED Course User Index  [RT] Sandeep Headley MD                           Clinical Impression:  Final diagnoses:  [I63.511] Acute right MCA stroke          ED Disposition Condition    Admit                   [1]   Social History  Tobacco Use    Smoking status: Never     Passive exposure: Never    Smokeless tobacco: Never   Substance Use Topics    Alcohol use: No    Drug use: No        Sandeep Headley MD  02/18/25 1218

## 2025-02-18 NOTE — ASSESSMENT & PLAN NOTE
84 year old woman with PMH BCC, HTN, HLD, aortic atherosclerosis, GI bleed, prior left MCA infarct 2022 who presented to [facility] ED 2/18 for left sided weakness, left facial droop, and dysarthria. LKW 2/17 2030, woke up 0830 with symptoms. CTA with right M1 occlusion. Telestroke NIHSS 10. Received TNK prior to transfer at 1104. Transferred to [facility] for IR evaluation. On arrival NIHSS 7. Repeat CTH stable. Went to IR. Admitted to [facility].  Antithrombotics for secondary stroke prevention: Antiplatelets: Aspirin: 81 mg daily  Clopidogrel: 75 mg daily    Statins for secondary stroke prevention and hyperlipidemia, if present:   Statins: Atorvastatin- 40 mg daily    Aggressive risk factor modification: HTN, HLD, AAD     Rehab efforts: The patient has been evaluated by a stroke team provider and the therapy needs have been fully considered based off the presenting complaints and exam findings. The following therapy evaluations are needed: PT evaluate and treat, OT evaluate and treat, SLP evaluate and treat    Diagnostics ordered/pending: TTE to assess cardiac function/status     VTE prophylaxis: None: Reason for No Pharmacological VTE Prophylaxis: Holding x 24 hours s/p treatment with Thrombolytic therapy    BP parameters: Infarct: SBP < 160

## 2025-02-18 NOTE — NURSING
Patient arrived to [facility] from IR  Report received from: Naila MAYERS    Type of stroke/diagnosis:   Right M1 occlusion    Tenecteplase end time 1104    Thrombectomy end time 1250    Current symptoms: spont mvts X4, AAOX4, flat x2 hrs, DP pulse 2+    Skin Assessment done: Yes  Wounds noted: right groin site  *If wounds noted, was Wound Care consulted? N/A  *If wounds noted, LDA placed? Yes  Skin Assessment Verified by:  MACO Claudio Completed? Pending, pt lay flat x2 hrs    Patient Belongings on Admit: none    NCC notified: MD Bryan

## 2025-02-18 NOTE — H&P
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  Patient is a transfer from outside facility 2/18 for large vessel occlusion.  Last known normal as per EMS report and medical records 8:00 p.m..  Patient woke up this morning at 8:30 a.m. secondary to left-sided weakness and facial droop.  Patient had right-sided gaze.  Patient was found to have large vessel occlusion.  TNK was given prior to arrival at 11:03 a.m..  As per EMS report, patient began to have improved to movement left side, blood pressure controlled with Cardene prior to arrival.  Patient states that she feels improved on her left side, weakness improving.  Patient denies any new symptoms. On arrival to [facility], patient taken for cerebral angiogram and RUDY, which showed two distal occlusions in M3 and M4. Patient TICI 2C after TNK.      Past Medical History:   Diagnosis Date    Cancer     basal cell carcinoma     Past Surgical History:   Procedure Laterality Date    CARPAL TUNNEL RELEASE      ESOPHAGOGASTRODUODENOSCOPY N/A 8/22/2022    Procedure: EGD (ESOPHAGOGASTRODUODENOSCOPY);  Surgeon: Steven Lopez MD;  Location: [address]);  Service: Endoscopy;  Laterality: N/A;    ESOPHAGOGASTRODUODENOSCOPY N/A 12/5/2022    Procedure: EGD (ESOPHAGOGASTRODUODENOSCOPY);  Surgeon: Steven Lopez MD;  Location: [address]);  Service: Endoscopy;  Laterality: N/A;  Has estimated pulmonary artery pressure 45, schedule location per protocol.   Please schedule with Dr. Darryl Lopez-  Plavix stopped 8/23/22  pt requested to schedule after Thanksgiving/ inst portal-RB  pre  call complete; [facility] 11/28/22    ESOPHAGOGASTRODUODENOSCOPY N/A 7/11/2023    Procedure: EGD (ESOPHAGOGASTRODUODENOSCOPY);  Surgeon: Natalie Fall MD;  [facility]: [facility] ENDO;  Service: Endoscopy;  Laterality: N/A;    EYE SURGERY      left eye cataract    LEFT HEART CATHETERIZATION  4/17/2024    Procedure: Left heart cath;  Surgeon: Elisabeth De La Cruz MD;  Location: [facility] CATH;  Service: Cardiology;;    TONSILLECTOMY      TUBAL LIGATION        Medications Ordered Prior to Encounter[1]   Allergies: Patient has no known allergies.  No family history on file.  Social History[2]  Review of Systems   Respiratory:  Negative for cough and shortness of breath.    Cardiovascular:  Negative for chest pain and palpitations.   Gastrointestinal:  Negative for abdominal pain and vomiting.   Neurological:  Positive for weakness (right side) and headaches.     Objective:     Vitals:    Temp: 96.9 °F (36.1 °C)  Pulse: 74  BP: (!) 123/58  MAP (mmHg): 83  Resp: (!) 21  SpO2: 100 %    Temp  Min: 96.9 °F (36.1 °C)  Max: 98 °F (36.7 °C)  Pulse  Min: 72  Max: 92  BP  Min: 118/59  Max: 209/83  MAP (mmHg)  Min: 83  Max: 116  Resp  Min: 18  Max: 27  SpO2  Min: 83 %  Max: 100 %    No intake/output data recorded.            Physical Exam  Vitals and nursing note reviewed. Exam conducted with a chaperone present.   HENT:      Head: Normocephalic and atraumatic.   Eyes:      Conjunctiva/sclera: Conjunctivae normal.      Pupils: Pupils are equal, round, and reactive to light.   Cardiovascular:      Rate and Rhythm: Normal rate and regular rhythm.   Pulmonary:      Effort: Pulmonary effort is normal.      Breath sounds: Normal breath sounds.   Abdominal:      Palpations: Abdomen is soft.      Tenderness: There is no abdominal tenderness.   Musculoskeletal:      Comments: DP pulses intact by doppler   Skin:     Comments: Dressing in place over R groin, c/d/i without swelling   Neurological:      Mental Status: She is alert.      Sensory: Sensation is  intact.      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 LKW 2/17 2030, woke up 0830 with symptoms. CTA with right M1 occlusion. Telestroke NIHSS 10. Received TNK prior to transfer at 1104. Transferred to [facility] for IR evaluation. On arrival NIHSS 7. Repeat CTH stable. Went to IR. Admitted to [facility].  Antithrombotics for secondary stroke prevention: Antiplatelets: Aspirin: 81 mg daily  Clopidogrel: 75 mg daily    Statins for secondary stroke prevention and hyperlipidemia, if present:   Statins: Atorvastatin- 40 mg daily    Aggressive risk factor modification: HTN, HLD, AAD     Rehab efforts: The patient has been evaluated by a stroke team provider and the therapy needs have been fully considered based off the presenting complaints and exam findings. The following therapy evaluations are needed: PT evaluate and treat, OT evaluate and treat, SLP evaluate and treat    Diagnostics ordered/pending: TTE to assess cardiac function/status     VTE prophylaxis: None: Reason for No Pharmacological VTE Prophylaxis: Holding x 24 hours s/p treatment with Thrombolytic therapy    BP parameters: Infarct: SBP < 160        Cardiac/Vascular  Hypertension  -Resume home medications when appropriate    Pure hypercholesterolemia  -Statin          The patient is being Prophylaxed for:  Venous Thromboembolism with: Mechanical  Stress Ulcer  with: PPI  Ventilator Pneumonia with: not applicable    Activity Orders            Progressive Mobility Protocol (mobilize patient to their highest level of functioning at least twice daily) starting at 02/18 2000    Turn patient starting at 02/18 1600    Diet NPO: NPO starting at 02/18 1438    Elevate HOB starting at 02/18 1438          Full Code    Robert Houston MD  Neurocritical Care  [facility] - Neuro Critical Care       [1]   Current Facility-Administered Medications on File Prior to Encounter   Medication Dose Route Frequency Provider Last Rate Last Admin    [COMPLETED] iohexoL (OMNIPAQUE 350) injection 80 mL  80 mL Intravenous ONCE PRN Isabelle Victoria MD   80 mL at 02/18/25 1016    [COMPLETED] tenecteplase (TNKase) IV KIT 13.5 mg  0.25 mg/kg Intravenous Once Isabelle Victoria MD   13.5 mg at 02/18/25 1104    [DISCONTINUED] niCARdipine 40 mg/200 mL (0.2 mg/mL) infusion  0-15 mg/hr Intravenous Continuous Isabelel Victoria MD 25 mL/hr at 02/18/25 1105 5 mg/hr at 02/18/25 1105    [DISCONTINUED] sodium chloride 0.9% flush 10 mL  10 mL Intravenous Once Isabelle Victoria MD         Current Outpatient Medications on File Prior to Encounter   Medication Sig Dispense Refill    atorvastatin (LIPITOR) 40 MG tablet Take 1 tablet (40 mg total) by mouth once daily. 90 tablet 3    carvediloL (COREG) 6.25 MG tablet Take 1 tablet (6.25 mg total) by mouth 2 (two) times daily with meals. 180 tablet 3    cyanocobalamin (VITAMIN B-12) 1000 MCG tablet Take 1,000 mcg by mouth once daily.      pantoprazole (PROTONIX) 40 MG tablet Take 1 tablet (40 mg total) by mouth once daily. 60 tablet 5    ticagrelor (BRILINTA) 60 mg tablet Take 1 tablet (60 mg total) by mouth 2 (two) times daily. 180 tablet 1    vitamin D (VITAMIN D3) 1000 units Tab Take 1,000 Units by mouth once daily.      [DISCONTINUED] alendronate (FOSAMAX) 70 MG tablet Take 1 tablet (70 mg total) by mouth every 7 days. 4 tablet 11    [DISCONTINUED] clopidogreL (PLAVIX) 75 mg tablet  Take 1 tablet (75 mg total) by mouth once daily. for 21 days 21 tablet 0   [2]   Social History  Tobacco Use    Smoking status: Never     Passive exposure: Never    Smokeless tobacco: Never   Substance Use Topics    Alcohol use: No    Drug use: No

## 2025-02-18 NOTE — CONSULTS
Interventional Neuroradiology Pre-procedure Note    Procedure: Diagnostic cerebral angiogram and RUDY    History of Present Illness:  Katelyn Caba is a 84 y.o. female who is airlifted to [facility] after OSH CTA discovered Right distal M1 occlusion. Pt was symptomatic upon waking up. Wake protocol MR head was performed and TNK was administered after DWI-FLAIR mismatch was seen. NIHSS on tele stroke eval was 10 . On arrival to [facility], NIHSS is 10 (unchanged).    ROS:   Hematological: no known coagulopathies  Respiratory: no shortness of breath  Cardiovascular: no chest pain  Gastrointestinal: no abdominal pain  Genito-Urinary: no dysuria  Musculoskeletal: negative  Neurological: +right MCA stroke     Scheduled Meds:   Current Meds: Current Medications[1]   Continuous Infusions:   PRN Meds:    Allergies: Review of patient's allergies indicates:  No Known Allergies  Sedation Hx: No adverse events.    Labs:  PT/INR/PTT:  --/1.1/30.2 (02/18 1026)  WBC/Hgb/Hct/Plts:  4.15/9.6/29.2/327 (02/18 1026)  BUN/Cr/glu/ALT/AST/amyl/lip:  13/0.55/--/<7/19/--/-- (02/18 1026)     Objective:  Vitals: There were no vitals taken for this visit.     Physical Exam:  Awake and follows commands, right gaze deviation that can not overcome to left, left facial droop, LUE 4/5, LLE 3/5, reduced sensation in LUE and LLE, mild to moderate dysarthria , left sided neglect     ASA: 3  MAL: Pt will be intubated    Plan:  -Plan for cerebral angiogram with possible intervention  -Sedation Plan: General anesthesia  -All diagnostics and imaging reviewed  -Risks & benefits of procedure explained in detail; patient's spouse consented over the phone and all questions answered  -Further reccs to follow procedure          Vasquez Callahan MD, MHA  Fellow, NeuroEndovascular Surgery, [facility]  Neurologist, [facility]  [place], [place]         [1] No current facility-administered medications for this encounter.    Current Outpatient Medications:      alendronate (FOSAMAX) 70 MG tablet, Take 1 tablet (70 mg total) by mouth every 7 days., Disp: 4 tablet, Rfl: 11    atorvastatin (LIPITOR) 40 MG tablet, Take 1 tablet (40 mg total) by mouth once daily., Disp: 90 tablet, Rfl: 3    carvediloL (COREG) 6.25 MG tablet, Take 1 tablet (6.25 mg total) by mouth 2 (two) times daily with meals., Disp: 180 tablet, Rfl: 3    cyanocobalamin (VITAMIN B-12) 1000 MCG tablet, Take 1,000 mcg by mouth once daily., Disp: , Rfl:     pantoprazole (PROTONIX) 40 MG tablet, Take 1 tablet (40 mg total) by mouth once daily., Disp: 60 tablet, Rfl: 5    ticagrelor (BRILINTA) 60 mg tablet, Take 1 tablet (60 mg total) by mouth 2 (two) times daily., Disp: 180 tablet, Rfl: 1    vitamin D (VITAMIN D3) 1000 units Tab, Take 1,000 Units by mouth once daily., Disp: , Rfl:

## 2025-02-18 NOTE — TRANSFER OF CARE
"Anesthesia Transfer of Care Note    Patient: Katelyn Caba    Procedure(s) Performed: * No procedures listed *    Patient location: ICU    Anesthesia Type: general    Transport from OR: Transported from OR on 6-10 L/min O2 by face mask with adequate spontaneous ventilation. Continuous ECG monitoring in transport. Continuous SpO2 monitoring in transport    Post pain: adequate analgesia    Post assessment: no apparent anesthetic complications and tolerated procedure well    Post vital signs: stable    Level of consciousness: awake, alert and oriented    Nausea/Vomiting: no nausea/vomiting    Complications: none    Transfer of care protocol was followed      Last vitals: Visit Vitals  BP (!) 142/63   Pulse 78   Resp 18   Ht 5' 6" (1.676 m)   Wt 60 kg (132 lb 4.4 oz)   SpO2 100%   BMI 21.35 kg/m²     "

## 2025-02-18 NOTE — SEDATION DOCUMENTATION
Pt arrived to IR Room 200  for Thrombectomy. Comfort measures utilized. Pt safely transferred from stretcher to procedural table. Fall risk reviewed with patient, fall risk interventions maintained. Safety strap applied, positioner pillows utilized to minimize pressure points. Blankets applied. Pt prepped and draped utilizing standard sterile technique. Patient placed on continuous monitoring, as required by sedation policy. Timeouts completed utilizing standard universal time-out, per department and facility policy. Pt is under the direct care of the anesthesia team at this time. RN to remain at bedside, continuous monitoring maintained. Pt resting comfortably. Denies pain/discomfort. Will continue to monitor. See flow sheets for monitoring, medication administration, and updates.

## 2025-02-18 NOTE — HPI
Katelyn Caba is an 84 year old woman with PMH BCC, HTN, HLD, Aortic atherosclerosis, GI bleed, prior left MCA infarct 2022 who presented to [facility] ED 2/18 for left sided weakness, left facial droop, and dysarthria. LKW 2/17 2030 when she went to bed, woke up with symptoms present at 0830. Received TNK prior to transfer at 1104. Transferred to [facility] for IR evaluation.

## 2025-02-18 NOTE — ANESTHESIA PREPROCEDURE EVALUATION
[facility]  Anesthesia Pre-Operative Evaluation         Patient Name: Katelyn Caba  YOB: 1940  MRN: [id number]    SUBJECTIVE:     Pre-operative evaluation for IR ANGIOGRAM CAROTID INTERNAL INC ARCH AND CEREBRAL BILATERAL      02/18/2025    Katelyn Caba is a 84 y.o. female w/ a significant PMHx of HTN, HLD, ISAIAH, and acute CVA.    Patient now presents for the above procedure(s).    Paper consent completed via telephone with , Eder Doe.      Echo 4/2024    Left Ventricle: The left ventricle is normal in size. Normal wall thickness. There is normal systolic function with a visually estimated ejection fraction of 55 - 60%.    Right Ventricle: Normal right ventricular cavity size. Systolic function is normal.    Left Atrium: Left atrium is mildly dilated.    Mitral Valve: There is mild regurgitation.    Pulmonary Artery: There is mild pulmonary hypertension. The estimated pulmonary artery systolic pressure is 41 mmHg.    IVC/SVC: Normal venous pressure at 3 mmHg.        LDA:       Peripheral IV - Single Lumen 02/18/25 1101 20 G 1 1/4 in Distal;Posterior;Right Forearm (Active)   Site Assessment Clean;Dry;Intact 02/18/25 1101   Extremity Assessment Distal to IV No abnormal discoloration;No redness;No swelling 02/18/25 1101   Dressing Status Clean;Dry;Intact 02/18/25 1101   Dressing Intervention Integrity maintained 02/18/25 1101   Number of days: 0            Peripheral IV - Single Lumen 02/18/25 18 G 1 3/4 in Anterior;Left;Proximal Forearm (Active)   Site Assessment Clean;Dry;Intact 02/18/25 1102   Extremity Assessment Distal to IV No abnormal discoloration;No redness;No swelling 02/18/25 1102   Dressing Status Clean;Dry;Intact 02/18/25 1102   Dressing Intervention Integrity maintained 02/18/25 1102   Number of days: 0       Prev airway: None documented.    Drips: None documented.      Problem List[1]    Review of patient's allergies indicates:  No Known  Allergies    Current Inpatient Medications:      Medications Ordered Prior to Encounter[2]    Past Surgical History:   Procedure Laterality Date    CARPAL TUNNEL RELEASE      ESOPHAGOGASTRODUODENOSCOPY N/A 8/22/2022    Procedure: EGD (ESOPHAGOGASTRODUODENOSCOPY);  Surgeon: Steven Lopez MD;  Location: [address]);  Service: Endoscopy;  Laterality: N/A;    ESOPHAGOGASTRODUODENOSCOPY N/A 12/5/2022    Procedure: EGD (ESOPHAGOGASTRODUODENOSCOPY);  Surgeon: Steven Lopez MD;  Location: [address]);  Service: Endoscopy;  Laterality: N/A;  Has estimated pulmonary artery pressure 45, schedule location per protocol.   Please schedule with Dr. Darryl Lopez-  Plavix stopped 8/23/22  pt requested to schedule after Thanksgiving/ inst portal-RB  pre call complete; [facility] 11/28/22    ESOPHAGOGASTRODUODENOSCOPY N/A 7/11/2023    Procedure: EGD (ESOPHAGOGASTRODUODENOSCOPY);  Surgeon: Natalie Fall MD;  Location: [facility];  Service: Endoscopy;  Laterality: N/A;    EYE SURGERY      left eye cataract    LEFT HEART CATHETERIZATION  4/17/2024    Procedure: Left heart cath;  Surgeon: Elisabeth De La Cruz MD;  Location: [facility] CATH;  Service: Cardiology;;    TONSILLECTOMY      TUBAL LIGATION         Social History[3]    OBJECTIVE:     Vital Signs Range (Last 24H):  Temp:  [36.7 °C (98 °F)]   Pulse:  [79-92]   Resp:  [18-25]   BP: (160-209)/()   SpO2:  [83 %-96 %]       Significant Labs:  Lab Results   Component Value Date    WBC 4.15 02/18/2025    HGB 9.6 (L) 02/18/2025    HCT 29.2 (L) 02/18/2025     02/18/2025    CHOL 171 11/01/2024    TRIG 124 11/01/2024    HDL 41 11/01/2024    ALT <7 (L) 02/18/2025    AST 19 02/18/2025     02/18/2025    K 3.9 02/18/2025     02/18/2025    CREATININE 0.55 02/18/2025    BUN 13 02/18/2025    CO2 24 02/18/2025    TSH 1.521 11/01/2024    INR 1.1 02/18/2025    HGBA1C 4.2 11/01/2024       Diagnostic Studies: No relevant studies.    EKG:   Results for orders  placed or performed during the hospital encounter of 02/18/25   ECG 12 lead    Collection Time: 02/18/25 11:01 AM   Result Value Ref Range    QRS Duration 86 ms    [facility] QTC Calculation 456 ms    Narrative    Test Reason : R29.818,    Vent. Rate :  76 BPM     Atrial Rate :  76 BPM     P-R Int : 140 ms          QRS Dur :  86 ms      QT Int : 406 ms       P-R-T Axes :  76  32 101 degrees    QTcB Int : 456 ms    Normal sinus rhythm  Low voltage QRS  Cannot rule out Anterior infarct (cited on or before 26-Sep-2024)  ST and T wave abnormality, consider lateral ischemia  Abnormal ECG  When compared with ECG of 26-Sep-2024 07:46,  No significant change was found    Referred By: AAAREFERRAL SELF           Confirmed By:        2D ECHO:  TTE:  Results for orders placed or performed during the hospital encounter of 04/17/24   Echo Saline Bubble? No   Result Value Ref Range    BSA 1.53 m2    LVOT stroke volume 63.30 cm3    LVIDd 4.21 3.5 - 6.0 cm    LV Systolic Volume 31.70 mL    LV Systolic Volume Index 20.7 mL/m2    LVIDs 2.88 2.1 - 4.0 cm    LV Diastolic Volume 79.00 mL    LV Diastolic Volume Index 51.63 mL/m2    IVS 0.94 0.6 - 1.1 cm    LVOT diameter 1.70 cm    LVOT area 2.3 cm2    FS 32 28 - 44 %    Left Ventricle Relative Wall Thickness 0.41 cm    PW 0.86 0.6 - 1.1 cm    LV mass 119.13 g    LV Mass Index 78 g/m2    MV Peak E Georges 0.88 m/s    TDI LATERAL 0.07 m/s    TDI SEPTAL 0.06 m/s    E/E' ratio 13.54 m/s    MV Peak A Georges 0.76 m/s    E/A ratio 1.16     E wave deceleration time 254.00 msec    LV SEPTAL E/E' RATIO 14.67 m/s    LV LATERAL E/E' RATIO 12.57 m/s    PV Peak S Georges 0.62 m/s    PV Peak D Georges 0.51 m/s    Pulm vein S/D ratio 1.22     Left Ventricular Outflow Tract Mean Velocity 0.68 cm/s    Left Ventricular Outflow Tract Mean Gradient 2.00 mmHg    RV S' 8.81 cm/s    TAPSE 1.95 cm    LA size 3.40 cm    LA Vol (MOD) 56.10 cm3    ERICA (MOD) 36.7 mL/m2    RA Major Axis 4.44 cm    RA Width 4.74 cm    AV mean gradient 6  mmHg    AV peak gradient 10 mmHg    Ao peak georges 1.62 m/s    Ao VTI 40.30 cm    LVOT peak VTI 27.90 cm    AV valve area 1.57 cm²    AV index (prosthetic) 0.69     MV peak gradient 3 mmHg    MV stenosis pressure 1/2 time 74.00 ms    MV valve area p 1/2 method 2.97 cm2    MV valve area by continuity eq 2.47 cm2    MV VTI 25.6 cm    PV PEAK VELOCITY 0.84 m/s    PV peak gradient 3 mmHg    Sinus 2.82 cm    IVC diameter 1.90 cm    Mean e' 0.07 m/s    ZLVIDS 0.31     ZLVIDD -0.58     TR Max Georges 3.1 m/s    TV resting pulmonary artery pressure 41 mmHg    RV TB RVSP 6 mmHg    Triscuspid Valve Regurgitation Peak Gradient 38 mmHg    Est. RA pres 3 mmHg    Narrative      Left Ventricle: The left ventricle is normal in size. Normal wall   thickness. There is normal systolic function with a visually estimated   ejection fraction of 55 - 60%.    Right Ventricle: Normal right ventricular cavity size. Systolic   function is normal.    Left Atrium: Left atrium is mildly dilated.    Mitral Valve: There is mild regurgitation.    Pulmonary Artery: There is mild pulmonary hypertension. The estimated   pulmonary artery systolic pressure is 41 mmHg.    IVC/SVC: Normal venous pressure at 3 mmHg.         EPHRAIM:  No results found for this or any previous visit.    ASSESSMENT/PLAN:           Pre-op Assessment    I have reviewed the Patient Summary Reports.    I have reviewed the NPO Status.      Review of Systems  Anesthesia Hx:  No problems with previous Anesthesia             Denies Family Hx of Anesthesia complications.    Denies Personal Hx of Anesthesia complications.                    Social:  Non-Smoker, No Alcohol Use       Hematology/Oncology:       -- Anemia:                  Denies Current/Recent Cancer                Cardiovascular:     Hypertension    Denies CAD.     Denies Dysrhythmias.    Denies CHF.    hyperlipidemia                               Pulmonary:    Denies COPD.  Denies Asthma.     Denies Sleep Apnea.                 Renal/:   Denies Chronic Renal Disease.                Hepatic/GI:      Denies GERD. Denies Liver Disease.               Musculoskeletal:  Denies Arthritis.               Neurological:   CVA Denies Neuromuscular Disease.   Denies Seizures.          Denies Chronic Pain Syndrome                         Endocrine:  Denies Diabetes.   Denies Hyperthyroidism.       Denies Obesity / BMI > 30  Psych:   denies anxiety denies depression              Physical Exam  General: Well nourished      Anesthesia Plan  Type of Anesthesia, risks & benefits discussed:    Anesthesia Type: Gen ETT  Intra-op Monitoring Plan: Standard ASA Monitors  Post Op Pain Control Plan: multimodal analgesia and IV/PO Opioids PRN  Induction:  IV and rapid sequence  Airway Plan: Direct and Video, Post-Induction  Informed Consent: Informed consent signed with the Patient and all parties understand the risks and agree with anesthesia plan.  All questions answered.   ASA Score: 4 Emergent  Day of Surgery Review of History & Physical: H&P Update referred to the surgeon/provider.    Ready For Surgery From Anesthesia Perspective.     .             [1]  Patient Active Problem List  Diagnosis    Venous insufficiency    H/O ischemic left MCA stroke    Pure hypercholesterolemia    Right homonymous inferior quadrantanopia    Speaking difficulty    ISAIAH (iron deficiency anemia)    Volume overload    Gastrointestinal hemorrhage    Solitary pulmonary nodule    Aortic atherosclerosis    Symptomatic anemia    History of gastric ulcer    Rodriguez's esophagus    Hypertension    History of GI bleed    Acute ischemic stroke   [2]  No current facility-administered medications on file prior to encounter.     Current Outpatient Medications on File Prior to Encounter   Medication Sig Dispense Refill    alendronate (FOSAMAX) 70 MG tablet Take 1 tablet (70 mg total) by mouth every 7 days. 4 tablet 11    atorvastatin (LIPITOR) 40 MG tablet Take 1 tablet (40 mg  total) by mouth once daily. 90 tablet 3    carvediloL (COREG) 6.25 MG tablet Take 1 tablet (6.25 mg total) by mouth 2 (two) times daily with meals. 180 tablet 3    cyanocobalamin (VITAMIN B-12) 1000 MCG tablet Take 1,000 mcg by mouth once daily.      pantoprazole (PROTONIX) 40 MG tablet Take 1 tablet (40 mg total) by mouth once daily. 60 tablet 5    ticagrelor (BRILINTA) 60 mg tablet Take 1 tablet (60 mg total) by mouth 2 (two) times daily. 180 tablet 1    vitamin D (VITAMIN D3) 1000 units Tab Take 1,000 Units by mouth once daily.      [DISCONTINUED] clopidogreL (PLAVIX) 75 mg tablet Take 1 tablet (75 mg total) by mouth once daily. for 21 days 21 tablet 0   [3]  Social History  Socioeconomic History    Marital status:    Tobacco Use    Smoking status: Never     Passive exposure: Never    Smokeless tobacco: Never   Substance and Sexual Activity    Alcohol use: No    Drug use: No     Social Drivers of Health     Financial Resource Strain: Low Risk  (6/18/2024)    Overall Financial Resource Strain (CARDIA)     Difficulty of Paying Living Expenses: Not very hard   Food Insecurity: No Food Insecurity (6/18/2024)    Hunger Vital Sign     Worried About Running Out of Food in the Last Year: Never true     Ran Out of Food in the Last Year: Never true   Transportation Needs: No Transportation Needs (4/18/2024)    PRAPARE - Transportation     Lack of Transportation (Medical): No     Lack of Transportation (Non-Medical): No   Physical Activity: Sufficiently Active (6/18/2024)    Exercise Vital Sign     Days of Exercise per Week: 7 days     Minutes of Exercise per Session: 30 min   Stress: No Stress Concern Present (6/18/2024)    [employer] - Occupational Stress Questionnaire     Feeling of Stress : Only a little   Housing Stability: Low Risk  (6/18/2024)    Housing Stability Vital Sign     Unable to Pay for Housing in the Last Year: No     Number of Times Moved in the  Last Year: 1     Homeless in the Last Year: No

## 2025-02-18 NOTE — H&P
[facility] Neuro Critical Care  Vascular Neurology  Comprehensive Stroke Center  History & Physical    Inpatient consult to Neurology Services (Vascular Neurology)  Consult performed by: Jacki Case MD  Consult ordered by: Robert Houston MD  Reason for consult: stroke transfer        Assessment/Plan:     Patient is a 84 y.o. year old female with:    * Stroke due to embolism of right middle cerebral artery  84 year old woman with PMH BCC, HTN, HLD, aortic atherosclerosis, GI bleed, prior left MCA infarct 2022 who presented to [facility] ED 2/18 for left sided weakness, left facial droop, and dysarthria. LKW 2/17 2030, woke up 0830 with symptoms. CTA with right M1 occlusion. Telestroke NIHSS 10. Received TNK prior to transfer at 1104. [place] [facility] [place]. On arrival NIHSS 7. Repeat CTH stable. Went to to IR. To be admitted to [facility] afterward.    Antithrombotics for secondary stroke prevention: Antiplatelets: Aspirin: 81 mg daily  Clopidogrel: 75 mg daily    Statins for secondary stroke prevention and hyperlipidemia, if present:   Statins: Atorvastatin- 40 mg daily    Aggressive risk factor modification: HTN, HLD, AAD     Rehab efforts: The patient has been evaluated by a stroke team provider and the therapy needs have been fully considered based off the presenting complaints and exam findings. The following therapy evaluations are needed: PT evaluate and treat, OT evaluate and treat, SLP evaluate and treat    Diagnostics ordered/pending: TTE to assess cardiac function/status     VTE prophylaxis: None: Reason for No Pharmacological VTE Prophylaxis: Holding x 24 hours s/p treatment with Thrombolytic therapy    BP parameters: Infarct: Post sucessful thrombectomy, SBP <140  Post unsucessful thrombectomy, SBP <220            STROKE DOCUMENTATION     Acute Stroke Times   Last Known Normal Date: 02/17/25  Last Known Normal Time: 2030  Unknown Symptom Onset Date: Unknown Date  Unknown Symptom Onset Time:  Unknown Time  Stroke Team Called Date: 02/18/25  Stroke Team Called Time: 1156  Stroke Team Arrival Date: 02/18/25  Stroke Team Arrival Time: 1156  CT Interpretation Time: 1200  Thrombolytic Therapy Recommended: Yes  CTA Interpretation Time:  (prior to arrival)  Thrombectomy Recommended: Yes  Decision to Treat Time for Tenecteplase:  (prior to arrival at outside facility)  Decision to Treat Time for IR:  (prior to arrival)    NIH Scale:  1a. Level of Consciousness: 0-->Alert, keenly responsive  1b. LOC Questions: 0-->Answers both questions correctly  1c. LOC Commands: 0-->Performs both tasks correctly  2. Best Gaze: 2-->Forced deviation, or total gaze paresis not overcome by the oculocephalic maneuver  3. Visual: 0-->No visual loss  4. Facial Palsy: 2-->Partial paralysis (total or near-total paralysis of lower face)  5a. Motor Arm, Left: 0-->No drift, limb holds 90 (or 45) degrees for full 10 secs  5b. Motor Arm, Right: 0-->No drift, limb holds 90 (or 45) degrees for full 10 secs  6a. Motor Leg, Left: 0-->No drift, leg holds 30 degree position for full 5 secs  6b. Motor Leg, Right: 0-->No drift, leg holds 30 degree position for full 5 secs  7. Limb Ataxia: 0-->Absent  8. Sensory: 1-->Mild-to-moderate sensory loss, patient feels pinprick is less sharp or is dull on the affected side, or there is a loss of superficial pain with pinprick, but patient is aware of being touched  9. Best Language: 0-->No aphasia, normal  10. Dysarthria: 1-->Mild-to-moderate dysarthria, patient slurs at least some words and, at worst, can be understood with some difficulty  11. Extinction and Inattention (formerly Neglect): 1-->Visual, tactile, auditory, spatial, or personal inattention or extinction to bilateral simultaneous stimulation in one of the sensory modalities  Total (NIH Stroke Scale): 7     Modified Keegan    Gabriel Coma Scale:    ABCD2 Score:    [id number] Score:   HAS -BLED Score:   ICH Score:   Hunt & Hoffman Classification:       Thrombolysis Candidate? Yes, given prior to arrival at outside hospital    Delays to Thrombolysis?  No    Interventional Revascularization Candidate?   Is the patient eligible for mechanical endovascular reperfusion (RUDY)?  Yes    Delays to Thrombectomy? No    Hemorrhagic change of an Ischemic Stroke: Does this patient have an ischemic stroke with hemorrhagic changes? No         Subjective:     History of Present Illness:  Katelyn Caba is an 84 year old woman with PMH BCC, HTN, HLD, Aortic atherosclerosis, GI bleed, prior left MCA infarct 2022 who presented to [facility] ED 2/18 for left sided weakness, left facial droop, and dysarthria. LKW 2/17 2030 when she went to bed, woke up with symptoms present at 0830. Received TNK prior to transfer at 1104. [place] [facility] [place]           Past Medical History:   Diagnosis Date    Cancer     basal cell carcinoma     Past Surgical History:   Procedure Laterality Date    CARPAL TUNNEL RELEASE      ESOPHAGOGASTRODUODENOSCOPY N/A 8/22/2022    Procedure: EGD (ESOPHAGOGASTRODUODENOSCOPY);  Surgeon: Steven Lopez MD;  Location: [facility] ([address] [place] [address]);  Service: Endoscopy;  Laterality: N/A;    ESOPHAGOGASTRODUODENOSCOPY N/A 12/5/2022    Procedure: EGD (ESOPHAGOGASTRODUODENOSCOPY);  Surgeon: Steven Lopez MD;  Location: [facility] ([address] [place] [address]);  Service: Endoscopy;  Laterality: N/A;  Has estimated pulmonary artery pressure 45, schedule location per protocol.   Please schedule with Dr. Darryl Lopez-  Plavix stopped 8/23/22  [facility]  [facility]; [facility] 11/28/[facility]    ESOPHAGOGASTRODUODENOSCOPY N/A 7/11/2023    Procedure: EGD (ESOPHAGOGASTRODUODENOSCOPY);  Surgeon: Natalie Fall MD;  Location: [facility];  Service: Endoscopy;  Laterality: N/A;    EYE SURGERY      left eye cataract    LEFT HEART CATHETERIZATION  4/17/2024    Procedure: Left heart cath;  Surgeon: Elisabeth De La Cruz MD;  Location: [facility]  CATH;  Service: Cardiology;;    TONSILLECTOMY      TUBAL LIGATION       Social History[1]  Review of patient's allergies indicates:  No Known Allergies    Medications: I have reviewed the current medication administration record.    Prescriptions Prior to Admission[2]    Review of Systems   Constitutional:  Negative for diaphoresis and fever.   HENT:  Negative for nosebleeds and rhinorrhea.    Eyes:  Negative for discharge and redness.   Respiratory:  Negative for cough and wheezing.    Cardiovascular:  Negative for leg swelling.   Gastrointestinal:  Negative for abdominal distention and vomiting.   Neurological:  Positive for speech difficulty and weakness.     Objective:     Vital Signs (Most Recent):  Pulse: 82 (02/18/25 1154)  Resp: (!) 22 (02/18/25 1443)  BP: (!) 160/100 (02/18/25 1154)  SpO2: 96 % (02/18/25 1154)    Vital Signs Range (Last 24H):  Temp:  [98 °F (36.7 °C)]   Pulse:  [79-92]   Resp:  [18-25]   BP: (160-209)/()   SpO2:  [83 %-96 %]        Physical Exam  Vitals and nursing note reviewed.   Constitutional:       Appearance: She is not diaphoretic.   HENT:      Head: Normocephalic and atraumatic.      Nose: Nose normal. No rhinorrhea.   Eyes:      General:         Right eye: No discharge.         Left eye: No discharge.      Conjunctiva/sclera: Conjunctivae normal.   Pulmonary:      Effort: Pulmonary effort is normal. No respiratory distress.   Skin:     General: Skin is warm and dry.   Neurological:      Mental Status: She is alert.              Neurological Exam:   LOC: alert  Attention Span: Good   Language: No aphasia  Articulation: Dysarthria  Orientation: Person, Place, Time   EOM (CN III, IV, VI): Gaze preference  right  Facial Movement (CN VII): Lower facial weakness on the Left  Motor: no drift in left or right extremities  Sensation: Unable to sense light touch on left extremities      Laboratory:  CMP:   Recent Labs   Lab 02/18/25  1026   CALCIUM 8.4*   ALBUMIN 3.2*   PROT 5.8*   NA  136   K 3.9   CO2 24      BUN 13   CREATININE 0.55   ALKPHOS 137   ALT <7*   AST 19   BILITOT 0.4     CBC:   Recent Labs   Lab 02/18/25  1026   WBC 4.15   RBC 3.33*   HGB 9.6*   HCT 29.2*      MCV 88   MCH 28.8   MCHC 32.9       Diagnostic Results:  Brain/Vessel Imaging:  CTA Stroke Multiphase 2/18/25 1025  1. Occlusion of the right MCA M1 segment distal aspect.  2. Approximately 50% stenosis at the origin of the left cervical ICA.    MRI Brain w/o contrast 2/18/25 1046  Acute ischemia right MCA territory involving the frontal, parietal, and temporal lobes inclusive of the right insular cortex.     CTH 2/18/25 1207  Evidence of early ischemic change in the right MCA distribution noting some cortical contrast enhancement corresponding to the area of diffusion restriction from the MRI performed earlier on the same date.     No new parenchymal hemorrhage or major vascular distribution infarct elsewhere.     Mild chronic small vessel ischemic change with remote left parietal infarct.     Extra-axial mass along the left petroclival ligament, presumed to reflect a meningioma.  Recommend a contrast enhanced MRI for further characterization.    Cardiac Evaluation:   EKG 2/18/25: NSR        Jacki Case MD  Comprehensive Stroke Center  Department of Vascular Neurology   [facility] Neuro Critical Care              [1]   Social History  Tobacco Use    Smoking status: Never     Passive exposure: Never    Smokeless tobacco: Never   Substance Use Topics    Alcohol use: No    Drug use: No   [2]   Medications Prior to Admission   Medication Sig Dispense Refill Last Dose/Taking    alendronate (FOSAMAX) 70 MG tablet Take 1 tablet (70 mg total) by mouth every 7 days. 4 tablet 11     atorvastatin (LIPITOR) 40 MG tablet Take 1 tablet (40 mg total) by mouth once daily. 90 tablet 3     carvediloL (COREG) 6.25 MG tablet Take 1 tablet (6.25 mg total) by mouth 2 (two) times daily with meals. 180 tablet 3     cyanocobalamin  (VITAMIN B-12) 1000 MCG tablet Take 1,000 mcg by mouth once daily.       pantoprazole (PROTONIX) 40 MG tablet Take 1 tablet (40 mg total) by mouth once daily. 60 tablet 5     ticagrelor (BRILINTA) 60 mg tablet Take 1 tablet (60 mg total) by mouth 2 (two) times daily. 180 tablet 1     vitamin D (VITAMIN D3) 1000 units Tab Take 1,000 Units by mouth once daily.       [DISCONTINUED] clopidogreL (PLAVIX) 75 mg tablet Take 1 tablet (75 mg total) by mouth once daily. for 21 days 21 tablet 0

## 2025-02-19 LAB
ALBUMIN SERPL BCP-MCNC: 3.5 G/DL (ref 3.5–5.2)
ALP SERPL-CCNC: 154 U/L (ref 40–150)
ALT SERPL W/O P-5'-P-CCNC: 9 U/L (ref 10–44)
ANION GAP SERPL CALC-SCNC: 10 MMOL/L (ref 8–16)
ASCENDING AORTA: 2.56 CM
AST SERPL-CCNC: 18 U/L (ref 10–40)
AV AREA BY CONTINUOUS VTI: 1.7 CM2
AV INDEX (PROSTH): 0.54
AV LVOT MEAN GRADIENT: 2 MMHG
AV LVOT PEAK GRADIENT: 3 MMHG
AV MEAN GRADIENT: 5 MMHG
AV PEAK GRADIENT: 9 MMHG
AV VALVE AREA BY VELOCITY RATIO: 1.9 CM²
AV VALVE AREA: 1.7 CM2
AV VELOCITY RATIO: 0.6
BASOPHILS # BLD AUTO: 0.01 K/UL (ref 0–0.2)
BASOPHILS NFR BLD: 0.2 % (ref 0–1.9)
BILIRUB SERPL-MCNC: 0.5 MG/DL (ref 0.1–1)
BSA FOR ECHO PROCEDURE: 1.54 M2
BUN SERPL-MCNC: 15 MG/DL (ref 8–23)
CALCIUM SERPL-MCNC: 9 MG/DL (ref 8.7–10.5)
CHLORIDE SERPL-SCNC: 104 MMOL/L (ref 95–110)
CO2 SERPL-SCNC: 25 MMOL/L (ref 23–29)
CREAT SERPL-MCNC: 0.6 MG/DL (ref 0.5–1.4)
CV ECHO LV RWT: 0.35 CM
DIFFERENTIAL METHOD BLD: ABNORMAL
DOP CALC AO PEAK VEL: 1.5 M/S
DOP CALC AO VTI: 35.3 CM
DOP CALC LVOT AREA: 3.1 CM2
DOP CALC LVOT DIAMETER: 2 CM
DOP CALC LVOT PEAK VEL: 0.9 M/S
DOP CALC LVOT STROKE VOLUME: 59.3 CM3
DOP CALCLVOT PEAK VEL VTI: 18.9 CM
E WAVE DECELERATION TIME: 195 MS
E WAVE DECELERATION TIME: 203 MS
E/A RATIO: 1.89
E/E' RATIO: 16 M/S
ECHO EF ESTIMATED: 55 %
ECHO LV POSTERIOR WALL: 0.7 CM (ref 0.6–1.1)
EOSINOPHIL # BLD AUTO: 0 K/UL (ref 0–0.5)
EOSINOPHIL NFR BLD: 0 % (ref 0–8)
ERYTHROCYTE [DISTWIDTH] IN BLOOD BY AUTOMATED COUNT: 13.7 % (ref 11.5–14.5)
EST. GFR  (NO RACE VARIABLE): >60 ML/MIN/1.73 M^2
FRACTIONAL SHORTENING: 27.5 % (ref 28–44)
GLUCOSE SERPL-MCNC: 112 MG/DL (ref 70–110)
HCT VFR BLD AUTO: 33.1 % (ref 37–48.5)
HGB BLD-MCNC: 10.6 G/DL (ref 12–16)
IMM GRANULOCYTES # BLD AUTO: 0.02 K/UL (ref 0–0.04)
IMM GRANULOCYTES NFR BLD AUTO: 0.4 % (ref 0–0.5)
INTERVENTRICULAR SEPTUM: 0.9 CM (ref 0.6–1.1)
LA MAJOR: 4.5 CM
LA MINOR: 4.6 CM
LA WIDTH: 3.7 CM
LEFT ATRIUM SIZE: 4.2 CM
LEFT ATRIUM VOLUME INDEX: 38 ML/M2
LEFT ATRIUM VOLUME: 60 CM3
LEFT INTERNAL DIMENSION IN SYSTOLE: 2.9 CM (ref 2.1–4)
LEFT VENTRICLE DIASTOLIC VOLUME INDEX: 44.03 ML/M2
LEFT VENTRICLE DIASTOLIC VOLUME: 69.13 ML
LEFT VENTRICLE MASS INDEX: 59.5 G/M2
LEFT VENTRICLE SYSTOLIC VOLUME INDEX: 19.6 ML/M2
LEFT VENTRICLE SYSTOLIC VOLUME: 30.81 ML
LEFT VENTRICULAR INTERNAL DIMENSION IN DIASTOLE: 4 CM (ref 3.5–6)
LEFT VENTRICULAR MASS: 93.5 G
LV LATERAL E/E' RATIO: 13.3
LV SEPTAL E/E' RATIO: 21.2
LYMPHOCYTES # BLD AUTO: 0.9 K/UL (ref 1–4.8)
LYMPHOCYTES NFR BLD: 16 % (ref 18–48)
MAGNESIUM SERPL-MCNC: 2.1 MG/DL (ref 1.6–2.6)
MCH RBC QN AUTO: 28.3 PG (ref 27–31)
MCHC RBC AUTO-ENTMCNC: 32 G/DL (ref 32–36)
MCV RBC AUTO: 89 FL (ref 82–98)
MONOCYTES # BLD AUTO: 0.4 K/UL (ref 0.3–1)
MONOCYTES NFR BLD: 7.2 % (ref 4–15)
MV PEAK A VEL: 0.56 M/S
MV PEAK E VEL: 1.06 M/S
NEUTROPHILS # BLD AUTO: 4.2 K/UL (ref 1.8–7.7)
NEUTROPHILS NFR BLD: 76.2 % (ref 38–73)
NRBC BLD-RTO: 0 /100 WBC
OHS CV RV/LV RATIO: 0.58 CM
PHOSPHATE SERPL-MCNC: 4.2 MG/DL (ref 2.7–4.5)
PISA TR MAX VEL: 2.7 M/S
PLATELET # BLD AUTO: 392 K/UL (ref 150–450)
PMV BLD AUTO: 9.8 FL (ref 9.2–12.9)
POCT GLUCOSE: 112 MG/DL (ref 70–110)
POCT GLUCOSE: 137 MG/DL (ref 70–110)
POCT GLUCOSE: 99 MG/DL (ref 70–110)
POTASSIUM SERPL-SCNC: 4.6 MMOL/L (ref 3.5–5.1)
PROT SERPL-MCNC: 6.5 G/DL (ref 6–8.4)
RA MAJOR: 4.14 CM
RA PRESSURE ESTIMATED: 3 MMHG
RA WIDTH: 2.68 CM
RBC # BLD AUTO: 3.74 M/UL (ref 4–5.4)
RIGHT VENTRICLE DIASTOLIC BASEL DIMENSION: 2.3 CM
RV TB RVSP: 6 MMHG
SINUS: 2.25 CM
SODIUM SERPL-SCNC: 139 MMOL/L (ref 136–145)
STJ: 2 CM
TDI LATERAL: 0.08 M/S
TDI SEPTAL: 0.05 M/S
TDI: 0.07 M/S
TR MAX PG: 29 MMHG
TRICUSPID ANNULAR PLANE SYSTOLIC EXCURSION: 1.61 CM
TROPONIN I SERPL DL<=0.01 NG/ML-MCNC: 95 NG/L (ref 0–14)
TV PEAK GRADIENT: 30 MMHG
TV REST PULMONARY ARTERY PRESSURE: 32 MMHG
WBC # BLD AUTO: 5.55 K/UL (ref 3.9–12.7)
Z-SCORE OF LEFT VENTRICULAR DIMENSION IN END DIASTOLE: -1.2
Z-SCORE OF LEFT VENTRICULAR DIMENSION IN END SYSTOLE: 0.28

## 2025-02-19 PROCEDURE — 80053 COMPREHEN METABOLIC PANEL: CPT

## 2025-02-19 PROCEDURE — 20600001 HC STEP DOWN PRIVATE ROOM

## 2025-02-19 PROCEDURE — 99900035 HC TECH TIME PER 15 MIN (STAT)

## 2025-02-19 PROCEDURE — 97535 SELF CARE MNGMENT TRAINING: CPT

## 2025-02-19 PROCEDURE — 84100 ASSAY OF PHOSPHORUS: CPT

## 2025-02-19 PROCEDURE — 97116 GAIT TRAINING THERAPY: CPT

## 2025-02-19 PROCEDURE — 97530 THERAPEUTIC ACTIVITIES: CPT

## 2025-02-19 PROCEDURE — 94761 N-INVAS EAR/PLS OXIMETRY MLT: CPT

## 2025-02-19 PROCEDURE — 97165 OT EVAL LOW COMPLEX 30 MIN: CPT

## 2025-02-19 PROCEDURE — 99233 SBSQ HOSP IP/OBS HIGH 50: CPT | Mod: GC,,, | Performed by: PSYCHIATRY & NEUROLOGY

## 2025-02-19 PROCEDURE — 63600175 PHARM REV CODE 636 W HCPCS

## 2025-02-19 PROCEDURE — 92610 EVALUATE SWALLOWING FUNCTION: CPT

## 2025-02-19 PROCEDURE — 85025 COMPLETE CBC W/AUTO DIFF WBC: CPT

## 2025-02-19 PROCEDURE — 84484 ASSAY OF TROPONIN QUANT: CPT

## 2025-02-19 PROCEDURE — 11000001 HC ACUTE MED/SURG PRIVATE ROOM

## 2025-02-19 PROCEDURE — 83735 ASSAY OF MAGNESIUM: CPT

## 2025-02-19 PROCEDURE — 25000003 PHARM REV CODE 250

## 2025-02-19 PROCEDURE — 99233 SBSQ HOSP IP/OBS HIGH 50: CPT | Mod: ,,, | Performed by: PSYCHIATRY & NEUROLOGY

## 2025-02-19 PROCEDURE — 97162 PT EVAL MOD COMPLEX 30 MIN: CPT

## 2025-02-19 PROCEDURE — 27000221 HC OXYGEN, UP TO 24 HOURS

## 2025-02-19 PROCEDURE — 92523 SPEECH SOUND LANG COMPREHEN: CPT

## 2025-02-19 RX ORDER — ATORVASTATIN CALCIUM 40 MG/1
80 TABLET, FILM COATED ORAL DAILY
Status: DISCONTINUED | OUTPATIENT
Start: 2025-02-20 | End: 2025-02-20 | Stop reason: HOSPADM

## 2025-02-19 RX ORDER — NAPROXEN SODIUM 220 MG/1
81 TABLET, FILM COATED ORAL DAILY
Status: DISCONTINUED | OUTPATIENT
Start: 2025-02-19 | End: 2025-02-20 | Stop reason: HOSPADM

## 2025-02-19 RX ORDER — ATORVASTATIN CALCIUM 40 MG/1
80 TABLET, FILM COATED ORAL DAILY
Status: CANCELLED | OUTPATIENT
Start: 2025-02-20

## 2025-02-19 RX ORDER — ENOXAPARIN SODIUM 100 MG/ML
40 INJECTION SUBCUTANEOUS EVERY 24 HOURS
Status: DISCONTINUED | OUTPATIENT
Start: 2025-02-19 | End: 2025-02-20 | Stop reason: HOSPADM

## 2025-02-19 RX ORDER — CLOPIDOGREL BISULFATE 75 MG/1
75 TABLET ORAL DAILY
Status: DISCONTINUED | OUTPATIENT
Start: 2025-02-19 | End: 2025-02-20 | Stop reason: HOSPADM

## 2025-02-19 RX ORDER — MIDAZOLAM HYDROCHLORIDE 1 MG/ML
0.5 INJECTION, SOLUTION INTRAMUSCULAR; INTRAVENOUS EVERY 5 MIN PRN
Status: DISCONTINUED | OUTPATIENT
Start: 2025-02-19 | End: 2025-02-20 | Stop reason: HOSPADM

## 2025-02-19 RX ADMIN — ACETAMINOPHEN 650 MG: 325 TABLET ORAL at 12:02

## 2025-02-19 RX ADMIN — ACETAMINOPHEN 650 MG: 325 TABLET ORAL at 06:02

## 2025-02-19 RX ADMIN — ATORVASTATIN CALCIUM 40 MG: 40 TABLET, FILM COATED ORAL at 08:02

## 2025-02-19 RX ADMIN — ENOXAPARIN SODIUM 40 MG: 40 INJECTION SUBCUTANEOUS at 06:02

## 2025-02-19 RX ADMIN — SENNOSIDES AND DOCUSATE SODIUM 1 TABLET: 50; 8.6 TABLET ORAL at 09:02

## 2025-02-19 RX ADMIN — PANTOPRAZOLE SODIUM 40 MG: 20 TABLET, DELAYED RELEASE ORAL at 08:02

## 2025-02-19 RX ADMIN — ASPIRIN 81 MG CHEWABLE TABLET 81 MG: 81 TABLET CHEWABLE at 02:02

## 2025-02-19 RX ADMIN — CLOPIDOGREL BISULFATE 75 MG: 75 TABLET ORAL at 02:02

## 2025-02-19 RX ADMIN — MIDAZOLAM 0.5 MG: 1 INJECTION INTRAMUSCULAR; INTRAVENOUS at 05:02

## 2025-02-19 RX ADMIN — SENNOSIDES AND DOCUSATE SODIUM 1 TABLET: 50; 8.6 TABLET ORAL at 08:02

## 2025-02-19 NOTE — PLAN OF CARE
"[facility] Care Plan  POC reviewed with Katelyn Caba and family at 1400. Patient and Family verbalized understanding. Questions and concerns addressed. No acute events today. Pt progressing toward goals. Will continue to monitor. See below and flowsheets for full assessment and VS info.   -transfer orders in  -aspirin and plavix started  -pt up to chair          Is this a stroke patient? yes- Stroke booklet reviewed with patient and family, risk factors identified for patient and stroke booklet remains at bedside for ongoing education.     Care individualization: pt enjoys family at the bedside    Neuro:  Gabriel Coma Scale  Best Eye Response: 4-->(E4) spontaneous  Best Motor Response: 6-->(M6) obeys commands  Best Verbal Response: 4-->(V4) confused  Gabriel Coma Scale Score: 14  Assessment Qualifiers: patient not sedated/intubated, no eye obstruction present  Pupil PERRLA: yes     24 hr Temp:  [96.9 °F (36.1 °C)-98.5 °F (36.9 °C)]     CV:   Rhythm: normal sinus rhythm  BP goals:   SBP < 160  MAP > 65    Resp:           Plan: N/A    GI/:     Diet/Nutrition Received: regular  Last Bowel Movement: 02/17/25  Voiding Characteristics: external catheter    Intake/Output Summary (Last 24 hours) at 2/19/2025 1334  Last data filed at 2/19/2025 1200  Gross per 24 hour   Intake 669.58 ml   Output 700 ml   Net -30.42 ml          Labs/Accuchecks:  Recent Labs   Lab 02/19/25  0422   WBC 5.55   RBC 3.74*   HGB 10.6*   HCT 33.1*         Recent Labs   Lab 02/19/25  0422      K 4.6   CO2 25      BUN 15   CREATININE 0.6   ALKPHOS 154*   ALT 9*   AST 18   BILITOT 0.5      Recent Labs   Lab 02/18/25  1026   INR 1.1   APTT 30.2    No results for input(s): "CPK", "CPKMB", "TROPONINI", "MB" in the last 168 hours.    Electrolytes: N/A - electrolytes WDL  Accuchecks: Q6H    Gtts:      LDA/Wounds:    Fausto Risk Assessment  Sensory Perception: 4-->no impairment  Moisture: 3-->occasionally moist  Activity: " 1-->bedfast  Mobility: 3-->slightly limited  Nutrition: 2-->probably inadequate  Friction and Shear: 2-->potential problem  Fausto Score: 15    Is your fausto score 12 or less? no

## 2025-02-19 NOTE — PLAN OF CARE
Goals remain appropriate.  Problem: Occupational Therapy  Goal: Occupational Therapy Goal  Description: Goals set 2/19 with an expiration date, 3/20:  Patient will increase functional independence with ADLs by performing:    Patient will demonstrate rolling to the right with modified independence.  Not met   Patient will demonstrate rolling to the left with modified independence.   Not met  Patient will demonstrate supine -sit with modified independence.   Not met  Patient will demonstrate stand pivot transfers with SBA.   Not met  Patient will demonstrate grooming while standing with SBA.   Not met  Patient will demonstrate upper body dressing with modified independence while seated EOB.   Not met  Patient will demonstrate lower body dressing with SBA while seated EOB.   Not met  Patient will demonstrate toileting with SBA.   Not met  Patient will demonstrate bathing while seated EOB with SBA.   Not met  Patient's family / caregiver will demonstrate independence and safety with assisting patient with self-care skills and functional mobility.     Not met            Outcome: Progressing

## 2025-02-19 NOTE — PLAN OF CARE
"[facility] Care Plan  POC reviewed with Katelyn Caba and family at 1800. Patient and Family verbalized understanding. Questions and concerns addressed. No acute events today. Pt progressing toward goals. Will continue to monitor. See below and flowsheets for full assessment and VS info.     - Admit to NCC post IR Procedure (see notes for details)  - Cardene gtt off    Is this a stroke patient? yes- Stroke booklet reviewed with patient and family, risk factors identified for patient and stroke booklet remains at bedside for ongoing education.     Care individualization: pt likes a lot of blankets    Neuro:  Gabriel Coma Scale  Best Eye Response: 4-->(E4) spontaneous  Best Motor Response: 6-->(M6) obeys commands  Best Verbal Response: 5-->(V5) oriented  Gabriel Coma Scale Score: 15  Pupil PERRLA: yes     24 hr Temp:  [96.9 °F (36.1 °C)-98 °F (36.7 °C)]     CV:   Rhythm: normal sinus rhythm  BP goals:   SBP < 160  MAP > 65    Resp:           Plan: N/A    GI/:     Diet/Nutrition Received: NPO  Last Bowel Movement: 02/17/25  Voiding Characteristics: external catheter    Intake/Output Summary (Last 24 hours) at 2/18/2025 1814  Last data filed at 2/18/2025 1802  Gross per 24 hour   Intake 419.58 ml   Output 350 ml   Net 69.58 ml          Labs/Accuchecks:  Recent Labs   Lab 02/18/25  1026   WBC 4.15   RBC 3.33*   HGB 9.6*   HCT 29.2*         Recent Labs   Lab 02/18/25  1026      K 3.9   CO2 24      BUN 13   CREATININE 0.55   ALKPHOS 137   ALT <7*   AST 19   BILITOT 0.4      Recent Labs   Lab 02/18/25  1026   INR 1.1   APTT 30.2    No results for input(s): "CPK", "CPKMB", "TROPONINI", "MB" in the last 168 hours.    Electrolytes: No replacement orders  Accuchecks: Q6H    Gtts:      LDA/Wounds:    Fausto Risk Assessment  Sensory Perception: 4-->no impairment  Moisture: 3-->occasionally moist  Activity: 1-->bedfast  Mobility: 3-->slightly limited  Nutrition: 2-->probably inadequate  Friction and Shear: " 2-->potential problem  Fausto Score: 15    Is your fausto score 12 or less? no        WCTM

## 2025-02-19 NOTE — CONSULTS
Inpatient consult to Physical Medicine Rehab  Consult performed by: Shanelle Campos NP  Consult ordered by: Robert Houston MD  Reason for consult: Rehab      Consult received.     GABBY Ash, FNP-C  Physical Medicine & Rehabilitation   02/19/2025

## 2025-02-19 NOTE — PLAN OF CARE
PT Eval complete, appropriate goals created    Problem: Physical Therapy  Goal: Physical Therapy Goal  Description: Goals to be completed by: 2/28/25    Pt will be able to stand up from EOB w/ modified independence using LRAD  Pt will ambulate 250 feet w/ supervision using LRAD  Pt will be independent w/ HEP therex on BLE w/ good form and ROM   Pt will ascend/descend 1 flight stairs w/ unilateral railing with stand by assistance    DME Justifications (see above for complete DME recommendations)    Rolling Walker- Patient demonstrates a mobility limitation that significantly impairs their ability to participate in one or more mobility related activities of daily living. Patient's mobility limitation cannot be sufficiently resolved with the use of a cane, but can be sufficiently resolved with the use of a rolling walker.The use of a rolling walker will considerably improve their ability to participate in MRADLs. Patient will use the walker on a regular basis at home.    Outcome: Progressing

## 2025-02-19 NOTE — HOSPITAL COURSE
02/19/2025 Patient admitted to [facility] w/ R M1 occlusion s/p TNK and DSA w/o thrombectomy as lesion migrated to distal MCA branches after TNK administered. Neuro exam improved. Therapy recs for LIT. Echo w/ LAE and extrapulmonary shunt. Pending SD to NPU.  02/20/2025 Step down to NPU overnight. Neuro exam stable. ECHO w/ mild LA dilation and extrapulmonary shunt. Will order cardiac monitor upon discharge. US doppler BLE ordered to R/O any DVT. PT/OT recommending Low intensity therapy. If US negative, will be able to discharge home.     US LE negative for DVT. Patient stable for discharge. Educated about the importance of following up with PCP and specialist. Instructed to come back to the ED in the case of worsening symptoms, loss of consciousness or if deemed necessary. Plan discussed with patient who verbalized understanding and agreed to it.

## 2025-02-19 NOTE — PT/OT/SLP EVAL
Speech Language Pathology Evaluation  Cognitive/Bedside Swallow    Patient Name:  Katelyn Caba   MRN:  [id number]  Admitting Diagnosis: Stroke due to embolism of right middle cerebral artery    Recommendations:                  General Recommendations:  Follow-up not indicated  Diet recommendations:  Regular, Thin   Aspiration Precautions:  Pt. Chews all medication;  and Standard aspiration precautions   General Precautions: Standard, fall  Communication strategies:  none    Assessment:     Katelyn Caba is a 84 y.o. female with oral and pharyngeal phases of swallow wfl.  Speech language and cognitive skills were wfl  History:     Past Medical History:   Diagnosis Date    Cancer     basal cell carcinoma       Past Surgical History:   Procedure Laterality Date    CARPAL TUNNEL RELEASE      ESOPHAGOGASTRODUODENOSCOPY N/A 8/22/2022    Procedure: EGD (ESOPHAGOGASTRODUODENOSCOPY);  Surgeon: Steven Lopez MD;  Location: [facility] ([address] [place] [address]);  Service: Endoscopy;  Laterality: N/A;    ESOPHAGOGASTRODUODENOSCOPY N/A 12/5/2022    Procedure: EGD (ESOPHAGOGASTRODUODENOSCOPY);  Surgeon: Steven Lopez MD;  Location: [facility] ([address] [place] [address]);  Service: Endoscopy;  Laterality: N/A;  Has estimated pulmonary artery pressure 45, schedule location per protocol.   Please schedule with Dr. Darryl Lopez-  Plavix stopped 8/23/22  [facility]  pre call complete; [facility] 11/28/22    ESOPHAGOGASTRODUODENOSCOPY N/A 7/11/2023    Procedure: EGD (ESOPHAGOGASTRODUODENOSCOPY);  Surgeon: Natalie Fall MD;  Location: [facility] ENDO;  Service: Endoscopy;  Laterality: N/A;    EYE SURGERY      left eye cataract    LEFT HEART CATHETERIZATION  4/17/2024    Procedure: Left heart cath;  Surgeon: Elisabeth De La Cruz MD;  Location: [facility] CATH;  Service: Cardiology;;    TONSILLECTOMY      TUBAL LIGATION         Social History: Patient lives with spouse.      Prior diet: regular diet  ".    Occupation/hobbies/homemaking: owns bar in [place]      Subjective     :I am doing fine" per pt  Patient goals: home     Pain/Comfort:  Pain Rating 1: 0/10  Pain Rating Post-Intervention 1: 0/10    Respiratory Status: Room air    Objective:     Cognitive Status:    Pt. was oriented x3 with good recall of recent and remote temporal and general information.  Immediate/delayed verbal recall was wfl with pt. Repeating 7 digits and 5 words without difficulty.  Responses to hypothetical verbal problem solving tasks were accurate and complete.  Pt. Compared and contrasted objects and generated multiple solutions to problems.  Thought organization and categorization skills were wfl with pt. Naming 18 animals given one minute when 15-20 are wnl.  Pt. Responded to functional math and time calculations with 100% accuracy and sequenced 4 words presented auditorily on 2/2 trials.        Receptive Language:   Comprehension:   Pt. Responded to complex yes/no questions and multi step commands with 100% accuracy and no delays in responding.        Pragmatics:    wfl    Expressive Language:  Verbal:    Verbal language skills were wfl with no evidence of aphasia.  Pt. Expressed their thoughts coherently in conversation with no evidence of confusion or word finding deficits        Motor Speech:  wnl    Voice:   wnl    Visual-Spatial:  wfl    Reading:   Wfl for sentences     Written Expression:   WFL    Oral Musculature Evaluation  Oral Musculature: WFL  Dentition: present and adequate  Secretion Management: adequate  Mucosal Quality: good  Mandibular Strength and Mobility: WFL  Oral Labial Strength and Mobility: WFL  Lingual Strength and Mobility: WFL  Velar Elevation: WFL  Buccal Strength and Mobility: WFL  Volitional Cough: strong  Volitional Swallow: no delay  Voice Prior to PO Intake: wfl    Bedside Swallow Eval:   Consistencies Assessed:  Thin liquids 4 sips of water via cup  Solids 2 bites eggs; 3 bites of grits  "     Oral Phase:   WFL    Pharyngeal Phase:   no overt clinical signs/symptoms of aspiration  no overt clinical signs/symptoms of pharyngeal dysphagia    Compensatory Strategies  None    Treatment: education provided re role of slp and plan of care     Goals:   Multidisciplinary Problems       SLP Goals       Not on file                    Plan:     Patient to be seen:      Plan of Care expires:     Plan of Care reviewed with:  patient   SLP Follow-Up:  No       Discharge recommendations:  Therapy Intensity Recommendations at Discharge: No Therapy Indicated   Barriers to Discharge:  None    Time Tracking:     SLP Treatment Date:   02/19/25  Speech Start Time:  0732  Speech Stop Time:  0752     Speech Total Time (min):  20 min    Billable Minutes: Eval 12  and Eval Swallow and Oral Function 8    02/19/2025

## 2025-02-19 NOTE — PT/OT/SLP EVAL
"Occupational Therapy   Evaluation    Name: Katelyn Caba  MRN: [id number]  Admitting Diagnosis: Stroke due to embolism of right middle cerebral artery  Recent Surgery: * No surgery found *      Recommendations:     Discharge Recommendations: Low Intensity Therapy  Discharge Equipment Recommendations:  shower chair  Barriers to discharge:  None    Assessment:     Katelyn Caba is a 84 y.o. female with a medical diagnosis of Stroke due to embolism of right middle cerebral artery.  She presents with performance deficits affecting function: weakness, impaired self care skills, impaired functional mobility.      Rehab Prognosis: Good; patient would benefit from acute skilled OT services to address these deficits and reach maximum level of function.       Plan:     Patient to be seen 4 x/week to address the above listed problems via neuromuscular re-education, therapeutic exercises, therapeutic activities, self-care/home management  Plan of Care Expires: 02/21/25  Plan of Care Reviewed with: patient, spouse    Subjective     Patient: "My left leg is still weak and I have pain in the groin but that's from the fall I had 2 weeks ago; they say it is not fractured.  My  has been helping me and I've been using crutches."  "I went to the bathroom and my  found me not talking right."  RN reported patient will be going for MRI soon.  Occupational Profile:  Patient resides in [place] with her  in 2 story home with bedroom on the first floor, no steps to enter.  Patient also resides in 2nd floor apt in [place] with a flight of stairs to enter.  Patient is right handed.  PTA patient reports having assistance from her  2* fall 2 weeks ago from desk chair resulting in left groin pain.   Hobbies: painting.  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  DME:  cane, crutches, rollator.    Pain/Comfort:  Pain Rating 1: " 0/10  Pain Rating Post-Intervention 1: 0/10    Patients cultural, spiritual, Caodaism conflicts given the current situation: no    Objective:     Communicated with: Nurse prior to session.  Patient found supine with bed alarm upon OT entry to room.    General Precautions: Standard, aspiration, fall  Orthopedic Precautions: N/A  Braces: N/A  Respiratory Status: Room air    Occupational Performance:    Bed Mobility:    Patient completed Rolling/Turning to Left with  supervision  Patient completed Rolling/Turning to Right with supervision  Patient completed Supine to Sit with supervision  Patient completed Sit to Supine with supervision    Functional Mobility/Transfers:  Patient completed Sit <> Stand Transfer with SBA  with  no assistive device   CGA stand pivot transfers; no assistive device    Activities of Daily Living:  Grooming: stand by assistance while standing  Upper Body Dressing: stand by assistance    Lower Body Dressing: contact guard assistance for standing balance    Cognitive/Visual Perceptual:  Cognitive/Psychosocial Skills:     -       Oriented to: Person, Place, Time, and Situation   -       Follows Commands/attention:Follows one-step commands  -       Communication: clear/fluent    Physical Exam:  Postural examination/scapula alignment:    -       Rounded shoulders  Upper Extremity Range of Motion:     -       Right Upper Extremity: WNL  -       Left Upper Extremity: WNL  Upper Extremity Strength:    -       Right Upper Extremity: WFL  -       Left Upper Extremity: WFL    AMPAC 6 Click ADL:  AMPAC Total Score: 18    Treatment & Education:  Patient/ Family education provided for stroke warning signs, prevention guidelines and personal risk factors.  Patient/ Family verbalizing understanding via teach back method.   Patient education provided on role of OT and need for continued OT upon discharge.  Patient alert and oriented x 3; able to follow 4/4 one step commands.  Patient attentive and  interactive throughout the session.  Patient able to identify 5/5 body parts.  Able to name 5/5 objects.  Continued education, patient/ family training recommended.    Session was conducted separately from PT session to give more opportunities to mobilize throughout the day.     Patient left supine with all lines intact, call button in reach, and bed alarm on    GOALS:   Multidisciplinary Problems       Occupational Therapy Goals          Problem: Occupational Therapy    Goal Priority Disciplines Outcome Interventions   Occupational Therapy Goal     OT, PT/OT Progressing    Description: Goals set 2/19 with an expiration date, 3/20:  Patient will increase functional independence with ADLs by performing:    Patient will demonstrate rolling to the right with modified independence.  Not met   Patient will demonstrate rolling to the left with modified independence.   Not met  Patient will demonstrate supine -sit with modified independence.   Not met  Patient will demonstrate stand pivot transfers with SBA.   Not met  Patient will demonstrate grooming while standing with SBA.   Not met  Patient will demonstrate upper body dressing with modified independence while seated EOB.   Not met  Patient will demonstrate lower body dressing with SBA while seated EOB.   Not met  Patient will demonstrate toileting with SBA.   Not met  Patient will demonstrate bathing while seated EOB with SBA.   Not met  Patient's family / caregiver will demonstrate independence and safety with assisting patient with self-care skills and functional mobility.     Not met    DME Justifications (see above for complete DME recommendations)    Bedside Commode- Patient has a mobility limitation that significantly impairs their ability to participate in one or more mobility related activities of daily living, including toileting. This deficit can be resolved by using a bedside commode. Patient demonstrates mobility limitations that will cause them to be  confined to one room at home without bathroom access for up to 30 days. Using a bedside commode will greatly improve the patient's ability to participate in MRADLs.                                      History:     Past Medical History:   Diagnosis Date    Cancer     basal cell carcinoma         Past Surgical History:   Procedure Laterality Date    CARPAL TUNNEL RELEASE      ESOPHAGOGASTRODUODENOSCOPY N/A 8/22/2022    Procedure: EGD (ESOPHAGOGASTRODUODENOSCOPY);  Surgeon: Steven Lopez MD;  Location: [facility] ([address] [place] [address]);  Service: Endoscopy;  Laterality: N/A;    ESOPHAGOGASTRODUODENOSCOPY N/A 12/5/2022    Procedure: EGD (ESOPHAGOGASTRODUODENOSCOPY);  Surgeon: Steven Lopez MD;  Location: [facility] ([address] [place] [address]);  Service: Endoscopy;  Laterality: N/A;  Has estimated pulmonary artery pressure 45, schedule location per protocol.   Please schedule with Dr. Darryl Lopez-  Plavix stopped 8/23/22  [place] [facility]-  pre call complete; [facility] 11/28/22    ESOPHAGOGASTRODUODENOSCOPY N/A 7/11/2023    Procedure: EGD (ESOPHAGOGASTRODUODENOSCOPY);  Surgeon: Natalie Fall MD;  Location: [facility];  Service: Endoscopy;  Laterality: N/A;    EYE SURGERY      left eye cataract    LEFT HEART CATHETERIZATION  4/17/2024    Procedure: Left heart cath;  Surgeon: Elisabeth De La Cruz MD;  Location: [facility] CATH;  Service: Cardiology;;    TONSILLECTOMY      TUBAL LIGATION         Time Tracking:     OT Date of Treatment: 02/19/25  OT Start Time first session: 0505  OT Stop Time first session: 0523  OT Total Time first session (min): 18 min  OT Start Time second session:  10:20  OT stop time second session:  10:34  OT total time 2nd session:  14 min    Billable Minutes:Evaluation 10  Therapeutic Activity 8  Self-care:  14 min    2/19/2025

## 2025-02-19 NOTE — HOSPITAL COURSE
Admitted to [facility] 2/18 as transfer from OSH. Received TNK prior to arrival and taken for angiogram on arrival. Angiogram showed distal occlusions in M3 and M4. Pt TICI 2C after TNK.  02/19/2025 NAOE

## 2025-02-19 NOTE — PT/OT/SLP EVAL
Physical Therapy Evaluation and Treatment    Patient Name: Katelyn Caba   MRN: [id number]  Recent Surgery: * No surgery found *      Recommendations:     Discharge Recommendations: Low Intensity Therapy    Discharge Equipment Recommendations: walker, rolling   Barriers to discharge: Increased level of assist  Nursing Mobilization: Patient clear to ambulate to/from bathroom with RN/PCT, assist x1 .    Assessment:     Katelyn Caba is a 84 y.o. female admitted with a medical diagnosis of Stroke due to embolism of right middle cerebral artery. She presents with the following impairments/functional limitations: weakness, impaired self care skills, impaired functional mobility, gait instability, impaired balance, decreased coordination, decreased safety awareness. Pt w/ mild inc assistance required for OOB mobility 2/2 LLE weakness/soreness. Patient currently demonstrates a need for low intensity therapy on a scheduled basis secondary to a decline in functional status due to injury    Rehab Prognosis: Good; patient would benefit from acute skilled PT services to address these deficits and reach maximum level of function.  Recent Surgery: * No surgery found *      Plan:     During this hospitalization, patient to be seen 4 x/week to address the identified rehab impairments via gait training, therapeutic activities, therapeutic exercises, neuromuscular re-education and progress toward the following goals:    Plan of Care Expires: 02/28/25    Subjective     Chief Complaint: soreness in L pelvis  Patient/Family Comments/Goals: wants to know why her pelvis hurts if it isn't broken; also wants to know if she can work Saturday  Pain/Comfort:  Pain Rating 1: 0/10  Pain Rating Post-Intervention 1: 0/10    Patients cultural, spiritual, Episcopal conflicts given the current situation: no    Social History:  Living Environment: Patient lives with their spouse. She has 2 homes, a 2nd floor apt w/ 1 flight of stairs to enter (no  elevator) and a [facility] w/ no ALONSO where she remains fully on 1st floor.   Prior Level of Function: Prior to admission, patient was independent with ADLs. She has been utilizing B crutches or rollator the last 1-2 wks since she fell due to L pelvic pain  Equipment Used at Home: crutches, rollator, grab bar    DME owned (not currently used): none  Assistance Upon Discharge: family    Objective:     Communicated with RN prior to session. Patient found HOB elevated with telemetry, blood pressure cuff, pulse ox (continuous), bed alarm, PureWick upon PT entry to room.    General Precautions: Standard, fall   Orthopedic Precautions:N/A   Braces: N/A  Respiratory Status: Room air    Exams:  Cognitive Exam: Patient is oriented to Person, Place, Time, Situation, follows commands 100% of the time  RLE ROM: WFL  RLE Strength: WFL  LLE ROM: WFL  LLE Strength: WFL, grossly 4/5  Sensation: Intact light touch to BLEs    Functional Mobility:  Bed Mobility:  Supine to Sit: stand by assistance  Transfers:  Sit to Stand: stand by assistance with no AD  Bed to Chair: contact guard assistance with hand-held assist using Step Transfer  Gait:   Patient ambulated 2x 20' with hand-held assist and minimum assistance. Patient demonstrates unsteady gait, decreased step length, wide base of support, decreased weight shift, decreased foot clearance, ambulates outside FLAVIA of RW, flexed posture, and decreased garo. All lines remained intact throughout ambulation trial.  Balance:   Static Sitting: supervision at EOB  Dynamic Sitting: stand by assistance at EOB  Static Standing: stand by assistance with hand-held assist  Dynamic Standing: contact guard assistance with hand-held assist    Therapeutic Activities and Exercises:  Patient educated on role of acute care PT and PT POC, safety while in hospital including calling nurse for mobility, and call light usage  Patient educated about importance of OOB mobility    AM-PAC 6 CLICK MOBILITY  Turning over  in bed (including adjusting bedclothes, sheets and blankets)?: 4  Sitting down on and standing up from a chair with arms (e.g., wheelchair, bedside commode, etc.): 3  Moving from lying on back to sitting on the side of the bed?: 3  Moving to and from a bed to a chair (including a wheelchair)?: 3  Need to walk in hospital room?: 3  Climbing 3-5 steps with a railing?: 3  Basic Mobility Total Score: 19     Patient left up in chair with all lines intact, call button in reach, RN notified, chair alarm on, and family present.    GOALS:   Multidisciplinary Problems       Physical Therapy Goals          Problem: Physical Therapy    Goal Priority Disciplines Outcome Interventions   Physical Therapy Goal     PT, PT/OT Progressing    Description: Goals to be completed by: 2/28/25    Pt will be able to stand up from EOB w/ modified independence using LRAD  Pt will ambulate 250 feet w/ supervision using LRAD  Pt will be independent w/ HEP therex on BLE w/ good form and ROM   Pt will ascend/descend 1 flight stairs w/ unilateral railing with stand by assistance    DME Justifications (see above for complete DME recommendations)    Rolling Walker- Patient demonstrates a mobility limitation that significantly impairs their ability to participate in one or more mobility related activities of daily living. Patient's mobility limitation cannot be sufficiently resolved with the use of a cane, but can be sufficiently resolved with the use of a rolling walker.The use of a rolling walker will considerably improve their ability to participate in MRADLs. Patient will use the walker on a regular basis at home.                         History:     Past Medical History:   Diagnosis Date    Cancer     basal cell carcinoma       Past Surgical History:   Procedure Laterality Date    CARPAL TUNNEL RELEASE      ESOPHAGOGASTRODUODENOSCOPY N/A 8/22/2022    Procedure: EGD (ESOPHAGOGASTRODUODENOSCOPY);  Surgeon: Steven Lopez MD;  Location: [facility]  (2ND FLR);  Service: Endoscopy;  Laterality: N/A;    ESOPHAGOGASTRODUODENOSCOPY N/A 12/5/2022    Procedure: EGD (ESOPHAGOGASTRODUODENOSCOPY);  Surgeon: Steven Lopez MD;  Location: [facility] (2ND FLR);  Service: Endoscopy;  Laterality: N/A;  Has estimated pulmonary artery pressure 45, schedule location per protocol.   Please schedule with Dr. Darryl Lopez-  Plavix stopped 8/23/22  [facility]-  pre call complete; [facility] 11/28/22    ESOPHAGOGASTRODUODENOSCOPY N/A 7/11/2023    Procedure: EGD (ESOPHAGOGASTRODUODENOSCOPY);  Surgeon: Natalie Fall MD;  Location: [facility];  Service: Endoscopy;  Laterality: N/A;    EYE SURGERY      left eye cataract    LEFT HEART CATHETERIZATION  4/17/2024    Procedure: Left heart cath;  Surgeon: Elisabeth De La Cruz MD;  Location: [facility];  Service: Cardiology;;    TONSILLECTOMY      TUBAL LIGATION         Time Tracking:     PT Received On: 02/19/25  PT Start Time: 1105  PT Stop Time: 1136  PT Total Time (min): 31 min     Billable Minutes: Evaluation 8, Gait Training 10, and Therapeutic Activity 13    02/19/2025

## 2025-02-19 NOTE — ANESTHESIA POSTPROCEDURE EVALUATION
Anesthesia Post Evaluation    Patient: Katelyn Caba    Procedure(s) Performed: * No procedures listed *    Final Anesthesia Type: general      Patient location during evaluation: PACU  Patient participation: Yes- Able to Participate  Level of consciousness: awake and alert  Post-procedure vital signs: reviewed and stable  Pain management: adequate  Airway patency: patent    PONV status: None or treated.  Anesthetic complications: no      Cardiovascular status: hemodynamically stable  Respiratory status: spontaneous ventilation  Hydration status: euvolemic  Follow-up not needed.          Vitals Value Taken Time   /67 02/19/25 06:06   Temp 36.4 °C (97.6 °F) 02/19/25 04:00   Pulse 71 02/19/25 06:38   Resp 18 02/19/25 06:38   SpO2 94 % 02/19/25 06:38   Vitals shown include unfiled device data.      No case tracking events are documented in the log.      Pain/Salena Score: Pain Rating Prior to Med Admin: 2 (2/19/2025  6:07 AM)  Pain Rating Post Med Admin: 0 (2/19/2025  1:37 AM)

## 2025-02-19 NOTE — PROGRESS NOTES
[facility] - Neuro Critical Care  Neurocritical Care  Progress Note    Admit Date: 2/18/2025  Service Date: 02/19/2025  Length of Stay: 1    Subjective:     Chief Complaint: Stroke due to embolism of right middle cerebral artery    History of Present Illness: Patient is a 84-year-old female with past medical history basal cell carcinoma, hypercholesterolemia that is presenting for evaluation for further evaluation for LVO.  Patient is a transfer from outside facility 2/18 for large vessel occlusion.  Last known normal as per EMS report and medical records 8:00 p.m..  Patient woke up this morning at 8:30 a.m. secondary to left-sided weakness and facial droop.  Patient had right-sided gaze.  Patient was found to have large vessel occlusion.  TNK was given prior to arrival at 11:03 a.m..  As per EMS report, patient began to have improved to movement left side, blood pressure controlled with Cardene prior to arrival.  Patient states that she feels improved on her left side, weakness improving.  Patient denies any new symptoms. On arrival to [facility], patient taken for cerebral angiogram and RUDY, which showed two distal occlusions in M3 and M4. Patient TICI 2C after TNK.    Hospital Course: Admitted to [facility] 2/18 as transfer from OSH. Received TNK prior to arrival and taken for angiogram on arrival. Angiogram showed distal occlusions in M3 and M4. Pt TICI 2C after TNK.  02/19/2025 NAOE    Interval History:  Admitted to [facility] overnight for post-TNK and post-thrombectomy monitoring    Review of Systems   Respiratory:  Negative for cough and shortness of breath.    Cardiovascular:  Negative for chest pain and palpitations.   Gastrointestinal:  Negative for abdominal pain and vomiting.   Neurological:  Negative for weakness and headaches.       Objective:     Vitals:  Temp: 98.5 °F (36.9 °C)  Pulse: 70  Rhythm: normal sinus rhythm  BP: 130/60  MAP (mmHg): 87  Resp: (!) 27  SpO2: (!) 94 %    Temp  Min: 97.4 °F (36.3 °C)  Max: 98.5 °F  (36.9 °C)  Pulse  Min: 57  Max: 83  BP  Min: 113/54  Max: 174/70  MAP (mmHg)  Min: 64  Max: 107  Resp  Min: 10  Max: 29  SpO2  Min: 93 %  Max: 100 %    02/18 0701 - 02/19 0700  In: 419.6 [I.V.:119.6]  Out: 700 [Urine:700]            Physical Exam  Vitals and nursing note reviewed. Exam conducted with a chaperone present.   Constitutional:       Appearance: Normal appearance.   Eyes:      Extraocular Movements: Extraocular movements intact.      Conjunctiva/sclera: Conjunctivae normal.      Pupils: Pupils are equal, round, and reactive to light.   Cardiovascular:      Rate and Rhythm: Normal rate and regular rhythm.   Pulmonary:      Effort: Pulmonary effort is normal.      Breath sounds: Normal breath sounds.   Abdominal:      Palpations: Abdomen is soft.      Tenderness: There is no abdominal tenderness.   Neurological:      Mental Status: She is alert and oriented to person, place, and time.      Cranial Nerves: Cranial nerves 2-12 are intact.      Sensory: Sensation is intact.      Motor: Motor function is intact.      Comments: 5/5 strength in all extremities            Medications:  Continuous Scheduledacetaminophen, 650 mg, Q6H  aspirin, 81 mg, Daily  [START ON 2/20/2025] atorvastatin, 80 mg, Daily  clopidogreL, 75 mg, Daily  pantoprazole, 40 mg, Daily  senna-docusate 8.6-50 mg, 1 tablet, BID    PRNbisacodyL, 10 mg, Daily PRN  hydrALAZINE, 10 mg, Q6H PRN  labetalol, 10 mg, Q6H PRN  magnesium oxide, 800 mg, PRN  magnesium oxide, 800 mg, PRN  midazolam, 0.5 mg, Q5 Min PRN  ondansetron, 4 mg, Q8H PRN  oxyCODONE, 10 mg, Q4H PRN  oxyCODONE, 5 mg, Q4H PRN  potassium bicarbonate, 35 mEq, PRN  potassium bicarbonate, 50 mEq, PRN  potassium bicarbonate, 60 mEq, PRN  potassium, sodium phosphates, 2 packet, PRN  potassium, sodium phosphates, 2 packet, PRN  potassium, sodium phosphates, 2 packet, PRN  sodium chloride 0.9%, 10 mL, PRN      Today I personally reviewed pertinent medications, lines/drains/airways, imaging,  cardiology results, laboratory results,    Diet  Diet Adult Regular Standard Tray  Diet Adult Regular Standard Tray        Assessment/Plan:     Neuro  * Stroke due to embolism of right middle cerebral artery  84 year old woman with PMH BCC, HTN, HLD, aortic atherosclerosis, GI bleed, prior left MCA infarct 2022 who presented to [facility] ED 2/18 for left sided weakness, left facial droop, and dysarthria. LKW 2/17 2030, woke up 0830 with symptoms. CTA with right M1 occlusion. Telestroke NIHSS 10. Received TNK prior to transfer at 1104. Transferred to [facility] for IR evaluation. On arrival NIHSS 7. Repeat CTH stable. Went to IR. Admitted to [facility].  Antithrombotics for secondary stroke prevention: Antiplatelets: Aspirin: 81 mg daily  Clopidogrel: 75 mg daily    Statins for secondary stroke prevention and hyperlipidemia, if present:   Statins: Atorvastatin- 40 mg daily    Aggressive risk factor modification: HTN, HLD, AAD     Rehab efforts: The patient has been evaluated by a stroke team provider and the therapy needs have been fully considered based off the presenting complaints and exam findings. The following therapy evaluations are needed: PT evaluate and treat, OT evaluate and treat, SLP evaluate and treat    Diagnostics ordered/pending: TTE to assess cardiac function/status     VTE prophylaxis: None: Reason for No Pharmacological VTE Prophylaxis: Holding x 24 hours s/p treatment with Thrombolytic therapy    BP parameters: Infarct: SBP < 160    Step down to Vascular Neurology.    Cardiac/Vascular  Hypertension  -Resume home medications when appropriate    Pure hypercholesterolemia  -Statin          The patient is being Prophylaxed for:  Venous Thromboembolism with: Mechanical or Chemical  Stress Ulcer with: PPI  Ventilator Pneumonia with: not applicable    Activity Orders            Progressive Mobility Protocol (mobilize patient to their highest level of functioning at least twice daily) starting at 02/18 2000    Diet  Adult Regular Standard Tray: Regular starting at 02/18 1754    Turn patient starting at 02/18 1600    Elevate HOB starting at 02/18 1438          Full Code    Robert Houston MD  Neurocritical Care  [facility] - Neuro Critical Care

## 2025-02-19 NOTE — SUBJECTIVE & OBJECTIVE
Neurologic Chief Complaint: LSW    Subjective:     Interval History: Patient is seen for follow-up neurological assessment and treatment recommendations:     HPI, Past Medical, Family, and Social History remains the same as documented in the initial encounter.     Review of Systems   Neurological:  Positive for weakness.     Scheduled Meds:   acetaminophen  650 mg Oral Q6H    aspirin  81 mg Oral Daily    [START ON 2/20/2025] atorvastatin  80 mg Oral Daily    clopidogreL  75 mg Oral Daily    enoxparin  40 mg Subcutaneous Q24H (prophylaxis, 1700)    pantoprazole  40 mg Oral Daily    senna-docusate 8.6-50 mg  1 tablet Oral BID     Continuous Infusions:  PRN Meds:  Current Facility-Administered Medications:     bisacodyL, 10 mg, Rectal, Daily PRN    hydrALAZINE, 10 mg, Intravenous, Q6H PRN    labetalol, 10 mg, Intravenous, Q6H PRN    magnesium oxide, 800 mg, Oral, PRN    magnesium oxide, 800 mg, Oral, PRN    midazolam, 0.5 mg, Intravenous, Q5 Min PRN    ondansetron, 4 mg, Intravenous, Q8H PRN    oxyCODONE, 10 mg, Oral, Q4H PRN    oxyCODONE, 5 mg, Oral, Q4H PRN    potassium bicarbonate, 35 mEq, Oral, PRN    potassium bicarbonate, 50 mEq, Oral, PRN    potassium bicarbonate, 60 mEq, Oral, PRN    potassium, sodium phosphates, 2 packet, Oral, PRN    potassium, sodium phosphates, 2 packet, Oral, PRN    potassium, sodium phosphates, 2 packet, Oral, PRN    sodium chloride 0.9%, 10 mL, Intravenous, PRN    Objective:     Vital Signs (Most Recent):  Temp: 97.8 °F (36.6 °C) (02/19/25 1500)  Pulse: 72 (02/19/25 1700)  Resp: 20 (02/19/25 1700)  BP: 135/62 (02/19/25 1700)  SpO2: 96 % (02/19/25 1700)       Vital Signs Range (Last 24H):  Temp:  [97.4 °F (36.3 °C)-98.5 °F (36.9 °C)]   Pulse:  [57-80]   Resp:  [10-29]   BP: (113-174)/(47-97)   SpO2:  [93 %-100 %]           Physical Exam  Vitals and nursing note reviewed.   Constitutional:       General: She is not in acute distress.     Appearance: Normal appearance.   HENT:      Head:  Normocephalic and atraumatic.      Mouth/Throat:      Mouth: Mucous membranes are moist.   Eyes:      Extraocular Movements: Extraocular movements intact.   Pulmonary:      Effort: Pulmonary effort is normal. No respiratory distress.   Abdominal:      General: Abdomen is flat. There is no distension.   Musculoskeletal:      Cervical back: Normal range of motion.   Skin:     General: Skin is warm.   Neurological:      Mental Status: She is alert and oriented to person, place, and time.   Psychiatric:         Mood and Affect: Mood normal.         Behavior: Behavior normal.              Neurological Exam:   LOC: alert  Attention Span: Good   Language: No aphasia  Articulation: No dysarthria  Orientation: Person, Place, Time   Visual Fields: Full  EOM (CN III, IV, VI): Full/intact  Facial Movement (CN VII): Symmetric facial expression    Motor: 5/5 throughout  Sensation: Intact to light touch  Tone: Normal tone throughout    Laboratory:  BMP:   Recent Labs   Lab 02/19/25  0422      K 4.6      CO2 25   BUN 15   CREATININE 0.6   CALCIUM 9.0     CBC:   Recent Labs   Lab 02/19/25  0422   WBC 5.55   RBC 3.74*   HGB 10.6*   HCT 33.1*      MCV 89   MCH 28.3   MCHC 32.0       Diagnostic Results     Brain Imaging   MRI pending read    Vessel Imaging   CTA:   Vertebral arteries demonstrate no flow-limiting stenosis. There is calcified plaque left carotid bulb and ICA origin with approximately 50% stenosis at the cervical ICA origin. The common carotid arteries, right cervical ICA, bilateral intracranial ICAs, ACAs, PCAs, basilar artery, and left MCA demonstrate no flow-limiting stenosis. There is occlusion of the right MCA M1 segment distally series 5 image 236. No aneurysms are demonstrated. On delayed imaging the dural venous sinuses demonstrate no thrombosis.     Cardiac Imaging   Results for orders placed during the hospital encounter of 02/18/25    Echo Saline Bubble? Yes    Interpretation Summary    Left  Ventricle: The left ventricle is normal in size. Normal wall thickness. There is normal systolic function with a visually estimated ejection fraction of 60 - 65%. There is normal diastolic function.    Right Ventricle: Normal right ventricular cavity size. Wall thickness is normal. Systolic function is normal.    Left Atrium: Left atrium is mildly dilated.    Aortic Valve: The aortic valve is a trileaflet valve. There is mild aortic valve sclerosis.    Tricuspid Valve: There is mild regurgitation.    Pulmonary Artery: The estimated pulmonary artery systolic pressure is 32 mmHg.    IVC/SVC: Normal venous pressure at 3 mmHg.    After contrast bubble injection very few bubbles appear late on the left side. They originate from  the pulmonary veins.  This is suggestive of extracardiac shunt.  Clinical correlation is required.

## 2025-02-19 NOTE — PLAN OF CARE
[facility] - Neuro Critical Care  Initial Discharge Assessment       Primary Care Provider: Brooklyn Burnham MD    Admission Diagnosis: Stroke [I63.9]  Acute right MCA stroke [I63.511]    Admission Date: 2/18/2025  Expected Discharge Date:     Transition of Care Barriers: None    Payor: MEDICARE / Plan: MEDICARE PART A & B / Product Type: Government /     Extended Emergency Contact Information  Primary Emergency Contact: Eder Doe  Mobile Phone: [phone]  Relation: Spouse   needed? No  Secondary Emergency Contact: Keegan Meza  Address: [address]           [place] [address] [facility]  Mobile Phone: [phone]  Relation: Son    Discharge Plan A: Home with family  Discharge Plan B: Home      [id number] - [place], [place] - [address] [facility] [address]  [address]  [facility] [address]  Phone: [phone] Fax: [phone]    [id number] - [place] - [address] [facility] [address] AT [facility] & [address]  [place] [address]  Phone: [phone] Fax: [phone]      Initial Assessment (most recent)       Adult Discharge Assessment - 02/19/25 1408          Discharge Assessment    Assessment Type Discharge Planning Assessment     Confirmed/corrected address, phone number and insurance Yes     Confirmed Demographics Correct on Facesheet     Source of Information patient     Communicated TERE with patient/caregiver Yes     Reason For Admission 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 Yes     Do you have help at home or someone to help you manage your care at home? Yes     Who are your caregiver(s) and their phone number(s)? [facility]     Prior to hospitilization cognitive status: Unable to Assess     Current cognitive status: Alert/Oriented     Walking or Climbing  Stairs Difficulty no     Dressing/Bathing Difficulty no     Home Layout Able to live on 1st floor     Equipment Currently Used at Home none     Readmission within 30 days? No     Patient currently being followed by outpatient case management? No     Do you currently have service(s) that help you manage your care at home? No     Do you take prescription medications? Yes     Do you have prescription coverage? Yes     Coverage Payor: MEDICARE - MEDICARE PART A & B -     Do you have any problems affording any of your prescribed medications? No     Is the patient taking medications as prescribed? yes     Who is going to help you get home at discharge? [facility]     How do you get to doctors appointments? car, drives self;family or friend will provide     Are you on dialysis? No     Do you take coumadin? No     Discharge Plan A Home with family     Discharge Plan B Home     DME Needed Upon Discharge  none     Discharge Plan discussed with: Patient     Transition of Care Barriers None        Physical Activity    On average, how many days per week do you engage in moderate to strenuous exercise (like a brisk walk)? 0 days     On average, how many minutes do you engage in exercise at this level? 0 min        Financial Resource Strain    How hard is it for you to pay for the very basics like food, housing, medical care, and heating? Not hard at all        Housing Stability    In the last 12 months, was there a time when you were not able to pay the mortgage or rent on time? No     At any time in the past 12 months, were you homeless or living in a shelter (including now)? No        Transportation Needs    Has the lack of transportation kept you from medical appointments, meetings, work or from getting things needed for daily living? No        Food Insecurity    Within the past 12 months, you worried that your food would run out before you got the money to buy more. Never true     Within the past 12 months, the food you  bought just didn't last and you didn't have money to get more. Never true        Stress    Do you feel stress - tense, restless, nervous, or anxious, or unable to sleep at night because your mind is troubled all the time - these days? Not at all        Social Isolation    How often do you feel lonely or isolated from those around you?  Never        Alcohol Use    Q1: How often do you have a drink containing alcohol? Never     Q2: How many drinks containing alcohol do you have on a typical day when you are drinking? Patient does not drink     Q3: How often do you have six or more drinks on one occasion? Never        Utilities    In the past 12 months has the electric, gas, oil, or water company threatened to shut off services in your home? No        Health Literacy    How often do you need to have someone help you when you read instructions, pamphlets, or other written material from your doctor or pharmacy? Never        OTHER    Name(s) of People in Home [facility]                      The SW met with the patient at bedside. The SW placed name and contact information on the blackboard in the patient's room. Use preferred pharmacy / bedside delivery for any necessary medications at the time of discharge. The patient is independent with all ADLs. The patient is not on Dialysis or Coumadin. The Patient does not use DME or home oxygen, The patient's spouse  will provide assistance to the patient upon discharge. The patient's spouse  will provide transportation upon discharge. SW will continue to follow for course of hospitalization.  A discharge planning booklet was left at bedside.        Discharge Plan A and Plan B have been determined by review of patient's clinical status, future medical and therapeutic needs, and coverage/benefits for post-acute care in coordination with multidisciplinary team members.    Tawny Blount MSW, PRINCE  [facility]  Case Management Dept.

## 2025-02-19 NOTE — SUBJECTIVE & OBJECTIVE
Interval History:  Admitted to [facility] overnight for post-TNK and post-thrombectomy monitoring    Review of Systems   Respiratory:  Negative for cough and shortness of breath.    Cardiovascular:  Negative for chest pain and palpitations.   Gastrointestinal:  Negative for abdominal pain and vomiting.   Neurological:  Negative for weakness and headaches.       Objective:     Vitals:  Temp: 98.5 °F (36.9 °C)  Pulse: 70  Rhythm: normal sinus rhythm  BP: 130/60  MAP (mmHg): 87  Resp: (!) 27  SpO2: (!) 94 %    Temp  Min: 97.4 °F (36.3 °C)  Max: 98.5 °F (36.9 °C)  Pulse  Min: 57  Max: 83  BP  Min: 113/54  Max: 174/70  MAP (mmHg)  Min: 64  Max: 107  Resp  Min: 10  Max: 29  SpO2  Min: 93 %  Max: 100 %    02/18 0701 - 02/19 0700  In: 419.6 [I.V.:119.6]  Out: 700 [Urine:700]            Physical Exam  Vitals and nursing note reviewed. Exam conducted with a chaperone present.   Constitutional:       Appearance: Normal appearance.   Eyes:      Extraocular Movements: Extraocular movements intact.      Conjunctiva/sclera: Conjunctivae normal.      Pupils: Pupils are equal, round, and reactive to light.   Cardiovascular:      Rate and Rhythm: Normal rate and regular rhythm.   Pulmonary:      Effort: Pulmonary effort is normal.      Breath sounds: Normal breath sounds.   Abdominal:      Palpations: Abdomen is soft.      Tenderness: There is no abdominal tenderness.   Neurological:      Mental Status: She is alert and oriented to person, place, and time.      Cranial Nerves: Cranial nerves 2-12 are intact.      Sensory: Sensation is intact.      Motor: Motor function is intact.      Comments: 5/5 strength in all extremities            Medications:  Continuous Scheduledacetaminophen, 650 mg, Q6H  aspirin, 81 mg, Daily  [START ON 2/20/2025] atorvastatin, 80 mg, Daily  clopidogreL, 75 mg, Daily  pantoprazole, 40 mg, Daily  senna-docusate 8.6-50 mg, 1 tablet, BID    PRNbisacodyL, 10 mg, Daily PRN  hydrALAZINE, 10 mg, Q6H PRN  labetalol, 10  mg, Q6H PRN  magnesium oxide, 800 mg, PRN  magnesium oxide, 800 mg, PRN  midazolam, 0.5 mg, Q5 Min PRN  ondansetron, 4 mg, Q8H PRN  oxyCODONE, 10 mg, Q4H PRN  oxyCODONE, 5 mg, Q4H PRN  potassium bicarbonate, 35 mEq, PRN  potassium bicarbonate, 50 mEq, PRN  potassium bicarbonate, 60 mEq, PRN  potassium, sodium phosphates, 2 packet, PRN  potassium, sodium phosphates, 2 packet, PRN  potassium, sodium phosphates, 2 packet, PRN  sodium chloride 0.9%, 10 mL, PRN      Today I personally reviewed pertinent medications, lines/drains/airways, imaging, cardiology results, laboratory results,    Diet  Diet Adult Regular Standard Tray  Diet Adult Regular Standard Tray

## 2025-02-19 NOTE — ASSESSMENT & PLAN NOTE
84 year old woman with PMH BCC, HTN, HLD, aortic atherosclerosis, GI bleed, prior left MCA infarct 2022 who presented to [facility] ED 2/18 for left sided weakness, left facial droop, and dysarthria. LKW 2/17 2030, woke up 0830 with symptoms. CTA with right M1 occlusion. Telestroke NIHSS 10. Received TNK prior to transfer at 1104. Transferred to [facility] for IR evaluation. On arrival NIHSS 7. Repeat CTH stable. Went to IR. Admitted to [facility].  Antithrombotics for secondary stroke prevention: Antiplatelets: Aspirin: 81 mg daily  Clopidogrel: 75 mg daily    Statins for secondary stroke prevention and hyperlipidemia, if present:   Statins: Atorvastatin- 40 mg daily    Aggressive risk factor modification: HTN, HLD, AAD     Rehab efforts: The patient has been evaluated by a stroke team provider and the therapy needs have been fully considered based off the presenting complaints and exam findings. The following therapy evaluations are needed: PT evaluate and treat, OT evaluate and treat, SLP evaluate and treat    Diagnostics ordered/pending: TTE to assess cardiac function/status     VTE prophylaxis: None: Reason for No Pharmacological VTE Prophylaxis: Holding x 24 hours s/p treatment with Thrombolytic therapy    BP parameters: Infarct: SBP < 160    Step down to Vascular Neurology.

## 2025-02-20 ENCOUNTER — CLINICAL SUPPORT (OUTPATIENT)
Dept: CARDIOLOGY | Facility: HOSPITAL | Age: 85
End: 2025-02-20
Attending: EMERGENCY MEDICINE
Payer: MEDICARE

## 2025-02-20 VITALS
BODY MASS INDEX: 18.16 KG/M2 | HEIGHT: 66 IN | SYSTOLIC BLOOD PRESSURE: 149 MMHG | HEART RATE: 69 BPM | OXYGEN SATURATION: 95 % | TEMPERATURE: 98 F | RESPIRATION RATE: 16 BRPM | DIASTOLIC BLOOD PRESSURE: 68 MMHG | WEIGHT: 113 LBS

## 2025-02-20 DIAGNOSIS — I63.9 CEREBRAL INFARCTION, UNSPECIFIED MECHANISM: ICD-10-CM

## 2025-02-20 DIAGNOSIS — I63.411 STROKE DUE TO EMBOLISM OF RIGHT MIDDLE CEREBRAL ARTERY: ICD-10-CM

## 2025-02-20 PROBLEM — G81.90 HEMIPARESIS: Status: ACTIVE | Noted: 2025-02-20

## 2025-02-20 PROBLEM — R20.0 SENSORY LOSS: Status: ACTIVE | Noted: 2025-02-20

## 2025-02-20 PROBLEM — Z85.828 HISTORY OF BASAL CELL CARCINOMA: Status: ACTIVE | Noted: 2025-02-20

## 2025-02-20 PROBLEM — E78.5 HLD (HYPERLIPIDEMIA): Status: ACTIVE | Noted: 2025-02-20

## 2025-02-20 PROBLEM — Z74.09 REDUCED MOBILITY: Status: ACTIVE | Noted: 2025-02-20

## 2025-02-20 LAB
ALBUMIN SERPL BCP-MCNC: 3.3 G/DL (ref 3.5–5.2)
ALP SERPL-CCNC: 134 U/L (ref 40–150)
ALT SERPL W/O P-5'-P-CCNC: <5 U/L (ref 10–44)
ANION GAP SERPL CALC-SCNC: 5 MMOL/L (ref 8–16)
AST SERPL-CCNC: 23 U/L (ref 10–40)
BASOPHILS # BLD AUTO: 0.04 K/UL (ref 0–0.2)
BASOPHILS NFR BLD: 0.5 % (ref 0–1.9)
BILIRUB SERPL-MCNC: 0.4 MG/DL (ref 0.1–1)
BUN SERPL-MCNC: 18 MG/DL (ref 8–23)
CALCIUM SERPL-MCNC: 8.7 MG/DL (ref 8.7–10.5)
CHLORIDE SERPL-SCNC: 107 MMOL/L (ref 95–110)
CO2 SERPL-SCNC: 27 MMOL/L (ref 23–29)
CREAT SERPL-MCNC: 0.6 MG/DL (ref 0.5–1.4)
DIFFERENTIAL METHOD BLD: ABNORMAL
EOSINOPHIL # BLD AUTO: 0.2 K/UL (ref 0–0.5)
EOSINOPHIL NFR BLD: 2.5 % (ref 0–8)
ERYTHROCYTE [DISTWIDTH] IN BLOOD BY AUTOMATED COUNT: 13.8 % (ref 11.5–14.5)
EST. GFR  (NO RACE VARIABLE): >60 ML/MIN/1.73 M^2
GLUCOSE SERPL-MCNC: 83 MG/DL (ref 70–110)
HCT VFR BLD AUTO: 29 % (ref 37–48.5)
HGB BLD-MCNC: 9.3 G/DL (ref 12–16)
IMM GRANULOCYTES # BLD AUTO: 0.04 K/UL (ref 0–0.04)
IMM GRANULOCYTES NFR BLD AUTO: 0.5 % (ref 0–0.5)
LYMPHOCYTES # BLD AUTO: 1.8 K/UL (ref 1–4.8)
LYMPHOCYTES NFR BLD: 24.3 % (ref 18–48)
MAGNESIUM SERPL-MCNC: 2.2 MG/DL (ref 1.6–2.6)
MCH RBC QN AUTO: 28.6 PG (ref 27–31)
MCHC RBC AUTO-ENTMCNC: 32.1 G/DL (ref 32–36)
MCV RBC AUTO: 89 FL (ref 82–98)
MONOCYTES # BLD AUTO: 0.7 K/UL (ref 0.3–1)
MONOCYTES NFR BLD: 9.3 % (ref 4–15)
NEUTROPHILS # BLD AUTO: 4.7 K/UL (ref 1.8–7.7)
NEUTROPHILS NFR BLD: 62.9 % (ref 38–73)
NRBC BLD-RTO: 0 /100 WBC
PHOSPHATE SERPL-MCNC: 2.6 MG/DL (ref 2.7–4.5)
PLATELET # BLD AUTO: 352 K/UL (ref 150–450)
PMV BLD AUTO: 10.1 FL (ref 9.2–12.9)
POCT GLUCOSE: 82 MG/DL (ref 70–110)
POTASSIUM SERPL-SCNC: 3.6 MMOL/L (ref 3.5–5.1)
PROT SERPL-MCNC: 5.9 G/DL (ref 6–8.4)
RBC # BLD AUTO: 3.25 M/UL (ref 4–5.4)
SODIUM SERPL-SCNC: 139 MMOL/L (ref 136–145)
WBC # BLD AUTO: 7.49 K/UL (ref 3.9–12.7)

## 2025-02-20 PROCEDURE — 83735 ASSAY OF MAGNESIUM: CPT

## 2025-02-20 PROCEDURE — 36415 COLL VENOUS BLD VENIPUNCTURE: CPT

## 2025-02-20 PROCEDURE — 80053 COMPREHEN METABOLIC PANEL: CPT

## 2025-02-20 PROCEDURE — 85025 COMPLETE CBC W/AUTO DIFF WBC: CPT

## 2025-02-20 PROCEDURE — 25000003 PHARM REV CODE 250

## 2025-02-20 PROCEDURE — 93270 REMOTE 30 DAY ECG REV/REPORT: CPT

## 2025-02-20 PROCEDURE — 99238 HOSP IP/OBS DSCHRG MGMT 30/<: CPT | Mod: GC,,, | Performed by: PSYCHIATRY & NEUROLOGY

## 2025-02-20 PROCEDURE — 84100 ASSAY OF PHOSPHORUS: CPT

## 2025-02-20 RX ORDER — MUPIROCIN 20 MG/G
OINTMENT TOPICAL 2 TIMES DAILY
Status: DISCONTINUED | OUTPATIENT
Start: 2025-02-20 | End: 2025-02-20 | Stop reason: HOSPADM

## 2025-02-20 RX ORDER — CLOPIDOGREL BISULFATE 75 MG/1
75 TABLET ORAL DAILY
Qty: 30 TABLET | Refills: 3 | Status: SHIPPED | OUTPATIENT
Start: 2025-02-21

## 2025-02-20 RX ORDER — ATORVASTATIN CALCIUM 80 MG/1
80 TABLET, FILM COATED ORAL DAILY
Qty: 90 TABLET | Refills: 3 | Status: SHIPPED | OUTPATIENT
Start: 2025-02-21 | End: 2026-02-21

## 2025-02-20 RX ORDER — NAPROXEN SODIUM 220 MG/1
81 TABLET, FILM COATED ORAL DAILY
Qty: 30 TABLET | Refills: 3 | Status: SHIPPED | OUTPATIENT
Start: 2025-02-21

## 2025-02-20 RX ADMIN — ASPIRIN 81 MG CHEWABLE TABLET 81 MG: 81 TABLET CHEWABLE at 08:02

## 2025-02-20 RX ADMIN — SENNOSIDES AND DOCUSATE SODIUM 1 TABLET: 50; 8.6 TABLET ORAL at 08:02

## 2025-02-20 RX ADMIN — ATORVASTATIN CALCIUM 80 MG: 40 TABLET, FILM COATED ORAL at 08:02

## 2025-02-20 RX ADMIN — CLOPIDOGREL BISULFATE 75 MG: 75 TABLET ORAL at 08:02

## 2025-02-20 RX ADMIN — ACETAMINOPHEN 650 MG: 325 TABLET ORAL at 12:02

## 2025-02-20 RX ADMIN — PANTOPRAZOLE SODIUM 40 MG: 20 TABLET, DELAYED RELEASE ORAL at 08:02

## 2025-02-20 NOTE — CARE UPDATE
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

## 2025-02-20 NOTE — PROGRESS NOTES
[facility] - Neuro Critical Care  Vascular Neurology  Comprehensive Stroke Center  Progress Note    Assessment/Plan:     * Stroke due to embolism of right middle cerebral artery  84 year old woman with PMH basal cell carcinoma, HTN, HLD, aortic atherosclerosis, GI bleed, prior left MCA infarct 2022 who presented to [facility] ED 2/18 for left sided weakness, left facial droop, and dysarthria. LKW 2/17 2030, woke up 0830 with symptoms. CTA with right M1 occlusion. Telestroke NIHSS 10. Received TNK prior to transfer at 1104. Transferred to [facility] for IR evaluation. On arrival NIHSS 7. Repeat CTH stable. Went to to IR. On DSA patient found to have R M1 migrated to distal R MCA branches not amendable to intervention. Patient admitted to [facility]. NIH improved to 0. MRI w/ small R parietal infarct. PT/OT eval for LIT. Echo w/ LA dilation and extracardiac shunt. Pending SD to NPU.    Antithrombotics for secondary stroke prevention: Antiplatelets: Aspirin: 81 mg daily  Clopidogrel: 75 mg daily    Statins for secondary stroke prevention and hyperlipidemia, if present:   Statins: Atorvastatin- 80 mg daily    Aggressive risk factor modification: HTN, HLD, AAD     Rehab efforts: The patient has been evaluated by a stroke team provider and the therapy needs have been fully considered based off the presenting complaints and exam findings. The following therapy evaluations are needed: PT evaluate and treat, OT evaluate and treat, SLP evaluate and treat    Diagnostics ordered/pending: None     VTE prophylaxis: Enoxaparin 40 mg SQ every 24 hours  Mechanical prophylaxis: Place SCDs    BP parameters: Infarct: SBP <160        History of GI bleed  Monitor for bleeding after starting DAPT    Hypertension  Stroke risk factor  -post TNK and DSA w/o intervention  -SBP < 160    ISAIAH (iron deficiency anemia)  -cbc daily  -f/u w/ heme outpt    Pure hypercholesterolemia  Lab Results   Component Value Date    LDLCALC 133.2 02/18/2025     -on atorvastatin  40 at home  -increase to atorva 80mg         02/19/2025 Patient admitted to [facility] w/ R M1 occlusion s/p TNK and DSA w/o thrombectomy as lesion migrated to distal MCA branches after TNK administered. Neuro exam improved. Therapy recs for LIT. Echo w/ LAE and extrapulmonary shunt. Pending SD to NPU.    STROKE DOCUMENTATION   Acute Stroke Times   Last Known Normal Date: 02/17/25  Last Known Normal Time: 2030  Unknown Symptom Onset Date: Unknown Date  Unknown Symptom Onset Time: Unknown Time  Stroke Team Called Date: 02/18/25  Stroke Team Called Time: 1156  Stroke Team Arrival Date: 02/18/25  Stroke Team Arrival Time: 1156  CT Interpretation Time: 1200  Thrombolytic Therapy Recommended: Yes  CTA Interpretation Time:  (prior to arrival)  Thrombectomy Recommended: Yes  Decision to Treat Time for Tenecteplase:  (prior to arrival at outside facility)  Decision to Treat Time for IR:  (prior to arrival)    NIH Scale:  1a. Level of Consciousness: 0-->Alert, keenly responsive  1b. LOC Questions: 0-->Answers both questions correctly  1c. LOC Commands: 0-->Performs both tasks correctly  2. Best Gaze: 0-->Normal  3. Visual: 0-->No visual loss  4. Facial Palsy: 0-->Normal symmetrical movements  5a. Motor Arm, Left: 0-->No drift, limb holds 90 (or 45) degrees for full 10 secs  5b. Motor Arm, Right: 0-->No drift, limb holds 90 (or 45) degrees for full 10 secs  6a. Motor Leg, Left: 0-->No drift, leg holds 30 degree position for full 5 secs  6b. Motor Leg, Right: 0-->No drift, leg holds 30 degree position for full 5 secs  7. Limb Ataxia: 0-->Absent  8. Sensory: 0-->Normal, no sensory loss  9. Best Language: 0-->No aphasia, normal  10. Dysarthria: 0-->Normal  11. Extinction and Inattention (formerly Neglect): 0-->No abnormality  Total (NIH Stroke Scale): 0       Modified [place]    Continental Coma Scale:    ABCD2 Score:    [id number] Score:   HAS -BLED Score:   ICH Score:   Hunt & Hoffman Classification:      Hemorrhagic change of an Ischemic  Stroke: Does this patient have an ischemic stroke with hemorrhagic changes? No     Neurologic Chief Complaint: LSW    Subjective:     Interval History: Patient is seen for follow-up neurological assessment and treatment recommendations:     HPI, Past Medical, Family, and Social History remains the same as documented in the initial encounter.     Review of Systems   Neurological:  Positive for weakness.     Scheduled Meds:   acetaminophen  650 mg Oral Q6H    aspirin  81 mg Oral Daily    [START ON 2/20/2025] atorvastatin  80 mg Oral Daily    clopidogreL  75 mg Oral Daily    enoxparin  40 mg Subcutaneous Q24H (prophylaxis, 1700)    pantoprazole  40 mg Oral Daily    senna-docusate 8.6-50 mg  1 tablet Oral BID     Continuous Infusions:  PRN Meds:  Current Facility-Administered Medications:     bisacodyL, 10 mg, Rectal, Daily PRN    hydrALAZINE, 10 mg, Intravenous, Q6H PRN    labetalol, 10 mg, Intravenous, Q6H PRN    magnesium oxide, 800 mg, Oral, PRN    magnesium oxide, 800 mg, Oral, PRN    midazolam, 0.5 mg, Intravenous, Q5 Min PRN    ondansetron, 4 mg, Intravenous, Q8H PRN    oxyCODONE, 10 mg, Oral, Q4H PRN    oxyCODONE, 5 mg, Oral, Q4H PRN    potassium bicarbonate, 35 mEq, Oral, PRN    potassium bicarbonate, 50 mEq, Oral, PRN    potassium bicarbonate, 60 mEq, Oral, PRN    potassium, sodium phosphates, 2 packet, Oral, PRN    potassium, sodium phosphates, 2 packet, Oral, PRN    potassium, sodium phosphates, 2 packet, Oral, PRN    sodium chloride 0.9%, 10 mL, Intravenous, PRN    Objective:     Vital Signs (Most Recent):  Temp: 97.8 °F (36.6 °C) (02/19/25 1500)  Pulse: 72 (02/19/25 1700)  Resp: 20 (02/19/25 1700)  BP: 135/62 (02/19/25 1700)  SpO2: 96 % (02/19/25 1700)       Vital Signs Range (Last 24H):  Temp:  [97.4 °F (36.3 °C)-98.5 °F (36.9 °C)]   Pulse:  [57-80]   Resp:  [10-29]   BP: (113-174)/(47-97)   SpO2:  [93 %-100 %]           Physical Exam  Vitals and nursing note reviewed.   Constitutional:       General: She  is not in acute distress.     Appearance: Normal appearance.   HENT:      Head: Normocephalic and atraumatic.      Mouth/Throat:      Mouth: Mucous membranes are moist.   Eyes:      Extraocular Movements: Extraocular movements intact.   Pulmonary:      Effort: Pulmonary effort is normal. No respiratory distress.   Abdominal:      General: Abdomen is flat. There is no distension.   Musculoskeletal:      Cervical back: Normal range of motion.   Skin:     General: Skin is warm.   Neurological:      Mental Status: She is alert and oriented to person, place, and time.   Psychiatric:         Mood and Affect: Mood normal.         Behavior: Behavior normal.              Neurological Exam:   LOC: alert  Attention Span: Good   Language: No aphasia  Articulation: No dysarthria  Orientation: Person, Place, Time   Visual Fields: Full  EOM (CN III, IV, VI): Full/intact  Facial Movement (CN VII): Symmetric facial expression    Motor: 5/5 throughout  Sensation: Intact to light touch  Tone: Normal tone throughout    Laboratory:  BMP:   Recent Labs   Lab 02/19/25  0422      K 4.6      CO2 25   BUN 15   CREATININE 0.6   CALCIUM 9.0     CBC:   Recent Labs   Lab 02/19/25  0422   WBC 5.55   RBC 3.74*   HGB 10.6*   HCT 33.1*      MCV 89   MCH 28.3   MCHC 32.0       Diagnostic Results     Brain Imaging   MRI pending read    Vessel Imaging   CTA:   Vertebral arteries demonstrate no flow-limiting stenosis. There is calcified plaque left carotid bulb and ICA origin with approximately 50% stenosis at the cervical ICA origin. The common carotid arteries, right cervical ICA, bilateral intracranial ICAs, ACAs, PCAs, basilar artery, and left MCA demonstrate no flow-limiting stenosis. There is occlusion of the right MCA M1 segment distally series 5 image 236. No aneurysms are demonstrated. On delayed imaging the dural venous sinuses demonstrate no thrombosis.     Cardiac Imaging   Results for orders placed during the hospital  encounter of 02/18/25    Echo Saline Bubble? Yes    Interpretation Summary    Left Ventricle: The left ventricle is normal in size. Normal wall thickness. There is normal systolic function with a visually estimated ejection fraction of 60 - 65%. There is normal diastolic function.    Right Ventricle: Normal right ventricular cavity size. Wall thickness is normal. Systolic function is normal.    Left Atrium: Left atrium is mildly dilated.    Aortic Valve: The aortic valve is a trileaflet valve. There is mild aortic valve sclerosis.    Tricuspid Valve: There is mild regurgitation.    Pulmonary Artery: The estimated pulmonary artery systolic pressure is 32 mmHg.    IVC/SVC: Normal venous pressure at 3 mmHg.    After contrast bubble injection very few bubbles appear late on the left side. They originate from  the pulmonary veins.  This is suggestive of extracardiac shunt.  Clinical correlation is required.      Roberto Marte MD  [facility]  Department of Vascular Neurology   [facility] - Neuro Critical Care

## 2025-02-20 NOTE — ASSESSMENT & PLAN NOTE
84 year old woman with PMH basal cell carcinoma, HTN, HLD, aortic atherosclerosis, GI bleed, prior left MCA infarct 2022 who presented to [facility] ED 2/18 for left sided weakness, left facial droop, and dysarthria. LKW 2/17 2030, woke up 0830 with symptoms. CTA with right M1 occlusion. Telestroke NIHSS 10. Received TNK prior to transfer at 1104. Transferred to [facility] for IR evaluation. On arrival NIHSS 7. Repeat CTH stable. Went to to IR. On DSA patient found to have R M1 migrated to distal R MCA branches not amendable to intervention. Patient admitted to [facility]. NIH improved to 0. MRI w/ small R parietal infarct. PT/OT eval for LIT. Echo w/ LA dilation and extracardiac shunt. Pending SD to NPU.    Given work up findings, etiology at this moment is ESUS. Will order cardiac monitor upon discharge to complete work up. US doppler BLE ordered to R/O any DVT. PT/OT recommending Low intensity therapy. If US negative, will be able to discharge home.     Antithrombotics for secondary stroke prevention: Antiplatelets: Aspirin: 81 mg daily  Clopidogrel: 75 mg daily    Statins for secondary stroke prevention and hyperlipidemia, if present:   Statins: Atorvastatin- 80 mg daily    Aggressive risk factor modification: HTN, HLD, AAD     Rehab efforts: The patient has been evaluated by a stroke team provider and the therapy needs have been fully considered based off the presenting complaints and exam findings. The following therapy evaluations are needed: PT evaluate and treat, OT evaluate and treat, SLP evaluate and treat    Diagnostics ordered/pending: None     VTE prophylaxis: Enoxaparin 40 mg SQ every 24 hours  Mechanical prophylaxis: Place SCDs    BP parameters: Infarct: SBP <160

## 2025-02-20 NOTE — DISCHARGE SUMMARY
Thanh Williamson - Neurosurgery ([facility])  Vascular Neurology  Comprehensive Stroke Center  Discharge Summary     Summary:     Admit Date: 2/18/2025 11:52 AM    Discharge Date and Time:  02/20/2025 12:26 PM    Attending Physician: Tj Grace MD     Discharge Provider: Niurka Santos MD    History of Present Illness: Katelyn Caba is an 84 year old woman with PMH BCC, HTN, HLD, Aortic atherosclerosis, GI bleed, prior left MCA infarct 2022 who presented to [facility] ED 2/18 for left sided weakness, left facial droop, and dysarthria. LKW 2/17 2030 when she went to bed, woke up with symptoms present at 0830. Received TNK prior to transfer at 1104. Transferred to [facility] for IR evaluation.     Hospital Course (synopsis of major diagnoses, care, treatment, and services provided during the course of the hospital stay): 02/19/2025 Patient admitted to [facility] w/ R M1 occlusion s/p TNK and DSA w/o thrombectomy as lesion migrated to distal MCA branches after TNK administered. Neuro exam improved. Therapy recs for LIT. Echo w/ LAE and extrapulmonary shunt. Pending SD to NPU.  02/20/2025 Step down to NPU overnight. Neuro exam stable. ECHO w/ mild LA dilation and extrapulmonary shunt. Will order cardiac monitor upon discharge. US doppler BLE ordered to R/O any DVT. PT/OT recommending Low intensity therapy. If US negative, will be able to discharge home.     US LE negative for DVT. Patient stable for discharge. Educated about the importance of following up with PCP and specialist. Instructed to come back to the ED in the case of worsening symptoms, loss of consciousness or if deemed necessary. Plan discussed with patient who verbalized understanding and agreed to it.      Goals of Care Treatment Preferences:  Code Status: Full Code      Stroke Etiology: Ischemic Undetermined Cause Cryptogenic Embolism / ESUS (Embolic Stroke of Undetermined Source)    STROKE DOCUMENTATION   Acute Stroke Times   Last Known Normal Date:  02/17/25  Last Known Normal Time: 2030  Unknown Symptom Onset Date: Unknown Date  Unknown Symptom Onset Time: Unknown Time  Stroke Team Called Date: 02/18/25  Stroke Team Called Time: 1156  Stroke Team Arrival Date: 02/18/25  Stroke Team Arrival Time: 1156  CT Interpretation Time: 1200  Thrombolytic Therapy Recommended: Yes  CTA Interpretation Time:  (prior to arrival)  Thrombectomy Recommended: Yes  Decision to Treat Time for Tenecteplase:  (prior to arrival at outside facility)  Decision to Treat Time for IR:  (prior to arrival)     NIH Scale:  1a. Level of Consciousness: 0-->Alert, keenly responsive  1b. LOC Questions: 0-->Answers both questions correctly  1c. LOC Commands: 0-->Performs both tasks correctly  2. Best Gaze: 0-->Normal  3. Visual: 0-->No visual loss  4. Facial Palsy: 0-->Normal symmetrical movements  5a. Motor Arm, Left: 0-->No drift, limb holds 90 (or 45) degrees for full 10 secs  5b. Motor Arm, Right: 0-->No drift, limb holds 90 (or 45) degrees for full 10 secs  6a. Motor Leg, Left: 0-->No drift, leg holds 30 degree position for full 5 secs  6b. Motor Leg, Right: 0-->No drift, leg holds 30 degree position for full 5 secs  7. Limb Ataxia: 0-->Absent  8. Sensory: 0-->Normal, no sensory loss  9. Best Language: 0-->No aphasia, normal  10. Dysarthria: 0-->Normal  11. Extinction and Inattention (formerly Neglect): 0-->No abnormality  Total (NIH Stroke Scale): 0        Modified [place] Score: 1  Gabriel Coma Scale:    ABCD2 Score:    [id number] Score:   HAS -BLED Score:   ICH Score:   Hunt & Hoffman Classification:       Assessment/Plan:     Diagnostic Results:      Brain Imaging   MRI  Small acute cortical/subcortical infarcts are right frontal and parietal lobes smaller in volume than predicted by initial MR imaging study of the prior day.        Vessel Imaging   CTA:   Vertebral arteries demonstrate no flow-limiting stenosis. There is calcified plaque left carotid bulb and ICA origin with approximately  50% stenosis at the cervical ICA origin. The common carotid arteries, right cervical ICA, bilateral intracranial ICAs, ACAs, PCAs, basilar artery, and left MCA demonstrate no flow-limiting stenosis. There is occlusion of the right MCA M1 segment distally series 5 image 236. No aneurysms are demonstrated. On delayed imaging the dural venous sinuses demonstrate no thrombosis.      Cardiac Imaging   Results for orders placed during the hospital encounter of 02/18/25     Echo Saline Bubble? Yes     Interpretation Summary    Left Ventricle: The left ventricle is normal in size. Normal wall thickness. There is normal systolic function with a visually estimated ejection fraction of 60 - 65%. There is normal diastolic function.    Right Ventricle: Normal right ventricular cavity size. Wall thickness is normal. Systolic function is normal.    Left Atrium: Left atrium is mildly dilated.    Aortic Valve: The aortic valve is a trileaflet valve. There is mild aortic valve sclerosis.    Tricuspid Valve: There is mild regurgitation.    Pulmonary Artery: The estimated pulmonary artery systolic pressure is 32 mmHg.    IVC/SVC: Normal venous pressure at 3 mmHg.    After contrast bubble injection very few bubbles appear late on the left side. They originate from  the pulmonary veins.  This is suggestive of extracardiac shunt.  Clinical correlation is required.    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  Service: Vascular Neurology      Recommendations:     Post-discharge complication risks: Falls    Stroke Education given to: patient and family    Follow-up in Stroke Clinic in 4-6 weeks.     Discharge Plan:  Antithrombotics: Aspirin 81 mg, Clopidogrel 75mg  Statin: Atorvastatin 80 mg  Aggresive risk factor modification:  Hypertension  Diabetes  High Cholesterol  Diet  Exercise  Obesity  Carotid Artery disease    Follow Up:      Patient Instructions:      Ambulatory referral/consult to Vascular Neurology   Standing Status: Future   Referral Priority: Routine Referral Type: Consultation   Referral Reason: Specialty Services Required   Requested Specialty: Vascular Neurology   Number of Visits Requested: 1     Ambulatory referral/consult to Cardiology   Standing Status: Future   Referral Priority: Routine Referral Type: Consultation   Referral Reason: Specialty Services Required   Requested Specialty: Cardiology   Number of Visits Requested: 1     Ambulatory Referral/Consult to Physical Therapy/Occupational Therapy   Standing Status: Future   Referral Priority: Routine Referral Type: Physical Medicine   Referral Reason: Specialty Services Required   Number of Visits Requested: 1     Ambulatory Referral/Consult to Physical Therapy/Occupational Therapy   Standing Status: Future   Referral Priority: Routine Referral Type: Physical Medicine   Referral Reason: Specialty Services Required   Number of Visits Requested: 1     Cardiac event monitor   Standing Status: Future Number of Occurrences: 1 Standing Exp. Date: 02/20/26     Order Specific Question Answer Comments   Cardiac Event Monitor Auto Trigger    Release to patient Immediate        Medications:  Reconciled Home Medications:      Medication List        START taking these medications      aspirin 81 MG Chew  Chew and swallow 1 tablet (81 mg total) by mouth once daily.  Start taking on: February 21, 2025     clopidogreL 75 mg  tablet  Commonly known as: PLAVIX  Take 1 tablet (75 mg total) by mouth once daily.  Start taking on: February 21, 2025            CHANGE how you take these medications      atorvastatin 80 MG tablet  Commonly known as: LIPITOR  Take 1 tablet (80 mg total) by mouth once daily.  Start taking on: February 21, 2025  What changed:   medication strength  how much to take            CONTINUE taking these medications      carvediloL 6.25 MG tablet  Commonly known as: COREG  Take 1 tablet (6.25 mg total) by mouth 2 (two) times daily with meals.     cyanocobalamin 1000 MCG tablet  Commonly known as: VITAMIN B-12  Take 1,000 mcg by mouth once daily.     pantoprazole 40 MG tablet  Commonly known as: PROTONIX  Take 1 tablet (40 mg total) by mouth once daily.     vitamin D 1000 units Tab  Commonly known as: VITAMIN D3  Take 1,000 Units by mouth once daily.            STOP taking these medications      ticagrelor 60 mg tablet  Commonly known as: PRAMOD Santos MD  Intern/PGY-1  Comprehensive Stroke Center  Department of Vascular Neurology   [facility] - Neurosurgery ([facility])

## 2025-02-20 NOTE — ASSESSMENT & PLAN NOTE
84 year old woman with PMH basal cell carcinoma, HTN, HLD, aortic atherosclerosis, GI bleed, prior left MCA infarct 2022 who presented to [facility] ED 2/18 for left sided weakness, left facial droop, and dysarthria. LKW 2/17 2030, woke up 0830 with symptoms. CTA with right M1 occlusion. Telestroke NIHSS 10. Received TNK prior to transfer at 1104. Transferred to [facility] for IR evaluation. On arrival NIHSS 7. Repeat CTH stable. Went to to IR. On DSA patient found to have R M1 migrated to distal R MCA branches not amendable to intervention. Patient admitted to [facility]. NIH improved to 0. MRI w/ small R parietal infarct. PT/OT eval for LIT. Echo w/ LA dilation and extracardiac shunt. Pending SD to NPU.    Antithrombotics for secondary stroke prevention: Antiplatelets: Aspirin: 81 mg daily  Clopidogrel: 75 mg daily    Statins for secondary stroke prevention and hyperlipidemia, if present:   Statins: Atorvastatin- 80 mg daily    Aggressive risk factor modification: HTN, HLD, AAD     Rehab efforts: The patient has been evaluated by a stroke team provider and the therapy needs have been fully considered based off the presenting complaints and exam findings. The following therapy evaluations are needed: PT evaluate and treat, OT evaluate and treat, SLP evaluate and treat    Diagnostics ordered/pending: None     VTE prophylaxis: Enoxaparin 40 mg SQ every 24 hours  Mechanical prophylaxis: Place SCDs    BP parameters: Infarct: SBP <160

## 2025-02-20 NOTE — PLAN OF CARE
CHW met with patient/family at bedside. Patient experience rounding completed and reviewed the following.     Do you know your discharge plan? Yes or No,    yes    If yes, what is the plan? (Home, Home Health, Rehab, SNF, LTAC, or Other) Home with family      Have you discussed your needs and preferences with your SW/CM? Yes or No     Yes    If you are discharging home, do you have help at home? Yes or No    Home with family     Do you think you will need help additional at home at discharge? Yes or No   No    Do you currently have difficulty keeping up with bills, affording medicine or buying food? Yes or No      No    Assigned SW/CM notified of any patient/family needs or concerns.     [facility]    Appropriate resources provided to address patient's needs.  Yes        Dahlia Trimble CHW  Case Management   [phone]

## 2025-02-20 NOTE — CARE UPDATE
"Purplish discoloration noted to inner corners of upper eyelids R>L, no pain, swelling, or vision changes. Denies trauma. Pt says it "appeared a day or two before" the stroke and has worsened.   ANJU Lewis made aware. Pt reminded of blood thinner administration and advised to follow up if it should worsen, become painful, swell, or if vision changes.     Plans to step down to NPU soon.  "

## 2025-02-20 NOTE — NURSING
Informed Stroke Team [place] regarding elevated BP upon transfer in 170/69 mmHg. Clarified whether to follow doctor's order to not treat BP unless >180/105 mmHg or Vascular Neuro note input to keep SBP <160 mmHg. Verbally ordered to keep BP <180/105 mmHg. No treatment to be done for now.

## 2025-02-20 NOTE — PLAN OF CARE
[facility] - Neurosurgery (Hospital)  Discharge Final Note    Primary Care Provider: Brooklyn Burnham MD    Expected Discharge Date: 2/20/2025  Future Appointments   Date Time Provider Department Center   2/24/2025 11:20 AM Brooklyn Burnham MD [facility]   3/10/2025 10:00 AM [facility], [facility]   3/18/2025  9:00 AM Elisabeth De La Cruz MD [facility] CARDIO [place]       Final Discharge Note (most recent)       Final Note - 02/20/25 1628          Final Note    Assessment Type Final Discharge Note     Anticipated Discharge Disposition Home or Self Care     What phone number can be called within the next 1-3 days to see how you are doing after discharge? [phone]        Post-Acute Status    Coverage MEDICARE - MEDICARE PART A & B     Discharge Delays None known at this time                 Pt discharge today home w/ Cardio Monitor (ELLIOTT henry w/ [employer] Ext:[id number], she informed  pt monitor was delivered). ELLIOTT met w/ pt; pt aware of f/u appt and stated she will make her own appt w/ her PCP Dr. Alva Burnham, she stated she has call PCP already and was informed PCP will call her back w/ an appt. Pt informed SW her - Bonnie will be providing her transportation home.     MERCED White   [facility]    Case Management Dept  [phone]      Important Message from Medicare  Important Message from Medicare regarding Discharge Appeal Rights: Given to patient/caregiver, Explained to patient/caregiver, Signed/date by patient/caregiver     Date IMM was signed: 02/20/25  Time IMM was signed: [id number]

## 2025-02-20 NOTE — ASSESSMENT & PLAN NOTE
Lab Results   Component Value Date    LDLCALC 133.2 02/18/2025     -on atorvastatin 40 at home  -increase to atorva 80mg

## 2025-02-20 NOTE — NURSING
Patient Transferred to NPU Room 940       Upon arrival to the floor, patient greeted and oriented to room. Complete head to toe assessment completed per protocol. VSS, see flowsheet for details. Neuro assessment completed. Primary team notified of patient's transfer to floor. All current and transfer orders reviewed/reconciled per protocol. All emergency equipment set up in patient's room. Fall  precautions initiated per providers orders. 4 Eyes skin assessment performed, see flowsheets for details. Reviewed assessment and rounding frequency with patient and family. Questions were encouraged and addressed. Repositioned patient for comfort with bed locked in lowest position, side rails up x 3, bed alarm set, and call light within reach. Instructed patient to call staff for mobility/assistance, verbalized understanding. No acute signs of distress noted at this time.

## 2025-02-20 NOTE — PROGRESS NOTES
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 LKW 2/17 2030, woke up 0830 with symptoms. CTA with right M1 occlusion. Telestroke NIHSS 10. Received TNK prior to transfer at 1104. [place] [facility]. On arrival NIHSS 7. Repeat CTH stable. Went to to IR. On DSA patient found to have R M1 migrated to distal R MCA branches not amendable to intervention. Patient admitted to [facility]. NIH improved to 0. MRI w/ small R parietal infarct. PT/OT eval for LIT. Echo w/ LA dilation and extracardiac shunt. Pending SD to NPU.    Given work up findings, etiology at this moment is ESUS. Will order cardiac monitor upon discharge to complete work up. US doppler BLE ordered to R/O any DVT. PT/OT recommending Low intensity therapy. If US negative, will be able to discharge home.     Antithrombotics for secondary stroke prevention: Antiplatelets: Aspirin: 81 mg daily  Clopidogrel: 75 mg daily    Statins for secondary stroke prevention and hyperlipidemia, if present:   Statins: Atorvastatin- 80 mg daily    Aggressive risk factor modification: HTN, HLD, AAD     Rehab efforts: The patient has been evaluated by a stroke team provider and the therapy needs have been fully considered based off the presenting complaints and exam findings. The following therapy evaluations are needed: PT evaluate and treat, OT evaluate and treat, SLP evaluate and treat    Diagnostics ordered/pending: None     VTE prophylaxis: Enoxaparin 40 mg SQ every 24 hours  Mechanical prophylaxis: Place SCDs    BP parameters: Infarct: SBP <160        History of GI bleed  Monitor for bleeding after starting  DAPT    Hypertension  Stroke risk factor  -post TNK and DSA w/o intervention  -SBP < 160    ISAIAH (iron deficiency anemia)  -cbc daily  -f/u w/ heme outpt    Pure hypercholesterolemia  Lab Results   Component Value Date    LDLCALC 133.2 02/18/2025     -on atorvastatin 40 at home  -increase to atorva 80mg         02/19/2025 Patient admitted to [facility] w/ R M1 occlusion s/p TNK and DSA w/o thrombectomy as lesion migrated to distal MCA branches after TNK administered. Neuro exam improved. Therapy recs for LIT. Echo w/ LAE and extrapulmonary shunt. Pending SD to NPU.  02/20/2025 Step down to NPU overnight. Neuro exam stable. ECHO w/ mild LA dilation and extrapulmonary shunt. Will order cardiac monitor upon discharge. US doppler BLE ordered to R/O any DVT. PT/OT recommending Low intensity therapy. If US negative, will be able to discharge home.       STROKE DOCUMENTATION   Acute Stroke Times   Last Known Normal Date: 02/17/25  Last Known Normal Time: 2030  Unknown Symptom Onset Date: Unknown Date  Unknown Symptom Onset Time: Unknown Time  Stroke Team Called Date: 02/18/25  Stroke Team Called Time: 1156  Stroke Team Arrival Date: 02/18/25  Stroke Team Arrival Time: 1156  CT Interpretation Time: 1200  Thrombolytic Therapy Recommended: Yes  CTA Interpretation Time:  (prior to arrival)  Thrombectomy Recommended: Yes  Decision to Treat Time for Tenecteplase:  (prior to arrival at outside facility)  Decision to Treat Time for IR:  (prior to arrival)    NIH Scale:  1a. Level of Consciousness: 0-->Alert, keenly responsive  1b. LOC Questions: 0-->Answers both questions correctly  1c. LOC Commands: 0-->Performs both tasks correctly  2. Best Gaze: 0-->Normal  3. Visual: 0-->No visual loss  4. Facial Palsy: 0-->Normal symmetrical movements  5a. Motor Arm, Left: 0-->No drift, limb holds 90 (or 45) degrees for full 10 secs  5b. Motor Arm, Right: 0-->No drift, limb holds 90 (or 45) degrees for full 10 secs  6a. Motor Leg, Left: 0-->No drift,  leg holds 30 degree position for full 5 secs  6b. Motor Leg, Right: 0-->No drift, leg holds 30 degree position for full 5 secs  7. Limb Ataxia: 0-->Absent  8. Sensory: 0-->Normal, no sensory loss  9. Best Language: 0-->No aphasia, normal  10. Dysarthria: 0-->Normal  11. Extinction and Inattention (formerly Neglect): 0-->No abnormality  Total (NIH Stroke Scale): 0       Modified Keegan Score: 1  Slayton Coma Scale:    ABCD2 Score:    [id number] Score:   HAS -BLED Score:   ICH Score:   Hunt & Hoffman Classification:      Hemorrhagic change of an Ischemic Stroke: Does this patient have an ischemic stroke with hemorrhagic changes? No     Neurologic Chief Complaint: LSW    Subjective:     Interval History: Patient is seen for follow-up neurological assessment and treatment recommendations:     HPI, Past Medical, Family, and Social History remains the same as documented in the initial encounter.     Review of Systems   Neurological:  Positive for weakness.     Scheduled Meds:   acetaminophen  650 mg Oral Q6H    aspirin  81 mg Oral Daily    atorvastatin  80 mg Oral Daily    clopidogreL  75 mg Oral Daily    enoxparin  40 mg Subcutaneous Q24H (prophylaxis, 1700)    pantoprazole  40 mg Oral Daily    senna-docusate 8.6-50 mg  1 tablet Oral BID     Continuous Infusions:  PRN Meds:  Current Facility-Administered Medications:     bisacodyL, 10 mg, Rectal, Daily PRN    hydrALAZINE, 10 mg, Intravenous, Q6H PRN    labetalol, 10 mg, Intravenous, Q6H PRN    midazolam, 0.5 mg, Intravenous, Q5 Min PRN    ondansetron, 4 mg, Intravenous, Q8H PRN    oxyCODONE, 5 mg, Oral, Q4H PRN    oxyCODONE, 10 mg, Oral, Q4H PRN    sodium chloride 0.9%, 10 mL, Intravenous, PRN    Objective:     Vital Signs (Most Recent):  Temp: 97.6 °F (36.4 °C) (02/20/25 0736)  Pulse: 68 (02/20/25 0736)  Resp: 16 (02/20/25 0736)  BP: (!) 135/58 (02/20/25 0736)  SpO2: 96 % (02/20/25 0736)  BP Location: Left arm    Vital Signs Range (Last 24H):  Temp:  [97.5 °F (36.4  °C)-98.5 °F (36.9 °C)]   Pulse:  [58-80]   Resp:  [16-27]   BP: (123-170)/(58-70)   SpO2:  [93 %-98 %]   BP Location: Left arm       Physical Exam  Vitals and nursing note reviewed.   Constitutional:       General: She is not in acute distress.     Appearance: Normal appearance.   HENT:      Head: Normocephalic and atraumatic.      Mouth/Throat:      Mouth: Mucous membranes are moist.   Eyes:      Extraocular Movements: Extraocular movements intact.   Pulmonary:      Effort: Pulmonary effort is normal. No respiratory distress.   Abdominal:      General: Abdomen is flat. There is no distension.   Musculoskeletal:      Cervical back: Normal range of motion.   Skin:     General: Skin is warm.   Neurological:      Mental Status: She is alert and oriented to person, place, and time.   Psychiatric:         Mood and Affect: Mood normal.         Behavior: Behavior normal.              Neurological Exam:   LOC: alert  Attention Span: Good   Language: No aphasia  Articulation: No dysarthria  Orientation: Person, Place, Time   Visual Fields: Full  EOM (CN III, IV, VI): Full/intact  Facial Movement (CN VII): Symmetric facial expression    Motor: 5/5 throughout  Sensation: Intact to light touch  Tone: Normal tone throughout    Laboratory:  BMP:   Recent Labs   Lab 02/20/25  0529      K 3.6      CO2 27   BUN 18   CREATININE 0.6   CALCIUM 8.7     CBC:   Recent Labs   Lab 02/20/25  0529   WBC 7.49   RBC 3.25*   HGB 9.3*   HCT 29.0*      MCV 89   MCH 28.6   MCHC 32.1       Diagnostic Results     Brain Imaging   MRI pending read    Vessel Imaging   CTA:   Vertebral arteries demonstrate no flow-limiting stenosis. There is calcified plaque left carotid bulb and ICA origin with approximately 50% stenosis at the cervical ICA origin. The common carotid arteries, right cervical ICA, bilateral intracranial ICAs, ACAs, PCAs, basilar artery, and left MCA demonstrate no flow-limiting stenosis. There is occlusion of the right  MCA M1 segment distally series 5 image 236. No aneurysms are demonstrated. On delayed imaging the dural venous sinuses demonstrate no thrombosis.     Cardiac Imaging   Results for orders placed during the hospital encounter of 02/18/25    Echo Saline Bubble? Yes    Interpretation Summary    Left Ventricle: The left ventricle is normal in size. Normal wall thickness. There is normal systolic function with a visually estimated ejection fraction of 60 - 65%. There is normal diastolic function.    Right Ventricle: Normal right ventricular cavity size. Wall thickness is normal. Systolic function is normal.    Left Atrium: Left atrium is mildly dilated.    Aortic Valve: The aortic valve is a trileaflet valve. There is mild aortic valve sclerosis.    Tricuspid Valve: There is mild regurgitation.    Pulmonary Artery: The estimated pulmonary artery systolic pressure is 32 mmHg.    IVC/SVC: Normal venous pressure at 3 mmHg.    After contrast bubble injection very few bubbles appear late on the left side. They originate from  the pulmonary veins.  This is suggestive of extracardiac shunt.  Clinical correlation is required.      Niurka Santos MD  Intern/PGY-1  Comprehensive Stroke Center  Department of Vascular Neurology   [facility] - [facility] ([facility])

## 2025-02-20 NOTE — SUBJECTIVE & OBJECTIVE
Neurologic Chief Complaint: LSW    Subjective:     Interval History: Patient is seen for follow-up neurological assessment and treatment recommendations:     HPI, Past Medical, Family, and Social History remains the same as documented in the initial encounter.     Review of Systems   Neurological:  Positive for weakness.     Scheduled Meds:   acetaminophen  650 mg Oral Q6H    aspirin  81 mg Oral Daily    atorvastatin  80 mg Oral Daily    clopidogreL  75 mg Oral Daily    enoxparin  40 mg Subcutaneous Q24H (prophylaxis, 1700)    pantoprazole  40 mg Oral Daily    senna-docusate 8.6-50 mg  1 tablet Oral BID     Continuous Infusions:  PRN Meds:  Current Facility-Administered Medications:     bisacodyL, 10 mg, Rectal, Daily PRN    hydrALAZINE, 10 mg, Intravenous, Q6H PRN    labetalol, 10 mg, Intravenous, Q6H PRN    midazolam, 0.5 mg, Intravenous, Q5 Min PRN    ondansetron, 4 mg, Intravenous, Q8H PRN    oxyCODONE, 5 mg, Oral, Q4H PRN    oxyCODONE, 10 mg, Oral, Q4H PRN    sodium chloride 0.9%, 10 mL, Intravenous, PRN    Objective:     Vital Signs (Most Recent):  Temp: 97.6 °F (36.4 °C) (02/20/25 0736)  Pulse: 68 (02/20/25 0736)  Resp: 16 (02/20/25 0736)  BP: (!) 135/58 (02/20/25 0736)  SpO2: 96 % (02/20/25 0736)  BP Location: Left arm    Vital Signs Range (Last 24H):  Temp:  [97.5 °F (36.4 °C)-98.5 °F (36.9 °C)]   Pulse:  [58-80]   Resp:  [16-27]   BP: (123-170)/(58-70)   SpO2:  [93 %-98 %]   BP Location: Left arm       Physical Exam  Vitals and nursing note reviewed.   Constitutional:       General: She is not in acute distress.     Appearance: Normal appearance.   HENT:      Head: Normocephalic and atraumatic.      Mouth/Throat:      Mouth: Mucous membranes are moist.   Eyes:      Extraocular Movements: Extraocular movements intact.   Pulmonary:      Effort: Pulmonary effort is normal. No respiratory distress.   Abdominal:      General: Abdomen is flat. There is no distension.   Musculoskeletal:      Cervical back: Normal  range of motion.   Skin:     General: Skin is warm.   Neurological:      Mental Status: She is alert and oriented to person, place, and time.   Psychiatric:         Mood and Affect: Mood normal.         Behavior: Behavior normal.              Neurological Exam:   LOC: alert  Attention Span: Good   Language: No aphasia  Articulation: No dysarthria  Orientation: Person, Place, Time   Visual Fields: Full  EOM (CN III, IV, VI): Full/intact  Facial Movement (CN VII): Symmetric facial expression    Motor: 5/5 throughout  Sensation: Intact to light touch  Tone: Normal tone throughout    Laboratory:  BMP:   Recent Labs   Lab 02/20/25  0529      K 3.6      CO2 27   BUN 18   CREATININE 0.6   CALCIUM 8.7     CBC:   Recent Labs   Lab 02/20/25  0529   WBC 7.49   RBC 3.25*   HGB 9.3*   HCT 29.0*      MCV 89   MCH 28.6   MCHC 32.1       Diagnostic Results     Brain Imaging   MRI pending read    Vessel Imaging   CTA:   Vertebral arteries demonstrate no flow-limiting stenosis. There is calcified plaque left carotid bulb and ICA origin with approximately 50% stenosis at the cervical ICA origin. The common carotid arteries, right cervical ICA, bilateral intracranial ICAs, ACAs, PCAs, basilar artery, and left MCA demonstrate no flow-limiting stenosis. There is occlusion of the right MCA M1 segment distally series 5 image 236. No aneurysms are demonstrated. On delayed imaging the dural venous sinuses demonstrate no thrombosis.     Cardiac Imaging   Results for orders placed during the hospital encounter of 02/18/25    Echo Saline Bubble? Yes    Interpretation Summary    Left Ventricle: The left ventricle is normal in size. Normal wall thickness. There is normal systolic function with a visually estimated ejection fraction of 60 - 65%. There is normal diastolic function.    Right Ventricle: Normal right ventricular cavity size. Wall thickness is normal. Systolic function is normal.    Left Atrium: Left atrium is mildly  dilated.    Aortic Valve: The aortic valve is a trileaflet valve. There is mild aortic valve sclerosis.    Tricuspid Valve: There is mild regurgitation.    Pulmonary Artery: The estimated pulmonary artery systolic pressure is 32 mmHg.    IVC/SVC: Normal venous pressure at 3 mmHg.    After contrast bubble injection very few bubbles appear late on the left side. They originate from  the pulmonary veins.  This is suggestive of extracardiac shunt.  Clinical correlation is required.

## 2025-02-20 NOTE — PLAN OF CARE
POC updated and reviewed with the patient at the bedside. Questions regarding POC were encouraged and addressed. VSS, see flowsheets. Tele maintained per provider's order. Patient is AOX4 at this time. Fall and safety precautions maintained, no signs of injury noted during shift. Patient repositioned independently in bed for comfort. Upon exiting room, patient's bed locked in low position, side rails up x 3, bed alarm set, with call light within reach. Instructed patient to call staff for mobility, verbalized understanding. Stroke book and stroke education reviewed with the patient at the bedside, see education flowsheets for details. No acute signs of distress noted at this time.     Problem: Adult Inpatient Plan of Care  Goal: Plan of Care Review  Outcome: Progressing  Goal: Patient-Specific Goal (Individualized)  Outcome: Progressing  Goal: Absence of Hospital-Acquired Illness or Injury  Outcome: Progressing  Goal: Optimal Comfort and Wellbeing  Outcome: Progressing  Goal: Readiness for Transition of Care  Outcome: Progressing     Problem: Wound  Goal: Optimal Coping  Outcome: Progressing  Goal: Optimal Functional Ability  Outcome: Progressing  Goal: Absence of Infection Signs and Symptoms  Outcome: Progressing  Goal: Improved Oral Intake  Outcome: Progressing  Goal: Optimal Pain Control and Function  Outcome: Progressing  Goal: Skin Health and Integrity  Outcome: Progressing  Goal: Optimal Wound Healing  Outcome: Progressing     Problem: Stroke, Ischemic (Includes Transient Ischemic Attack)  Goal: Optimal Coping  Outcome: Progressing  Goal: Effective Bowel Elimination  Outcome: Progressing  Goal: Optimal Cerebral Tissue Perfusion  Outcome: Progressing  Goal: Optimal Cognitive Function  Outcome: Progressing  Goal: Improved Communication Skills  Outcome: Progressing  Goal: Optimal Functional Ability  Outcome: Progressing  Goal: Optimal Nutrition Intake  Outcome: Progressing  Goal: Effective Oxygenation and  Ventilation  Outcome: Progressing  Goal: Improved Sensorimotor Function  Outcome: Progressing  Goal: Safe and Effective Swallow  Outcome: Progressing  Goal: Effective Urinary Elimination  Outcome: Progressing     Problem: Skin Injury Risk Increased  Goal: Skin Health and Integrity  Outcome: Progressing     Problem: Fall Injury Risk  Goal: Absence of Fall and Fall-Related Injury  Outcome: Progressing

## 2025-02-21 ENCOUNTER — PATIENT OUTREACH (OUTPATIENT)
Dept: ADMINISTRATIVE | Facility: CLINIC | Age: 85
End: 2025-02-21
Payer: MEDICARE

## 2025-02-21 DIAGNOSIS — Z86.73 H/O ISCHEMIC LEFT MCA STROKE: Primary | ICD-10-CM

## 2025-02-21 NOTE — PROGRESS NOTES
C3 nurse spoke with Katelyn Caba for a TCC post hospital discharge follow up call. The patient has a scheduled [facility] appointment with Brooklyn Burnham MD  on 02/24/25 @ 6900.

## 2025-02-22 DIAGNOSIS — Z00.00 ENCOUNTER FOR MEDICARE ANNUAL WELLNESS EXAM: ICD-10-CM

## 2025-02-24 ENCOUNTER — LAB VISIT (OUTPATIENT)
Dept: LAB | Facility: HOSPITAL | Age: 85
End: 2025-02-24
Attending: FAMILY MEDICINE
Payer: MEDICARE

## 2025-02-24 ENCOUNTER — OFFICE VISIT (OUTPATIENT)
Dept: FAMILY MEDICINE | Facility: CLINIC | Age: 85
End: 2025-02-24
Attending: FAMILY MEDICINE
Payer: MEDICARE

## 2025-02-24 VITALS
WEIGHT: 110 LBS | BODY MASS INDEX: 17.75 KG/M2 | DIASTOLIC BLOOD PRESSURE: 60 MMHG | HEART RATE: 76 BPM | OXYGEN SATURATION: 99 % | SYSTOLIC BLOOD PRESSURE: 110 MMHG

## 2025-02-24 DIAGNOSIS — I10 ESSENTIAL HYPERTENSION: Primary | ICD-10-CM

## 2025-02-24 DIAGNOSIS — Z86.73 H/O ISCHEMIC LEFT MCA STROKE: ICD-10-CM

## 2025-02-24 DIAGNOSIS — D50.0 IRON DEFICIENCY ANEMIA DUE TO CHRONIC BLOOD LOSS: ICD-10-CM

## 2025-02-24 LAB
BASOPHILS # BLD AUTO: 0.08 K/UL (ref 0–0.2)
BASOPHILS NFR BLD: 1.5 % (ref 0–1.9)
DIFFERENTIAL METHOD BLD: ABNORMAL
EOSINOPHIL # BLD AUTO: 0.2 K/UL (ref 0–0.5)
EOSINOPHIL NFR BLD: 4 % (ref 0–8)
ERYTHROCYTE [DISTWIDTH] IN BLOOD BY AUTOMATED COUNT: 14.2 % (ref 11.5–14.5)
HCT VFR BLD AUTO: 26.5 % (ref 37–48.5)
HGB BLD-MCNC: 8.1 G/DL (ref 12–16)
IMM GRANULOCYTES # BLD AUTO: 0.02 K/UL (ref 0–0.04)
IMM GRANULOCYTES NFR BLD AUTO: 0.4 % (ref 0–0.5)
IRON SERPL-MCNC: 34 UG/DL (ref 30–160)
LYMPHOCYTES # BLD AUTO: 1.7 K/UL (ref 1–4.8)
LYMPHOCYTES NFR BLD: 31.4 % (ref 18–48)
MCH RBC QN AUTO: 27.8 PG (ref 27–31)
MCHC RBC AUTO-ENTMCNC: 30.6 G/DL (ref 32–36)
MCV RBC AUTO: 91 FL (ref 82–98)
MONOCYTES # BLD AUTO: 0.6 K/UL (ref 0.3–1)
MONOCYTES NFR BLD: 11.2 % (ref 4–15)
NEUTROPHILS # BLD AUTO: 2.7 K/UL (ref 1.8–7.7)
NEUTROPHILS NFR BLD: 51.5 % (ref 38–73)
NRBC BLD-RTO: 0 /100 WBC
PLATELET # BLD AUTO: 405 K/UL (ref 150–450)
PMV BLD AUTO: 10.8 FL (ref 9.2–12.9)
RBC # BLD AUTO: 2.91 M/UL (ref 4–5.4)
SATURATED IRON: 11 % (ref 20–50)
TOTAL IRON BINDING CAPACITY: 297 UG/DL (ref 250–450)
TRANSFERRIN SERPL-MCNC: 201 MG/DL (ref 200–375)
WBC # BLD AUTO: 5.29 K/UL (ref 3.9–12.7)

## 2025-02-24 PROCEDURE — 82728 ASSAY OF FERRITIN: CPT | Performed by: NURSE PRACTITIONER

## 2025-02-24 PROCEDURE — 99213 OFFICE O/P EST LOW 20 MIN: CPT | Mod: PBBFAC,PO | Performed by: FAMILY MEDICINE

## 2025-02-24 PROCEDURE — 36415 COLL VENOUS BLD VENIPUNCTURE: CPT | Mod: PO | Performed by: NURSE PRACTITIONER

## 2025-02-24 PROCEDURE — 85025 COMPLETE CBC W/AUTO DIFF WBC: CPT | Performed by: NURSE PRACTITIONER

## 2025-02-24 PROCEDURE — 99999 PR PBB SHADOW E&M-EST. PATIENT-LVL III: CPT | Mod: PBBFAC,,, | Performed by: FAMILY MEDICINE

## 2025-02-24 PROCEDURE — 99214 OFFICE O/P EST MOD 30 MIN: CPT | Mod: S$PBB,,, | Performed by: FAMILY MEDICINE

## 2025-02-24 PROCEDURE — 83540 ASSAY OF IRON: CPT | Performed by: NURSE PRACTITIONER

## 2025-02-25 ENCOUNTER — TELEPHONE (OUTPATIENT)
Dept: HEMATOLOGY/ONCOLOGY | Facility: CLINIC | Age: 85
End: 2025-02-25
Payer: MEDICARE

## 2025-02-25 LAB — FERRITIN SERPL-MCNC: 64 NG/ML (ref 20–300)

## 2025-02-25 NOTE — TELEPHONE ENCOUNTER
----- Message from Veronica sent at 2/25/2025  9:16 AM CST -----  Type: Needs Medical AdviceWho Called:  PtBest Call Back Number: [phone] Additional Information: requesting a call back to get results of lab and work and schedule appt

## 2025-02-26 ENCOUNTER — PATIENT MESSAGE (OUTPATIENT)
Dept: NEUROLOGY | Facility: HOSPITAL | Age: 85
End: 2025-02-26
Payer: MEDICARE

## 2025-02-26 LAB — STFR SERPL-MCNC: 3.5 MG/L (ref 1.8–4.6)

## 2025-03-03 ENCOUNTER — HOSPITAL ENCOUNTER (INPATIENT)
Facility: HOSPITAL | Age: 85
LOS: 3 days | Discharge: HOME | DRG: 377 | End: 2025-03-06
Attending: EMERGENCY MEDICINE | Admitting: HOSPITALIST
Payer: MEDICARE

## 2025-03-03 DIAGNOSIS — R07.9 CHEST PAIN: ICD-10-CM

## 2025-03-03 DIAGNOSIS — D64.9 SYMPTOMATIC ANEMIA: ICD-10-CM

## 2025-03-03 DIAGNOSIS — R06.02 SHORTNESS OF BREATH: ICD-10-CM

## 2025-03-03 DIAGNOSIS — Z87.19 HISTORY OF GI BLEED: ICD-10-CM

## 2025-03-03 DIAGNOSIS — I50.9 CHF EXACERBATION: Primary | ICD-10-CM

## 2025-03-03 DIAGNOSIS — D64.9 ANEMIA, UNSPECIFIED TYPE: ICD-10-CM

## 2025-03-03 DIAGNOSIS — K92.2 GASTROINTESTINAL HEMORRHAGE, UNSPECIFIED GASTROINTESTINAL HEMORRHAGE TYPE: ICD-10-CM

## 2025-03-03 DIAGNOSIS — Z09 HOSPITAL DISCHARGE FOLLOW-UP: ICD-10-CM

## 2025-03-03 PROBLEM — I63.411 STROKE DUE TO EMBOLISM OF RIGHT MIDDLE CEREBRAL ARTERY: Status: RESOLVED | Noted: 2025-02-18 | Resolved: 2025-03-03

## 2025-03-03 PROBLEM — I63.9 ACUTE ISCHEMIC STROKE: Status: RESOLVED | Noted: 2025-02-18 | Resolved: 2025-03-03

## 2025-03-03 PROBLEM — D50.0 IRON DEFICIENCY ANEMIA DUE TO CHRONIC BLOOD LOSS: Status: ACTIVE | Noted: 2022-08-21

## 2025-03-03 PROBLEM — E87.70 VOLUME OVERLOAD: Status: RESOLVED | Noted: 2022-08-21 | Resolved: 2025-03-03

## 2025-03-03 PROBLEM — R79.89 ELEVATED BRAIN NATRIURETIC PEPTIDE (BNP) LEVEL: Status: ACTIVE | Noted: 2025-03-03

## 2025-03-03 LAB
ABO + RH BLD: NORMAL
ALBUMIN SERPL BCP-MCNC: 3.3 G/DL (ref 3.5–5.2)
ALP SERPL-CCNC: 152 U/L (ref 40–150)
ALT SERPL W/O P-5'-P-CCNC: 28 U/L (ref 10–44)
ANION GAP SERPL CALC-SCNC: 10 MMOL/L (ref 8–16)
ANISOCYTOSIS BLD QL SMEAR: SLIGHT
ANISOCYTOSIS BLD QL SMEAR: SLIGHT
AST SERPL-CCNC: 31 U/L (ref 10–40)
BASOPHILS # BLD AUTO: 0.06 K/UL (ref 0–0.2)
BASOPHILS # BLD AUTO: 0.11 K/UL (ref 0–0.2)
BASOPHILS NFR BLD: 0.3 % (ref 0–1.9)
BASOPHILS NFR BLD: 0.4 % (ref 0–1.9)
BILIRUB SERPL-MCNC: 0.5 MG/DL (ref 0.1–1)
BILIRUB UR QL STRIP: NEGATIVE
BIPAP: 0
BLD GP AB SCN CELLS X3 SERPL QL: NORMAL
BLD PROD TYP BPU: NORMAL
BLD PROD TYP BPU: NORMAL
BLOOD UNIT EXPIRATION DATE: NORMAL
BLOOD UNIT EXPIRATION DATE: NORMAL
BLOOD UNIT TYPE CODE: 600
BLOOD UNIT TYPE CODE: 600
BLOOD UNIT TYPE: NORMAL
BLOOD UNIT TYPE: NORMAL
BNP SERPL-MCNC: 1512 PG/ML (ref 0–99)
BUN SERPL-MCNC: 23 MG/DL (ref 6–30)
BUN SERPL-MCNC: 25 MG/DL (ref 8–23)
CALCIUM SERPL-MCNC: 7.9 MG/DL (ref 8.7–10.5)
CHLORIDE SERPL-SCNC: 103 MMOL/L (ref 95–110)
CHLORIDE SERPL-SCNC: 106 MMOL/L (ref 95–110)
CLARITY UR REFRACT.AUTO: CLEAR
CO2 SERPL-SCNC: 20 MMOL/L (ref 23–29)
CODING SYSTEM: NORMAL
CODING SYSTEM: NORMAL
COLOR UR AUTO: COLORLESS
CORRECTED TEMPERATURE (PCO2): 52.5 MMHG
CORRECTED TEMPERATURE (PH): 7.24
CORRECTED TEMPERATURE (PO2): 33.2 MMHG
CREAT SERPL-MCNC: 0.7 MG/DL (ref 0.5–1.4)
CREAT SERPL-MCNC: 0.7 MG/DL (ref 0.5–1.4)
CROSSMATCH INTERPRETATION: NORMAL
CROSSMATCH INTERPRETATION: NORMAL
DACRYOCYTES BLD QL SMEAR: ABNORMAL
DIFFERENTIAL METHOD BLD: ABNORMAL
DIFFERENTIAL METHOD BLD: ABNORMAL
DISPENSE STATUS: NORMAL
DISPENSE STATUS: NORMAL
EOSINOPHIL # BLD AUTO: 0 K/UL (ref 0–0.5)
EOSINOPHIL # BLD AUTO: 0.1 K/UL (ref 0–0.5)
EOSINOPHIL NFR BLD: 0.1 % (ref 0–8)
EOSINOPHIL NFR BLD: 0.4 % (ref 0–8)
ERYTHROCYTE [DISTWIDTH] IN BLOOD BY AUTOMATED COUNT: 15.1 % (ref 11.5–14.5)
ERYTHROCYTE [DISTWIDTH] IN BLOOD BY AUTOMATED COUNT: 16.3 % (ref 11.5–14.5)
EST. GFR  (NO RACE VARIABLE): >60 ML/MIN/1.73 M^2
FIO2: 21 %
GLUCOSE SERPL-MCNC: 231 MG/DL (ref 70–110)
GLUCOSE SERPL-MCNC: 237 MG/DL (ref 70–110)
GLUCOSE UR QL STRIP: NEGATIVE
HCT VFR BLD AUTO: 19.5 % (ref 37–48.5)
HCT VFR BLD AUTO: 29.3 % (ref 37–48.5)
HCT VFR BLD CALC: 18.1 %
HCT VFR BLD CALC: 22 %PCV (ref 36–54)
HCV AB SERPL QL IA: NORMAL
HGB BLD-MCNC: 5.6 G/DL (ref 12–16)
HGB BLD-MCNC: 7.8 G/DL (ref 12–16)
HGB BLD-MCNC: 9.6 G/DL (ref 12–16)
HGB UR QL STRIP: NEGATIVE
HIV 1+2 AB+HIV1 P24 AG SERPL QL IA: NORMAL
HYPOCHROMIA BLD QL SMEAR: ABNORMAL
IMM GRANULOCYTES # BLD AUTO: 0.07 K/UL (ref 0–0.04)
IMM GRANULOCYTES # BLD AUTO: 0.39 K/UL (ref 0–0.04)
IMM GRANULOCYTES NFR BLD AUTO: 0.5 % (ref 0–0.5)
IMM GRANULOCYTES NFR BLD AUTO: 1.2 % (ref 0–0.5)
INFLUENZA A, MOLECULAR: NEGATIVE
INFLUENZA B, MOLECULAR: NEGATIVE
KETONES UR QL STRIP: NEGATIVE
LDH SERPL L TO P-CCNC: 2.7 MMOL/L (ref 0.5–2.2)
LEUKOCYTE ESTERASE UR QL STRIP: NEGATIVE
LIPASE SERPL-CCNC: 25 U/L (ref 4–60)
LYMPHOCYTES # BLD AUTO: 1.5 K/UL (ref 1–4.8)
LYMPHOCYTES # BLD AUTO: 4.4 K/UL (ref 1–4.8)
LYMPHOCYTES NFR BLD: 30.9 % (ref 18–48)
LYMPHOCYTES NFR BLD: 4.8 % (ref 18–48)
MAGNESIUM SERPL-MCNC: 2 MG/DL (ref 1.6–2.6)
MCH RBC QN AUTO: 27.7 PG (ref 27–31)
MCH RBC QN AUTO: 28.9 PG (ref 27–31)
MCHC RBC AUTO-ENTMCNC: 28.7 G/DL (ref 32–36)
MCHC RBC AUTO-ENTMCNC: 32.8 G/DL (ref 32–36)
MCV RBC AUTO: 88 FL (ref 82–98)
MCV RBC AUTO: 97 FL (ref 82–98)
MONOCYTES # BLD AUTO: 1.2 K/UL (ref 0.3–1)
MONOCYTES # BLD AUTO: 2.1 K/UL (ref 0.3–1)
MONOCYTES NFR BLD: 6.5 % (ref 4–15)
MONOCYTES NFR BLD: 8.7 % (ref 4–15)
NEUTROPHILS # BLD AUTO: 27.7 K/UL (ref 1.8–7.7)
NEUTROPHILS # BLD AUTO: 8.5 K/UL (ref 1.8–7.7)
NEUTROPHILS NFR BLD: 59.1 % (ref 38–73)
NEUTROPHILS NFR BLD: 87.1 % (ref 38–73)
NITRITE UR QL STRIP: NEGATIVE
NRBC BLD-RTO: 0 /100 WBC
NRBC BLD-RTO: 1 /100 WBC
OHS QRS DURATION: 82 MS
OHS QRS DURATION: 84 MS
OHS QTC CALCULATION: 454 MS
OHS QTC CALCULATION: 463 MS
OVALOCYTES BLD QL SMEAR: ABNORMAL
PCO2 BLDA: 52.5 MMHG
PH SMN: 7.24 [PH]
PH UR STRIP: 5 [PH] (ref 5–8)
PLATELET # BLD AUTO: 447 K/UL (ref 150–450)
PLATELET # BLD AUTO: 609 K/UL (ref 150–450)
PLATELET BLD QL SMEAR: ABNORMAL
PLATELET BLD QL SMEAR: ABNORMAL
PMV BLD AUTO: 10 FL (ref 9.2–12.9)
PMV BLD AUTO: 10.5 FL (ref 9.2–12.9)
PO2 BLDA: 33.2 MMHG
POC BASE DEFICIT: -4.8 MMOL/L
POC HCO3: 19.9 MMOL/L
POC IONIZED CALCIUM: 1.08 MMOL/L (ref 1.06–1.42)
POC IONIZED CALCIUM: 1.16 MMOL/L (ref 1.06–1.42)
POC PERFORMED BY: ABNORMAL
POC TCO2 (MEASURED): 23 MMOL/L (ref 23–29)
POC TEMPERATURE: 37 C
POIKILOCYTOSIS BLD QL SMEAR: SLIGHT
POLYCHROMASIA BLD QL SMEAR: ABNORMAL
POLYCHROMASIA BLD QL SMEAR: ABNORMAL
POTASSIUM BLD-SCNC: 3.4 MMOL/L (ref 3.5–5.1)
POTASSIUM BLD-SCNC: 3.8 MMOL/L (ref 3.5–5.1)
POTASSIUM SERPL-SCNC: 3.7 MMOL/L (ref 3.5–5.1)
PROT SERPL-MCNC: 5.7 G/DL (ref 6–8.4)
PROT UR QL STRIP: NEGATIVE
RBC # BLD AUTO: 2.02 M/UL (ref 4–5.4)
RBC # BLD AUTO: 3.32 M/UL (ref 4–5.4)
SAMPLE: ABNORMAL
SARS-COV-2 RDRP RESP QL NAA+PROBE: NEGATIVE
SODIUM BLD-SCNC: 138 MMOL/L (ref 136–145)
SODIUM BLD-SCNC: 141 MMOL/L (ref 136–145)
SODIUM SERPL-SCNC: 136 MMOL/L (ref 136–145)
SP GR UR STRIP: 1.01 (ref 1–1.03)
SPECIMEN OUTDATE: NORMAL
SPECIMEN SOURCE: ABNORMAL
SPECIMEN SOURCE: NORMAL
TRANS ERYTHROCYTES VOL PATIENT: NORMAL ML
TRANS ERYTHROCYTES VOL PATIENT: NORMAL ML
TROPONIN I SERPL DL<=0.01 NG/ML-MCNC: 247 NG/L (ref 0–14)
TROPONIN I SERPL DL<=0.01 NG/ML-MCNC: 511 NG/L (ref 0–14)
TSH SERPL DL<=0.005 MIU/L-ACNC: 1.58 UIU/ML (ref 0.4–4)
URN SPEC COLLECT METH UR: ABNORMAL
WBC # BLD AUTO: 14.33 K/UL (ref 3.9–12.7)
WBC # BLD AUTO: 31.84 K/UL (ref 3.9–12.7)

## 2025-03-03 PROCEDURE — 81003 URINALYSIS AUTO W/O SCOPE: CPT

## 2025-03-03 PROCEDURE — 36415 COLL VENOUS BLD VENIPUNCTURE: CPT

## 2025-03-03 PROCEDURE — 93005 ELECTROCARDIOGRAM TRACING: CPT

## 2025-03-03 PROCEDURE — 87040 BLOOD CULTURE FOR BACTERIA: CPT | Mod: 59

## 2025-03-03 PROCEDURE — 36430 TRANSFUSION BLD/BLD COMPNT: CPT

## 2025-03-03 PROCEDURE — 94660 CPAP INITIATION&MGMT: CPT

## 2025-03-03 PROCEDURE — 94761 N-INVAS EAR/PLS OXIMETRY MLT: CPT | Mod: XB

## 2025-03-03 PROCEDURE — P9021 RED BLOOD CELLS UNIT: HCPCS

## 2025-03-03 PROCEDURE — 20600001 HC STEP DOWN PRIVATE ROOM

## 2025-03-03 PROCEDURE — 27100171 HC OXYGEN HIGH FLOW UP TO 24 HOURS

## 2025-03-03 PROCEDURE — 84484 ASSAY OF TROPONIN QUANT: CPT | Mod: 91

## 2025-03-03 PROCEDURE — 99285 EMERGENCY DEPT VISIT HI MDM: CPT | Mod: ,,, | Performed by: INTERNAL MEDICINE

## 2025-03-03 PROCEDURE — 87502 INFLUENZA DNA AMP PROBE: CPT

## 2025-03-03 PROCEDURE — 83735 ASSAY OF MAGNESIUM: CPT

## 2025-03-03 PROCEDURE — 80047 BASIC METABLC PNL IONIZED CA: CPT

## 2025-03-03 PROCEDURE — 83880 ASSAY OF NATRIURETIC PEPTIDE: CPT

## 2025-03-03 PROCEDURE — 87635 SARS-COV-2 COVID-19 AMP PRB: CPT

## 2025-03-03 PROCEDURE — 86803 HEPATITIS C AB TEST: CPT | Performed by: EMERGENCY MEDICINE

## 2025-03-03 PROCEDURE — 93010 ELECTROCARDIOGRAM REPORT: CPT | Mod: 76,,, | Performed by: INTERNAL MEDICINE

## 2025-03-03 PROCEDURE — 85025 COMPLETE CBC W/AUTO DIFF WBC: CPT

## 2025-03-03 PROCEDURE — 96365 THER/PROPH/DIAG IV INF INIT: CPT

## 2025-03-03 PROCEDURE — 87389 HIV-1 AG W/HIV-1&-2 AB AG IA: CPT | Performed by: EMERGENCY MEDICINE

## 2025-03-03 PROCEDURE — 96375 TX/PRO/DX INJ NEW DRUG ADDON: CPT

## 2025-03-03 PROCEDURE — 84443 ASSAY THYROID STIM HORMONE: CPT

## 2025-03-03 PROCEDURE — 96366 THER/PROPH/DIAG IV INF ADDON: CPT

## 2025-03-03 PROCEDURE — 25000242 PHARM REV CODE 250 ALT 637 W/ HCPCS

## 2025-03-03 PROCEDURE — 82803 BLOOD GASES ANY COMBINATION: CPT

## 2025-03-03 PROCEDURE — 80053 COMPREHEN METABOLIC PANEL: CPT

## 2025-03-03 PROCEDURE — 86901 BLOOD TYPING SEROLOGIC RH(D): CPT

## 2025-03-03 PROCEDURE — 63600175 PHARM REV CODE 636 W HCPCS

## 2025-03-03 PROCEDURE — 5A09357 ASSISTANCE WITH RESPIRATORY VENTILATION, LESS THAN 24 CONSECUTIVE HOURS, CONTINUOUS POSITIVE AIRWAY PRESSURE: ICD-10-PCS | Performed by: EMERGENCY MEDICINE

## 2025-03-03 PROCEDURE — 94799 UNLISTED PULMONARY SVC/PX: CPT

## 2025-03-03 PROCEDURE — 30233N1 TRANSFUSION OF NONAUTOLOGOUS RED BLOOD CELLS INTO PERIPHERAL VEIN, PERCUTANEOUS APPROACH: ICD-10-PCS | Performed by: EMERGENCY MEDICINE

## 2025-03-03 PROCEDURE — 63600175 PHARM REV CODE 636 W HCPCS: Performed by: INTERNAL MEDICINE

## 2025-03-03 PROCEDURE — 25000003 PHARM REV CODE 250

## 2025-03-03 PROCEDURE — 99900035 HC TECH TIME PER 15 MIN (STAT)

## 2025-03-03 PROCEDURE — 27000190 HC CPAP FULL FACE MASK W/VALVE

## 2025-03-03 PROCEDURE — 83690 ASSAY OF LIPASE: CPT

## 2025-03-03 PROCEDURE — 83605 ASSAY OF LACTIC ACID: CPT

## 2025-03-03 PROCEDURE — 85018 HEMOGLOBIN: CPT

## 2025-03-03 PROCEDURE — 85025 COMPLETE CBC W/AUTO DIFF WBC: CPT | Mod: 91

## 2025-03-03 PROCEDURE — 94640 AIRWAY INHALATION TREATMENT: CPT

## 2025-03-03 PROCEDURE — 86920 COMPATIBILITY TEST SPIN: CPT

## 2025-03-03 PROCEDURE — 99285 EMERGENCY DEPT VISIT HI MDM: CPT | Mod: 25

## 2025-03-03 RX ORDER — IPRATROPIUM BROMIDE AND ALBUTEROL SULFATE 2.5; .5 MG/3ML; MG/3ML
3 SOLUTION RESPIRATORY (INHALATION)
Status: COMPLETED | OUTPATIENT
Start: 2025-03-03 | End: 2025-03-03

## 2025-03-03 RX ORDER — NALOXONE HCL 0.4 MG/ML
0.02 VIAL (ML) INJECTION
Status: DISCONTINUED | OUTPATIENT
Start: 2025-03-03 | End: 2025-03-06 | Stop reason: HOSPADM

## 2025-03-03 RX ORDER — IBUPROFEN 200 MG
24 TABLET ORAL
Status: DISCONTINUED | OUTPATIENT
Start: 2025-03-03 | End: 2025-03-06 | Stop reason: HOSPADM

## 2025-03-03 RX ORDER — CEFTRIAXONE 1 G/1
1 INJECTION, POWDER, FOR SOLUTION INTRAMUSCULAR; INTRAVENOUS
Status: COMPLETED | OUTPATIENT
Start: 2025-03-03 | End: 2025-03-03

## 2025-03-03 RX ORDER — SODIUM CHLORIDE 0.9 % (FLUSH) 0.9 %
10 SYRINGE (ML) INJECTION EVERY 12 HOURS PRN
Status: DISCONTINUED | OUTPATIENT
Start: 2025-03-03 | End: 2025-03-06 | Stop reason: HOSPADM

## 2025-03-03 RX ORDER — CARVEDILOL 6.25 MG/1
6.25 TABLET ORAL 2 TIMES DAILY WITH MEALS
Status: DISCONTINUED | OUTPATIENT
Start: 2025-03-04 | End: 2025-03-06 | Stop reason: HOSPADM

## 2025-03-03 RX ORDER — HYDROCODONE BITARTRATE AND ACETAMINOPHEN 500; 5 MG/1; MG/1
TABLET ORAL
Status: DISCONTINUED | OUTPATIENT
Start: 2025-03-03 | End: 2025-03-05

## 2025-03-03 RX ORDER — GLUCAGON 1 MG
1 KIT INJECTION
Status: DISCONTINUED | OUTPATIENT
Start: 2025-03-03 | End: 2025-03-06 | Stop reason: HOSPADM

## 2025-03-03 RX ORDER — LANOLIN ALCOHOL/MO/W.PET/CERES
1000 CREAM (GRAM) TOPICAL DAILY
Status: DISCONTINUED | OUTPATIENT
Start: 2025-03-04 | End: 2025-03-06 | Stop reason: HOSPADM

## 2025-03-03 RX ORDER — IBUPROFEN 200 MG
16 TABLET ORAL
Status: DISCONTINUED | OUTPATIENT
Start: 2025-03-03 | End: 2025-03-06 | Stop reason: HOSPADM

## 2025-03-03 RX ORDER — FUROSEMIDE 10 MG/ML
40 INJECTION INTRAMUSCULAR; INTRAVENOUS
Status: COMPLETED | OUTPATIENT
Start: 2025-03-03 | End: 2025-03-03

## 2025-03-03 RX ORDER — ATORVASTATIN CALCIUM 40 MG/1
80 TABLET, FILM COATED ORAL DAILY
Status: DISCONTINUED | OUTPATIENT
Start: 2025-03-04 | End: 2025-03-06 | Stop reason: HOSPADM

## 2025-03-03 RX ORDER — IPRATROPIUM BROMIDE AND ALBUTEROL SULFATE 2.5; .5 MG/3ML; MG/3ML
SOLUTION RESPIRATORY (INHALATION)
Status: COMPLETED
Start: 2025-03-03 | End: 2025-03-03

## 2025-03-03 RX ORDER — PANTOPRAZOLE SODIUM 40 MG/10ML
40 INJECTION, POWDER, LYOPHILIZED, FOR SOLUTION INTRAVENOUS 2 TIMES DAILY
Status: DISCONTINUED | OUTPATIENT
Start: 2025-03-03 | End: 2025-03-05

## 2025-03-03 RX ADMIN — PANTOPRAZOLE SODIUM 40 MG: 40 INJECTION, POWDER, FOR SOLUTION INTRAVENOUS at 02:03

## 2025-03-03 RX ADMIN — IPRATROPIUM BROMIDE AND ALBUTEROL SULFATE 3 ML: 2.5; .5 SOLUTION RESPIRATORY (INHALATION) at 08:03

## 2025-03-03 RX ADMIN — CEFTRIAXONE 1 G: 1 INJECTION, POWDER, FOR SOLUTION INTRAMUSCULAR; INTRAVENOUS at 10:03

## 2025-03-03 RX ADMIN — VANCOMYCIN HYDROCHLORIDE 1000 MG: 1 INJECTION, POWDER, LYOPHILIZED, FOR SOLUTION INTRAVENOUS at 10:03

## 2025-03-03 RX ADMIN — FUROSEMIDE 40 MG: 10 INJECTION, SOLUTION INTRAVENOUS at 11:03

## 2025-03-03 RX ADMIN — PANTOPRAZOLE SODIUM 40 MG: 40 INJECTION, POWDER, FOR SOLUTION INTRAVENOUS at 08:03

## 2025-03-03 NOTE — HPI
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 She was hypoxic to 85% on EMS arrival and was placed on CPAP. She reports having shortness of breath since her hospitalization for her stroke in addition to fatigue. She was just discharged 2/20/25. She denies any chest pain, cough, mucous production, fevers, chills. She reports black colored stools at home. She thought it was secondary to oral iron but she has not been taking this since her discharged on 2/20. She denies abdominal pain, N/V/D. She reports using oxygen at home as needed for her shortness of breath. Per her daughter, she is not prescribed oxygen at home but has been using her husbands oxygen tank for her symptoms of shortness of breath. Her daughter reports that even when she is feeling short of breath her spO2 is above 95%.    Placed on BiPAP upon arrival to the ED. Hemodynamically stable. BNP elevated to 1500 and troponin in the 200s. EKG with some TWI in V5 and V6, similar to previous. CXR with bilateral vascular congestion. Hg of 5.6, will receive 2 units PRBCs. Was saturating well on FiO2 25% and she reports that her shortness of breath had significantly improved. She was able to be weaned to 2L NC. She is saturating 97-98% and reports that her breathing is much better.

## 2025-03-03 NOTE — ED NOTES
I-STAT Chem-8+ Results:   Value Reference Range   Sodium 138 136-145 mmol/L   Potassium  3.8 3.5-5.1 mmol/L   Chloride 103  mmol/L   Ionized Calcium 1.16 1.06-1.42 mmol/L   CO2 (measured) 23 23-29 mmol/L   Glucose 231  mg/dL   BUN 23 6-30 mg/dL   Creatinine 0.7 0.5-1.4 mg/dL   Hematocrit 22 36-54%

## 2025-03-03 NOTE — ASSESSMENT & PLAN NOTE
Patient with Hypoxic Respiratory failure which is Acute.  she is not on home oxygen. Supplemental oxygen was provided and noted- Oxygen Concentration (%):  [35] 35    .   Signs/symptoms of respiratory failure include- tachypnea, increased work of breathing, and respiratory distress. Contributing diagnoses includes -  acute blood loss anemia  Labs and images were reviewed. Patient Has not had a recent ABG. Will treat underlying causes and adjust management of respiratory failure as follows- supplemental oxygen will wean as tolerated. Patient not in any respiratory distress. Patient does endorse using oxygen on occasion at home. She says her son is a dentist who provides oxygen tank and she utilizes O2 PRN. She is not sure how often she uses it.

## 2025-03-03 NOTE — ASSESSMENT & PLAN NOTE
84yF with hx of peptic ulcers and holloway's esophagus presenting with dark stools for ~8 days and anemia with hgb 5.6. Pt's dyspnea is more likely related to her gurdeep volume overload given how quickly it improved with diuresis. Dark stools generally began a few days after starting Brilinta for recent stroke. 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 Peptic ulcer is possible though less likely given adherence to PPI.     No indication for an urgent scope, though she may benefit from an EGD once volume status has normalized.     - optimize volume status  - transfuse pRBC to HGB >7, maintain 2 large bore Ivs  - hold Brilinta until after EGD  - continue 40mg IV Protonix BID  - NPO at midnight, will reassess for scope tomorrow

## 2025-03-03 NOTE — ASSESSMENT & PLAN NOTE
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 Biopsy was negative for any malignancy   - pantoprazole IV 40 mg   - current GI recommendations (3/3)   - optimize volume status  - transfuse pRBC to HGB >7, maintain 2 large bore Ivs  - hold Brilinta until after EGD  - continue 40mg IV Protonix BID  - NPO at midnight, will reassess for scope tomorrow (3/4)

## 2025-03-03 NOTE — ED PROVIDER NOTES
Encounter Date: 3/3/2025       History     Chief Complaint   Patient presents with    Respiratory Distress     Arrives via EMS on CPAP, in respiratory distress at home RA sats 85% and tachypniec in the 40s      HPI    Patient is an 83yo female with hx of MCA, HTN, HLD presenting to the ED in respiratory distress. Patient arrives via EMS with CPAP. Initial O2 85% on RA, improved saturations with resp intervention. No CP, abd pain/N/V.   Initial BP elevated with systolic > 200, improved to 180s/190s with nitro paste.  Patient presents from home.  Was discharged recently after stroke with Plavix and aspirin.    No chest pain or infectious symptoms.    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 02/18/2025     Past Surgical History:   Procedure Laterality Date    CARPAL TUNNEL RELEASE      ESOPHAGOGASTRODUODENOSCOPY N/A 8/22/2022    Procedure: EGD (ESOPHAGOGASTRODUODENOSCOPY);  Surgeon: Steven Lopez MD;  Location: [facility] ([address] [place] [address]);  Service: Endoscopy;  Laterality: N/A;    ESOPHAGOGASTRODUODENOSCOPY N/A 12/5/2022    Procedure: EGD (ESOPHAGOGASTRODUODENOSCOPY);  Surgeon: Steven Lopez MD;  Location: [facility] ([address] [place] [address]);  Service: Endoscopy;  Laterality: N/A;  Has estimated pulmonary artery pressure 45, schedule location per protocol.   Please schedule with Dr. Darryl Lopez-  Plavix stopped 8/23/22  [facility]  pre call complete; [facility] 11/28/22    ESOPHAGOGASTRODUODENOSCOPY N/A 7/11/2023    Procedure: EGD (ESOPHAGOGASTRODUODENOSCOPY);  Surgeon: Natalie Fall MD;  Location: [facility] ENDO;  Service: Endoscopy;  Laterality: N/A;    EYE SURGERY      left eye cataract    LEFT HEART CATHETERIZATION  4/17/2024    Procedure: Left heart cath;  Surgeon: Elisabeth De La Cruz MD;  Location: [facility] CATH;  Service: Cardiology;;     TONSILLECTOMY      TUBAL LIGATION       No family history on file.  Social History[1]    Physical Exam     Initial Vitals   BP Pulse Resp Temp SpO2   03/03/25 0827 03/03/25 0827 03/03/25 0827 03/03/25 0833 03/03/25 0827   (!) 174/82 101 (!) 26 97.1 °F (36.2 °C) 100 %      MAP       --                Physical Exam    Constitutional: She appears well-developed and well-nourished. She is not diaphoretic. She appears distressed (Resp).   HENT:   Head: Normocephalic and atraumatic.   Eyes: EOM are normal.   Cardiovascular:  Normal rate and regular rhythm.           Pulmonary/Chest: She is in respiratory distress. She has wheezes. She has rhonchi.   Poor air movement   Abdominal: Abdomen is soft. She exhibits no distension. There is no abdominal tenderness.   Genitourinary:    Genitourinary Comments: MAGALYS performed with chaperone; stool was Hemoccult positive, dark brown in appearance     Musculoskeletal:         General: No tenderness or edema.     Neurological: She is alert.   Skin: Skin is warm and dry. There is pallor.   Psychiatric: She has a normal mood and affect.         ED Course   Procedures  Labs Reviewed   CBC W/ AUTO DIFFERENTIAL - Abnormal       Result Value    WBC 14.33 (*)     RBC 2.02 (*)     Hemoglobin 5.6 (*)     Hematocrit 19.5 (*)     MCV 97      MCH 27.7      MCHC 28.7 (*)     RDW 16.3 (*)     Platelets 609 (*)     MPV 10.5      Immature Granulocytes 0.5      Gran # (ANC) 8.5 (*)     Immature Grans (Abs) 0.07 (*)     Lymph # 4.4      Mono # 1.2 (*)     Eos # 0.1      Baso # 0.06      nRBC 1 (*)     Gran % 59.1      Lymph % 30.9      Mono % 8.7      Eosinophil % 0.4      Basophil % 0.4      Platelet Estimate Increased (*)     Aniso Slight      Poik Slight      Poly Occasional      Hypo Occasional      Ovalocytes Occasional      Tear Drop Cells Occasional      Differential Method Automated      Narrative:     Release to patient->Immediate  agnieszka kovacs do  acknowledged and accepted results on test(s)  H&H via   secure chat.  by [email or web address] 03/03/2025 09:25   COMPREHENSIVE METABOLIC PANEL - Abnormal    Sodium 136      Potassium 3.7      Chloride 106      CO2 20 (*)     Glucose 237 (*)     BUN 25 (*)     Creatinine 0.7      Calcium 7.9 (*)     Total Protein 5.7 (*)     Albumin 3.3 (*)     Total Bilirubin 0.5      Alkaline Phosphatase 152 (*)     AST 31      ALT 28      eGFR >60.0      Anion Gap 10      Narrative:     Release to patient->Immediate   B-TYPE NATRIURETIC PEPTIDE - Abnormal    BNP 1,512 (*)     Narrative:     Release to patient->Immediate   TROPONIN I HIGH SENSITIVITY - Abnormal    Troponin I High Sensitivity 247 (*)     Narrative:     Release to patient->Immediate   TROPONIN I HIGH SENSITIVITY - Abnormal    Troponin I High Sensitivity 511 (*)    HEMOGLOBIN - Abnormal    Hemoglobin 7.8 (*)    URINALYSIS, REFLEX TO URINE CULTURE - Abnormal    Specimen UA Urine, Clean Catch      Color, UA Colorless (*)     Appearance, UA Clear      pH, UA 5.0      Specific Gravity, UA 1.010      Protein, UA Negative      Glucose, UA Negative      Ketones, UA Negative      Bilirubin (UA) Negative      Occult Blood UA Negative      Nitrite, UA Negative      Leukocytes, UA Negative      Narrative:     Specimen Source->Urine   ISTAT PROCEDURE - Abnormal    POC Glucose 231 (*)     POC BUN 23      POC Creatinine 0.7      POC Sodium 138      POC Potassium 3.8      POC Chloride 103      POC TCO2 (MEASURED) 23      POC Ionized Calcium 1.16      POC Hematocrit 22 (*)     Sample AUREA     INFLUENZA A & B BY MOLECULAR    Influenza A, Molecular Negative      Influenza B, Molecular Negative      Flu A & B Source NP     CULTURE, BLOOD   HEPATITIS C ANTIBODY    Hepatitis C Ab Non-reactive      Narrative:     Release to patient->Immediate   HIV 1 / 2 ANTIBODY    HIV 1/2 Ag/Ab Non-reactive      Narrative:     Release to patient->Immediate   LIPASE    Lipase 25      Narrative:     Release to patient->Immediate   MAGNESIUM    Magnesium 2.0       Narrative:     Release to patient->Immediate   TSH    TSH 1.583      Narrative:     Release to patient->Immediate   SARS-COV-2 RNA AMPLIFICATION, QUAL    SARS-CoV-2 RNA, Amplification, Qual Negative     TYPE & SCREEN    Group & Rh A NEG      Indirect Kimberlyn NEG      Specimen Outdate 03/06/2025 23:59     PREPARE RBC SOFT    UNIT NUMBER [id number]      Product Code A1654F17      DISPENSE STATUS TRANSFUSED      CODING SYSTEM EDWD670      Unit Blood Type Code 0600      Unit Blood Type A NEG      Unit Expiration [id number]      CROSSMATCH INTERPRETATION Compatible      UNIT NUMBER [id number]      Product Code S6937N71      DISPENSE STATUS TRANSFUSED      CODING SYSTEM VWMH680      Unit Blood Type Code 0600      Unit Blood Type A NEG      Unit Expiration [id number]      CROSSMATCH INTERPRETATION Compatible       EKG Readings: (Independently Interpreted)   Initial Reading: No STEMI. Rhythm: Normal Sinus Rhythm. Heart Rate: 79. Ectopy: No Ectopy. ST Segments: Non-Specific ST Segment Depression. T Waves Flipped: II, III, V5 and V6. Axis: Normal. Clinical Impression: Normal Sinus Rhythm     ECG Results              EKG 12-lead (Final result)        Collection Time Result Time QRS Duration OHS QTC Calculation    03/03/25 11:39:11 03/03/25 16:21:15 84 463                     Final result by Interface, Lab In [facility] (03/03/25 16:21:18)                   Narrative:    Test Reason : R06.02,    Vent. Rate :  77 BPM     Atrial Rate :  77 BPM     P-R Int : 118 ms          QRS Dur :  84 ms      QT Int : 410 ms       P-R-T Axes :  61  39 247 degrees    QTcB Int : 463 ms    Normal sinus rhythm  Low voltage QRS  Low septal forces  Abnormal R wave progression in the precordial leads - Cannot rule out  Anterior infarct (cited on or before 26-Sep-2024)  ST and T wave abnormality, consider inferolateral ischemia  Abnormal ECG  When compared with ECG of 03-Mar-2025 08:42,  No significant change was found  Confirmed by Nikko Lora  ([id number]) on 3/3/2025 4:21:14 PM    Referred By: DARLEEN SELF           Confirmed By: Nikko Lora                                     EKG 12-lead (Final result)        Collection Time Result Time QRS Duration OHS QTC Calculation    03/03/25 08:42:34 03/03/25 10:01:59 82 454                     Final result by Interface, Lab In [facility] (03/03/25 10:02:04)                   Narrative:    Test Reason : R06.02,    Vent. Rate :  79 BPM     Atrial Rate :  79 BPM     P-R Int : 120 ms          QRS Dur :  82 ms      QT Int : 396 ms       P-R-T Axes :  69  47 234 degrees    QTcB Int : 454 ms    Normal sinus rhythm  Low voltage QRS Low septal forces ; Abnormal R wave progression in the  precordial leads -Possible  Septal infarct (cited on or before 26-Sep-2024)  ST and T wave abnormality, consider lateral ischemia  Abnormal ECG  When compared with ECG of 18-Feb-2025 11:01,  Questionable change in initial forces of Anterior-septal leads  Confirmed by Nikko Lora ([id number]) on 3/3/2025 10:01:56 AM    Referred By: BENIGNOERRAL SELF           Confirmed By: Nikko Lora                                  Imaging Results              X-Ray Chest AP Portable (Final result)  Result time 03/03/25 09:44:32      Final result by Jd Pyle MD (03/03/25 09:44:32)                   Impression:      Mild pulmonary vascular congestion, edema and pleural effusion      Electronically signed by: Jd Pyle MD  Date:    03/03/2025  Time:    09:44               Narrative:    EXAMINATION:  XR CHEST AP PORTABLE    CLINICAL HISTORY:  shortness of breath;    TECHNIQUE:  Single frontal view of the chest was performed.    COMPARISON:  No 04/17/2024 ne    FINDINGS:  Mild cardiomegaly.  Diffuse accentuation interstitial markings and/or edema.  Opacification at the lung bases bilaterally consistent with small amount of pleural effusion and associated volume loss.  The lung apices are clear.                                       Medications   0.9%  NaCl  infusion (for blood administration) (has no administration in time range)   pantoprazole injection 40 mg (40 mg Intravenous Given 3/3/25 2015)   sodium chloride 0.9% flush 10 mL (has no administration in time range)   naloxone 0.4 mg/mL injection 0.02 mg (has no administration in time range)   glucose chewable tablet 16 g (has no administration in time range)   glucose chewable tablet 24 g (has no administration in time range)   dextrose 50% injection 12.5 g (has no administration in time range)   dextrose 50% injection 25 g (has no administration in time range)   glucagon (human recombinant) injection 1 mg (has no administration in time range)   atorvastatin tablet 80 mg (has no administration in time range)   carvediloL tablet 6.25 mg (has no administration in time range)   cyanocobalamin tablet 1,000 mcg (has no administration in time range)   albuterol-ipratropium (DUO-NEB) 2.5 mg-0.5 mg/3 mL nebulizer solution (3 mLs  Given 3/3/25 0851)   albuterol-ipratropium 2.5 mg-0.5 mg/3 mL nebulizer solution 3 mL (3 mLs Nebulization Given 3/3/25 0851)   vancomycin (VANCOCIN) 1,000 mg in 0.9% NaCl 250 mL IVPB (admixture device) (0 mg Intravenous Stopped 3/3/25 1146)   cefTRIAXone injection 1 g (1 g Intravenous Given 3/3/25 1001)   furosemide injection 40 mg (40 mg Intravenous Given 3/3/25 1127)     Medical Decision Making  Amount and/or Complexity of Data Reviewed  Labs: ordered.  Radiology: ordered.  ECG/medicine tests:  Decision-making details documented in ED Course.    Risk  Prescription drug management.  Decision regarding hospitalization.    Patient is an 85yo female with hx of MCA, HTN, HLD presenting to the ED in respiratory distress.    On presentation, she is in distress on CPAP, quickly transitioned to our emergency department BiPAP.  She is hemodynamically stable at this time.  Lungs with poor air movement bilaterally, crackles and wheezing appreciated.  Extremities are warm.  Cardiogenic shock less likely.   Considering sepsis versus pneumonia versus COPD/CHF.    Labs were ordered to assess for infection, CHF exacerbation.  Initially hemoglobin slightly elevated at 14.  This could be reactive due to her severe anemia, with a hemoglobin of 5.6.  CMP relatively unremarkable.  BNP and troponin both elevated, BNP is 1500 and troponin is 247.    Picture consistent with CHF and anemia.    Stool Hemoccult positive.  She was transfused 2 units of PRBC.  Medical ICU was consulted for anemia and respiratory distress.  Her overall clinical status significantly improved after the period of BiPAP and blood, and was able to be transitioned to nasal cannula.  At this time, consider syncopal for the floor.  Patient was admitted to Hospital Medicine with GI consulting.            Attending Attestation:   Physician Attestation Statement for Resident:  As the supervising MD   Physician Attestation Statement: I have personally seen and examined this patient.   I agree with the above history.  -:   As the supervising MD I agree with the above PE.     As the supervising MD I agree with the above treatment, course, plan, and disposition.                    ED Course as of 03/04/25 0749   Mon Mar 03, 2025   0927 EKG 12-lead  EKG shows normal sinus rhythm and no acute ischemia per my independent interpretation.   [DC]      ED Course User Index  [DC] Serafin Zaragoza MD                       Critical Care   Performed by: Serafin Zaragoza MD   Authorized by: Serafin Zaragoza MD    Total critical care time (exclusive of procedural time) : 80 minutes  Critical care was necessary to treat or prevent imminent or life-threatening deterioration of the following conditions:  severe anemia, CHF      Clinical Impression:  Final diagnoses:  [R06.02] Shortness of breath  [I50.9] CHF exacerbation (Primary)  [D64.9] Anemia, unspecified type          ED Disposition Condition    Admit                   Alecia Em DO  Resident  03/03/25 2043         [1]    Social History  Tobacco Use    Smoking status: Never     Passive exposure: Never    Smokeless tobacco: Never   Substance Use Topics    Alcohol use: No    Drug use: No        Serafin Zaragoza MD  03/04/25 0749

## 2025-03-03 NOTE — ASSESSMENT & PLAN NOTE
Presented with shortness of breath saturating 85% on room air. Placed on CPAP with EMS then switched to BiPAP on arrival. BNP of 1500, CXR with evidence of vascular congestion. Bibasilar crackles on exam. Shortness of breath is likely multifactorial in the setting of volume overload and anemia with Hg of 5.6.   POCUS with normal appearing EF, no significant enlargement of RV, IVC was dilated, possible trace pericardial effusion. Previous echo 2/2025 with normal EF, normal diastolic function.   EKG with TWI in V5 and V6 that appear similar to previous.     - Recommend repeating formal echo  - lasix 40 mg IV given in the ED, would redose based on urine output for goal of net neg 1-2L  - Trend troponin  - Repeat EKG if develops chest pain   - Wean oxygen for goal spO2 >92%

## 2025-03-03 NOTE — ED NOTES
* * Bipap removed by critical care MD at this time * * placed on 2 LPM via NC w/ 100% oxygen saturation * *

## 2025-03-03 NOTE — ASSESSMENT & PLAN NOTE
Anemia is likely due to acute blood loss which was from possible gastric ulcer but unknown at this time . Most recent hemoglobin and hematocrit are listed below.  Recent Labs     03/03/25  0839 03/03/25  0842 03/03/25  0846 03/03/25  1402   HGB  --  5.6*  --  7.8*   HCT 22* 19.5* 18.1  --      Plan  - Monitor serial CBC: Every 8 hours  - Transfuse PRBC if patient becomes hemodynamically unstable, symptomatic or H/H drops below 7/21.  - Patient has received 2 units of PRBCs on 3/3/2025  - Patient's anemia is currently stable

## 2025-03-03 NOTE — ASSESSMENT & PLAN NOTE
Reports black colored stools at home. Heme occult positive in the ED. Has history of ISAIAH and has received IV iron in the past. Hg of 5.6 on arrival.     - 2 units PRBCs ordered  - Lasix to prevent volume overload while receiving blood products  - PPI BID  - Transfuse for Hg <7  - GI consult, however patient has declined colonoscopy in the recent past

## 2025-03-03 NOTE — ASSESSMENT & PLAN NOTE
Patient had prior left MCA infarct 2022 who presented to [facility] ED 2/18 for left sided weakness, left facial droop, and dysarthria. LKW 2/17 2030 when she went to bed, woke up with symptoms present at 0830. Received TNK prior to transfer at 1104. Transferred to [facility] for IR evaluation. 02/19/2025 Patient admitted to [facility] w/ R M1 occlusion s/p TNK and DSA w/o thrombectomy as lesion migrated to distal MCA branches after TNK administered.     - holding aspirin  - plavix not on home medications

## 2025-03-03 NOTE — CONSULTS
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 She was hypoxic to 85% on EMS arrival and was placed on CPAP. She reports having shortness of breath since her hospitalization for her stroke in addition to fatigue. She was just discharged 2/20/25. She denies any chest pain, cough, mucous production, fevers, chills. She reports black colored stools at home. She thought it was secondary to oral iron but she has not been taking this since her discharged on 2/20. She denies abdominal pain, N/V/D. She reports using oxygen at home as needed for her shortness of breath. Per her daughter, she is not prescribed oxygen at home but has been using her husbands oxygen tank for her symptoms of shortness of breath. Her daughter reports that even when she is feeling short of breath her spO2 is above 95%.    Placed on BiPAP upon arrival to the ED. Hemodynamically stable. BNP elevated to 1500 and troponin in the 200s. EKG with some TWI in V5 and V6, similar to previous. CXR with bilateral vascular congestion. Hg of 5.6, will receive 2 units PRBCs. Was saturating well on FiO2 25% and she reports that her shortness of breath had significantly improved. She was able to be weaned to 2L NC. She is saturating  97-98% and reports that her breathing is much better.     Hospital/ICU Course:  No notes on file    Past Medical History:   Diagnosis Date    Cancer     basal cell carcinoma       Past Surgical History:   Procedure Laterality Date    CARPAL TUNNEL RELEASE      ESOPHAGOGASTRODUODENOSCOPY N/A 8/22/2022    Procedure: EGD (ESOPHAGOGASTRODUODENOSCOPY);  Surgeon: Steevn Lopez MD;  Location: [address]);  Service: Endoscopy;  Laterality: N/A;    ESOPHAGOGASTRODUODENOSCOPY N/A 12/5/2022    Procedure: EGD (ESOPHAGOGASTRODUODENOSCOPY);  Surgeon: Steven Lopez MD;  Location: [address]);  Service: Endoscopy;  Laterality: N/A;  Has estimated pulmonary artery pressure 45, schedule location per protocol.   Please schedule with Dr. Darryl Lopez-  Plavix stopped 8/23/22  pt requested to schedule after Thanksgiving/ inst portal-RB  pre call complete; [facility] 11/28/22    ESOPHAGOGASTRODUODENOSCOPY N/A 7/11/2023    Procedure: EGD (ESOPHAGOGASTRODUODENOSCOPY);  Surgeon: Natalie Fall MD;  Location: [facility];  Service: Endoscopy;  Laterality: N/A;    EYE SURGERY      left eye cataract    LEFT HEART CATHETERIZATION  4/17/2024    Procedure: Left heart cath;  Surgeon: Elisabeth De La Cruz MD;  Location: [facility] CATH;  Service: Cardiology;;    TONSILLECTOMY      TUBAL LIGATION         Review of patient's allergies indicates:  No Known Allergies    Family History    None       Tobacco Use    Smoking status: Never     Passive exposure: Never    Smokeless tobacco: Never   Substance and Sexual Activity    Alcohol use: No    Drug use: No    Sexual activity: Not on file      Review of Systems   Constitutional:  Positive for fatigue. Negative for chills and fever.   HENT:  Negative for congestion.    Respiratory:  Positive for shortness of breath. Negative for cough.    Cardiovascular:  Positive for leg swelling (after being on her feet all day). Negative for chest pain.   Gastrointestinal:  Positive for blood in stool.  Negative for abdominal distention, abdominal pain, diarrhea, nausea and vomiting.   Genitourinary:  Negative for dysuria.   Neurological:  Positive for light-headedness.     Objective:     Vital Signs (Most Recent):  Temp: 98 °F (36.7 °C) (03/03/25 1133)  Pulse: 79 (03/03/25 1133)  Resp: (!) 27 (03/03/25 1133)  BP: 135/62 (03/03/25 1012)  SpO2: 98 % (03/03/25 1133) Vital Signs (24h Range):  Temp:  [97.1 °F (36.2 °C)-98 °F (36.7 °C)] 98 °F (36.7 °C)  Pulse:  [] 79  Resp:  [25-29] 27  SpO2:  [98 %-100 %] 98 %  BP: (135-196)/(62-85) 135/62   Weight: 49.9 kg (110 lb)  Body mass index is 17.75 kg/m².    No intake or output data in the 24 hours ending 03/03/25 1143       Physical Exam  Constitutional:       General: She is not in acute distress.  HENT:      Head: Normocephalic and atraumatic.   Eyes:      Extraocular Movements: Extraocular movements intact.   Cardiovascular:      Rate and Rhythm: Normal rate and regular rhythm.   Pulmonary:      Breath sounds: No wheezing or rhonchi.      Comments: Bibasilar crackles  Abdominal:      Palpations: Abdomen is soft.      Tenderness: There is no abdominal tenderness.   Musculoskeletal:      Right lower leg: No edema.      Left lower leg: No edema.   Skin:     General: Skin is warm and dry.   Neurological:      General: No focal deficit present.      Mental Status: She is alert.            Vents:  Oxygen Concentration (%): (S) 35 (03/03/25 0838)  Lines/Drains/Airways       Peripheral Intravenous Line  Duration                  Peripheral IV - Single Lumen 03/03/25 0834 20 G Left;Posterior Hand <1 day         Peripheral IV - Single Lumen 03/03/25 1125 20 G Right Antecubital <1 day                  Significant Labs:    CBC/Anemia Profile:  Recent Labs   Lab 03/03/25  0839 03/03/25  0842 03/03/25  0846   WBC  --  14.33*  --    HGB  --  5.6*  --    HCT 22* 19.5* 18.1   PLT  --  609*  --    MCV  --  97  --    RDW  --  16.3*  --         Chemistries:  Recent Labs   Lab  03/03/25  0842      K 3.7      CO2 20*   BUN 25*   CREATININE 0.7   CALCIUM 7.9*   ALBUMIN 3.3*   PROT 5.7*   BILITOT 0.5   ALKPHOS 152*   ALT 28   AST 31   MG 2.0       All pertinent labs within the past 24 hours have been reviewed.    Significant Imaging: I have reviewed all pertinent imaging results/findings within the past 24 hours.    ABG  Recent Labs   Lab 03/03/25  0846   PH 7.235   PO2 33.2   PCO2 52.5   HCO3 19.9     Assessment/Plan:     Pulmonary  Acute hypoxic respiratory failure  Presented with shortness of breath saturating 85% on room air. Placed on CPAP with EMS then switched to BiPAP on arrival. BNP of 1500, CXR with evidence of vascular congestion. Bibasilar crackles on exam. Shortness of breath is likely multifactorial in the setting of volume overload and anemia with Hg of 5.6.   POCUS with normal appearing EF, no significant enlargement of RV, IVC was dilated, possible trace pericardial effusion. Previous echo 2/2025 with normal EF, normal diastolic function.   EKG with TWI in V5 and V6 that appear similar to previous.     - Recommend repeating formal echo  - lasix 40 mg IV given in the ED, would redose based on urine output for goal of net neg 1-2L  - Trend troponin  - Repeat EKG if develops chest pain   - Wean oxygen for goal spO2 >92%    GI  Gastrointestinal hemorrhage  Reports black colored stools at home. Heme occult positive in the ED. Has history of ISAIAH and has received IV iron in the past. Hg of 5.6 on arrival.     - 2 units PRBCs ordered  - Lasix to prevent volume overload while receiving blood products  - PPI BID  - Transfuse for Hg <7  - GI consult, however patient has declined colonoscopy in the recent past       Critical care was time spent personally by me on the following activities: development of treatment plan with patient or surrogate and bedside caregivers, discussions with consultants, evaluation of patient's response to treatment, examination of patient, ordering  and performing treatments and interventions, ordering and review of laboratory studies, ordering and review of radiographic studies, pulse oximetry, re-evaluation of patient's condition. This critical care time did not overlap with that of any other provider or involve time for any procedures.    Thank you for your consult. I will sign off. Please contact us if you have any additional questions.     Deepika Watson MD  Critical Care Medicine  [facility] - Emergency Dept

## 2025-03-03 NOTE — SUBJECTIVE & OBJECTIVE
Past Medical History:   Diagnosis Date    Cancer     basal cell carcinoma    Stroke due to embolism of right middle cerebral artery 02/18/2025       Past Surgical History:   Procedure Laterality Date    CARPAL TUNNEL RELEASE      ESOPHAGOGASTRODUODENOSCOPY N/A 8/22/2022    Procedure: EGD (ESOPHAGOGASTRODUODENOSCOPY);  Surgeon: Steven Lopez MD;  [address]  Service: Endoscopy;  Laterality: N/A;    ESOPHAGOGASTRODUODENOSCOPY N/A 12/5/2022    Procedure: EGD (ESOPHAGOGASTRODUODENOSCOPY);  Surgeon: Steven Lopez MD;  [address]  Service: Endoscopy;  Laterality: N/A;  Has estimated pulmonary artery pressure 45, schedule location per protocol.   Please schedule with Dr. Darryl Lopez-  Plavix stopped 8/23/22  pt requested to schedule after Thanksgiving/ inst portal-RB  pre call complete; [facility] 11/28/22    ESOPHAGOGASTRODUODENOSCOPY N/A 7/11/2023    Procedure: EGD (ESOPHAGOGASTRODUODENOSCOPY);  Surgeon: Natalie Fall MD;  Location: [facility] ENDO;  Service: Endoscopy;  Laterality: N/A;    EYE SURGERY      left eye cataract    LEFT HEART CATHETERIZATION  4/17/2024    Procedure: Left heart cath;  Surgeon: Elisabeth De La Cruz MD;  Location: [facility] CATH;  Service: Cardiology;;    TONSILLECTOMY      TUBAL LIGATION         Review of patient's allergies indicates:  No Known Allergies  Family History    None       Tobacco Use    Smoking status: Never     Passive exposure: Never    Smokeless tobacco: Never   Substance and Sexual Activity    Alcohol use: No    Drug use: No    Sexual activity: Not on file     Review of Systems   Constitutional:  Positive for fatigue.   Respiratory:  Positive for shortness of breath.    Cardiovascular:  Positive for leg swelling.   Gastrointestinal:  Negative for abdominal distention, abdominal pain, anal bleeding, constipation, diarrhea, nausea, rectal pain and vomiting.   Neurological:  Positive for weakness.     Objective:     Vital Signs (Most  Recent):  Temp: 98 °F (36.7 °C) (03/03/25 1133)  Pulse: 75 (03/03/25 1418)  Resp: (!) 32 (03/03/25 1418)  BP: (!) 144/64 (03/03/25 1418)  SpO2: 97 % (03/03/25 1418) Vital Signs (24h Range):  Temp:  [97.1 °F (36.2 °C)-98 °F (36.7 °C)] 98 °F (36.7 °C)  Pulse:  [] 75  Resp:  [18-32] 32  SpO2:  [97 %-100 %] 97 %  BP: (135-196)/(62-85) 144/64     Weight: 49.9 kg (110 lb) (03/03/25 0838)  Body mass index is 17.75 kg/m².      Intake/Output Summary (Last 24 hours) at 3/3/2025 1434  Last data filed at 3/3/2025 1332  Gross per 24 hour   Intake --   Output 1500 ml   Net -1500 ml       Lines/Drains/Airways       Drain  Duration             Female External Urinary Catheter w/ Suction 03/03/25 1144 <1 day              Peripheral Intravenous Line  Duration                  Peripheral IV - Single Lumen 03/03/25 0834 20 G Left;Posterior Hand <1 day         Peripheral IV - Single Lumen 03/03/25 1125 20 G Right Antecubital <1 day                     Physical Exam  Constitutional:       General: She is not in acute distress.     Appearance: Normal appearance.   Cardiovascular:      Rate and Rhythm: Normal rate.   Pulmonary:      Effort: Pulmonary effort is normal. No respiratory distress.      Comments: On 2L NC  Abdominal:      General: Abdomen is flat. There is no distension.      Palpations: Abdomen is soft.      Tenderness: There is no abdominal tenderness.   Genitourinary:     Comments: MAGALYS with hemorrhoids and dark stool  Musculoskeletal:      Right lower leg: Edema present.      Left lower leg: Edema present.   Skin:     Coloration: Skin is pale.   Neurological:      General: No focal deficit present.      Mental Status: She is alert and oriented to person, place, and time.          Significant Labs:  CBC:   Recent Labs   Lab 03/03/25  0839 03/03/25  0842 03/03/25  0846 03/03/25  1402   WBC  --  14.33*  --   --    HGB  --  5.6*  --  7.8*   HCT 22* 19.5* 18.1  --    PLT  --  609*  --   --      BMP:   Recent Labs   Lab  03/03/25  0842   *      K 3.7      CO2 20*   BUN 25*   CREATININE 0.7   CALCIUM 7.9*   MG 2.0     All pertinent lab results from the last 24 hours have been reviewed.    Significant Imaging:  Imaging results within the past 24 hours have been reviewed.

## 2025-03-03 NOTE — PROGRESS NOTES
I have reviewed the notes, assessments, and/or procedures performed this visit, and I concur with the documentation.    Patient seen in the ED as requested in consultation  Past history reviewed, from a GI perspective she has long segment Barretts esophagus and prior history of gastric ulcer, but has been documented healed in the past.  She presents now with severe shortness of breath in the setting of profound anemia and dark stools  She does have oxygen at home which she uses as needed, but despite that she was dyspneic which prompted her visit to the ED  In the ED she in CHF, being treated with diuretics, while simultaneously receiving blood transfusions    Likely upper GI bleeding as a cause of the anemia, but because of the severe anemia, the severe dyspnea, and the CHF, she is not stable for an EGD at this time.  She can eat up until midnight but please keep NPO past midnight, we will evaluate in the morning to decide if an urgent EGD needs to be done tomorrow

## 2025-03-03 NOTE — CONSULTS
[facility] Emergency Dept  Gastroenterology  Consult Note    Patient Name: Katelyn Caba  MRN: [id number]  Admission Date: 3/3/2025  Hospital Length of Stay: 0 days  Code Status: Full Code   Attending Provider: Serafin Mcdaniel MD   Consulting Provider: Yoko Li MD  Primary Care Physician: Brooklyn Burnham MD  Principal Problem:<principal problem not specified>    Inpatient consult to Gastroenterology  Consult performed by: Yoko Li MD  Consult ordered by: Serafin Mcdaniel MD        Subjective:     HPI:  Katelyn Caba is an 85yo female with a hx of recent MCA CVA 2/18/25, STEMI 4/2024 (on brilinta), HTN, HLD, GI bleed, ISAIAH (on iron transfusions), Barrrett's esophagus, and BCC who is presenting for progressive shortness of breath on exertion and increased oxygen requirement since discharge on 2/20/25. She was recently admitted for an MCA CVA requiring TKA and was subsequently started on Brilinta. She notes darker stools since this time but denies any bright red blood, nausea, emesis, or bleeding from alternate sites. She has not had any abdominal pains or difficulty eating. She receives regular iron infusions but has not received one for multiple months. 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 In the ED she temporarily required bipap but was able to de-escalate to 2L NC with initiation of diuresis. She was also found to have hgb 5.8. pRBC transfusion started.     Past Medical History:   Diagnosis Date    Cancer     basal cell carcinoma    Stroke due to embolism of right middle cerebral artery 02/18/2025       Past Surgical History:   Procedure Laterality Date    CARPAL TUNNEL RELEASE      ESOPHAGOGASTRODUODENOSCOPY N/A 8/22/2022    Procedure: EGD (ESOPHAGOGASTRODUODENOSCOPY);  Surgeon: Steven Lopez MD;  Location: [address]);  Service: Endoscopy;  Laterality: N/A;    ESOPHAGOGASTRODUODENOSCOPY N/A 12/5/2022     Procedure: EGD (ESOPHAGOGASTRODUODENOSCOPY);  Surgeon: Steven Lopez MD;  [facility]  Service: Endoscopy;  Laterality: N/A;  Has estimated pulmonary artery pressure 45, schedule location per protocol.   Please schedule with Dr. Darryl Lopez-  Plavix stopped 8/23/22  pt requested to schedule after Thanksgiving/ inst portal-RB  pre call complete; [facility] 11/28/22    ESOPHAGOGASTRODUODENOSCOPY N/A 7/11/2023    Procedure: EGD (ESOPHAGOGASTRODUODENOSCOPY);  Surgeon: Natalie Fall MD;  Location: [facility] ENDO;  Service: Endoscopy;  Laterality: N/A;    EYE SURGERY      left eye cataract    LEFT HEART CATHETERIZATION  4/17/2024    Procedure: Left heart cath;  Surgeon: Elisabeth De La Cruz MD;  Location: [facility] CATH;  Service: Cardiology;;    TONSILLECTOMY      TUBAL LIGATION         Review of patient's allergies indicates:  No Known Allergies  Family History    None       Tobacco Use    Smoking status: Never     Passive exposure: Never    Smokeless tobacco: Never   Substance and Sexual Activity    Alcohol use: No    Drug use: No    Sexual activity: Not on file     Review of Systems   Constitutional:  Positive for fatigue.   Respiratory:  Positive for shortness of breath.    Cardiovascular:  Positive for leg swelling.   Gastrointestinal:  Negative for abdominal distention, abdominal pain, anal bleeding, constipation, diarrhea, nausea, rectal pain and vomiting.   Neurological:  Positive for weakness.     Objective:     Vital Signs (Most Recent):  Temp: 98 °F (36.7 °C) (03/03/25 1133)  Pulse: 75 (03/03/25 1418)  Resp: (!) 32 (03/03/25 1418)  BP: (!) 144/64 (03/03/25 1418)  SpO2: 97 % (03/03/25 1418) Vital Signs (24h Range):  Temp:  [97.1 °F (36.2 °C)-98 °F (36.7 °C)] 98 °F (36.7 °C)  Pulse:  [] 75  Resp:  [18-32] 32  SpO2:  [97 %-100 %] 97 %  BP: (135-196)/(62-85) 144/64     Weight: 49.9 kg (110 lb) (03/03/25 0838)  Body mass index is 17.75 kg/m².      Intake/Output Summary (Last 24 hours) at 3/3/2025  1434  Last data filed at 3/3/2025 1332  Gross per 24 hour   Intake --   Output 1500 ml   Net -1500 ml       Lines/Drains/Airways       Drain  Duration             Female External Urinary Catheter w/ Suction 03/03/25 1144 <1 day              Peripheral Intravenous Line  Duration                  Peripheral IV - Single Lumen 03/03/25 0834 20 G Left;Posterior Hand <1 day         Peripheral IV - Single Lumen 03/03/25 1125 20 G Right Antecubital <1 day                     Physical Exam  Constitutional:       General: She is not in acute distress.     Appearance: Normal appearance.   Cardiovascular:      Rate and Rhythm: Normal rate.   Pulmonary:      Effort: Pulmonary effort is normal. No respiratory distress.      Comments: On 2L NC  Abdominal:      General: Abdomen is flat. There is no distension.      Palpations: Abdomen is soft.      Tenderness: There is no abdominal tenderness.   Genitourinary:     Comments: MAGALYS with hemorrhoids and dark stool  Musculoskeletal:      Right lower leg: Edema present.      Left lower leg: Edema present.   Skin:     Coloration: Skin is pale.   Neurological:      General: No focal deficit present.      Mental Status: She is alert and oriented to person, place, and time.          Significant Labs:  CBC:   Recent Labs   Lab 03/03/25  0839 03/03/25  0842 03/03/25  0846 03/03/25  1402   WBC  --  14.33*  --   --    HGB  --  5.6*  --  7.8*   HCT 22* 19.5* 18.1  --    PLT  --  609*  --   --      BMP:   Recent Labs   Lab 03/03/25  0842   *      K 3.7      CO2 20*   BUN 25*   CREATININE 0.7   CALCIUM 7.9*   MG 2.0     All pertinent lab results from the last 24 hours have been reviewed.    Significant Imaging:  Imaging results within the past 24 hours have been reviewed.  Assessment/Plan:     GI  Gastrointestinal hemorrhage  84yF with hx of peptic ulcers and holloway's esophagus presenting with dark stools for ~8 days and anemia with hgb 5.6. Pt's dyspnea is more likely related to  her gurdeep volume overload given how quickly it improved with diuresis. Dark stools generally began a few days after starting Brilinta for recent stroke. 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 Peptic ulcer is possible though less likely given adherence to PPI.     No indication for an urgent scope, though she may benefit from an EGD once volume status has normalized.     - optimize volume status  - transfuse pRBC to HGB >7, maintain 2 large bore Ivs  - hold Brilinta until after EGD  - continue 40mg IV Protonix BID  - NPO at midnight, will reassess for scope tomorrow        Thank you for your consult. I will follow-up with patient. Please contact us if you have any additional questions.    Yoko Li MD  Gastroenterology  [facility] - Emergency Dept

## 2025-03-03 NOTE — SUBJECTIVE & OBJECTIVE
Past Medical History:   Diagnosis Date    Cancer     basal cell carcinoma       Past Surgical History:   Procedure Laterality Date    CARPAL TUNNEL RELEASE      ESOPHAGOGASTRODUODENOSCOPY N/A 8/22/2022    Procedure: EGD (ESOPHAGOGASTRODUODENOSCOPY);  Surgeon: Steven Lopez MD;  Location: [address]);  Service: Endoscopy;  Laterality: N/A;    ESOPHAGOGASTRODUODENOSCOPY N/A 12/5/2022    Procedure: EGD (ESOPHAGOGASTRODUODENOSCOPY);  Surgeon: Steven Lopez MD;  [address]  Service: Endoscopy;  Laterality: N/A;  Has estimated pulmonary artery pressure 45, schedule location per protocol.   Please schedule with Dr. Darryl Lopez-  Plavix stopped 8/23/22  pt requested to schedule after Thanksgiving/ inst portal-RB  pre call complete; [facility] 11/28/22    ESOPHAGOGASTRODUODENOSCOPY N/A 7/11/2023    Procedure: EGD (ESOPHAGOGASTRODUODENOSCOPY);  Surgeon: Natalie Fall MD;  Location: [facility];  Service: Endoscopy;  Laterality: N/A;    EYE SURGERY      left eye cataract    LEFT HEART CATHETERIZATION  4/17/2024    Procedure: Left heart cath;  Surgeon: Elisabeth De La Cruz MD;  [facility];  Service: Cardiology;;    TONSILLECTOMY      TUBAL LIGATION         Review of patient's allergies indicates:  No Known Allergies    Family History    None       Tobacco Use    Smoking status: Never     Passive exposure: Never    Smokeless tobacco: Never   Substance and Sexual Activity    Alcohol use: No    Drug use: No    Sexual activity: Not on file      Review of Systems   Constitutional:  Positive for fatigue. Negative for chills and fever.   HENT:  Negative for congestion.    Respiratory:  Positive for shortness of breath. Negative for cough.    Cardiovascular:  Positive for leg swelling (after being on her feet all day). Negative for chest pain.   Gastrointestinal:  Positive for blood in stool. Negative for abdominal distention, abdominal pain, diarrhea, nausea and vomiting.   Genitourinary:   Negative for dysuria.   Neurological:  Positive for light-headedness.     Objective:     Vital Signs (Most Recent):  Temp: 98 °F (36.7 °C) (03/03/25 1133)  Pulse: 79 (03/03/25 1133)  Resp: (!) 27 (03/03/25 1133)  BP: 135/62 (03/03/25 1012)  SpO2: 98 % (03/03/25 1133) Vital Signs (24h Range):  Temp:  [97.1 °F (36.2 °C)-98 °F (36.7 °C)] 98 °F (36.7 °C)  Pulse:  [] 79  Resp:  [25-29] 27  SpO2:  [98 %-100 %] 98 %  BP: (135-196)/(62-85) 135/62   Weight: 49.9 kg (110 lb)  Body mass index is 17.75 kg/m².    No intake or output data in the 24 hours ending 03/03/25 1143       Physical Exam  Constitutional:       General: She is not in acute distress.  HENT:      Head: Normocephalic and atraumatic.   Eyes:      Extraocular Movements: Extraocular movements intact.   Cardiovascular:      Rate and Rhythm: Normal rate and regular rhythm.   Pulmonary:      Breath sounds: No wheezing or rhonchi.      Comments: Bibasilar crackles  Abdominal:      Palpations: Abdomen is soft.      Tenderness: There is no abdominal tenderness.   Musculoskeletal:      Right lower leg: No edema.      Left lower leg: No edema.   Skin:     General: Skin is warm and dry.   Neurological:      General: No focal deficit present.      Mental Status: She is alert.            Vents:  Oxygen Concentration (%): (S) 35 (03/03/25 0838)  Lines/Drains/Airways       Peripheral Intravenous Line  Duration                  Peripheral IV - Single Lumen 03/03/25 0834 20 G Left;Posterior Hand <1 day         Peripheral IV - Single Lumen 03/03/25 1125 20 G Right Antecubital <1 day                  Significant Labs:    CBC/Anemia Profile:  Recent Labs   Lab 03/03/25  0839 03/03/25  0842 03/03/25  0846   WBC  --  14.33*  --    HGB  --  5.6*  --    HCT 22* 19.5* 18.1   PLT  --  609*  --    MCV  --  97  --    RDW  --  16.3*  --         Chemistries:  Recent Labs   Lab 03/03/25  0842      K 3.7      CO2 20*   BUN 25*   CREATININE 0.7   CALCIUM 7.9*   ALBUMIN 3.3*    PROT 5.7*   BILITOT 0.5   ALKPHOS 152*   ALT 28   AST 31   MG 2.0       All pertinent labs within the past 24 hours have been reviewed.    Significant Imaging: I have reviewed all pertinent imaging results/findings within the past 24 hours.

## 2025-03-03 NOTE — HPI
Katelyn Caba is an 83yo female with a hx of recent MCA CVA 2/18/25, STEMI 4/2024 (on brilinta), HTN, HLD, GI bleed, ISAIAH (on iron transfusions), Barrrett's esophagus, and BCC who is presenting for progressive shortness of breath on exertion and increased oxygen requirement since discharge on 2/20/25. She was recently admitted for an MCA CVA requiring TKA and was subsequently started on Brilinta. She notes darker stools since this time but denies any bright red blood, nausea, emesis, or bleeding from alternate sites. She has not had any abdominal pains or difficulty eating. She receives regular iron infusions but has not received one for multiple months. 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 In the ED she temporarily required bipap but was able to de-escalate to 2L NC with initiation of diuresis. She was also found to have hgb 5.8. pRBC transfusion started.

## 2025-03-04 PROBLEM — D72.829 LEUKOCYTOSIS: Status: ACTIVE | Noted: 2025-03-04

## 2025-03-04 LAB
ALBUMIN SERPL BCP-MCNC: 3.2 G/DL (ref 3.5–5.2)
ALP SERPL-CCNC: 146 U/L (ref 40–150)
ALT SERPL W/O P-5'-P-CCNC: 24 U/L (ref 10–44)
ANION GAP SERPL CALC-SCNC: 11 MMOL/L (ref 8–16)
AST SERPL-CCNC: 27 U/L (ref 10–40)
BASOPHILS # BLD AUTO: 0.12 K/UL (ref 0–0.2)
BASOPHILS NFR BLD: 0.5 % (ref 0–1.9)
BILIRUB SERPL-MCNC: 4.1 MG/DL (ref 0.1–1)
BUN SERPL-MCNC: 22 MG/DL (ref 8–23)
CALCIUM SERPL-MCNC: 8.2 MG/DL (ref 8.7–10.5)
CHLORIDE SERPL-SCNC: 102 MMOL/L (ref 95–110)
CO2 SERPL-SCNC: 24 MMOL/L (ref 23–29)
CREAT SERPL-MCNC: 0.6 MG/DL (ref 0.5–1.4)
DIFFERENTIAL METHOD BLD: ABNORMAL
EOSINOPHIL # BLD AUTO: 0.1 K/UL (ref 0–0.5)
EOSINOPHIL NFR BLD: 0.5 % (ref 0–8)
ERYTHROCYTE [DISTWIDTH] IN BLOOD BY AUTOMATED COUNT: 15.4 % (ref 11.5–14.5)
EST. GFR  (NO RACE VARIABLE): >60 ML/MIN/1.73 M^2
GLUCOSE SERPL-MCNC: 90 MG/DL (ref 70–110)
HCT VFR BLD AUTO: 28.5 % (ref 37–48.5)
HGB BLD-MCNC: 9.3 G/DL (ref 12–16)
IMM GRANULOCYTES # BLD AUTO: 0.2 K/UL (ref 0–0.04)
IMM GRANULOCYTES NFR BLD AUTO: 0.8 % (ref 0–0.5)
LYMPHOCYTES # BLD AUTO: 2 K/UL (ref 1–4.8)
LYMPHOCYTES NFR BLD: 7.8 % (ref 18–48)
MAGNESIUM SERPL-MCNC: 2 MG/DL (ref 1.6–2.6)
MCH RBC QN AUTO: 29 PG (ref 27–31)
MCHC RBC AUTO-ENTMCNC: 32.6 G/DL (ref 32–36)
MCV RBC AUTO: 89 FL (ref 82–98)
MONOCYTES # BLD AUTO: 1.7 K/UL (ref 0.3–1)
MONOCYTES NFR BLD: 6.7 % (ref 4–15)
NEUTROPHILS # BLD AUTO: 21 K/UL (ref 1.8–7.7)
NEUTROPHILS NFR BLD: 83.7 % (ref 38–73)
NRBC BLD-RTO: 0 /100 WBC
PHOSPHATE SERPL-MCNC: 3.5 MG/DL (ref 2.7–4.5)
PLATELET # BLD AUTO: 417 K/UL (ref 150–450)
PMV BLD AUTO: 10 FL (ref 9.2–12.9)
POTASSIUM SERPL-SCNC: 3.3 MMOL/L (ref 3.5–5.1)
PROT SERPL-MCNC: 5.8 G/DL (ref 6–8.4)
RBC # BLD AUTO: 3.21 M/UL (ref 4–5.4)
SODIUM SERPL-SCNC: 137 MMOL/L (ref 136–145)
WBC # BLD AUTO: 25.08 K/UL (ref 3.9–12.7)

## 2025-03-04 PROCEDURE — 83735 ASSAY OF MAGNESIUM: CPT

## 2025-03-04 PROCEDURE — 84100 ASSAY OF PHOSPHORUS: CPT

## 2025-03-04 PROCEDURE — 25000003 PHARM REV CODE 250

## 2025-03-04 PROCEDURE — 20600001 HC STEP DOWN PRIVATE ROOM

## 2025-03-04 PROCEDURE — 85025 COMPLETE CBC W/AUTO DIFF WBC: CPT | Mod: 91 | Performed by: INTERNAL MEDICINE

## 2025-03-04 PROCEDURE — 63600175 PHARM REV CODE 636 W HCPCS: Mod: JZ,TB

## 2025-03-04 PROCEDURE — 36415 COLL VENOUS BLD VENIPUNCTURE: CPT | Mod: XB | Performed by: INTERNAL MEDICINE

## 2025-03-04 PROCEDURE — 63600175 PHARM REV CODE 636 W HCPCS: Performed by: INTERNAL MEDICINE

## 2025-03-04 PROCEDURE — 36415 COLL VENOUS BLD VENIPUNCTURE: CPT

## 2025-03-04 PROCEDURE — 85025 COMPLETE CBC W/AUTO DIFF WBC: CPT

## 2025-03-04 PROCEDURE — 80053 COMPREHEN METABOLIC PANEL: CPT

## 2025-03-04 RX ADMIN — POTASSIUM BICARBONATE 40 MEQ: 391 TABLET, EFFERVESCENT ORAL at 09:03

## 2025-03-04 RX ADMIN — FERUMOXYTOL 1020 MG: 510 INJECTION INTRAVENOUS at 12:03

## 2025-03-04 RX ADMIN — ATORVASTATIN CALCIUM 80 MG: 40 TABLET, FILM COATED ORAL at 09:03

## 2025-03-04 RX ADMIN — CARVEDILOL 6.25 MG: 6.25 TABLET, FILM COATED ORAL at 09:03

## 2025-03-04 RX ADMIN — POTASSIUM BICARBONATE 40 MEQ: 391 TABLET, EFFERVESCENT ORAL at 11:03

## 2025-03-04 RX ADMIN — PANTOPRAZOLE SODIUM 40 MG: 40 INJECTION, POWDER, FOR SOLUTION INTRAVENOUS at 09:03

## 2025-03-04 RX ADMIN — CARVEDILOL 6.25 MG: 6.25 TABLET, FILM COATED ORAL at 05:03

## 2025-03-04 RX ADMIN — PANTOPRAZOLE SODIUM 40 MG: 40 INJECTION, POWDER, FOR SOLUTION INTRAVENOUS at 08:03

## 2025-03-04 RX ADMIN — CYANOCOBALAMIN TAB 1000 MCG 1000 MCG: 1000 TAB at 09:03

## 2025-03-04 NOTE — PROGRESS NOTES
[facility] - Telemetry [facility]  Progress Note    Patient Name: Katelyn Caba  MRN: [id number]  Patient Class: IP- Inpatient   Admission Date: 3/3/2025  Length of Stay: 1 days  Attending Physician: Serafin Mcdaniel MD  Primary Care Provider: Brooklyn Burnham MD        Subjective     Principal Problem:Gastrointestinal hemorrhage        HPI:  83 yo female with PMH of recent MCA CVA (2/18/25, received TNK), inferolateral STEMI (4/2024, s/p cath with no intervention) on brilinta, hypertension, hyperlipidemia, GI bleed, ISAIAH, BCC who presented to the ED with shortness of breath. She was hypoxic to 85% on EMS arrival and was placed on CPAP. She reports having shortness of breath since her hospitalization for her stroke in addition to fatigue. She was just discharged 2/20/25. She denies any chest pain, cough, mucous production, fevers, chills. She reports black colored stools at home. She thought it was secondary to oral iron but she has not been taking this since her discharged on 2/20. She denies abdominal pain, N/V/D. She reports using oxygen at home as needed for her shortness of breath. Per her daughter, she is not prescribed oxygen at home but has been using her husbands oxygen tank for her symptoms of shortness of breath. Her daughter reports that even when she is feeling short of breath her spO2 is above 95%.     Placed on BiPAP upon arrival to the ED. Hemodynamically stable. BNP elevated to 1500 and troponin in the 200s. EKG with some TWI in V5 and V6, similar to previous. CXR with bilateral vascular congestion. Hg of 5.6, will receive 2 units PRBCs. Was saturating well on FiO2 25% and she reports that her shortness of breath had significantly improved. She was able to be weaned to 2L NC. She is saturating 97-98% and reports that her breathing is much better.      Overview/Hospital Course:  No notes on file    Interval History: Patient is stable on 2 L NC. GI will aim to scope 3/5 due to emergency  procedures taking precedence 3/4.     Review of Systems  Objective:     Vital Signs (Most Recent):  Temp: 98.7 °F (37.1 °C) (03/04/25 0744)  Pulse: 75 (03/04/25 1109)  Resp: (!) 30 (03/04/25 0745)  BP: 136/65 (03/04/25 0745)  SpO2: (!) 92 % (03/04/25 0744) Vital Signs (24h Range):  Temp:  [98.1 °F (36.7 °C)-98.7 °F (37.1 °C)] 98.7 °F (37.1 °C)  Pulse:  [66-82] 75  Resp:  [18-32] 30  SpO2:  [92 %-100 %] 92 %  BP: (136-161)/(62-75) 136/65     Weight: 49.9 kg (110 lb)  Body mass index is 17.75 kg/m².    Intake/Output Summary (Last 24 hours) at 3/4/2025 1213  Last data filed at 3/4/2025 0707  Gross per 24 hour   Intake 120 ml   Output 2600 ml   Net -2480 ml         Physical Exam        Significant Labs: All pertinent labs within the past 24 hours have been reviewed.  CBC:   Recent Labs   Lab 03/03/25  0842 03/03/25  0846 03/03/25  1402 03/03/25  2136 03/04/25  0059   WBC 14.33*  --   --  31.84* 25.08*   HGB 5.6*  --  7.8* 9.6* 9.3*   HCT 19.5* 18.1  --  29.3* 28.5*   *  --   --  447 417     CMP:   Recent Labs   Lab 03/03/25  0842 03/04/25  0251    137   K 3.7 3.3*    102   CO2 20* 24   * 90   BUN 25* 22   CREATININE 0.7 0.6   CALCIUM 7.9* 8.2*   PROT 5.7* 5.8*   ALBUMIN 3.3* 3.2*   BILITOT 0.5 4.1*   ALKPHOS 152* 146   AST 31 27   ALT 28 24   ANIONGAP 10 11       Significant Imaging: I have reviewed all pertinent imaging results/findings within the past 24 hours.    Assessment and Plan     * Gastrointestinal hemorrhage  - Aspirin post CVA  -  holding while inpatient for active bleeding and scope     Leukocytosis  - likely secondary to blood loss anemia  - WBC is stabilizing and decreasing      Elevated brain natriuretic peptide (BNP) level  BNP  Recent Labs   Lab 03/03/25  0842   BNP 1,512*          Rodriguez's esophagus  - GI to scope      History of gastric ulcer  - last EGD 8/2022 with Rafa's esophagus, multiple gastric ulcers with clean ulcer base and 3 non bleeding angiectasis in the  duodenum. Biopsy was negative for any malignancy   - pantoprazole IV 40 mg   - current GI recommendations (3/3)   - optimize volume status  - transfuse pRBC to HGB >7, maintain 2 large bore Ivs  - hold Brilinta until after EGD  - continue 40mg IV Protonix BID  - NPO at midnight, will reassess for scope tomorrow (3/4)    Symptomatic anemia  Anemia is likely due to acute blood loss which was from possible gastric ulcer but unknown at this time . Most recent hemoglobin and hematocrit are listed below.  Recent Labs     03/03/25  0839 03/03/25  0842 03/03/25  0846 03/03/25  1402   HGB  --  5.6*  --  7.8*   HCT 22* 19.5* 18.1  --      Plan  - Monitor serial CBC: Every 8 hours  - Transfuse PRBC if patient becomes hemodynamically unstable, symptomatic or H/H drops below 7/21.  - Patient has received 2 units of PRBCs on 3/3/2025  - Patient's anemia is currently stable    Aortic atherosclerosis  Continue atorvastatin     Acute hypoxic respiratory failure  Patient with Hypoxic Respiratory failure which is Acute.  she is not on home oxygen. Supplemental oxygen was provided and noted- Oxygen Concentration (%):  [35] 35    .   Signs/symptoms of respiratory failure include- tachypnea, increased work of breathing, and respiratory distress. Contributing diagnoses includes -  acute blood loss anemia  Labs and images were reviewed. Patient Has not had a recent ABG. Will treat underlying causes and adjust management of respiratory failure as follows- supplemental oxygen will wean as tolerated. Patient not in any respiratory distress. Patient does endorse using oxygen on occasion at home. She says her son is a dentist who provides oxygen tank and she utilizes O2 PRN. She is not sure how often she uses it.     Pure hypercholesterolemia  - continue home atorvastatin      H/O ischemic left MCA stroke  Patient had prior left MCA infarct 2022 who presented to [facility] ED 2/18 for left sided weakness, left facial droop, and dysarthria. [place]  2/17 2030 when she went to bed, woke up with symptoms present at 0830. Received TNK prior to transfer at 1104. Transferred to [facility] for IR evaluation. 02/19/2025 Patient admitted to [facility] w/ R M1 occlusion s/p TNK and DSA w/o thrombectomy as lesion migrated to distal MCA branches after TNK administered.     - holding aspirin  - plavix not on home medications       VTE Risk Mitigation (From admission, onward)           Ordered     IP VTE HIGH RISK PATIENT  Once         03/03/25 1354     Place sequential compression device  Until discontinued         03/03/25 1354                    Discharge Planning   TERE: 3/5/2025     Code Status: Full Code   Medical Readiness for Discharge Date:                            Bethanie Park MD  Department of Hospital Medicine   [facility] - Telemetry Stepdown

## 2025-03-04 NOTE — AI DETERIORATION ALERT
"RAPID RESPONSE NURSE AI ALERT       AI alert received.    Chart Reviewed: 03/04/2025, 5:05 AM    MRN: [id number]  Bed: 8092/8092 A    Dx: Gastrointestinal hemorrhage    Katelyn Caba has a past medical history of Cancer and Stroke due to embolism of right middle cerebral artery.    Last VS: /62 (BP Location: Left arm, Patient Position: Lying)   Pulse 72   Temp 98.7 °F (37.1 °C) (Axillary)   Resp (!) 29   Ht 5' 6" (1.676 m)   Wt 49.9 kg (110 lb)   SpO2 98%   Breastfeeding No   BMI 17.75 kg/m²     24H Vital Sign Range:  Temp:  [97.1 °F (36.2 °C)-98.7 °F (37.1 °C)]   Pulse:  []   Resp:  [18-32]   BP: (135-196)/(62-85)   SpO2:  [95 %-100 %]     Level of Consciousness (AVPU): alert    Recent Labs     03/03/25  0842 03/03/25  0846 03/03/25  1402 03/03/25  2136 03/04/25  0059   WBC 14.33*  --   --  31.84* 25.08*   HGB 5.6*  --  7.8* 9.6* 9.3*   HCT 19.5* 18.1  --  29.3* 28.5*   *  --   --  447 417       Recent Labs     03/03/25  0842 03/04/25  0251    137   K 3.7 3.3*    102   CO2 20* 24   BUN 25* 22   CREATININE 0.7 0.6   * 90   PHOS  --  3.5   MG 2.0 2.0        Recent Labs     03/03/25  0846   PH 7.235   PCO2 52.5   PO2 33.2   HCO3 19.9        OXYGEN:  Flow (L/min) (Oxygen Therapy): 2  Oxygen Concentration (%): (S) 35       MEWS score: 1    Bedside RNCyndee contacted for documented SpO2 73% reports entered in error, will correct charting. No additional concerns verbalized at this time. Instructed to call [phone] for further concerns or assistance.    CHAVA Lane RN        "

## 2025-03-04 NOTE — SUBJECTIVE & OBJECTIVE
Interval History: Patient is stable on 2 L NC. GI will aim to scope 3/5 due to emergency procedures taking precedence 3/4.     Review of Systems  Objective:     Vital Signs (Most Recent):  Temp: 98.7 °F (37.1 °C) (03/04/25 0744)  Pulse: 75 (03/04/25 1109)  Resp: (!) 30 (03/04/25 0745)  BP: 136/65 (03/04/25 0745)  SpO2: (!) 92 % (03/04/25 0744) Vital Signs (24h Range):  Temp:  [98.1 °F (36.7 °C)-98.7 °F (37.1 °C)] 98.7 °F (37.1 °C)  Pulse:  [66-82] 75  Resp:  [18-32] 30  SpO2:  [92 %-100 %] 92 %  BP: (136-161)/(62-75) 136/65     Weight: 49.9 kg (110 lb)  Body mass index is 17.75 kg/m².    Intake/Output Summary (Last 24 hours) at 3/4/2025 1213  Last data filed at 3/4/2025 0707  Gross per 24 hour   Intake 120 ml   Output 2600 ml   Net -2480 ml         Physical Exam        Significant Labs: All pertinent labs within the past 24 hours have been reviewed.  CBC:   Recent Labs   Lab 03/03/25  0842 03/03/25  0846 03/03/25  1402 03/03/25  2136 03/04/25  0059   WBC 14.33*  --   --  31.84* 25.08*   HGB 5.6*  --  7.8* 9.6* 9.3*   HCT 19.5* 18.1  --  29.3* 28.5*   *  --   --  447 417     CMP:   Recent Labs   Lab 03/03/25  0842 03/04/25  0251    137   K 3.7 3.3*    102   CO2 20* 24   * 90   BUN 25* 22   CREATININE 0.7 0.6   CALCIUM 7.9* 8.2*   PROT 5.7* 5.8*   ALBUMIN 3.3* 3.2*   BILITOT 0.5 4.1*   ALKPHOS 152* 146   AST 31 27   ALT 28 24   ANIONGAP 10 11       Significant Imaging: I have reviewed all pertinent imaging results/findings within the past 24 hours.

## 2025-03-04 NOTE — NURSING
Notified Dr Elizondo to get clarification of continuous BIPAP orders. According to him will discuss with day team of future plans in regards to BIPAP orders. Currently on 2 L Nasal cannula.

## 2025-03-04 NOTE — SUBJECTIVE & OBJECTIVE
Past Medical History:   Diagnosis Date    Cancer     basal cell carcinoma    Stroke due to embolism of right middle cerebral artery 02/18/2025       Past Surgical History:   Procedure Laterality Date    CARPAL TUNNEL RELEASE      ESOPHAGOGASTRODUODENOSCOPY N/A 8/22/2022    Procedure: EGD (ESOPHAGOGASTRODUODENOSCOPY);  Surgeon: Steven Lopez MD;  Location: [facility] (2ND FLR);  Service: Endoscopy;  Laterality: N/A;    ESOPHAGOGASTRODUODENOSCOPY N/A 12/5/2022    Procedure: EGD (ESOPHAGOGASTRODUODENOSCOPY);  Surgeon: Steven Lopez MD;  Location: [facility] (2ND FLR);  Service: Endoscopy;  Laterality: N/A;  Has estimated pulmonary artery pressure 45, schedule location per protocol.   Please schedule with Dr. Darryl Lopez-  Plavix stopped 8/23/22  pt requested to schedule after Thanksgiving/ inst portal-RB  pre call complete; [facility] 11/28/22    ESOPHAGOGASTRODUODENOSCOPY N/A 7/11/2023    Procedure: EGD (ESOPHAGOGASTRODUODENOSCOPY);  Surgeon: Natalie Fall MD;  Location: [facility] ENDO;  Service: Endoscopy;  Laterality: N/A;    EYE SURGERY      left eye cataract    LEFT HEART CATHETERIZATION  4/17/2024    Procedure: Left heart cath;  Surgeon: Elisabeth De La Cruz MD;  Location: [facility] CATH;  Service: Cardiology;;    TONSILLECTOMY      TUBAL LIGATION         Review of patient's allergies indicates:  No Known Allergies    No current facility-administered medications on file prior to encounter.     Current Outpatient Medications on File Prior to Encounter   Medication Sig    aspirin 81 MG Chew Chew and swallow 1 tablet (81 mg total) by mouth once daily.    atorvastatin (LIPITOR) 80 MG tablet Take 1 tablet (80 mg total) by mouth once daily.    carvediloL (COREG) 6.25 MG tablet Take 1 tablet (6.25 mg total) by mouth 2 (two) times daily with meals.    cyanocobalamin (VITAMIN B-12) 1000 MCG tablet Take 1,000 mcg by mouth once daily.    pantoprazole (PROTONIX) 40 MG tablet Take 1 tablet (40 mg total) by mouth once daily.     vitamin D (VITAMIN D3) 1000 units Tab Take 1,000 Units by mouth once daily.    [DISCONTINUED] clopidogreL (PLAVIX) 75 mg tablet Take 1 tablet (75 mg total) by mouth once daily.     Family History    None       Tobacco Use    Smoking status: Never     Passive exposure: Never    Smokeless tobacco: Never   Substance and Sexual Activity    Alcohol use: No    Drug use: No    Sexual activity: Not on file     Review of Systems   Constitutional:  Positive for fatigue. Negative for chills and fever.   HENT:  Negative for congestion.    Respiratory:  Positive for shortness of breath. Negative for cough.    Cardiovascular:  Positive for leg swelling (after being on her feet all day). Negative for chest pain.   Gastrointestinal:  Positive for blood in stool. Negative for abdominal distention, abdominal pain, diarrhea, nausea and vomiting.   Genitourinary:  Negative for dysuria.   Neurological:  Positive for light-headedness.     Objective:     Vital Signs (Most Recent):  Temp: 98.1 °F (36.7 °C) (03/03/25 1528)  Pulse: 74 (03/03/25 1717)  Resp: 18 (03/03/25 1717)  BP: (!) 151/66 (03/03/25 1717)  SpO2: 97 % (03/03/25 1717) Vital Signs (24h Range):  Temp:  [97.1 °F (36.2 °C)-98.1 °F (36.7 °C)] 98.1 °F (36.7 °C)  Pulse:  [] 74  Resp:  [18-32] 18  SpO2:  [96 %-100 %] 97 %  BP: (135-196)/(62-85) 151/66     Weight: 49.9 kg (110 lb)  Body mass index is 17.75 kg/m².     Physical Exam  Constitutional:       General: She is not in acute distress.  HENT:      Head: Normocephalic and atraumatic.      Mouth/Throat:      Mouth: Mucous membranes are dry.      Pharynx: Oropharynx is clear.   Eyes:      Extraocular Movements: Extraocular movements intact.   Cardiovascular:      Rate and Rhythm: Normal rate and regular rhythm.   Pulmonary:      Breath sounds: No wheezing or rhonchi.      Comments: Decreased air movement at lung bases  Abdominal:      Palpations: Abdomen is soft.      Tenderness: There is no abdominal tenderness.  "  Musculoskeletal:      Right lower leg: No edema.      Left lower leg: No edema.   Skin:     General: Skin is warm and dry.      Capillary Refill: Capillary refill takes less than 2 seconds.   Neurological:      General: No focal deficit present.      Mental Status: She is alert.   Psychiatric:         Mood and Affect: Mood normal.                Significant Labs: All pertinent labs within the past 24 hours have been reviewed.  CBC:   Recent Labs   Lab 03/03/25  0839 03/03/25  0842 03/03/25  0846 03/03/25  1402   WBC  --  14.33*  --   --    HGB  --  5.6*  --  7.8*   HCT 22* 19.5* 18.1  --    PLT  --  609*  --   --      CMP:   Recent Labs   Lab 03/03/25  0842      K 3.7      CO2 20*   *   BUN 25*   CREATININE 0.7   CALCIUM 7.9*   PROT 5.7*   ALBUMIN 3.3*   BILITOT 0.5   ALKPHOS 152*   AST 31   ALT 28   ANIONGAP 10     Lactic Acid: No results for input(s): "LACTATE" in the last 48 hours.    Significant Imaging: I have reviewed all pertinent imaging results/findings within the past 24 hours.  "

## 2025-03-04 NOTE — H&P
[facility] - Emergency Dept  [facility] Medicine  History & Physical    Patient Name: Katelyn Caba  MRN: [id number]  Patient Class: IP- Inpatient  Admission Date: 3/3/2025  Attending Physician: Serafin Mcdaniel MD   Primary Care Provider: Brooklyn Burnham MD         Patient information was obtained from patient and ER records.     Subjective:     Principal Problem:Gastrointestinal hemorrhage    Chief Complaint:   Chief Complaint   Patient presents with    Respiratory Distress     Arrives via EMS on CPAP, in respiratory distress at home RA sats 85% and tachypniec in the 40s         HPI: 85yo female with hx of MCA, HTN, HLD presenting to the ED in respiratory distress.   83 yo female with PMH of recent MCA CVA (2/18/25, received TNK), inferolateral STEMI (4/2024, s/p cath with no intervention) on brilinta, hypertension, hyperlipidemia, GI bleed, ISAIAH, BCC who presented to the ED with shortness of breath. She was hypoxic to 85% on EMS arrival and was placed on CPAP. She reports having shortness of breath since her hospitalization for her stroke in addition to fatigue. She was just discharged 2/20/25. She denies any chest pain, cough, mucous production, fevers, chills. She reports black colored stools at home. She thought it was secondary to oral iron but she has not been taking this since her discharged on 2/20. She denies abdominal pain, N/V/D. She reports using oxygen at home as needed for her shortness of breath. Per her daughter, she is not prescribed oxygen at home but has been using her husbands oxygen tank for her symptoms of shortness of breath. Her daughter reports that even when she is feeling short of breath her spO2 is above 95%.     Placed on BiPAP upon arrival to the ED. Hemodynamically stable. BNP elevated to 1500 and troponin in the 200s. EKG with some TWI in V5 and V6, similar to previous. CXR with bilateral vascular congestion. Hg of 5.6, will receive 2 units PRBCs. Was saturating well on FiO2 25% and  she reports that her shortness of breath had significantly improved. She was able to be weaned to 2L NC. She is saturating 97-98% and reports that her breathing is much better.    Past Medical History:   Diagnosis Date    Cancer     basal cell carcinoma    Stroke due to embolism of right middle cerebral artery 02/18/2025       Past Surgical History:   Procedure Laterality Date    CARPAL TUNNEL RELEASE      ESOPHAGOGASTRODUODENOSCOPY N/A 8/22/2022    Procedure: EGD (ESOPHAGOGASTRODUODENOSCOPY);  Surgeon: Steven Lopez MD;  [facility]  Service: Endoscopy;  Laterality: N/A;    ESOPHAGOGASTRODUODENOSCOPY N/A 12/5/2022    Procedure: EGD (ESOPHAGOGASTRODUODENOSCOPY);  Surgeon: tSeven Lopez MD;  Location[facility]  Service: Endoscopy;  Laterality: N/A;  Has estimated pulmonary artery pressure 45, schedule location per protocol.   Please schedule with Dr. Darryl Lopez-  Plavix stopped 8/23/22  [facility]  pre call complete; [facility] 11/28/22    ESOPHAGOGASTRODUODENOSCOPY N/A 7/11/2023    Procedure: EGD (ESOPHAGOGASTRODUODENOSCOPY);  Surgeon: Natalie Fall MD;  Location: [facility];  Service: Endoscopy;  Laterality: N/A;    EYE SURGERY      left eye cataract    LEFT HEART CATHETERIZATION  4/17/2024    Procedure: Left heart cath;  Surgeon: Elisabeth De La Cruz MD;  Location: [facility] CATH;  Service: Cardiology;;    TONSILLECTOMY      TUBAL LIGATION         Review of patient's allergies indicates:  No Known Allergies    No current facility-administered medications on file prior to encounter.     Current Outpatient Medications on File Prior to Encounter   Medication Sig    aspirin 81 MG Chew Chew and swallow 1 tablet (81 mg total) by mouth once daily.    atorvastatin (LIPITOR) 80 MG tablet Take 1 tablet (80 mg total) by mouth once daily.    carvediloL (COREG) 6.25 MG tablet Take 1 tablet (6.25 mg total) by mouth 2 (two) times daily with meals.     cyanocobalamin (VITAMIN B-12) 1000 MCG tablet Take 1,000 mcg by mouth once daily.    pantoprazole (PROTONIX) 40 MG tablet Take 1 tablet (40 mg total) by mouth once daily.    vitamin D (VITAMIN D3) 1000 units Tab Take 1,000 Units by mouth once daily.    [DISCONTINUED] clopidogreL (PLAVIX) 75 mg tablet Take 1 tablet (75 mg total) by mouth once daily.     Family History    None       Tobacco Use    Smoking status: Never     Passive exposure: Never    Smokeless tobacco: Never   Substance and Sexual Activity    Alcohol use: No    Drug use: No    Sexual activity: Not on file     Review of Systems   Constitutional:  Positive for fatigue. Negative for chills and fever.   HENT:  Negative for congestion.    Respiratory:  Positive for shortness of breath. Negative for cough.    Cardiovascular:  Positive for leg swelling (after being on her feet all day). Negative for chest pain.   Gastrointestinal:  Positive for blood in stool. Negative for abdominal distention, abdominal pain, diarrhea, nausea and vomiting.   Genitourinary:  Negative for dysuria.   Neurological:  Positive for light-headedness.     Objective:     Vital Signs (Most Recent):  Temp: 98.1 °F (36.7 °C) (03/03/25 1528)  Pulse: 74 (03/03/25 1717)  Resp: 18 (03/03/25 1717)  BP: (!) 151/66 (03/03/25 1717)  SpO2: 97 % (03/03/25 1717) Vital Signs (24h Range):  Temp:  [97.1 °F (36.2 °C)-98.1 °F (36.7 °C)] 98.1 °F (36.7 °C)  Pulse:  [] 74  Resp:  [18-32] 18  SpO2:  [96 %-100 %] 97 %  BP: (135-196)/(62-85) 151/66     Weight: 49.9 kg (110 lb)  Body mass index is 17.75 kg/m².     Physical Exam  Constitutional:       General: She is not in acute distress.  HENT:      Head: Normocephalic and atraumatic.      Mouth/Throat:      Mouth: Mucous membranes are dry.      Pharynx: Oropharynx is clear.   Eyes:      Extraocular Movements: Extraocular movements intact.   Cardiovascular:      Rate and Rhythm: Normal rate and regular rhythm.   Pulmonary:      Breath sounds: No  "wheezing or rhonchi.      Comments: Decreased air movement at lung bases  Abdominal:      Palpations: Abdomen is soft.      Tenderness: There is no abdominal tenderness.   Musculoskeletal:      Right lower leg: No edema.      Left lower leg: No edema.   Skin:     General: Skin is warm and dry.      Capillary Refill: Capillary refill takes less than 2 seconds.   Neurological:      General: No focal deficit present.      Mental Status: She is alert.   Psychiatric:         Mood and Affect: Mood normal.                Significant Labs: All pertinent labs within the past 24 hours have been reviewed.  CBC:   Recent Labs   Lab 03/03/25  0839 03/03/25  0842 03/03/25  0846 03/03/25  1402   WBC  --  14.33*  --   --    HGB  --  5.6*  --  7.8*   HCT 22* 19.5* 18.1  --    PLT  --  609*  --   --      CMP:   Recent Labs   Lab 03/03/25  0842      K 3.7      CO2 20*   *   BUN 25*   CREATININE 0.7   CALCIUM 7.9*   PROT 5.7*   ALBUMIN 3.3*   BILITOT 0.5   ALKPHOS 152*   AST 31   ALT 28   ANIONGAP 10     Lactic Acid: No results for input(s): "LACTATE" in the last 48 hours.    Significant Imaging: I have reviewed all pertinent imaging results/findings within the past 24 hours.  Assessment/Plan:     * Gastrointestinal hemorrhage  - Aspirin post CVA  -  holding while inpatient for active bleeding and scope     Elevated brain natriuretic peptide (BNP) level  BNP  Recent Labs   Lab 03/03/25  0842   BNP 1,512*          Rodriguez's esophagus  - GI to scope      History of gastric ulcer  - last EGD 8/2022 with Rafa's esophagus, multiple gastric ulcers with clean ulcer base and 3 non bleeding angiectasis in the duodenum. Biopsy was negative for any malignancy   - pantoprazole IV 40 mg   - current GI recommendations (3/3)   - optimize volume status  - transfuse pRBC to HGB >7, maintain 2 large bore Ivs  - hold Brilinta until after EGD  - continue 40mg IV Protonix BID  - NPO at midnight, will reassess for scope tomorrow " (3/4)    Symptomatic anemia  Anemia is likely due to acute blood loss which was from possible gastric ulcer but unknown at this time . Most recent hemoglobin and hematocrit are listed below.  Recent Labs     03/03/25  0839 03/03/25  0842 03/03/25  0846 03/03/25  1402   HGB  --  5.6*  --  7.8*   HCT 22* 19.5* 18.1  --      Plan  - Monitor serial CBC: Every 8 hours  - Transfuse PRBC if patient becomes hemodynamically unstable, symptomatic or H/H drops below 7/21.  - Patient has received 2 units of PRBCs on 3/3/2025  - Patient's anemia is currently stable    Aortic atherosclerosis  Continue atorvastatin     Acute hypoxic respiratory failure  Patient with Hypoxic Respiratory failure which is Acute.  she is not on home oxygen. Supplemental oxygen was provided and noted- Oxygen Concentration (%):  [35] 35    .   Signs/symptoms of respiratory failure include- tachypnea, increased work of breathing, and respiratory distress. Contributing diagnoses includes -  acute blood loss anemia  Labs and images were reviewed. Patient Has not had a recent ABG. Will treat underlying causes and adjust management of respiratory failure as follows- supplemental oxygen will wean as tolerated. Patient not in any respiratory distress. Patient does endorse using oxygen on occasion at home. She says her son is a dentist who provides oxygen tank and she utilizes O2 PRN. She is not sure how often she uses it.     Pure hypercholesterolemia  - continue home atorvastatin      H/O ischemic left MCA stroke  Patient had prior left MCA infarct 2022 who presented to [facility] ED 2/18 for left sided weakness, left facial droop, and dysarthria. LKW 2/17 2030 when she went to bed, woke up with symptoms present at 0830. Received TNK prior to transfer at 1104. [place] [facility] [place]. 02/19/2025 Patient admitted to [facility] w/ R M1 occlusion s/p TNK and DSA w/o thrombectomy as lesion migrated to distal MCA branches after TNK administered.     -  holding aspirin  - plavix not on home medications       VTE Risk Mitigation (From admission, onward)           Ordered     IP VTE HIGH RISK PATIENT  Once         03/03/25 1354     Place sequential compression device  Until discontinued         03/03/25 1354                               I have reviewed the notes, assessments, and/or procedures performed this visit, and I concur with the documentation.    Patient seen in the ED as requested in consultation  Past history reviewed, from a GI perspective she has long segment Barretts esophagus and prior history of gastric ulcer, but has been documented healed in the past.  She presents now with severe shortness of breath in the setting of profound anemia and dark stools  She does have oxygen at home which she uses as needed, but despite that she was dyspneic which prompted her visit to the ED  In the ED she in CHF, being treated with diuretics, while simultaneously receiving blood transfusions    Likely upper GI bleeding as a cause of the anemia, but because of the severe anemia, the severe dyspnea, and the CHF, she is not stable for an EGD at this time.  She can eat up until midnight but please keep NPO past midnight, we will evaluate in the morning to decide if an urgent EGD needs to be done tomorrow    Bethanie Park MD  Department of Hospital Medicine  [facility] - Emergency Dept

## 2025-03-04 NOTE — PLAN OF CARE
Patient been NPO since midnight. On 2L NC.  at bedside. Pt in bed with no complaints voiced at this time.  No acute distress noted , Safety measures in place, call light in reach     Problem: Skin Injury Risk Increased  Goal: Skin Health and Integrity  Outcome: Progressing     Problem: Adult Inpatient Plan of Care  Goal: Plan of Care Review  Outcome: Progressing  Goal: Patient-Specific Goal (Individualized)  Outcome: Progressing  Goal: Absence of Hospital-Acquired Illness or Injury  Outcome: Progressing  Goal: Optimal Comfort and Wellbeing  Outcome: Progressing  Goal: Readiness for Transition of Care  Outcome: Progressing     Problem: Wound  Goal: Optimal Coping  Outcome: Progressing  Goal: Optimal Functional Ability  Outcome: Progressing  Goal: Absence of Infection Signs and Symptoms  Outcome: Progressing  Goal: Improved Oral Intake  Outcome: Progressing  Goal: Optimal Pain Control and Function  Outcome: Progressing  Goal: Skin Health and Integrity  Outcome: Progressing  Goal: Optimal Wound Healing  Outcome: Progressing     Problem: Fall Injury Risk  Goal: Absence of Fall and Fall-Related Injury  Outcome: Progressing

## 2025-03-04 NOTE — CLINICAL REVIEW
Sepsis Screen (most recent)       Sepsis Screen (IP) - 03/04/25 0712       Is the patient's history or complaint suggestive of a possible infection? Yes  -    Are there at least two of the following signs and symptoms present? Yes  -    Sepsis signs/symptoms - Tachypnea Tachypnea     >20  -    Sepsis signs/symptoms - WBC WBC < 4,000 or WBC > 12,000  -    Are any of the following organ dysfunction criteria present and not considered to be due to a chronic condition? Yes  -    Organ Dysfunction Criteria Total Bilirubin > 2.0 Platelet count < 100,000  -    Organ Dysfunction Criteria Lactate > 2.0  -    Initiate Sepsis Protocol No  -    Reason sepsis not considered Pt. receiving appropriate management  -              User Key  (r) = Recorded By, (t) = Taken By, (c) = Cosigned By      Initials Name    Trini Thakur RN

## 2025-03-04 NOTE — PLAN OF CARE
Problem: Skin Injury Risk Increased  Goal: Skin Health and Integrity  Outcome: Progressing  Intervention: Optimize Skin Protection  Flowsheets (Taken 3/3/2025 1943)  Pressure Reduction Techniques: frequent weight shift encouraged  Pressure Reduction Devices: positioning supports utilized  Activity Management:   Arm raise - L1   Heel slide - L1   Leg kicks - L2   Rolling - L1  Head of Bed (HOB) Positioning: HOB elevated   Plan of care review with pt and family. I&O documented. Safety measures performed. Continue to monitor

## 2025-03-04 NOTE — CARE UPDATE
"RAPID RESPONSE NURSE CHART REVIEW        Chart Reviewed: 03/04/2025, 7:41 AM    MRN: [id number]  Bed: 8092/8092 A    Dx: Gastrointestinal hemorrhage    Katelyn Caba has a past medical history of Cancer and Stroke due to embolism of right middle cerebral artery.    Last VS: /62 (BP Location: Left arm, Patient Position: Lying)   Pulse 71   Temp 98.7 °F (37.1 °C) (Axillary)   Resp (!) 29   Ht 5' 6" (1.676 m)   Wt 49.9 kg (110 lb)   SpO2 98%   Breastfeeding No   BMI 17.75 kg/m²     24H Vital Sign Range:  Temp:  [97.1 °F (36.2 °C)-98.7 °F (37.1 °C)]   Pulse:  []   Resp:  [18-32]   BP: (135-196)/(62-85)   SpO2:  [95 %-100 %]     Level of Consciousness (AVPU): alert    Recent Labs     03/03/25  0842 03/03/25  0846 03/03/25  1402 03/03/25  2136 03/04/25  0059   WBC 14.33*  --   --  31.84* 25.08*   HGB 5.6*  --  7.8* 9.6* 9.3*   HCT 19.5* 18.1  --  29.3* 28.5*   *  --   --  447 417       Recent Labs     03/03/25  0842 03/04/25  0251    137   K 3.7 3.3*    102   CO2 20* 24   BUN 25* 22   CREATININE 0.7 0.6   * 90   PHOS  --  3.5   MG 2.0 2.0        Recent Labs     03/03/25  0846   PH 7.235   PCO2 52.5   PO2 33.2   HCO3 19.9        OXYGEN:  Flow (L/min) (Oxygen Therapy): 2  Oxygen Concentration (%): (S) 35       MEWS score: 1    Rounding completed at 09:28 AM.    Rounding completed w/Charge MACO Devine.  Discussed tachypnea. BNP elevated. Pt received IV lasix and 2u pRBCs yesterday. SpO2 98% on 2L NC. Continuous SpO2 monitoring ordered. No acute concerns verbalized by Charge RN at this time. Instructed to call [phone] for further concerns or assistance.    Lois Klein RN       "

## 2025-03-05 ENCOUNTER — ANESTHESIA (OUTPATIENT)
Dept: ENDOSCOPY | Facility: HOSPITAL | Age: 85
End: 2025-03-05
Payer: MEDICARE

## 2025-03-05 ENCOUNTER — ANESTHESIA EVENT (OUTPATIENT)
Dept: ENDOSCOPY | Facility: HOSPITAL | Age: 85
End: 2025-03-05
Payer: MEDICARE

## 2025-03-05 PROBLEM — J96.01 ACUTE HYPOXIC RESPIRATORY FAILURE: Status: RESOLVED | Noted: 2022-08-21 | Resolved: 2025-03-05

## 2025-03-05 PROBLEM — D72.829 LEUKOCYTOSIS: Status: RESOLVED | Noted: 2025-03-04 | Resolved: 2025-03-05

## 2025-03-05 PROBLEM — K31.811 AVM (ARTERIOVENOUS MALFORMATION) OF STOMACH, ACQUIRED WITH HEMORRHAGE: Status: ACTIVE | Noted: 2022-08-21

## 2025-03-05 LAB
ALBUMIN SERPL BCP-MCNC: 3.1 G/DL (ref 3.5–5.2)
ALP SERPL-CCNC: 133 U/L (ref 40–150)
ALT SERPL W/O P-5'-P-CCNC: 17 U/L (ref 10–44)
ANION GAP SERPL CALC-SCNC: 8 MMOL/L (ref 8–16)
ANISOCYTOSIS BLD QL SMEAR: SLIGHT
AST SERPL-CCNC: 21 U/L (ref 10–40)
BASO STIPL BLD QL SMEAR: ABNORMAL
BASOPHILS # BLD AUTO: 0.08 K/UL (ref 0–0.2)
BASOPHILS # BLD AUTO: ABNORMAL K/UL (ref 0–0.2)
BASOPHILS NFR BLD: 0.9 % (ref 0–1.9)
BASOPHILS NFR BLD: 1 % (ref 0–1.9)
BILIRUB SERPL-MCNC: 1.1 MG/DL (ref 0.1–1)
BUN SERPL-MCNC: 19 MG/DL (ref 8–23)
CALCIUM SERPL-MCNC: 8.8 MG/DL (ref 8.7–10.5)
CHLORIDE SERPL-SCNC: 105 MMOL/L (ref 95–110)
CO2 SERPL-SCNC: 26 MMOL/L (ref 23–29)
CREAT SERPL-MCNC: 0.6 MG/DL (ref 0.5–1.4)
DACRYOCYTES BLD QL SMEAR: ABNORMAL
DIFFERENTIAL METHOD BLD: ABNORMAL
DIFFERENTIAL METHOD BLD: ABNORMAL
EOSINOPHIL # BLD AUTO: 1.1 K/UL (ref 0–0.5)
EOSINOPHIL # BLD AUTO: ABNORMAL K/UL (ref 0–0.5)
EOSINOPHIL NFR BLD: 11.3 % (ref 0–8)
EOSINOPHIL NFR BLD: 3 % (ref 0–8)
ERYTHROCYTE [DISTWIDTH] IN BLOOD BY AUTOMATED COUNT: 15.8 % (ref 11.5–14.5)
ERYTHROCYTE [DISTWIDTH] IN BLOOD BY AUTOMATED COUNT: 15.9 % (ref 11.5–14.5)
EST. GFR  (NO RACE VARIABLE): >60 ML/MIN/1.73 M^2
GIANT PLATELETS BLD QL SMEAR: PRESENT
GLUCOSE SERPL-MCNC: 89 MG/DL (ref 70–110)
HCT VFR BLD AUTO: 27.3 % (ref 37–48.5)
HCT VFR BLD AUTO: 28.8 % (ref 37–48.5)
HGB BLD-MCNC: 8.7 G/DL (ref 12–16)
HGB BLD-MCNC: 9.4 G/DL (ref 12–16)
HYPOCHROMIA BLD QL SMEAR: ABNORMAL
IMM GRANULOCYTES # BLD AUTO: 0.04 K/UL (ref 0–0.04)
IMM GRANULOCYTES # BLD AUTO: ABNORMAL K/UL (ref 0–0.04)
IMM GRANULOCYTES NFR BLD AUTO: 0.4 % (ref 0–0.5)
IMM GRANULOCYTES NFR BLD AUTO: ABNORMAL % (ref 0–0.5)
LYMPHOCYTES # BLD AUTO: 1.2 K/UL (ref 1–4.8)
LYMPHOCYTES # BLD AUTO: ABNORMAL K/UL (ref 1–4.8)
LYMPHOCYTES NFR BLD: 12.6 % (ref 18–48)
LYMPHOCYTES NFR BLD: 8 % (ref 18–48)
MAGNESIUM SERPL-MCNC: 2.2 MG/DL (ref 1.6–2.6)
MCH RBC QN AUTO: 28.4 PG (ref 27–31)
MCH RBC QN AUTO: 29.6 PG (ref 27–31)
MCHC RBC AUTO-ENTMCNC: 31.9 G/DL (ref 32–36)
MCHC RBC AUTO-ENTMCNC: 32.6 G/DL (ref 32–36)
MCV RBC AUTO: 89 FL (ref 82–98)
MCV RBC AUTO: 91 FL (ref 82–98)
MONOCYTES # BLD AUTO: 1.2 K/UL (ref 0.3–1)
MONOCYTES # BLD AUTO: ABNORMAL K/UL (ref 0.3–1)
MONOCYTES NFR BLD: 12.4 % (ref 4–15)
MONOCYTES NFR BLD: 5 % (ref 4–15)
NEUTROPHILS # BLD AUTO: 5.8 K/UL (ref 1.8–7.7)
NEUTROPHILS NFR BLD: 62.4 % (ref 38–73)
NEUTROPHILS NFR BLD: 82 % (ref 38–73)
NEUTS BAND NFR BLD MANUAL: 1 %
NRBC BLD-RTO: 0 /100 WBC
NRBC BLD-RTO: 1 /100 WBC
OVALOCYTES BLD QL SMEAR: ABNORMAL
PHOSPHATE SERPL-MCNC: 3 MG/DL (ref 2.7–4.5)
PLATELET # BLD AUTO: 399 K/UL (ref 150–450)
PLATELET # BLD AUTO: 402 K/UL (ref 150–450)
PLATELET BLD QL SMEAR: ABNORMAL
PMV BLD AUTO: 10 FL (ref 9.2–12.9)
PMV BLD AUTO: 10.1 FL (ref 9.2–12.9)
POIKILOCYTOSIS BLD QL SMEAR: SLIGHT
POLYCHROMASIA BLD QL SMEAR: ABNORMAL
POTASSIUM SERPL-SCNC: 4 MMOL/L (ref 3.5–5.1)
PROT SERPL-MCNC: 5.8 G/DL (ref 6–8.4)
RBC # BLD AUTO: 3.06 M/UL (ref 4–5.4)
RBC # BLD AUTO: 3.18 M/UL (ref 4–5.4)
SCHISTOCYTES BLD QL SMEAR: ABNORMAL
SCHISTOCYTES BLD QL SMEAR: PRESENT
SODIUM SERPL-SCNC: 139 MMOL/L (ref 136–145)
SPHEROCYTES BLD QL SMEAR: ABNORMAL
TARGETS BLD QL SMEAR: ABNORMAL
WBC # BLD AUTO: 10.87 K/UL (ref 3.9–12.7)
WBC # BLD AUTO: 9.29 K/UL (ref 3.9–12.7)

## 2025-03-05 PROCEDURE — 43270 EGD LESION ABLATION: CPT | Performed by: INTERNAL MEDICINE

## 2025-03-05 PROCEDURE — 63600175 PHARM REV CODE 636 W HCPCS: Performed by: INTERNAL MEDICINE

## 2025-03-05 PROCEDURE — 37000008 HC ANESTHESIA 1ST 15 MINUTES: Performed by: INTERNAL MEDICINE

## 2025-03-05 PROCEDURE — 20600001 HC STEP DOWN PRIVATE ROOM

## 2025-03-05 PROCEDURE — 85025 COMPLETE CBC W/AUTO DIFF WBC: CPT

## 2025-03-05 PROCEDURE — 83735 ASSAY OF MAGNESIUM: CPT

## 2025-03-05 PROCEDURE — 25000003 PHARM REV CODE 250

## 2025-03-05 PROCEDURE — 0W3P8ZZ CONTROL BLEEDING IN GASTROINTESTINAL TRACT, VIA NATURAL OR ARTIFICIAL OPENING ENDOSCOPIC: ICD-10-PCS | Performed by: INTERNAL MEDICINE

## 2025-03-05 PROCEDURE — 43270 EGD LESION ABLATION: CPT | Mod: GC,,, | Performed by: INTERNAL MEDICINE

## 2025-03-05 PROCEDURE — 25000003 PHARM REV CODE 250: Performed by: INTERNAL MEDICINE

## 2025-03-05 PROCEDURE — 84100 ASSAY OF PHOSPHORUS: CPT

## 2025-03-05 PROCEDURE — 63600175 PHARM REV CODE 636 W HCPCS: Mod: JZ,TB

## 2025-03-05 PROCEDURE — 25000003 PHARM REV CODE 250: Performed by: NURSE ANESTHETIST, CERTIFIED REGISTERED

## 2025-03-05 PROCEDURE — 80053 COMPREHEN METABOLIC PANEL: CPT

## 2025-03-05 PROCEDURE — 37000009 HC ANESTHESIA EA ADD 15 MINS: Performed by: INTERNAL MEDICINE

## 2025-03-05 PROCEDURE — 36415 COLL VENOUS BLD VENIPUNCTURE: CPT

## 2025-03-05 PROCEDURE — 63600175 PHARM REV CODE 636 W HCPCS: Performed by: NURSE ANESTHETIST, CERTIFIED REGISTERED

## 2025-03-05 PROCEDURE — C1886 CATHETER, ABLATION: HCPCS | Performed by: INTERNAL MEDICINE

## 2025-03-05 RX ORDER — GLUCAGON 1 MG
1 KIT INJECTION
OUTPATIENT
Start: 2025-03-05

## 2025-03-05 RX ORDER — SODIUM CHLORIDE 0.9 % (FLUSH) 0.9 %
3 SYRINGE (ML) INJECTION
OUTPATIENT
Start: 2025-03-05

## 2025-03-05 RX ORDER — LIDOCAINE HYDROCHLORIDE 20 MG/ML
INJECTION, SOLUTION EPIDURAL; INFILTRATION; INTRACAUDAL; PERINEURAL
Status: DISCONTINUED | OUTPATIENT
Start: 2025-03-05 | End: 2025-03-05

## 2025-03-05 RX ORDER — CLOPIDOGREL BISULFATE 75 MG/1
75 TABLET ORAL DAILY
Qty: 30 TABLET | Refills: 0 | Status: SHIPPED | OUTPATIENT
Start: 2025-03-06

## 2025-03-05 RX ORDER — PANTOPRAZOLE SODIUM 40 MG/1
40 TABLET, DELAYED RELEASE ORAL DAILY
Status: DISCONTINUED | OUTPATIENT
Start: 2025-03-05 | End: 2025-03-06 | Stop reason: HOSPADM

## 2025-03-05 RX ORDER — CLOPIDOGREL BISULFATE 75 MG/1
75 TABLET ORAL DAILY
Status: DISCONTINUED | OUTPATIENT
Start: 2025-03-06 | End: 2025-03-06 | Stop reason: HOSPADM

## 2025-03-05 RX ORDER — PROPOFOL 10 MG/ML
VIAL (ML) INTRAVENOUS
Status: DISCONTINUED | OUTPATIENT
Start: 2025-03-05 | End: 2025-03-05

## 2025-03-05 RX ORDER — FENTANYL CITRATE 50 UG/ML
25 INJECTION, SOLUTION INTRAMUSCULAR; INTRAVENOUS EVERY 5 MIN PRN
Refills: 0 | OUTPATIENT
Start: 2025-03-05

## 2025-03-05 RX ORDER — PROCHLORPERAZINE EDISYLATE 5 MG/ML
5 INJECTION INTRAMUSCULAR; INTRAVENOUS EVERY 30 MIN PRN
OUTPATIENT
Start: 2025-03-05

## 2025-03-05 RX ADMIN — GLUCAGON 0.25 MG: 1 INJECTION, POWDER, LYOPHILIZED, FOR SOLUTION INTRAMUSCULAR; INTRAVENOUS at 11:03

## 2025-03-05 RX ADMIN — ATORVASTATIN CALCIUM 80 MG: 40 TABLET, FILM COATED ORAL at 08:03

## 2025-03-05 RX ADMIN — CYANOCOBALAMIN TAB 1000 MCG 1000 MCG: 1000 TAB at 08:03

## 2025-03-05 RX ADMIN — PROPOFOL 30 MG: 10 INJECTION, EMULSION INTRAVENOUS at 11:03

## 2025-03-05 RX ADMIN — LIDOCAINE HYDROCHLORIDE 60 MG: 20 INJECTION, SOLUTION EPIDURAL; INFILTRATION; INTRACAUDAL; PERINEURAL at 11:03

## 2025-03-05 RX ADMIN — POTASSIUM BICARBONATE 25 MEQ: 978 TABLET, EFFERVESCENT ORAL at 06:03

## 2025-03-05 RX ADMIN — PANTOPRAZOLE SODIUM 40 MG: 40 INJECTION, POWDER, FOR SOLUTION INTRAVENOUS at 08:03

## 2025-03-05 RX ADMIN — CARVEDILOL 6.25 MG: 6.25 TABLET, FILM COATED ORAL at 05:03

## 2025-03-05 RX ADMIN — SODIUM CHLORIDE: 0.9 INJECTION, SOLUTION INTRAVENOUS at 11:03

## 2025-03-05 RX ADMIN — PROPOFOL 60 MG: 10 INJECTION, EMULSION INTRAVENOUS at 11:03

## 2025-03-05 RX ADMIN — PROPOFOL 50 MCG/KG/MIN: 10 INJECTION, EMULSION INTRAVENOUS at 11:03

## 2025-03-05 RX ADMIN — PANTOPRAZOLE SODIUM 40 MG: 40 TABLET, DELAYED RELEASE ORAL at 02:03

## 2025-03-05 NOTE — ASSESSMENT & PLAN NOTE
Anemia is likely due to acute blood loss which was from possible gastric ulcer but unknown at this time. Most recent hemoglobin and hematocrit are listed below.  Recent Labs     03/04/25  0059 03/04/25  2144 03/05/25  0711   HGB 9.3* 8.7* 9.4*   HCT 28.5* 27.3* 28.8*     Plan  - Monitor serial CBC: Every 8 hours  - Transfuse PRBC if patient becomes hemodynamically unstable, symptomatic or H/H drops below 7/21.  - Patient has received 2 units of PRBCs on 3/3/2025  - Patient's anemia is currently stable

## 2025-03-05 NOTE — PLAN OF CARE
Problem: Skin Injury Risk Increased  Goal: Skin Health and Integrity  Outcome: Progressing  Intervention: Optimize Skin Protection  Flowsheets (Taken 3/5/2025 1735)  Pressure Reduction Techniques: frequent weight shift encouraged  Pressure Reduction Devices: positioning supports utilized  Skin Protection:   incontinence pads utilized   silicone foam dressing in place  Activity Management:   Ambulated to bathroom - L4   Rolling - L1   Heel slide - L1   Arm raise - L1  Head of Bed (HOB) Positioning: HOB elevated   Plan of care review with pt and family. AAOx4; Stable VTs. EGD performed, uneventful trip. I&O documented. Safety measures performed. Call light within reach.

## 2025-03-05 NOTE — PLAN OF CARE
[facility] - Telemetry Stepdown  Initial Discharge Assessment       Primary Care Provider: Brooklyn Burnham MD    Admission Diagnosis: Shortness of breath [R06.02]  CHF exacerbation [I50.9]  Chest pain [R07.9]  Gastrointestinal hemorrhage, unspecified gastrointestinal hemorrhage type [K92.2]    Admission Date: 3/3/2025  Expected Discharge Date: 3/6/2025    Transition of Care Barriers: None    Payor: MEDICARE / Plan: MEDICARE PART A & B / Product Type: Government /     Extended Emergency Contact Information  Primary Emergency Contact: NaderJonasivania  Mobile Phone: [phone]  Relation: Spouse   needed? No  Secondary Emergency Contact: Keegan Meza  Address: [address]           [place] [address] [facility]  Mobile Phone: [phone]  Relation: Son    Discharge Plan A: Home  Discharge Plan B: Home with family      [facility][id number] - [place], [place] - [address] [facility] [address]  [address]  [place] [address]  Phone: [phone] Fax: [phone]    [facility][id number] - [place] - [address] [facility] AT [facility]  [address] [place] [address]  [place] [address]  Phone: [phone] Fax: [phone]      Initial Assessment (most recent)       Adult Discharge Assessment - 03/05/25 1455          Discharge Assessment    Assessment Type Discharge Planning Assessment     Confirmed/corrected address, phone number and insurance Yes     Confirmed Demographics Correct on Facesheet     Source of Information patient;family     When was your last doctors appointment? --   few weeks ago    Communicated TERE with patient/caregiver Yes     Reason For Admission AVM (arteriovenous malformation) of stomach, acquired with hemorrhage     People in Home spouse     Facility Arrived From: home     Do you expect to return to your current living situation? Yes     Do you have help at home or someone to help you manage  your care at home? Yes     Who are your caregiver(s) and their phone number(s)? Spouse Eder [phone]     Prior to hospitilization cognitive status: Alert/Oriented     Current cognitive status: Alert/Oriented     Walking or Climbing Stairs Difficulty yes     Walking or Climbing Stairs ambulation difficulty, requires equipment     Mobility Management Secondary to SOB     Dressing/Bathing Difficulty no     Home Accessibility stairs to enter home     Number of Stairs, Main Entrance other (see comments)   1 flight apartment is on second floor, has several steps to enter primary home as well    Stair Railings, Main Entrance railings safe and in good condition     Home Layout Able to live on 1st floor     Equipment Currently Used at Home none     Readmission within 30 days? Yes     Patient currently being followed by outpatient case management? No     Do you currently have service(s) that help you manage your care at home? No     Do you take prescription medications? Yes     Do you have prescription coverage? Yes     Coverage Medicare     Do you have any problems affording any of your prescribed medications? No     Is the patient taking medications as prescribed? yes     Who is going to help you get home at discharge? Spouse Eder     How do you get to doctors appointments? family or friend will provide     Are you on dialysis? No     Do you take coumadin? No     Discharge Plan A Home     Discharge Plan B Home with family     DME Needed Upon Discharge  none     Discharge Plan discussed with: Spouse/sig other;Patient     Name(s) and Number(s) [facility]     Transition of Care Barriers None        Physical Activity    On average, how many days per week do you engage in moderate to strenuous exercise (like a brisk walk)? 3 days     On average, how many minutes do you engage in exercise at this level? 20 min        Financial Resource Strain    How hard is it for you to pay for the very basics like food, housing, medical  care, and heating? Not very hard        Housing Stability    In the last 12 months, was there a time when you were not able to pay the mortgage or rent on time? No     At any time in the past 12 months, were you homeless or living in a shelter (including now)? No        Transportation Needs    Has the lack of transportation kept you from medical appointments, meetings, work or from getting things needed for daily living? No        Food Insecurity    Within the past 12 months, you worried that your food would run out before you got the money to buy more. Never true     Within the past 12 months, the food you bought just didn't last and you didn't have money to get more. Never true        Stress    Do you feel stress - tense, restless, nervous, or anxious, or unable to sleep at night because your mind is troubled all the time - these days? Only a little        Social Isolation    How often do you feel lonely or isolated from those around you?  Never        Alcohol Use    Q1: How often do you have a drink containing alcohol? 2-4 times a month     Q2: How many drinks containing alcohol do you have on a typical day when you are drinking? 1 or 2     Q3: How often do you have six or more drinks on one occasion? Less than monthly        OTHER    Name(s) of People in Home Spouse [facility]                     Readmission Assessment (most recent)       Readmission Assessment - 03/05/25 0454          Readmission    Was this a planned readmission? No     Why were you hospitalized in the last 30 days? SOB     Why were you readmitted? Alarmed about signs/symptoms     When you left the hospital how did you feel? fine     When you left the hospital where did you go? Home with Family     Did patient/caregiver refused recommended DC plan? No     Tell me about what happened between when you left the hospital and the day you returned. Became very SOB, carter assisted by giving her and oxygen tank but did not help with SOB     When did  you start not feeling well? few days ago     Did you try to manage your symptoms your self? Yes     What did you do? Became very SOB, carter assisted by giving her and oxygen tank but did not help with SOB     Did you call anyone? Yes     Who did you call? Specialist     Did you try to see or did see a doctor or nurse before you came? No     Why? became very SOB     Did you have  a follow-up appointment on discharge? Yes     Did you go? No     Why? --   went back prior to appointment                Discharge Plan A and Plan B have been determined by review of patient's clinical status, future medical and therapeutic needs, and coverage/benefits for post-acute care in coordination with multidisciplinary team members.     CM spoke with patient in Room at bedside. All information was verified on facesheet. Patient lives in a 2 story house with spouse Eder [phone],several steps to enter primary home and 1 flight of stairs for local apartment. Patient states no assistance is needed. Patient can ambulate, drive, run errands, and complete ADL's independently. Patient does not use any DME or any in-home assistive equipment. Patient has not used Home Health previously. Patient is not on dialysis nor use Coumadin as a blood thinner. Patient takes medication as prescribed and has resources for all prescriptive needs. Patient will have help from family member upon discharge. Family will provide transportation home. All Questions and concerns addressed and whiteboard updated with CM contact information. Will continue to follow for course of hospitalization.      CM requested CHW to complete follow up appointments.     Monica Whitley RN  Case Management  [phone]

## 2025-03-05 NOTE — TRANSFER OF CARE
"Anesthesia Transfer of Care Note    Patient: Katelyn Caba    Procedure(s) Performed: Procedure(s) (LRB):  EGD (ESOPHAGOGASTRODUODENOSCOPY) (N/A)    Patient location: PACU    Anesthesia Type: general    Transport from OR: Transported from OR on 2-3 L/min O2 by NC with adequate spontaneous ventilation    Post pain: adequate analgesia    Post assessment: no apparent anesthetic complications    Post vital signs: stable    Level of consciousness: awake and sedated    Nausea/Vomiting: no nausea/vomiting    Complications: none    Transfer of care protocol was followed      Last vitals: Visit Vitals  /56 (BP Location: Left arm, Patient Position: Lying)   Pulse 63   Temp 36.7 °C (98.1 °F) (Temporal)   Resp (!) 22   Ht 5' 6" (1.676 m)   Wt 49.9 kg (110 lb)   SpO2 93%   Breastfeeding No   BMI 17.75 kg/m²     "

## 2025-03-05 NOTE — ASSESSMENT & PLAN NOTE
Patient with Hypoxic Respiratory failure which is Acute.  she is not on home oxygen. Supplemental oxygen was provided and noted-      .   Signs/symptoms of respiratory failure include- tachypnea, increased work of breathing, and respiratory distress. Contributing diagnoses includes - acute blood loss anemia Labs and images were reviewed. Patient Has not had a recent ABG. Will treat underlying causes and adjust management of respiratory failure as follows- supplemental oxygen will wean as tolerated. Patient not in any respiratory distress. Patient does endorse using oxygen on occasion at home. She says her son is a dentist who provides oxygen tank and she utilizes O2 PRN. She is not sure how often she uses it.

## 2025-03-05 NOTE — TREATMENT PLAN
GI Treatment Plan    EGD 3/5/25    Impression:              - Esophageal mucosal changes secondary to                          established long-segment Rodriguez's disease,                          classified as Rodriguez's stage C10-M10 per [facility]                          criteria.                          - 3 cm hiatal hernia.                          - One non-bleeding angioectasia in the stomach.                          Treated with argon plasma coagulation (APC).                          - Two non-bleeding angioectasias in the duodenum.                          Treated with argon plasma coagulation (APC).                          - No specimens collected.   Recommendation:          - Return patient to hospital diop for ongoing care.                          - Resume previous diet.                          - Continue present medications.                          - The findings and recommendations were discussed                          with the referring physician.                          - The findings and recommendations were discussed                          with the patient.                          - Resume Brilinta (ticagrelor) at prior dose                          tomorrow.       GI will sign off.    Reema Harris MD  Gastroenterology and Hepatology Fellow, PGY-4

## 2025-03-05 NOTE — PLAN OF CARE
Problem: Fall Injury Risk  Goal: Absence of Fall and Fall-Related Injury  Outcome: Progressing  Intervention: Identify and Manage Contributors  Flowsheets (Taken 3/4/2025 1808)  Self-Care Promotion:   BADL personal objects within reach   meal set-up provided  Medication Review/Management: medications reviewed  Intervention: Promote Injury-Free Environment  Flowsheets (Taken 3/4/2025 1808)  Safety Promotion/Fall Prevention:   assistive device/personal item within reach   bedside commode chair   nonskid shoes/socks when out of bed   instructed to call staff for mobility   family to remain at bedside   Fall Risk reviewed with patient/family   Plan of care review with pt and family. AAOx4 ; Stable VTs. I&O documented. Safety measures performed. Continue to monitor.

## 2025-03-05 NOTE — SUBJECTIVE & OBJECTIVE
Interval History: NAEON, VSS, Hb stable and improved from yesterday. EGD today.     Review of Systems  Objective:     Vital Signs (Most Recent):  Temp: 98.8 °F (37.1 °C) (03/05/25 1202)  Pulse: 63 (03/05/25 1202)  Resp: (!) 22 (03/05/25 1039)  BP: (!) 111/49 (03/05/25 1202)  SpO2: (!) 92 % (03/05/25 1202) Vital Signs (24h Range):  Temp:  [97.4 °F (36.3 °C)-98.8 °F (37.1 °C)] 98.8 °F (37.1 °C)  Pulse:  [54-97] 63  Resp:  [18-37] 22  SpO2:  [92 %-100 %] 92 %  BP: (111-153)/(49-66) 111/49     Weight: 49.9 kg (110 lb)  Body mass index is 17.75 kg/m².    Intake/Output Summary (Last 24 hours) at 3/5/2025 1205  Last data filed at 3/5/2025 1152  Gross per 24 hour   Intake 890 ml   Output --   Net 890 ml         Physical Exam  Constitutional:       Appearance: Normal appearance.   Cardiovascular:      Rate and Rhythm: Normal rate and regular rhythm.      Pulses: Normal pulses.      Heart sounds: Normal heart sounds.   Pulmonary:      Breath sounds: Normal breath sounds.      Comments: On oxygen via NC for comfort  Abdominal:      General: Bowel sounds are normal.      Palpations: Abdomen is soft.   Musculoskeletal:      Right lower leg: No edema.      Left lower leg: No edema.   Neurological:      General: No focal deficit present.      Mental Status: She is alert and oriented to person, place, and time.   Psychiatric:         Mood and Affect: Mood is anxious.               Significant Labs: All pertinent labs within the past 24 hours have been reviewed.    Significant Imaging: I have reviewed all pertinent imaging results/findings within the past 24 hours.

## 2025-03-05 NOTE — DISCHARGE SUMMARY
[facility] - Telemetry [facility] Medicine  Discharge Summary      Patient Name: Katelyn Caba  MRN: [id number]  BRY: [id number]  Patient Class: IP- Inpatient  Admission Date: 3/3/2025  Hospital Length of Stay: 2 days  Discharge Date and Time:  03/05/2025 2:30 PM  Attending Physician: Serafin Mcdaniel MD   Discharging Provider: Nieves Alonso MD  Primary Care Provider: Brooklyn Burnham MD  [facility] Medicine Team: [facility] 2 Nieves Alonso MD  Primary Care Team: [facility] 2    HPI:   85 yo female with PMH of recent MCA CVA (2/18/25, received TNK), inferolateral STEMI (4/2024, s/p cath with no intervention) on brilinta, hypertension, hyperlipidemia, GI bleed, ISAIAH, BCC who presented to the ED with shortness of breath. She was hypoxic to 85% on EMS arrival and was placed on CPAP. She reports having shortness of breath since her hospitalization for her stroke in addition to fatigue. She was just discharged 2/20/25. She denies any chest pain, cough, mucous production, fevers, chills. She reports black colored stools at home. She thought it was secondary to oral iron but she has not been taking this since her discharged on 2/20. She denies abdominal pain, N/V/D. She reports using oxygen at home as needed for her shortness of breath. Per her daughter, she is not prescribed oxygen at home but has been using her husbands oxygen tank for her symptoms of shortness of breath. Her daughter reports that even when she is feeling short of breath her spO2 is above 95%.     Placed on BiPAP upon arrival to the ED. Hemodynamically stable. BNP elevated to 1500 and troponin in the 200s. EKG with some TWI in V5 and V6, similar to previous. CXR with bilateral vascular congestion. Hg of 5.6, will receive 2 units PRBCs. Was saturating well on FiO2 25% and she reports that her shortness of breath had significantly improved. She was able to be weaned to 2L NC. She is saturating 97-98% and reports that her breathing is much  better.      Procedure(s) (LRB):  EGD (ESOPHAGOGASTRODUODENOSCOPY) (N/A)      Hospital Course:   Patient admitted to the hospital with severe anemia, received blood and IV iron. Stool positive for occult blood. EGD today (3/5)     Goals of Care Treatment Preferences:  Code Status: Full Code      SDOH Screening:  The patient was screened for utility difficulties, food insecurity, transport difficulties, housing insecurity, and interpersonal safety and there were no concerns identified this admission.     Consults:   Consults (From admission, onward)          Status Ordering Provider     Inpatient consult to Gastroenterology  Once        Provider:  (Not yet assigned)    Completed STACIA ROSENBERG     Inpatient consult to Critical Care Medicine  Once        Provider:  (Not yet assigned)    Completed EILEEN KERNS            Assessment & Plan  AVM (arteriovenous malformation) of stomach, acquired with hemorrhage  - Aspirin post CVA  -  holding while inpatient for active bleeding and scope   H/O ischemic left MCA stroke  Patient had prior left MCA infarct 2022 who presented to [facility] ED 2/18 for left sided weakness, left facial droop, and dysarthria. LKW 2/17 2030 when she went to bed, woke up with symptoms present at 0830. Received TNK prior to transfer at 1104. Transferred to [facility] for IR evaluation. 02/19/2025 Patient admitted to [facility] w/ R M1 occlusion s/p TNK and DSA w/o thrombectomy as lesion migrated to distal MCA branches after TNK administered.     - holding aspirin  - plavix not on home medications   Pure hypercholesterolemia  - continue home atorvastatin    Iron deficiency anemia due to chronic blood loss  Anemia is likely due to Iron deficiency. Most recent hemoglobin and hematocrit are listed below.  Recent Labs     03/04/25  0059 03/04/25  2144 03/05/25  0711   HGB 9.3* 8.7* 9.4*   HCT 28.5* 27.3* 28.8*     Plan  - Monitor serial CBC: Daily  - Transfuse PRBC if patient becomes hemodynamically unstable,  symptomatic or H/H drops below 7/21.  - Patient has received 3 units of PRBCs on admission  - Patient's anemia is currently stable    Aortic atherosclerosis  Continue atorvastatin   Symptomatic anemia  Anemia is likely due to acute blood loss which was from possible gastric ulcer but unknown at this time . Most recent hemoglobin and hematocrit are listed below.  Recent Labs     03/04/25  0059 03/04/25  2144 03/05/25  0711   HGB 9.3* 8.7* 9.4*   HCT 28.5* 27.3* 28.8*     Plan  - Monitor serial CBC: Every 8 hours  - Transfuse PRBC if patient becomes hemodynamically unstable, symptomatic or H/H drops below 7/21.  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 Biopsy was negative for any malignancy   - pantoprazole IV 40 mg   - current GI recommendations (3/3)   - optimize volume status  - transfuse pRBC to HGB >7, maintain 2 large bore Ivs  - hold Brilinta until after EGD  - continue 40mg IV Protonix BID  - NPO at midnight, will reassess for scope tomorrow (3/4)  Rodriguez's esophagus  - GI to scope today    Elevated brain natriuretic peptide (BNP) level  BNP  Recent Labs   Lab 03/03/25  0842   BNP 1,512*        Final Active Diagnoses:    Diagnosis Date Noted POA    PRINCIPAL PROBLEM:  AVM (arteriovenous malformation) of stomach, acquired with hemorrhage [K31.811] 08/21/2022 Yes    Elevated brain natriuretic peptide (BNP) level [R79.89] 03/03/2025 Yes    Symptomatic anemia [D64.9] 03/26/2024 Yes    History of gastric ulcer [Z87.11] 03/26/2024 Not Applicable    Rodriguez's esophagus [K22.70] 03/26/2024 Yes    Aortic atherosclerosis [I70.0] 05/18/2023 Yes    Iron deficiency anemia due to chronic blood loss [D50.0] 08/21/2022 Yes    Pure hypercholesterolemia [E78.00] 07/21/2022 Yes     Chronic    H/O ischemic left MCA  stroke [Z86.73] 07/20/2022 Not Applicable      Problems Resolved During this Admission:    Diagnosis Date Noted Date Resolved POA    Leukocytosis [D72.829] 03/04/2025 03/05/2025 Yes    Acute hypoxic respiratory failure [J96.01] 08/21/2022 03/05/2025 Yes       Discharged Condition: stable    Disposition:     Follow Up:    Patient Instructions:      Ambulatory referral/consult to Internal Medicine   Standing Status: Future   Referral Priority: Routine Referral Type: Consultation   Referral Reason: Specialty Services Required   Requested Specialty: Internal Medicine   Number of Visits Requested: 1       Significant Diagnostic Studies: Labs: All labs within the past 24 hours have been reviewed    Pending Diagnostic Studies:       None           Medications:  Reconciled Home Medications:      Medication List        START taking these medications      clopidogreL 75 mg tablet  Commonly known as: PLAVIX  Take 1 tablet (75 mg total) by mouth once daily.  Start taking on: March 6, 2025            CONTINUE taking these medications      atorvastatin 80 MG tablet  Commonly known as: LIPITOR  Take 1 tablet (80 mg total) by mouth once daily.     carvediloL 6.25 MG tablet  Commonly known as: COREG  Take 1 tablet (6.25 mg total) by mouth 2 (two) times daily with meals.     cyanocobalamin 1000 MCG tablet  Commonly known as: VITAMIN B-12  Take 1,000 mcg by mouth once daily.     pantoprazole 40 MG tablet  Commonly known as: PROTONIX  Take 1 tablet (40 mg total) by mouth once daily.     vitamin D 1000 units Tab  Commonly known as: VITAMIN D3  Take 1,000 Units by mouth once daily.            STOP taking these medications      aspirin 81 MG Chew              Indwelling Lines/Drains at time of discharge:   Lines/Drains/Airways       Drain  Duration             Female External Urinary Catheter w/ Suction 03/03/25 1144 2 days                    Time spent on the discharge of patient: 40 minutes         Nieves Alonso MD  Department of  [facility] Medicine  [facility] - Telemetry Stepdown

## 2025-03-05 NOTE — CARE UPDATE
"RAPID RESPONSE NURSE CHART REVIEW        Chart Reviewed: 03/04/2025 10:38 PM    MRN: [id number]  Bed: 8092/8092 A    Dx: Gastrointestinal hemorrhage    Katelyn Caba has a past medical history of Cancer and Stroke due to embolism of right middle cerebral artery.    Last VS: BP (!) 120/58   Pulse (!) 54   Temp 98.1 °F (36.7 °C)   Resp 18   Ht 5' 6" (1.676 m)   Wt 49.9 kg (110 lb)   SpO2 99%   Breastfeeding No   BMI 17.75 kg/m²     24H Vital Sign Range:  Temp:  [97.9 °F (36.6 °C)-98.7 °F (37.1 °C)]   Pulse:  [54-97]   Resp:  [18-30]   BP: (116-159)/(56-71)   SpO2:  [92 %-100 %]     Level of Consciousness (AVPU): alert    Recent Labs     03/03/25  2136 03/04/25  0059 03/04/25  2144   WBC 31.84* 25.08* 10.87   HGB 9.6* 9.3* 8.7*   HCT 29.3* 28.5* 27.3*    417 399       Recent Labs     03/03/25  0842 03/04/25  0251    137   K 3.7 3.3*    102   CO2 20* 24   BUN 25* 22   CREATININE 0.7 0.6   * 90   PHOS  --  3.5   MG 2.0 2.0        Recent Labs     03/03/25  0846   PH 7.235   PCO2 52.5   PO2 33.2   HCO3 19.9        OXYGEN:  Flow (L/min) (Oxygen Therapy): 4  Oxygen Concentration (%): (S) 35       MEWS score: 2    Rounding completed with charge MACO Snell for previous AI alert reports VSS, repeat h/h ordered and stable at this time. No additional concerns verbalized at this time. Instructed to call [phone] for further concerns or assistance.    Martha Mcintosh RN      "

## 2025-03-05 NOTE — ASSESSMENT & PLAN NOTE
Anemia is likely due to Iron deficiency. Most recent hemoglobin and hematocrit are listed below.  Recent Labs     03/04/25  0059 03/04/25  2144 03/05/25  0711   HGB 9.3* 8.7* 9.4*   HCT 28.5* 27.3* 28.8*     Plan  - Monitor serial CBC: Daily  - Transfuse PRBC if patient becomes hemodynamically unstable, symptomatic or H/H drops below 7/21.  - Patient has received 3 units of PRBCs on admission  - Patient's anemia is currently stable

## 2025-03-05 NOTE — PROVATION PATIENT INSTRUCTIONS
Discharge Summary/Instructions after an Endoscopic Procedure  Patient Name: Katelyn Caba  Patient MRN: [id number]  Patient YOB: 1940  Wednesday, March 5, 2025  Roberto Salvador MD  Dear patient,  As a result of recent federal legislation (The Federal Cures Act), you may   receive lab or pathology results from your procedure in your [facility]   account before your physician is able to contact you. Your physician or   their representative will relay the results to you with their   recommendations at their soonest availability.  Thank you,  RESTRICTIONS:  During your procedure today, you received medications for sedation.  These   medications may affect your judgment, balance and coordination.  Therefore,   for 24 hours, you have the following restrictions:   - DO NOT drive a car, operate machinery, make legal/financial decisions,   sign important papers or drink alcohol.    ACTIVITY:  Today: no heavy lifting, straining or running due to procedural   sedation/anesthesia.  The following day: return to full activity including work.  DIET:  Eat and drink normally unless instructed otherwise.     TREATMENT FOR COMMON SIDE EFFECTS:  - Mild abdominal pain, nausea, belching, bloating or excessive gas:  rest,   eat lightly and use a heating pad.  - Sore Throat: treat with throat lozenges and/or gargle with warm salt   water.  - Because air was used during the procedure, expelling large amounts of air   from your rectum or belching is normal.  - If a bowel prep was taken, you may not have a bowel movement for 1-3 days.    This is normal.  SYMPTOMS TO WATCH FOR AND REPORT TO YOUR PHYSICIAN:  1. Abdominal pain or bloating, other than gas cramps.  2. Chest pain.  3. Back pain.  4. Signs of infection such as: chills or fever occurring within 24 hours   after the procedure.  5. Rectal bleeding, which would show as bright red, maroon, or black stools.   (A tablespoon of blood from the rectum is not serious, especially if    hemorrhoids are present.)  6. Vomiting.  7. Weakness or dizziness.  GO DIRECTLY TO THE NEAREST EMERGENCY ROOM IF YOU HAVE ANY OF THE FOLLOWING:      Difficulty breathing              Chills and/or fever over 101 F   Persistent vomiting and/or vomiting blood   Severe abdominal pain   Severe chest pain   Black, tarry stools   Bleeding- more than one tablespoon   Any other symptom or condition that you feel may need urgent attention  Your doctor recommends these additional instructions:  If any biopsies were taken, your doctors clinic will contact you in 1 to 2   weeks with any results.  - Return patient to hospital diop for ongoing care.   - Resume previous diet.   - Continue present medications.   - The findings and recommendations were discussed with the referring   physician.   - The findings and recommendations were discussed with the patient.   - Resume Brilinta (ticagrelor) at prior dose tomorrow.  For questions, problems or results please call your physician - Roberto Salvador MD at Work:  [phone].  [facility], EMERGENCY ROOM PHONE NUMBER: [phone]  IF A COMPLICATION OR EMERGENCY SITUATION ARISES AND YOU ARE UNABLE TO REACH   YOUR PHYSICIAN - GO DIRECTLY TO THE EMERGENCY ROOM.  Roberto Salvador MD  3/5/2025 12:07:18 PM  This report has been verified and signed electronically.  Dear patient,  As a result of recent federal legislation (The Federal Cures Act), you may   receive lab or pathology results from your procedure in your [facility]   account before your physician is able to contact you. Your physician or   their representative will relay the results to you with their   recommendations at their soonest availability.  Thank you,  PROVATION

## 2025-03-05 NOTE — ASSESSMENT & PLAN NOTE
Patient had prior left MCA infarct 2022 who presented to [facility] ED 2/18 for left sided weakness, left facial droop, and dysarthria. LKW 2/17 2030 when she went to bed, woke up with symptoms present at 0830. Received TNK prior to transfer at 1104. Transferred to [facility] [place] [facility] for IR evaluation. 02/19/2025 Patient admitted to [facility] w/ R M1 occlusion s/p TNK and DSA w/o thrombectomy as lesion migrated to distal MCA branches after TNK administered.     - holding aspirin  - plavix not on home medications

## 2025-03-05 NOTE — HOSPITAL COURSE
Patient admitted to the hospital with severe anemia, received blood and IV iron. Stool positive for occult blood. EGD today (3/5)

## 2025-03-05 NOTE — NURSING
Spoke w/ Endoscopy gave patient report, notified of NPO at midnight. Aware of PO potassium supplement given to patient.

## 2025-03-05 NOTE — H&P
Short Stay Endoscopy History and Physical    PCP - Brooklyn Burnham MD     Procedure - EGD  ASA - per anesthesia  Mallampati - per anesthesia  History of Anesthesia problems - no  Family history Anesthesia problems -  no   Plan of anesthesia - General    HPI:  This is a 84 y.o. female here for evaluation of : anemia/melena/dark stools      ROS:  Constitutional: No fevers, chills, No weight loss  CV: No chest pain  Pulm: No cough, No shortness of breath  Ophtho: No vision changes  GI: see HPI  Derm: No rash    Medical History:  has a past medical history of Cancer and Stroke due to embolism of right middle cerebral artery (02/18/2025).    Surgical History:  has a past surgical history that includes Carpal tunnel release; Eye surgery; Tonsillectomy; Tubal ligation; Esophagogastroduodenoscopy (N/A, 8/22/2022); Esophagogastroduodenoscopy (N/A, 12/5/2022); Esophagogastroduodenoscopy (N/A, 7/11/2023); and Left heart catheterization (4/17/2024).    Family History: family history is not on file.. Otherwise no colon cancer, inflammatory bowel disease, or GI malignancies.    Social History:  reports that she has never smoked. She has never been exposed to tobacco smoke. She has never used smokeless tobacco. She reports that she does not drink alcohol and does not use drugs.    Review of patient's allergies indicates:  No Known Allergies    Medications:   Prescriptions Prior to Admission[1]    Physical Exam:    Vital Signs:   Vitals:    03/05/25 0754   BP: (!) 153/65   Pulse: 62   Resp: (!) 24   Temp: 97.4 °F (36.3 °C)       General Appearance: Well appearing in no acute distress  Eyes:    No scleral icterus  ENT: Neck supple, Lips, mucosa, and tongue normal; teeth and gums normal  Abdomen: Soft, non tender, non distended with normal bowel sounds. No hepatosplenomegaly, ascites, or mass.  Extremities: No edema  Skin: No rash    Labs:  Lab Results   Component Value Date    WBC 9.29 03/05/2025    HGB 9.4 (L) 03/05/2025     HCT 28.8 (L) 03/05/2025     03/05/2025    CHOL 193 02/18/2025    TRIG 104 02/18/2025    HDL 39 (L) 02/18/2025    ALT 17 03/05/2025    AST 21 03/05/2025     03/05/2025    K 4.0 03/05/2025     03/05/2025    CREATININE 0.6 03/05/2025    BUN 19 03/05/2025    CO2 26 03/05/2025    TSH 1.583 03/03/2025    INR 1.1 02/18/2025    HGBA1C 4.9 02/18/2025       I have explained the risks and benefits of endoscopy procedures to the patient including but not limited to bleeding, perforation, infection, and death.  The patient was asked if they understand and allowed to ask any further questions to their satisfaction.      Neris Welsh,          [1]   Medications Prior to Admission   Medication Sig Dispense Refill Last Dose/Taking    aspirin 81 MG Chew Chew and swallow 1 tablet (81 mg total) by mouth once daily. 30 tablet 3     atorvastatin (LIPITOR) 80 MG tablet Take 1 tablet (80 mg total) by mouth once daily. 90 tablet 3     carvediloL (COREG) 6.25 MG tablet Take 1 tablet (6.25 mg total) by mouth 2 (two) times daily with meals. 180 tablet 3     cyanocobalamin (VITAMIN B-12) 1000 MCG tablet Take 1,000 mcg by mouth once daily.       pantoprazole (PROTONIX) 40 MG tablet Take 1 tablet (40 mg total) by mouth once daily. 60 tablet 5     vitamin D (VITAMIN D3) 1000 units Tab Take 1,000 Units by mouth once daily.

## 2025-03-05 NOTE — NURSING TRANSFER
Nursing Transfer Note      3/5/2025   12:07 PM    Nurse giving handoff:Gabriele BARBOSA Rn  Nurse receiving handoff: Sana Rn    Reason patient is being transferred: postop    Transfer To: [id number]    Transfer via stretcher    Transfer with cardiac monitor     Transported by pacu pct    Transfer Vital Signs:  Blood Pressure:see flowsheet  Heart Rate:  O2:  Temperature:  Respirations:    Telemetry: n/a  Order for Tele Monitor? No    Additional Lines: n/a    Medicines sent: n./a    Any special needs or follow-up needed:     Patient belongings transferred with patient: Yes, dentures    Chart send with patient: Yes    Notified: spouse    Patient reassessed at: 3/5/25  1  Upon arrival to floor: bed in lowest position

## 2025-03-05 NOTE — PROGRESS NOTES
[facility] Medicine  Progress Note    Patient Name: Katelyn Caba  MRN: [id number]  Patient Class: IP- Inpatient   Admission Date: 3/3/2025  Length of Stay: 2 days  Attending Physician: Serafin Mcdaniel MD  Primary Care Provider: Brooklyn Burnham MD        Subjective     Principal Problem:Gastrointestinal hemorrhage        HPI:  83 yo female with PMH of recent MCA CVA (2/18/25, received TNK), inferolateral STEMI (4/2024, s/p cath with no intervention) on brilinta, hypertension, hyperlipidemia, GI bleed, ISAIAH, BCC who presented to the ED with shortness of breath. She was hypoxic to 85% on EMS arrival and was placed on CPAP. She reports having shortness of breath since her hospitalization for her stroke in addition to fatigue. She was just discharged 2/20/25. She denies any chest pain, cough, mucous production, fevers, chills. She reports black colored stools at home. She thought it was secondary to oral iron but she has not been taking this since her discharged on 2/20. She denies abdominal pain, N/V/D. She reports using oxygen at home as needed for her shortness of breath. Per her daughter, she is not prescribed oxygen at home but has been using her husbands oxygen tank for her symptoms of shortness of breath. Her daughter reports that even when she is feeling short of breath her spO2 is above 95%.     Placed on BiPAP upon arrival to the ED. Hemodynamically stable. BNP elevated to 1500 and troponin in the 200s. EKG with some TWI in V5 and V6, similar to previous. CXR with bilateral vascular congestion. Hg of 5.6, will receive 2 units PRBCs. Was saturating well on FiO2 25% and she reports that her shortness of breath had significantly improved. She was able to be weaned to 2L NC. She is saturating 97-98% and reports that her breathing is much better.      Overview/Hospital Course:  Patient admitted to the hospital with severe anemia, received blood and IV iron. Stool positive for occult blood. EGD  today (3/5)    Interval History: NAEON, VSS, Hb stable and improved from yesterday. EGD today.     Review of Systems  Objective:     Vital Signs (Most Recent):  Temp: 98.8 °F (37.1 °C) (03/05/25 1202)  Pulse: 63 (03/05/25 1202)  Resp: (!) 22 (03/05/25 1039)  BP: (!) 111/49 (03/05/25 1202)  SpO2: (!) 92 % (03/05/25 1202) Vital Signs (24h Range):  Temp:  [97.4 °F (36.3 °C)-98.8 °F (37.1 °C)] 98.8 °F (37.1 °C)  Pulse:  [54-97] 63  Resp:  [18-37] 22  SpO2:  [92 %-100 %] 92 %  BP: (111-153)/(49-66) 111/49     Weight: 49.9 kg (110 lb)  Body mass index is 17.75 kg/m².    Intake/Output Summary (Last 24 hours) at 3/5/2025 1205  Last data filed at 3/5/2025 1152  Gross per 24 hour   Intake 890 ml   Output --   Net 890 ml         Physical Exam  Constitutional:       Appearance: Normal appearance.   Cardiovascular:      Rate and Rhythm: Normal rate and regular rhythm.      Pulses: Normal pulses.      Heart sounds: Normal heart sounds.   Pulmonary:      Breath sounds: Normal breath sounds.      Comments: On oxygen via NC for comfort  Abdominal:      General: Bowel sounds are normal.      Palpations: Abdomen is soft.   Musculoskeletal:      Right lower leg: No edema.      Left lower leg: No edema.   Neurological:      General: No focal deficit present.      Mental Status: She is alert and oriented to person, place, and time.   Psychiatric:         Mood and Affect: Mood is anxious.               Significant Labs: All pertinent labs within the past 24 hours have been reviewed.    Significant Imaging: I have reviewed all pertinent imaging results/findings within the past 24 hours.    Assessment and Plan     Assessment & Plan  Gastrointestinal hemorrhage  - Aspirin post CVA  -  holding while inpatient for active bleeding and scope   H/O ischemic left MCA stroke  Patient had prior left MCA infarct 2022 who presented to [facility] ED 2/18 for left sided weakness, left facial droop, and dysarthria. LKW 2/17 2030 when she went to bed,  woke up with symptoms present at 0830. Received TNK prior to transfer at 1104. Transferred to [facility] for IR evaluation. 02/19/2025 Patient admitted to [facility] w/ R M1 occlusion s/p TNK and DSA w/o thrombectomy as lesion migrated to distal MCA branches after TNK administered.     - holding aspirin  - plavix not on home medications   Pure hypercholesterolemia  - continue home atorvastatin    Iron deficiency anemia due to chronic blood loss  Anemia is likely due to Iron deficiency. Most recent hemoglobin and hematocrit are listed below.  Recent Labs     03/04/25 0059 03/04/25 2144 03/05/25  0711   HGB 9.3* 8.7* 9.4*   HCT 28.5* 27.3* 28.8*     Plan  - Monitor serial CBC: Daily  - Transfuse PRBC if patient becomes hemodynamically unstable, symptomatic or H/H drops below 7/21.  - Patient has received 3 units of PRBCs on admission  - Patient's anemia is currently stable    Acute hypoxic respiratory failure  Patient with Hypoxic Respiratory failure which is Acute.  she is not on home oxygen. Supplemental oxygen was provided and noted-      .   Signs/symptoms of respiratory failure include- tachypnea, increased work of breathing, and respiratory distress. Contributing diagnoses includes -  acute blood loss anemia  Labs and images were reviewed. Patient Has not had a recent ABG. Will treat underlying causes and adjust management of respiratory failure as follows- supplemental oxygen will wean as tolerated. Patient not in any respiratory distress. Patient does endorse using oxygen on occasion at home. She says her son is a dentist who provides oxygen tank and she utilizes O2 PRN. She is not sure how often she uses it.   Aortic atherosclerosis  Continue atorvastatin   Symptomatic anemia  Anemia is likely due to acute blood loss which was from possible gastric ulcer but unknown at this time . Most recent hemoglobin and hematocrit are listed below.  Recent Labs     03/04/25 0059 03/04/25 2144 03/05/25  0711   HGB 9.3* 8.7* 9.4*    HCT 28.5* 27.3* 28.8*     Plan  - Monitor serial CBC: Every 8 hours  - Transfuse PRBC if patient becomes hemodynamically unstable, symptomatic or H/H drops below 7/21.  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 Biopsy was negative for any malignancy   - pantoprazole IV 40 mg   - current GI recommendations (3/3)   - optimize volume status  - transfuse pRBC to HGB >7, maintain 2 large bore Ivs  - hold Brilinta until after EGD  - continue 40mg IV Protonix BID  - NPO at midnight, will reassess for scope tomorrow (3/4)  Rodriguez's esophagus  - GI to scope today    Elevated brain natriuretic peptide (BNP) level  BNP  Recent Labs   Lab 03/03/25  0842   BNP 1,512*        Leukocytosis  - likely secondary to blood loss anemia  - WBC is stabilizing and decreasing    VTE Risk Mitigation (From admission, onward)           Ordered     IP VTE HIGH RISK PATIENT  Once         03/03/25 1354     Place sequential compression device  Until discontinued         03/03/25 1354                    Discharge Planning   TERE: 3/7/2025     Code Status: Full Code   Medical Readiness for Discharge Date:                            Nieves Alonso MD  Department of Hospital Medicine   [facility] - Endoscopy

## 2025-03-05 NOTE — ANESTHESIA PREPROCEDURE EVALUATION
03/05/2025  Katelyn Caba is a 84 y.o., female.      Pre-op Assessment    I have reviewed the NPO Status.      Review of Systems  Anesthesia Hx:  No problems with previous Anesthesia   History of prior surgery of interest to airway management or planning:  Previous anesthesia: General        Denies Family Hx of Anesthesia complications.    Denies Personal Hx of Anesthesia complications.                    Cardiovascular:     Hypertension                                          Neurological:   CVA                                      Physical Exam  General: Well nourished, Cooperative, Alert and Oriented    Airway:  Mallampati: II   Mouth Opening: Normal  TM Distance: Normal  Tongue: Normal  Neck ROM: Normal ROM    Anesthesia Plan  Type of Anesthesia, risks & benefits discussed:    Anesthesia Type: Gen ETT, Gen Natural Airway  Intra-op Monitoring Plan: Standard ASA Monitors  Post Op Pain Control Plan: multimodal analgesia  Induction:  IV  Informed Consent: Informed consent signed with the Patient and all parties understand the risks and agree with anesthesia plan.  All questions answered.   ASA Score: 3    Ready For Surgery From Anesthesia Perspective.   .

## 2025-03-05 NOTE — ASSESSMENT & PLAN NOTE
Patient had prior left MCA infarct 2022 who presented to [facility] ED 2/18 for left sided weakness, left facial droop, and dysarthria. LKW 2/17 2030 when she went to bed, woke up with symptoms present at 0830. Received TNK prior to transfer at 1104. [place] [facility] [place]. 02/19/2025 Patient admitted to [facility] w/ R M1 occlusion s/p TNK and DSA w/o thrombectomy as lesion migrated to distal MCA branches after TNK administered.     - holding aspirin  - plavix not on home medications

## 2025-03-06 ENCOUNTER — OUTPATIENT CASE MANAGEMENT (OUTPATIENT)
Dept: ADMINISTRATIVE | Facility: OTHER | Age: 85
End: 2025-03-06
Payer: MEDICARE

## 2025-03-06 ENCOUNTER — EPISODE CHANGES (OUTPATIENT)
Dept: CARDIOLOGY | Facility: CLINIC | Age: 85
End: 2025-03-06

## 2025-03-06 VITALS
OXYGEN SATURATION: 94 % | TEMPERATURE: 98 F | BODY MASS INDEX: 17.68 KG/M2 | HEART RATE: 71 BPM | DIASTOLIC BLOOD PRESSURE: 69 MMHG | RESPIRATION RATE: 19 BRPM | WEIGHT: 110 LBS | HEIGHT: 66 IN | SYSTOLIC BLOOD PRESSURE: 133 MMHG

## 2025-03-06 LAB
ALBUMIN SERPL BCP-MCNC: 3.1 G/DL (ref 3.5–5.2)
ALP SERPL-CCNC: 126 U/L (ref 40–150)
ALT SERPL W/O P-5'-P-CCNC: 13 U/L (ref 10–44)
ANION GAP SERPL CALC-SCNC: 7 MMOL/L (ref 8–16)
AST SERPL-CCNC: 20 U/L (ref 10–40)
BASOPHILS # BLD AUTO: 0.08 K/UL (ref 0–0.2)
BASOPHILS NFR BLD: 0.7 % (ref 0–1.9)
BILIRUB SERPL-MCNC: 0.8 MG/DL (ref 0.1–1)
BUN SERPL-MCNC: 20 MG/DL (ref 8–23)
CALCIUM SERPL-MCNC: 8.8 MG/DL (ref 8.7–10.5)
CHLORIDE SERPL-SCNC: 105 MMOL/L (ref 95–110)
CO2 SERPL-SCNC: 28 MMOL/L (ref 23–29)
CREAT SERPL-MCNC: 0.6 MG/DL (ref 0.5–1.4)
DIFFERENTIAL METHOD BLD: ABNORMAL
EOSINOPHIL # BLD AUTO: 1 K/UL (ref 0–0.5)
EOSINOPHIL NFR BLD: 9.7 % (ref 0–8)
ERYTHROCYTE [DISTWIDTH] IN BLOOD BY AUTOMATED COUNT: 15.9 % (ref 11.5–14.5)
EST. GFR  (NO RACE VARIABLE): >60 ML/MIN/1.73 M^2
GLUCOSE SERPL-MCNC: 94 MG/DL (ref 70–110)
HCT VFR BLD AUTO: 29.1 % (ref 37–48.5)
HGB BLD-MCNC: 9.3 G/DL (ref 12–16)
IMM GRANULOCYTES # BLD AUTO: 0.04 K/UL (ref 0–0.04)
IMM GRANULOCYTES NFR BLD AUTO: 0.4 % (ref 0–0.5)
LYMPHOCYTES # BLD AUTO: 1.3 K/UL (ref 1–4.8)
LYMPHOCYTES NFR BLD: 12.2 % (ref 18–48)
MCH RBC QN AUTO: 28.7 PG (ref 27–31)
MCHC RBC AUTO-ENTMCNC: 32 G/DL (ref 32–36)
MCV RBC AUTO: 90 FL (ref 82–98)
MONOCYTES # BLD AUTO: 1 K/UL (ref 0.3–1)
MONOCYTES NFR BLD: 9.3 % (ref 4–15)
NEUTROPHILS # BLD AUTO: 7.3 K/UL (ref 1.8–7.7)
NEUTROPHILS NFR BLD: 67.7 % (ref 38–73)
NRBC BLD-RTO: 0 /100 WBC
PLATELET # BLD AUTO: 447 K/UL (ref 150–450)
PMV BLD AUTO: 10 FL (ref 9.2–12.9)
POTASSIUM SERPL-SCNC: 4 MMOL/L (ref 3.5–5.1)
PROT SERPL-MCNC: 5.7 G/DL (ref 6–8.4)
RBC # BLD AUTO: 3.24 M/UL (ref 4–5.4)
SODIUM SERPL-SCNC: 140 MMOL/L (ref 136–145)
WBC # BLD AUTO: 10.72 K/UL (ref 3.9–12.7)

## 2025-03-06 PROCEDURE — 36415 COLL VENOUS BLD VENIPUNCTURE: CPT

## 2025-03-06 PROCEDURE — 25000003 PHARM REV CODE 250

## 2025-03-06 PROCEDURE — 85025 COMPLETE CBC W/AUTO DIFF WBC: CPT

## 2025-03-06 PROCEDURE — 25000003 PHARM REV CODE 250: Performed by: INTERNAL MEDICINE

## 2025-03-06 PROCEDURE — 80053 COMPREHEN METABOLIC PANEL: CPT

## 2025-03-06 RX ADMIN — CLOPIDOGREL BISULFATE 75 MG: 75 TABLET ORAL at 08:03

## 2025-03-06 RX ADMIN — CARVEDILOL 6.25 MG: 6.25 TABLET, FILM COATED ORAL at 08:03

## 2025-03-06 RX ADMIN — CYANOCOBALAMIN TAB 1000 MCG 1000 MCG: 1000 TAB at 08:03

## 2025-03-06 RX ADMIN — ATORVASTATIN CALCIUM 80 MG: 40 TABLET, FILM COATED ORAL at 08:03

## 2025-03-06 RX ADMIN — PANTOPRAZOLE SODIUM 40 MG: 40 TABLET, DELAYED RELEASE ORAL at 08:03

## 2025-03-06 NOTE — ASSESSMENT & PLAN NOTE
Patient had prior left MCA infarct 2022 who presented to [facility] ED 2/18 for left sided weakness, left facial droop, and dysarthria. LKW 2/17 2030 when she went to bed, woke up with symptoms present at 0830. Received TNK prior to transfer at 1104. [place] [facility] [place]. 02/19/2025 Patient admitted to [facility] w/ R M1 occlusion s/p TNK and DSA w/o thrombectomy as lesion migrated to distal MCA branches after TNK administered.     - holding aspirin  - plavix not on home medications

## 2025-03-06 NOTE — ASSESSMENT & PLAN NOTE
Anemia is likely due to Iron deficiency. Most recent hemoglobin and hematocrit are listed below.  Recent Labs     03/04/25  2144 03/05/25  0711 03/06/25  0619   HGB 8.7* 9.4* 9.3*   HCT 27.3* 28.8* 29.1*     Plan  - Monitor serial CBC: Daily  - Transfuse PRBC if patient becomes hemodynamically unstable, symptomatic or H/H drops below 7/21.  - Patient has received 3 units of PRBCs on admission  - Patient's anemia is currently stable

## 2025-03-06 NOTE — PLAN OF CARE
[facility] - Telemetry Stepdown  Discharge Final Note    Primary Care Provider: Brooklyn Burnham MD    Expected Discharge Date: 3/6/2025    Patient discharged 3/6/2025 home with no needs. Family provided transportation home.    Future Appointments   Date Time Provider Department Center   3/11/2025 11:20 AM Brooklyn Burnham MD [facility]   3/12/2025  2:30 PM [facility]   3/18/2025  9:00 AM Elisabeth De La Cruz MD [facility] C[facility] [place]         Final Discharge Note (most recent)       Final Note - 03/06/25 1507          Final Note    Assessment Type Final Discharge Note     Anticipated Discharge Disposition Home or Self Care     Hospital Resources/Appts/Education Provided Appointments scheduled and added to AVS        Post-Acute Status    Coverage MEDICARE - MEDICARE PART A & B     Discharge Delays None known at this time                     Important Message from Medicare  Important Message from Medicare regarding Discharge Appeal Rights: Given to patient/caregiver, Explained to patient/caregiver, Signed/date by patient/caregiver     Date IMM was signed: 03/06/25  Time IMM was signed: 0955    Contact Info       Brooklyn Burnham MD   Specialty: Family Medicine   Relationship: PCP - General    [address]  [address]  [facility] [address]   Phone: [phone]       Next Steps: Follow up    [facility] [address]   Specialty: Gastroenterology    [address]  [facility] [address]   Phone: [phone]       Next Steps: Schedule an appointment as soon as possible for a visit in 1 week(s)          Janis Stoner RN     [phone]

## 2025-03-06 NOTE — DISCHARGE SUMMARY
[facility] - Telemetry [facility] Medicine  Discharge Summary      Patient Name: Katelyn Caba  MRN: [id number]  BRY: [id number]  Patient Class: IP- Inpatient  Admission Date: 3/3/2025  Hospital Length of Stay: 3 days  Discharge Date and Time:  03/06/2025 1:53 PM  Attending Physician: No att. providers found   Discharging Provider: Bethanie Park MD  Primary Care Provider: Brooklyn Burnham MD  [facility] Medicine Team: [facility] 2 Bethanie Park MD  Primary Care Team: [facility] 2    HPI:   85 yo female with PMH of recent MCA CVA (2/18/25, received TNK), inferolateral STEMI (4/2024, s/p cath with no intervention) on brilinta, hypertension, hyperlipidemia, GI bleed, ISAIAH, BCC who presented to the ED with shortness of breath. She was hypoxic to 85% on EMS arrival and was placed on CPAP. She reports having shortness of breath since her hospitalization for her stroke in addition to fatigue. She was just discharged 2/20/25. She denies any chest pain, cough, mucous production, fevers, chills. She reports black colored stools at home. She thought it was secondary to oral iron but she has not been taking this since her discharged on 2/20. She denies abdominal pain, N/V/D. She reports using oxygen at home as needed for her shortness of breath. Per her daughter, she is not prescribed oxygen at home but has been using her husbands oxygen tank for her symptoms of shortness of breath. Her daughter reports that even when she is feeling short of breath her spO2 is above 95%.     Placed on BiPAP upon arrival to the ED. Hemodynamically stable. BNP elevated to 1500 and troponin in the 200s. EKG with some TWI in V5 and V6, similar to previous. CXR with bilateral vascular congestion. Hg of 5.6, will receive 2 units PRBCs. Was saturating well on FiO2 25% and she reports that her shortness of breath had significantly improved. She was able to be weaned to 2L NC. She is saturating 97-98% and reports that her breathing is much  better.      Procedure(s) (LRB):  EGD (ESOPHAGOGASTRODUODENOSCOPY) (N/A)      Hospital Course:   Patient admitted to the hospital with severe anemia, received blood and IV iron. Stool positive for occult blood. EGD today (3/5)     Goals of Care Treatment Preferences:  Code Status: Full Code      SDOH Screening:  The patient was screened for utility difficulties, food insecurity, transport difficulties, housing insecurity, and interpersonal safety and there were no concerns identified this admission.     Consults:   Consults (From admission, onward)          Status Ordering Provider     Inpatient consult to Gastroenterology  Once        Provider:  (Not yet assigned)    Completed STACIA ROSENBERG     Inpatient consult to Critical Care Medicine  Once        Provider:  (Not yet assigned)    Completed EILEEN KERNS            Assessment & Plan  AVM (arteriovenous malformation) of stomach, acquired with hemorrhage  - Aspirin post CVA  -  holding while inpatient for active bleeding and scope   H/O ischemic left MCA stroke  Patient had prior left MCA infarct 2022 who presented to [facility] ED 2/18 for left sided weakness, left facial droop, and dysarthria. LKW 2/17 2030 when she went to bed, woke up with symptoms present at 0830. Received TNK prior to transfer at 1104. Transferred to [facility] for IR evaluation. 02/19/2025 Patient admitted to [facility] w/ R M1 occlusion s/p TNK and DSA w/o thrombectomy as lesion migrated to distal MCA branches after TNK administered.     - holding aspirin  - plavix not on home medications   Pure hypercholesterolemia  - continue home atorvastatin    Iron deficiency anemia due to chronic blood loss  Anemia is likely due to Iron deficiency. Most recent hemoglobin and hematocrit are listed below.  Recent Labs     03/04/25  2144 03/05/25  0711 03/06/25  0619   HGB 8.7* 9.4* 9.3*   HCT 27.3* 28.8* 29.1*     Plan  - Monitor serial CBC: Daily  - Transfuse PRBC if patient becomes hemodynamically unstable,  symptomatic or H/H drops below 7/21.  - Patient has received 3 units of PRBCs on admission  - Patient's anemia is currently stable    Aortic atherosclerosis  Continue atorvastatin   Symptomatic anemia  Anemia is likely due to acute blood loss which was from possible gastric ulcer but unknown at this time . Most recent hemoglobin and hematocrit are listed below.  Recent Labs     03/04/25  2144 03/05/25  0711 03/06/25  0619   HGB 8.7* 9.4* 9.3*   HCT 27.3* 28.8* 29.1*     Plan  - Monitor serial CBC: Every 8 hours  - Transfuse PRBC if patient becomes hemodynamically unstable, symptomatic or H/H drops below 7/21.  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 Biopsy was negative for any malignancy   - pantoprazole IV 40 mg   - current GI recommendations (3/3)   - optimize volume status  - transfuse pRBC to HGB >7, maintain 2 large bore Ivs  - hold Brilinta until after EGD  - continue 40mg IV Protonix BID  - NPO at midnight, will reassess for scope tomorrow (3/4)  Rodriguez's esophagus  - GI to scope today    Elevated brain natriuretic peptide (BNP) level  BNP  Recent Labs   Lab 03/03/25  0842   BNP 1,512*        Final Active Diagnoses:    Diagnosis Date Noted POA    PRINCIPAL PROBLEM:  AVM (arteriovenous malformation) of stomach, acquired with hemorrhage [K31.811] 08/21/2022 Yes    Elevated brain natriuretic peptide (BNP) level [R79.89] 03/03/2025 Yes    Symptomatic anemia [D64.9] 03/26/2024 Yes    History of gastric ulcer [Z87.11] 03/26/2024 Not Applicable    Rodriguez's esophagus [K22.70] 03/26/2024 Yes    Aortic atherosclerosis [I70.0] 05/18/2023 Yes    Iron deficiency anemia due to chronic blood loss [D50.0] 08/21/2022 Yes    Pure hypercholesterolemia [E78.00] 07/21/2022 Yes     Chronic    H/O ischemic left MCA  stroke [Z86.73] 07/20/2022 Not Applicable      Problems Resolved During this Admission:    Diagnosis Date Noted Date Resolved POA    Leukocytosis [D72.829] 03/04/2025 03/05/2025 Yes    Acute hypoxic respiratory failure [J96.01] 08/21/2022 03/05/2025 Yes       Discharged Condition: stable    Disposition: Home or Self Care    Follow Up:    Patient Instructions:      CBC Auto Differential   Standing Status: Future Standing Exp. Date: 06/04/26     Ambulatory referral/consult to Internal Medicine   Standing Status: Future   Referral Priority: Routine Referral Type: Consultation   Referral Reason: Specialty Services Required   Requested Specialty: Internal Medicine   Number of Visits Requested: 1       Significant Diagnostic Studies: N/A    Pending Diagnostic Studies:       None           Medications:  Reconciled Home Medications:      Medication List        START taking these medications      clopidogreL 75 mg tablet  Commonly known as: PLAVIX  Take 1 tablet (75 mg total) by mouth once daily.            CONTINUE taking these medications      atorvastatin 80 MG tablet  Commonly known as: LIPITOR  Take 1 tablet (80 mg total) by mouth once daily.     carvediloL 6.25 MG tablet  Commonly known as: COREG  Take 1 tablet (6.25 mg total) by mouth 2 (two) times daily with meals.     cyanocobalamin 1000 MCG tablet  Commonly known as: VITAMIN B-12  Take 1,000 mcg by mouth once daily.     pantoprazole 40 MG tablet  Commonly known as: PROTONIX  Take 1 tablet (40 mg total) by mouth once daily.     vitamin D 1000 units Tab  Commonly known as: VITAMIN D3  Take 1,000 Units by mouth once daily.            STOP taking these medications      aspirin 81 MG Chew              Indwelling Lines/Drains at time of discharge:   Lines/Drains/Airways       None                   Time spent on the discharge of patient: 35 minutes         Bethanie Park MD  Department of Hospital Medicine  [facility] - Telemetry Stepdown

## 2025-03-06 NOTE — ANESTHESIA POSTPROCEDURE EVALUATION
Anesthesia Post Evaluation    Patient: Katelyn Caba    Procedure(s) Performed: Procedure(s) (LRB):  EGD (ESOPHAGOGASTRODUODENOSCOPY) (N/A)    Final Anesthesia Type: general      Patient location during evaluation: [facility]  Patient participation: Yes- Able to Participate  Level of consciousness: awake and alert  Post-procedure vital signs: reviewed and stable  Pain management: adequate  Airway patency: patent    PONV status at discharge: No PONV  Anesthetic complications: no      Cardiovascular status: blood pressure returned to baseline  Respiratory status: unassisted  Hydration status: euvolemic  Follow-up not needed.              Vitals Value Taken Time   /63 03/06/25 00:32   Temp 36.6 °C (97.8 °F) 03/06/25 00:32   Pulse 68 03/06/25 05:53   Resp 18 03/05/25 20:20   SpO2 93 % 03/06/25 05:53   Vitals shown include unfiled device data.      Event Time   Out of Recovery 13:04:00         Pain/Salena Score: Salena Score: 9 (3/5/2025 12:45 PM)

## 2025-03-06 NOTE — ASSESSMENT & PLAN NOTE
Anemia is likely due to acute blood loss which was from possible gastric ulcer but unknown at this time. Most recent hemoglobin and hematocrit are listed below.  Recent Labs     03/04/25  2144 03/05/25  0711 03/06/25  0619   HGB 8.7* 9.4* 9.3*   HCT 27.3* 28.8* 29.1*     Plan  - Monitor serial CBC: Every 8 hours  - Transfuse PRBC if patient becomes hemodynamically unstable, symptomatic or H/H drops below 7/21.  - Patient has received 2 units of PRBCs on 3/3/2025  - Patient's anemia is currently stable

## 2025-03-06 NOTE — PLAN OF CARE
Patient received awake and alert. Breathes on Oxygen @ 2L/min. Patient is able to breathe on room air with O2 sats @95%. Medicated per MAR. Meal tolerated. For discharge home today.  Tele box and intravenous access removed.Bedside delivery incomplete since Plavix was dispense February 20 th. Next refill is March 15, 2025. Patient informed. Transported off the unit in wheelchair accompanied by Nurse and relatives.

## 2025-03-07 ENCOUNTER — OUTPATIENT CASE MANAGEMENT (OUTPATIENT)
Dept: ADMINISTRATIVE | Facility: OTHER | Age: 85
End: 2025-03-07
Payer: MEDICARE

## 2025-03-07 ENCOUNTER — TELEPHONE (OUTPATIENT)
Dept: FAMILY MEDICINE | Facility: CLINIC | Age: 85
End: 2025-03-07
Payer: MEDICARE

## 2025-03-07 ENCOUNTER — PATIENT OUTREACH (OUTPATIENT)
Dept: ADMINISTRATIVE | Facility: OTHER | Age: 85
End: 2025-03-07
Payer: MEDICARE

## 2025-03-07 NOTE — TELEPHONE ENCOUNTER
----- Message from  Mayra sent at 3/7/2025 12:53 PM CST -----  Regarding: Enroll in OPCM  3/7/2025 Good afternoon, Your patient [id number] Katelyn Caba  was  referred to [facility] by Provider Referral.  My name is Ana Cristina, Care Manager.  At times, it may be necessary to have a  involved in the coordination of care, their names will be added to the Care Team where appropriate.In our enrollment encounter, the following needs were identified:Disease Management Education and Self-care Management EducationAll needs and services provided by [facility] and other care team members will be communicated to you via your Resource Pool.  Our documentation can be readily accessed in the EMR using the following tabs: Chart review -> Episodes: High Risk. It is our goal to provide holistic care, if at any time you feel other areas of concern need to be addressed, please communicate with me by Inbasket messaging.  Sincerely,Ana Cristina Pop RNRN Case ManagerOutpatient Complex Care ManagementOchsner Health [email or web address]

## 2025-03-07 NOTE — PROGRESS NOTES
This Community Health Worker (CHW) completed Social Determinant of Health (SDOH)  Questionnaire with patient/caregiver via telephone today.  Notified OPCM CM Mayra Pop RN of completion.      Patient denied any SDOH needs at this time.

## 2025-03-07 NOTE — PROGRESS NOTES
Outpatient Care Management  Initial Patient Assessment    Patient: Katelyn Caba  MRN: [id number]  Date of Service: 03/07/2025  Completed by: Mayra Pop RN  Referral Date: 02/21/2025  Date of Eligibility: 3/6/2025  Program:   High Risk  Status: Ongoing  Effective Dates: 3/7/2025 - present  Responsible Staff: Mayra Pop RN        Reason for Visit   Patient presents with    OPCM Enrollment Call    Nursing Assessment       Brief Summary:  Katelyn Caba was referred by Dr. Brooklyn Burnham for h/o ischemic left MCA stroke. Patient qualifies for program based on risk score of 61.3%.   Active problem list, medical, surgical and social history reviewed. Active comorbidities include left MCA stroke, inferolateral STEMI, HTN, HLD, GI bleed, iron deficiency anemia, basal cell carcinoma, holloway's esophagus, AVM of stomach. Areas of need identified by patient include iron deficiency anemia.     CM sent referral to [facility] for SDOH to be done.    Next steps:   Follow up if received educational materials  Educate on S&S of anemia  Educate on iron rich diet      Disability Status  Is the patient alert and oriented (person, place, time, and situation)?: Alert and oriented x 4  Hearing Difficulty or Deaf: no  Visual Difficulty or Blind: yes  Visual and Hearing Conclusion Statement: reading glasses, no hearing problems  Vision Management: Other  Difficulty Concentrating, Remembering or Making Decisions: no  Communication Difficulty: no  Eating/Swallowing Difficulty: no  Walking or Climbing Stairs Difficulty: no  Walking or Climbing Stairs: ambulation difficulty, requires equipment  Mobility Management: Secondary to SOB  Dressing/Bathing Difficulty: no  Grooming: independent  Transferring (e.g., getting in and out of chairs): independent  Toileting : Independent  Continence : Continence - Not a problem  Difficulty Managing Errands Independently: yes  Errands Management: spouse and sons runs  errands  Equipment Currently Used at Home: blood pressure machine; nebulizer; other (see comments) (Pulse oximeter)  ADL Conclusion Statement: Patient is very independent with ADL's.  She does have reading glasses only and no hearing problems.  She does not currently drive because she just got out of the hospital so her  and sons runs her errands.  She has a  that helps with the cleaning.  Service Animal Required: no  Change in Functional Status Since Onset of Current Illness/Injury: no        Spiritual Beliefs  Spiritual, Cultural Beliefs, Yarsani Practices, Values that Affect Care: no      Social History     Socioeconomic History    Marital status:    Tobacco Use    Smoking status: Never     Passive exposure: Never    Smokeless tobacco: Never   Substance and Sexual Activity    Alcohol use: No    Drug use: No     Social Drivers of Health     Financial Resource Strain: Low Risk  (3/5/2025)    Overall Financial Resource Strain (CARDIA)     Difficulty of Paying Living Expenses: Not very hard   Food Insecurity: No Food Insecurity (3/5/2025)    Hunger Vital Sign     Worried About Running Out of Food in the Last Year: Never true     Ran Out of Food in the Last Year: Never true   Transportation Needs: No Transportation Needs (4/18/2024)    PRAPARE - Transportation     Lack of Transportation (Medical): No     Lack of Transportation (Non-Medical): No   Physical Activity: Insufficiently Active (3/5/2025)    Exercise Vital Sign     Days of Exercise per Week: 3 days     Minutes of Exercise per Session: 20 min   Stress: No Stress Concern Present (3/5/2025)    [employer] - Occupational Stress Questionnaire     Feeling of Stress : Only a little   Housing Stability: Low Risk  (3/5/2025)    Housing Stability Vital Sign     Unable to Pay for Housing in the Last Year: No     Number of Times Moved in the Last Year: 1     Homeless in the Last Year: No       Roles and  Relationships  Primary Source of Support/Comfort: spouse  Name of Support/Comfort Primary Source: [facility]  Secondary Source of Support/Comfort: child(emily); sibling(s)  Name of Support/Comfort Secondary Source: Serafin and sampson Allison and Jd Caba, brother      Advance Directives (For Healthcare)  Advance Directive  (If Adv Dir status is received, view document under Adv Dir in header or Chart Review Media tab): Patient does not have Advance Directive, requests information.  Patient Requests Assistance: Handouts provided        Patient Reported Insurance  Verified current insurance plan:: Blue Cross; Medicare            3/7/2025    11:49 AM 1/10/2025    12:58 PM 9/23/2024     3:27 PM 7/9/2024     3:02 PM 4/9/2024     1:56 PM 4/8/2024    10:40 AM 2/8/2024    10:12 AM   Depression Patient Health Questionnaire   Over the last two weeks how often have you been bothered by little interest or pleasure in doing things Not at all Not at all Not at all Not at all Not at all Not at all Not at all   Over the last two weeks how often have you been bothered by feeling down, depressed or hopeless Not at all Not at all Not at all Not at all Not at all Not at all Not at all   PHQ-2 Total Score 0 0 0 0 0 0 0       Learning Assessment       03/07/2025 1213 [facility] (3/7/2025 - Present)   Created by Mayra Pop RN -  (Nurse) Status: Complete                 PRIMARY LEARNER     Primary Learner Name:  Katelyn Caba DB - 03/07/2025 1213    Relationship:  Patient DB - 03/07/2025 1213    Does the primary learner have any barriers to learning?:  No Barriers DB - 03/07/2025 1213    What is the preferred language of the primary learner?:  English DB - 03/07/2025 1213    Is an  required?:  No DB - 03/07/2025 1213    How does the primary learner prefer to learn new concepts?:  Listening, Reading DB - 03/07/2025 1213    How often do you need to have someone help you read instructions, pamphlets, or  written material from your doctor or pharmacy?:  Never DB - 03/07/2025 1213        CO-LEARNER #1     No question answered        CO-LEARNER #2     No question answered        SPECIAL TOPICS     No question answered        ANSWERED BY:     No question answered        Comments         Edit History       Mayra Pop, RN -  (Nurse)   03/07/2025 7014

## 2025-03-07 NOTE — LETTER
Katelyn Caba  [id number] [address] [address]    Dear Katelyn,    Welcome to [facility].  It was a pleasure talking with you today.  My name is Ana Cristina Pop, and I look forward to being your Care Manager.  My goal is to help you function at the healthiest and highest level possible.  You can contact me directly at [phone].    As an [facility] patient, some of the services we may be able to provide include:     Development of an individualized care plan with a Registered Nurse   Connection with a   Connection with available resources and services    Coordinate communication among your care team members   Provide coaching and education   Help you understand your doctors treatment plan  Help you obtain information about your insurance coverage.     All services provided by [facility] Complex Care Managers and other care team members are coordinated with and communicated to your primary care team.      As part of your enrollment, you will be receiving education materials and more information about these services in your [facility] account, by phone or through the mail.  If you do not wish to participate or receive information, please contact our office at [phone].      Sincerely,    Ana Cristina Pop, MACO  [facility]   Outpatient RN Complex Care Manager

## 2025-03-08 LAB — BACTERIA BLD CULT: NORMAL

## 2025-03-09 LAB — BACTERIA BLD CULT: NORMAL

## 2025-03-11 ENCOUNTER — OFFICE VISIT (OUTPATIENT)
Dept: FAMILY MEDICINE | Facility: CLINIC | Age: 85
End: 2025-03-11
Attending: FAMILY MEDICINE
Payer: MEDICARE

## 2025-03-11 VITALS
DIASTOLIC BLOOD PRESSURE: 68 MMHG | BODY MASS INDEX: 17.92 KG/M2 | WEIGHT: 111 LBS | HEART RATE: 64 BPM | OXYGEN SATURATION: 99 % | SYSTOLIC BLOOD PRESSURE: 136 MMHG

## 2025-03-11 DIAGNOSIS — D64.9 NORMOCYTIC ANEMIA: Primary | ICD-10-CM

## 2025-03-11 DIAGNOSIS — Z09 HOSPITAL DISCHARGE FOLLOW-UP: ICD-10-CM

## 2025-03-11 DIAGNOSIS — K21.9 GASTROESOPHAGEAL REFLUX DISEASE, UNSPECIFIED WHETHER ESOPHAGITIS PRESENT: ICD-10-CM

## 2025-03-11 PROCEDURE — 99214 OFFICE O/P EST MOD 30 MIN: CPT | Mod: S$PBB,,, | Performed by: FAMILY MEDICINE

## 2025-03-11 PROCEDURE — 99214 OFFICE O/P EST MOD 30 MIN: CPT | Mod: PBBFAC,PO | Performed by: FAMILY MEDICINE

## 2025-03-11 PROCEDURE — 99999 PR PBB SHADOW E&M-EST. PATIENT-LVL IV: CPT | Mod: PBBFAC,,, | Performed by: FAMILY MEDICINE

## 2025-03-12 ENCOUNTER — OFFICE VISIT (OUTPATIENT)
Dept: HEMATOLOGY/ONCOLOGY | Facility: CLINIC | Age: 85
End: 2025-03-12
Payer: MEDICARE

## 2025-03-12 VITALS
TEMPERATURE: 97 F | SYSTOLIC BLOOD PRESSURE: 122 MMHG | BODY MASS INDEX: 18.33 KG/M2 | DIASTOLIC BLOOD PRESSURE: 57 MMHG | OXYGEN SATURATION: 96 % | WEIGHT: 113.56 LBS | HEART RATE: 65 BPM | RESPIRATION RATE: 16 BRPM

## 2025-03-12 DIAGNOSIS — L08.9 SKIN INFECTION: ICD-10-CM

## 2025-03-12 DIAGNOSIS — D64.9 NORMOCYTIC ANEMIA: ICD-10-CM

## 2025-03-12 DIAGNOSIS — K22.719 BARRETT'S ESOPHAGUS WITH DYSPLASIA: ICD-10-CM

## 2025-03-12 DIAGNOSIS — Z86.73 H/O ISCHEMIC LEFT MCA STROKE: ICD-10-CM

## 2025-03-12 DIAGNOSIS — D50.0 IRON DEFICIENCY ANEMIA DUE TO CHRONIC BLOOD LOSS: Primary | ICD-10-CM

## 2025-03-12 PROCEDURE — 99215 OFFICE O/P EST HI 40 MIN: CPT | Mod: S$PBB,,, | Performed by: NURSE PRACTITIONER

## 2025-03-12 PROCEDURE — 99214 OFFICE O/P EST MOD 30 MIN: CPT | Mod: PBBFAC,PN | Performed by: NURSE PRACTITIONER

## 2025-03-12 PROCEDURE — 99999 PR PBB SHADOW E&M-EST. PATIENT-LVL IV: CPT | Mod: PBBFAC,,, | Performed by: NURSE PRACTITIONER

## 2025-03-12 RX ORDER — SODIUM CHLORIDE 9 MG/ML
INJECTION, SOLUTION INTRAVENOUS CONTINUOUS
OUTPATIENT
Start: 2025-03-12

## 2025-03-12 RX ORDER — SODIUM CHLORIDE 0.9 % (FLUSH) 0.9 %
10 SYRINGE (ML) INJECTION
OUTPATIENT
Start: 2025-03-12

## 2025-03-12 RX ORDER — HEPARIN 100 UNIT/ML
5 SYRINGE INTRAVENOUS
OUTPATIENT
Start: 2025-03-12

## 2025-03-12 RX ORDER — DIPHENHYDRAMINE HYDROCHLORIDE 50 MG/ML
50 INJECTION, SOLUTION INTRAMUSCULAR; INTRAVENOUS ONCE AS NEEDED
OUTPATIENT
Start: 2025-03-12

## 2025-03-12 RX ORDER — EPINEPHRINE 0.3 MG/.3ML
0.3 INJECTION SUBCUTANEOUS ONCE AS NEEDED
OUTPATIENT
Start: 2025-03-12

## 2025-03-12 RX ORDER — MUPIROCIN 20 MG/G
OINTMENT TOPICAL 3 TIMES DAILY
Qty: 15 G | Refills: 0 | Status: SHIPPED | OUTPATIENT
Start: 2025-03-12

## 2025-03-12 NOTE — PROGRESS NOTES
Subjective:        [facility] - Established patient    Reason for visit: follow up on ISAIAH    Anemia  2023: Iron Def Anemia, received IV iron 9/2022 - 1/2023 05/23/2024:  Injectafer 750 mg IV  10/14 & 10/21/24:  Injectafer 750 mg IV each    HPI: Katelyn Caba is a 84 y.o. female with history of BCC, HTN, HLD, Aortic atherosclerosis, CVA, GI bleed presents to the clinic today with family friend for follow up of ISAIAH.  Since her last visit, she had a hospital admission from 2/18/25 - 2/20/25 for CVA and from 3/3/25-3/6/25 for GI bleed.  She remains on Brillinta. She reports feeling well overall with some minimal fatigue, but she states she is ready to go back to work. She denies bleeding, melena, cold extremities, HA's, fatigue, CP, palpitations, etc.     History:  Patient hospitalized 3/26 - 3/27/24 with Hgb 6.6 sp 1u pRBC. GI evaluation plan for outpatient colonoscopy but patient does not wish to proceed with colonoscopy. Patient recalls fall around [place] with pelvic fracture. Patient was taking iron supplement daily and increasing iron rich foods. Weight is stable. 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 In ED, troponin elevated x3 and an inferolateral STEMI was confirmed on EKG. [facility] with evidence of moderate nonobstructive CAD for which no intervention was required. Patient placed on GDMT with atorvastatin 40 mg qd and metoprolol tartrate 12.5 mg TID. Antiplatelet therapy with ticagrelor recommended, however, patient apprehensive of medication secondary to history of GI bleed. Patient agreed to medication regimen. Patient without anginal equivalents and R radial access site well healed upon discharge.    Presented to ED on 2/18/25 for evaluation of left-sided facial droop with  left upper extremity weakness. Brain MRI noted acute ischemia right MCA territory involving the frontal, parietal, and temporal lobes inclusive of the right insular cortex and CTA stroke multi-phase noted occlusion of the MCA M1 segment distal aspect and approximately 50% stenosis at the origin of the left cervical ICA. She was transferred via flight med to [facility] for thrombectomy after TNK initiated and a Cardene drip started due to blood pressure elevation. Thrombectomy not performed due to lesion migrating to distal MCA branches after TNK administered. Neuro exams were stable for remainder of stay. She continues on cardiac monitor.    Presented to ED on 3/3/25 with shortness of breath noting hypoxia, placed on CPAP. She reported black colored stool in addition to fatigue. BNP elevated to 1500 and troponin in the 200s. CXR with bilateral vascular congestion, Hgb of 5.6 g/dL and received 3U PRBCs. She was able to be weaned to 2L NS and saturating 97-98% noting her breathing was much better with d/c. She also received IV iron with this admission. Patient with h/o Rodriguez's esophagus and EGD noted non-bleeding angiectasias.     Patient presents with female family friend.  ECOG PS is 1.  History has been obtained by chart review and discussion with the patient.    ROS:   Review of Systems   Constitutional:  Negative for fever and malaise/fatigue.   HENT:  Negative for nosebleeds.    Respiratory:  Negative for hemoptysis and shortness of breath.    Cardiovascular:  Negative for leg swelling.   Gastrointestinal:  Negative for abdominal pain, blood in stool, diarrhea and vomiting.   Genitourinary:  Negative for hematuria.   Neurological:  Negative for dizziness and headaches.   Endo/Heme/Allergies:  Does not bruise/bleed easily.   All other systems reviewed and are negative.         Past Medical History:   Past Medical History:   Diagnosis Date    Cancer     basal cell carcinoma    Stroke due to embolism of right  middle cerebral artery 02/18/2025        Past Surgical History:   Past Surgical History:   Procedure Laterality Date    CARPAL TUNNEL RELEASE      ESOPHAGOGASTRODUODENOSCOPY N/A 8/22/2022    Procedure: EGD (ESOPHAGOGASTRODUODENOSCOPY);  Surgeon: Steven Lopez MD;  Location: [address]);  Service: Endoscopy;  Laterality: N/A;    ESOPHAGOGASTRODUODENOSCOPY N/A 12/5/2022    Procedure: EGD (ESOPHAGOGASTRODUODENOSCOPY);  Surgeon: Steven Lopez MD;  Location: [facility]);  Service: Endoscopy;  Laterality: N/A;  Has estimated pulmonary artery pressure 45, schedule location per protocol.   Please schedule with Dr. Darryl Lopez-  Plavix stopped 8/23/22  pt requested to schedule after Thanksgiving/ inst portal-RB  pre call complete; [facility] 11/28/22    ESOPHAGOGASTRODUODENOSCOPY N/A 7/11/2023    Procedure: EGD (ESOPHAGOGASTRODUODENOSCOPY);  Surgeon: Natalie Fall MD;  Location: [facility];  Service: Endoscopy;  Laterality: N/A;    ESOPHAGOGASTRODUODENOSCOPY N/A 3/5/2025    Procedure: EGD (ESOPHAGOGASTRODUODENOSCOPY);  Surgeon: Roberto Salvador MD;  Location: [address]);  Service: Endoscopy;  Laterality: N/A;    EYE SURGERY      left eye cataract    LEFT HEART CATHETERIZATION  4/17/2024    Procedure: Left heart cath;  Surgeon: Elisabeth De La Cruz MD;  [facility] CATH;  Service: Cardiology;;    TONSILLECTOMY      TUBAL LIGATION          Family History:   No family history on file.     Social History:   Social History     Tobacco Use    Smoking status: Never     Passive exposure: Never    Smokeless tobacco: Never   Substance Use Topics    Alcohol use: No      Patient splits time between [address] and apartment in [place]    Allergies:   Review of patient's allergies indicates:  No Known Allergies     Medications:   Current Outpatient Medications   Medication Sig Dispense Refill    atorvastatin (LIPITOR) 80 MG tablet Take 1 tablet (80 mg total) by mouth once daily. 90 tablet 3    carvediloL  (COREG) 6.25 MG tablet Take 1 tablet (6.25 mg total) by mouth 2 (two) times daily with meals. 180 tablet 3    clopidogreL (PLAVIX) 75 mg tablet Take 1 tablet (75 mg total) by mouth once daily. 30 tablet 0    cyanocobalamin (VITAMIN B-12) 1000 MCG tablet Take 1,000 mcg by mouth once daily.      pantoprazole (PROTONIX) 40 MG tablet Take 1 tablet (40 mg total) by mouth once daily. 60 tablet 5    vitamin D (VITAMIN D3) 1000 units Tab Take 1,000 Units by mouth once daily.       No current facility-administered medications for this visit.        Physical Exam:   BP (!) 122/57 (BP Location: Left arm, Patient Position: Sitting)   Pulse 65   Temp 97.1 °F (36.2 °C) (Temporal)   Resp 16   Wt 51.5 kg (113 lb 8.6 oz)   SpO2 96%   BMI 18.33 kg/m²    Wt Readings from Last 3 Encounters:   03/12/25 51.5 kg (113 lb 8.6 oz)   03/11/25 50.3 kg (111 lb)   03/05/25 49.9 kg (110 lb)               Physical Exam  Vitals reviewed.   Constitutional:       General: She is not in acute distress.  HENT:      Head: Normocephalic and atraumatic.      Mouth/Throat:      Mouth: Mucous membranes are moist.      Pharynx: Oropharynx is clear.   Eyes:      Conjunctiva/sclera: Conjunctivae normal.   Cardiovascular:      Rate and Rhythm: Normal rate and regular rhythm.      Pulses: Normal pulses.      Heart sounds: No murmur heard.  Pulmonary:      Effort: Pulmonary effort is normal. No respiratory distress.      Breath sounds: Normal breath sounds.   Abdominal:      General: Abdomen is flat. There is no distension.      Palpations: Abdomen is soft.      Tenderness: There is no abdominal tenderness.   Musculoskeletal:         General: No swelling or tenderness. Normal range of motion.      Cervical back: Neck supple. No rigidity.   Lymphadenopathy:      Cervical: No cervical adenopathy.   Skin:     General: Skin is warm and dry.      Coloration: Skin is not jaundiced or pale.   Neurological:      General: No focal deficit present.      Mental  Status: She is alert and oriented to person, place, and time.   Psychiatric:         Mood and Affect: Mood normal.         Behavior: Behavior normal.         Thought Content: Thought content normal.           Labs:   Lab Results   Component Value Date    WBC 10.72 03/06/2025    HGB 9.3 (L) 03/06/2025    HCT 29.1 (L) 03/06/2025    MCV 90 03/06/2025     03/06/2025      Lab Results   Component Value Date    IRON 34 02/24/2025    TRANSFERRIN 201 02/24/2025    TIBC 297 02/24/2025    FESATURATED 11 (L) 02/24/2025      Lab Results   Component Value Date    FERRITIN 64 02/24/2025         Lab Results   Component Value Date     03/06/2025    K 4.0 03/06/2025     03/06/2025    CO2 28 03/06/2025    BUN 20 03/06/2025    CREATININE 0.6 03/06/2025    ALBUMIN 3.1 (L) 03/06/2025    BILITOT 0.8 03/06/2025    ALKPHOS 126 03/06/2025    AST 20 03/06/2025    ALT 13 03/06/2025       Imaging:   X-Ray Chest AP Portable  Narrative: EXAMINATION:  XR CHEST AP PORTABLE    CLINICAL HISTORY:  shortness of breath;    TECHNIQUE:  Single frontal view of the chest was performed.    COMPARISON:  No 04/17/2024 ne    FINDINGS:  Mild cardiomegaly.  Diffuse accentuation interstitial markings and/or edema.  Opacification at the lung bases bilaterally consistent with small amount of pleural effusion and associated volume loss.  The lung apices are clear.  Impression: Mild pulmonary vascular congestion, edema and pleural effusion    Electronically signed by: Jd Pyle MD  Date:    03/03/2025  Time:    09:44       Procedures:   Upper GI endoscopy 3/5/25    - Esophageal mucosal changes secondary to established long-segment Rodriguez's disease, classified as Rodriguez's stage C10-M10 per [place] criteria.   - 3 cm hiatal hernia.   - One non-bleeding angioectasia in the stomach. Treated with argon plasma coagulation (APC).   - Two non-bleeding angioectasias in the duodenum. Treated with argon plasma coagulation (APC).   - No specimens collected.    Roberto Salvador MD   3/5/2025 12:07:18 PM          Assessment:       1. Iron deficiency anemia due to chronic blood loss    2. Normocytic anemia              Plan:             # Iron Def Anemia - Never had an abnormal colonoscopy, no history of GIB with extensive previous ISAIAH work-up. Most likely cause following recent injury to pelvis, causing consumption with bruising + decreased nutrition with bedrest. Hgb is now improved after 1u pRBC and symptomatically patient feels better. Recommend to proceed with additional dose of IV iron to correct deficit, ~750 mg.   - injectafer x1  - return to clinic in 3-4m with repeat iron serology   07/09/24:  Arrange for additional Injectafer 750 mg on Monday, 07/15 @ 2:30 pm; f/u in 2 months with CBC, iron studies/ferritin prior.  09/23/24:  Symptomatic; Injectafer 750 mg weekly x 2; f/u in 3 months with CBC, BMP, iron studies/ferritin prior.  01/10/25:  S/P Injectafer 750 mg x 2 since last visit; Ferritin 113; improved Hgb from 9.9 to 11.9 g/dl, iron studies low; asymptomatic; Labs in 2 months; phone review  3/12/25: Patient with 2 hospitalizations since the last visit, CVA and GI bleed. Patient will require and additional Injectafer 750 mg; she will need to follow up in 2 months with hematologist at her request.    #STEMI/heart cath - 04/17/24, Cath per Dr. De La Cruz; presently on Brillinta    # TIA - Patient took anticoagulation and noted worsening of symptoms and has since discontinued. Most recent CVA noted on 2/18/25.    # GIB - Upcoming visit with GI to rule out bleeding, patient is not currently on anticoagulation  07/09/24:  On Brillinta s/p cath for STEMI  3/12/25: Patient remains on Brillinta    # Pelvic Fracture (12/2023) - Improved with physical therapy, no surgical intervention.     The above information has been reviewed with the patient and all questions have been answered to their apparent satisfaction.  They understand that they can call the clinic with any  questions.     NEAL Powell  Hematology and Medical Oncology  [facility]  [facility]    45 minutes of total time spent on the encounter, which includes face to face time and non-face to face time preparing to see the patient (eg, review of tests), Obtaining and/or reviewing separately obtained history, documenting clinical information in the electronic or other health record, independently interpreting results if documented above (not separately reported) and communicating results to the patient/family/caregiver, or Care coordination (not separately reported).         Med Onc Chart Routing      Follow up with physician . Patient will need Injectafer x 1 dose now. She will need CBC, CMP, iron studies and ferritin a couple days prior to seeing Heme MD in 2 months (patient requests to see MD)..   Follow up with LUZ    Infusion scheduling note    Injection scheduling note    Labs    Imaging    Pharmacy appointment    Other referrals

## 2025-03-14 ENCOUNTER — OUTPATIENT CASE MANAGEMENT (OUTPATIENT)
Dept: ADMINISTRATIVE | Facility: OTHER | Age: 85
End: 2025-03-14
Payer: MEDICARE

## 2025-03-14 NOTE — PROGRESS NOTES
Outpatient Care Management  Plan of Care Follow Up Visit    Patient: Katelyn Caba  MRN: [id number]  Date of Service: 03/14/2025  Completed by: Mayra Pop RN  Referral Date: 02/21/2025    Reason for Visit   Patient presents with    OPCM RN Follow Up Call    Nursing Assessment       Brief Summary:   -S&S of Anemia:  Patient states the signs and symptoms of anemia is no energy.  She states this is the only sign and symptom she knows of.  She states she does not know how to check for paleness.    -Iron:  Patient states that she is scheduled for an Injectofer infusion on Tues, 3/18, to increase her iron level.  She also states she is trying to eat plenty of meats and greens high in iron.  She states she has to see the Hematologist 2-3 weeks after the injection.     -Blood pressures:  Patient states her blood pressures are running 124/56, 117/53, 112/53, and 109/56.  She states she writes down the blood pressures but not the heart rates.      -S&S of a stroke:  Patient states she has had a stroke in the past and she has no residual weakness left from this stroke.  She states she does not really know the signs and symptoms of a stroke or the acronym to remember them.    INTERVENTIONS  :  -Iron:  CM encouraged patient to be sure she goes to the Injectofer appointment.  CM also reviewed with the patient the appointments she has scheduled coming up including the Hem/Onc appointment on 5/19 at 10:30 with Dr. Gee.     -S&S of anemia:  CM reviewed signs and symptoms of anemia including fatigue, Breathlessness, tachypnea, tachycardia, cold hands and feet, dizziness, headache, pale skin, brittle nails and craving unusual food.  CM also reviewed how to see if she is pale by looking in the mirror and pulling her bottom eyelid down.  If it is white inside instead of pink, then she is pale and she should let her doctor know.    -Blood pressures:  CM commended patient to keeping a log of her blood pressures.  PATRICIO  encouraged the patient to add the heart rate to the log and to bring the log with her whenever she goes to see Dr. Burnham.  That way if she needs her blood pressure medications adjusted, the doctor will have a trend to go by before adjusting the patient's medications.    -S&S of a stroke:  CM also reviewed the acronym BEFAST for the signs and symptoms of a stroke.  Balance when walking, Eyes with vision changes, Face with one sided facial droop, Arm with weakness on one side, Speech slurred or unable to talk and Time noted of the onset of symptoms.     SDOH completed by Mary Anne WASHINGTON and reviewed by this CM.  No needs noted at this time.    Next steps:   Follow up if received educational materials  Follow up on S&S of anemia  Follow up on iron rich diet  Follow up on what BP is running  Follow up on S&S of a stroke  Educate on when to follow up with the doctor with anemia

## 2025-03-18 ENCOUNTER — INFUSION (OUTPATIENT)
Dept: INFUSION THERAPY | Facility: HOSPITAL | Age: 85
End: 2025-03-18
Attending: NURSE PRACTITIONER
Payer: MEDICARE

## 2025-03-18 VITALS
RESPIRATION RATE: 18 BRPM | TEMPERATURE: 98 F | HEART RATE: 66 BPM | SYSTOLIC BLOOD PRESSURE: 122 MMHG | DIASTOLIC BLOOD PRESSURE: 55 MMHG | WEIGHT: 115.5 LBS | HEIGHT: 66 IN | OXYGEN SATURATION: 97 % | BODY MASS INDEX: 18.56 KG/M2

## 2025-03-18 DIAGNOSIS — D50.0 IRON DEFICIENCY ANEMIA DUE TO CHRONIC BLOOD LOSS: Primary | ICD-10-CM

## 2025-03-18 PROCEDURE — 63600175 PHARM REV CODE 636 W HCPCS: Mod: JZ,TB,PN | Performed by: NURSE PRACTITIONER

## 2025-03-18 PROCEDURE — 96365 THER/PROPH/DIAG IV INF INIT: CPT | Mod: PN

## 2025-03-18 PROCEDURE — 25000003 PHARM REV CODE 250: Mod: PN | Performed by: NURSE PRACTITIONER

## 2025-03-18 RX ORDER — DIPHENHYDRAMINE HYDROCHLORIDE 50 MG/ML
50 INJECTION, SOLUTION INTRAMUSCULAR; INTRAVENOUS ONCE AS NEEDED
OUTPATIENT
Start: 2025-03-25

## 2025-03-18 RX ORDER — EPINEPHRINE 0.3 MG/.3ML
0.3 INJECTION SUBCUTANEOUS ONCE AS NEEDED
OUTPATIENT
Start: 2025-03-25

## 2025-03-18 RX ORDER — SODIUM CHLORIDE 0.9 % (FLUSH) 0.9 %
10 SYRINGE (ML) INJECTION
OUTPATIENT
Start: 2025-03-25

## 2025-03-18 RX ORDER — HEPARIN 100 UNIT/ML
5 SYRINGE INTRAVENOUS
Status: DISCONTINUED | OUTPATIENT
Start: 2025-03-18 | End: 2025-03-18 | Stop reason: HOSPADM

## 2025-03-18 RX ORDER — HEPARIN 100 UNIT/ML
5 SYRINGE INTRAVENOUS
Status: CANCELLED | OUTPATIENT
Start: 2025-03-25

## 2025-03-18 RX ORDER — SODIUM CHLORIDE 9 MG/ML
INJECTION, SOLUTION INTRAVENOUS CONTINUOUS
Status: DISCONTINUED | OUTPATIENT
Start: 2025-03-18 | End: 2025-03-18 | Stop reason: HOSPADM

## 2025-03-18 RX ORDER — SODIUM CHLORIDE 0.9 % (FLUSH) 0.9 %
10 SYRINGE (ML) INJECTION
Status: DISCONTINUED | OUTPATIENT
Start: 2025-03-18 | End: 2025-03-18 | Stop reason: HOSPADM

## 2025-03-18 RX ORDER — EPINEPHRINE 0.3 MG/.3ML
0.3 INJECTION SUBCUTANEOUS ONCE AS NEEDED
Status: DISCONTINUED | OUTPATIENT
Start: 2025-03-18 | End: 2025-03-18 | Stop reason: HOSPADM

## 2025-03-18 RX ORDER — SODIUM CHLORIDE 9 MG/ML
INJECTION, SOLUTION INTRAVENOUS CONTINUOUS
OUTPATIENT
Start: 2025-03-25

## 2025-03-18 RX ADMIN — FERRIC CARBOXYMALTOSE INJECTION 750 MG: 50 INJECTION, SOLUTION INTRAVENOUS at 10:03

## 2025-03-18 NOTE — PLAN OF CARE
Problem: Adult Inpatient Plan of Care  Goal: Plan of Care Review  Outcome: Progressing  Flowsheets (Taken 3/18/2025 1151)  Plan of Care Reviewed With: patient  Goal: Patient-Specific Goal (Individualized)  Outcome: Progressing  Flowsheets (Taken 3/18/2025 1151)  Individualized Care Needs: Recliner, water, conversation  Anxieties, Fears or Concerns: PIV sticks  Patient/Family-Specific Goals (Include Timeframe): No s/s of reaction from treatment     Problem: Adult Inpatient Plan of Care  Goal: Patient-Specific Goal (Individualized)  Outcome: Progressing  Flowsheets (Taken 3/18/2025 1151)  Individualized Care Needs: Recliner, water, conversation  Anxieties, Fears or Concerns: PIV sticks  Patient/Family-Specific Goals (Include Timeframe): No s/s of reaction from treatment     Problem: Fatigue  Goal: Improved Activity Tolerance  Outcome: Progressing  Intervention: Promote Improved Energy  Flowsheets (Taken 3/18/2025 1151)  Fatigue Management:   activity assistance provided   paced activity encouraged   frequent rest breaks encouraged   fatigue-related activity identified  Sleep/Rest Enhancement:   natural light exposure provided   relaxation techniques promoted  Activity Management:   Ambulated -L4   Up in chair - L3  Environmental Support:   environmental consistency promoted   personal routine supported   rooming-in facilitated   rest periods encouraged   Pt tolerated Injectafer infusion well, NAD. Pt observed 30 min closely with no s/s of reaction. Pt given a schedule and reviewed, pt verbalized understanding. Pt ambulated out of the clinic without difficulty independently.

## 2025-03-20 ENCOUNTER — HOSPITAL ENCOUNTER (INPATIENT)
Facility: HOSPITAL | Age: 85
LOS: 3 days | Discharge: HOME | DRG: 189 | End: 2025-03-23
Attending: EMERGENCY MEDICINE | Admitting: HOSPITALIST
Payer: MEDICARE

## 2025-03-20 DIAGNOSIS — I50.9 CHF (CONGESTIVE HEART FAILURE): ICD-10-CM

## 2025-03-20 DIAGNOSIS — J96.01 ACUTE HYPOXIC RESPIRATORY FAILURE: Primary | ICD-10-CM

## 2025-03-20 DIAGNOSIS — R06.02 SHORTNESS OF BREATH: ICD-10-CM

## 2025-03-20 DIAGNOSIS — I50.20 SYSTOLIC CONGESTIVE HEART FAILURE, UNSPECIFIED HF CHRONICITY: ICD-10-CM

## 2025-03-20 DIAGNOSIS — J90 PLEURAL EFFUSION: ICD-10-CM

## 2025-03-20 PROBLEM — I50.21 ACUTE SYSTOLIC CONGESTIVE HEART FAILURE: Status: ACTIVE | Noted: 2025-03-20

## 2025-03-20 LAB
ALBUMIN SERPL BCP-MCNC: 3.4 G/DL (ref 3.5–5.2)
ALLENS TEST: ABNORMAL
ALP SERPL-CCNC: 147 U/L (ref 40–150)
ALT SERPL W/O P-5'-P-CCNC: 42 U/L (ref 10–44)
ANION GAP SERPL CALC-SCNC: 9 MMOL/L (ref 8–16)
ASCENDING AORTA: 3.13 CM
AST SERPL-CCNC: 38 U/L (ref 10–40)
AV AREA BY CONTINUOUS VTI: 2.2 CM2
AV INDEX (PROSTH): 0.74
AV LVOT MEAN GRADIENT: 2 MMHG
AV LVOT PEAK GRADIENT: 4 MMHG
AV MEAN GRADIENT: 6 MMHG
AV PEAK GRADIENT: 12 MMHG
AV VALVE AREA BY VELOCITY RATIO: 1.8 CM²
AV VALVE AREA: 2.3 CM2
AV VELOCITY RATIO: 0.59
BASOPHILS # BLD AUTO: 0.11 K/UL (ref 0–0.2)
BASOPHILS NFR BLD: 1 % (ref 0–1.9)
BILIRUB SERPL-MCNC: 0.5 MG/DL (ref 0.1–1)
BNP SERPL-MCNC: 1293 PG/ML (ref 0–99)
BSA FOR ECHO PROCEDURE: 1.56 M2
BUN SERPL-MCNC: 12 MG/DL (ref 8–23)
CALCIUM SERPL-MCNC: 8.5 MG/DL (ref 8.7–10.5)
CHLORIDE SERPL-SCNC: 110 MMOL/L (ref 95–110)
CO2 SERPL-SCNC: 19 MMOL/L (ref 23–29)
CREAT SERPL-MCNC: 0.7 MG/DL (ref 0.5–1.4)
CV ECHO LV RWT: 0.36 CM
DELSYS: ABNORMAL
DIFFERENTIAL METHOD BLD: ABNORMAL
DOP CALC AO PEAK VEL: 1.7 M/S
DOP CALC AO VTI: 35.1 CM
DOP CALC LVOT AREA: 3.1 CM2
DOP CALC LVOT DIAMETER: 2 CM
DOP CALC LVOT PEAK VEL: 1 M/S
DOP CALC LVOT STROKE VOLUME: 82 CM3
DOP CALCLVOT PEAK VEL VTI: 26.1 CM
E WAVE DECELERATION TIME: 160 MS
E/A RATIO: 1.66
E/E' RATIO: 22 M/S
ECHO EF ESTIMATED: 57 %
ECHO LV POSTERIOR WALL: 0.8 CM (ref 0.6–1.1)
EOSINOPHIL # BLD AUTO: 0.1 K/UL (ref 0–0.5)
EOSINOPHIL NFR BLD: 1.1 % (ref 0–8)
EP: 5
ERYTHROCYTE [DISTWIDTH] IN BLOOD BY AUTOMATED COUNT: 19 % (ref 11.5–14.5)
ERYTHROCYTE [SEDIMENTATION RATE] IN BLOOD BY WESTERGREN METHOD: 29 MM/H
EST. GFR  (NO RACE VARIABLE): >60 ML/MIN/1.73 M^2
ESTIMATED AVG GLUCOSE: 74 MG/DL (ref 68–131)
FRACTIONAL SHORTENING: 29.5 % (ref 28–44)
GLUCOSE SERPL-MCNC: 261 MG/DL (ref 70–110)
HBA1C MFR BLD: 4.2 % (ref 4–5.6)
HCO3 UR-SCNC: 21.4 MMOL/L (ref 24–28)
HCO3 UR-SCNC: 25.8 MMOL/L (ref 24–28)
HCT VFR BLD AUTO: 29.3 % (ref 37–48.5)
HGB BLD-MCNC: 8.7 G/DL (ref 12–16)
IMM GRANULOCYTES # BLD AUTO: 0.08 K/UL (ref 0–0.04)
IMM GRANULOCYTES NFR BLD AUTO: 0.7 % (ref 0–0.5)
INTERVENTRICULAR SEPTUM: 0.8 CM (ref 0.6–1.1)
IP: 15
IVC DIAMETER: 1.21 CM
LA MAJOR: 4.7 CM
LA MINOR: 4.7 CM
LA WIDTH: 3.8 CM
LACTATE SERPL-SCNC: 1.5 MMOL/L (ref 0.5–2.2)
LDH SERPL L TO P-CCNC: 2.37 MMOL/L (ref 0.5–2.2)
LEFT ATRIUM SIZE: 3.3 CM
LEFT ATRIUM VOLUME INDEX MOD: 32 ML/M2
LEFT ATRIUM VOLUME INDEX: 32 ML/M2
LEFT ATRIUM VOLUME MOD: 51 ML
LEFT ATRIUM VOLUME: 50 CM3
LEFT INTERNAL DIMENSION IN SYSTOLE: 3.1 CM (ref 2.1–4)
LEFT VENTRICLE DIASTOLIC VOLUME INDEX: 56.33 ML/M2
LEFT VENTRICLE DIASTOLIC VOLUME: 89 ML
LEFT VENTRICLE MASS INDEX: 69.3 G/M2
LEFT VENTRICLE SYSTOLIC VOLUME INDEX: 24.1 ML/M2
LEFT VENTRICLE SYSTOLIC VOLUME: 38 ML
LEFT VENTRICULAR INTERNAL DIMENSION IN DIASTOLE: 4.4 CM (ref 3.5–6)
LEFT VENTRICULAR MASS: 109.4 G
LV LATERAL E/E' RATIO: 18.5
LV SEPTAL E/E' RATIO: 27.8
LYMPHOCYTES # BLD AUTO: 3.5 K/UL (ref 1–4.8)
LYMPHOCYTES NFR BLD: 32.2 % (ref 18–48)
MAGNESIUM SERPL-MCNC: 1.9 MG/DL (ref 1.6–2.6)
MCH RBC QN AUTO: 30.9 PG (ref 27–31)
MCHC RBC AUTO-ENTMCNC: 29.7 G/DL (ref 32–36)
MCV RBC AUTO: 104 FL (ref 82–98)
MIN VOL: 11.7
MODE: ABNORMAL
MONOCYTES # BLD AUTO: 0.6 K/UL (ref 0.3–1)
MONOCYTES NFR BLD: 5.7 % (ref 4–15)
MV A" WAVE DURATION": 51.38 MS
MV PEAK A VEL: 0.67 M/S
MV PEAK E VEL: 1.11 M/S
NEUTROPHILS # BLD AUTO: 6.3 K/UL (ref 1.8–7.7)
NEUTROPHILS NFR BLD: 59.3 % (ref 38–73)
NRBC BLD-RTO: 1 /100 WBC
OHS CV RV/LV RATIO: 0.66 CM
OHS QRS DURATION: 88 MS
OHS QTC CALCULATION: 445 MS
PCO2 BLDA: 44.7 MMHG (ref 35–45)
PCO2 BLDA: 49.1 MMHG (ref 35–45)
PH SMN: 7.29 [PH] (ref 7.35–7.45)
PH SMN: 7.33 [PH] (ref 7.35–7.45)
PISA TR MAX VEL: 2.3 M/S
PLATELET # BLD AUTO: 453 K/UL (ref 150–450)
PMV BLD AUTO: 10.4 FL (ref 9.2–12.9)
PO2 BLDA: 31 MMHG (ref 40–60)
PO2 BLDA: 59 MMHG (ref 40–60)
POC BE: -5 MMOL/L
POC BE: 0 MMOL/L
POC SATURATED O2: 54 % (ref 95–100)
POC SATURATED O2: 87 % (ref 95–100)
POC TCO2: 23 MMOL/L (ref 24–29)
POC TCO2: 27 MMOL/L (ref 24–29)
POTASSIUM SERPL-SCNC: 4 MMOL/L (ref 3.5–5.1)
PROT SERPL-MCNC: 6.1 G/DL (ref 6–8.4)
PULM VEIN A" WAVE DURATION": 51.38 MS
PULM VEIN S/D RATIO: 0.36
PULMONIC VEIN PEAK A VELOCITY: 0.1 M/S
PV PEAK D VEL: 0.76 M/S
PV PEAK S VEL: 0.27 M/S
RA MAJOR: 4.14 CM
RA PRESSURE ESTIMATED: 3 MMHG
RA WIDTH: 3.34 CM
RBC # BLD AUTO: 2.82 M/UL (ref 4–5.4)
RIGHT ATRIAL AREA: 12.5 CM2
RIGHT VENTRICLE DIASTOLIC BASEL DIMENSION: 2.9 CM
RV TB RVSP: 5 MMHG
RV TISSUE DOPPLER FREE WALL SYSTOLIC VELOCITY 1 (APICAL 4 CHAMBER VIEW): 9.77 CM/S
SAMPLE: ABNORMAL
SARS-COV-2 RDRP RESP QL NAA+PROBE: NEGATIVE
SINUS: 2.97 CM
SITE: ABNORMAL
SODIUM SERPL-SCNC: 138 MMOL/L (ref 136–145)
SP02: 95
STJ: 2.53 CM
TDI LATERAL: 0.06 M/S
TDI SEPTAL: 0.04 M/S
TDI: 0.05 M/S
TRICUSPID ANNULAR PLANE SYSTOLIC EXCURSION: 1.44 CM
TROPONIN I SERPL DL<=0.01 NG/ML-MCNC: 82 NG/L (ref 0–14)
TV PEAK GRADIENT: 21 MMHG
TV REST PULMONARY ARTERY PRESSURE: 24 MMHG
WBC # BLD AUTO: 10.7 K/UL (ref 3.9–12.7)
Z-SCORE OF LEFT VENTRICULAR DIMENSION IN END DIASTOLE: -0.28
Z-SCORE OF LEFT VENTRICULAR DIMENSION IN END SYSTOLE: 0.78

## 2025-03-20 PROCEDURE — 25000242 PHARM REV CODE 250 ALT 637 W/ HCPCS

## 2025-03-20 PROCEDURE — 63600175 PHARM REV CODE 636 W HCPCS: Performed by: HOSPITALIST

## 2025-03-20 PROCEDURE — 80053 COMPREHEN METABOLIC PANEL: CPT

## 2025-03-20 PROCEDURE — 63600175 PHARM REV CODE 636 W HCPCS

## 2025-03-20 PROCEDURE — 96374 THER/PROPH/DIAG INJ IV PUSH: CPT

## 2025-03-20 PROCEDURE — 83605 ASSAY OF LACTIC ACID: CPT | Performed by: PHYSICIAN ASSISTANT

## 2025-03-20 PROCEDURE — 93005 ELECTROCARDIOGRAM TRACING: CPT

## 2025-03-20 PROCEDURE — 96375 TX/PRO/DX INJ NEW DRUG ADDON: CPT

## 2025-03-20 PROCEDURE — 27000190 HC CPAP FULL FACE MASK W/VALVE

## 2025-03-20 PROCEDURE — 94799 UNLISTED PULMONARY SVC/PX: CPT

## 2025-03-20 PROCEDURE — 99291 CRITICAL CARE FIRST HOUR: CPT

## 2025-03-20 PROCEDURE — 83605 ASSAY OF LACTIC ACID: CPT

## 2025-03-20 PROCEDURE — 87635 SARS-COV-2 COVID-19 AMP PRB: CPT

## 2025-03-20 PROCEDURE — 94640 AIRWAY INHALATION TREATMENT: CPT

## 2025-03-20 PROCEDURE — 83735 ASSAY OF MAGNESIUM: CPT

## 2025-03-20 PROCEDURE — 20600001 HC STEP DOWN PRIVATE ROOM

## 2025-03-20 PROCEDURE — 94761 N-INVAS EAR/PLS OXIMETRY MLT: CPT | Mod: XB

## 2025-03-20 PROCEDURE — 25000003 PHARM REV CODE 250: Performed by: HOSPITALIST

## 2025-03-20 PROCEDURE — 94660 CPAP INITIATION&MGMT: CPT

## 2025-03-20 PROCEDURE — 5A0945A ASSISTANCE WITH RESPIRATORY VENTILATION, 24-96 CONSECUTIVE HOURS, HIGH NASAL FLOW/VELOCITY: ICD-10-PCS | Performed by: HOSPITALIST

## 2025-03-20 PROCEDURE — 85025 COMPLETE CBC W/AUTO DIFF WBC: CPT

## 2025-03-20 PROCEDURE — 93010 ELECTROCARDIOGRAM REPORT: CPT | Mod: ,,, | Performed by: INTERNAL MEDICINE

## 2025-03-20 PROCEDURE — 83880 ASSAY OF NATRIURETIC PEPTIDE: CPT

## 2025-03-20 PROCEDURE — 27100171 HC OXYGEN HIGH FLOW UP TO 24 HOURS

## 2025-03-20 PROCEDURE — 25000003 PHARM REV CODE 250

## 2025-03-20 PROCEDURE — 99900035 HC TECH TIME PER 15 MIN (STAT)

## 2025-03-20 PROCEDURE — 82803 BLOOD GASES ANY COMBINATION: CPT

## 2025-03-20 PROCEDURE — 83036 HEMOGLOBIN GLYCOSYLATED A1C: CPT | Performed by: HOSPITALIST

## 2025-03-20 PROCEDURE — 5A09357 ASSISTANCE WITH RESPIRATORY VENTILATION, LESS THAN 24 CONSECUTIVE HOURS, CONTINUOUS POSITIVE AIRWAY PRESSURE: ICD-10-PCS | Performed by: HOSPITALIST

## 2025-03-20 PROCEDURE — 84484 ASSAY OF TROPONIN QUANT: CPT

## 2025-03-20 RX ORDER — ONDANSETRON 4 MG/1
8 TABLET, ORALLY DISINTEGRATING ORAL EVERY 8 HOURS PRN
Status: DISCONTINUED | OUTPATIENT
Start: 2025-03-20 | End: 2025-03-23 | Stop reason: HOSPADM

## 2025-03-20 RX ORDER — FUROSEMIDE 10 MG/ML
40 INJECTION INTRAMUSCULAR; INTRAVENOUS EVERY 12 HOURS
Status: DISCONTINUED | OUTPATIENT
Start: 2025-03-20 | End: 2025-03-22

## 2025-03-20 RX ORDER — SODIUM CHLORIDE 0.9 % (FLUSH) 0.9 %
10 SYRINGE (ML) INJECTION
Status: DISCONTINUED | OUTPATIENT
Start: 2025-03-20 | End: 2025-03-23 | Stop reason: HOSPADM

## 2025-03-20 RX ORDER — ACETAMINOPHEN 325 MG/1
650 TABLET ORAL EVERY 4 HOURS PRN
Status: DISCONTINUED | OUTPATIENT
Start: 2025-03-20 | End: 2025-03-23 | Stop reason: HOSPADM

## 2025-03-20 RX ORDER — IPRATROPIUM BROMIDE AND ALBUTEROL SULFATE 2.5; .5 MG/3ML; MG/3ML
3 SOLUTION RESPIRATORY (INHALATION)
Status: COMPLETED | OUTPATIENT
Start: 2025-03-20 | End: 2025-03-20

## 2025-03-20 RX ORDER — ATORVASTATIN CALCIUM 40 MG/1
80 TABLET, FILM COATED ORAL DAILY
Status: DISCONTINUED | OUTPATIENT
Start: 2025-03-20 | End: 2025-03-23 | Stop reason: HOSPADM

## 2025-03-20 RX ORDER — FUROSEMIDE 10 MG/ML
40 INJECTION INTRAMUSCULAR; INTRAVENOUS
Status: COMPLETED | OUTPATIENT
Start: 2025-03-20 | End: 2025-03-20

## 2025-03-20 RX ORDER — FUROSEMIDE 10 MG/ML
80 INJECTION INTRAMUSCULAR; INTRAVENOUS
Status: DISCONTINUED | OUTPATIENT
Start: 2025-03-20 | End: 2025-03-20

## 2025-03-20 RX ORDER — PANTOPRAZOLE SODIUM 40 MG/1
40 TABLET, DELAYED RELEASE ORAL DAILY
Status: DISCONTINUED | OUTPATIENT
Start: 2025-03-20 | End: 2025-03-23 | Stop reason: HOSPADM

## 2025-03-20 RX ORDER — ACETAMINOPHEN 325 MG/1
650 TABLET ORAL EVERY 8 HOURS PRN
Status: DISCONTINUED | OUTPATIENT
Start: 2025-03-20 | End: 2025-03-23 | Stop reason: HOSPADM

## 2025-03-20 RX ORDER — ACETAMINOPHEN 500 MG
1000 TABLET ORAL
Status: COMPLETED | OUTPATIENT
Start: 2025-03-20 | End: 2025-03-20

## 2025-03-20 RX ORDER — PROCHLORPERAZINE EDISYLATE 5 MG/ML
5 INJECTION INTRAMUSCULAR; INTRAVENOUS EVERY 6 HOURS PRN
Status: DISCONTINUED | OUTPATIENT
Start: 2025-03-20 | End: 2025-03-23 | Stop reason: HOSPADM

## 2025-03-20 RX ORDER — DEXAMETHASONE SODIUM PHOSPHATE 4 MG/ML
8 INJECTION, SOLUTION INTRA-ARTICULAR; INTRALESIONAL; INTRAMUSCULAR; INTRAVENOUS; SOFT TISSUE
Status: COMPLETED | OUTPATIENT
Start: 2025-03-20 | End: 2025-03-20

## 2025-03-20 RX ORDER — TALC
6 POWDER (GRAM) TOPICAL NIGHTLY PRN
Status: DISCONTINUED | OUTPATIENT
Start: 2025-03-20 | End: 2025-03-23 | Stop reason: HOSPADM

## 2025-03-20 RX ORDER — CARVEDILOL 6.25 MG/1
6.25 TABLET ORAL ONCE
Status: DISCONTINUED | OUTPATIENT
Start: 2025-03-20 | End: 2025-03-22

## 2025-03-20 RX ORDER — CLOPIDOGREL BISULFATE 75 MG/1
75 TABLET ORAL DAILY
Status: DISCONTINUED | OUTPATIENT
Start: 2025-03-20 | End: 2025-03-23 | Stop reason: HOSPADM

## 2025-03-20 RX ORDER — CARVEDILOL 6.25 MG/1
6.25 TABLET ORAL 2 TIMES DAILY WITH MEALS
Status: DISCONTINUED | OUTPATIENT
Start: 2025-03-20 | End: 2025-03-22

## 2025-03-20 RX ORDER — ENOXAPARIN SODIUM 100 MG/ML
40 INJECTION SUBCUTANEOUS EVERY 24 HOURS
Status: DISCONTINUED | OUTPATIENT
Start: 2025-03-20 | End: 2025-03-20

## 2025-03-20 RX ORDER — CARVEDILOL 3.12 MG/1
3.12 TABLET ORAL 2 TIMES DAILY WITH MEALS
Status: DISCONTINUED | OUTPATIENT
Start: 2025-03-20 | End: 2025-03-20

## 2025-03-20 RX ORDER — OXYCODONE HYDROCHLORIDE 5 MG/1
5 TABLET ORAL EVERY 6 HOURS PRN
Refills: 0 | Status: DISCONTINUED | OUTPATIENT
Start: 2025-03-20 | End: 2025-03-23 | Stop reason: HOSPADM

## 2025-03-20 RX ADMIN — IPRATROPIUM BROMIDE AND ALBUTEROL SULFATE 3 ML: 2.5; .5 SOLUTION RESPIRATORY (INHALATION) at 06:03

## 2025-03-20 RX ADMIN — DEXAMETHASONE SODIUM PHOSPHATE 8 MG: 4 INJECTION INTRA-ARTICULAR; INTRALESIONAL; INTRAMUSCULAR; INTRAVENOUS; SOFT TISSUE at 06:03

## 2025-03-20 RX ADMIN — PANTOPRAZOLE SODIUM 40 MG: 40 TABLET, DELAYED RELEASE ORAL at 11:03

## 2025-03-20 RX ADMIN — CARVEDILOL 6.25 MG: 6.25 TABLET, FILM COATED ORAL at 04:03

## 2025-03-20 RX ADMIN — FUROSEMIDE 40 MG: 10 INJECTION, SOLUTION INTRAVENOUS at 08:03

## 2025-03-20 RX ADMIN — ATORVASTATIN CALCIUM 80 MG: 40 TABLET, FILM COATED ORAL at 11:03

## 2025-03-20 RX ADMIN — CLOPIDOGREL 75 MG: 75 TABLET ORAL at 11:03

## 2025-03-20 RX ADMIN — FUROSEMIDE 40 MG: 10 INJECTION, SOLUTION INTRAVENOUS at 11:03

## 2025-03-20 RX ADMIN — ACETAMINOPHEN 1000 MG: 500 TABLET ORAL at 10:03

## 2025-03-20 RX ADMIN — FUROSEMIDE 40 MG: 10 INJECTION, SOLUTION INTRAVENOUS at 09:03

## 2025-03-20 NOTE — ASSESSMENT & PLAN NOTE
01-May-2021 12:58 Patient has Systolic (HFrEF) heart failure that is Acute on chronic. On presentation their CHF was decompensated. Evidence of decompensated CHF on presentation includes: crackles on lung auscultation, dyspnea on exertion (WATKINS), and shortness of breath. The etiology of their decompensation is likely not on lasix . Most recent BNP and echo results are listed below.  Recent Labs     03/20/25  0626   BNP 1,293*     Latest ECHO  Results for orders placed during the hospital encounter of 02/18/25    Echo Saline Bubble? Yes    Interpretation Summary    Left Ventricle: The left ventricle is normal in size. Normal wall thickness. There is normal systolic function with a visually estimated ejection fraction of 60 - 65%. There is normal diastolic function.    Right Ventricle: Normal right ventricular cavity size. Wall thickness is normal. Systolic function is normal.    Left Atrium: Left atrium is mildly dilated.    Aortic Valve: The aortic valve is a trileaflet valve. There is mild aortic valve sclerosis.    Tricuspid Valve: There is mild regurgitation.    Pulmonary Artery: The estimated pulmonary artery systolic pressure is 32 mmHg.    IVC/SVC: Normal venous pressure at 3 mmHg.    After contrast bubble injection very few bubbles appear late on the left side. They originate from  the pulmonary veins.  This is suggestive of extracardiac shunt.  Clinical correlation is required.    Current Heart Failure Medications  carvediloL tablet 6.25 mg, Once, Oral  furosemide injection 40 mg, Every 12 hours, Intravenous  carvediloL tablet 6.25 mg, 2 times daily with meals, Oral    Plan  - Monitor strict I&Os and daily weights.    - Place on telemetry  - Low sodium diet  - Place on fluid restriction of 1.5 L.   - Cardiology has not been consulted  - The patient's volume status is worsening as indicated by crackles on lung auscultation. Will adjust treatment as follows: lasix IV BID  - continue Coreg

## 2025-03-20 NOTE — SUBJECTIVE & OBJECTIVE
Past Medical History:   Diagnosis Date    Cancer     basal cell carcinoma    Stroke due to embolism of right middle cerebral artery 02/18/2025       Past Surgical History:   Procedure Laterality Date    CARPAL TUNNEL RELEASE      ESOPHAGOGASTRODUODENOSCOPY N/A 8/22/2022    Procedure: EGD (ESOPHAGOGASTRODUODENOSCOPY);  Surgeon: Steven Lopez MD;  Location: [address]);  Service: Endoscopy;  Laterality: N/A;    ESOPHAGOGASTRODUODENOSCOPY N/A 12/5/2022    Procedure: EGD (ESOPHAGOGASTRODUODENOSCOPY);  Surgeon: Steven Lopez MD;  [facility]  Service: Endoscopy;  Laterality: N/A;  Has estimated pulmonary artery pressure 45, schedule location per protocol.   Please schedule with Dr. Darryl Lopez-  Plavix stopped 8/23/22  pt requested to schedule after Thanksgiving/ inst portal-RB  pre call complete; [facility] 11/28/22    ESOPHAGOGASTRODUODENOSCOPY N/A 7/11/2023    Procedure: EGD (ESOPHAGOGASTRODUODENOSCOPY);  Surgeon: Natalie Fall MD;  [facility]: [facility];  Service: Endoscopy;  Laterality: N/A;    ESOPHAGOGASTRODUODENOSCOPY N/A 3/5/2025    Procedure: EGD (ESOPHAGOGASTRODUODENOSCOPY);  Surgeon: Roberto Salvador MD;  Location: [address]);  Service: Endoscopy;  Laterality: N/A;    EYE SURGERY      left eye cataract    LEFT HEART CATHETERIZATION  4/17/2024    Procedure: Left heart cath;  Surgeon: Elisabeth De La Cruz MD;  Location: [facility] CATH;  Service: Cardiology;;    TONSILLECTOMY      TUBAL LIGATION         Review of patient's allergies indicates:  No Known Allergies    No current facility-administered medications on file prior to encounter.     Current Outpatient Medications on File Prior to Encounter   Medication Sig    atorvastatin (LIPITOR) 80 MG tablet Take 1 tablet (80 mg total) by mouth once daily.    clopidogreL (PLAVIX) 75 mg tablet Take 1 tablet (75 mg total) by mouth once daily.    cyanocobalamin (VITAMIN B-12) 1000 MCG tablet Take 1,000 mcg by mouth once daily.     mupirocin (BACTROBAN) 2 % ointment Apply topically 3 (three) times daily.    ticagrelor (BRILINTA) 60 mg tablet Take 60 mg by mouth 2 (two) times daily.    vitamin D (VITAMIN D3) 1000 units Tab Take 1,000 Units by mouth once daily.    [DISCONTINUED] carvediloL (COREG) 6.25 MG tablet Take 1 tablet (6.25 mg total) by mouth 2 (two) times daily with meals.    [DISCONTINUED] pantoprazole (PROTONIX) 40 MG tablet Take 1 tablet (40 mg total) by mouth once daily.     Family History    None       Tobacco Use    Smoking status: Never     Passive exposure: Never    Smokeless tobacco: Never   Substance and Sexual Activity    Alcohol use: No    Drug use: No    Sexual activity: Not on file     Review of Systems   Constitutional:  Positive for fatigue.   HENT:  Negative for rhinorrhea and sore throat.    Respiratory:  Positive for cough, chest tightness and shortness of breath.    Cardiovascular:  Positive for chest pain and leg swelling. Negative for palpitations.   Gastrointestinal:  Negative for abdominal distention and abdominal pain.   Genitourinary:  Negative for dysuria and hematuria.     Objective:     Vital Signs (Most Recent):  Temp: 97 °F (36.1 °C) (03/20/25 0604)  Pulse: 72 (03/20/25 1216)  Resp: (!) 29 (03/20/25 1000)  BP: (!) 161/67 (03/20/25 1216)  SpO2: 100 % (03/20/25 1216) Vital Signs (24h Range):  Temp:  [97 °F (36.1 °C)] 97 °F (36.1 °C)  Pulse:  [69-86] 72  Resp:  [24-42] 29  SpO2:  [86 %-100 %] 100 %  BP: (161-190)/(67-92) 161/67     Weight: 52.2 kg (115 lb)  Body mass index is 18.56 kg/m².     Physical Exam  Constitutional:       General: She is not in acute distress.     Appearance: Normal appearance.   HENT:      Head: Normocephalic and atraumatic.      Right Ear: External ear normal.      Left Ear: External ear normal.      Nose: Nose normal. No congestion.      Mouth/Throat:      Mouth: Mucous membranes are moist.      Pharynx: No oropharyngeal exudate.   Eyes:      Extraocular Movements: Extraocular  movements intact.      Pupils: Pupils are equal, round, and reactive to light.   Cardiovascular:      Rate and Rhythm: Normal rate and regular rhythm.      Pulses: Normal pulses.      Heart sounds: Normal heart sounds. No murmur heard.  Pulmonary:      Effort: Respiratory distress present.      Breath sounds: Rales present.   Abdominal:      General: Bowel sounds are normal. There is no distension.      Palpations: There is no mass.   Musculoskeletal:      Cervical back: No rigidity.   Neurological:      Mental Status: She is alert.              CRANIAL NERVES     CN III, IV, VI   Pupils are equal, round, and reactive to light.       Significant Labs: All pertinent labs within the past 24 hours have been reviewed.    Significant Imaging: I have reviewed all pertinent imaging results/findings within the past 24 hours.

## 2025-03-20 NOTE — ASSESSMENT & PLAN NOTE
Patient's hemorrhage is due to gastrointestinal bleed, this bleeding is not associated with a medication or a coagulopathy. Patients most recent Hgb, Hct, platelets, and INR are listed below.  Recent Labs     03/20/25  0626   HGB 8.7*   HCT 29.3*   *     Plan  - Will trend hemoglobin/hematocrit Daily  - Will monitor and correct any coagulation defects  - Will transfuse if Hgb is <7g/dl (<8g/dl in cases of active ACS) or if patient has rapid bleeding leading to hemodynamic instability  - Stable, continue protonix

## 2025-03-20 NOTE — Clinical Note
Diagnosis: Acute hypoxic respiratory failure [[id number]]   Future Attending Provider: LUZMARIA BAER [[id number]]   Reason for IP Medical Treatment  (Clinical interventions that can only be accomplished in the IP setting? ) :: acute hypoxic respiratory failure   Plans for Post-Acute care--if anticipated (pick the single best option):: A. No post acute care anticipated at this time

## 2025-03-20 NOTE — ASSESSMENT & PLAN NOTE
Patient's blood pressure range in the last 24 hours was: BP  Min: 161/67  Max: 190/67.The patient's inpatient anti-hypertensive regimen is listed below:  Current Antihypertensives  carvediloL tablet 6.25 mg, Once, Oral  furosemide injection 40 mg, Every 12 hours, Intravenous  carvediloL tablet 6.25 mg, 2 times daily with meals, Oral    Plan  - BP is controlled, no changes needed to their regimen  -

## 2025-03-20 NOTE — PHARMACY MED REC
"        Admission Medication History     The home medication history was taken by Antonina Alfonso.    You may go to "Admission" then "Reconcile Home Medications" tabs to review and/or act upon these items.     The home medication list has been updated by the Pharmacy department.   Please read ALL comments highlighted in yellow.   Please address this information as you see fit.    Feel free to contact us if you have any questions or require assistance.      The medications listed below were removed from the home medication list. Please reorder if appropriate:  Patient reports no longer taking the following medication(s):  Coreg   Protonix     Medications listed below were obtained from: Patient/family and Analytic software- [employer]  Current Outpatient Medications on File Prior to Encounter   Medication Sig    atorvastatin (LIPITOR) 80 MG tablet Take 1 tablet (80 mg total) by mouth once daily.      clopidogreL (PLAVIX) 75 mg tablet Take 1 tablet (75 mg total) by mouth once daily.      cyanocobalamin (VITAMIN B-12) 1000 MCG tablet Take 1,000 mcg by mouth once daily.      mupirocin (BACTROBAN) 2 % ointment Apply topically 3 (three) times daily.      vitamin D (VITAMIN D3) 1000 units Tab Take 1,000 Units by mouth once daily.      ticagrelor (BRILINTA) 60 mg tablet Take 60 mg by mouth 2 (two) times daily.               Antonina Alfonso  [id number]          .          "

## 2025-03-20 NOTE — ED NOTES
Telemetry Verification   Patient placed on Telemetry Box  Verified with War Room  Box # [id number]   Rate 75   Rhythm Ns

## 2025-03-20 NOTE — NURSING
AAOX4,VSS,O2 sats>92% on 12L HFNC. Adequate output on IV Lasix. Plan of care discussed with patient. Discussed medications and care. Patient has no questions at this time. Pt visualised and stable. Call light within reach. Pt resting,bed at lowest position. Pt's family by the bedside. Will continue to monitor through the shift.

## 2025-03-20 NOTE — HPI
84-year-old female PMH of recent MCA CVA (2/18/25, received TNK), inferolateral STEMI (4/2024, s/p cath with no intervention) on brilinta, hypertension, hyperlipidemia, GI bleed, ISAIAH, BCC presenting for shortness of breath. Acidotic with significant work of breathing on presentation to ED and was placed on BiPAP. Was weaned down to high-flow nasal cannula at 15 L for past hour without hypoxia or changes in mentation. Work of breathing improved since arrival. Troponin elevated at 82 but downtrending from 2 weeks ago, BNP elevated at 1200 but also downtrending from 2 weeks ago. Chest x-ray showing bilateral pleural effusions. Patient received 40 mg Lasix with roughly 700 cc urine output at this point.

## 2025-03-20 NOTE — H&P
[facility] - Emergency Dept  [facility] Medicine  History & Physical    Patient Name: Katelyn Caba  MRN: [id number]  Patient Class: IP- Inpatient  Admission Date: 3/20/2025  Attending Physician: Rhea Prado MD   Primary Care Provider: Brooklyn Burnham MD         Patient information was obtained from patient, past medical records, and ER records.     Subjective:     Principal Problem:<principal problem not specified>    Chief Complaint:   Chief Complaint   Patient presents with    Shortness of Breath     EMS from home 88% RA. Duo Neb in route        HPI: 84-year-old female PMH of recent MCA CVA (2/18/25, received TNK), inferolateral STEMI (4/2024, s/p cath with no intervention) on brilinta, hypertension, hyperlipidemia, GI bleed, ISAIAH, BCC presenting for shortness of breath. Acidotic with significant work of breathing on presentation to ED and was placed on BiPAP. Was weaned down to high-flow nasal cannula at 15 L for past hour without hypoxia or changes in mentation. Work of breathing improved since arrival. Troponin elevated at 82 but downtrending from 2 weeks ago, BNP elevated at 1200 but also downtrending from 2 weeks ago. Chest x-ray showing bilateral pleural effusions. Patient received 40 mg Lasix with roughly 700 cc urine output at this point.     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   Please schedule with Dr. Darryl Lopez-  Plavix stopped 8/23/22  pt requested to schedule after Thanksgiving/ inst portal-  pre call complete; [facility] 11/28/22    ESOPHAGOGASTRODUODENOSCOPY N/A 7/11/2023    Procedure: EGD (ESOPHAGOGASTRODUODENOSCOPY);  Surgeon: Natalie Fall MD;  Location: [facility] ENDO;  Service: Endoscopy;  Laterality: N/A;    ESOPHAGOGASTRODUODENOSCOPY N/A 3/5/2025    Procedure: EGD (ESOPHAGOGASTRODUODENOSCOPY);  Surgeon: Roberto Salvador MD;  Location: [facility] ENDO ([address] [place] [address]);  Service: Endoscopy;  Laterality: N/A;    EYE SURGERY      left eye cataract    LEFT HEART CATHETERIZATION  4/17/2024    Procedure: Left heart cath;  Surgeon: Elisabeth De La Cruz MD;  Location: [facility] CATH;  Service: Cardiology;;    TONSILLECTOMY      TUBAL LIGATION         Review of patient's allergies indicates:  No Known Allergies    No current facility-administered medications on file prior to encounter.     Current Outpatient Medications on File Prior to Encounter   Medication Sig    atorvastatin (LIPITOR) 80 MG tablet Take 1 tablet (80 mg total) by mouth once daily.    clopidogreL (PLAVIX) 75 mg tablet Take 1 tablet (75 mg total) by mouth once daily.    cyanocobalamin (VITAMIN B-12) 1000 MCG tablet Take 1,000 mcg by mouth once daily.    mupirocin (BACTROBAN) 2 % ointment Apply topically 3 (three) times daily.    ticagrelor (BRILINTA) 60 mg tablet Take 60 mg by mouth 2 (two) times daily.    vitamin D (VITAMIN D3) 1000 units Tab Take 1,000 Units by mouth once daily.    [DISCONTINUED] carvediloL (COREG) 6.25 MG tablet Take 1 tablet (6.25 mg total) by mouth 2 (two) times daily with meals.    [DISCONTINUED] pantoprazole (PROTONIX) 40 MG tablet Take 1 tablet (40 mg total) by mouth once daily.     Family History    None       Tobacco Use    Smoking status: Never     Passive exposure: Never    Smokeless tobacco: Never   Substance and Sexual Activity    Alcohol use: No    Drug use: No    Sexual activity: Not  on file     Review of Systems   Constitutional:  Positive for fatigue.   HENT:  Negative for rhinorrhea and sore throat.    Respiratory:  Positive for cough, chest tightness and shortness of breath.    Cardiovascular:  Positive for chest pain and leg swelling. Negative for palpitations.   Gastrointestinal:  Negative for abdominal distention and abdominal pain.   Genitourinary:  Negative for dysuria and hematuria.     Objective:     Vital Signs (Most Recent):  Temp: 97 °F (36.1 °C) (03/20/25 0604)  Pulse: 72 (03/20/25 1216)  Resp: (!) 29 (03/20/25 1000)  BP: (!) 161/67 (03/20/25 1216)  SpO2: 100 % (03/20/25 1216) Vital Signs (24h Range):  Temp:  [97 °F (36.1 °C)] 97 °F (36.1 °C)  Pulse:  [69-86] 72  Resp:  [24-42] 29  SpO2:  [86 %-100 %] 100 %  BP: (161-190)/(67-92) 161/67     Weight: 52.2 kg (115 lb)  Body mass index is 18.56 kg/m².     Physical Exam  Constitutional:       General: She is not in acute distress.     Appearance: Normal appearance.   HENT:      Head: Normocephalic and atraumatic.      Right Ear: External ear normal.      Left Ear: External ear normal.      Nose: Nose normal. No congestion.      Mouth/Throat:      Mouth: Mucous membranes are moist.      Pharynx: No oropharyngeal exudate.   Eyes:      Extraocular Movements: Extraocular movements intact.      Pupils: Pupils are equal, round, and reactive to light.   Cardiovascular:      Rate and Rhythm: Normal rate and regular rhythm.      Pulses: Normal pulses.      Heart sounds: Normal heart sounds. No murmur heard.  Pulmonary:      Effort: Respiratory distress present.      Breath sounds: Rales present.   Abdominal:      General: Bowel sounds are normal. There is no distension.      Palpations: There is no mass.   Musculoskeletal:      Cervical back: No rigidity.   Neurological:      Mental Status: She is alert.              CRANIAL NERVES     CN III, IV, VI   Pupils are equal, round, and reactive to light.       Significant Labs: All pertinent labs  within the past 24 hours have been reviewed.    Significant Imaging: I have reviewed all pertinent imaging results/findings within the past 24 hours.  Assessment/Plan:     Acute systolic congestive heart failure  Patient has Systolic (HFrEF) heart failure that is Acute on chronic. On presentation their CHF was decompensated. Evidence of decompensated CHF on presentation includes: crackles on lung auscultation, dyspnea on exertion (WATKINS), and shortness of breath. The etiology of their decompensation is likely not on lasix . Most recent BNP and echo results are listed below.  Recent Labs     03/20/25  0626   BNP 1,293*     Latest ECHO  Results for orders placed during the hospital encounter of 02/18/25    Echo Saline Bubble? Yes    Interpretation Summary    Left Ventricle: The left ventricle is normal in size. Normal wall thickness. There is normal systolic function with a visually estimated ejection fraction of 60 - 65%. There is normal diastolic function.    Right Ventricle: Normal right ventricular cavity size. Wall thickness is normal. Systolic function is normal.    Left Atrium: Left atrium is mildly dilated.    Aortic Valve: The aortic valve is a trileaflet valve. There is mild aortic valve sclerosis.    Tricuspid Valve: There is mild regurgitation.    Pulmonary Artery: The estimated pulmonary artery systolic pressure is 32 mmHg.    IVC/SVC: Normal venous pressure at 3 mmHg.    After contrast bubble injection very few bubbles appear late on the left side. They originate from  the pulmonary veins.  This is suggestive of extracardiac shunt.  Clinical correlation is required.    Current Heart Failure Medications  carvediloL tablet 6.25 mg, Once, Oral  furosemide injection 40 mg, Every 12 hours, Intravenous  carvediloL tablet 6.25 mg, 2 times daily with meals, Oral    Plan  - Monitor strict I&Os and daily weights.    - Place on telemetry  - Low sodium diet  - Place on fluid restriction of 1.5 L.   - Cardiology has  not been consulted  - The patient's volume status is worsening as indicated by crackles on lung auscultation. Will adjust treatment as follows: lasix IV BID  - continue Coreg        Hypertension  Patient's blood pressure range in the last 24 hours was: BP  Min: 161/67  Max: 190/67.The patient's inpatient anti-hypertensive regimen is listed below:  Current Antihypertensives  carvediloL tablet 6.25 mg, Once, Oral  furosemide injection 40 mg, Every 12 hours, Intravenous  carvediloL tablet 6.25 mg, 2 times daily with meals, Oral    Plan  - BP is controlled, no changes needed to their regimen  -    AVM (arteriovenous malformation) of stomach, acquired with hemorrhage  Patient's hemorrhage is due to gastrointestinal bleed, this bleeding is not associated with a medication or a coagulopathy. Patients most recent Hgb, Hct, platelets, and INR are listed below.  Recent Labs     03/20/25  0626   HGB 8.7*   HCT 29.3*   *     Plan  - Will trend hemoglobin/hematocrit Daily  - Will monitor and correct any coagulation defects  - Will transfuse if Hgb is <7g/dl (<8g/dl in cases of active ACS) or if patient has rapid bleeding leading to hemodynamic instability  - Stable, continue protonix    H/O ischemic left MCA stroke    Continue Plavis and BP management      VTE Risk Mitigation (From admission, onward)           Ordered     enoxaparin injection 40 mg  Daily         03/20/25 1106     IP VTE HIGH RISK PATIENT  Once         03/20/25 1106     Place sequential compression device  Until discontinued         03/20/25 1106                                    Rhea Prado MD  Department of Hospital Medicine  [facility] - Emergency Dept

## 2025-03-20 NOTE — ED PROVIDER NOTES
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   reports that patient was in her usual state of health last night when they went to bed.  Patient awoke sometime in the night feeling short of breath.  She also noted some right low back pain at that time that has since resolved.  EMS was called for shortness of breath and found patient to be hypoxic at 88%, so started breathing treatment and transported patient to ED for further evaluation.  Patient denies chest pain, fevers/chills, back pain, any other symptoms at this time.    The history is provided by the patient, the spouse, the EMS personnel and medical records. No  was used.     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 02/18/2025     Past Surgical History:   Procedure Laterality Date    CARPAL TUNNEL RELEASE      ESOPHAGOGASTRODUODENOSCOPY N/A 8/22/2022    Procedure: EGD (ESOPHAGOGASTRODUODENOSCOPY);  Surgeon: Steven Lopez MD;  Location: [address]);  Service: Endoscopy;  Laterality: N/A;    ESOPHAGOGASTRODUODENOSCOPY N/A 12/5/2022    Procedure: EGD (ESOPHAGOGASTRODUODENOSCOPY);  Surgeon: Steven Lopez MD;  Location: [address]);  Service: Endoscopy;  Laterality: N/A;  Has estimated pulmonary artery pressure 45, schedule location per protocol.   Please schedule with Dr. Darryl Lopez-  Plavix stopped 8/23/22  [facility]  pre call complete; [facility] 11/28/22    ESOPHAGOGASTRODUODENOSCOPY  N/A 7/11/2023    Procedure: EGD (ESOPHAGOGASTRODUODENOSCOPY);  Surgeon: Natalie Fall MD;  Location: [facility] ENDO;  Service: Endoscopy;  Laterality: N/A;    ESOPHAGOGASTRODUODENOSCOPY N/A 3/5/2025    Procedure: EGD (ESOPHAGOGASTRODUODENOSCOPY);  Surgeon: Roberto Salvador MD;  [facility]  Service: Endoscopy;  Laterality: N/A;    EYE SURGERY      left eye cataract    LEFT HEART CATHETERIZATION  4/17/2024    Procedure: Left heart cath;  Surgeon: Elisabeth De La Cruz MD;  Location: [facility] CATH;  Service: Cardiology;;    TONSILLECTOMY      TUBAL LIGATION       No family history on file.  Social History[1]      Physical Exam     Initial Vitals [03/20/25 0604]   BP Pulse Resp Temp SpO2   (!) 190/67 86 (!) 30 97 °F (36.1 °C) 97 %      MAP       --         Physical Exam    Nursing note and vitals reviewed.  Constitutional: She is diaphoretic. She appears distressed.   Chronically ill-appearing   HENT:   Head: Normocephalic and atraumatic.   Eyes: Conjunctivae are normal. Pupils are equal, round, and reactive to light.   Conjunctival rim pallor   Cardiovascular:  Normal rate, regular rhythm and normal heart sounds.           Pulmonary/Chest: Accessory muscle usage present. Tachypnea noted. She has decreased breath sounds in the right upper field, the right middle field, the right lower field, the left upper field and the left middle field. She has wheezes in the right upper field, the right middle field, the right lower field, the left upper field, the left middle field and the left lower field. She has no rhonchi. She has no rales.   Abdominal: Abdomen is soft. There is no abdominal tenderness.     Neurological: She is alert.   Follows commands         ED Course   Procedures  Labs Reviewed   CBC W/ AUTO DIFFERENTIAL - Abnormal       Result Value    WBC 10.70      RBC 2.82 (*)     Hemoglobin 8.7 (*)     Hematocrit 29.3 (*)      (*)     MCH 30.9      MCHC 29.7 (*)     RDW 19.0 (*)     Platelets 453 (*)     MPV  10.4      Immature Granulocytes 0.7 (*)     Gran # (ANC) 6.3      Immature Grans (Abs) 0.08 (*)     Lymph # 3.5      Mono # 0.6      Eos # 0.1      Baso # 0.11      nRBC 1 (*)     Gran % 59.3      Lymph % 32.2      Mono % 5.7      Eosinophil % 1.1      Basophil % 1.0      Differential Method Automated     COMPREHENSIVE METABOLIC PANEL - Abnormal    Sodium 138      Potassium 4.0      Chloride 110      CO2 19 (*)     Glucose 261 (*)     BUN 12      Creatinine 0.7      Calcium 8.5 (*)     Total Protein 6.1      Albumin 3.4 (*)     Total Bilirubin 0.5      Alkaline Phosphatase 147      AST 38      ALT 42      eGFR >60.0      Anion Gap 9     TROPONIN I HIGH SENSITIVITY - Abnormal    Troponin I High Sensitivity 82 (*)    B-TYPE NATRIURETIC PEPTIDE - Abnormal    BNP 1,293 (*)    ISTAT PROCEDURE - Abnormal    POC PH 7.288 (*)     POC PCO2 44.7      POC PO2 59      POC HCO3 21.4 (*)     POC BE -5 (*)     POC SATURATED O2 87      POC TCO2 23 (*)     Sample VENOUS      Site Other      Allens Test N/A     ISTAT LACTATE - Abnormal    POC Lactate 2.37 (*)     Sample VENOUS      Site Other      Allens Test N/A     ISTAT PROCEDURE - Abnormal    POC PH 7.328 (*)     POC PCO2 49.1 (*)     POC PO2 31 (*)     POC HCO3 25.8      POC BE 0      POC SATURATED O2 54      POC TCO2 27      Rate 29      Sample VENOUS      Site Other      Allens Test N/A      DelSys CPAP/BiPAP      Mode BiPAP      Min Vol 11.7      Sp02 95      IP 15      EP 5     SARS-COV-2 RNA AMPLIFICATION, QUAL    SARS-CoV-2 RNA, Amplification, Qual Negative     MAGNESIUM    Magnesium 1.9     LACTIC ACID, PLASMA    Lactate (Lactic Acid) 1.5     HEMOGLOBIN A1C    Hemoglobin A1C 4.2      Estimated Avg Glucose 74       EKG Readings: (Independently Interpreted)   Initial Reading: No STEMI. Previous EKG: Compared with most recent EKG Previous EKG Date: 03/03/2025. Rhythm: Normal Sinus Rhythm. Heart Rate: 86. Axis: Normal.     ECG Results              EKG 12-lead (Final result)         Collection Time Result Time QRS Duration OHS QTC Calculation    03/20/25 06:13:18 03/20/25 08:01:08 88 445                     Final result by Interface, [facility] (03/20/25 08:01:13)                   Narrative:    Test Reason : R06.02,    Vent. Rate :  86 BPM     Atrial Rate :  86 BPM     P-R Int : 122 ms          QRS Dur :  88 ms      QT Int : 372 ms       P-R-T Axes :  59  49  -6 degrees    QTcB Int : 445 ms    Normal sinus rhythm  Low voltage QRS  Low septal forces ; Abnormal R wave progression in the precordial leads  -Probable Septal infarct (cited on or before 26-Sep-2024)  Abnormal ECG  When compared with ECG of 03-Mar-2025 11:39,  T wave inversion less evident in Lateral leads  Confirmed by Nikko Lora ([id number]) on 3/20/2025 8:01:04 AM    Referred By: AAAREFERRAL SELF           Confirmed By: Nikko Lora                                  Imaging Results              X-Ray Chest 1 View (Final result)  Result time 03/20/25 08:10:52      Final result by Arsenio Evans MD (03/20/25 08:10:52)                   Impression:      Patchy opacities throughout both lungs may relate to atelectasis, edema, infection, and/or noninfectious inflammatory etiology.    At least moderately sized bilateral pleural effusions.      Electronically signed by: Arsenio Evans  Date:    03/20/2025  Time:    08:10               Narrative:    EXAMINATION:  XR CHEST 1 VIEW    CLINICAL HISTORY:  shortness of breath;    TECHNIQUE:  Single frontal view of the chest was performed.    COMPARISON:  Chest radiograph performed 03/03/2025, 08:55 hours.    FINDINGS:  Monitoring leads overlie the chest.    Generator device projects over the right hemiabdomen    Grossly unchanged cardiac contours, again noting enlargement the cardiac silhouette and prominence of central vasculature.    Background of course is noted.    Patchy opacities are noted throughout both lungs.    At least moderately sized bilateral pleural effusions.    No definite  pneumothorax.    No acute findings in the visualized abdomen.  Osseous and soft tissue structures appear without definite acute change.  Suspected osseous demineralization.  Hilar mass of the thoracic and lumbar vertebral bodies felt likely similar to prior.                                       Medications   carvediloL tablet 6.25 mg (6.25 mg Oral Not Given 3/20/25 0915)   sodium chloride 0.9% flush 10 mL (has no administration in time range)   enoxaparin injection 40 mg (has no administration in time range)   furosemide injection 40 mg (40 mg Intravenous Given 3/20/25 1127)   ondansetron disintegrating tablet 8 mg (has no administration in time range)   prochlorperazine injection Soln 5 mg (has no administration in time range)   acetaminophen tablet 650 mg (has no administration in time range)   acetaminophen tablet 650 mg (has no administration in time range)   oxyCODONE immediate release tablet 5 mg (has no administration in time range)   melatonin tablet 6 mg (has no administration in time range)   atorvastatin tablet 80 mg (80 mg Oral Given 3/20/25 1126)   pantoprazole EC tablet 40 mg (40 mg Oral Given 3/20/25 1126)   clopidogreL tablet 75 mg (75 mg Oral Given 3/20/25 1126)   carvediloL tablet 6.25 mg (has no administration in time range)   albuterol-ipratropium 2.5 mg-0.5 mg/3 mL nebulizer solution 3 mL (3 mLs Nebulization Given 3/20/25 0646)   dexAMETHasone injection 8 mg (8 mg Intravenous Given 3/20/25 0628)   furosemide injection 40 mg (40 mg Intravenous Given 3/20/25 0839)   acetaminophen tablet 1,000 mg (1,000 mg Oral Given 3/20/25 1050)     Medical Decision Making  84-year-old female PMH of recent MCA CVA (2/18/25, received TNK), inferolateral STEMI (4/2024, s/p cath with no intervention) on brilinta, hypertension, hyperlipidemia, GI bleed, ISAIAH, BCC presenting for shortness of breath.      Differential diagnosis includes, but is not limited to, ACS, CHF, pneumonia, pneumothorax, COPD, anemia.    In  emergency department, patient in significant respiratory distress with decreased air movement and wheezing in all lung fields.  Started patient on DuoNebs and gave steroids.  Patient still had significant work of breathing, so placed on BiPAP.  Initial VBG with pH 7.288, pCO2 44.7.  Repeat gas after BiPAP pH 7.328.  Patient transitioned from BiPAP to high-flow nasal cannula, maintaining O2 sats on high-flow.  On re-evaluation, patient appearing much more comfortable with improved work of breathing and diminished wheezing in all fields with improved air movement.  As patient is not normally on home oxygen, admitted patient to Hospital Medicine for acute hypoxic respiratory failure and bilateral pleural effusions.    Amount and/or Complexity of Data Reviewed  External Data Reviewed: notes.     Details: === Results for orders placed during the hospital encounter of 02/18/25 ===    Echo Saline Bubble? Yes    - Interpretation Summary -  ·  Left Ventricle: The left ventricle is normal in size. Normal wall thickness. There is normal systolic function with a visually estimated ejection fraction of 60 - 65%. There is normal diastolic function.  ·  Right Ventricle: Normal right ventricular cavity size. Wall thickness is normal. Systolic function is normal.  ·  Left Atrium: Left atrium is mildly dilated.  ·  Aortic Valve: The aortic valve is a trileaflet valve. There is mild aortic valve sclerosis.  ·  Tricuspid Valve: There is mild regurgitation.  ·  Pulmonary Artery: The estimated pulmonary artery systolic pressure is 32 mmHg.  ·  IVC/SVC: Normal venous pressure at 3 mmHg.  ·  After contrast bubble injection very few bubbles appear late on the left side. They originate from  the pulmonary veins.  This is suggestive of extracardiac shunt.  Clinical correlation is required.      Labs: ordered. Decision-making details documented in ED Course.  Radiology: ordered and independent interpretation performed. Decision-making details  documented in ED Course.  ECG/medicine tests: ordered and independent interpretation performed. Decision-making details documented in ED Course.    Risk  OTC drugs.  Prescription drug management.  Decision regarding hospitalization.  Risk Details: Patient received Lasix, breathing treatments, steroids while in the emergency department.  Admitted patient to Hospital Medicine for acute hypoxic respiratory failure.              Attending Attestation:   Physician Attestation Statement for Resident:  As the supervising MD   Physician Attestation Statement: I have personally seen and examined this patient.   I agree with the above history.  -: 84-year-old woman with history of recent CVA status post TNK, STEMI, HTN, HLD, GI bleed presents via EMS for shortness of breath.  On exam she is barrel-chested with diffuse wheezing throughout.  She received steroids and breathing treatments for possible COPD exacerbation.  Bipap initiated.  BNP and troponin were elevated.  No chest pain, do not suspect acute thrombotic ACS.  Initial lactic acid was slightly elevated but patient has warm extremities, is mentating okay, and after diuresis, repeat lactic acid cleared.  Do not suspect cardiogenic shock.  Presentation likely secondary to CHF exacerbation not COPD exacerbation. Will admit for continued diuresis for presumed acute CHF exacerbation.   As the supervising MD I agree with the above PE.     As the supervising MD I agree with the above treatment, course, plan, and disposition.            Attending Critical Care:   Critical Care Times:   ==============================================================  Total Critical Care Time - exclusive of procedural time: 30 minutes.  ==============================================================  Critical care was necessary to treat or prevent imminent or life-threatening deterioration of the following conditions: respiratory failure.           ED Course as of 03/20/25 1234   u Mar 20, 2025    0644 ISTAT PROCEDURE(!!)  Acidosis [BH]   0645 ISTAT Lactate(!)  Elevated lactic acid, concern for possible cardiogenic cause in this patient []   0717 CBC Auto Differential(!)  Anemia, most recent 9.3  No leukocytosis []   0718 COVID-19 Rapid Screening  Negative []   0722 Troponin I High Sensitivity(!)  Elevated, but downtrending from previous [BH]   0737 On re-evaluation, patient satting 100% on BiPAP.  Wheezing no longer appreciated, but still diminished in the lung bases bilaterally []   0741 X-Ray Chest 1 View  Independent interpretation of this chest x-ray:  Bilateral pleural effusions without focal consolidation or pneumothorax []   0840 BNP(!)  Elevated, downtrending from previous []   0858 ISTAT PROCEDURE(!)  Acidosis improved [BH]      ED Course User Index  [BH] Robert Houston MD                           Clinical Impression:  Final diagnoses:  [R06.02] Shortness of breath  [J96.01] Acute hypoxic respiratory failure (Primary)          ED Disposition Condition    Admit Stable                    [1]   Social History  Tobacco Use    Smoking status: Never     Passive exposure: Never    Smokeless tobacco: Never   Substance Use Topics    Alcohol use: No    Drug use: No        Robert Houston MD  Resident  03/20/25 7139

## 2025-03-21 ENCOUNTER — DOCUMENTATION ONLY (OUTPATIENT)
Dept: CARDIOLOGY | Facility: CLINIC | Age: 85
End: 2025-03-21
Payer: MEDICARE

## 2025-03-21 PROBLEM — E44.0 MODERATE PROTEIN-CALORIE MALNUTRITION: Status: ACTIVE | Noted: 2025-03-21

## 2025-03-21 LAB
ANION GAP SERPL CALC-SCNC: 12 MMOL/L (ref 8–16)
BUN SERPL-MCNC: 15 MG/DL (ref 8–23)
CALCIUM SERPL-MCNC: 8.6 MG/DL (ref 8.7–10.5)
CHLORIDE SERPL-SCNC: 100 MMOL/L (ref 95–110)
CO2 SERPL-SCNC: 27 MMOL/L (ref 23–29)
CREAT SERPL-MCNC: 0.7 MG/DL (ref 0.5–1.4)
EST. GFR  (NO RACE VARIABLE): >60 ML/MIN/1.73 M^2
GLUCOSE SERPL-MCNC: 93 MG/DL (ref 70–110)
PHOSPHATE SERPL-MCNC: 3.1 MG/DL (ref 2.7–4.5)
POTASSIUM SERPL-SCNC: 3.6 MMOL/L (ref 3.5–5.1)
SODIUM SERPL-SCNC: 139 MMOL/L (ref 136–145)

## 2025-03-21 PROCEDURE — 84100 ASSAY OF PHOSPHORUS: CPT | Performed by: HOSPITALIST

## 2025-03-21 PROCEDURE — 20600001 HC STEP DOWN PRIVATE ROOM

## 2025-03-21 PROCEDURE — 94761 N-INVAS EAR/PLS OXIMETRY MLT: CPT

## 2025-03-21 PROCEDURE — 80048 BASIC METABOLIC PNL TOTAL CA: CPT | Performed by: HOSPITALIST

## 2025-03-21 PROCEDURE — 97535 SELF CARE MNGMENT TRAINING: CPT

## 2025-03-21 PROCEDURE — 36415 COLL VENOUS BLD VENIPUNCTURE: CPT | Performed by: HOSPITALIST

## 2025-03-21 PROCEDURE — 25000003 PHARM REV CODE 250: Performed by: HOSPITALIST

## 2025-03-21 PROCEDURE — 27000221 HC OXYGEN, UP TO 24 HOURS

## 2025-03-21 PROCEDURE — 63600175 PHARM REV CODE 636 W HCPCS: Performed by: HOSPITALIST

## 2025-03-21 PROCEDURE — 97165 OT EVAL LOW COMPLEX 30 MIN: CPT

## 2025-03-21 RX ADMIN — FUROSEMIDE 40 MG: 10 INJECTION, SOLUTION INTRAVENOUS at 09:03

## 2025-03-21 RX ADMIN — ATORVASTATIN CALCIUM 80 MG: 40 TABLET, FILM COATED ORAL at 09:03

## 2025-03-21 RX ADMIN — CLOPIDOGREL 75 MG: 75 TABLET ORAL at 09:03

## 2025-03-21 RX ADMIN — CARVEDILOL 6.25 MG: 6.25 TABLET, FILM COATED ORAL at 09:03

## 2025-03-21 RX ADMIN — PANTOPRAZOLE SODIUM 40 MG: 40 TABLET, DELAYED RELEASE ORAL at 09:03

## 2025-03-21 NOTE — CONSULTS
[facility] - Cardiology Stepdown  Adult Nutrition  Consult Note    SUMMARY     Recommendations    1. Continue low Na diet as tolerated     2. Recommend boost plus once daily    3. Encourage good intake    4. RD to monitor weight, labs, intake    Goals:   1. % nutritional needs met with diet during admission     2. Maintain weight during admission  Nutrition Goal Status: new  Communication of RD Recs: other (comment) (POC)    Nutrition Discharge Planning     Nutrition Discharge Planning: Therapeutic diet (comments), Oral supplement regimen (comments)  Therapeutic diet (comments): low Na  Oral supplement regimen (comments): supplement of choice    Assessment and Plan    Endocrine  Moderate protein-calorie malnutrition  Malnutrition Type:  Context: chronic illness  Level: moderate    Related to (etiology):   Physiological causes resulting in anorexia or diminished intake     Signs and Symptoms (as evidenced by):   Wt loss, muscle and fat depletion, and consuming < 75% of meals/skipping meals     Malnutrition Characteristic Summary:  Weight Loss (Malnutrition): greater than 10% in 6 months (16 lbs/13.4% wt loss x 6 months)  Energy Intake (Malnutrition): less than 75% for greater than or equal to 1 month  Subcutaneous Fat (Malnutrition): mild depletion  Muscle Mass (Malnutrition): mild depletion    Interventions/Recommendations (treatment strategy):  Collaboration with other providers   ONS    Nutrition Diagnosis Status:   New         Malnutrition Assessment    Malnutrition Context: chronic illness  Malnutrition Level: moderate  Skin (Micronutrient): edema       Weight Loss (Malnutrition): greater than 10% in 6 months (16 lbs/13.4% wt loss x 6 months)  Energy Intake (Malnutrition): less than 75% for greater than or equal to 1 month  Subcutaneous Fat (Malnutrition): mild depletion  Muscle Mass (Malnutrition): mild depletion   Orbital Region (Subcutaneous Fat Loss): mild depletion  Upper Arm Region (Subcutaneous Fat  Loss): mild depletion  Thoracic and Lumbar Region: mild depletion   Christian Region (Muscle Loss): mild depletion  Clavicle Bone Region (Muscle Loss): moderate depletion  Clavicle and Acromion Bone Region (Muscle Loss): moderate depletion  Dorsal Hand (Muscle Loss): mild depletion  Patellar Region (Muscle Loss): mild depletion  Anterior Thigh Region (Muscle Loss): mild depletion  Posterior Calf Region (Muscle Loss): mild depletion     Reason for Assessment    Reason For Assessment: consult (fluid restriction and low Na education)  Diagnosis: pulmonary disease (acute hypoxic respiratory failure)  General Information Comments: Pt admitted with acute hypoxic respiratory failure. PMHx: cancer, stroke due to embolism of right middle cerebral artery, HTN, HLD, GI bleed. Surgical hx: Esophagogastroduodenoscopy (3/5). 1+ edema. No GI s/s - BM: 3/20. Pt reported a fair appetite - PO: 25-75%. NFPE completed 3/21.    Food & Nutrition  Education     Diet Education: Fluid and Salt restriction   Time Spent: 10 minutes   Learners: Patient      Handouts provided: Low Sodium Nutrition Therapy, Fluid-Restricted Nutrition Therapy      Comments: Discussed importance of a low sodium diet. Reviewed high sodium foods that should be avoided. Food labels, salt free seasonings, and recommended sodium intake reviewed. Encouraged healthy, fresh foods that are low in sodium that are good for consumption. Fluid intake and conversions discussed. Foods considered fluids were reviewed and encouraged to monitor. General healthy diet encouraged. All questions/concerns were addressed.    Nutrition/Diet History    Nutrition Intake History: 2 meals and snacks  Spiritual, Cultural Beliefs, Mosque Practices, Values that Affect Care: no  Food Allergies: NKFA  Factors Affecting Nutritional Intake: decreased appetite    Nutrition Related Social Determinants of Health: SDOH: Adequate food in home environment    Food Insecurity: No Food Insecurity (3/20/2025)  "   Hunger Vital Sign     Worried About Running Out of Food in the Last Year: Never true     Ran Out of Food in the Last Year: Never true     Anthropometrics    Height: 5' 6" (167.6 cm)  Height (inches): 66 in  Height Method: Stated  Weight: 46.9 kg (103 lb 6.3 oz)  Weight (lb): 103.4 lb  Weight Method: Bed Scale  Ideal Body Weight (IBW), Female: 130 lb  % Ideal Body Weight, Female (lb): 86.15 %  BMI (Calculated): 16.7  BMI Grade: less than 18.5 - underweight  Usual Body Weight (UBW), k.9 kg  % Usual Body Weight: 92.33    Lab/Procedures/Meds    Pertinent Labs Reviewed: reviewed  Pertinent Labs Comments: H/H 8.7/29.3 low, Ca 8.6 low, albumin 3.4 low  Pertinent Medications Reviewed: reviewed  Pertinent Medications Comments: atorvastatin, carvedilol, clopidogrel, pantoprazole, furosemide    Estimated/Assessed Needs    Weight Used For Calorie Calculations: 46.9 kg (103 lb 6.3 oz)  Energy Calorie Requirements (kcal): 0426-1531 kcal  Energy Need Method: Kcal/kg (25-35 kcal/kg)  Protein Requirements: 71-94 g/pro (1.5-2.0 g/kg)  Weight Used For Protein Calculations: 46.9 kg (103 lb 6.3 oz)        RDA Method (mL): 1173  CHO Requirement: 147-205 g    Nutrition Prescription Ordered    Current Diet Order: low Na 1500 ml    Evaluation of Received Nutrient/Fluid Intake    Energy Calories Required: not meeting needs  Protein Required: not meeting needs  Tolerance: tolerating  % Intake of Estimated Energy Needs: 25 - 50 %  % Meal Intake: 25 - 50 %    Nutrition Risk    Level of Risk/Frequency of Follow-up: moderate     Monitor and Evaluation    Monitor and Evaluation: Energy intake, Food and beverage intake, Protein intake, Carbohydrate intake, Diet order, Food and nutrition knowledge, Weight, Electrolyte and renal panel, Gastrointestinal profile, Glucose/endocrine profile, Inflammatory profile, Lipid profile, Nutrition focused physical findings     Nutrition Follow-Up    RD Follow-up?: Yes    "

## 2025-03-21 NOTE — ASSESSMENT & PLAN NOTE
Patient with Hypoxic Respiratory failure which is Acute.  she is not on home oxygen. Supplemental oxygen was provided and noted-      .   Signs/symptoms of respiratory failure include- tachypnea and respiratory distress. Contributing diagnoses includes - CHF Labs and images were reviewed. Patient Has recent ABG, which has been reviewed.

## 2025-03-21 NOTE — PROGRESS NOTES
Thanh Williamson - Cardiology [facility] Medicine  Progress Note    Patient Name: Katelyn Caba  MRN: [id number]  Patient Class: IP- Inpatient   Admission Date: 3/20/2025  Length of Stay: 1 days  Attending Physician: Rhea Prado MD  Primary Care Provider: Brooklyn Burnham MD        Subjective     Principal Problem:Acute hypoxic respiratory failure        HPI:  84-year-old female PMH of recent MCA CVA (2/18/25, received TNK), inferolateral STEMI (4/2024, s/p cath with no intervention) on brilinta, hypertension, hyperlipidemia, GI bleed, ISAIAH, BCC presenting for shortness of breath. Acidotic with significant work of breathing on presentation to ED and was placed on BiPAP. Was weaned down to high-flow nasal cannula at 15 L for past hour without hypoxia or changes in mentation. Work of breathing improved since arrival. Troponin elevated at 82 but downtrending from 2 weeks ago, BNP elevated at 1200 but also downtrending from 2 weeks ago. Chest x-ray showing bilateral pleural effusions. Patient received 40 mg Lasix with roughly 700 cc urine output at this point.     Overview/Hospital Course:  No notes on file    Interval History:   3/21: patient on 2L Oxygen today. Notes good urine output. Family updated at bedside.     Review of Systems   Constitutional:  Positive for fatigue.   HENT:  Negative for rhinorrhea and sore throat.    Respiratory:  Positive for cough, chest tightness and shortness of breath.    Cardiovascular:  Positive for chest pain and leg swelling. Negative for palpitations.   Gastrointestinal:  Negative for abdominal distention and abdominal pain.   Genitourinary:  Negative for dysuria and hematuria.     Objective:     Vital Signs (Most Recent):  Temp: 97.9 °F (36.6 °C) (03/21/25 0814)  Pulse: 67 (03/21/25 1507)  Resp: 18 (03/21/25 1133)  BP: (!) 101/50 (03/21/25 1147)  SpO2: 95 % (03/21/25 1133) Vital Signs (24h Range):  Temp:  [96.8 °F (36 °C)-97.9 °F (36.6 °C)] 97.9 °F (36.6 °C)  Pulse:   [59-83] 67  Resp:  [18-20] 18  SpO2:  [93 %-98 %] 95 %  BP: (101-135)/(50-62) 101/50     Weight: 46.9 kg (103 lb 6.3 oz)  Body mass index is 16.69 kg/m².    Intake/Output Summary (Last 24 hours) at 3/21/2025 1546  Last data filed at 3/21/2025 1407  Gross per 24 hour   Intake 822 ml   Output 1600 ml   Net -778 ml         Physical Exam  Constitutional:       General: She is not in acute distress.     Appearance: Normal appearance.   HENT:      Head: Normocephalic and atraumatic.      Right Ear: External ear normal.      Left Ear: External ear normal.      Nose: Nose normal. No congestion.      Mouth/Throat:      Mouth: Mucous membranes are moist.      Pharynx: No oropharyngeal exudate.   Eyes:      Extraocular Movements: Extraocular movements intact.      Pupils: Pupils are equal, round, and reactive to light.   Cardiovascular:      Rate and Rhythm: Normal rate and regular rhythm.      Pulses: Normal pulses.      Heart sounds: Normal heart sounds. No murmur heard.  Pulmonary:      Effort: Respiratory distress present.      Breath sounds: Rales present.   Abdominal:      General: Bowel sounds are normal. There is no distension.      Palpations: There is no mass.   Musculoskeletal:      Cervical back: No rigidity.   Neurological:      Mental Status: She is alert.               Significant Labs: All pertinent labs within the past 24 hours have been reviewed.    Significant Imaging: I have reviewed all pertinent imaging results/findings within the past 24 hours.      Assessment & Plan  Acute hypoxic respiratory failure  Patient with Hypoxic Respiratory failure which is Acute.  she is not on home oxygen. Supplemental oxygen was provided and noted-      .   Signs/symptoms of respiratory failure include- tachypnea and respiratory distress. Contributing diagnoses includes - CHF Labs and images were reviewed. Patient Has recent ABG, which has been reviewed.   H/O ischemic left MCA stroke    Continue Plavis and BP  management  AVM (arteriovenous malformation) of stomach, acquired with hemorrhage  Patient's hemorrhage is due to gastrointestinal bleed, this bleeding is not associated with a medication or a coagulopathy. Patients most recent Hgb, Hct, platelets, and INR are listed below.  Recent Labs     03/20/25  0626   HGB 8.7*   HCT 29.3*   *     Plan  - Will trend hemoglobin/hematocrit Daily  - Will monitor and correct any coagulation defects  - Will transfuse if Hgb is <7g/dl (<8g/dl in cases of active ACS) or if patient has rapid bleeding leading to hemodynamic instability  - Stable, continue protonix  Hypertension  Patient's blood pressure range in the last 24 hours was: BP  Min: 101/50  Max: 135/62.The patient's inpatient anti-hypertensive regimen is listed below:  Current Antihypertensives  carvediloL tablet 6.25 mg, Once, Oral  furosemide injection 40 mg, Every 12 hours, Intravenous  carvediloL tablet 6.25 mg, 2 times daily with meals, Oral    Plan  - BP is controlled, no changes needed to their regimen  -  Acute systolic congestive heart failure  Patient has Systolic (HFrEF) heart failure that is Acute on chronic. On presentation their CHF was decompensated. Evidence of decompensated CHF on presentation includes: crackles on lung auscultation, dyspnea on exertion (WATKINS), and shortness of breath. The etiology of their decompensation is likely not on lasix . Most recent BNP and echo results are listed below.  Recent Labs     03/20/25  0626   BNP 1,293*     Latest ECHO  Results for orders placed during the hospital encounter of 02/18/25    Echo Saline Bubble? Yes    Interpretation Summary    Left Ventricle: The left ventricle is normal in size. Normal wall thickness. There is normal systolic function with a visually estimated ejection fraction of 60 - 65%. There is normal diastolic function.    Right Ventricle: Normal right ventricular cavity size. Wall thickness is normal. Systolic function is normal.    Left  Atrium: Left atrium is mildly dilated.    Aortic Valve: The aortic valve is a trileaflet valve. There is mild aortic valve sclerosis.    Tricuspid Valve: There is mild regurgitation.    Pulmonary Artery: The estimated pulmonary artery systolic pressure is 32 mmHg.    IVC/SVC: Normal venous pressure at 3 mmHg.    After contrast bubble injection very few bubbles appear late on the left side. They originate from  the pulmonary veins.  This is suggestive of extracardiac shunt.  Clinical correlation is required.    Current Heart Failure Medications  carvediloL tablet 6.25 mg, Once, Oral  furosemide injection 40 mg, Every 12 hours, Intravenous  carvediloL tablet 6.25 mg, 2 times daily with meals, Oral    Plan  - Monitor strict I&Os and daily weights.    - Place on telemetry  - Low sodium diet  - Place on fluid restriction of 1.5 L.   - Cardiology has not been consulted  - The patient's volume status is worsening as indicated by crackles on lung auscultation. Will adjust treatment as follows: lasix IV BID  - continue Coreg      Systolic congestive heart failure  Patient has Diastolic (HFpEF) heart failure that is Acute on chronic. On presentation their CHF was decompensated. Evidence of decompensated CHF on presentation includes: crackles on lung auscultation. The etiology of their decompensation is likely increased fluid intake. Most recent BNP and echo results are listed below.  Recent Labs     03/20/25  0626   BNP 1,293*     Latest ECHO  Results for orders placed during the hospital encounter of 03/20/25    Echo    Interpretation Summary    Left Ventricle: The left ventricle is normal in size. Ventricular mass is normal. Normal wall thickness. Normal wall motion. There is normal systolic function with a visually estimated ejection fraction of 55 - 60%. There is indeterminate diastolic function. Elevated left ventricular filling pressure.    Right Ventricle: The right ventricle is normal in size. Wall thickness is  normal. Systolic function is normal.    Mitral Valve: There is mild regurgitation.    Tricuspid Valve: There is mild regurgitation.    Pulmonary Artery: The estimated pulmonary artery systolic pressure is 24 mmHg.    IVC/SVC: Normal venous pressure at 3 mmHg.    Pericardium: Left pleural effusion.    Current Heart Failure Medications  carvediloL tablet 6.25 mg, Once, Oral  furosemide injection 40 mg, Every 12 hours, Intravenous  carvediloL tablet 6.25 mg, 2 times daily with meals, Oral    Plan  - Monitor strict I&Os and daily weights.    - Place on telemetry  - Low sodium diet  - Place on fluid restriction of 1.5 L.   - Cardiology has not been consulted  - The patient's volume status is improving but not at their baseline as indicated by edema      VTE Risk Mitigation (From admission, onward)           Ordered     IP VTE HIGH RISK PATIENT  Once         03/20/25 1106     Place sequential compression device  Until discontinued         03/20/25 1106                    Discharge Planning   TERE: 3/24/2025     Code Status: Full Code   Medical Readiness for Discharge Date:                    Please place Justification for DME        Rhea Prado MD  Department of Hospital Medicine   [facility] - Cardiology Stepdown

## 2025-03-21 NOTE — ASSESSMENT & PLAN NOTE
Malnutrition Type:  Context: chronic illness  Level: moderate    Related to (etiology):   Physiological causes resulting in anorexia or diminished intake     Signs and Symptoms (as evidenced by):   Wt loss, muscle and fat depletion, and consuming < 75% of meals/skipping meals     Malnutrition Characteristic Summary:  Weight Loss (Malnutrition): greater than 10% in 6 months (16 lbs/13.4% wt loss x 6 months)  Energy Intake (Malnutrition): less than 75% for greater than or equal to 1 month  Subcutaneous Fat (Malnutrition): mild depletion  Muscle Mass (Malnutrition): mild depletion    Interventions/Recommendations (treatment strategy):  Collaboration with other providers   ONS    Nutrition Diagnosis Status:   New

## 2025-03-21 NOTE — PLAN OF CARE
Recommendations     1. Continue low Na diet as tolerated     2. Recommend boost plus once daily    3. Encourage good intake    4. RD to monitor weight, labs, intake     Goals:   1. % nutritional needs met with diet during admission     2. Maintain weight during admission  Nutrition Goal Status: new  Communication of RD Recs: other (comment) (POC)     Nutrition Discharge Planning      Nutrition Discharge Planning: Therapeutic diet (comments), Oral supplement regimen (comments)  Therapeutic diet (comments): low Na  Oral supplement regimen (comments): supplement of choice

## 2025-03-21 NOTE — ASSESSMENT & PLAN NOTE
Patient has Diastolic (HFpEF) heart failure that is Acute on chronic. On presentation their CHF was decompensated. Evidence of decompensated CHF on presentation includes: crackles on lung auscultation. The etiology of their decompensation is likely increased fluid intake. Most recent BNP and echo results are listed below.  Recent Labs     03/20/25  0626   BNP 1,293*     Latest ECHO  Results for orders placed during the hospital encounter of 03/20/25    Echo    Interpretation Summary    Left Ventricle: The left ventricle is normal in size. Ventricular mass is normal. Normal wall thickness. Normal wall motion. There is normal systolic function with a visually estimated ejection fraction of 55 - 60%. There is indeterminate diastolic function. Elevated left ventricular filling pressure.    Right Ventricle: The right ventricle is normal in size. Wall thickness is normal. Systolic function is normal.    Mitral Valve: There is mild regurgitation.    Tricuspid Valve: There is mild regurgitation.    Pulmonary Artery: The estimated pulmonary artery systolic pressure is 24 mmHg.    IVC/SVC: Normal venous pressure at 3 mmHg.    Pericardium: Left pleural effusion.    Current Heart Failure Medications  carvediloL tablet 6.25 mg, Once, Oral  furosemide injection 40 mg, Every 12 hours, Intravenous  carvediloL tablet 6.25 mg, 2 times daily with meals, Oral    Plan  - Monitor strict I&Os and daily weights.    - Place on telemetry  - Low sodium diet  - Place on fluid restriction of 1.5 L.   - Cardiology has not been consulted  - The patient's volume status is improving but not at their baseline as indicated by edema

## 2025-03-21 NOTE — PROGRESS NOTES
Heart Failure Transitional [facility] (HFT[facility]) Team notified of pt referral via Heart Failure [facility] (automated inbasket notification) .    Patient screened today 3-21-25 by provider and RN for enrollment to program.      Pt was deemed not a candidate for enrollment at this time related to  PT not ready to enroll but will call us, informed this is a time sensitive Clinic and we will be of service for the next 31 days after Hosp D/C.    Pt will require additional follow up planning per primary team.     If pt status, diagnosis, or treatment plan changes , please place AMB referral to Heart Failure [facility]itional [facility] Clinic ([id number]) for HFTCC enrollment re-evalution.     Hospital Education done with PT & Spouse as follows;    Reviewed the following key points of HFTCC program with pt and family:                1.) Take your medications as directed. Call Ms Fernandes if any Health Care Professional changes your Heart/Fluid medications.    health Care              2.) Weight yourself daily. Daily Dry Weights. Upon waking ,empty your bladder, weigh with as little clothes as possible before eating/drinking. Record weight in Symptom Tracker and compare these weights for fluid gain. Weight gain overnight of 3-4 lbs is Fluid, also a gain of 5 lbs in 3 days is Fluid as well. Call US!              3.) Follow low salt and limited fluid diet. Salt/Sodium Restriction 4116-1967 mg, see page 4. Sodium makes you hold onto Fluid. High Sodium Foods;Deli Meat/Cheeses, Sausages/Hot Dogs, Fast Food/Restaurant Food, Anything in a box, bottle or can. Cook with Fresh or Frozen ingredients and use seasonings that are labeled NO SALT. Check Portion Sizes, Salt is reported on labels for 1 portion. A can may contain 2-3 portions. Fluid Restriction: 1.5 -2 Liters/Day, see page 6, measure your Fluid, the milk in your cereal, broth in your soup and ice cream because anything that melts at room temp is a liquid.              4.) Stop smoking and  "start exercising. Brisk walking is good, don't walk like you are going to the [employer] and DON"T WALK IN THE HEAT! Start low and increase as tolerated. Remember if you don't use it you lose it.              5.) Go to your appointments and call your team. Have your weight and BP/P ready when we call Professional changes your Heart/Fluid medications.   to do the Phone Check ins and call us if you have fluid gain or questions         HFTCC Home Care Guide Pamphlet with Symptom Tracker and the [facility] Flyer given.             "

## 2025-03-21 NOTE — PT/OT/SLP EVAL
"Occupational Therapy   Evaluation    Name: Katelyn Caba  MRN: [id number]  Admitting Diagnosis: Acute hypoxic respiratory failure  Recent Surgery: * No surgery found *      Recommendations:     Discharge Recommendations: Low Intensity Therapy  Discharge Equipment Recommendations:  shower chair, oxygen, grab bar  Barriers to discharge:  None    Assessment:     Katelyn Caba is a 84 y.o. female with a medical diagnosis of Acute hypoxic respiratory failure.  She presents with performance deficits affecting function: weakness, impaired endurance, impaired self care skills, impaired functional mobility, gait instability, impaired balance, decreased safety awareness.      Pt encountered with HOB raised, pleasant demeanor and  present at bedside. Pt able to complete bed mobility independently though demonstrated some instability during STS transfer and functional mobility in hospital room requiring SBA to [facility]/HHA to walk in straight line 2* lateral sway. Pt assisted with toileting/dressing this date when exchanging soiled purewick/underwear set for fresh purewick. Patient currently demonstrates a need for low intensity therapy on a scheduled basis secondary to a decline in functional status due to illness      Rehab Prognosis: Good; patient would benefit from acute skilled OT services to address these deficits and reach maximum level of function.       Plan:     Patient to be seen 4 x/week to address the above listed problems via self-care/home management, therapeutic activities, therapeutic exercises  Plan of Care Expires: 04/04/25  Plan of Care Reviewed with: patient, spouse    Subjective     Chief Complaint: "I feel better now"   Patient/Family Comments/goals: Get better, return home     Occupational Profile:  Living Environment: Pt lives with spouse in 2nd floor apartment with 1 flight of stairs, BHR, tub/sh combo and 0 SC   Previous level of function: independent in ADLs/mobility  Roles and Routines: wife, " community dweller  Equipment Used at Home: none   Equipment Owned, not used: wheelchair, crutches, rollator  Assistance upon Discharge:     Pain/Comfort:  Pain Rating 1: 0/10  Pain Rating Post-Intervention 1: 0/10    Patients cultural, spiritual, Yazidi conflicts given the current situation: no    Objective:     Communicated with: RN prior to session.  Patient found HOB elevated with telemetry, pulse ox (continuous), PureWick upon OT entry to room.    General Precautions: Standard, fall  Orthopedic Precautions: N/A  Braces: N/A  Respiratory Status: Nasal cannula, flow 2 L/min    Occupational Performance:    Bed Mobility:    Patient completed Rolling/Turning to Right with independence  Patient completed Scooting/Bridging with independence  Patient completed Supine to Sit with independence  Pt able to sit EOB for dynamic/static mobility with independence    Functional Mobility/Transfers:  Patient completed Sit <> Stand Transfer with stand by assistance  with  hand-held assist   Functional Mobility: Pt engaging in functional mobility to simulate household/community distances within hospital room  with SBA to CGA and utilizing HHA for lateral sway and occasional instability in order to maximize functional activity tolerance and standing balance required for engagement in occupations of choice.     Activities of Daily Living:  Upper Body Dressing: minimum assistance affixing gown to maximize coverage in back with tie, manage during toileting activity  Lower Body Dressing: maximal assistance doffing soiled Purewick underwear set and replacing with fresh pair while educating pt on how to do so as needed  Toileting: independence wiping self with moist towelette, OT also managed purewick    Cognitive/Visual Perceptual:  Cognitive/Psychosocial Skills:     -       Oriented to: AOX4   -       Follows Commands/attention:Follows multistep  commands  -       Communication: clear/fluent  -       Memory: No Deficits  noted  -       Safety awareness/insight to disability: impaired   -       Mood/Affect/Coping skills/emotional control: Pleasant  Visual/Perceptual:      -Intact      Physical Exam:  Balance:    -       Sitting: Good.  Standing: Impaired  Postural examination/scapula alignment:    -       Rounded shoulders  Skin integrity: Thin  Edema:  None noted  Sensation:    -       Intact  Motor Planning:    -       impaired  Dominant hand:    -       right  Upper Extremity Range of Motion:     -       Right Upper Extremity: WNL  -       Left Upper Extremity: WNL  Upper Extremity Strength:    -       Right Upper Extremity: WNL  -       Left Upper Extremity: WNL   Strength:    -       Right Upper Extremity: WNL  -       Left Upper Extremity: WNL  Fine Motor Coordination:    -       Intact  Neurological:    -       impaired    AMPAC 6 Click ADL:  AMPAC Total Score: 24    Treatment & Education:  Pt educated on role of OT, POC, and goals for therapy.    POC was dicussed with patient/caregiver, who was included in its development and is in agreement with the identified goals and treatment plan.   Patient and family aware of patient's deficits and therapy progression.   Time provided for therapeutic counseling and discussion of health disposition.   Educated on importance of EOB/OOB mobility, maintaining routine, sitting up in chair, and maximizing independence with ADLs during admission   Pt completed ADLs and functional mobility for treatment session as noted above   Pt/caregiver verbalized understanding and expressed no further concerns/questions.  Updated communication board with level of assist required      Patient left up in chair with all lines intact, call button in reach, RN notified, and  present    GOALS:   Multidisciplinary Problems       Occupational Therapy Goals          Problem: Occupational Therapy    Goal Priority Disciplines Outcome Interventions   Occupational Therapy Goal     OT, PT/OT Progressing     Description: Goals to be met by: 4/4/25     Patient will increase functional independence with ADLs by performing:    LE Dressing with [place].  Grooming while standing at sink with Set-up Assistance.  Toileting from toilet with Hendricks for hygiene and clothing management.   Step transfer with Supervision                         DME Justifications:  No DME recommended requiring DME justifications    History:     Past Medical History:   Diagnosis Date    Cancer     basal cell carcinoma    Stroke due to embolism of right middle cerebral artery 02/18/2025         Past Surgical History:   Procedure Laterality Date    CARPAL TUNNEL RELEASE      ESOPHAGOGASTRODUODENOSCOPY N/A 8/22/2022    Procedure: EGD (ESOPHAGOGASTRODUODENOSCOPY);  Surgeon: Steven Lopez MD;  Location: [facility] (2ND FLR);  Service: Endoscopy;  Laterality: N/A;    ESOPHAGOGASTRODUODENOSCOPY N/A 12/5/2022    Procedure: EGD (ESOPHAGOGASTRODUODENOSCOPY);  Surgeon: Steven Lopez MD;  Location: [facility] (2ND FLR);  Service: Endoscopy;  Laterality: N/A;  Has estimated pulmonary artery pressure 45, schedule location per protocol.   Please schedule with Dr. Darryl Lopez-  Plavix stopped 8/23/22  [facility]  pre call complete; [facility] 11/28/22    ESOPHAGOGASTRODUODENOSCOPY N/A 7/11/2023    Procedure: EGD (ESOPHAGOGASTRODUODENOSCOPY);  Surgeon: Natalie Fall MD;  Location: [facility];  Service: Endoscopy;  Laterality: N/A;    ESOPHAGOGASTRODUODENOSCOPY N/A 3/5/2025    Procedure: EGD (ESOPHAGOGASTRODUODENOSCOPY);  Surgeon: Roberto Salvador MD;  Location: [facility] (2ND FLR);  Service: Endoscopy;  Laterality: N/A;    EYE SURGERY      left eye cataract    LEFT HEART CATHETERIZATION  4/17/2024    Procedure: Left heart cath;  Surgeon: Elisabeth De La Cruz MD;  Location: [facility] CATH;  Service: Cardiology;;    TONSILLECTOMY      TUBAL LIGATION         Time Tracking:     OT Date of Treatment: 03/21/25  OT  Start Time: 1555  OT Stop Time: 1616  OT Total Time (min): 21 min    Billable Minutes:Evaluation 8  Self Care/Home Management 13    3/21/2025

## 2025-03-21 NOTE — SUBJECTIVE & OBJECTIVE
Interval History:   3/21: patient on 2L Oxygen today. Notes good urine output. Family updated at bedside.     Review of Systems   Constitutional:  Positive for fatigue.   HENT:  Negative for rhinorrhea and sore throat.    Respiratory:  Positive for cough, chest tightness and shortness of breath.    Cardiovascular:  Positive for chest pain and leg swelling. Negative for palpitations.   Gastrointestinal:  Negative for abdominal distention and abdominal pain.   Genitourinary:  Negative for dysuria and hematuria.     Objective:     Vital Signs (Most Recent):  Temp: 97.9 °F (36.6 °C) (03/21/25 0814)  Pulse: 67 (03/21/25 1507)  Resp: 18 (03/21/25 1133)  BP: (!) 101/50 (03/21/25 1147)  SpO2: 95 % (03/21/25 1133) Vital Signs (24h Range):  Temp:  [96.8 °F (36 °C)-97.9 °F (36.6 °C)] 97.9 °F (36.6 °C)  Pulse:  [59-83] 67  Resp:  [18-20] 18  SpO2:  [93 %-98 %] 95 %  BP: (101-135)/(50-62) 101/50     Weight: 46.9 kg (103 lb 6.3 oz)  Body mass index is 16.69 kg/m².    Intake/Output Summary (Last 24 hours) at 3/21/2025 1546  Last data filed at 3/21/2025 1407  Gross per 24 hour   Intake 822 ml   Output 1600 ml   Net -778 ml         Physical Exam  Constitutional:       General: She is not in acute distress.     Appearance: Normal appearance.   HENT:      Head: Normocephalic and atraumatic.      Right Ear: External ear normal.      Left Ear: External ear normal.      Nose: Nose normal. No congestion.      Mouth/Throat:      Mouth: Mucous membranes are moist.      Pharynx: No oropharyngeal exudate.   Eyes:      Extraocular Movements: Extraocular movements intact.      Pupils: Pupils are equal, round, and reactive to light.   Cardiovascular:      Rate and Rhythm: Normal rate and regular rhythm.      Pulses: Normal pulses.      Heart sounds: Normal heart sounds. No murmur heard.  Pulmonary:      Effort: Respiratory distress present.      Breath sounds: Rales present.   Abdominal:      General: Bowel sounds are normal. There is no  distension.      Palpations: There is no mass.   Musculoskeletal:      Cervical back: No rigidity.   Neurological:      Mental Status: She is alert.               Significant Labs: All pertinent labs within the past 24 hours have been reviewed.    Significant Imaging: I have reviewed all pertinent imaging results/findings within the past 24 hours.   Statement Selected

## 2025-03-21 NOTE — RESPIRATORY THERAPY
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    24 Hours Vitals Range:  Temp:  [96.8 °F (36 °C)-98 °F (36.7 °C)]   Pulse:  [59-82]   Resp:  [18-29]   BP: (119-167)/(57-72)   SpO2:  [93 %-100 %]     Labs:    Recent Labs     25  0626 25  0448    139   K 4.0 3.6    100   CO2 19* 27   BUN 12 15   CREATININE 0.7 0.7   * 93   PHOS  --  3.1   MG 1.9  --         Recent Labs     25  0624 25  0850   PH 7.288* 7.328*   PCO2 44.7 49.1*   PO2 59 31*   HCO3 21.4* 25.8   POCSATURATED 87 54   BE -5* 0       ASSESSMENT/INTERVENTIONS    Bedside HFNC check completed    Last VS   Temp: 97.9 °F (36.6 °C) (814)  Pulse: 70 (814)  Resp: 20 (814)  BP: 119/59 (814)  SpO2: 94 % (913)    Level of Consciousness: Level of Consciousness (AVPU): alert  Respiratory Effort: Respiratory Effort: Unlabored Expansion/Accessory Muscle Usage: Expansion/Accessory Muscles/Retractions: expansion symmetric, no retractions, no use of accessory muscles  All Lung Field Breath Sounds: All Lung Fields Breath Sounds: Anterior:, Lateral:, crackles, wheezes, expiratory  GILBERTO Breath Sounds: diminished, wheezes, expiratory  LLL Breath Sounds: diminished, wheezes, expiratory  RUL Breath Sounds: diminished, crackles  RML Breath Sounds: diminished, crackles  RLL Breath Sounds: diminished, crackles  O2 Device/Concentration: 2 lpm nasal cannula  Was the O2 device able to be weaned? Yes 12 lpm to 2 lpm  Ambu at bedside: yes    Active Orders   Respiratory Care    Oxygen Continuous     Frequency: Continuous     Number of Occurrences: Until Specified     Order Questions:       Device type: High flow      Device: High Flow Nasal Cannula (6 -15 Liters)      LPM: 10-15      Titrate O2 per Oxygen Titration Protocol: Yes    Pulse Oximetry Q4H     Frequency: Q4H     Number of Occurrences: Until Specified     Order Comments: With vitals         RECOMMENDATIONS    We recommend: RRT Recs: Continue POC per primary team.      FOLLOW-UP    Please call back the Rapid Response RT, Nichole Jarrett, RRT at x [phone] for any questions or concerns.

## 2025-03-21 NOTE — PLAN OF CARE
Pt free of falls/trauma/injuries.  Denies c/o SOB, CP, or discomfort.  Generalized skin remains CDI; +1 edema noted.  Pt being diuresed with IV Push lasix ; diuresing well.   Wt remains stable.  Electrolytes replaced as ordered. Pt tolerating plan of care.       Problem: Fall Injury Risk  Goal: Absence of Fall and Fall-Related Injury  Outcome: Progressing

## 2025-03-21 NOTE — PLAN OF CARE
Pt engaged well in evaluative session     Problem: Occupational Therapy  Goal: Occupational Therapy Goal  Description: Goals to be met by: 4/4/25     Patient will increase functional independence with ADLs by performing:    LE Dressing with [facility].  Grooming while standing at sink with Set-up Assistance.  Toileting from toilet with [facility] for hygiene and clothing management.   Step transfer with Supervision    Outcome: Progressing      VIJAY Mariscal [facility] Nurse Msg Pool; [facility]ists Pool [place]  Benlysta 200mg Pen -  Approved - Ready to fill     Valid from 01/01/2022 to 04/27/2023     Script is being sent to [facility]     Pharmacy Coverage: [employer]   Patient's Co-Pay: $4     Co pay Assistance Information   Assistance not required.       Additional Information:   Spoke to patient they are aware of approval and filling pharmacy.       Christiana Lee   4/28/22

## 2025-03-21 NOTE — ASSESSMENT & PLAN NOTE
Patient has Systolic (HFrEF) heart failure that is Acute on chronic. On presentation their CHF was decompensated. Evidence of decompensated CHF on presentation includes: crackles on lung auscultation, dyspnea on exertion (WATKINS), and shortness of breath. The etiology of their decompensation is likely not on lasix. Most recent BNP and echo results are listed below.  Recent Labs     03/20/25  0626   BNP 1,293*     Latest ECHO  Results for orders placed during the hospital encounter of 02/18/25    Echo Saline Bubble? Yes    Interpretation Summary    Left Ventricle: The left ventricle is normal in size. Normal wall thickness. There is normal systolic function with a visually estimated ejection fraction of 60 - 65%. There is normal diastolic function.    Right Ventricle: Normal right ventricular cavity size. Wall thickness is normal. Systolic function is normal.    Left Atrium: Left atrium is mildly dilated.    Aortic Valve: The aortic valve is a trileaflet valve. There is mild aortic valve sclerosis.    Tricuspid Valve: There is mild regurgitation.    Pulmonary Artery: The estimated pulmonary artery systolic pressure is 32 mmHg.    IVC/SVC: Normal venous pressure at 3 mmHg.    After contrast bubble injection very few bubbles appear late on the left side. They originate from  the pulmonary veins.  This is suggestive of extracardiac shunt.  Clinical correlation is required.    Current Heart Failure Medications  carvediloL tablet 6.25 mg, Once, Oral  furosemide injection 40 mg, Every 12 hours, Intravenous  carvediloL tablet 6.25 mg, 2 times daily with meals, Oral    Plan  - Monitor strict I&Os and daily weights.    - Place on telemetry  - Low sodium diet  - Place on fluid restriction of 1.5 L.   - Cardiology has not been consulted  - The patient's volume status is worsening as indicated by crackles on lung auscultation. Will adjust treatment as follows: lasix IV BID  - continue Coreg

## 2025-03-21 NOTE — ASSESSMENT & PLAN NOTE
Patient's blood pressure range in the last 24 hours was: BP  Min: 101/50  Max: 135/62.The patient's inpatient anti-hypertensive regimen is listed below:  Current Antihypertensives  carvediloL tablet 6.25 mg, Once, Oral  furosemide injection 40 mg, Every 12 hours, Intravenous  carvediloL tablet 6.25 mg, 2 times daily with meals, Oral    Plan  - BP is controlled, no changes needed to their regimen  -

## 2025-03-22 LAB
ANION GAP (OHS): 11 MMOL/L (ref 8–16)
BUN SERPL-MCNC: 21 MG/DL (ref 8–23)
CALCIUM SERPL-MCNC: 8.5 MG/DL (ref 8.7–10.5)
CHLORIDE SERPL-SCNC: 99 MMOL/L (ref 95–110)
CO2 SERPL-SCNC: 30 MMOL/L (ref 23–29)
CREAT SERPL-MCNC: 0.8 MG/DL (ref 0.5–1.4)
GFR SERPLBLD CREATININE-BSD FMLA CKD-EPI: >60 ML/MIN/1.73/M2
GLUCOSE SERPL-MCNC: 94 MG/DL (ref 70–110)
POTASSIUM SERPL-SCNC: 3.2 MMOL/L (ref 3.5–5.1)
SODIUM SERPL-SCNC: 140 MMOL/L (ref 136–145)

## 2025-03-22 PROCEDURE — 25000003 PHARM REV CODE 250: Performed by: HOSPITALIST

## 2025-03-22 PROCEDURE — 94761 N-INVAS EAR/PLS OXIMETRY MLT: CPT

## 2025-03-22 PROCEDURE — 25000003 PHARM REV CODE 250: Performed by: INTERNAL MEDICINE

## 2025-03-22 PROCEDURE — 82310 ASSAY OF CALCIUM: CPT | Performed by: HOSPITALIST

## 2025-03-22 PROCEDURE — 36415 COLL VENOUS BLD VENIPUNCTURE: CPT | Performed by: HOSPITALIST

## 2025-03-22 PROCEDURE — 20600001 HC STEP DOWN PRIVATE ROOM

## 2025-03-22 RX ORDER — CARVEDILOL 3.12 MG/1
3.12 TABLET ORAL 2 TIMES DAILY WITH MEALS
Status: DISCONTINUED | OUTPATIENT
Start: 2025-03-22 | End: 2025-03-23 | Stop reason: HOSPADM

## 2025-03-22 RX ORDER — POTASSIUM CHLORIDE 20 MEQ/1
40 TABLET, EXTENDED RELEASE ORAL
Status: COMPLETED | OUTPATIENT
Start: 2025-03-22 | End: 2025-03-22

## 2025-03-22 RX ORDER — FUROSEMIDE 20 MG/1
20 TABLET ORAL DAILY
Status: DISCONTINUED | OUTPATIENT
Start: 2025-03-22 | End: 2025-03-23 | Stop reason: HOSPADM

## 2025-03-22 RX ORDER — POLYETHYLENE GLYCOL 3350 17 G/17G
17 POWDER, FOR SOLUTION ORAL DAILY
Status: DISCONTINUED | OUTPATIENT
Start: 2025-03-22 | End: 2025-03-23 | Stop reason: HOSPADM

## 2025-03-22 RX ADMIN — POTASSIUM CHLORIDE 40 MEQ: 1500 TABLET, EXTENDED RELEASE ORAL at 12:03

## 2025-03-22 RX ADMIN — PANTOPRAZOLE SODIUM 40 MG: 40 TABLET, DELAYED RELEASE ORAL at 09:03

## 2025-03-22 RX ADMIN — POTASSIUM CHLORIDE 40 MEQ: 1500 TABLET, EXTENDED RELEASE ORAL at 05:03

## 2025-03-22 RX ADMIN — CARVEDILOL 3.12 MG: 3.12 TABLET, FILM COATED ORAL at 05:03

## 2025-03-22 RX ADMIN — POLYETHYLENE GLYCOL 3350 17 G: 17 POWDER, FOR SOLUTION ORAL at 12:03

## 2025-03-22 RX ADMIN — POTASSIUM CHLORIDE 40 MEQ: 1500 TABLET, EXTENDED RELEASE ORAL at 09:03

## 2025-03-22 RX ADMIN — ATORVASTATIN CALCIUM 80 MG: 40 TABLET, FILM COATED ORAL at 09:03

## 2025-03-22 RX ADMIN — CLOPIDOGREL 75 MG: 75 TABLET ORAL at 09:03

## 2025-03-22 RX ADMIN — FUROSEMIDE 20 MG: 20 TABLET ORAL at 09:03

## 2025-03-22 NOTE — PROGRESS NOTES
[facility] - Cardiology [facility] Medicine  Progress Note    Patient Name: Katelyn Caba  MRN: [id number]  Patient Class: IP- Inpatient   Admission Date: 3/20/2025  Length of Stay: 2 days  Attending Physician: Rhea Prado MD  Primary Care Provider: Brooklyn Burnham MD        Subjective     Principal Problem:Acute hypoxic respiratory failure        HPI:  84-year-old female PMH of recent MCA CVA (2/18/25, received TNK), inferolateral STEMI (4/2024, s/p cath with no intervention) on brilinta, hypertension, hyperlipidemia, GI bleed, ISAIAH, BCC presenting for shortness of breath. Acidotic with significant work of breathing on presentation to ED and was placed on BiPAP. Was weaned down to high-flow nasal cannula at 15 L for past hour without hypoxia or changes in mentation. Work of breathing improved since arrival. Troponin elevated at 82 but downtrending from 2 weeks ago, BNP elevated at 1200 but also downtrending from 2 weeks ago. Chest x-ray showing bilateral pleural effusions. Patient received 40 mg Lasix with roughly 700 cc urine output at this point.     Overview/Hospital Course:  No notes on file    Interval History:   3/22: Patient feels comfortable with breathing without supplemental oxygen at rest.     Review of Systems   Constitutional:  Positive for fatigue.   HENT:  Negative for rhinorrhea and sore throat.    Respiratory:  Positive for cough, chest tightness and shortness of breath.    Cardiovascular:  Positive for chest pain and leg swelling. Negative for palpitations.   Gastrointestinal:  Negative for abdominal distention and abdominal pain.   Genitourinary:  Negative for dysuria and hematuria.     Objective:     Vital Signs (Most Recent):  Temp: 97.7 °F (36.5 °C) (03/22/25 1554)  Pulse: 68 (03/22/25 1554)  Resp: 18 (03/22/25 1554)  BP: (!) 109/53 (03/22/25 1554)  SpO2: 95 % (03/22/25 1554) Vital Signs (24h Range):  Temp:  [97.5 °F (36.4 °C)-98.2 °F (36.8 °C)] 97.7 °F (36.5 °C)  Pulse:   [38-74] 68  Resp:  [18-20] 18  SpO2:  [94 %-99 %] 95 %  BP: ()/(51-61) 109/53     Weight: 46.9 kg (103 lb 6.3 oz)  Body mass index is 16.69 kg/m².    Intake/Output Summary (Last 24 hours) at 3/22/2025 1611  Last data filed at 3/22/2025 1300  Gross per 24 hour   Intake 598 ml   Output 500 ml   Net 98 ml         Physical Exam  Constitutional:       General: She is not in acute distress.     Appearance: Normal appearance.   HENT:      Head: Normocephalic and atraumatic.      Right Ear: External ear normal.      Left Ear: External ear normal.      Nose: Nose normal. No congestion.      Mouth/Throat:      Mouth: Mucous membranes are moist.      Pharynx: No oropharyngeal exudate.   Eyes:      Extraocular Movements: Extraocular movements intact.      Pupils: Pupils are equal, round, and reactive to light.   Cardiovascular:      Rate and Rhythm: Normal rate and regular rhythm.      Pulses: Normal pulses.      Heart sounds: Normal heart sounds. No murmur heard.  Pulmonary:      Effort: Respiratory distress present.      Breath sounds: Rales present.   Abdominal:      General: Bowel sounds are normal. There is no distension.      Palpations: There is no mass.   Musculoskeletal:      Cervical back: No rigidity.   Neurological:      Mental Status: She is alert.               Significant Labs: All pertinent labs within the past 24 hours have been reviewed.    Significant Imaging: I have reviewed all pertinent imaging results/findings within the past 24 hours.      Assessment & Plan  Acute hypoxic respiratory failure  Patient with Hypoxic Respiratory failure which is Acute.  she is not on home oxygen. Supplemental oxygen was provided and noted-      .   Signs/symptoms of respiratory failure include- tachypnea and respiratory distress. Contributing diagnoses includes - CHF Labs and images were reviewed. Patient Has recent ABG, which has been reviewed.   H/O ischemic left MCA stroke    Continue Plavis and BP management  AVM  (arteriovenous malformation) of stomach, acquired with hemorrhage  Patient's hemorrhage is due to gastrointestinal bleed, this bleeding is not associated with a medication or a coagulopathy. Patients most recent Hgb, Hct, platelets, and INR are listed below.  Recent Labs     03/20/25  0626   HGB 8.7*   HCT 29.3*   *     Plan  - Will trend hemoglobin/hematocrit Daily  - Will monitor and correct any coagulation defects  - Will transfuse if Hgb is <7g/dl (<8g/dl in cases of active ACS) or if patient has rapid bleeding leading to hemodynamic instability  - Stable, continue protonix  Hypertension  Patient's blood pressure range in the last 24 hours was: BP  Min: 99/51  Max: 139/61.The patient's inpatient anti-hypertensive regimen is listed below:  Current Antihypertensives  furosemide tablet 20 mg, Daily, Oral  carvediloL tablet 3.125 mg, 2 times daily with meals, Oral    Plan  - BP is controlled, no changes needed to their regimen  -  Acute systolic congestive heart failure  Patient has Systolic (HFrEF) heart failure that is Acute on chronic. On presentation their CHF was decompensated. Evidence of decompensated CHF on presentation includes: crackles on lung auscultation, dyspnea on exertion (WATKINS), and shortness of breath. The etiology of their decompensation is likely not on lasix . Most recent BNP and echo results are listed below.  Recent Labs     03/20/25  0626   BNP 1,293*     Latest ECHO  Results for orders placed during the hospital encounter of 02/18/25    Echo Saline Bubble? Yes    Interpretation Summary    Left Ventricle: The left ventricle is normal in size. Normal wall thickness. There is normal systolic function with a visually estimated ejection fraction of 60 - 65%. There is normal diastolic function.    Right Ventricle: Normal right ventricular cavity size. Wall thickness is normal. Systolic function is normal.    Left Atrium: Left atrium is mildly dilated.    Aortic Valve: The aortic valve is a  trileaflet valve. There is mild aortic valve sclerosis.    Tricuspid Valve: There is mild regurgitation.    Pulmonary Artery: The estimated pulmonary artery systolic pressure is 32 mmHg.    IVC/SVC: Normal venous pressure at 3 mmHg.    After contrast bubble injection very few bubbles appear late on the left side. They originate from  the pulmonary veins.  This is suggestive of extracardiac shunt.  Clinical correlation is required.    Current Heart Failure Medications  furosemide tablet 20 mg, Daily, Oral  carvediloL tablet 3.125 mg, 2 times daily with meals, Oral    Plan  - Monitor strict I&Os and daily weights.    - Place on telemetry  - Low sodium diet  - Place on fluid restriction of 1.5 L.   - Cardiology has not been consulted  - The patient's volume status is worsening as indicated by crackles on lung auscultation. Will adjust treatment as follows: lasix IV BID  - continue Coreg      Systolic congestive heart failure  Patient has Diastolic (HFpEF) heart failure that is Acute on chronic. On presentation their CHF was decompensated. Evidence of decompensated CHF on presentation includes: crackles on lung auscultation. The etiology of their decompensation is likely increased fluid intake. Most recent BNP and echo results are listed below.  Recent Labs     03/20/25  0626   BNP 1,293*     Latest ECHO  Results for orders placed during the hospital encounter of 03/20/25    Echo    Interpretation Summary    Left Ventricle: The left ventricle is normal in size. Ventricular mass is normal. Normal wall thickness. Normal wall motion. There is normal systolic function with a visually estimated ejection fraction of 55 - 60%. There is indeterminate diastolic function. Elevated left ventricular filling pressure.    Right Ventricle: The right ventricle is normal in size. Wall thickness is normal. Systolic function is normal.    Mitral Valve: There is mild regurgitation.    Tricuspid Valve: There is mild regurgitation.     Pulmonary Artery: The estimated pulmonary artery systolic pressure is 24 mmHg.    IVC/SVC: Normal venous pressure at 3 mmHg.    Pericardium: Left pleural effusion.    Current Heart Failure Medications  furosemide tablet 20 mg, Daily, Oral  carvediloL tablet 3.125 mg, 2 times daily with meals, Oral    Plan  - Monitor strict I&Os and daily weights.    - Place on telemetry  - Low sodium diet  - Place on fluid restriction of 1.5 L.   - Cardiology has not been consulted  - The patient's volume status is improving but not at their baseline as indicated by edema      Moderate protein-calorie malnutrition  Nutrition consulted. Most recent weight and BMI monitored-     Measurements:  Wt Readings from Last 1 Encounters:   03/21/25 46.9 kg (103 lb 6.3 oz)   Body mass index is 16.69 kg/m².    Patient has been screened and assessed by RD.    Malnutrition Type:  Context: chronic illness  Level: moderate    Malnutrition Characteristic Summary:  Weight Loss (Malnutrition): greater than 10% in 6 months (16 lbs/13.4% wt loss x 6 months)  Energy Intake (Malnutrition): less than 75% for greater than or equal to 1 month  Subcutaneous Fat (Malnutrition): mild depletion  Muscle Mass (Malnutrition): mild depletion    Interventions/Recommendations (treatment strategy):  1. Continue low Na diet as tolerated   2. Recommend boost plus once daily  3. Encourage good intake  4. RD to monitor weight, labs, intake    VTE Risk Mitigation (From admission, onward)           Ordered     IP VTE HIGH RISK PATIENT  Once         03/20/25 1106     Place sequential compression device  Until discontinued         03/20/25 1106                    Discharge Planning   TERE: 3/24/2025     Code Status: Full Code   Medical Readiness for Discharge Date:                    Please place Justification for DME        Rhea Prado MD  Department of Hospital Medicine   [facility] - Cardiology Stepdown

## 2025-03-22 NOTE — ASSESSMENT & PLAN NOTE
Patient's blood pressure range in the last 24 hours was: BP  Min: 99/51  Max: 139/61.The patient's inpatient anti-hypertensive regimen is listed below:  Current Antihypertensives  furosemide tablet 20 mg, Daily, Oral  carvediloL tablet 3.125 mg, 2 times daily with meals, Oral    Plan  - BP is controlled, no changes needed to their regimen  -

## 2025-03-22 NOTE — ASSESSMENT & PLAN NOTE
Nutrition consulted. Most recent weight and BMI monitored-     Measurements:  Wt Readings from Last 1 Encounters:   03/21/25 46.9 kg (103 lb 6.3 oz)   Body mass index is 16.69 kg/m².    Patient has been screened and assessed by RD.    Malnutrition Type:  Context: chronic illness  Level: moderate    Malnutrition Characteristic Summary:  Weight Loss (Malnutrition): greater than 10% in 6 months (16 lbs/13.4% wt loss x 6 months)  Energy Intake (Malnutrition): less than 75% for greater than or equal to 1 month  Subcutaneous Fat (Malnutrition): mild depletion  Muscle Mass (Malnutrition): mild depletion    Interventions/Recommendations (treatment strategy):  1. Continue low Na diet as tolerated   2. Recommend boost plus once daily  3. Encourage good intake  4. RD to monitor weight, labs, intake     DISPLAY PLAN FREE TEXT

## 2025-03-22 NOTE — SUBJECTIVE & OBJECTIVE
Interval History:   3/22: Patient feels comfortable with breathing without supplemental oxygen at rest.     Review of Systems   Constitutional:  Positive for fatigue.   HENT:  Negative for rhinorrhea and sore throat.    Respiratory:  Positive for cough, chest tightness and shortness of breath.    Cardiovascular:  Positive for chest pain and leg swelling. Negative for palpitations.   Gastrointestinal:  Negative for abdominal distention and abdominal pain.   Genitourinary:  Negative for dysuria and hematuria.     Objective:     Vital Signs (Most Recent):  Temp: 97.7 °F (36.5 °C) (03/22/25 1554)  Pulse: 68 (03/22/25 1554)  Resp: 18 (03/22/25 1554)  BP: (!) 109/53 (03/22/25 1554)  SpO2: 95 % (03/22/25 1554) Vital Signs (24h Range):  Temp:  [97.5 °F (36.4 °C)-98.2 °F (36.8 °C)] 97.7 °F (36.5 °C)  Pulse:  [38-74] 68  Resp:  [18-20] 18  SpO2:  [94 %-99 %] 95 %  BP: ()/(51-61) 109/53     Weight: 46.9 kg (103 lb 6.3 oz)  Body mass index is 16.69 kg/m².    Intake/Output Summary (Last 24 hours) at 3/22/2025 1611  Last data filed at 3/22/2025 1300  Gross per 24 hour   Intake 598 ml   Output 500 ml   Net 98 ml         Physical Exam  Constitutional:       General: She is not in acute distress.     Appearance: Normal appearance.   HENT:      Head: Normocephalic and atraumatic.      Right Ear: External ear normal.      Left Ear: External ear normal.      Nose: Nose normal. No congestion.      Mouth/Throat:      Mouth: Mucous membranes are moist.      Pharynx: No oropharyngeal exudate.   Eyes:      Extraocular Movements: Extraocular movements intact.      Pupils: Pupils are equal, round, and reactive to light.   Cardiovascular:      Rate and Rhythm: Normal rate and regular rhythm.      Pulses: Normal pulses.      Heart sounds: Normal heart sounds. No murmur heard.  Pulmonary:      Effort: Respiratory distress present.      Breath sounds: Rales present.   Abdominal:      General: Bowel sounds are normal. There is no distension.       Palpations: There is no mass.   Musculoskeletal:      Cervical back: No rigidity.   Neurological:      Mental Status: She is alert.               Significant Labs: All pertinent labs within the past 24 hours have been reviewed.    Significant Imaging: I have reviewed all pertinent imaging results/findings within the past 24 hours.

## 2025-03-22 NOTE — ASSESSMENT & PLAN NOTE
Patient has Systolic (HFrEF) heart failure that is Acute on chronic. On presentation their CHF was decompensated. Evidence of decompensated CHF on presentation includes: crackles on lung auscultation, dyspnea on exertion (WATKINS), and shortness of breath. The etiology of their decompensation is likely not on lasix. Most recent BNP and echo results are listed below.  Recent Labs     03/20/25  0626   BNP 1,293*     Latest ECHO  Results for orders placed during the hospital encounter of 02/18/25    Echo Saline Bubble? Yes    Interpretation Summary    Left Ventricle: The left ventricle is normal in size. Normal wall thickness. There is normal systolic function with a visually estimated ejection fraction of 60 - 65%. There is normal diastolic function.    Right Ventricle: Normal right ventricular cavity size. Wall thickness is normal. Systolic function is normal.    Left Atrium: Left atrium is mildly dilated.    Aortic Valve: The aortic valve is a trileaflet valve. There is mild aortic valve sclerosis.    Tricuspid Valve: There is mild regurgitation.    Pulmonary Artery: The estimated pulmonary artery systolic pressure is 32 mmHg.    IVC/SVC: Normal venous pressure at 3 mmHg.    After contrast bubble injection very few bubbles appear late on the left side. They originate from  the pulmonary veins.  This is suggestive of extracardiac shunt.  Clinical correlation is required.    Current Heart Failure Medications  furosemide tablet 20 mg, Daily, Oral  carvediloL tablet 3.125 mg, 2 times daily with meals, Oral    Plan  - Monitor strict I&Os and daily weights.    - Place on telemetry  - Low sodium diet  - Place on fluid restriction of 1.5 L.   - Cardiology has not been consulted  - The patient's volume status is worsening as indicated by crackles on lung auscultation. Will adjust treatment as follows: lasix IV BID  - continue Coreg

## 2025-03-22 NOTE — PLAN OF CARE
Pt free of falls/trauma/injuries.  Denies c/o SOB, CP, or discomfort.  Generalized skin remains CDI; + edema noted.  Pt being diuresed with lasix iv push ; diuresing well.   .  Electrolytes replaced as ordered.  Pt tolerating plan of care.       Problem: Fall Injury Risk  Goal: Absence of Fall and Fall-Related Injury  Outcome: Progressing

## 2025-03-22 NOTE — ASSESSMENT & PLAN NOTE
Patient has Diastolic (HFpEF) heart failure that is Acute on chronic. On presentation their CHF was decompensated. Evidence of decompensated CHF on presentation includes: crackles on lung auscultation. The etiology of their decompensation is likely increased fluid intake. Most recent BNP and echo results are listed below.  Recent Labs     03/20/25  0626   BNP 1,293*     Latest ECHO  Results for orders placed during the hospital encounter of 03/20/25    Echo    Interpretation Summary    Left Ventricle: The left ventricle is normal in size. Ventricular mass is normal. Normal wall thickness. Normal wall motion. There is normal systolic function with a visually estimated ejection fraction of 55 - 60%. There is indeterminate diastolic function. Elevated left ventricular filling pressure.    Right Ventricle: The right ventricle is normal in size. Wall thickness is normal. Systolic function is normal.    Mitral Valve: There is mild regurgitation.    Tricuspid Valve: There is mild regurgitation.    Pulmonary Artery: The estimated pulmonary artery systolic pressure is 24 mmHg.    IVC/SVC: Normal venous pressure at 3 mmHg.    Pericardium: Left pleural effusion.    Current Heart Failure Medications  furosemide tablet 20 mg, Daily, Oral  carvediloL tablet 3.125 mg, 2 times daily with meals, Oral    Plan  - Monitor strict I&Os and daily weights.    - Place on telemetry  - Low sodium diet  - Place on fluid restriction of 1.5 L.   - Cardiology has not been consulted  - The patient's volume status is improving but not at their baseline as indicated by edema

## 2025-03-22 NOTE — NURSING
Home Oxygen Evaluation    Date Performed:   1) Patient's Home O2 Sat on room air, while at rest: 95        If O2 sats on room air at rest are 88% or below, patient qualifies. No additional testing needed. Document N/A in steps 2 and 3. If 89% or above, complete steps 2.      2) Patient's O2 Sat on room air while exercisin        If O2 sats on room air while exercising remain 89% or above patient does not qualify, no further testing needed Document N/A in step 3. If O2 sats on room air while exercising are 88% or below, continue to step 3.      3) Patient's O2 Sat while exercising on O2: na at na LPM         (Must show improvement from #2 for patients to qualify)    If O2 sats improve on oxygen, patient qualifies for portable oxygen. If not, the patient does not qualify.

## 2025-03-23 VITALS
BODY MASS INDEX: 16.61 KG/M2 | OXYGEN SATURATION: 95 % | HEART RATE: 64 BPM | SYSTOLIC BLOOD PRESSURE: 125 MMHG | TEMPERATURE: 97 F | RESPIRATION RATE: 19 BRPM | DIASTOLIC BLOOD PRESSURE: 60 MMHG | HEIGHT: 66 IN | WEIGHT: 103.38 LBS

## 2025-03-23 LAB
ANION GAP (OHS): 8 MMOL/L (ref 8–16)
BUN SERPL-MCNC: 19 MG/DL (ref 8–23)
CALCIUM SERPL-MCNC: 8.6 MG/DL (ref 8.7–10.5)
CHLORIDE SERPL-SCNC: 105 MMOL/L (ref 95–110)
CO2 SERPL-SCNC: 27 MMOL/L (ref 23–29)
CREAT SERPL-MCNC: 0.7 MG/DL (ref 0.5–1.4)
GFR SERPLBLD CREATININE-BSD FMLA CKD-EPI: >60 ML/MIN/1.73/M2
GLUCOSE SERPL-MCNC: 96 MG/DL (ref 70–110)
POTASSIUM SERPL-SCNC: 4.9 MMOL/L (ref 3.5–5.1)
SODIUM SERPL-SCNC: 140 MMOL/L (ref 136–145)

## 2025-03-23 PROCEDURE — 82310 ASSAY OF CALCIUM: CPT | Performed by: HOSPITALIST

## 2025-03-23 PROCEDURE — 36415 COLL VENOUS BLD VENIPUNCTURE: CPT | Performed by: HOSPITALIST

## 2025-03-23 PROCEDURE — 97161 PT EVAL LOW COMPLEX 20 MIN: CPT

## 2025-03-23 PROCEDURE — 25000003 PHARM REV CODE 250: Performed by: HOSPITALIST

## 2025-03-23 PROCEDURE — 97116 GAIT TRAINING THERAPY: CPT

## 2025-03-23 RX ORDER — CARVEDILOL 3.12 MG/1
3.12 TABLET ORAL 2 TIMES DAILY WITH MEALS
Qty: 180 TABLET | Refills: 3 | Status: SHIPPED | OUTPATIENT
Start: 2025-03-23 | End: 2025-03-26

## 2025-03-23 RX ORDER — FUROSEMIDE 20 MG/1
20 TABLET ORAL DAILY
Qty: 30 TABLET | Refills: 11 | Status: SHIPPED | OUTPATIENT
Start: 2025-03-24 | End: 2025-03-26 | Stop reason: SDUPTHER

## 2025-03-23 RX ADMIN — CARVEDILOL 3.12 MG: 3.12 TABLET, FILM COATED ORAL at 10:03

## 2025-03-23 RX ADMIN — POLYETHYLENE GLYCOL 3350 17 G: 17 POWDER, FOR SOLUTION ORAL at 10:03

## 2025-03-23 RX ADMIN — ATORVASTATIN CALCIUM 80 MG: 40 TABLET, FILM COATED ORAL at 10:03

## 2025-03-23 RX ADMIN — FUROSEMIDE 20 MG: 20 TABLET ORAL at 10:03

## 2025-03-23 RX ADMIN — PANTOPRAZOLE SODIUM 40 MG: 40 TABLET, DELAYED RELEASE ORAL at 10:03

## 2025-03-23 RX ADMIN — CLOPIDOGREL 75 MG: 75 TABLET ORAL at 10:03

## 2025-03-23 NOTE — PLAN OF CARE
Problem: Physical Therapy  Goal: Physical Therapy Goal  Description: Goals to be met by:      Patient will increase functional independence with mobility by performin. Gait  x 300 feet with Modified [place] using LRAD.   2. Ascend/descend 1 flight stair with bilateral Handrails Supervision using no AD.     Outcome: Progressing   Evaluation completed, initiated plan of care.   Allison Correa, PT  3/23/2025

## 2025-03-23 NOTE — PT/OT/SLP EVAL
"Physical Therapy Evaluation    Patient Name:  Katelyn Caba   MRN:  [id number]    Recommendations:     Discharge Recommendations: Low Intensity Therapy   Discharge Equipment Recommendations: shower chair, grab bar (owns crutches and wheelchair)   Barriers to discharge: None    Assessment:     Katelyn Caba is a 84 y.o. female admitted with a medical diagnosis of Acute hypoxic respiratory failure.  She presents with the following impairments/functional limitations: weakness, impaired endurance, impaired self care skills, impaired functional mobility, gait instability, impaired cardiopulmonary response to activity. The patient's mobility is limited due to generalized weakness from prolonged bed rest and complex medical condition. She complains of "decreased energy", decreased endurance. She ascended/descended 12 stairs SRIRAM HR and contact guard assist. She ambulated 120' with no Ad and stand by assistance. Patient currently demonstrates a need for low intensity therapy on a scheduled basis secondary to a decline in functional status due to illness     Rehab Prognosis: Good; patient would benefit from acute skilled PT services to address these deficits and reach maximum level of function.    Recent Surgery: * No surgery found *      Plan:     During this hospitalization, patient to be seen 3 x/week to address the identified rehab impairments via gait training, therapeutic exercises, therapeutic activities, neuromuscular re-education and progress toward the following goals:    Plan of Care Expires:  04/22/25    Subjective     Chief Complaint: "I want to know why I feel like I have no energy"  Patient/Family Comments/goals: return to PLOF, "I want to be able to go to [facility], I usually go on Saturday afternoons"  Pain/Comfort:  Pain Rating 1: 0/10    Patients cultural, spiritual, Druze conflicts given the current situation: no    Living Environment:  The patient lives with her spouse in [facility] on 2nd floor (1 " flight stairs, SRIRAM HR), T/S. 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   Prior to admission, patients level of function was independent .  Equipment used at home: none (wc crutches rollator).  DME owned (not currently used): none.  Upon discharge, patient will have assistance from spouse.    Objective:     Communicated with Rn prior to session.  Patient found HOB elevated with telemetry, pulse ox (continuous), PureWick  upon PT entry to room.Spouse at bedside.     General Precautions: Standard, fall  Orthopedic Precautions:N/A   Braces: N/A  Respiratory Status: Room air    Exams:    Cognitive Exam  Patient is A&O x4 and follows 100% of one -step commands    Fine Motor Coordination   -       WFL     Postural Exam Patient presented with the following abnormalities:    -       Rounded shoulders  -       Forward head  -       Kyphosis  -       Posterior pelvic tilt  -        Sensation    -       Light touch intact SRIRAM LE   Skin Integrity/Edema     -       Skin integrity: visibly intact  -       Edema: NA   R LE ROM WFL   R LE Strength WFL   L LE ROM WFL   L LE Strength  WFL       Functional Mobility:    Bed Mobility  Supine to Sit on the R side:  independent      Transfers Sit to Stand:  supervision assistance    Gait  Gait Distance: 100  ft with no AD, chair follow  Assistance Level: stand by assistance   Description: mild forward flexed posture, some wavering of path laterally, decreased step length, short shuffling steps, slow deliberate gait speed    Stairs Ascended/descended: 12 stairs   Quality: step to with R LE ascending, step to with R LE leading descending, slow deliberate speed  Level of assist: contact guard assist   Rails used: SRIRAM HR          AM-PAC 6 CLICK MOBILITY  Total Score:22       Treatment & Education:  Patient educated on:  -role of therapy  -goals of session  -PT POC  -benefits of out of bed mobility and consequences of immobility  -calling for staff assist to mobilize safely  Patient  agreeable to mobilize with therapy.      Gait training: cued for upright posture, reciprocal strides,  pacing for energy conservation    Patient encouraged to ambulate, sit up in chair 3x/day to prevent deconditioning during hospitalization. Patient verbalized understanding and agreement to mobilize only with RN assist for safety.     Patient left up in chair with all lines intact, call button in reach, and spouse present.    GOALS:   Multidisciplinary Problems       Physical Therapy Goals          Problem: Physical Therapy    Goal Priority Disciplines Outcome Interventions   Physical Therapy Goal     PT, PT/OT Progressing    Description: Goals to be met by:      Patient will increase functional independence with mobility by performin. Gait  x 300 feet with Modified [place] using LRAD.   2. Ascend/descend 1 flight stair with bilateral Handrails Supervision using no AD.                          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   Please schedule with Dr. Darryl Lopez-  Plavix stopped 22  [facility]  pre call complete; [facility] 22    ESOPHAGOGASTRODUODENOSCOPY N/A 2023    Procedure: EGD  (ESOPHAGOGASTRODUODENOSCOPY);  Surgeon: Natalie Fall MD;  Location: [facility] ENDO;  Service: Endoscopy;  Laterality: N/A;    ESOPHAGOGASTRODUODENOSCOPY N/A 3/5/2025    Procedure: EGD (ESOPHAGOGASTRODUODENOSCOPY);  Surgeon: Roberto Salvador MD;  Location: [facility] ([address] [place] [address]);  Service: Endoscopy;  Laterality: N/A;    EYE SURGERY      left eye cataract    LEFT HEART CATHETERIZATION  4/17/2024    Procedure: Left heart cath;  Surgeon: Elisabeth De La Cruz MD;  Location: [facility] CATH;  Service: Cardiology;;    TONSILLECTOMY      TUBAL LIGATION         Time Tracking:     PT Received On: 03/23/25  PT Start Time: 0900     PT Stop Time: 0918  PT Total Time (min): 18 min     Billable Minutes: Evaluation 10 and Gait Training 8      03/23/2025

## 2025-03-23 NOTE — PLAN OF CARE
AAOx 4,O2 sats > 95% on RA. Plan of care discussed with patient. Patient has no complaints of chest pain/SOB/palpitations. Pt ambulating  with assist x standby, fall precautions in place,no falls/injuries through the shift.Discussed medications and care.Patient has no questions at this time.Pt resting comfortably with no acute distress.Call light within reach,bed at lowest position.      Problem: Fall Injury Risk  Goal: Absence of Fall and Fall-Related Injury  Outcome: Progressing

## 2025-03-23 NOTE — PROGRESS NOTES
DC instructions reviewed with pt and family, all verbalize understanding, copy given to pt. Pt escorted off unit via wheelchair with family to personal vehicle.

## 2025-03-23 NOTE — DISCHARGE INSTRUCTIONS
For Weight Gain > 2-3 lbs in 1 day or 4-6 lbs over 1 week notify PCP:  CHF DIURETIC SLIDING SCALE     Skilled nurse visits daily to instruct and monitor medication adherence until target weight. Then resume prior order frequency.     If weight gain exceeds 5 lbs over target weight, call MD  If weight gain of 3-4 lbs over target weight, then:     Increase Furosemide - current dose (mg/day) to 40mg double dose and frequency of daily until target weight achieved or maximum 3 days.

## 2025-03-23 NOTE — PLAN OF CARE
[facility] - Cardiology Stepdown      HOME HEALTH ORDERS  FACE TO FACE ENCOUNTER    Patient Name: Katelyn Caba  YOB: 1940    PCP: Brooklyn Burnham MD   PCP Address: [address] / [facility] [address]  PCP Phone Number: [phone]  PCP Fax: [phone]    Encounter Date: 3/20/25    Admit to Home Health    Diagnoses:  Active Hospital Problems    Diagnosis  POA    *Acute hypoxic respiratory failure [J96.01]  Yes    Moderate protein-calorie malnutrition [E44.0]  Yes    Acute systolic congestive heart failure [I50.21]  Yes    Systolic congestive heart failure [I50.20]  Yes    Hypertension [I10]  Yes    AVM (arteriovenous malformation) of stomach, 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    3 cm hiatal hernia.   - One non-bleeding angioectasia in the stomach. Treated with argon plasma coagulation (APC).   - Two non-bleeding angioectasias in the duodenum.Treated with argon plasma coagulation (APC).       H/O ischemic left MCA stroke [Z86.73]  Not Applicable      Resolved Hospital Problems   No resolved problems to display.       Follow Up Appointments:  Future Appointments   Date Time Provider Department Center   3/26/2025 10:30 AM Leonela Hutchinson PA-C [facility] CARDIO [place]   3/26/2025 11:20 AM Brooklyn Burnham MD [facility]   [facility]   5/19/2025 10:30 AM David Gee MD [facility] HEMONC [facility]       Allergies:Review of patient's allergies indicates:  No Known Allergies    Medications: Review discharge medications with patient and family and provide education.    Current Medications[1]     Medication List        START taking these medications      carvediloL 3.125 MG tablet  Commonly known as: COREG  Take 1 tablet (3.125 mg total) by mouth 2 (two) times  daily with meals.     furosemide 20 MG tablet  Commonly known as: LASIX  Take 1 tablet (20 mg total) by mouth once daily.  Start taking on: March 24, 2025            CONTINUE taking these medications      atorvastatin 80 MG tablet  Commonly known as: LIPITOR  Take 1 tablet (80 mg total) by mouth once daily.     clopidogreL 75 mg tablet  Commonly known as: PLAVIX  Take 1 tablet (75 mg total) by mouth once daily.     cyanocobalamin 1000 MCG tablet  Commonly known as: VITAMIN B-12  Take 1,000 mcg by mouth once daily.     vitamin D 1000 units Tab  Commonly known as: VITAMIN D3  Take 1,000 Units by mouth once daily.            STOP taking these medications      BRILINTA 60 mg tablet  Generic drug: ticagrelor     mupirocin 2 % ointment  Commonly known as: BACTROBAN                I have seen and examined this patient within the last 30 days. My clinical findings that support the need for the home health skilled services and home bound status are the following:no   Weakness/numbness causing balance and gait disturbance due to Heart Failure making it taxing to leave home.     Diet:   cardiac diet    Labs:  none    Referrals/ Consults  Physical Therapy to evaluate and treat. Evaluate for home safety and equipment needs; Establish/upgrade home exercise program. Perform / instruct on therapeutic exercises, gait training, transfer training, and Range of Motion.  Occupational Therapy to evaluate and treat. Evaluate home environment for safety and equipment needs. Perform/Instruct on transfers, ADL training, ROM, and therapeutic exercises.   to evaluate for community resources/long-range planning.  Aide to provide assistance with personal care, ADLs, and vital signs.    Activities:   activity as tolerated    Nursing:   Agency to admit patient within 24 hours of hospital discharge unless specified on physician order or at patient request    SN to complete comprehensive assessment including routine vital signs.  Instruct on disease process and s/s of complications to report to MD. Review/verify medication list sent home with the patient at time of discharge  and instruct patient/caregiver as needed. Frequency may be adjusted depending on start of care date.     Skilled nurse to perform up to 3 visits PRN for symptoms related to diagnosis    Notify MD if SBP > 160 or < 90; DBP > 90 or < 50; HR > 120 or < 50; Temp > 101; O2 < 88%; Other:       Ok to schedule additional visits based on staff availability and patient request on consecutive days within the home health episode.    When multiple disciplines ordered:    Start of Care occurs on Sunday - Wednesday schedule remaining discipline evaluations as ordered on separate consecutive days following the start of care.    Thursday SOC -schedule subsequent evaluations Friday and Monday the following week.     Friday - Saturday SOC - schedule subsequent discipline evaluations on consecutive days starting Monday of the following week.    For all post-discharge communication and subsequent orders please contact patient's primary care physician. If unable to reach primary care physician or do not receive response within 30 minutes, please contact [phone] for clinical staff order clarification    Miscellaneous   Routine Skin for Bedridden Patients: Instruct patient/caregiver to apply moisture barrier cream to all skin folds and wet areas in perineal area daily and after baths and all bowel movements.  Heart Failure:      SN to instruct on the following:    Instruct on the definition of CHF.   Instruct on the signs/sympoms of CHF to be reported.   Instruct on and monitor daily weights.   Instruct on factors that cause exacerbation.   Instruct on action, dose, schedule, and side effects of medications.   Instruct on diet as prescribed.   Instruct on activity allowed.   Instruct on life-style modifications for life long management of CHF   SN to assess compliance with daily weights,  diet, medications, fluid retention,    safety precautions, activities permitted and life-style modifications.   Additional 1-2 SN visits per week as needed for signs and symptoms     of CHF exacerbation.      For Weight Gain > 2-3 lbs in 1 day or 4-6 lbs over 1 week notify PCP:  CHF DIURETIC SLIDING SCALE     Skilled nurse visits daily to instruct and monitor medication adherence until target weight. Then resume prior order frequency.     If weight gain exceeds 5 lbs over target weight, call MD  If weight gain of 3-4 lbs over target weight, then:     Increase Furosemide - current dose (mg/day) to 40mg double dose and frequency of daily until target weight achieved or maximum 3 days.     If already on max oral daily dose, see IV diuretic instructions.    After 3 days of increased oral diuretic dose, get BMP. If patient is on increased oral diuretic dose greater than 5 days, repeat BMP at day 7 of increased dose.     Potassium supplementation   Scr > 1.5 mg/dl  Scr  1.5 mg/dl    K < 3.0 - NOTIFY MD and 40 mEq bid  40 mEq tid   K- 3.1-3.3  20 mEq bid  20 mEq tid    K 3.4-3.7  10 mEq bid  10 mEq tid      If target weight not reached after 5 days of increased oral diuretic, proceed to IV diuretic with daily patient contact to include face-to-face visit and telephone encounters.       Home Health Aide:  Nursing Three times weekly, Physical Therapy Three times weekly, Occupational Therapy Three times weekly, Medical Social Work Three times weekly, and Home Health Aide Three times weekly    Wound Care Orders  no    I certify that this patient is confined to her home and needs intermittent skilled nursing care, physical therapy, and occupational therapy.              [1]   Current Facility-Administered Medications   Medication Dose Route Frequency Provider Last Rate Last Admin    acetaminophen tablet 650 mg  650 mg Oral Q8H PRN Rhea Prado MD        acetaminophen tablet 650 mg  650 mg Oral Q4H PRN Rhea Prado,  MD        atorvastatin tablet 80 mg  80 mg Oral Daily Rhea Prado MD   80 mg at 03/23/25 1008    carvediloL tablet 3.125 mg  3.125 mg Oral BID WM Rhea Prado MD   3.125 mg at 03/23/25 1008    clopidogreL tablet 75 mg  75 mg Oral Daily Rhea Prado MD   75 mg at 03/23/25 1008    furosemide tablet 20 mg  20 mg Oral Daily Rhea Prado MD   20 mg at 03/23/25 1008    melatonin tablet 6 mg  6 mg Oral Nightly PRN Rhea Prado MD        ondansetron disintegrating tablet 8 mg  8 mg Oral Q8H PRN Rhea Prado MD        oxyCODONE immediate release tablet 5 mg  5 mg Oral Q6H PRN Rhea Prado MD        pantoprazole EC tablet 40 mg  40 mg Oral Daily Rhea Prado MD   40 mg at 03/23/25 1008    polyethylene glycol packet 17 g  17 g Oral Daily Rhea Prado MD   17 g at 03/23/25 1008    prochlorperazine injection Soln 5 mg  5 mg Intravenous Q6H PRN Rhea Prado MD        sodium chloride 0.9% flush 10 mL  10 mL Intravenous PRN Rhea Prado MD

## 2025-03-24 ENCOUNTER — OUTPATIENT CASE MANAGEMENT (OUTPATIENT)
Dept: ADMINISTRATIVE | Facility: OTHER | Age: 85
End: 2025-03-24
Payer: MEDICARE

## 2025-03-24 NOTE — PROGRESS NOTES
Outpatient Care Management  Plan of Care Follow Up Visit    Patient: Katelyn Caba  MRN: [id number]  Date of Service: 03/24/2025  Completed by: Mayra Pop RN  Referral Date: 02/21/2025    Reason for Visit   Patient presents with    OPCM RN Follow Up Call       Brief Summary:   MEMBER'S CURRENT STATUS  :  -S&S of Anemia:  Patient states she does not remember any of the signs and symptoms of anemia at this time.  She states she has not received her educational materials yet.    -Iron:  Patient states she had an Injectofer infusion on 3/18 to help build her iron up.     -When to go to the ED:  Patient states she does not know when to contact the doctor or go to the ED for her anemia.     -CHF definition:  Patient states that CHF is when the heart has a hard time pumping.  Patient was hospitalized from 3/20-3/23 for shortness of breath.  She was placed on Bipap then weaned to hi flow NC at 15L/min.  Her work of breathing improved after given Lasix 40 mg IV.  She was weaned to room air on 3/22.  She passed the 6 minute walk test.  Home Health was ordered by the hospitalist but not set up by inpatient case management.  She was discharged on 3/23.      -Daily weights and 3 lb/5 lb rule:  Patient states she has a working scale but does not weigh herself daily at home.  She states she does not know the 3 lb/5 lb rule for CHF.    -S&S of CHF:  Patient states the signs and symptoms of CHF is shortness of breath.  That is the only one she knows at this time.      -Cardiology appointment:  Patient states she has a cardiology appointment coming up in [place] but she is staying in her [place] apartment for now and would like it changed to a [place] appointment.      -Low sodium diet:  Patient states she is watching her sodium intake but does not count the mg of sodium.  She does not know how much she is allowed to have.    -Fluid restriction:  Patient states that she was told by the doctor in the hospital to have 1.5 L  of fluids a day.    INTERVENTIONS  :  -S&S of Anemia:  CM reiterated the signs and symptoms of anemia including fatigue, Breathlessness, tachypnea, tachycardia, cold hands and feet, dizziness, headache, pale skin, brittle nails and craving unusual food.      -Iron:  CM encouraged patient to continue to get her iron infusions.    -When to go to the ED: CM informed patient she should go to the ED if she develops chest pain or severe trouble breathing, she throws up blood or something that looks like coffee grounds, she has black or tar colored stool, or if she feels weak or like she is going to pass out.       -CHF definition:  CM commended patient for knowing that CHF is when the heart has a hard time pumping.  CM also reviewed that Heart failure is a condition (usually chronic) in which the heart has difficulty pumping blood. This leads to an inadequate delivery of blood to the body to meet metabolic needs. Both right and left sides of the heart may be affected. Fluid may back up into the lungs and other organs because the heart is not functioning normally. Symptoms develop or exacerbate as heart failure progresses.      -Daily weights and 3 lb/5 lb rule:  CM explained to the patient that it is important to weigh herself every morning after she urinates and before she eats anything.  CM also explained that she should log the weights so she can compare the weights from day to day as well as from week to week.  CM explained the 3 lb/5 lb rule to the patient and informed her if she gains the 3 lbs overnight or 5 lbs in one week she needs to contact her doctor so they can adjust medications as needed.    -S&S of CHF:  CM reviewed the S&S of CHF including difficulty breathing or shortness of breath, especially with activity; persistent cough; swelling of feet, legs or abdomen; unexplained weight gain; fatigue; weakness; loss of appetite; dizziness or rapid heartbeat.     -Cardiology appointment:  CM called to reschedule  the cardiology appointment from 3/26 at 10:30 in [place] to     -Low sodium diet:  CM educated the patient that she should have 2300 mg of sodium per day and she should monitor the sodium by calculating how much she is eating.      -Fluid restriction:  CM educated the patient that she should have at least 1.5 L of fluids per day but not more than 2 L of fluids per day.         Next steps:   Follow up if received educational materials  Follow up on S&S of anemia  Follow up on iron rich diet  Follow up on what BP is running  Follow up on S&S of a stroke  Follow up when to go to the ED with anemia  Follow up on low sodium diet, 2300 mg  Follow up on fluid restriction, 1.5 L - 2 L  Follow up on S&S of CHF  Follow up on cardiology appointment

## 2025-03-25 PROBLEM — I50.30 (HFPEF) HEART FAILURE WITH PRESERVED EJECTION FRACTION: Status: ACTIVE | Noted: 2025-03-25

## 2025-03-25 PROBLEM — I25.10 CAD (CORONARY ARTERY DISEASE): Status: ACTIVE | Noted: 2025-03-25

## 2025-03-25 NOTE — PROGRESS NOTES
Outpatient Care Management   - Care Plan Follow Up    Patient: Katelyn Caba  MRN:  [id number]  Date of Service:  3/24/2025  Completed by:  Raya Kirk LMSW  Referral Date: 02/21/2025    Reason for Visit   Patient presents with    OPCM Post Discharge     3/24/2025       Brief Summary:  completed post discharge assessment with patient via telephone. Patient was just discharged from the hospital yesterday. Patient has help at home from her . Either her  or friend will be bringing her to her doctor's appointments. Patient was discharged with home health orders but referral was not sent by inpatient case management team.  sent home health orders to [facility] via [employer].  notified by [facility] that per patient, they will be admitting her on Thursday. Patient stated she has no difficulty affording groceries or her utility bills. Patient was able to access her medications. No other SDOH needs at this time. Notified OPCM RN and closed case.    Complex Care Plan     Care plan was discussed and completed today with input from patient and/or caregiver.    Patient Instructions     No follow-ups on file.

## 2025-03-25 NOTE — ASSESSMENT & PLAN NOTE
In 4/2024 pt presented with chest pain and elevated Trop. LHC done at the time notable for no obvious culprit for MI. the patient's chest pain and ST elevations entirely resolved by the end of the diagnostic angiogram.  Left ventriculogram with EF of 60-70% and normal anterior and inferior wall motion.  Possible diagnoses include plaque erosion with resolved thrombus or distal occlusion in the LPL.  PCI deferred  - Asymptomatic and euvolemic   - Given pt Hx of GI bleed and stomach AVM of the stomach and anemia. Only plavix was started with high intensity statin     Recommendation   - Continue with lipitor 80 mg HS   - Start Coreg 6.25 mg BID   - Continue plavix 75 mg qd

## 2025-03-25 NOTE — ASSESSMENT & PLAN NOTE
Pt was recently admitted for Acute decompensated HFpEF after she presented with progressive SOB and elevated 1,293   - NYHA Class   - ACC/AHA Stage   - ECHO - There is normal systolic function with a visually estimated ejection fraction of 55 - 60%. There is indeterminate diastolic function. Elevated left ventricular filling pressure.    - PE     Recommendations   - Continue with lasix 20 qd   - Start Jardiance 10 mg qd   - Continue with coreg 3.125 mg BID

## 2025-03-25 NOTE — PROGRESS NOTES
Cardiology Clinic Note  Reason for Visit: Hospital Discharge     HPI:   83y/o F with PMHx of HFpEF, MCA CVA (2/18/25, received TNK), Mechanical Fall with Left hip pain 2 weeks prior to CVA, inferolateral STEMI (4/2024, s/p cath with no intervention) not on antiplatelet, hypertension, hyperlipidemia, GI bleed ( non-bleeding angioectasia in the stomach, Duodenum), ISAIAH, BCC presents to [facility] care. Pt was recently evaluated in the [place] and admitted for Acute decompensated HF 3/2025 after she presented with acute hypoxic respiratory failure with elevated BNP. Upon encounter patient denies chest pain with exertion or at rest, palpitations, shortness of breath, dyspnea on exertion, dizziness, syncope, edema, orthopnea, paroxysmal nocturnal dyspnea, or claudication.    ROS:    Constitution: Negative for fever, chills, weight loss or gain.   HENT: Negative for sore throat, rhinorrhea, or headache.  Eyes: Negative for blurred or double vision.   Cardiovascular: See above  Pulmonary: Negative for SOB   Gastrointestinal: Negative for abdominal pain, nausea, vomiting, or diarrhea.   : Negative for dysuria.   Neurological: Negative for focal weakness or sensory changes.  PMH:     Past Medical History:   Diagnosis Date    Cancer     basal cell carcinoma    Stroke due to embolism of right middle cerebral artery 02/18/2025     Past Surgical History:   Procedure Laterality Date    CARPAL TUNNEL RELEASE      ESOPHAGOGASTRODUODENOSCOPY N/A 8/22/2022    Procedure: EGD (ESOPHAGOGASTRODUODENOSCOPY);  Surgeon: Steven Lopez MD;  Location: [address]);  Service: Endoscopy;  Laterality: N/A;    ESOPHAGOGASTRODUODENOSCOPY N/A 12/5/2022    Procedure: EGD (ESOPHAGOGASTRODUODENOSCOPY);  Surgeon: Steven Lopez MD;  Location: [address]);  Service: Endoscopy;  Laterality: N/A;  Has estimated pulmonary artery pressure 45, schedule location per protocol.   Please schedule with Dr. Darryl Lopez-  Plavix  stopped 8/23/22  pt requested to schedule after Thanksgiving/ inst portal-RB  pre call complete; [facility] 11/28/22    ESOPHAGOGASTRODUODENOSCOPY N/A 7/11/2023    Procedure: EGD (ESOPHAGOGASTRODUODENOSCOPY);  Surgeon: Natalie Fall MD;  Location: [facility] ENDO;  Service: Endoscopy;  Laterality: N/A;    ESOPHAGOGASTRODUODENOSCOPY N/A 3/5/2025    Procedure: EGD (ESOPHAGOGASTRODUODENOSCOPY);  Surgeon: Roberto Salvador MD;  Location: [facility] ENDO (2ND FLR);  Service: Endoscopy;  Laterality: N/A;    EYE SURGERY      left eye cataract    LEFT HEART CATHETERIZATION  4/17/2024    Procedure: Left heart cath;  Surgeon: Elisabeth De La Cruz MD;  Location: [facility] CATH;  Service: Cardiology;;    TONSILLECTOMY      TUBAL LIGATION       Allergies:   Review of patient's allergies indicates:  No Known Allergies  Medications:   Medications Ordered Prior to Encounter[1]  Social History:     Social History     Tobacco Use    Smoking status: Never     Passive exposure: Never    Smokeless tobacco: Never   Substance Use Topics    Alcohol use: No     Family History:   No family history on file.  Physical Exam:   There were no vitals taken for this visit.   Wt Readings from Last 4 Encounters:   03/23/25 46.9 kg (103 lb 6.3 oz)   03/18/25 52.4 kg (115 lb 8.3 oz)   03/12/25 51.5 kg (113 lb 8.6 oz)   03/11/25 50.3 kg (111 lb)       Physical Exam   Constitutional: No distress, obese, conversant  HEENT: Sclera anicteric, PERRLA  Neck: No JVD, no masses, good movement  CV: RRR, S1 and S2 normal, no additional heart sounds or murmurs. Pulses 2+ and equal bilaterally in radial arteries and femoral, DP and PT areas bilaterally  Pulm: Clear to auscultation bilaterally with symmetrical expansion.   Extremities: Both extremities intact and grossly normal, skin is warm, no edema noted  Psych: AOx3, appropriate affect  Neuro: CNII-XII intact, no focal deficits      Labs:     Lab Results   Component Value Date     03/23/2025     03/21/2025    K 4.9 03/23/2025     K 3.6 03/21/2025     03/23/2025     03/21/2025    CO2 27 03/23/2025    CO2 27 03/21/2025    BUN 19 03/23/2025    CREATININE 0.7 03/23/2025    GLUCOSE 96 03/23/2025    ANIONGAP 8 03/23/2025     Lab Results   Component Value Date    HGBA1C 4.2 03/20/2025     Lab Results   Component Value Date    BNP 1,293 (H) 03/20/2025    BNP 1,512 (H) 03/03/2025     (H) 08/21/2022    Lab Results   Component Value Date    WBC 10.70 03/20/2025    HGB 8.7 (L) 03/20/2025    HCT 29.3 (L) 03/20/2025    HCT 18.1 03/03/2025     (H) 03/20/2025    GRAN 6.3 03/20/2025    GRAN 59.3 03/20/2025     Lab Results   Component Value Date    CHOL 193 02/18/2025    HDL 39 (L) 02/18/2025    LDLCALC 133.2 02/18/2025    TRIG 104 02/18/2025          Imaging:     EF   Date Value Ref Range Status   08/21/2022 65 % Final   07/22/2022 55 % Final       TTE 3/20/25:     Left Ventricle: The left ventricle is normal in size. Ventricular mass is normal. Normal wall thickness. Normal wall motion. There is normal systolic function with a visually estimated ejection fraction of 55 - 60%. There is indeterminate diastolic function. Elevated left ventricular filling pressure.    Right Ventricle: The right ventricle is normal in size. Wall thickness is normal. Systolic function is normal.    Mitral Valve: There is mild regurgitation.    Tricuspid Valve: There is mild regurgitation.    Pulmonary Artery: The estimated pulmonary artery systolic pressure is 24 mmHg.    IVC/SVC: Normal venous pressure at 3 mmHg.    Pericardium: Left pleural effusion    Coronary Angiography 4/17/24:    Coronary anatomy:  Dominance: right  Left main:  Angiographically normal  Left anterior descending:  Large vessel that supplies the apex.  It has mild diffuse disease overall.  The mid segment after the large 1st diagonal has some moderate disease.    Left circumflex:  Large vessel with mild diffuse disease and multiple small obtuse marginal branches.  There is a left  posterolateral moderate-sized branch with DOM 3 flow.  There is a hint of an abrupt cut off possibly in the mid LPL    Right coronary artery:  Tortuous large dominant vessel with mild diffuse disease and large RPDA and RPL.  The proximal RCA has a 50% stenosis and DOM 3 flow.      Conclusions:  No obvious culprit for MI. the patient's chest pain and ST elevations were almost entirely resolved by the end of the diagnostic angiogram.  Left ventriculogram with EF of 60-70% and normal anterior and inferior wall motion.  Possible diagnoses include plaque erosion with resolved thrombus or distal occlusion in the LPL.  PCI deferred  Elevated left filling pressure    EGD 3/5/25     Impression:              - Esophageal mucosal changes secondary to                          established long-segment Rodriguez's disease,                          classified as Rodriguez's stage C10-M10 per [facility]                          criteria.                          - 3 cm hiatal hernia.                          - One non-bleeding angioectasia in the stomach.                          Treated with argon plasma coagulation (APC).                          - Two non-bleeding angioectasias in the duodenum.                          Treated with argon plasma coagulation (APC).                          - No specimens collected.    Assessment and Plan:      (HFpEF) heart failure with preserved ejection fraction  Pt was recently admitted for Acute decompensated HFpEF after she presented with progressive SOB and elevated 1,293   - NYHA Class I   - ACC/AHA Stage B  - ECHO - There is normal systolic function with a visually estimated ejection fraction of 55 - 60%. There is indeterminate diastolic function. Elevated left ventricular filling pressure.    - PE: Euvolemic and asymptomatic     Recommendations   - Continue with lasix 20 qd   - Start Jardiance 10 mg qd   - STOP coreg 3.125 mg BID   - Monitor daily weights   - 1.5 L fluid restriction   - Will  repeat BMP in 4 weeks.     CAD (coronary artery disease)  In 4/2024 pt presented with chest pain and elevated Trop. LHC done at the time notable for no obvious culprit for MI. the patient's chest pain and ST elevations entirely resolved by the end of the diagnostic angiogram.  Left ventriculogram with EF of 60-70% and normal anterior and inferior wall motion.  Possible diagnoses include plaque erosion with resolved thrombus or distal occlusion in the LPL.  PCI deferred. Likely coronary vasospasm.   - Asymptomatic and euvolemic   - Given pt Hx of GI bleed and stomach AVM of the stomach and anemia. Only plavix was started with high intensity statin     Recommendations  - Continue with lipitor 80 mg HS   - Continue plavix 75 mg qd   - Evaluated by GI, and recommended to resume antiplatelet     Hypertension  BP in clinic: 111/67 HR 68   Home monitor: Yes  Range: 100s  Medication compliance: Yes      Recommendation   - Continue home medications   - Mediterrena diet/ DASH Diet: Increase potassium, reduce sodium (<1.5g/day Na?), more fruits, vegetables, and lean proteins.  - Exercise as tolerated - >=150 min/week of moderate aerobic activity (e.g., brisk walking, swimming).  - Smoking cessation advised. Limit Alcohol -  No more than 1 drink/day (women), 2 drinks/day (men   - Stress Management: Mindfulness, yoga, or cognitive behavioral therapy         Keep a home BP diary and bring to next visit  Discussed mediterranean diet  Discussed exercise therapy based on 150-min per week AHA recommendations for moderate intensity exercise/75 min for high-intensity exercise    Signed:  Roseanne Keenan Obi, M.D.  Cardiology Fellow  [facility]  3/25/2025 3:39 PM           [1]   Current Outpatient Medications on File Prior to Visit   Medication Sig Dispense Refill    atorvastatin (LIPITOR) 80 MG tablet Take 1 tablet (80 mg total) by mouth once daily. 90 tablet 3    carvediloL (COREG) 3.125 MG tablet Take 1 tablet (3.125 mg  total) by mouth 2 (two) times daily with meals. 180 tablet 3    clopidogreL (PLAVIX) 75 mg tablet Take 1 tablet (75 mg total) by mouth once daily. 30 tablet 0    cyanocobalamin (VITAMIN B-12) 1000 MCG tablet Take 1,000 mcg by mouth once daily.      furosemide (LASIX) 20 MG tablet Take 1 tablet (20 mg total) by mouth once daily. 30 tablet 11    vitamin D (VITAMIN D3) 1000 units Tab Take 1,000 Units by mouth once daily.       No current facility-administered medications on file prior to visit.

## 2025-03-25 NOTE — ASSESSMENT & PLAN NOTE
BP in clinic   Home monitor:   Range:   Medication compliance:      Recommendation   - Continue home medications   - Start Valsartan 40 mg qd   - Mediterrena diet/ DASH Diet: Increase potassium, reduce sodium (<1.5g/day Na?), more fruits, vegetables, and lean proteins.  - Exercise as tolerated - >=150 min/week of moderate aerobic activity (e.g., brisk walking, swimming).  - Smoking cessation advised. Limit Alcohol -  No more than 1 drink/day (women), 2 drinks/day (men   - Stress Management: Mindfulness, yoga, or cognitive behavioral therapy      Oriented - self; Oriented - place; Oriented - time

## 2025-03-26 ENCOUNTER — OFFICE VISIT (OUTPATIENT)
Dept: CARDIOLOGY | Facility: CLINIC | Age: 85
End: 2025-03-26
Payer: MEDICARE

## 2025-03-26 VITALS
SYSTOLIC BLOOD PRESSURE: 111 MMHG | HEIGHT: 62 IN | WEIGHT: 104.25 LBS | OXYGEN SATURATION: 98 % | HEART RATE: 68 BPM | BODY MASS INDEX: 19.19 KG/M2 | DIASTOLIC BLOOD PRESSURE: 67 MMHG

## 2025-03-26 DIAGNOSIS — I25.10 CORONARY ARTERY DISEASE, UNSPECIFIED VESSEL OR LESION TYPE, UNSPECIFIED WHETHER ANGINA PRESENT, UNSPECIFIED WHETHER NATIVE OR TRANSPLANTED HEART: ICD-10-CM

## 2025-03-26 DIAGNOSIS — I50.32 CHRONIC HEART FAILURE WITH PRESERVED EJECTION FRACTION: Primary | ICD-10-CM

## 2025-03-26 DIAGNOSIS — I50.20 SYSTOLIC CONGESTIVE HEART FAILURE, UNSPECIFIED HF CHRONICITY: ICD-10-CM

## 2025-03-26 DIAGNOSIS — I63.411 STROKE DUE TO EMBOLISM OF RIGHT MIDDLE CEREBRAL ARTERY: ICD-10-CM

## 2025-03-26 DIAGNOSIS — I10 HYPERTENSION, UNSPECIFIED TYPE: ICD-10-CM

## 2025-03-26 PROCEDURE — 99213 OFFICE O/P EST LOW 20 MIN: CPT | Mod: PBBFAC | Performed by: STUDENT IN AN ORGANIZED HEALTH CARE EDUCATION/TRAINING PROGRAM

## 2025-03-26 PROCEDURE — 99999 PR PBB SHADOW E&M-EST. PATIENT-LVL III: CPT | Mod: PBBFAC,GC,, | Performed by: STUDENT IN AN ORGANIZED HEALTH CARE EDUCATION/TRAINING PROGRAM

## 2025-03-26 RX ORDER — FAMOTIDINE 20 MG/1
20 TABLET, FILM COATED ORAL DAILY
Qty: 30 TABLET | Refills: 11 | Status: SHIPPED | OUTPATIENT
Start: 2025-03-26 | End: 2026-03-26

## 2025-03-26 RX ORDER — CLOPIDOGREL BISULFATE 75 MG/1
75 TABLET ORAL DAILY
Qty: 30 TABLET | Refills: 0 | Status: SHIPPED | OUTPATIENT
Start: 2025-03-26

## 2025-03-26 RX ORDER — FUROSEMIDE 20 MG/1
20 TABLET ORAL DAILY
Qty: 30 TABLET | Refills: 11 | Status: SHIPPED | OUTPATIENT
Start: 2025-03-26 | End: 2026-03-26

## 2025-03-26 RX ORDER — ATORVASTATIN CALCIUM 80 MG/1
80 TABLET, FILM COATED ORAL DAILY
Qty: 90 TABLET | Refills: 3 | Status: SHIPPED | OUTPATIENT
Start: 2025-03-26 | End: 2026-03-26

## 2025-03-31 NOTE — PROGRESS NOTES
"Subjective:       Patient ID: Katelyn Caba is a 84 y.o. female.    Chief Complaint: Hospital Follow Up    HPI  Pt is here for follow up of hospital stay for mca stroke pt is feeling well today no sob/cp no focal weakness   Pt has htn bp fine no sob/cp  Review of Systems   Constitutional:  Negative for chills, fatigue and fever.   Respiratory:  Negative for cough, chest tightness and shortness of breath.    Cardiovascular:  Negative for chest pain and palpitations.   Neurological:  Negative for speech difficulty and weakness.   Psychiatric/Behavioral:  Negative for dysphoric mood, sleep disturbance and suicidal ideas. The patient is not nervous/anxious.        Objective:    /60   Pulse 76   Wt 49.9 kg (110 lb)   SpO2 99%   BMI 17.75 kg/m²     Physical Exam  Constitutional:       Appearance: Normal appearance. She is not ill-appearing.   Cardiovascular:      Rate and Rhythm: Normal rate and regular rhythm.      Heart sounds:      No gallop.   Pulmonary:      Effort: Pulmonary effort is normal. No respiratory distress.   Neurological:      General: No focal deficit present.      Mental Status: She is alert and oriented to person, place, and time.      Cranial Nerves: No cranial nerve deficit.      Coordination: Coordination normal.         Assessment:       1. Essential hypertension    2. H/O ischemic left MCA stroke        Plan:     Orders declined  F/u neurology  Cont meds  Low fat diet  Graded exercise  Rtc 3-6 months       "This note will not be shared with the patient."     "

## 2025-04-01 NOTE — PROGRESS NOTES
"Subjective:       Patient ID: Katelyn Caba is a 84 y.o. female.    Chief Complaint: Hospital Follow Up    HPI  Pt is in clinic for follow up of hosp stay for anemia pt is feeling much better no sob/cp no excessive fatigue  Pt has gerd no active heart burn she is on a ppi   Review of Systems   Constitutional:  Negative for chills, fatigue and fever.   Respiratory:  Negative for cough, chest tightness and shortness of breath.    Cardiovascular:  Negative for chest pain and palpitations.   Gastrointestinal:  Negative for abdominal distention, abdominal pain and blood in stool.       Objective:    /68   Pulse 64   Wt 50.3 kg (111 lb)   SpO2 99%   BMI 17.92 kg/m²     Physical Exam  Constitutional:       Appearance: Normal appearance. She is not ill-appearing.   Cardiovascular:      Rate and Rhythm: Normal rate and regular rhythm.      Heart sounds:      No gallop.   Pulmonary:      Effort: Pulmonary effort is normal. No respiratory distress.   Neurological:      General: No focal deficit present.      Mental Status: She is alert and oriented to person, place, and time.      Cranial Nerves: No cranial nerve deficit.      Coordination: Coordination normal.       H/h 9/29 in 3/2025  Assessment:       1. Normocytic anemia    2. Hospital discharge follow-up    3. Gastroesophageal reflux disease, unspecified whether esophagitis present        Plan:     Orders declined  Cont meds  Increase water intake  Low salt low fat diet   F/u hem/onc  F/u neurology  F/u cards  Rtc 3-6 months        "This note will not be shared with the patient."     "

## 2025-04-02 ENCOUNTER — TELEPHONE (OUTPATIENT)
Dept: FAMILY MEDICINE | Facility: CLINIC | Age: 85
End: 2025-04-02
Payer: MEDICARE

## 2025-04-02 NOTE — TELEPHONE ENCOUNTER
----- Message from [employer] sent at 4/2/2025 10:59 AM CDT -----  Type : Patient CallWho Called : Roberto ( [facility])Does the patient know what this is regarding?: Requesting a callback regarding ppt. PT presented as if she was very confused. Wanted to see if provider wanted to do some additional test. Messaged marked urgent @ Roberto request.Would the patient rather a call back or a response via [facility]? [employer] Call Back Number: [phone] (Direct)Additional Information:

## 2025-04-14 ENCOUNTER — OUTPATIENT CASE MANAGEMENT (OUTPATIENT)
Dept: ADMINISTRATIVE | Facility: OTHER | Age: 85
End: 2025-04-14
Payer: MEDICARE

## 2025-04-14 NOTE — PROGRESS NOTES
[facility] Medicine Services  History & Physical    Patient Name: Charleen Barnett  : 1952  MRN: [id number]  Primary Care Physician:  Ilsa Her MD  Date of admission: 2025  Date and Time of Service: 2025 at 10:08 AM EDT     Subjective      Chief Complaint: Hypoxia    History of Present Illness: Charleen Barnett is a 72 y.o. female with a CMH of Ramos's esophagitis, cervical carcinoma, depression, fibromyalgia, GERD, hyperlipidemia, hypertension, hypothyroidism, STEMI, KAYLIE, asthma who presented to [facility] on 2025 with shortness of breath.  This has been ongoing and progressively worsening over the last 2 days.  Patient is typically on room air at baseline, however has been using her 's oxygen for supplementation..  Patient denies any orthopnea.  She did state that she had some chest pain and tightness.  Upon arrival to the emergency department patient's oxygen saturations were 80%.  Patient has previously been noncompliant with CPAP mask.    Upon arrival to the emergency department patient was placed on 6 L of O2, gradually titrated down to 2 L of O2.  Patient was given antimicrobial therapy, as well as aerosols.  Labs were remarkable for troponin 25, 29 respectively.  BNP 2,980.0.Chest x-ray showed bibasilar pneumonia with small bilateral pleural effusions.      Review of Systems   Constitutional:  Positive for activity change. Negative for fatigue and fever.   HENT:  Negative for congestion.    Respiratory:  Positive for cough, chest tightness and shortness of breath. Negative for wheezing.    Cardiovascular:  Positive for chest pain and leg swelling.   Gastrointestinal: Negative.    Endocrine: Negative.    Genitourinary: Negative.    Musculoskeletal: Negative.    Skin: Negative.    Neurological: Negative.    Hematological: Negative.    Psychiatric/Behavioral: Negative.         Personal History     Past Medical History:   Diagnosis Date     [facility] - NEW PATIENT VISIT    Reason for visit: Establish Care     Best Contact Phone Number(s): [phone] (home)      Anemia  2023: Iron Def Anemia, received IV iron 9/2022 - 1/2023     HPI: Katelyn Caba is a 83 y.o. female with history of iron deficiency anemia who presents to establish [facility]. Patient hospitalized 3/26 - 3/27 with Hgb 6.6 sp 1u pRBC. GI evaluation plan for outpatient colonoscopy but patient does not wish to proceed with colonoscopy. Patient recalls fall around [place] with pelvic fracture. Patient is taking iron supplement daily and increasing iron rich foods. Weight is stable. 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 They opened 40 years ago and continue to travel to the city for work.     Patient presents with , Eder.  ECOG PS is 1.  History has been obtained by chart review and discussion with the patient.    ROS:   A complete 12-point review of systems was reviewed and is negative except as mentioned above.     Past Medical History:   Past Medical History:   Diagnosis Date    Cancer     basal cell carcinoma        Past Surgical History:   Past Surgical History:   Procedure Laterality Date    CARPAL TUNNEL RELEASE      ESOPHAGOGASTRODUODENOSCOPY N/A 8/22/2022    Procedure: EGD (ESOPHAGOGASTRODUODENOSCOPY);  Surgeon: Steven Lopez MD;  Location: [address]);  Service: Endoscopy;  Laterality: N/A;    ESOPHAGOGASTRODUODENOSCOPY N/A 12/5/2022    Procedure: EGD (ESOPHAGOGASTRODUODENOSCOPY);  Surgeon: Steven Lopez MD;  Location: [address]);  Service: Endoscopy;  Laterality: N/A;  Has estimated pulmonary artery pressure 45, schedule location per protocol.   Please schedule with Dr. Darryl Lopez-  Plavix stopped 8/23/22  [facility]  pre call complete; [facility] 11/28/22    ESOPHAGOGASTRODUODENOSCOPY N/A 7/11/2023    Procedure: EGD  "(ESOPHAGOGASTRODUODENOSCOPY);  Surgeon: Natalie Fall MD;  Location: [facility];  Service: Endoscopy;  Laterality: N/A;    EYE SURGERY      left eye cataract    TONSILLECTOMY      TUBAL LIGATION          Family History:   No family history on file.     Social History:   Social History     Tobacco Use    Smoking status: Never    Smokeless tobacco: Never   Substance Use Topics    Alcohol use: No      Patient splits time between [address] and apartment in [place]    I have reviewed and updated the patient's past medical, surgical, family and social histories.    Allergies:   Review of patient's allergies indicates:  No Known Allergies     Medications:   Current Outpatient Medications   Medication Sig Dispense Refill    ferrous sulfate (IRON) 325 mg (65 mg iron) Tab tablet Take 1 tablet (325 mg total) by mouth daily with breakfast. 90 tablet 0    pantoprazole (PROTONIX) 40 MG tablet Take 1 tablet (40 mg total) by mouth once daily. 60 tablet 5    alendronate (FOSAMAX) 70 MG tablet Take 1 tablet (70 mg total) by mouth every 7 days. 4 tablet 11    cyanocobalamin (VITAMIN B-12) 1000 MCG tablet Take 1,000 mcg by mouth once daily. (Patient not taking: Reported on 4/9/2024)      mv,Ca,min-folic acid-vit K1 (ONE-A-DAY WOMEN'S 50 PLUS) 400-20 mcg Tab Take 1 tablet by mouth once daily. (Patient not taking: Reported on 4/9/2024)      vitamin D (VITAMIN D3) 1000 units Tab Take 1,000 Units by mouth once daily. (Patient not taking: Reported on 4/9/2024)       No current facility-administered medications for this visit.        Physical Exam:   BP (!) 144/64   Pulse 70   Temp 97.3 °F (36.3 °C) (Temporal)   Resp 20   Ht 5' 3" (1.6 m)   Wt 53.9 kg (118 lb 13.3 oz)   SpO2 98%   BMI 21.05 kg/m²                Physical Exam  Constitutional:       Appearance: Normal appearance.   HENT:      Head: Normocephalic and atraumatic.      Mouth/Throat:      Mouth: Mucous membranes are moist.   Eyes:      Extraocular Movements: " Allergic 1970's    Anemia 1950's    Angina pectoris     Anxiety 1980's    Arthritis 1990's    Arthritis of neck     Asthma 02/13/2020    Ramos's esophagus 02/15/2018    Cancer Carcinoma Insitu cervical    Carcinoma insitu cervix    Cataract     Removed both eyes in Jan. 2023    Cholelithiasis Removed 2000's    Chronic constipation     Chronic diarrhea     Colon polyp 2000's    Coronary artery disease 2006?    CTS (carpal tunnel syndrome)     Depression 08/27/2018    Dislocation of finger     Fibromyalgia 12/14/2011    Fibromyalgia, primary 1990's I think    Fracture of wrist     R HAND LAST 3 DIGITS    Fracture, finger     GERD (gastroesophageal reflux disease)     Take Lansaprazole    Gout 02/13/2020    Headache     HL (hearing loss) Age related    Hyperlipidemia     Hypertension     Hypothyroidism 2022    Injury of back     Irritable bowel syndrome     Most of my life    Joint pain     Knee swelling     Low back pain 1970's    Low back strain     Lumbosacral disc disease     Neck strain ??    My neck is always pulling    Obesity Lifelong since puberty    Obstructive sleep apnea 10/16/2014    Osteopenia Five years appx    Dr. DURAN GIVES SHOTS    Osteoporosis     fosamax(SE), on prolia(4/10/18-6/18/21, 2/1/22)    Otitis media     Pedal edema 10/22/2018    Periarthritis of shoulder     Pneumonia 2020    Presence of left artificial knee joint 03/14/2019    Rotator cuff syndrome     Scoliosis 1961    Seasonal allergies     Shortness of breath     ST elevation (STEMI) myocardial infarction 02/13/2020    Status post percutaneous transluminal coronary angioplasty 06/10/2014    Substance abuse     Tachycardia     Tear of meniscus of knee     Thoracic disc disorder     Alignment totally out    Tremor 2015    Urge incontinence 08/27/2018    Urinary tract infection     Visual impairment Cataracts    Removed 1/2023 removed    Vitamin D deficiency 03/19/2015       Past Surgical History:   Procedure Laterality Date     BLADDER SURGERY      BLADDER SUSPENSION  04/2023    CARDIAC CATHETERIZATION N/A 05/29/2021    Procedure: Left Heart Cath and coronary angiogram;  Surgeon: Yazmin Matta MD;  Location: [facility] CATH INVASIVE LOCATION;  Service: Cardiovascular;  Laterality: N/A;    CARDIAC CATHETERIZATION  05/29/2021    Procedure: Functional Flow Reserve (iFR);  Surgeon: Bryan Patel MD;  Location: [facility] CATH INVASIVE LOCATION;  Service: Cardiology;;    CARDIAC CATHETERIZATION N/A 05/29/2021    Procedure: Percutaneous Coronary Intervention;  Surgeon: Bryan Patel MD;  Location: [facility] CATH INVASIVE LOCATION;  Service: Cardiology;  Laterality: N/A;    CARDIAC CATHETERIZATION N/A 05/29/2021    Procedure: Stent FRANCOISE coronary;  Surgeon: Bryan Patel MD;  Location: [facility] CATH INVASIVE LOCATION;  Service: Cardiology;  Laterality: N/A;    CAROTID STENT      CHOLECYSTECTOMY      COLONOSCOPY  2017??    Scheduled for march 2023?    CORONARY STENT PLACEMENT  ??    FRACTURE SURGERY  2020    Hand surgery [facility]    HAND SURGERY      HYSTERECTOMY      INGUINAL HERNIA REPAIR      JOINT REPLACEMENT  L-knee    KNEE SURGERY      OTHER SURGICAL HISTORY      STENT    SUBTOTAL HYSTERECTOMY  1977    Carcinoma insitu Cervix    TRIGGER POINT INJECTION         Family History: family history includes Alcohol abuse in her brother, brother, brother, father, maternal grandfather, and mother; Anxiety disorder in her mother; Arthritis in her brother; Cancer in her father; Depression in her brother, brother, father, mother, and sister; Diabetes in her paternal grandmother; Early death in her brother, brother, brother, and mother; Heart attack in her brother, brother, brother, father, maternal grandfather, maternal grandmother, paternal grandfather, and paternal grandmother; Heart disease in her sister; Hyperlipidemia in her brother, brother, and brother; Hypertension in her brother, brother, brother, and sister;  Extraocular movements intact.      Pupils: Pupils are equal, round, and reactive to light.   Cardiovascular:      Rate and Rhythm: Normal rate and regular rhythm.      Pulses: Normal pulses.      Heart sounds: No murmur heard.  Pulmonary:      Effort: Pulmonary effort is normal. No respiratory distress.      Breath sounds: Normal breath sounds.   Abdominal:      General: Abdomen is flat. There is no distension.      Palpations: Abdomen is soft.      Tenderness: There is no abdominal tenderness.   Musculoskeletal:         General: No swelling or tenderness. Normal range of motion.      Cervical back: Normal range of motion. No rigidity.   Skin:     General: Skin is warm and dry.      Coloration: Skin is not jaundiced.   Neurological:      General: No focal deficit present.      Mental Status: She is alert and oriented to person, place, and time.   Psychiatric:         Mood and Affect: Mood normal.         Behavior: Behavior normal.           Labs:   Lab Results   Component Value Date    WBC 7.18 03/27/2024    HGB 9.0 (L) 03/27/2024    HCT 29.9 (L) 03/27/2024    MCV 71 (L) 03/27/2024     (H) 03/27/2024       Lab Results   Component Value Date     03/27/2024    K 4.1 03/27/2024     03/27/2024    CO2 22 (L) 03/27/2024    BUN 16 03/27/2024    CREATININE 0.7 03/27/2024    ALBUMIN 3.8 03/26/2024    BILITOT 0.3 03/26/2024    ALKPHOS 136 (H) 03/26/2024    AST 16 03/26/2024    ALT 9 (L) 03/26/2024       Imaging:   X-Ray Chest PA And Lateral  Narrative: EXAMINATION:  XR CHEST PA AND LATERAL    TECHNIQUE:  PA and lateral views of the chest were performed.    COMPARISON:  10/22/2021, 03/17/2023    FINDINGS:  The cardiac silhouette is normal in size, midline.    The pulmonary vascularity is normal.    The pulmonary cathie appear normal bilaterally.    The lungs are well expanded and clear.    No focal airspace disease.    No pleural effusion, no pneumothorax.    The osseous structures appear within normal limits  Mental illness in her brother and mother; No Known Problems in her brother, maternal aunt, maternal uncle, paternal uncle, and sister; Rheumatologic disease in her maternal grandmother; Scoliosis in her maternal grandmother; Stroke in her paternal aunt. Otherwise pertinent FHx was reviewed and not pertinent to current issue.    Social History:  reports that she has never smoked. She has never been exposed to tobacco smoke. She has never used smokeless tobacco. She reports that she does not currently use alcohol. She reports that she does not currently use drugs after having used the following drugs: Amphetamines. Frequency: 1.00 time per week.    Home Medications:  Prior to Admission Medications       Prescriptions Last Dose Informant Patient Reported? Taking?    albuterol sulfate  (90 Base) MCG/ACT inhaler   No Yes    Inhale 2 puffs Every 6 (Six) Hours As Needed for Wheezing.    aspirin 81 MG tablet 4/13/2025  Yes Yes    Take 1 tablet by mouth Daily.    atorvastatin (LIPITOR) 10 MG tablet 4/13/2025  No Yes    TAKE ONE TABLET BY MOUTH DAILY    BIOTIN PO 4/13/2025  Yes Yes    Take 1 tablet by mouth Daily.    buPROPion XL (WELLBUTRIN XL) 150 MG 24 hr tablet 4/13/2025  No Yes    TAKE 1 TABLET BY MOUTH EVERY MORNING    busPIRone (BUSPAR) 10 MG tablet 4/13/2025  Yes Yes    Take 1 tablet by mouth 2 (Two) Times a Day.    Cholecalciferol (Vitamin D) 50 MCG (2000 UT) tablet 4/13/2025  Yes Yes    Take 1 tablet by mouth Daily.    citalopram (CeleXA) 20 MG tablet 4/13/2025  No Yes    TAKE ONE TABLET BY MOUTH DAILY    fluticasone (FLONASE) 50 MCG/ACT nasal spray 4/13/2025  No Yes    USE 2 SPRAYS INTO THE NOSTRIL(S) AS DIRECTED BY PROVIDER DAILY.    gabapentin (NEURONTIN) 400 MG capsule 4/13/2025  No Yes    Take 1 capsule by mouth every night at bedtime.    Gemtesa 75 MG tablet 4/13/2025  Yes Yes    Take 1 tablet by mouth Every Other Day.    hydrOXYzine (ATARAX) 10 MG tablet 4/13/2025  No Yes    TAKE 1 TABLET BY MOUTH  for age.  Impression: No significant abnormality seen.    Electronically signed by: Dagmar [facility] MD  Date:    02/09/2024  Time:    09:14            Assessment:       1. H/O ischemic left MCA stroke    2. Microcytic anemia    3. Gastrointestinal hemorrhage, unspecified gastrointestinal hemorrhage type    4. Closed nondisplaced fracture of pelvis with routine healing, unspecified part of pelvis, subsequent encounter          Plan:             # Iron Def Anemia - Never had an abnormal colonoscopy, no history of GIB with extensive previous ISAIAH work-up. Most likely cause following recent injury to pelvis, causing consumption with bruising + decreased nutrition with bedrest. Hgb is now improved after 1u pRBC and symptomatically patient feels better. Recommend to proceed with additional dose of IV iron to correct deficit, ~750 mg.   - injectafer x1  - return to clinic in 3-4m with repeat iron serology     # TIA - Patient took anticoagulation and noted worsening of symptoms and has since discontinued    # GIB - Upcoming visit with GI to rule out bleeding, patient is not currently on anticoagulation    # Pelvic Fracture - Improved with physical therapy, no surgical intervention     Follow up: 3m      The above information has been reviewed with the patient and all questions have been answered to their apparent satisfaction.  They understand that they can call the clinic with any questions.    Shea Ha MD  Hematology/Oncology  [facility]      Med Onc Chart Routing      Follow up with physician    Follow up with LUZ 3 months. francisco miguel   Infusion scheduling note   injectafer x1 when authorized Mon-Wed   Injection scheduling note    Labs CBC, CMP, ferritin and iron and TIBC   Scheduling:  Preferred lab:  Lab interval:  lab apt before clinic visit   Imaging    Pharmacy appointment    Other referrals                     EVERY NIGHT AT BEDTIME    levothyroxine (SYNTHROID, LEVOTHROID) 25 MCG tablet 4/13/2025  No Yes    TAKE ONE TABLET BY MOUTH EVERY MORNING    Magnesium Citrate 100 MG capsule 4/13/2025  Yes Yes    Take 1 capsule by mouth Daily.    metoprolol succinate XL (TOPROL-XL) 25 MG 24 hr tablet 4/13/2025  Yes Yes    Take 1 tablet by mouth Daily.    midodrine (PROAMATINE) 5 MG tablet 4/13/2025  Yes Yes    Take 0.5 tablets by mouth 2 (Two) Times a Day.    montelukast (SINGULAIR) 10 MG tablet 4/13/2025  No Yes    TAKE ONE TABLET BY MOUTH DAILY    spironolactone (ALDACTONE) 25 MG tablet   Yes No    As Needed.    vitamin E 100 UNIT capsule 4/13/2025  Yes Yes    Take 1 capsule by mouth Daily.    Zinc Acetate, Oral, (ZINC ACETATE PO) 4/13/2025  Yes Yes    Take 1 tablet by mouth Daily.              Allergies:  Allergies   Allergen Reactions    Hydrocodone Hives    Chlorpheniramine-Phenylephrine Rash    Penicillins Rash    Sulfa Antibiotics Hives and Rash    Tetracycline Rash    Warfarin Rash    Pseudoephedrine Hcl Rash    Triprolidine Hcl Rash       Objective      Vitals:   Temp:  [98.3 °F (36.8 °C)-99.1 °F (37.3 °C)] 98.3 °F (36.8 °C)  Heart Rate:  [51-77] 59  Resp:  [8-20] 20  BP: (144-207)/() 144/74  Body mass index is 39.29 kg/m².  Physical Exam  PHYSICAL EXAM  Constitutional:  Well-developed, well-nourished, no acute distress, nontoxic appearance   Eyes:  PERRL, conjunctivae normal, EOMI   HENT:  Atraumatic, external ears normal, nose normal, oropharynx moist, no pharyngeal exudates. Neck-normal range of motion, no tenderness, supple, trachea midline  Respiratory:  Rales are identified there are scattered expiratory wheezes bilaterally and equal breath sounds bilaterally , nonlabored respirations without accessory muscle use  Cardiovascular:  Normal rate, normal rhythm, no murmurs, no gallops, no rubs   GI:  Soft, nondistended, normal bowel sounds, nontender, no organomegaly, no mass, no rebound, no guarding  "  Musculoskeletal:  No edema, no tenderness, no deformities  Integument:  Well hydrated, no rash   Neurologic:  Alert & oriented x 3, CN 2-12 normal, normal motor function, normal sensory function, no focal deficits noted   Psychiatric:  Speech and behavior appropriate        Diagnostic Data:  Lab Results (last 24 hours)       Procedure Component Value Units Date/Time    Procalcitonin [[id number]]  (Normal) Collected: 04/14/25 0618    Specimen: Blood Updated: 04/14/25 0718     Procalcitonin 0.03 ng/mL     Narrative:      As a Marker for Sepsis (Non-Neonates):    1. <0.5 ng/mL represents a low risk of severe sepsis and/or septic shock.  2. >2 ng/mL represents a high risk of severe sepsis and/or septic shock.    As a Marker for Lower Respiratory Tract Infections that require antibiotic therapy:    PCT on Admission    Antibiotic Therapy       6-12 Hrs later    >0.5                Strongly Recommended  >0.25 - <0.5        Recommended   0.1 - 0.25          Discouraged              Remeasure/reassess PCT  <0.1                Strongly Discouraged     Remeasure/reassess PCT    As 28 day mortality risk marker: \"Change in Procalcitonin Result\" (>80% or <=80%) if Day 0 (or Day 1) and Day 4 values are available. Refer to http://[email or web address]-pct-calculator.[email or web address]    Change in PCT <=80%  A decrease of PCT levels below or equal to 80% defines a positive change in PCT test result representing a higher risk for 28-day all-cause mortality of patients diagnosed with severe sepsis for septic shock.    Change in PCT >80%  A decrease of PCT levels of more than 80% defines a negative change in PCT result representing a lower risk for 28-day all-cause mortality of patients diagnosed with severe sepsis or septic shock.       High Sensitivity Troponin T 1Hr [[id number]]  (Abnormal) Collected: 04/14/25 0618    Specimen: Blood Updated: 04/14/25 0644     HS Troponin T 29 ng/L      Troponin T Numeric Delta 4 ng/L      Troponin T % Delta 16    " Narrative:      High Sensitive Troponin T Reference Range:  <14.0 ng/L- Negative Female for AMI  <22.0 ng/L- Negative Male for AMI  >=14 - Abnormal Female indicating possible myocardial injury.  >=22 - Abnormal Male indicating possible myocardial injury.   Clinicians would have to utilize clinical acumen, EKG, Troponin, and serial changes to determine if it is an Acute Myocardial Infarction or myocardial injury due to an underlying chronic condition.         Respiratory Panel PCR w/COVID-19(SARS-CoV-2) ALMITA/RODNEY/RADHA/PAD/COR/TALA In-House, NP Swab in UTM/VTM, 2 HR TAT - Swab, Nasopharynx [[id number]]  (Normal) Collected: 04/14/25 0508    Specimen: Swab from Nasopharynx Updated: 04/14/25 0637     ADENOVIRUS, PCR Not Detected     Coronavirus 229E Not Detected     Coronavirus HKU1 Not Detected     Coronavirus NL63 Not Detected     Coronavirus OC43 Not Detected     COVID19 Not Detected     Human Metapneumovirus Not Detected     Human Rhinovirus/Enterovirus Not Detected     Influenza A PCR Not Detected     Influenza B PCR Not Detected     Parainfluenza Virus 1 Not Detected     Parainfluenza Virus 2 Not Detected     Parainfluenza Virus 3 Not Detected     Parainfluenza Virus 4 Not Detected     RSV, PCR Not Detected     Bordetella pertussis pcr Not Detected     Bordetella parapertussis PCR Not Detected     Chlamydophila pneumoniae PCR Not Detected     Mycoplasma pneumo by PCR Not Detected    Narrative:      In the setting of a positive respiratory panel with a viral infection PLUS a negative procalcitonin without other underlying concern for bacterial infection, consider observing off antibiotics or discontinuation of antibiotics and continue supportive care. If the respiratory panel is positive for atypical bacterial infection (Bordetella pertussis, Chlamydophila pneumoniae, or Mycoplasma pneumoniae), consider antibiotic de-escalation to target atypical bacterial infection.    Comprehensive Metabolic Panel [[id number]]   (Abnormal) Collected: 04/14/25 0508    Specimen: Blood Updated: 04/14/25 0611     Glucose 163 mg/dL      BUN 20 mg/dL      Creatinine 0.97 mg/dL      Sodium 139 mmol/L      Potassium 4.0 mmol/L      Chloride 102 mmol/L      CO2 24.1 mmol/L      Calcium 8.9 mg/dL      Total Protein 6.6 g/dL      Albumin 4.1 g/dL      ALT (SGPT) 15 U/L      AST (SGOT) 22 U/L      Alkaline Phosphatase 88 U/L      Total Bilirubin 0.2 mg/dL      Globulin 2.5 gm/dL      A/G Ratio 1.6 g/dL      BUN/Creatinine Ratio 20.6     Anion Gap 12.9 mmol/L      eGFR 61.4 mL/min/1.73     Narrative:      GFR Categories in Chronic Kidney Disease (CKD)      GFR Category          GFR (mL/min/1.73)    Interpretation  G1                     90 or greater         Normal or high (1)  G2                      60-89                Mild decrease (1)  G3a                   45-59                Mild to moderate decrease  G3b                   30-44                Moderate to severe decrease  G4                    15-29                Severe decrease  G5                    14 or less           Kidney failure          (1)In the absence of evidence of kidney disease, neither GFR category G1 or G2 fulfill the criteria for CKD.    eGFR calculation 2021 CKD-EPI creatinine equation, which does not include race as a factor    High Sensitivity Troponin T [[id number]]  (Abnormal) Collected: 04/14/25 0508    Specimen: Blood Updated: 04/14/25 0611     HS Troponin T 25 ng/L     Narrative:      High Sensitive Troponin T Reference Range:  <14.0 ng/L- Negative Female for AMI  <22.0 ng/L- Negative Male for AMI  >=14 - Abnormal Female indicating possible myocardial injury.  >=22 - Abnormal Male indicating possible myocardial injury.   Clinicians would have to utilize clinical acumen, EKG, Troponin, and serial changes to determine if it is an Acute Myocardial Infarction or myocardial injury due to an underlying chronic condition.         BNP [[id number]]  (Abnormal) Collected: 04/14/25  0508    Specimen: Blood Updated: 04/14/25 0611     proBNP 2,980.0 pg/mL     Narrative:      This assay is used as an aid in the diagnosis of individuals suspected of having heart failure. It can be used as an aid in the diagnosis of acute decompensated heart failure (ADHF) in patients presenting with signs and symptoms of ADHF to the emergency department (ED). In addition, NT-proBNP of <300 pg/mL indicates ADHF is not likely.    Age Range Result Interpretation  NT-proBNP Concentration (pg/mL:      <50             Positive            >450                   Gray                 300-450                    Negative             <300    50-75           Positive            >900                  Gray                300-900                  Negative            <300      >75             Positive            >1800                  Gray                300-1800                  Negative            <300    CBC & Differential [[id number]]  (Abnormal) Collected: 04/14/25 0508    Specimen: Blood Updated: 04/14/25 0552    Narrative:      The following orders were created for panel order CBC & Differential.  Procedure                               Abnormality         Status                     ---------                               -----------         ------                     CBC Auto Differential[[id number]]        Abnormal            Final result                 Please view results for these tests on the individual orders.    CBC Auto Differential [[id number]]  (Abnormal) Collected: 04/14/25 0508    Specimen: Blood Updated: 04/14/25 0552     WBC 9.59 10*3/mm3      RBC 4.50 10*6/mm3      Hemoglobin 12.5 g/dL      Hematocrit 39.5 %      MCV 87.8 fL      MCH 27.8 pg      MCHC 31.6 g/dL      RDW 12.6 %      RDW-SD 40.2 fl      MPV 10.5 fL      Platelets 244 10*3/mm3      Neutrophil % 73.7 %      Lymphocyte % 20.1 %      Monocyte % 5.4 %      Eosinophil % 0.3 %      Basophil % 0.1 %      Immature Grans % 0.4 %      Neutrophils, Absolute  7.06 10*3/mm3      Lymphocytes, Absolute 1.93 10*3/mm3      Monocytes, Absolute 0.52 10*3/mm3      Eosinophils, Absolute 0.03 10*3/mm3      Basophils, Absolute 0.01 10*3/mm3      Immature Grans, Absolute 0.04 10*3/mm3      nRBC 0.0 /100 WBC     Extra Tubes [[id number]] Collected: 04/14/25 0508    Specimen: Blood, Venous Line Updated: 04/14/25 0545    Narrative:      The following orders were created for panel order Extra Tubes.  Procedure                               Abnormality         Status                     ---------                               -----------         ------                     Light Blue Top[[id number]]                                   Final result                 Please view results for these tests on the individual orders.    Light Blue Top [[id number]] Collected: 04/14/25 0508    Specimen: Blood Updated: 04/14/25 0545     Extra Tube Hold for add-ons.     Comment: Auto resulted       Blood Culture - Blood, Arm, Left [[id number]] Collected: 04/14/25 0508    Specimen: Blood from Arm, Left Updated: 04/14/25 0542    Blood Culture - Blood, Arm, Right [[id number]] Collected: 04/14/25 0518    Specimen: Blood from Arm, Right Updated: 04/14/25 0524    POC Lactate [[id number]]  (Normal) Collected: 04/14/25 0512    Specimen: Blood Updated: 04/14/25 0514     Lactate 1.3 mmol/L      Comment: Serial Number: [id number]:  [id number]                Imaging Results (Last 24 Hours)       Procedure Component Value Units Date/Time    XR Chest 1 View [[id number]] Collected: 04/14/25 0532     Updated: 04/14/25 0535    Narrative:      XR CHEST 1 VW    Date of Exam: 4/14/2025 5:20 AM EDT    Indication: hypoxemia, wheezing    Comparison: 3/20/2025.    Findings:  Patchy airspace disease present within the lung bases bilaterally. Small bilateral pleural effusions present.. No pneumothorax. The pulmonary vasculature appears within normal limits. The heart appears enlarged, stable. No acute osseous abnormality    identified.      Impression:      Impression:  Bibasilar pneumonia with small bilateral pleural effusions.          Electronically Signed: Sol Reese MD    4/14/2025 5:33 AM EDT    Workstation ID: [id number]              Assessment & Plan        This is a 72 y.o. female with:    Active and Resolved Problems  Active Hospital Problems    Diagnosis  POA    **Bilateral pneumonia [J18.9]  Yes    Hypoxia [R09.02]  Yes      Resolved Hospital Problems   No resolved problems to display.       Bilateral pneumonia  - WBC 9.9  - CXR:bibasilar pneumonia with small bilateral pleural effusions  - Wheezing noted on exam  - Solu-Medrol 125 mg IV given x 1 in ED.  Continue with 40 mg twice daily  - Continue aerosols  - I-S  - Oxygen supplementation to keep sats 90 to 95%.  Wean as tolerated.  Patient has room air at baseline  - Ceftriaxone given in ED, continue    Elevated BNP  - BNP 2980.0  - Troponins 25, 29  - EKG: Sinus rhythm, ventricular premature complex.  No significant change from EKG from 3/20/2025  - Echo on 3/20/2025  - Bumex 1 mg IV given in ED, continue    CAD  Hypertension  - Blood pressure on admission 141/76  - Patient of Dr. Matta's  - Continue metoprolol, Aldactone, Lipitor, aspirin  - Patient with history of stent placement, and has history of STEMI, however is only on aspirin.      Hypothyroidism-continue levothyroxine  Fibromyalgia, depression-Wellbutrin, BuSpar (states she takes BuSpar twice a day), Celexa        VTE Prophylaxis:  Mechanical VTE prophylaxis orders are present.        The patient desires to be as follows:    CODE STATUS:           Dottie Barnett, who can be contacted at [phone], is the designated person to make medical decisions on the patient's behalf if She is incapable of doing so. This was clarified with patient and/or next of kin on 4/14/2025 during the course of this H&P.    Admission Status:  I believe this patient meets inpatient status.    Expected Length of Stay: >2MN    PDMP and  Medication Dispenses via Sidebar reviewed and consistent with patient reported medications.    I discussed the patient's findings and my recommendations with patient.      Signature:     This document has been electronically signed by MANISH Camacho on April 14, 2025 11:18 EDT   [facility]

## 2025-04-14 NOTE — LETTER
Katelyn Caba  [address] [address] [address]      Dear Katelyn Caba,     I am your nurse with [facility]s Out[facility] Department. I was unsuccessful in reaching you today. At your earliest convenience, I would like to discuss your healthcare progress.      Please contact me at [phone] from 8:00AM to 4:30 PM on Monday thru Friday.     As you know, [facility] is a program offered to you through [facility] where a nurse is available 24/7 to answer questions or provide medical advice, their number is [phone].    Thanks,    Ana Cristina Pop RN  Outpatient Care Management

## 2025-04-15 ENCOUNTER — LAB VISIT (OUTPATIENT)
Dept: LAB | Facility: HOSPITAL | Age: 85
End: 2025-04-15
Attending: STUDENT IN AN ORGANIZED HEALTH CARE EDUCATION/TRAINING PROGRAM
Payer: MEDICARE

## 2025-04-15 DIAGNOSIS — I50.32 CHRONIC HEART FAILURE WITH PRESERVED EJECTION FRACTION: ICD-10-CM

## 2025-04-15 LAB
ANION GAP (OHS): 9 MMOL/L (ref 8–16)
BUN SERPL-MCNC: 17 MG/DL (ref 8–23)
CALCIUM SERPL-MCNC: 9.2 MG/DL (ref 8.7–10.5)
CHLORIDE SERPL-SCNC: 105 MMOL/L (ref 95–110)
CO2 SERPL-SCNC: 27 MMOL/L (ref 23–29)
CREAT SERPL-MCNC: 0.6 MG/DL (ref 0.5–1.4)
GFR SERPLBLD CREATININE-BSD FMLA CKD-EPI: >60 ML/MIN/1.73/M2
GLUCOSE SERPL-MCNC: 91 MG/DL (ref 70–110)
POTASSIUM SERPL-SCNC: 3.8 MMOL/L (ref 3.5–5.1)
SODIUM SERPL-SCNC: 141 MMOL/L (ref 136–145)

## 2025-04-15 PROCEDURE — 36415 COLL VENOUS BLD VENIPUNCTURE: CPT | Mod: PO

## 2025-04-15 PROCEDURE — 82374 ASSAY BLOOD CARBON DIOXIDE: CPT

## 2025-04-21 ENCOUNTER — PATIENT MESSAGE (OUTPATIENT)
Dept: FAMILY MEDICINE | Facility: CLINIC | Age: 85
End: 2025-04-21
Payer: MEDICARE

## 2025-04-21 ENCOUNTER — OUTPATIENT CASE MANAGEMENT (OUTPATIENT)
Dept: ADMINISTRATIVE | Facility: OTHER | Age: 85
End: 2025-04-21
Payer: MEDICARE

## 2025-04-21 ENCOUNTER — TELEPHONE (OUTPATIENT)
Dept: CARDIOLOGY | Facility: CLINIC | Age: 85
End: 2025-04-21
Payer: MEDICARE

## 2025-04-21 RX ORDER — CLOPIDOGREL BISULFATE 75 MG/1
75 TABLET ORAL DAILY
Qty: 30 TABLET | Refills: 0 | Status: SHIPPED | OUTPATIENT
Start: 2025-04-21 | End: 2025-04-24 | Stop reason: SDUPTHER

## 2025-04-21 NOTE — TELEPHONE ENCOUNTER
1-Pt c/o still having SOB when climbing stairs etc.. and wants to be checked, does not want to get sick enough to get readmitted.--    2- Also started her Jardiance, had PT that day. The physical therapist noticed that she was mentally impaired (problem remembering name etc..). She wast told by PCP to hold the Jardiance as it was the only new drug she had been on. Her confusion lasted 2 days and resolved.    Pt was scheduled in [place] w. Ms Ventura to address and told to arrive 30 mn early. She agreed to date/time of appointment(s). and verbalized understanding.

## 2025-04-21 NOTE — TELEPHONE ENCOUNTER
----- Message from Traci sent at 4/21/2025  2:02 PM CDT -----  Regarding: Shortness of breath  Patient have been experiencing shortness of breath since yesterday. Please call back @ [phone]. Thank you Traci

## 2025-04-21 NOTE — LETTER
April 22, 2025       Katelyn Caba  [address]  [place] [address]        Dear Mrs Zepeda,    Enclosed you will find the information about CHF (Congestive Heart Failure) that I told you about.  Please read it and keep it for your reference.      Talk to you soon!    Thank you,  Ana Cristina Pop RN Case Manager

## 2025-04-21 NOTE — TELEPHONE ENCOUNTER
No care due was identified.  [facility] Embedded Care Due Messages. Reference number: [id number].   4/21/2025 11:53:12 AM CDT

## 2025-04-21 NOTE — PROGRESS NOTES
Outpatient Care Management  Plan of Care Follow Up Visit    Patient: Katelyn Caba  MRN: [id number]  Date of Service: 04/21/2025  Completed by: Mayra Pop RN  Referral Date: 02/21/2025    Reason for Visit   Patient presents with    OPCM RN Follow Up Call       Brief Summary:   MEMBER'S CURRENT STATUS  :  -S&S of Anemia:  Patient states the signs and symptoms of anemia are lack of energy.  That is the only one she can think of at this time.  Patient states she received the educational materials but has not read them yet.    -When to go to the ED:  She states she does not remember when she should call the doctor or go to the ED.     -S&S of a stroke:  Patient states she does not know the S&S of a stroke or the acronym.     -Patient's condition:  Patient states she is having some fatigue and shortness of breath on exertion.  She states she has to stop frequently when she is walking to rest.  She states she has no swelling and her weight today is 107.  She states her blood pressure is 136/56 with a HR of 77 right now.  She states she has gained a little bit of weight and does not have a cough at this time.  She states that she was started on Jardiance and when she took it, she had a terrible reaction to it that made her very confused to where she did not even know her last name.  She could only state her first name.  She states Dr. Burnham stopped the Jardiance right away and she has not been taking it.      -S&S of CHF:  Patient states the only signs and symptoms of CHF she can think of is shortness of breath.         INTERVENTIONS  :  -S&S of Anemia:  CM reiterated the signs and symptoms of anemia including fatigue, Breathlessness, tachypnea, tachycardia, cold hands and feet, dizziness, headache, pale skin, brittle nails and craving unusual food.  CM encouraged patient to read the educational materials.    -When to go to the ED:  CM reiterated to the patient she should go to the ED if she develops chest pain  or severe trouble breathing, she throws up blood or something that looks like coffee grounds, she has black or tar colored stool, or if she feels weak or like she is going to pass out.     -S&S of a stroke:  CM also reviewed the acronym BEFAST for the signs and symptoms of a stroke.  Balance when walking, Eyes with vision changes, Face with one sided facial droop, Arm with weakness on one side, Speech slurred or unable to talk and Time noted of the onset of symptoms.    -Patient's condition:  The patient had put in a call to the heart failure clinic to tell them about her symptoms and they called while CM was on the phone.  CM reminded patient to tell them she is not taking the Jardiance anymore when she was on the phone with them.  She is seeing Ms Audrey RENE in cardiology on 4/22 at 8:00 AM.      -Educational material:  CM sent the Heart Failure guide to the patient's PO Box in [employer] for her to have to read and keep as a reference.    -S&S of CHF:  CM reiterated the S&S of CHF including difficulty breathing or shortness of breath, especially with activity; persistent cough; swelling of feet, legs or abdomen; unexplained weight gain; fatigue; weakness; loss of appetite; dizziness or rapid heartbeat.          Next steps:   Follow up if read educational materials  Follow up if got CHF guide  Follow up on S&S of anemia  Follow up on when to go to ED for anemia  Follow up on iron rich diet  Follow up on what BP is running  Follow up on S&S of a stroke  Follow up when to go to the ED with anemia  Follow up on low sodium diet, 2300 mg  Follow up on fluid restriction, 1.5 L per Dr. Kellogg  Follow up on S&S of CHF  Follow up on cardiology appointment on 4/22  Educate on 3 lb/5 lb rule  Educate on VTE and prevention

## 2025-04-22 ENCOUNTER — OFFICE VISIT (OUTPATIENT)
Dept: CARDIOLOGY | Facility: CLINIC | Age: 85
End: 2025-04-22
Payer: MEDICARE

## 2025-04-22 ENCOUNTER — LAB VISIT (OUTPATIENT)
Dept: LAB | Facility: HOSPITAL | Age: 85
End: 2025-04-22
Attending: PHYSICIAN ASSISTANT
Payer: MEDICARE

## 2025-04-22 ENCOUNTER — RESULTS FOLLOW-UP (OUTPATIENT)
Dept: CARDIOLOGY | Facility: CLINIC | Age: 85
End: 2025-04-22

## 2025-04-22 VITALS
HEIGHT: 63 IN | SYSTOLIC BLOOD PRESSURE: 120 MMHG | HEART RATE: 73 BPM | WEIGHT: 109.38 LBS | BODY MASS INDEX: 19.38 KG/M2 | OXYGEN SATURATION: 95 % | DIASTOLIC BLOOD PRESSURE: 53 MMHG

## 2025-04-22 DIAGNOSIS — I50.32 CHRONIC HEART FAILURE WITH PRESERVED EJECTION FRACTION: Primary | ICD-10-CM

## 2025-04-22 DIAGNOSIS — I10 HYPERTENSION, UNSPECIFIED TYPE: ICD-10-CM

## 2025-04-22 DIAGNOSIS — D64.9 ANEMIA, UNSPECIFIED TYPE: ICD-10-CM

## 2025-04-22 DIAGNOSIS — Z86.73 H/O ISCHEMIC LEFT MCA STROKE: ICD-10-CM

## 2025-04-22 DIAGNOSIS — I70.0 AORTIC ATHEROSCLEROSIS: ICD-10-CM

## 2025-04-22 DIAGNOSIS — I50.32 CHRONIC HEART FAILURE WITH PRESERVED EJECTION FRACTION: ICD-10-CM

## 2025-04-22 DIAGNOSIS — E78.00 PURE HYPERCHOLESTEROLEMIA: Chronic | ICD-10-CM

## 2025-04-22 DIAGNOSIS — I25.10 CORONARY ARTERY DISEASE INVOLVING NATIVE CORONARY ARTERY OF NATIVE HEART WITHOUT ANGINA PECTORIS: ICD-10-CM

## 2025-04-22 PROBLEM — R79.89 ELEVATED BRAIN NATRIURETIC PEPTIDE (BNP) LEVEL: Status: RESOLVED | Noted: 2025-03-03 | Resolved: 2025-04-22

## 2025-04-22 PROBLEM — I50.20 SYSTOLIC CONGESTIVE HEART FAILURE: Status: RESOLVED | Noted: 2025-03-20 | Resolved: 2025-04-22

## 2025-04-22 PROBLEM — I50.21 ACUTE SYSTOLIC CONGESTIVE HEART FAILURE: Status: RESOLVED | Noted: 2025-03-20 | Resolved: 2025-04-22

## 2025-04-22 LAB
ABSOLUTE EOSINOPHIL (OHS): 0.08 K/UL
ABSOLUTE MONOCYTE (OHS): 0.46 K/UL (ref 0.3–1)
ABSOLUTE NEUTROPHIL COUNT (OHS): 3.26 K/UL (ref 1.8–7.7)
BASOPHILS # BLD AUTO: 0.07 K/UL
BASOPHILS NFR BLD AUTO: 1.4 %
BNP SERPL-MCNC: 1257 PG/ML (ref 0–99)
ERYTHROCYTE [DISTWIDTH] IN BLOOD BY AUTOMATED COUNT: 15.2 % (ref 11.5–14.5)
HCT VFR BLD AUTO: 26.5 % (ref 37–48.5)
HGB BLD-MCNC: 8.3 GM/DL (ref 12–16)
IMM GRANULOCYTES # BLD AUTO: 0.01 K/UL (ref 0–0.04)
IMM GRANULOCYTES NFR BLD AUTO: 0.2 % (ref 0–0.5)
LYMPHOCYTES # BLD AUTO: 1.12 K/UL (ref 1–4.8)
MCH RBC QN AUTO: 32.7 PG (ref 27–31)
MCHC RBC AUTO-ENTMCNC: 31.3 G/DL (ref 32–36)
MCV RBC AUTO: 104 FL (ref 82–98)
NUCLEATED RBC (/100WBC) (OHS): 0 /100 WBC
PLATELET # BLD AUTO: 406 K/UL (ref 150–450)
PMV BLD AUTO: 10.2 FL (ref 9.2–12.9)
RBC # BLD AUTO: 2.54 M/UL (ref 4–5.4)
RELATIVE EOSINOPHIL (OHS): 1.6 %
RELATIVE LYMPHOCYTE (OHS): 22.4 % (ref 18–48)
RELATIVE MONOCYTE (OHS): 9.2 % (ref 4–15)
RELATIVE NEUTROPHIL (OHS): 65.2 % (ref 38–73)
WBC # BLD AUTO: 5 K/UL (ref 3.9–12.7)

## 2025-04-22 PROCEDURE — 36415 COLL VENOUS BLD VENIPUNCTURE: CPT | Mod: PO

## 2025-04-22 PROCEDURE — 85025 COMPLETE CBC W/AUTO DIFF WBC: CPT

## 2025-04-22 PROCEDURE — 83880 ASSAY OF NATRIURETIC PEPTIDE: CPT

## 2025-04-22 PROCEDURE — 99214 OFFICE O/P EST MOD 30 MIN: CPT | Mod: PBBFAC | Performed by: PHYSICIAN ASSISTANT

## 2025-04-22 PROCEDURE — 99214 OFFICE O/P EST MOD 30 MIN: CPT | Mod: S$PBB,,, | Performed by: PHYSICIAN ASSISTANT

## 2025-04-22 PROCEDURE — 99999 PR PBB SHADOW E&M-EST. PATIENT-LVL IV: CPT | Mod: PBBFAC,,, | Performed by: PHYSICIAN ASSISTANT

## 2025-04-22 RX ORDER — POTASSIUM CHLORIDE 20 MEQ/1
20 TABLET, EXTENDED RELEASE ORAL DAILY
Qty: 30 TABLET | Refills: 11 | Status: SHIPPED | OUTPATIENT
Start: 2025-04-22 | End: 2026-04-17

## 2025-04-22 RX ORDER — FUROSEMIDE 20 MG/1
20 TABLET ORAL 2 TIMES DAILY
Qty: 60 TABLET | Refills: 11 | Status: SHIPPED | OUTPATIENT
Start: 2025-04-22 | End: 2026-04-22

## 2025-04-22 RX ORDER — PANTOPRAZOLE SODIUM 40 MG/1
40 TABLET, DELAYED RELEASE ORAL DAILY
COMMUNITY

## 2025-04-22 NOTE — PROGRESS NOTES
General Cardiology Clinic Note  Reason for Visit: Fatigue  Last Clinic Visit: 3/26/2025 with Dr. Kellogg    HPI:   Katelyn Caba is a 84 y.o. female who presents for Chronic heart failure with preserved ejection fraction    Problems:  HFpEF  Hx of STEMI 4/2024, no intervention  Hypertension  Hyperlipidemia  MCA CVA 2/18/2025  Hx of GI bleed    Interval HPI  Patient presents for exertional fatigue 2-3 days. She states that since her last hospital admission for decompensated heart failure in March, she has been doing very well until a few days ago. At her last visit, she was able to walk to the clinic from the parking garage without difficulty. Today, she had to stop and rest twice. Walking up stairs is now difficult as well. She denies dyspnea, but rather just feels tired. She denies CP, orthopnea, PND, edema, syncope, near syncope, palpitations, overt bleeding. She has gradually gained about 6 lbs back since hospital discharge. Her BP at home is normal, and occasionally slightly low. She was only able to take Jardiance for 2 days, because it made her feel very confused.     3/26/2025 HPI (Dr. Kellogg)  83y/o F with PMHx of HFpEF, MCA CVA (2/18/25, received TNK), Mechanical Fall with Left hip pain 2 weeks prior to CVA, inferolateral STEMI (4/2024, s/p cath with no intervention) not on antiplatelet, hypertension, hyperlipidemia, GI bleed ( non-bleeding angioectasia in the stomach, Duodenum), ISAIAH, BCC presents to establish care. Pt was recently evaluated in the [facility] and admitted for Acute decompensated HF 3/2025 after she presented with acute hypoxic respiratory failure with elevated BNP. Upon encounter patient denies chest pain with exertion or at rest, palpitations, shortness of breath, dyspnea on exertion, dizziness, syncope, edema, orthopnea, paroxysmal nocturnal dyspnea, or claudication.     ROS:      Review of Systems   Constitutional: Positive for malaise/fatigue and weight gain. Negative for diaphoresis and  weight loss.   HENT:  Negative for nosebleeds.    Eyes:  Negative for vision loss in left eye, vision loss in right eye and visual disturbance.   Cardiovascular:  Negative for chest pain, claudication, dyspnea on exertion, irregular heartbeat, leg swelling, near-syncope, orthopnea, palpitations, paroxysmal nocturnal dyspnea and syncope.   Respiratory:  Negative for cough, shortness of breath, sleep disturbances due to breathing, snoring and wheezing.    Hematologic/Lymphatic: Negative for bleeding problem. Does not bruise/bleed easily.   Skin:  Negative for poor wound healing and rash.   Musculoskeletal:  Negative for muscle cramps and myalgias.   Gastrointestinal:  Negative for bloating, abdominal pain, diarrhea, heartburn, melena, nausea and vomiting.   Genitourinary:  Negative for hematuria and nocturia.   Neurological:  Negative for brief paralysis, dizziness, headaches, light-headedness, numbness and weakness.   Psychiatric/Behavioral:  Negative for depression.    Allergic/Immunologic: Negative for hives.       PMH:     Past Medical History:   Diagnosis Date    Cancer     basal cell carcinoma    Stroke due to embolism of right middle cerebral artery 02/18/2025     Past Surgical History:   Procedure Laterality Date    CARPAL TUNNEL RELEASE      ESOPHAGOGASTRODUODENOSCOPY N/A 8/22/2022    Procedure: EGD (ESOPHAGOGASTRODUODENOSCOPY);  Surgeon: Steven Lopez MD;  Location: [address]);  Service: Endoscopy;  Laterality: N/A;    ESOPHAGOGASTRODUODENOSCOPY N/A 12/5/2022    Procedure: EGD (ESOPHAGOGASTRODUODENOSCOPY);  Surgeon: Steven Lopez MD;  Location: [address]);  Service: Endoscopy;  Laterality: N/A;  Has estimated pulmonary artery pressure 45, schedule location per protocol.   Please schedule with Dr. Darryl Lopez-  Plavix stopped 8/23/22  [facility]  pre call complete; [facility] 11/28/22    ESOPHAGOGASTRODUODENOSCOPY N/A 7/11/2023     "Procedure: EGD (ESOPHAGOGASTRODUODENOSCOPY);  Surgeon: Natalie Fall MD;  Location: [facility] ENDO;  Service: Endoscopy;  Laterality: N/A;    ESOPHAGOGASTRODUODENOSCOPY N/A 3/5/2025    Procedure: EGD (ESOPHAGOGASTRODUODENOSCOPY);  Surgeon: Roberto Salvador MD;  [facility]  Service: Endoscopy;  Laterality: N/A;    EYE SURGERY      left eye cataract    LEFT HEART CATHETERIZATION  4/17/2024    Procedure: Left heart cath;  Surgeon: Elisabeth De La Cruz MD;  Location: [facility] CATH;  Service: Cardiology;;    TONSILLECTOMY      TUBAL LIGATION       Allergies:     Review of patient's allergies indicates:   Allergen Reactions    Jardiance [empagliflozin]      Medications:   Medications Ordered Prior to Encounter[1]  Social History:     Social History     Tobacco Use    Smoking status: Never     Passive exposure: Never    Smokeless tobacco: Never   Substance Use Topics    Alcohol use: No     Family History:   No family history on file.  Physical Exam:   BP (!) 120/53   Pulse 73   Ht 5' 3" (1.6 m)   Wt 49.6 kg (109 lb 5.6 oz)   SpO2 95%   BMI 19.37 kg/m²        Physical Exam  Vitals and nursing note reviewed.   Constitutional:       General: She is not in acute distress.     Appearance: Normal appearance. She is not ill-appearing.   HENT:      Head: Normocephalic and atraumatic.   Eyes:      General: No scleral icterus.     Conjunctiva/sclera: Conjunctivae normal.   Neck:      Thyroid: No thyromegaly.      Vascular: Hepatojugular reflux and JVD present. No carotid bruit.   Cardiovascular:      Rate and Rhythm: Normal rate and regular rhythm.      Pulses: Normal pulses.      Heart sounds: Normal heart sounds. No murmur heard.     No friction rub. No gallop.   Pulmonary:      Effort: Pulmonary effort is normal.      Breath sounds: Examination of the right-lower field reveals decreased breath sounds. Examination of the left-lower field reveals decreased breath sounds. Decreased breath sounds present. No wheezing, " rhonchi or rales.   Chest:      Chest wall: No tenderness.   Abdominal:      General: Bowel sounds are normal. There is no distension.      Palpations: Abdomen is soft.      Tenderness: There is no abdominal tenderness.   Musculoskeletal:         General: No swelling.      Cervical back: Neck supple.      Right lower leg: Pitting Edema (very trace edema) present.      Left lower leg: Pitting Edema (very trace edema) present.   Skin:     General: Skin is warm and dry.      Coloration: Skin is not pale.      Findings: No erythema or rash.      Nails: There is no clubbing.   Neurological:      Mental Status: She is alert and oriented to person, place, and time. Mental status is at baseline.   Psychiatric:         Mood and Affect: Mood and affect normal.         Behavior: Behavior normal.          Labs:     Lab Results   Component Value Date     04/15/2025     03/21/2025    K 3.8 04/15/2025    K 3.6 03/21/2025     04/15/2025     03/21/2025    CO2 27 04/15/2025    CO2 27 03/21/2025    BUN 17 04/15/2025    CREATININE 0.6 04/15/2025    GLUCOSE 91 04/15/2025    ANIONGAP 9 04/15/2025     Lab Results   Component Value Date    HGBA1C 4.2 03/20/2025     Lab Results   Component Value Date    BNP 1,293 (H) 03/20/2025    BNP 1,512 (H) 03/03/2025     (H) 08/21/2022    Lab Results   Component Value Date    WBC 10.70 03/20/2025    HGB 8.7 (L) 03/20/2025    HCT 29.3 (L) 03/20/2025    HCT 18.1 03/03/2025     (H) 03/20/2025    GRAN 6.3 03/20/2025    GRAN 59.3 03/20/2025     Lab Results   Component Value Date    CHOL 193 02/18/2025    HDL 39 (L) 02/18/2025    LDLCALC 133.2 02/18/2025    TRIG 104 02/18/2025          Imaging:   Echocardiograms:   TTE 3/20/2025    Left Ventricle: The left ventricle is normal in size. Ventricular mass is normal. Normal wall thickness. Normal wall motion. There is normal systolic function with a visually estimated ejection fraction of 55 - 60%. There is indeterminate  diastolic function. Elevated left ventricular filling pressure.    Right Ventricle: The right ventricle is normal in size. Wall thickness is normal. Systolic function is normal.    Mitral Valve: There is mild regurgitation.    Tricuspid Valve: There is mild regurgitation.    Pulmonary Artery: The estimated pulmonary artery systolic pressure is 24 mmHg.    IVC/SVC: Normal venous pressure at 3 mmHg.    Pericardium: Left pleural effusion.    TTE 2/19/2025    Left Ventricle: The left ventricle is normal in size. Normal wall thickness. There is normal systolic function with a visually estimated ejection fraction of 60 - 65%. There is normal diastolic function.    Right Ventricle: Normal right ventricular cavity size. Wall thickness is normal. Systolic function is normal.    Left Atrium: Left atrium is mildly dilated.    Aortic Valve: The aortic valve is a trileaflet valve. There is mild aortic valve sclerosis.    Tricuspid Valve: There is mild regurgitation.    Pulmonary Artery: The estimated pulmonary artery systolic pressure is 32 mmHg.    IVC/SVC: Normal venous pressure at 3 mmHg.    After contrast bubble injection very few bubbles appear late on the left side. They originate from  the pulmonary veins.  This is suggestive of extracardiac shunt.  Clinical correlation is required.    TTE 4/17/2024    Left Ventricle: The left ventricle is normal in size. Normal wall thickness. There is normal systolic function with a visually estimated ejection fraction of 55 - 60%.    Right Ventricle: Normal right ventricular cavity size. Systolic function is normal.    Left Atrium: Left atrium is mildly dilated.    Mitral Valve: There is mild regurgitation.    Pulmonary Artery: There is mild pulmonary hypertension. The estimated pulmonary artery systolic pressure is 41 mmHg.    IVC/SVC: Normal venous pressure at 3 mmHg.    Stress Tests:       Caths:   [facility] 4/17/2024  Dominance: right  Left main:  Angiographically normal  Left anterior  descending:  Large vessel that supplies the apex.  It has mild diffuse disease overall.  The mid segment after the large 1st diagonal has some moderate disease.    Left circumflex:  Large vessel with mild diffuse disease and multiple small obtuse marginal branches.  There is a left posterolateral moderate-sized branch with DOM 3 flow.  There is a hint of an abrupt cut off possibly in the mid LPL    Right coronary artery:  Tortuous large dominant vessel with mild diffuse disease and large RPDA and RPL.  The proximal RCA has a 50% stenosis and DOM 3 flow.    Conclusions:  No obvious culprit for MI. the patient's chest pain and ST elevations were almost entirely resolved by the end of the diagnostic angiogram.  Left ventriculogram with EF of 60-70% and normal anterior and inferior wall motion.  Possible diagnoses include plaque erosion with resolved thrombus or distal occlusion in the LPL.  PCI deferred  Elevated left filling pressure    Other:  Event monitor 2/20/2025    Negative event monitor with no clinical arrhythmias.    Symptoms corresponding with normal sinus rhythm.      Assessment:     1. Chronic heart failure with preserved ejection fraction    2. Hypertension, unspecified type    3. Pure hypercholesterolemia    4. Aortic atherosclerosis    5. Coronary artery disease involving native coronary artery of native heart without angina pectoris    6. Anemia, unspecified type    7. H/O ischemic left MCA stroke      Plan:     Chronic diastolic CHF  Pt complains of exertional fatigue x 3 days. Symptoms likely CHF vs worsening anemia. She appears mildly volume up on exam. She has been unable to tolerate Jardiance due to side effects.   Increase Lasix to 20 mg bid. Start Potassium 20 MEQ daily   Continue low sodium diet and daily weights    Hypertension  BP normal to slightly low at home. No longer on any antihypertensives aside from Lasix.     Anemia  She has required intermittent iron and blood transfusions  Obtain  CBC    Hyperlipidemia  Aortic atherosclerosis   Nonobstructive CAD on [facility] (etiology of STEMI unclear)  LDL above goal. May benefit from addition of Zetia. Can discuss at next visit.   Continue Plavix     Hx of CVA  Continue Plavix and high intensity statin       Follow up in 4 weeks    Signed:  Sowmya Ventura PA-C  Cardiology   [phone] - General         [1]   Current Outpatient Medications on File Prior to Visit   Medication Sig Dispense Refill    atorvastatin (LIPITOR) 80 MG tablet Take 1 tablet (80 mg total) by mouth once daily. 90 tablet 3    clopidogreL (PLAVIX) 75 mg tablet Take 1 tablet (75 mg total) by mouth once daily. 30 tablet 0    cyanocobalamin (VITAMIN B-12) 1000 MCG tablet Take 1,000 mcg by mouth once daily.      famotidine (PEPCID) 20 MG tablet Take 1 tablet (20 mg total) by mouth once daily. 30 tablet 11    furosemide (LASIX) 20 MG tablet Take 1 tablet (20 mg total) by mouth once daily. 30 tablet 11    vitamin D (VITAMIN D3) 1000 units Tab Take 1,000 Units by mouth once daily.      empagliflozin (JARDIANCE) 10 mg tablet Take 1 tablet (10 mg total) by mouth once daily. (Patient not taking: Reported on 4/22/2025) 90 tablet 1    pantoprazole (PROTONIX) 40 MG tablet Take 40 mg by mouth once daily. Take 1 tablet by mouth every day       No current facility-administered medications on file prior to visit.

## 2025-04-24 NOTE — TELEPHONE ENCOUNTER
Refill Encounter    PCP Visits: Recent Visits  No visits were found meeting these conditions.  Showing recent visits within past 360 days and meeting all other requirements  Future Appointments  No visits were found meeting these conditions.  Showing future appointments within next 720 days and meeting all other requirements      Last 3 Blood Pressure:   BP Readings from Last 3 Encounters:   04/22/25 (!) 120/53   03/26/25 111/67   03/26/25 101/64     Preferred Pharmacy:   [id number] - [place] [address] AT [place] [address] & [employer] [address] [place] [id number]  Phone: [phone] Fax: [phone]    [id number] - [place] - [facility] & [address] [address]  Phone: [phone] Fax: [phone]    Requested RX:  Requested Prescriptions     Pending Prescriptions Disp Refills    atorvastatin (LIPITOR) 80 MG tablet 90 tablet 3     Sig: Take 1 tablet (80 mg total) by mouth once daily.      RX Route: Normal

## 2025-04-25 RX ORDER — CLOPIDOGREL BISULFATE 75 MG/1
75 TABLET ORAL DAILY
Qty: 90 TABLET | Refills: 3 | Status: SHIPPED | OUTPATIENT
Start: 2025-04-25 | End: 2026-04-25

## 2025-04-26 RX ORDER — ATORVASTATIN CALCIUM 80 MG/1
80 TABLET, FILM COATED ORAL DAILY
Qty: 90 TABLET | Refills: 3 | Status: SHIPPED | OUTPATIENT
Start: 2025-04-26 | End: 2026-04-26

## 2025-04-29 ENCOUNTER — OFFICE VISIT (OUTPATIENT)
Dept: HEMATOLOGY/ONCOLOGY | Facility: CLINIC | Age: 85
End: 2025-04-29
Payer: MEDICARE

## 2025-04-29 ENCOUNTER — TELEPHONE (OUTPATIENT)
Dept: HEMATOLOGY/ONCOLOGY | Facility: CLINIC | Age: 85
End: 2025-04-29

## 2025-04-29 ENCOUNTER — LAB VISIT (OUTPATIENT)
Dept: LAB | Facility: HOSPITAL | Age: 85
End: 2025-04-29
Attending: NURSE PRACTITIONER
Payer: MEDICARE

## 2025-04-29 VITALS
HEIGHT: 63 IN | WEIGHT: 104.06 LBS | BODY MASS INDEX: 18.44 KG/M2 | HEART RATE: 77 BPM | RESPIRATION RATE: 18 BRPM | DIASTOLIC BLOOD PRESSURE: 62 MMHG | SYSTOLIC BLOOD PRESSURE: 116 MMHG | TEMPERATURE: 97 F | OXYGEN SATURATION: 100 %

## 2025-04-29 DIAGNOSIS — K31.811 AVM (ARTERIOVENOUS MALFORMATION) OF STOMACH, ACQUIRED WITH HEMORRHAGE: ICD-10-CM

## 2025-04-29 DIAGNOSIS — D50.0 IRON DEFICIENCY ANEMIA DUE TO CHRONIC BLOOD LOSS: Primary | ICD-10-CM

## 2025-04-29 DIAGNOSIS — D50.0 IRON DEFICIENCY ANEMIA DUE TO CHRONIC BLOOD LOSS: ICD-10-CM

## 2025-04-29 LAB
ABO + RH BLD: NORMAL
ABSOLUTE EOSINOPHIL (OHS): 0.16 K/UL
ABSOLUTE MONOCYTE (OHS): 0.67 K/UL (ref 0.3–1)
ABSOLUTE NEUTROPHIL COUNT (OHS): 3 K/UL (ref 1.8–7.7)
BASOPHILS # BLD AUTO: 0.08 K/UL
BASOPHILS NFR BLD AUTO: 1.5 %
BLD GP AB SCN CELLS X3 SERPL QL: NORMAL
ERYTHROCYTE [DISTWIDTH] IN BLOOD BY AUTOMATED COUNT: 13.7 % (ref 11.5–14.5)
FERRITIN SERPL-MCNC: 203 NG/ML (ref 20–300)
HCT VFR BLD AUTO: 27.6 % (ref 37–48.5)
HGB BLD-MCNC: 8.9 GM/DL (ref 12–16)
IMM GRANULOCYTES # BLD AUTO: 0.01 K/UL (ref 0–0.04)
IMM GRANULOCYTES NFR BLD AUTO: 0.2 % (ref 0–0.5)
IRON SATN MFR SERPL: 9 % (ref 20–50)
IRON SERPL-MCNC: 27 UG/DL (ref 30–160)
LYMPHOCYTES # BLD AUTO: 1.44 K/UL (ref 1–4.8)
MCH RBC QN AUTO: 32 PG (ref 27–31)
MCHC RBC AUTO-ENTMCNC: 32.2 G/DL (ref 32–36)
MCV RBC AUTO: 99 FL (ref 82–98)
NUCLEATED RBC (/100WBC) (OHS): 0 /100 WBC
PLATELET # BLD AUTO: 451 K/UL (ref 150–450)
PMV BLD AUTO: 9.4 FL (ref 9.2–12.9)
RBC # BLD AUTO: 2.78 M/UL (ref 4–5.4)
RELATIVE EOSINOPHIL (OHS): 3 %
RELATIVE LYMPHOCYTE (OHS): 26.9 % (ref 18–48)
RELATIVE MONOCYTE (OHS): 12.5 % (ref 4–15)
RELATIVE NEUTROPHIL (OHS): 55.9 % (ref 38–73)
SPECIMEN OUTDATE: NORMAL
TIBC SERPL-MCNC: 302 UG/DL (ref 250–450)
TRANSFERRIN SERPL-MCNC: 204 MG/DL (ref 200–375)
WBC # BLD AUTO: 5.36 K/UL (ref 3.9–12.7)

## 2025-04-29 PROCEDURE — 86850 RBC ANTIBODY SCREEN: CPT | Mod: PN | Performed by: NURSE PRACTITIONER

## 2025-04-29 PROCEDURE — 86901 BLOOD TYPING SEROLOGIC RH(D): CPT | Performed by: NURSE PRACTITIONER

## 2025-04-29 PROCEDURE — 82728 ASSAY OF FERRITIN: CPT

## 2025-04-29 PROCEDURE — 84466 ASSAY OF TRANSFERRIN: CPT

## 2025-04-29 PROCEDURE — 85025 COMPLETE CBC W/AUTO DIFF WBC: CPT | Mod: PN

## 2025-04-29 PROCEDURE — 99214 OFFICE O/P EST MOD 30 MIN: CPT | Mod: S$PBB,,, | Performed by: NURSE PRACTITIONER

## 2025-04-29 PROCEDURE — 99999 PR PBB SHADOW E&M-EST. PATIENT-LVL III: CPT | Mod: PBBFAC,,, | Performed by: NURSE PRACTITIONER

## 2025-04-29 PROCEDURE — 36415 COLL VENOUS BLD VENIPUNCTURE: CPT | Mod: PN | Performed by: NURSE PRACTITIONER

## 2025-04-29 PROCEDURE — 99213 OFFICE O/P EST LOW 20 MIN: CPT | Mod: PBBFAC,PN,25 | Performed by: NURSE PRACTITIONER

## 2025-04-29 PROCEDURE — 36415 COLL VENOUS BLD VENIPUNCTURE: CPT | Mod: PN

## 2025-04-29 RX ORDER — EPINEPHRINE 0.3 MG/.3ML
0.3 INJECTION SUBCUTANEOUS ONCE AS NEEDED
Status: CANCELLED | OUTPATIENT
Start: 2025-04-29

## 2025-04-29 RX ORDER — SODIUM CHLORIDE 0.9 % (FLUSH) 0.9 %
10 SYRINGE (ML) INJECTION
Status: CANCELLED | OUTPATIENT
Start: 2025-04-29

## 2025-04-29 RX ORDER — HEPARIN 100 UNIT/ML
500 SYRINGE INTRAVENOUS
Status: CANCELLED | OUTPATIENT
Start: 2025-04-29

## 2025-04-29 NOTE — PROGRESS NOTES
Subjective:        [facility] - Established patient    Reason for visit: follow up on ISAIAH    Anemia  2023: Iron Def Anemia, received IV iron 9/2022 - 1/2023 05/23/2024:  Injectafer 750 mg IV  10/14 & 10/21/24:  Injectafer 750 mg IV each    HPI: Katelyn Caba is a 84 y.o. female with history of BCC, HTN, HLD, Aortic atherosclerosis, CVA, GI bleed presents to the clinic today with family friend for follow up of ISAIAH.  Since her last visit, she had a hospital admission from 03/20 - 03/23/25 for respiratory distress; bilateral pulmonary effusions.  She endorses decreased energy, increased fatigue and SOB with exertion.  She denies any CP, palpitations, bleeding, melena, cold extremities, HA's, etc. She was seen by Cardiology, 04/22 & lasix was increased.  She endorses good diuresis, but remains symptomatic.      History:  Patient hospitalized 3/26 - 3/27/24 with Hgb 6.6 sp 1u pRBC. GI evaluation plan for outpatient colonoscopy but patient does not wish to proceed with colonoscopy. Patient recalls fall around [place] with pelvic fracture. Patient was taking iron supplement daily and increasing iron rich foods. Weight is stable. She remains very active with work, managing the "2,10E+07" with her . 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 In ED, troponin elevated x3 and an inferolateral STEMI was confirmed on EKG. [facility] with evidence of moderate nonobstructive CAD for which no intervention was required. Patient placed on GDMT with atorvastatin 40 mg qd and metoprolol tartrate 12.5 mg TID. Antiplatelet therapy with ticagrelor recommended, however, patient apprehensive of medication secondary to history of GI bleed. Patient agreed to medication regimen. Patient without anginal equivalents and R radial access site well healed upon discharge.    Presented to ED on  2/18/25 for evaluation of left-sided facial droop with left upper extremity weakness. Brain MRI noted acute ischemia right MCA territory involving the frontal, parietal, and temporal lobes inclusive of the right insular cortex and CTA stroke multi-phase noted occlusion of the MCA M1 segment distal aspect and approximately 50% stenosis at the origin of the left cervical ICA. She was transferred via flight med to [facility] for thrombectomy after TNK initiated and a Cardene drip started due to blood pressure elevation. Thrombectomy not performed due to lesion migrating to distal MCA branches after TNK administered. Neuro exams were stable for remainder of stay. She continues on cardiac monitor.    Presented to ED on 3/3/25 with shortness of breath noting hypoxia, placed on CPAP. She reported black colored stool in addition to fatigue. BNP elevated to 1500 and troponin in the 200s. CXR with bilateral vascular congestion, Hgb of 5.6 g/dL and received 3U PRBCs. She was able to be weaned to 2L NS and saturating 97-98% noting her breathing was much better with d/c. She also received IV iron with this admission. Patient with h/o Rodriguez's esophagus and EGD noted non-bleeding angiectasias.    03/20-03/23/25:  presented with SOB; acidotic & in respiratory distress; placed on BiPAP. Was weaned down to high-flow nasal cannula at 15 L for past hour without hypoxia or changes in mentation. Work of breathing improved since arrival. Troponin elevated at 82 but downtrending from 2 weeks ago, BNP elevated at 1200 but also downtrending from 2 weeks ago. Chest x-ray showing bilateral pleural effusions. Patient received 40 mg Lasix with roughly 700 cc urine output at this point.      Patient presents with female family friend.  ECOG PS is 1.  History has been obtained by chart review and discussion with the patient.    ROS:   Review of Systems   Constitutional:  Positive for malaise/fatigue. Negative for fever.   HENT:  Negative for  nosebleeds.    Respiratory:  Negative for hemoptysis and shortness of breath.    Cardiovascular:  Negative for chest pain, palpitations and leg swelling.   Gastrointestinal:  Negative for abdominal pain, blood in stool, diarrhea and vomiting.   Genitourinary:  Negative for hematuria.   Neurological:  Negative for dizziness and headaches.   Endo/Heme/Allergies:  Does not bruise/bleed easily.   All other systems reviewed and are negative.         Past Medical History:   Past Medical History:   Diagnosis Date    Cancer     basal cell carcinoma    Stroke due to embolism of right middle cerebral artery 02/18/2025        Past Surgical History:   Past Surgical History:   Procedure Laterality Date    CARPAL TUNNEL RELEASE      ESOPHAGOGASTRODUODENOSCOPY N/A 8/22/2022    Procedure: EGD (ESOPHAGOGASTRODUODENOSCOPY);  Surgeon: Steven Lopez MD;  Location: [facility] (2ND FLR);  Service: Endoscopy;  Laterality: N/A;    ESOPHAGOGASTRODUODENOSCOPY N/A 12/5/2022    Procedure: EGD (ESOPHAGOGASTRODUODENOSCOPY);  Surgeon: Steven Lopez MD;  Location: [facility] (2ND FLR);  Service: Endoscopy;  Laterality: N/A;  Has estimated pulmonary artery pressure 45, schedule location per protocol.   Please schedule with Dr. Darryl Lopez-  Plavix stopped 8/23/22  pt requested to schedule after Thanksgiving/ [facility] portal-RB  pre call complete; [facility] 11/28/22    ESOPHAGOGASTRODUODENOSCOPY N/A 7/11/2023    Procedure: EGD (ESOPHAGOGASTRODUODENOSCOPY);  Surgeon: Natalie Fall MD;  Location: [facility];  Service: Endoscopy;  Laterality: N/A;    ESOPHAGOGASTRODUODENOSCOPY N/A 3/5/2025    Procedure: EGD (ESOPHAGOGASTRODUODENOSCOPY);  Surgeon: Roberto Salvador MD;  Location: [facility] ENDO (2ND FLR);  Service: Endoscopy;  Laterality: N/A;    EYE SURGERY      left eye cataract    LEFT HEART CATHETERIZATION  4/17/2024    Procedure: Left heart cath;  Surgeon: Elisabeth De La Cruz MD;  [facility] C[facility];  Service: Cardiology;;    TONSILLECTOMY      TUBAL  "LIGATION          Family History:   No family history on file.     Social History:   Social History     Tobacco Use    Smoking status: Never     Passive exposure: Never    Smokeless tobacco: Never   Substance Use Topics    Alcohol use: No      Patient splits time between [address] and apartment in [place]    Allergies:   Review of patient's allergies indicates:   Allergen Reactions    Jardiance [empagliflozin]         Medications:   Current Outpatient Medications   Medication Sig Dispense Refill    atorvastatin (LIPITOR) 80 MG tablet Take 1 tablet (80 mg total) by mouth once daily. 90 tablet 3    atorvastatin (LIPITOR) 80 MG tablet Take 1 tablet (80 mg total) by mouth once daily. 90 tablet 3    clopidogreL (PLAVIX) 75 mg tablet Take 1 tablet (75 mg total) by mouth once daily. 90 tablet 3    cyanocobalamin (VITAMIN B-12) 1000 MCG tablet Take 1,000 mcg by mouth once daily.      famotidine (PEPCID) 20 MG tablet Take 1 tablet (20 mg total) by mouth once daily. 30 tablet 11    furosemide (LASIX) 20 MG tablet Take 1 tablet (20 mg total) by mouth 2 (two) times a day. 60 tablet 11    pantoprazole (PROTONIX) 40 MG tablet Take 40 mg by mouth once daily. Take 1 tablet by mouth every day      potassium chloride SA (K-DUR,KLOR-CON) 20 MEQ tablet Take 1 tablet (20 mEq total) by mouth once daily. 30 tablet 11    vitamin D (VITAMIN D3) 1000 units Tab Take 1,000 Units by mouth once daily.       No current facility-administered medications for this visit.        Physical Exam:   /62 (BP Location: Left arm, Patient Position: Sitting)   Pulse 77   Temp 97 °F (36.1 °C) (Temporal)   Resp 18   Ht 5' 3" (1.6 m)   Wt 47.2 kg (104 lb 0.9 oz)   SpO2 100%   BMI 18.43 kg/m²    Wt Readings from Last 3 Encounters:   04/22/25 49.6 kg (109 lb 5.6 oz)   03/26/25 47.3 kg (104 lb 4.4 oz)   03/26/25 46.7 kg (103 lb)               Physical Exam  Vitals reviewed.   Constitutional:       General: She is not in acute distress.  HENT:      " Head: Normocephalic and atraumatic.      Mouth/Throat:      Mouth: Mucous membranes are moist.      Pharynx: Oropharynx is clear.   Eyes:      Conjunctiva/sclera: Conjunctivae normal.   Cardiovascular:      Rate and Rhythm: Normal rate and regular rhythm.      Pulses: Normal pulses.      Heart sounds: No murmur heard.  Pulmonary:      Effort: Pulmonary effort is normal. No respiratory distress.      Breath sounds: No wheezing.   Abdominal:      General: Abdomen is flat. There is no distension.      Palpations: Abdomen is soft.      Tenderness: There is no abdominal tenderness.   Musculoskeletal:         General: No swelling or tenderness. Normal range of motion.      Cervical back: Neck supple. No rigidity.   Lymphadenopathy:      Cervical: No cervical adenopathy.   Skin:     General: Skin is warm and dry.      Coloration: Skin is not jaundiced or pale.   Neurological:      General: No focal deficit present.      Mental Status: She is alert and oriented to person, place, and time.   Psychiatric:         Mood and Affect: Mood normal.         Behavior: Behavior normal.         Thought Content: Thought content normal.           Labs:   Lab Results   Component Value Date    WBC 5.00 04/22/2025    HGB 8.3 (L) 04/22/2025    HCT 26.5 (L) 04/22/2025     (H) 04/22/2025     04/22/2025      Lab Results   Component Value Date    IRON 34 02/24/2025    TRANSFERRIN 201 02/24/2025    TIBC 297 02/24/2025    FESATURATED 11 (L) 02/24/2025      Lab Results   Component Value Date    FERRITIN 64 02/24/2025         Lab Results   Component Value Date     04/15/2025    K 3.8 04/15/2025     04/15/2025    CO2 27 04/15/2025    BUN 17 04/15/2025    CREATININE 0.6 04/15/2025    ALBUMIN 3.4 (L) 03/20/2025    BILITOT 0.5 03/20/2025    ALKPHOS 147 03/20/2025    AST 38 03/20/2025    ALT 42 03/20/2025       Imaging:   Cardiac event monitor    Negative event monitor with no clinical arrhythmias.    Symptoms corresponding with  normal sinus rhythm.       Procedures:   Upper GI endoscopy 3/5/25    - Esophageal mucosal changes secondary to established long-segment Rodriguez's disease, classified as Rodriguez's stage C10-M10 per [place] criteria.   - 3 cm hiatal hernia.   - One non-bleeding angioectasia in the stomach. Treated with argon plasma coagulation (APC).   - Two non-bleeding angioectasias in the duodenum. Treated with argon plasma coagulation (APC).   - No specimens collected.   Roberto Salvador MD   3/5/2025 12:07:18 PM          Assessment:       1. Iron deficiency anemia due to chronic blood loss              Plan:             # Iron Def Anemia - Never had an abnormal colonoscopy, no history of GIB with extensive previous ISAIAH work-up. Most likely cause following recent injury to pelvis, causing consumption with bruising + decreased nutrition with bedrest. Hgb is now improved after 1u pRBC and symptomatically patient feels better. Recommend to proceed with additional dose of IV iron to correct deficit, ~750 mg.   - injectafer x1  - return to clinic in 3-4m with repeat iron serology   07/09/24:  Arrange for additional Injectafer 750 mg on Monday, 07/15 @ 2:30 pm; f/u in 2 months with CBC, iron studies/ferritin prior.  09/23/24:  Symptomatic; Injectafer 750 mg weekly x 2; f/u in 3 months with CBC, BMP, iron studies/ferritin prior.  01/10/25:  S/P Injectafer 750 mg x 2 since last visit; Ferritin 113; improved Hgb from 9.9 to 11.9 g/dl, iron studies low; asymptomatic; Labs in 2 months; phone review  3/12/25: Patient with 2 hospitalizations since the last visit, CVA and GI bleed. Patient will require and additional Injectafer 750 mg; she will need to follow up in 2 months with hematologist at her request.  04/29/25:  s/p Injectafer 750 mg 03/18/25; hospitalization 03/20- 03/23/25; hgb 8.3 g/dl; hx of VM of stomach; Labs now:  CBC, T & S, iron studies/ferritin; discussed for possible transfusion of blood if Hgb is found < 7 g/dl; otherwise, will  replenish with iron.  Ganzoni deficit 955 - 1201 mg iron.    CBC Hgb 8.9 g/dl; will order iron infusion Injectafer 750 mg weekly x 2; f/u in 2 months with CBC, iron studies/ferritin 1-2 days prior.    #STEMI/heart cath - 04/17/24, Cath per Dr. De La Cruz; presently on Brillinta    # TIA - Patient took anticoagulation and noted worsening of symptoms and has since discontinued. Most recent CVA noted on 2/18/25.    # GIB - Upcoming visit with GI to rule out bleeding, patient is not currently on anticoagulation  07/09/24:  On Brillinta s/p cath for STEMI  3/12/25: Patient remains on Brillinta    # Pelvic Fracture (12/2023) - Improved with physical therapy, no surgical intervention.     The above information has been reviewed with the patient and all questions have been answered to their apparent satisfaction.  They understand that they can call the clinic with any questions.     GABBY Ruth, FNP-C  [facility]  30 minutes were spent in coordination of patient's care, record review and counseling.       Lab Results   Component Value Date    WBC 5.36 04/29/2025    HGB 8.9 (L) 04/29/2025    HCT 27.6 (L) 04/29/2025    MCV 99 (H) 04/29/2025     (H) 04/29/2025               Med Onc Chart Routing      Follow up with physician    Follow up with LUZ 2 months. f/u in 2 months with CBC, Iron studies/ferritin 1-2 days prior   Infusion scheduling note New or changed treatment   Injectafer 750 mg weekly x 2; schedule with auth   Injection scheduling note    Labs   Scheduling:  Preferred lab:  Lab interval:  Lab today:  CBC, T & S, Iron studies/ ferritin   Imaging    Pharmacy appointment    Other referrals                     Addendum:    Lab Results   Component Value Date    FERRITIN 203.0 04/29/2025     In light of ferritin 203 & improved Hgb to 8.9; hold on iron & f/u with Dr. Novoa as scheduled 05/19/25

## 2025-04-29 NOTE — TELEPHONE ENCOUNTER
Per Luis's note- patient is to come back in 2 months to see LUZRobel Bucio. NP saw patient and advised patient she need to see MD. I will route to Luis to confirm f/u appointments.    ----- Message from Veronica sent at 4/29/2025 11:17 AM CDT -----  Type:  Patient Returning CallWho Called:  ptWho Left Message for Patient:  Does the patient know what this is regarding?:  [facility] Call Back Number:  [phone] Additional Information:  pt dont know who call, pt requesting a call back

## 2025-04-30 ENCOUNTER — TELEPHONE (OUTPATIENT)
Dept: HEMATOLOGY/ONCOLOGY | Facility: CLINIC | Age: 85
End: 2025-04-30
Payer: MEDICARE

## 2025-04-30 NOTE — TELEPHONE ENCOUNTER
Reviewed CBC & Ferritin with patient.  Lab Results   Component Value Date    WBC 5.36 04/29/2025    HGB 8.9 (L) 04/29/2025    HCT 27.6 (L) 04/29/2025    MCV 99 (H) 04/29/2025     (H) 04/29/2025    Hgb improved from 8.3  to 8.9 g/dl     Lab Results   Component Value Date    FERRITIN 203.0 04/29/2025     Last Injectafer:  03/18/25  Ferritin improved to 203 from 64 (02/24/25)  Will hold on iron infusions at present; labs 05/15 @ [facility] & appt with Dr. Novoa on 05/19 for evaluation.  Patient verbalized agreement to plan.

## 2025-05-05 ENCOUNTER — OUTPATIENT CASE MANAGEMENT (OUTPATIENT)
Dept: ADMINISTRATIVE | Facility: OTHER | Age: 85
End: 2025-05-05
Payer: MEDICARE

## 2025-05-05 ENCOUNTER — TELEPHONE (OUTPATIENT)
Dept: HEMATOLOGY/ONCOLOGY | Facility: CLINIC | Age: 85
End: 2025-05-05
Payer: MEDICARE

## 2025-05-05 NOTE — TELEPHONE ENCOUNTER
Pt requesting r/s 5/19 appt with Dr. Novoa d/t court obligations. Pt accepted next soonest available appt on 6/12 at 8 am with labs on 6/9.

## 2025-05-05 NOTE — PROGRESS NOTES
Outpatient Care Management  Plan of Care Follow Up Visit    Patient: Katelyn Caba  MRN: [id number]  Date of Service: 05/05/2025  Completed by: Mayra Pop RN  Referral Date: 02/21/2025    Reason for Visit   Patient presents with    OPCM SW Follow Up Call       Brief Summary:   MEMBER'S CURRENT STATUS  :  -S&S of Anemia:  Patient states that she has started reading the educational materials.  She states that she does not remember the signs and symptoms of anemia.     -S&S of a stroke:  Patient states she does not remember the signs and symptoms of a stroke or the acronym that goes along with it.     -S&S of VTE and prevention:  Patient states she did not know that she was at increased risk of VTE or what the signs and symptoms of VTE are.  She also states she does not know how to prevent VTE's.     -What to do if has VTE:  Patient states if she thinks she has a VTE, she would probably go to the ED to get checked out.     -S&S of CHF:  Patient states that the signs and symptoms of CHF are shortness of breath and weight gain.      -3 lb/5 lb rule:  She states that she does not know what the 3 lb/5 lb rule is or what it is for.      -Fluid restriction:  Patient states she is not sure what her fluid restriction is anymore.        INTERVENTIONS  :  -S&S of Anemia:  CM reiterated signs and symptoms of anemia including fatigue, Breathlessness, tachypnea, tachycardia, cold hands and feet, dizziness, headache, pale skin, brittle nails and craving unusual food.     -S&S of a stroke:  CM also reiterated the acronym BEFAST for the signs and symptoms of a stroke.  Balance when walking, Eyes with vision changes, Face with one sided facial droop, Arm with weakness on one side, Speech slurred or unable to talk and Time noted of the onset of symptoms.    -Patient states she is doing much better than the last time I spoke with her.  She states that she went to her appointment on 4/22 with Cardiology but they did not replace the  Jardiance.    -S&S of VTE and prevention:  CM reviewed the S&S of VTE including pain or tenderness in an extremity not caused by injury; swelling of leg or arm; skin of extremity that is warm to touch, red or discolored (pale, blue).  CM also reviewed that patient can prevent VTE's by wearing compression socks and walking around ever couple of hours to help send the blood in the legs back to the heart.      -What to do if has VTE:  CM instructed the patient that if she thinks she has a VTE to contact her PCP, Dr. Burnham, of which extremity it is and what her symptoms are and the doctor will order an US to be done on the extremity to see if there is a VTE and treat it if there is.    -S&S of CHF:  CM commended patient for knowing two of the major signs and symptoms of CHF.  CM reiterated the rest of the S&S of CHF including persistent cough; swelling of feet, legs or abdomen; fatigue; weakness; loss of appetite; dizziness or rapid heartbeat.     -3 lb/5 lb rule:  CM reviewed that the 3 lb/5 lb rule is if the patient gains 3 lbs overnight or 5 lbs in one week, they should contact their PCP/Cardiologist of this weight gain so that the doctor can adjust medications if necessary.     -Fluid restriction:  CM instructed patient that per Dr. Kellogg from Cardiology, she needs to restrict her total fluids to 1.5 L per day which should include all fluids she consumes including ice cream and anything that melt at room temperature.          Next steps:   Follow up if got CHF guide  Follow up on S&S of anemia  Follow up on when to go to ED for anemia  Follow up on iron rich diet  Follow up on what BP is running  Follow up on S&S of a stroke  Follow up on low sodium diet, 2300 mg  Follow up on fluid restriction, 1.5 L per Dr. Kellogg  Follow up on S&S of CHF  Follow up on daily weights  Follow up on 3 lb/5 lb rule  Follow up on VTE and prevention  Follow up what to do if thinks she has VTE

## 2025-05-12 ENCOUNTER — TELEPHONE (OUTPATIENT)
Dept: HEMATOLOGY/ONCOLOGY | Facility: CLINIC | Age: 85
End: 2025-05-12
Payer: MEDICARE

## 2025-05-12 ENCOUNTER — LAB VISIT (OUTPATIENT)
Dept: LAB | Facility: HOSPITAL | Age: 85
End: 2025-05-12
Attending: NURSE PRACTITIONER
Payer: MEDICARE

## 2025-05-12 ENCOUNTER — OFFICE VISIT (OUTPATIENT)
Dept: HEMATOLOGY/ONCOLOGY | Facility: CLINIC | Age: 85
End: 2025-05-12
Payer: MEDICARE

## 2025-05-12 VITALS
DIASTOLIC BLOOD PRESSURE: 58 MMHG | WEIGHT: 108.44 LBS | TEMPERATURE: 97 F | HEART RATE: 91 BPM | SYSTOLIC BLOOD PRESSURE: 125 MMHG | OXYGEN SATURATION: 96 % | HEIGHT: 63 IN | RESPIRATION RATE: 16 BRPM | BODY MASS INDEX: 19.21 KG/M2

## 2025-05-12 DIAGNOSIS — D64.9 SYMPTOMATIC ANEMIA: Primary | ICD-10-CM

## 2025-05-12 DIAGNOSIS — D50.0 IRON DEFICIENCY ANEMIA DUE TO CHRONIC BLOOD LOSS: Primary | ICD-10-CM

## 2025-05-12 DIAGNOSIS — D50.0 IRON DEFICIENCY ANEMIA DUE TO CHRONIC BLOOD LOSS: ICD-10-CM

## 2025-05-12 LAB
ABO + RH BLD: NORMAL
ABSOLUTE EOSINOPHIL (OHS): 0.02 K/UL
ABSOLUTE MONOCYTE (OHS): 0.85 K/UL (ref 0.3–1)
ABSOLUTE NEUTROPHIL COUNT (OHS): 10.03 K/UL (ref 1.8–7.7)
BASOPHILS # BLD AUTO: 0.07 K/UL
BASOPHILS NFR BLD AUTO: 0.6 %
BLD GP AB SCN CELLS X3 SERPL QL: NORMAL
ERYTHROCYTE [DISTWIDTH] IN BLOOD BY AUTOMATED COUNT: 14.3 % (ref 11.5–14.5)
HCT VFR BLD AUTO: 21.9 % (ref 37–48.5)
HGB BLD-MCNC: 7 GM/DL (ref 12–16)
IMM GRANULOCYTES # BLD AUTO: 0.05 K/UL (ref 0–0.04)
IMM GRANULOCYTES NFR BLD AUTO: 0.4 % (ref 0–0.5)
LYMPHOCYTES # BLD AUTO: 1.22 K/UL (ref 1–4.8)
MCH RBC QN AUTO: 29.3 PG (ref 27–31)
MCHC RBC AUTO-ENTMCNC: 32 G/DL (ref 32–36)
MCV RBC AUTO: 92 FL (ref 82–98)
NUCLEATED RBC (/100WBC) (OHS): 0 /100 WBC
PLATELET # BLD AUTO: 621 K/UL (ref 150–450)
PMV BLD AUTO: 9.7 FL (ref 9.2–12.9)
RBC # BLD AUTO: 2.39 M/UL (ref 4–5.4)
RELATIVE EOSINOPHIL (OHS): 0.2 %
RELATIVE LYMPHOCYTE (OHS): 10 % (ref 18–48)
RELATIVE MONOCYTE (OHS): 6.9 % (ref 4–15)
RELATIVE NEUTROPHIL (OHS): 81.9 % (ref 38–73)
SPECIMEN OUTDATE: NORMAL
WBC # BLD AUTO: 12.24 K/UL (ref 3.9–12.7)

## 2025-05-12 PROCEDURE — 99213 OFFICE O/P EST LOW 20 MIN: CPT | Mod: PBBFAC,PN,25 | Performed by: NURSE PRACTITIONER

## 2025-05-12 PROCEDURE — 99999 PR PBB SHADOW E&M-EST. PATIENT-LVL III: CPT | Mod: PBBFAC,,, | Performed by: NURSE PRACTITIONER

## 2025-05-12 PROCEDURE — 86850 RBC ANTIBODY SCREEN: CPT | Mod: PN | Performed by: NURSE PRACTITIONER

## 2025-05-12 PROCEDURE — 86900 BLOOD TYPING SEROLOGIC ABO: CPT | Performed by: NURSE PRACTITIONER

## 2025-05-12 PROCEDURE — 36415 COLL VENOUS BLD VENIPUNCTURE: CPT | Mod: PN | Performed by: NURSE PRACTITIONER

## 2025-05-12 PROCEDURE — 85025 COMPLETE CBC W/AUTO DIFF WBC: CPT | Mod: PN

## 2025-05-12 PROCEDURE — 99213 OFFICE O/P EST LOW 20 MIN: CPT | Mod: S$PBB,,, | Performed by: NURSE PRACTITIONER

## 2025-05-12 PROCEDURE — 36415 COLL VENOUS BLD VENIPUNCTURE: CPT | Mod: PN

## 2025-05-12 RX ORDER — DIPHENHYDRAMINE HCL 25 MG
25 CAPSULE ORAL ONCE
Status: CANCELLED | OUTPATIENT
Start: 2025-05-12

## 2025-05-12 RX ORDER — FUROSEMIDE 10 MG/ML
20 INJECTION INTRAMUSCULAR; INTRAVENOUS ONCE
Status: CANCELLED | OUTPATIENT
Start: 2025-05-12

## 2025-05-12 RX ORDER — ACETAMINOPHEN 500 MG
1000 TABLET ORAL ONCE
Status: CANCELLED | OUTPATIENT
Start: 2025-05-12

## 2025-05-12 RX ORDER — HYDROCODONE BITARTRATE AND ACETAMINOPHEN 500; 5 MG/1; MG/1
TABLET ORAL ONCE
Status: CANCELLED | OUTPATIENT
Start: 2025-05-12 | End: 2025-05-12

## 2025-05-12 NOTE — PROGRESS NOTES
"Subjective:        [facility] - Established patient    Reason for visit: follow up on ISAIAH    Anemia  2023: Iron Def Anemia, received IV iron 9/2022 - 1/2023 05/23/2024:  Injectafer 750 mg IV  10/14 & 10/21/24:  Injectafer 750 mg IV each    HPI: Katelyn Caba is a 84 y.o. female with history of BCC, HTN, HLD, Aortic atherosclerosis, CVA, GI bleed presents to the clinic today with family friend for follow up of ISAIAH.  Since her last visit, she endorses decreased energy, increased fatigue and SOB with exertion.  She states, "I can't even walk to the bathroom from my bed". Having BM's daily - no bleeding or melena. Appetite fair. She denies any CP, palpitations, bleeding, melena, cold extremities, HA's, etc.      History:  Patient hospitalized 3/26 - 3/27/24 with Hgb 6.6 sp 1u pRBC. GI evaluation plan for outpatient colonoscopy but patient does not wish to proceed with colonoscopy. Patient recalls fall around [place] with pelvic fracture. Patient was taking iron supplement daily and increasing iron rich foods. Weight is stable. 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 In ED, troponin elevated x3 and an inferolateral STEMI was confirmed on EKG. LHC with evidence of moderate nonobstructive CAD for which no intervention was required. Patient placed on GDMT with atorvastatin 40 mg qd and metoprolol tartrate 12.5 mg TID. Antiplatelet therapy with ticagrelor recommended, however, patient apprehensive of medication secondary to history of GI bleed. Patient agreed to medication regimen. Patient without anginal equivalents and R radial access site well healed upon discharge.    Presented to ED on 2/18/25 for evaluation of left-sided facial droop with left upper extremity weakness. Brain MRI " noted acute ischemia right MCA territory involving the frontal, parietal, and temporal lobes inclusive of the right insular cortex and CTA stroke multi-phase noted occlusion of the MCA M1 segment distal aspect and approximately 50% stenosis at the origin of the left cervical ICA. She was transferred via flight med to [facility] for thrombectomy after TNK initiated and a Cardene drip started due to blood pressure elevation. Thrombectomy not performed due to lesion migrating to distal MCA branches after TNK administered. Neuro exams were stable for remainder of stay. She continues on cardiac monitor.    Presented to ED on 3/3/25 with shortness of breath noting hypoxia, placed on CPAP. She reported black colored stool in addition to fatigue. BNP elevated to 1500 and troponin in the 200s. CXR with bilateral vascular congestion, Hgb of 5.6 g/dL and received 3U PRBCs. She was able to be weaned to 2L NS and saturating 97-98% noting her breathing was much better with d/c. She also received IV iron with this admission. Patient with h/o Rodriguez's esophagus and EGD noted non-bleeding angiectasias.    03/20-03/23/25:  presented with SOB; acidotic & in respiratory distress; placed on BiPAP. Was weaned down to high-flow nasal cannula at 15 L for past hour without hypoxia or changes in mentation. Work of breathing improved since arrival. Troponin elevated at 82 but downtrending from 2 weeks ago, BNP elevated at 1200 but also downtrending from 2 weeks ago. Chest x-ray showing bilateral pleural effusions. Patient received 40 mg Lasix with roughly 700 cc urine output at this point.      Patient presents with female family friend.  ECOG PS is 1.  History has been obtained by chart review and discussion with the patient.    ROS:   Review of Systems   Constitutional:  Positive for malaise/fatigue. Negative for fever.   HENT:  Negative for nosebleeds.    Respiratory:  Negative for hemoptysis and shortness of breath (with exertion).     Cardiovascular:  Negative for chest pain, palpitations and leg swelling.   Gastrointestinal:  Negative for abdominal pain, blood in stool, diarrhea and vomiting.   Genitourinary:  Negative for hematuria.   Neurological:  Negative for dizziness and headaches.   Endo/Heme/Allergies:  Does not bruise/bleed easily.   All other systems reviewed and are negative.         Past Medical History:   Past Medical History:   Diagnosis Date    Cancer     basal cell carcinoma    Stroke due to embolism of right middle cerebral artery 02/18/2025        Past Surgical History:   Past Surgical History:   Procedure Laterality Date    CARPAL TUNNEL RELEASE      ESOPHAGOGASTRODUODENOSCOPY N/A 8/22/2022    Procedure: EGD (ESOPHAGOGASTRODUODENOSCOPY);  Surgeon: Steven Lopez MD;  Location: [facility] (2ND FLR);  Service: Endoscopy;  Laterality: N/A;    ESOPHAGOGASTRODUODENOSCOPY N/A 12/5/2022    Procedure: EGD (ESOPHAGOGASTRODUODENOSCOPY);  Surgeon: Steven Lopez MD;  Location: [facility] (2ND FLR);  Service: Endoscopy;  Laterality: N/A;  Has estimated pulmonary artery pressure 45, schedule location per protocol.   Please schedule with Dr. Darryl Lopez-  Plavix stopped 8/23/22  pt requested to schedule after Thanksgiving/ inst portal-RB  pre call complete; [facility] 11/28/22    ESOPHAGOGASTRODUODENOSCOPY N/A 7/11/2023    Procedure: EGD (ESOPHAGOGASTRODUODENOSCOPY);  Surgeon: Natalie Fall MD;  Location: [facility];  Service: Endoscopy;  Laterality: N/A;    ESOPHAGOGASTRODUODENOSCOPY N/A 3/5/2025    Procedure: EGD (ESOPHAGOGASTRODUODENOSCOPY);  Surgeon: Roberto Salvador MD;  Location: [facility] (2ND FLR);  Service: Endoscopy;  Laterality: N/A;    EYE SURGERY      left eye cataract    LEFT HEART CATHETERIZATION  4/17/2024    Procedure: Left heart cath;  Surgeon: Elisabeth De La Cruz MD;  Location: [facility] CATH;  Service: Cardiology;;    TONSILLECTOMY      TUBAL LIGATION          Family History:   No family history on file.  "    Social History:   Social History     Tobacco Use    Smoking status: Never     Passive exposure: Never    Smokeless tobacco: Never   Substance Use Topics    Alcohol use: No      Patient splits time between [address] and apartment in [place]    Allergies:   Review of patient's allergies indicates:   Allergen Reactions    Jardiance [empagliflozin]         Medications:   Current Outpatient Medications   Medication Sig Dispense Refill    atorvastatin (LIPITOR) 80 MG tablet Take 1 tablet (80 mg total) by mouth once daily. 90 tablet 3    atorvastatin (LIPITOR) 80 MG tablet Take 1 tablet (80 mg total) by mouth once daily. 90 tablet 3    clopidogreL (PLAVIX) 75 mg tablet Take 1 tablet (75 mg total) by mouth once daily. 90 tablet 3    cyanocobalamin (VITAMIN B-12) 1000 MCG tablet Take 1,000 mcg by mouth once daily.      famotidine (PEPCID) 20 MG tablet Take 1 tablet (20 mg total) by mouth once daily. 30 tablet 11    furosemide (LASIX) 20 MG tablet Take 1 tablet (20 mg total) by mouth 2 (two) times a day. 60 tablet 11    pantoprazole (PROTONIX) 40 MG tablet Take 40 mg by mouth once daily. Take 1 tablet by mouth every day      potassium chloride SA (K-DUR,KLOR-CON) 20 MEQ tablet Take 1 tablet (20 mEq total) by mouth once daily. 30 tablet 11    vitamin D (VITAMIN D3) 1000 units Tab Take 1,000 Units by mouth once daily.       No current facility-administered medications for this visit.        Physical Exam:   BP (!) 125/58 (BP Location: Left arm, Patient Position: Sitting)   Pulse 91   Temp 97.4 °F (36.3 °C) (Temporal)   Resp 16   Ht 5' 3" (1.6 m)   Wt 49.2 kg (108 lb 7.5 oz)   SpO2 96%   BMI 19.21 kg/m²    Wt Readings from Last 3 Encounters:   05/12/25 49.2 kg (108 lb 7.5 oz)   04/29/25 47.2 kg (104 lb 0.9 oz)   04/22/25 49.6 kg (109 lb 5.6 oz)               Physical Exam  Vitals reviewed.   Constitutional:       General: She is not in acute distress.  HENT:      Head: Normocephalic and atraumatic. "      Mouth/Throat:      Pharynx: Oropharynx is clear.   Eyes:      Conjunctiva/sclera: Conjunctivae normal.   Cardiovascular:      Rate and Rhythm: Normal rate and regular rhythm.      Pulses: Normal pulses.      Heart sounds: No murmur heard.  Pulmonary:      Effort: Pulmonary effort is normal. No respiratory distress.      Breath sounds: Rales present. No wheezing.   Abdominal:      General: Abdomen is flat. There is no distension.      Palpations: Abdomen is soft.      Tenderness: There is no abdominal tenderness.   Musculoskeletal:         General: No swelling or tenderness. Normal range of motion.      Cervical back: Neck supple. No rigidity.      Right lower leg: No edema.      Left lower leg: No edema.   Lymphadenopathy:      Cervical: No cervical adenopathy.   Skin:     General: Skin is warm and dry.      Coloration: Skin is not jaundiced or pale.   Neurological:      General: No focal deficit present.      Mental Status: She is alert and oriented to person, place, and time.   Psychiatric:         Mood and Affect: Mood normal.         Behavior: Behavior normal.         Thought Content: Thought content normal.         Labs:   Lab Results   Component Value Date    WBC 12.24 05/12/2025    HGB 7.0 (L) 05/12/2025    HCT 21.9 (L) 05/12/2025    MCV 92 05/12/2025     (H) 05/12/2025      Lab Results   Component Value Date    IRON 27 (L) 04/29/2025    TRANSFERRIN 204 04/29/2025    TIBC 302 04/29/2025    LABIRON 9 (L) 04/29/2025    FESATURATED 11 (L) 02/24/2025      Lab Results   Component Value Date    FERRITIN 203.0 04/29/2025         Lab Results   Component Value Date     04/15/2025    K 3.8 04/15/2025     04/15/2025    CO2 27 04/15/2025    BUN 17 04/15/2025    CREATININE 0.6 04/15/2025    ALBUMIN 3.4 (L) 03/20/2025    BILITOT 0.5 03/20/2025    ALKPHOS 147 03/20/2025    AST 38 03/20/2025    ALT 42 03/20/2025       Imaging:   Cardiac event monitor    Negative event monitor with no clinical  arrhythmias.    Symptoms corresponding with normal sinus rhythm.       Procedures:   Upper GI endoscopy 3/5/25    - Esophageal mucosal changes secondary to established long-segment Rodriguez's disease, classified as Rodriguez's stage C10-M10 per Hill[place] criteria.   - 3 cm hiatal hernia.   - One non-bleeding angioectasia in the stomach. Treated with argon plasma coagulation (APC).   - Two non-bleeding angioectasias in the duodenum. Treated with argon plasma coagulation (APC).   - No specimens collected.   Roberto Salvador MD   3/5/2025 12:07:18 PM          Assessment:       1. Symptomatic anemia    2. Iron deficiency anemia due to chronic blood loss            Plan:             # Iron Def Anemia - Never had an abnormal colonoscopy, no history of GIB with extensive previous ISAIAH work-up. Most likely cause following recent injury to pelvis, causing consumption with bruising + decreased nutrition with bedrest. Hgb is now improved after 1u pRBC and symptomatically patient feels better. Recommend to proceed with additional dose of IV iron to correct deficit, ~750 mg.   - injectafer x1  - return to clinic in 3-4m with repeat iron serology   07/09/24:  Arrange for additional Injectafer 750 mg on Monday, 07/15 @ 2:30 pm; f/u in 2 months with CBC, iron studies/ferritin prior.  09/23/24:  Symptomatic; Injectafer 750 mg weekly x 2; f/u in 3 months with CBC, BMP, iron studies/ferritin prior.  01/10/25:  S/P Injectafer 750 mg x 2 since last visit; Ferritin 113; improved Hgb from 9.9 to 11.9 g/dl, iron studies low; asymptomatic; Labs in 2 months; phone review  3/12/25: Patient with 2 hospitalizations since the last visit, CVA and GI bleed. Patient will require and additional Injectafer 750 mg; she will need to follow up in 2 months with hematologist at her request.  04/29/25:  s/p Injectafer 750 mg 03/18/25; hospitalization 03/20- 03/23/25; hgb 8.3 g/dl; hx of VM of stomach; Labs now:  CBC, T & S, iron studies/ferritin; discussed for  possible transfusion of blood if Hgb is found < 7 g/d.  In light of ferritin 203 & improved Hgb to 8.9; hold on iron & f/u with Dr. Novoa as scheduled 05/19/25 05/12/25:  Symptomatic anemia with Hgb down to 7.0 g/dl; 2 units of PRBC's ordered; Lasix post 1st & 2nd unit; scheduled for ST 05/13 @ 0700.  Recheck CBC in 2 weeks: 05/26 with T & S. F/U with Dr. Novoa on 06/12/25.    #STEMI/heart cath - 04/17/24, Cath per Dr. De La Cruz; presently on Brillinta    # TIA - Patient took anticoagulation and noted worsening of symptoms and has since discontinued. Most recent CVA noted on 2/18/25.    # GIB - Upcoming visit with GI to rule out bleeding, patient is not currently on anticoagulation  07/09/24:  On Brillinta s/p cath for STEMI  3/12/25: Patient remains on Brillinta    # Pelvic Fracture (12/2023) - Improved with physical therapy, no surgical intervention.     The above information has been reviewed with the patient and all questions have been answered to their apparent satisfaction.  They understand that they can call the clinic with any questions.     GABBY Ruth, FNP-C  [facility]  25 minutes were spent in coordination of patient's care, record review and counseling.         Med Onc Chart Routing      Follow up with physician    Follow up with LUZ 2 weeks. f/u in 2 weeks with CBC & T & S prior.   Infusion scheduling note    Injection scheduling note    Labs    Imaging    Pharmacy appointment    Other referrals           [facility] Outpatient infusion:  2 units on 05/13 @ 0700

## 2025-05-12 NOTE — TELEPHONE ENCOUNTER
Called Ms. Katelyn dickerson, she is c/o of being weak and being fatigue. She denies any SOB, chest pain, dizziness, or headaches. She said last time she checked her oxygen it was ranging from 91-93% on room air. Nurse asked patient to check oxygen while on the phone but patient was unable to check. She last had labs on 4/29 and was scheduled to see Dr. Novoa on 5/19 but patient called to reschedule. Went and spoke to ZENAIDA Smith in regards to these concerns and patient concerns. ZENAIDA Smith ordered t/s and CBC. Patient aware to come for 2pm to get these needed labs. Patient scheduled to see luis today. Patient also advised if S/S worsen to go to ER. Patient agreed to the following. No other concerns or questions.      ----- Message from Macy sent at 5/12/2025  9:07 AM CDT -----  Regarding: Consult/Advisory    Name Of Caller: Self Contact Preference?:  [phone]  Provider Name:    MsRobel Luis Lujan NP Does patient feel the need to be seen today? No What is the nature of the call?:    Requesting to speak with a nurse. She states she has no energy, can't wait for f/u appt, and would like to be seen today on [facility].

## 2025-05-16 ENCOUNTER — DOCUMENTATION ONLY (OUTPATIENT)
Dept: HEMATOLOGY/ONCOLOGY | Facility: CLINIC | Age: 85
End: 2025-05-16

## 2025-05-16 ENCOUNTER — LAB VISIT (OUTPATIENT)
Dept: LAB | Facility: HOSPITAL | Age: 85
End: 2025-05-16
Attending: NURSE PRACTITIONER
Payer: MEDICARE

## 2025-05-16 DIAGNOSIS — D64.9 SYMPTOMATIC ANEMIA: Primary | ICD-10-CM

## 2025-05-16 DIAGNOSIS — D64.9 SYMPTOMATIC ANEMIA: ICD-10-CM

## 2025-05-16 LAB
ABO + RH BLD: NORMAL
ABSOLUTE EOSINOPHIL (OHS): 0.21 K/UL
ABSOLUTE MONOCYTE (OHS): 0.86 K/UL (ref 0.3–1)
ABSOLUTE NEUTROPHIL COUNT (OHS): 4.69 K/UL (ref 1.8–7.7)
BASOPHILS # BLD AUTO: 0.08 K/UL
BASOPHILS NFR BLD AUTO: 1.1 %
BLD GP AB SCN CELLS X3 SERPL QL: NORMAL
ERYTHROCYTE [DISTWIDTH] IN BLOOD BY AUTOMATED COUNT: 15.2 % (ref 11.5–14.5)
HCT VFR BLD AUTO: 30.7 % (ref 37–48.5)
HGB BLD-MCNC: 10.2 GM/DL (ref 12–16)
IMM GRANULOCYTES # BLD AUTO: 0.02 K/UL (ref 0–0.04)
IMM GRANULOCYTES NFR BLD AUTO: 0.3 % (ref 0–0.5)
LYMPHOCYTES # BLD AUTO: 1.34 K/UL (ref 1–4.8)
MCH RBC QN AUTO: 29.2 PG (ref 27–31)
MCHC RBC AUTO-ENTMCNC: 33.2 G/DL (ref 32–36)
MCV RBC AUTO: 88 FL (ref 82–98)
NUCLEATED RBC (/100WBC) (OHS): 0 /100 WBC
PLATELET # BLD AUTO: 538 K/UL (ref 150–450)
PMV BLD AUTO: 9.5 FL (ref 9.2–12.9)
RBC # BLD AUTO: 3.49 M/UL (ref 4–5.4)
RELATIVE EOSINOPHIL (OHS): 2.9 %
RELATIVE LYMPHOCYTE (OHS): 18.6 % (ref 18–48)
RELATIVE MONOCYTE (OHS): 11.9 % (ref 4–15)
RELATIVE NEUTROPHIL (OHS): 65.2 % (ref 38–73)
SPECIMEN OUTDATE: NORMAL
WBC # BLD AUTO: 7.2 K/UL (ref 3.9–12.7)

## 2025-05-16 PROCEDURE — 36415 COLL VENOUS BLD VENIPUNCTURE: CPT | Mod: PN

## 2025-05-16 PROCEDURE — 85025 COMPLETE CBC W/AUTO DIFF WBC: CPT | Mod: PN

## 2025-05-16 PROCEDURE — 86850 RBC ANTIBODY SCREEN: CPT | Mod: PN | Performed by: NURSE PRACTITIONER

## 2025-05-16 PROCEDURE — 36415 COLL VENOUS BLD VENIPUNCTURE: CPT | Mod: PN | Performed by: NURSE PRACTITIONER

## 2025-05-16 PROCEDURE — 86850 RBC ANTIBODY SCREEN: CPT | Performed by: NURSE PRACTITIONER

## 2025-05-16 NOTE — PROGRESS NOTES
Lab Results   Component Value Date    WBC 7.20 05/16/2025    HGB 10.2 (L) 05/16/2025    HCT 30.7 (L) 05/16/2025    MCV 88 05/16/2025     (H) 05/16/2025       Patient c/o fatigue; concern over labs - to note - received 2 units of PRBC's 05/13 for Hgb 7.0  Reviewed today's lab with patient; relieved.  Will keep appt on 05/29 with lab prior.

## 2025-05-25 PROCEDURE — G0179 MD RECERTIFICATION HHA PT: HCPCS | Mod: ,,, | Performed by: FAMILY MEDICINE

## 2025-05-26 ENCOUNTER — TELEPHONE (OUTPATIENT)
Dept: CARDIOLOGY | Facility: CLINIC | Age: 85
End: 2025-05-26
Payer: MEDICARE

## 2025-05-26 ENCOUNTER — OUTPATIENT CASE MANAGEMENT (OUTPATIENT)
Dept: ADMINISTRATIVE | Facility: OTHER | Age: 85
End: 2025-05-26
Payer: MEDICARE

## 2025-05-26 NOTE — PROGRESS NOTES
Outpatient Care Management  Plan of Care Follow Up Visit    Patient: Katelyn Caba  MRN: [id number]  Date of Service: 05/26/2025  Completed by: Mayra Pop RN  Referral Date: 02/21/2025    Reason for Visit   Patient presents with    OPCM RN Follow Up Call       Brief Summary:   MEMBER'S CURRENT STATUS  :  -S&S of Anemia:  Patient states the signs and symptoms of anemia are tiredness, pale and shortness of breath.  She states she had a blood transfusion when she went to see the NP because her blood count was low and she has been feeling better ever since.     -High iron diet:  Patient states she has been eating foods still that are high in iron for her blood count.  She states she makes a beets and kale smoothie in the .  She states it does not taste the best but she drinks it anyway.    -S&S of a stroke:  Patient states her blood pressure has been 103-117/47-67 lately.  She states she does not know the signs and symptoms of a stroke, nor does she know the acronym to remember them.    -S&S of VTE and prevention:  Patient states the only sign and symptom of a VTE She can remember is swelling.  She states she can prevent it by walking.      -What to do if has VTE:  She states if she thinks she has a blood clot she would go to the doctor's office.    -S&S of CHF:  Patient states the only sign and symptoms of CHF she can think of right now is gaining weight.  She states her daily weight is 105-107.  She states she got the CHF guide and is reading through it.    -3 lb/5 lb rule:  Patient states the 3 lb/5 lb rule is when you gain 3 lbs overnight and does not remember the 5 lb part of the rule.    -Fluid restriction:  Patient states she follows her fluid restriction for the most part.  She states she drinks water, occasionally apple juice and milk for breakfast.  She states she stays right at about 1.5 L of fluids per day.  She states she does not drink caffeine because she was told not to.              INTERVENTIONS  :  -S&S of Anemia:  CM reviewed signs and symptoms of anemia she did not mention including tachypnea, tachycardia, cold hands and feet, dizziness, headache, brittle nails and craving unusual substances.     -High iron diet:  CM encouraged patient to eat lots of green leafy vegetables and also the beets because they are high in iron.  She encouraged patient to keep up this practice to help her blood counts.    -S&S of a stroke:  CM also reiterated the acronym BEFAST for the signs and symptoms of a stroke.  Balance when walking, Eyes with vision changes, Face with one sided facial droop, Arm with weakness on one side, Speech slurred or unable to talk and Time noted of the onset of symptoms.  CM reminded patient that this is a medical emergency and if she has these symptoms to call 911 to get to the emergency room as fast as possible.    -S&S of VTE and prevention:  CM reiterated the S&S of VTE including pain or tenderness in an extremity not caused by injury; swelling of leg or arm; skin of extremity that is warm to touch, red or discolored (pale, blue).  CM reminded patient that in addition to walking, she can prevent VTE's by wearing compression socks.      -What to do if has VTE:  CM commended patient for knowing that she needs to notify and see her doctor if she thinks she has a VTE so the doctor can get an US.    -S&S of CHF:  CM reiterated the S&S of CHF including difficulty breathing or shortness of breath, especially with activity; persistent cough; swelling of feet, legs or abdomen; unexplained weight gain; fatigue; weakness; loss of appetite; dizziness or rapid heartbeat.     -3 lb/5 lb rule:  CM reiterated that the 3 lb/5 lb rule is if the patient gains 3 lbs overnight or 5 lbs in one week, they should contact their PCP/Cardiologist of this weight gain so that the doctor can adjust medications if necessary.      -Fluid restriction:  CM commended patient for following her fluid  restriction and for drinking mostly water.            Next steps:   Follow up on what BP is running  Follow up on S&S of a stroke  Follow up on low sodium diet, 2300 mg  Follow up on fluid restriction, 1.5 L per Dr. Kellogg  Follow up on S&S of CHF  Follow up on daily weights  Follow up on 3 lb/5 lb rule  Follow up on VTE and prevention

## 2025-05-27 ENCOUNTER — EXTERNAL HOME HEALTH (OUTPATIENT)
Dept: HOME HEALTH SERVICES | Facility: HOSPITAL | Age: 85
End: 2025-05-27
Payer: MEDICARE

## 2025-05-27 ENCOUNTER — OFFICE VISIT (OUTPATIENT)
Dept: CARDIOLOGY | Facility: CLINIC | Age: 85
End: 2025-05-27
Payer: MEDICARE

## 2025-05-27 VITALS
DIASTOLIC BLOOD PRESSURE: 58 MMHG | WEIGHT: 106.69 LBS | HEART RATE: 78 BPM | OXYGEN SATURATION: 98 % | BODY MASS INDEX: 18.9 KG/M2 | HEIGHT: 63 IN | SYSTOLIC BLOOD PRESSURE: 126 MMHG

## 2025-05-27 DIAGNOSIS — I10 HYPERTENSION, UNSPECIFIED TYPE: ICD-10-CM

## 2025-05-27 DIAGNOSIS — Z86.73 H/O ISCHEMIC LEFT MCA STROKE: ICD-10-CM

## 2025-05-27 DIAGNOSIS — I70.0 AORTIC ATHEROSCLEROSIS: ICD-10-CM

## 2025-05-27 DIAGNOSIS — E78.2 MIXED HYPERLIPIDEMIA: ICD-10-CM

## 2025-05-27 DIAGNOSIS — D64.9 SYMPTOMATIC ANEMIA: ICD-10-CM

## 2025-05-27 DIAGNOSIS — I50.32 CHRONIC HEART FAILURE WITH PRESERVED EJECTION FRACTION: Primary | ICD-10-CM

## 2025-05-27 DIAGNOSIS — I25.10 CORONARY ARTERY DISEASE INVOLVING NATIVE CORONARY ARTERY OF NATIVE HEART WITHOUT ANGINA PECTORIS: ICD-10-CM

## 2025-05-27 PROCEDURE — 99999 PR PBB SHADOW E&M-EST. PATIENT-LVL IV: CPT | Mod: PBBFAC,,, | Performed by: PHYSICIAN ASSISTANT

## 2025-05-27 PROCEDURE — 99214 OFFICE O/P EST MOD 30 MIN: CPT | Mod: S$PBB,,, | Performed by: PHYSICIAN ASSISTANT

## 2025-05-27 PROCEDURE — 99214 OFFICE O/P EST MOD 30 MIN: CPT | Mod: PBBFAC | Performed by: PHYSICIAN ASSISTANT

## 2025-05-27 RX ORDER — EZETIMIBE 10 MG/1
10 TABLET ORAL DAILY
Qty: 90 TABLET | Refills: 3 | Status: SHIPPED | OUTPATIENT
Start: 2025-05-27 | End: 2026-05-22

## 2025-05-27 NOTE — PROGRESS NOTES
General Cardiology Clinic Note  Reason for Visit: Fatigue  Last Clinic Visit: 4/22/2025    HPI:   Katelyn Caba is a 84 y.o. female who presents for No chief complaint on file.    Problems:  HFpEF  Hx of STEMI 4/2024, no intervention  Hypertension  Hyperlipidemia  MCA CVA 2/18/2025  Hx of GI bleed  ISAIAH, requiring intermittent transfusions    Interval HPI  Patient presents for follow up. She was complaining of exertional fatigue at her last visit. It was unclear at the time if symptoms were primarily due to anemia or CHF. I increased her Lasix to 20 mg bid. She does not remember if this produced symptomatic improvement. A couple of weeks after seeing her, she had a CBC done showing a drop in Hgb to 7, so she received 2 units of PRBCs. Hgb up to 10 on last check. She reports feeling fine right now. Denies WATKINS, edema, weight gain, orthopnea, chest pain, fatigue.     4/22/2025 HPI  Patient presents for exertional fatigue 2-3 days. She states that since her last hospital admission for decompensated heart failure in March, she has been doing very well until a few days ago. At her last visit, she was able to walk to the clinic from the parking garage without difficulty. Today, she had to stop and rest twice. Walking up stairs is now difficult as well. She denies dyspnea, but rather just feels tired. She denies CP, orthopnea, PND, edema, syncope, near syncope, palpitations, overt bleeding. She has gradually gained about 6 lbs back since hospital discharge. Her BP at home is normal, and occasionally slightly low. She was only able to take Jardiance for 2 days, because it made her feel very confused.     3/26/2025 HPI (Dr. Kellogg)  85y/o F with PMHx of HFpEF, MCA CVA (2/18/25, received TNK), Mechanical Fall with Left hip pain 2 weeks prior to CVA, inferolateral STEMI (4/2024, s/p cath with no intervention) not on antiplatelet, hypertension, hyperlipidemia, GI bleed ( non-bleeding angioectasia in the stomach, Duodenum),  ISAIAH, BCC presents to establish care. Pt was recently evaluated in the [facility] and admitted for Acute decompensated HF 3/2025 after she presented with acute hypoxic respiratory failure with elevated BNP. Upon encounter patient denies chest pain with exertion or at rest, palpitations, shortness of breath, dyspnea on exertion, dizziness, syncope, edema, orthopnea, paroxysmal nocturnal dyspnea, or claudication.     ROS:      Review of Systems   Constitutional: Positive for malaise/fatigue and weight gain. Negative for diaphoresis and weight loss.   HENT:  Negative for nosebleeds.    Eyes:  Negative for vision loss in left eye, vision loss in right eye and visual disturbance.   Cardiovascular:  Negative for chest pain, claudication, dyspnea on exertion, irregular heartbeat, leg swelling, near-syncope, orthopnea, palpitations, paroxysmal nocturnal dyspnea and syncope.   Respiratory:  Negative for cough, shortness of breath, sleep disturbances due to breathing, snoring and wheezing.    Hematologic/Lymphatic: Negative for bleeding problem. Does not bruise/bleed easily.   Skin:  Negative for poor wound healing and rash.   Musculoskeletal:  Negative for muscle cramps and myalgias.   Gastrointestinal:  Negative for bloating, abdominal pain, diarrhea, heartburn, melena, nausea and vomiting.   Genitourinary:  Negative for hematuria and nocturia.   Neurological:  Negative for brief paralysis, dizziness, headaches, light-headedness, numbness and weakness.   Psychiatric/Behavioral:  Negative for depression.    Allergic/Immunologic: Negative for hives.       PMH:     Past Medical History:   Diagnosis Date    Cancer     basal cell carcinoma    Stroke due to embolism of right middle cerebral artery 02/18/2025     Past Surgical History:   Procedure Laterality Date    CARPAL TUNNEL RELEASE      ESOPHAGOGASTRODUODENOSCOPY N/A 8/22/2022    Procedure: EGD (ESOPHAGOGASTRODUODENOSCOPY);  Surgeon: Steven Lopez MD;  Location: [facility] ([facility]  "FLR);  Service: Endoscopy;  Laterality: N/A;    ESOPHAGOGASTRODUODENOSCOPY N/A 12/5/2022    Procedure: EGD (ESOPHAGOGASTRODUODENOSCOPY);  Surgeon: Steven Lopez MD;  Location: [facility] (2ND FLR);  Service: Endoscopy;  Laterality: N/A;  Has estimated pulmonary artery pressure 45, schedule location per protocol.   Please schedule with Dr. Darryl Lopez-  Plavix stopped 8/23/22  pt requested to schedule after Thanksgiving/ [facility] portal-RB  pre call complete; [facility] 11/28/22    ESOPHAGOGASTRODUODENOSCOPY N/A 7/11/2023    Procedure: EGD (ESOPHAGOGASTRODUODENOSCOPY);  Surgeon: Natalie Fall MD;  Location: [facility];  Service: Endoscopy;  Laterality: N/A;    ESOPHAGOGASTRODUODENOSCOPY N/A 3/5/2025    Procedure: EGD (ESOPHAGOGASTRODUODENOSCOPY);  Surgeon: Roberto Salvador MD;  Location: [facility] (2ND FLR);  Service: Endoscopy;  Laterality: N/A;    EYE SURGERY      left eye cataract    LEFT HEART CATHETERIZATION  4/17/2024    Procedure: Left heart cath;  Surgeon: Elisabeth De La Cruz MD;  Location: [facility] CATH;  Service: Cardiology;;    TONSILLECTOMY      TUBAL LIGATION       Allergies:     Review of patient's allergies indicates:   Allergen Reactions    Jardiance [empagliflozin]      Medications:   Medications Ordered Prior to Encounter[1]  Social History:     Social History     Tobacco Use    Smoking status: Never     Passive exposure: Never    Smokeless tobacco: Never   Substance Use Topics    Alcohol use: No     Family History:   No family history on file.  Physical Exam:   BP (!) 126/58 (Patient Position: Sitting)   Pulse 78   Ht 5' 3" (1.6 m)   Wt 48.4 kg (106 lb 11.2 oz)   SpO2 98%   BMI 18.90 kg/m²        Physical Exam  Vitals and nursing note reviewed.   Constitutional:       General: She is not in acute distress.     Appearance: Normal appearance. She is not ill-appearing.   HENT:      Head: Normocephalic and atraumatic.   Eyes:      General: No scleral icterus.     Conjunctiva/sclera: Conjunctivae normal. "   Neck:      Thyroid: No thyromegaly.      Vascular: No carotid bruit, hepatojugular reflux or JVD.   Cardiovascular:      Rate and Rhythm: Normal rate and regular rhythm.      Pulses: Normal pulses.      Heart sounds: Normal heart sounds. No murmur heard.     No friction rub. No gallop.   Pulmonary:      Effort: Pulmonary effort is normal.      Breath sounds: No decreased breath sounds, wheezing, rhonchi or rales.   Chest:      Chest wall: No tenderness.   Abdominal:      General: Bowel sounds are normal. There is no distension.      Palpations: Abdomen is soft.      Tenderness: There is no abdominal tenderness.   Musculoskeletal:         General: No swelling.      Cervical back: Neck supple.      Right lower leg: No edema.      Left lower leg: No edema.   Skin:     General: Skin is warm and dry.      Coloration: Skin is not pale.      Findings: No erythema or rash.      Nails: There is no clubbing.   Neurological:      Mental Status: She is alert and oriented to person, place, and time. Mental status is at baseline.   Psychiatric:         Mood and Affect: Mood and affect normal.         Behavior: Behavior normal.          Labs:     Lab Results   Component Value Date     04/15/2025     03/21/2025    K 3.8 04/15/2025    K 3.6 03/21/2025     04/15/2025     03/21/2025    CO2 27 04/15/2025    CO2 27 03/21/2025    BUN 17 04/15/2025    CREATININE 0.6 04/15/2025    ANIONGAP 9 04/15/2025     Lab Results   Component Value Date    HGBA1C 4.2 03/20/2025     Lab Results   Component Value Date    BNP 1,257 (H) 04/22/2025    BNP 1,293 (H) 03/20/2025    BNP 1,512 (H) 03/03/2025     (H) 08/21/2022    Lab Results   Component Value Date    WBC 7.20 05/16/2025    HGB 10.2 (L) 05/16/2025    HGB 8.7 (L) 03/20/2025    HCT 30.7 (L) 05/16/2025    HCT 29.3 (L) 03/20/2025    HCT 18.1 03/03/2025     (H) 05/16/2025     (H) 03/20/2025    GRAN 6.3 03/20/2025    GRAN 59.3 03/20/2025     Lab Results    Component Value Date    CHOL 193 02/18/2025    HDL 39 (L) 02/18/2025    LDLCALC 133.2 02/18/2025    TRIG 104 02/18/2025          Imaging:   Echocardiograms:   TTE 3/20/2025    Left Ventricle: The left ventricle is normal in size. Ventricular mass is normal. Normal wall thickness. Normal wall motion. There is normal systolic function with a visually estimated ejection fraction of 55 - 60%. There is indeterminate diastolic function. Elevated left ventricular filling pressure.    Right Ventricle: The right ventricle is normal in size. Wall thickness is normal. Systolic function is normal.    Mitral Valve: There is mild regurgitation.    Tricuspid Valve: There is mild regurgitation.    Pulmonary Artery: The estimated pulmonary artery systolic pressure is 24 mmHg.    IVC/SVC: Normal venous pressure at 3 mmHg.    Pericardium: Left pleural effusion.    TTE 2/19/2025    Left Ventricle: The left ventricle is normal in size. Normal wall thickness. There is normal systolic function with a visually estimated ejection fraction of 60 - 65%. There is normal diastolic function.    Right Ventricle: Normal right ventricular cavity size. Wall thickness is normal. Systolic function is normal.    Left Atrium: Left atrium is mildly dilated.    Aortic Valve: The aortic valve is a trileaflet valve. There is mild aortic valve sclerosis.    Tricuspid Valve: There is mild regurgitation.    Pulmonary Artery: The estimated pulmonary artery systolic pressure is 32 mmHg.    IVC/SVC: Normal venous pressure at 3 mmHg.    After contrast bubble injection very few bubbles appear late on the left side. They originate from  the pulmonary veins.  This is suggestive of extracardiac shunt.  Clinical correlation is required.    TTE 4/17/2024    Left Ventricle: The left ventricle is normal in size. Normal wall thickness. There is normal systolic function with a visually estimated ejection fraction of 55 - 60%.    Right Ventricle: Normal right ventricular  cavity size. Systolic function is normal.    Left Atrium: Left atrium is mildly dilated.    Mitral Valve: There is mild regurgitation.    Pulmonary Artery: There is mild pulmonary hypertension. The estimated pulmonary artery systolic pressure is 41 mmHg.    IVC/SVC: Normal venous pressure at 3 mmHg.    Stress Tests:   None    Caths:   [facility] 4/17/2024  Dominance: right  Left main:  Angiographically normal  Left anterior descending:  Large vessel that supplies the apex.  It has mild diffuse disease overall.  The mid segment after the large 1st diagonal has some moderate disease.    Left circumflex:  Large vessel with mild diffuse disease and multiple small obtuse marginal branches.  There is a left posterolateral moderate-sized branch with DOM 3 flow.  There is a hint of an abrupt cut off possibly in the mid LPL    Right coronary artery:  Tortuous large dominant vessel with mild diffuse disease and large RPDA and RPL.  The proximal RCA has a 50% stenosis and DOM 3 flow.    Conclusions:  No obvious culprit for MI. the patient's chest pain and ST elevations were almost entirely resolved by the end of the diagnostic angiogram.  Left ventriculogram with EF of 60-70% and normal anterior and inferior wall motion.  Possible diagnoses include plaque erosion with resolved thrombus or distal occlusion in the LPL.  PCI deferred  Elevated left filling pressure    Other:  Event monitor 2/20/2025    Negative event monitor with no clinical arrhythmias.    Symptoms corresponding with normal sinus rhythm.      Assessment:     1. Chronic heart failure with preserved ejection fraction    2. Coronary artery disease involving native coronary artery of native heart without angina pectoris    3. Hypertension, unspecified type    4. Mixed hyperlipidemia    5. Aortic atherosclerosis    6. H/O ischemic left MCA stroke      Plan:     Chronic diastolic CHF  Appears euvolemic on exam today.   Continue Lasix 20 mg bid and Potassium. Obtain BMP and  BNP. Will increase Lasix if needed.   Continue low sodium diet and daily weights    Hypertension  BP normal to slightly low at home. No longer on any antihypertensives aside from Lasix.     Anemia  She has required intermittent iron and blood transfusions. Hgb up to 10 on recent labs. She is following with hematology closely.     Hyperlipidemia  Aortic atherosclerosis   Nonobstructive CAD on [facility] (etiology of STEMI unclear)  LDL above goal. Start Zetia 10 mg daily   Continue Plavix     Hx of CVA  Continue Plavix and high intensity statin       Follow up with Dr. Kellogg as scheduled.     Signed:  Sowmya Ventura PA-C  Cardiology   [phone] - General           [1]   Current Outpatient Medications on File Prior to Visit   Medication Sig Dispense Refill    atorvastatin (LIPITOR) 80 MG tablet Take 1 tablet (80 mg total) by mouth once daily. 90 tablet 3    atorvastatin (LIPITOR) 80 MG tablet Take 1 tablet (80 mg total) by mouth once daily. 90 tablet 3    clopidogreL (PLAVIX) 75 mg tablet Take 1 tablet (75 mg total) by mouth once daily. 90 tablet 3    cyanocobalamin (VITAMIN B-12) 1000 MCG tablet Take 1,000 mcg by mouth once daily.      famotidine (PEPCID) 20 MG tablet Take 1 tablet (20 mg total) by mouth once daily. 30 tablet 11    furosemide (LASIX) 20 MG tablet Take 1 tablet (20 mg total) by mouth 2 (two) times a day. 60 tablet 11    pantoprazole (PROTONIX) 40 MG tablet Take 40 mg by mouth once daily. Take 1 tablet by mouth every day      potassium chloride SA (K-DUR,KLOR-CON) 20 MEQ tablet Take 1 tablet (20 mEq total) by mouth once daily. 30 tablet 11    vitamin D (VITAMIN D3) 1000 units Tab Take 1,000 Units by mouth once daily.       No current facility-administered medications on file prior to visit.

## 2025-05-28 ENCOUNTER — OFFICE VISIT (OUTPATIENT)
Dept: HEMATOLOGY/ONCOLOGY | Facility: CLINIC | Age: 85
End: 2025-05-28
Payer: MEDICARE

## 2025-05-28 ENCOUNTER — RESULTS FOLLOW-UP (OUTPATIENT)
Dept: CARDIOLOGY | Facility: CLINIC | Age: 85
End: 2025-05-28

## 2025-05-28 ENCOUNTER — TELEPHONE (OUTPATIENT)
Dept: GASTROENTEROLOGY | Facility: CLINIC | Age: 85
End: 2025-05-28
Payer: MEDICARE

## 2025-05-28 VITALS
DIASTOLIC BLOOD PRESSURE: 57 MMHG | HEIGHT: 63 IN | OXYGEN SATURATION: 98 % | HEART RATE: 79 BPM | BODY MASS INDEX: 18.82 KG/M2 | WEIGHT: 106.25 LBS | SYSTOLIC BLOOD PRESSURE: 106 MMHG | TEMPERATURE: 97 F | RESPIRATION RATE: 18 BRPM

## 2025-05-28 DIAGNOSIS — Z87.19 HISTORY OF GI BLEED: ICD-10-CM

## 2025-05-28 DIAGNOSIS — D64.9 SYMPTOMATIC ANEMIA: ICD-10-CM

## 2025-05-28 DIAGNOSIS — D50.0 IRON DEFICIENCY ANEMIA DUE TO CHRONIC BLOOD LOSS: Primary | ICD-10-CM

## 2025-05-28 DIAGNOSIS — K31.811 AVM (ARTERIOVENOUS MALFORMATION) OF STOMACH, ACQUIRED WITH HEMORRHAGE: ICD-10-CM

## 2025-05-28 DIAGNOSIS — I50.33 ACUTE ON CHRONIC HEART FAILURE WITH PRESERVED EJECTION FRACTION: ICD-10-CM

## 2025-05-28 PROCEDURE — 99999 PR PBB SHADOW E&M-EST. PATIENT-LVL IV: CPT | Mod: PBBFAC,,, | Performed by: NURSE PRACTITIONER

## 2025-05-28 PROCEDURE — 99215 OFFICE O/P EST HI 40 MIN: CPT | Mod: S$PBB,,, | Performed by: NURSE PRACTITIONER

## 2025-05-28 PROCEDURE — 99214 OFFICE O/P EST MOD 30 MIN: CPT | Mod: PBBFAC,PN,25 | Performed by: NURSE PRACTITIONER

## 2025-05-28 RX ORDER — FUROSEMIDE 10 MG/ML
20 INJECTION INTRAMUSCULAR; INTRAVENOUS ONCE
OUTPATIENT
Start: 2025-05-28

## 2025-05-28 RX ORDER — ACETAMINOPHEN 325 MG/1
650 TABLET ORAL ONCE
OUTPATIENT
Start: 2025-05-28

## 2025-05-28 RX ORDER — DIPHENHYDRAMINE HCL 25 MG
25 CAPSULE ORAL ONCE
OUTPATIENT
Start: 2025-05-28

## 2025-05-28 RX ORDER — HYDROCODONE BITARTRATE AND ACETAMINOPHEN 500; 5 MG/1; MG/1
TABLET ORAL ONCE
OUTPATIENT
Start: 2025-05-28 | End: 2025-05-28

## 2025-05-28 NOTE — PROGRESS NOTES
Subjective:        [facility] - Established patient    Reason for visit: follow up on ISAIAH    Anemia  2023: Iron Def Anemia, received IV iron 9/2022 - 1/2023 05/23/2024:  Injectafer 750 mg IV  10/14 & 10/21/24:  Injectafer 750 mg IV each  03/18/25:  Injectafer 750 mg (hospitalization)    05/13/25:  2 units PRBC's Hgb 7.0; 5/16: Hgb 10.2      HPI: Katelyn Caba is a 84 y.o. female with history of BCC, HTN, HLD, Aortic atherosclerosis, CVA, GI bleed presents to the clinic today with family friend for follow up of ISAIAH.  Since her last visit, she received 2 units of PRBC's for symptomatic anemia with Hgb 7.0.  Levels improved to 10.2.  Today, is approximately 2 weeks post infusion & Hgb is down to 7.3 g/dl.  Last UGI:  03/05/25 with (1) non-bleeding angiectasia in stomach & (2) angiectasia in duodenum. Seen in Cardiology yesterday, remains on Lasix 20 mg bid for CHF.  Patient on Plavix.  Today, she endorses increased fatigue & SOB with exertion.  She denies any blood in stool or urine.  She has been eating beets & meat.  She denies any CP, palpitations, bleeding, melena, cold extremities, HA's, etc.      History:  Patient hospitalized 3/26 - 3/27/24 with Hgb 6.6 sp 1u pRBC. GI evaluation plan for outpatient colonoscopy but patient does not wish to proceed with colonoscopy. Patient recalls fall around [place] with pelvic fracture. Patient was taking iron supplement daily and increasing iron rich foods. Weight is stable. 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 In ED, troponin elevated x3 and an inferolateral STEMI was confirmed on EKG. LHC with evidence of moderate nonobstructive CAD for which no intervention was required. Patient placed on GDMT with atorvastatin 40 mg qd and metoprolol  tartrate 12.5 mg TID. Antiplatelet therapy with ticagrelor recommended, however, patient apprehensive of medication secondary to history of GI bleed. Patient agreed to medication regimen. Patient without anginal equivalents and R radial access site well healed upon discharge.    Presented to ED on 2/18/25 for evaluation of left-sided facial droop with left upper extremity weakness. Brain MRI noted acute ischemia right MCA territory involving the frontal, parietal, and temporal lobes inclusive of the right insular cortex and CTA stroke multi-phase noted occlusion of the MCA M1 segment distal aspect and approximately 50% stenosis at the origin of the left cervical ICA. She was transferred via flight [facility] to [facility] for thrombectomy after TNK initiated and a Cardene drip started due to blood pressure elevation. Thrombectomy not performed due to lesion migrating to distal MCA branches after TNK administered. Neuro exams were stable for remainder of stay. She continues on cardiac monitor.    Presented to ED on 3/3/25 with shortness of breath noting hypoxia, placed on CPAP. She reported black colored stool in addition to fatigue. BNP elevated to 1500 and troponin in the 200s. CXR with bilateral vascular congestion, Hgb of 5.6 g/dL and received 3U PRBCs. She was able to be weaned to 2L NS and saturating 97-98% noting her breathing was much better with d/c. She also received IV iron with this admission. Patient with h/o Rodriguez's esophagus and EGD noted non-bleeding angiectasias.    03/20-03/23/25:  presented with SOB; acidotic & in respiratory distress; placed on BiPAP. Was weaned down to high-flow nasal cannula at 15 L for past hour without hypoxia or changes in mentation. Work of breathing improved since arrival. Troponin elevated at 82 but downtrending from 2 weeks ago, BNP elevated at 1200 but also downtrending from 2 weeks ago. Chest x-ray showing bilateral pleural effusions. Patient received 40 mg Lasix with  roughly 700 cc urine output at this point.      Patient presents with female family friend.  ECOG PS is 1.  History has been obtained by chart review and discussion with the patient.    ROS:   Review of Systems   Constitutional:  Positive for malaise/fatigue. Negative for fever.   HENT:  Negative for nosebleeds.    Respiratory:  Negative for hemoptysis and shortness of breath (with exertion).    Cardiovascular:  Negative for chest pain, palpitations and leg swelling.   Gastrointestinal:  Negative for abdominal pain, blood in stool, diarrhea and vomiting.   Genitourinary:  Negative for hematuria.   Neurological:  Negative for dizziness and headaches.   Endo/Heme/Allergies:  Does not bruise/bleed easily.   All other systems reviewed and are negative.    Past Medical History:   Past Medical History:   Diagnosis Date    Cancer     basal cell carcinoma    Stroke due to embolism of right middle cerebral artery 02/18/2025        Past Surgical History:   Past Surgical History:   Procedure Laterality Date    CARPAL TUNNEL RELEASE      ESOPHAGOGASTRODUODENOSCOPY N/A 8/22/2022    Procedure: EGD (ESOPHAGOGASTRODUODENOSCOPY);  Surgeon: Steven Lopez MD;  Location: [facility] ([address] [place] [address]);  Service: Endoscopy;  Laterality: N/A;    ESOPHAGOGASTRODUODENOSCOPY N/A 12/5/2022    Procedure: EGD (ESOPHAGOGASTRODUODENOSCOPY);  Surgeon: Steven Lopez MD;  Location: [facility] ([address] [place] [address]);  Service: Endoscopy;  Laterality: N/A;  Has estimated pulmonary artery pressure 45, schedule location per protocol.   Please schedule with Dr. Darryl Lopez-  Plavix stopped 8/23/22  [facility]  pre call complete; [facility] 11/28/22    ESOPHAGOGASTRODUODENOSCOPY N/A 7/11/2023    Procedure: EGD (ESOPHAGOGASTRODUODENOSCOPY);  Surgeon: Natalie Fall MD;  Location: [facility];  Service: Endoscopy;  Laterality: N/A;    ESOPHAGOGASTRODUODENOSCOPY N/A 3/5/2025    Procedure: EGD (ESOPHAGOGASTRODUODENOSCOPY);   Surgeon: Roberto Salvador MD;  Location: [facility] ENDO (2ND FLR);  Service: Endoscopy;  Laterality: N/A;    EYE SURGERY      left eye cataract    LEFT HEART CATHETERIZATION  4/17/2024    Procedure: Left heart cath;  Surgeon: Elisabeth De La Cruz MD;  Location: [facility] CATH;  Service: Cardiology;;    TONSILLECTOMY      TUBAL LIGATION          Family History:   No family history on file.     Social History:   Social History     Tobacco Use    Smoking status: Never     Passive exposure: Never    Smokeless tobacco: Never   Substance Use Topics    Alcohol use: No      Patient splits time between [address] and apartment in [place]    Allergies:   Review of patient's allergies indicates:   Allergen Reactions    Jardiance [empagliflozin]         Medications:   Current Outpatient Medications   Medication Sig Dispense Refill    atorvastatin (LIPITOR) 80 MG tablet Take 1 tablet (80 mg total) by mouth once daily. 90 tablet 3    atorvastatin (LIPITOR) 80 MG tablet Take 1 tablet (80 mg total) by mouth once daily. 90 tablet 3    clopidogreL (PLAVIX) 75 mg tablet Take 1 tablet (75 mg total) by mouth once daily. 90 tablet 3    cyanocobalamin (VITAMIN B-12) 1000 MCG tablet Take 1,000 mcg by mouth once daily.      ezetimibe (ZETIA) 10 mg tablet Take 1 tablet (10 mg total) by mouth once daily. 90 tablet 3    famotidine (PEPCID) 20 MG tablet Take 1 tablet (20 mg total) by mouth once daily. 30 tablet 11    furosemide (LASIX) 20 MG tablet Take 1 tablet (20 mg total) by mouth 2 (two) times a day. 60 tablet 11    pantoprazole (PROTONIX) 40 MG tablet Take 40 mg by mouth once daily. Take 1 tablet by mouth every day      potassium chloride SA (K-DUR,KLOR-CON) 20 MEQ tablet Take 1 tablet (20 mEq total) by mouth once daily. 30 tablet 11    vitamin D (VITAMIN D3) 1000 units Tab Take 1,000 Units by mouth once daily.       No current facility-administered medications for this visit.        Physical Exam:   BP (!) 106/57 (BP Location: Left arm, Patient  "Position: Sitting)   Pulse 79   Temp 97.2 °F (36.2 °C) (Temporal)   Resp 18   Ht 5' 3" (1.6 m)   Wt 48.2 kg (106 lb 4.2 oz)   SpO2 98%   BMI 18.82 kg/m²    Wt Readings from Last 3 Encounters:   05/28/25 48.2 kg (106 lb 4.2 oz)   05/27/25 48.4 kg (106 lb 11.2 oz)   05/12/25 49.2 kg (108 lb 7.5 oz)               Physical Exam  Vitals reviewed.   Constitutional:       General: She is not in acute distress.  HENT:      Head: Normocephalic and atraumatic.      Mouth/Throat:      Pharynx: Oropharynx is clear.   Eyes:      Conjunctiva/sclera: Conjunctivae normal.   Cardiovascular:      Rate and Rhythm: Normal rate and regular rhythm.      Pulses: Normal pulses.      Heart sounds: No murmur heard.  Pulmonary:      Effort: Pulmonary effort is normal. No respiratory distress.      Breath sounds: No wheezing or rales.   Abdominal:      General: Abdomen is flat. There is no distension.      Palpations: Abdomen is soft.      Tenderness: There is no abdominal tenderness.   Musculoskeletal:         General: Normal range of motion.      Cervical back: Neck supple.      Right lower leg: No edema.      Left lower leg: No edema.   Lymphadenopathy:      Cervical: No cervical adenopathy.   Skin:     General: Skin is warm and dry.      Coloration: Skin is not jaundiced or pale.   Neurological:      General: No focal deficit present.      Mental Status: She is alert and oriented to person, place, and time.      Motor: Weakness present.   Psychiatric:         Mood and Affect: Mood normal.         Behavior: Behavior normal.         Thought Content: Thought content normal.           Labs:   Lab Results   Component Value Date    WBC 5.95 05/28/2025    HGB 7.3 (L) 05/28/2025    HCT 23.1 (L) 05/28/2025    MCV 89 05/28/2025     (H) 05/28/2025      Lab Results   Component Value Date    IRON 27 (L) 04/29/2025    TRANSFERRIN 204 04/29/2025    TIBC 302 04/29/2025    LABIRON 9 (L) 04/29/2025    FESATURATED 11 (L) 02/24/2025      Lab " Results   Component Value Date    FERRITIN 203.0 04/29/2025     Lab Results   Component Value Date     04/15/2025    K 3.8 04/15/2025     04/15/2025    CO2 27 04/15/2025    BUN 17 04/15/2025    CREATININE 0.6 04/15/2025    ALBUMIN 3.4 (L) 03/20/2025    BILITOT 0.5 03/20/2025    ALKPHOS 147 03/20/2025    AST 38 03/20/2025    ALT 42 03/20/2025       Imaging:   Cardiac event monitor    Negative event monitor with no clinical arrhythmias.    Symptoms corresponding with normal sinus rhythm.       Procedures:   Upper GI endoscopy 3/5/25    - Esophageal mucosal changes secondary to established long-segment Rodriguez's disease, classified as Rodriguez's stage C10-M10 per [place] criteria.   - 3 cm hiatal hernia.   - One non-bleeding angioectasia in the stomach. Treated with argon plasma coagulation (APC).   - Two non-bleeding angioectasias in the duodenum. Treated with argon plasma coagulation (APC).   - No specimens collected.   Roberto Salvador MD   3/5/2025 12:07:18 PM          Assessment:       1. Iron deficiency anemia due to chronic blood loss    2. AVM (arteriovenous malformation) of stomach, acquired with hemorrhage            Plan:             # Iron Def Anemia - Never had an abnormal colonoscopy, no history of GIB with extensive previous ISAIAH work-up. Most likely cause following recent injury to pelvis, causing consumption with bruising + decreased nutrition with bedrest. Hgb is now improved after 1u pRBC and symptomatically patient feels better. Recommend to proceed with additional dose of IV iron to correct deficit, ~750 mg.   - injectafer x1  - return to clinic in 3-4m with repeat iron serology   07/09/24:  Arrange for additional Injectafer 750 mg on Monday, 07/15 @ 2:30 pm; f/u in 2 months with CBC, iron studies/ferritin prior.  09/23/24:  Symptomatic; Injectafer 750 mg weekly x 2; f/u in 3 months with CBC, BMP, iron studies/ferritin prior.  01/10/25:  S/P Injectafer 750 mg x 2 since last visit; Ferritin  113; improved Hgb from 9.9 to 11.9 g/dl, iron studies low; asymptomatic; Labs in 2 months; phone review  3/12/25: Patient with 2 hospitalizations since the last visit, CVA and GI bleed. Patient will require and additional Injectafer 750 mg; she will need to follow up in 2 months with hematologist at her request.  04/29/25:  s/p Injectafer 750 mg 03/18/25; hospitalization 03/20- 03/23/25; hgb 8.3 g/dl; hx of VM of stomach; Labs now:  CBC, T & S, iron studies/ferritin; discussed for possible transfusion of blood if Hgb is found < 7 g/d.  In light of ferritin 203 & improved Hgb to 8.9; hold on iron & f/u with Dr. Novoa as scheduled 05/19/25 05/12/25:  Symptomatic anemia with Hgb down to 7.0 g/dl; 2 units of PRBC's ordered; Lasix post 1st & 2nd unit; scheduled for [facility] 05/13 @ 0700.  Recheck CBC in 2 weeks: 05/26 with T & S. F/U with Dr. Novoa on 06/12/25.  05/28/25:  Symptomatic anemia; Hgb down to 7.3 from 10.2 on last visit 05/16; 2 units of PRBC's ordered with premeds; Lasix 20 mg IV post 1st & 2nd unit; [facility] to call in re for infusion on Friday, 05/30.  Ambulatory referral to GI placed; f/u with Dr. Novoa as scheduled on 06/12 with labs prior.    #STEMI/heart cath - 04/17/24, Cath per Dr. De La Cruz; presently on Plavix    # TIA - Patient took anticoagulation and noted worsening of symptoms and has since discontinued. Most recent CVA noted on 2/18/25.    # GIB - Upcoming visit with GI to rule out bleeding, patient is not currently on anticoagulation  07/09/24:  On Brillinta s/p cath for STEMI  3/12/25: Patient remains on Brillinta  05/28/25:  Patient on Plavix    CHF  -followed by Cardiology; Lasix 20 mg bid    # Pelvic Fracture (12/2023) - Improved with physical therapy, no surgical intervention.     The above information has been reviewed with the patient and all questions have been answered to their apparent satisfaction.  They understand that they can call the clinic with any questions.     GABBY Ruth,  FNP-C  [facility]  25 minutes were spent in coordination of patient's care, record review and counseling.         Med Onc Chart Routing      Follow up with physician . F/u with Dr. Novoa as scheduled on 06/12 with labs prior   Follow up with LUZ    Infusion scheduling note   2 units of PRBC's @ [facility] on Friday 05/30   Injection scheduling note    Labs    Imaging    Pharmacy appointment    Other referrals           Amb referral to GI asap

## 2025-05-28 NOTE — TELEPHONE ENCOUNTER
----- Message from MACO Troy sent at 5/28/2025  9:03 AM CDT -----  Good morning, a GI referral was placed for this patient. Luis Lujan NP would like this patient to be seen as soon as possible due to requiring frequent blood transfusions. Thank you,Sydni Carreon

## 2025-05-29 ENCOUNTER — HOSPITAL ENCOUNTER (INPATIENT)
Facility: HOSPITAL | Age: 85
LOS: 2 days | Discharge: HOME | DRG: 177 | End: 2025-05-31
Attending: EMERGENCY MEDICINE | Admitting: STUDENT IN AN ORGANIZED HEALTH CARE EDUCATION/TRAINING PROGRAM
Payer: MEDICARE

## 2025-05-29 DIAGNOSIS — R06.02 SHORTNESS OF BREATH: ICD-10-CM

## 2025-05-29 DIAGNOSIS — R07.9 CHEST PAIN: ICD-10-CM

## 2025-05-29 PROBLEM — U07.1 COVID-19: Status: ACTIVE | Noted: 2025-05-29

## 2025-05-29 LAB
ABO + RH BLD: NORMAL
ABO + RH BLD: NORMAL
ABSOLUTE EOSINOPHIL (OHS): 0.15 K/UL
ABSOLUTE MONOCYTE (OHS): 0.52 K/UL (ref 0.3–1)
ABSOLUTE NEUTROPHIL COUNT (OHS): 4.11 K/UL (ref 1.8–7.7)
ALBUMIN SERPL BCP-MCNC: 3.6 G/DL (ref 3.5–5.2)
ALP SERPL-CCNC: 104 UNIT/L (ref 40–150)
ALT SERPL W/O P-5'-P-CCNC: 16 UNIT/L (ref 10–44)
ANION GAP (OHS): 8 MMOL/L (ref 8–16)
AST SERPL-CCNC: 20 UNIT/L (ref 11–45)
BASOPHILS # BLD AUTO: 0.06 K/UL
BASOPHILS NFR BLD AUTO: 1 %
BILIRUB SERPL-MCNC: 0.3 MG/DL (ref 0.1–1)
BLD PROD TYP BPU: NORMAL
BLD PROD TYP BPU: NORMAL
BLOOD UNIT EXPIRATION DATE: NORMAL
BLOOD UNIT EXPIRATION DATE: NORMAL
BLOOD UNIT TYPE CODE: 600
BLOOD UNIT TYPE CODE: 600
BNP SERPL-MCNC: 1188 PG/ML (ref 0–99)
BUN SERPL-MCNC: 23 MG/DL (ref 8–23)
CALCIUM SERPL-MCNC: 8.8 MG/DL (ref 8.7–10.5)
CHLORIDE SERPL-SCNC: 106 MMOL/L (ref 95–110)
CO2 SERPL-SCNC: 26 MMOL/L (ref 23–29)
CREAT SERPL-MCNC: 0.6 MG/DL (ref 0.5–1.4)
CROSSMATCH INTERPRETATION: NORMAL
CROSSMATCH INTERPRETATION: NORMAL
DISPENSE STATUS: NORMAL
DISPENSE STATUS: NORMAL
ERYTHROCYTE [DISTWIDTH] IN BLOOD BY AUTOMATED COUNT: 14.6 % (ref 11.5–14.5)
GFR SERPLBLD CREATININE-BSD FMLA CKD-EPI: >60 ML/MIN/1.73/M2
GLUCOSE SERPL-MCNC: 98 MG/DL (ref 70–110)
HCT VFR BLD AUTO: 24.2 % (ref 37–48.5)
HGB BLD-MCNC: 7.2 GM/DL (ref 12–16)
IMM GRANULOCYTES # BLD AUTO: 0.02 K/UL (ref 0–0.04)
IMM GRANULOCYTES NFR BLD AUTO: 0.3 % (ref 0–0.5)
INDIRECT COOMBS: NORMAL
LYMPHOCYTES # BLD AUTO: 0.96 K/UL (ref 1–4.8)
MCH RBC QN AUTO: 26.9 PG (ref 27–31)
MCHC RBC AUTO-ENTMCNC: 29.8 G/DL (ref 32–36)
MCV RBC AUTO: 90 FL (ref 82–98)
NUCLEATED RBC (/100WBC) (OHS): 0 /100 WBC
OHS QRS DURATION: 96 MS
OHS QRS DURATION: 96 MS
OHS QTC CALCULATION: 470 MS
OHS QTC CALCULATION: 485 MS
PLATELET # BLD AUTO: 517 K/UL (ref 150–450)
PMV BLD AUTO: 9.7 FL (ref 9.2–12.9)
POTASSIUM SERPL-SCNC: 3.5 MMOL/L (ref 3.5–5.1)
PROT SERPL-MCNC: 6.3 GM/DL (ref 6–8.4)
RBC # BLD AUTO: 2.68 M/UL (ref 4–5.4)
RELATIVE EOSINOPHIL (OHS): 2.6 %
RELATIVE LYMPHOCYTE (OHS): 16.5 % (ref 18–48)
RELATIVE MONOCYTE (OHS): 8.9 % (ref 4–15)
RELATIVE NEUTROPHIL (OHS): 70.7 % (ref 38–73)
RH BLD: NORMAL
SARS-COV-2 RDRP RESP QL NAA+PROBE: POSITIVE
SODIUM SERPL-SCNC: 140 MMOL/L (ref 136–145)
SPECIMEN OUTDATE: NORMAL
TROPONIN I SERPL HS-MCNC: 149 NG/L
TROPONIN I SERPL HS-MCNC: 178 NG/L
UNIT NUMBER: NORMAL
UNIT NUMBER: NORMAL
WBC # BLD AUTO: 5.82 K/UL (ref 3.9–12.7)

## 2025-05-29 PROCEDURE — 63600175 PHARM REV CODE 636 W HCPCS

## 2025-05-29 PROCEDURE — 63600175 PHARM REV CODE 636 W HCPCS: Mod: JZ,TB | Performed by: STUDENT IN AN ORGANIZED HEALTH CARE EDUCATION/TRAINING PROGRAM

## 2025-05-29 PROCEDURE — 99285 EMERGENCY DEPT VISIT HI MDM: CPT | Mod: 25

## 2025-05-29 PROCEDURE — 80053 COMPREHEN METABOLIC PANEL: CPT

## 2025-05-29 PROCEDURE — U0002 COVID-19 LAB TEST NON-CDC: HCPCS

## 2025-05-29 PROCEDURE — 36430 TRANSFUSION BLD/BLD COMPNT: CPT

## 2025-05-29 PROCEDURE — P9016 RBC LEUKOCYTES REDUCED: HCPCS

## 2025-05-29 PROCEDURE — 27000207 HC ISOLATION

## 2025-05-29 PROCEDURE — 93010 ELECTROCARDIOGRAM REPORT: CPT | Mod: ,,, | Performed by: INTERNAL MEDICINE

## 2025-05-29 PROCEDURE — 12000002 HC ACUTE/MED SURGE SEMI-PRIVATE ROOM

## 2025-05-29 PROCEDURE — 83880 ASSAY OF NATRIURETIC PEPTIDE: CPT | Performed by: EMERGENCY MEDICINE

## 2025-05-29 PROCEDURE — 86901 BLOOD TYPING SEROLOGIC RH(D): CPT

## 2025-05-29 PROCEDURE — 93005 ELECTROCARDIOGRAM TRACING: CPT

## 2025-05-29 PROCEDURE — 96374 THER/PROPH/DIAG INJ IV PUSH: CPT

## 2025-05-29 PROCEDURE — XW033E5 INTRODUCTION OF REMDESIVIR ANTI-INFECTIVE INTO PERIPHERAL VEIN, PERCUTANEOUS APPROACH, NEW TECHNOLOGY GROUP 5: ICD-10-PCS | Performed by: STUDENT IN AN ORGANIZED HEALTH CARE EDUCATION/TRAINING PROGRAM

## 2025-05-29 PROCEDURE — 30233N1 TRANSFUSION OF NONAUTOLOGOUS RED BLOOD CELLS INTO PERIPHERAL VEIN, PERCUTANEOUS APPROACH: ICD-10-PCS | Performed by: STUDENT IN AN ORGANIZED HEALTH CARE EDUCATION/TRAINING PROGRAM

## 2025-05-29 PROCEDURE — 85025 COMPLETE CBC W/AUTO DIFF WBC: CPT

## 2025-05-29 PROCEDURE — 86920 COMPATIBILITY TEST SPIN: CPT

## 2025-05-29 PROCEDURE — 93010 ELECTROCARDIOGRAM REPORT: CPT | Mod: ,,, | Performed by: STUDENT IN AN ORGANIZED HEALTH CARE EDUCATION/TRAINING PROGRAM

## 2025-05-29 PROCEDURE — 84484 ASSAY OF TROPONIN QUANT: CPT | Performed by: EMERGENCY MEDICINE

## 2025-05-29 PROCEDURE — 84484 ASSAY OF TROPONIN QUANT: CPT

## 2025-05-29 PROCEDURE — 25000003 PHARM REV CODE 250: Performed by: STUDENT IN AN ORGANIZED HEALTH CARE EDUCATION/TRAINING PROGRAM

## 2025-05-29 PROCEDURE — 21400001 HC TELEMETRY ROOM

## 2025-05-29 RX ORDER — FUROSEMIDE 10 MG/ML
20 INJECTION INTRAMUSCULAR; INTRAVENOUS
Status: COMPLETED | OUTPATIENT
Start: 2025-05-29 | End: 2025-05-29

## 2025-05-29 RX ORDER — IBUPROFEN 200 MG
24 TABLET ORAL
Status: DISCONTINUED | OUTPATIENT
Start: 2025-05-29 | End: 2025-05-31 | Stop reason: HOSPADM

## 2025-05-29 RX ORDER — CLOPIDOGREL BISULFATE 75 MG/1
75 TABLET ORAL DAILY
Status: DISCONTINUED | OUTPATIENT
Start: 2025-05-30 | End: 2025-05-29

## 2025-05-29 RX ORDER — ATORVASTATIN CALCIUM 40 MG/1
80 TABLET, FILM COATED ORAL DAILY
Status: DISCONTINUED | OUTPATIENT
Start: 2025-05-30 | End: 2025-05-31 | Stop reason: HOSPADM

## 2025-05-29 RX ORDER — IPRATROPIUM BROMIDE AND ALBUTEROL SULFATE 2.5; .5 MG/3ML; MG/3ML
3 SOLUTION RESPIRATORY (INHALATION) EVERY 6 HOURS PRN
Status: DISCONTINUED | OUTPATIENT
Start: 2025-05-29 | End: 2025-05-31

## 2025-05-29 RX ORDER — FAMOTIDINE 20 MG/1
20 TABLET, FILM COATED ORAL DAILY
Status: DISCONTINUED | OUTPATIENT
Start: 2025-05-30 | End: 2025-05-31 | Stop reason: HOSPADM

## 2025-05-29 RX ORDER — ENOXAPARIN SODIUM 100 MG/ML
40 INJECTION SUBCUTANEOUS EVERY 24 HOURS
Status: DISCONTINUED | OUTPATIENT
Start: 2025-05-29 | End: 2025-05-29

## 2025-05-29 RX ORDER — IBUPROFEN 200 MG
16 TABLET ORAL
Status: DISCONTINUED | OUTPATIENT
Start: 2025-05-29 | End: 2025-05-31 | Stop reason: HOSPADM

## 2025-05-29 RX ORDER — ENOXAPARIN SODIUM 100 MG/ML
30 INJECTION SUBCUTANEOUS EVERY 24 HOURS
Status: DISCONTINUED | OUTPATIENT
Start: 2025-05-29 | End: 2025-05-31 | Stop reason: HOSPADM

## 2025-05-29 RX ORDER — GLUCAGON 1 MG
1 KIT INJECTION
Status: DISCONTINUED | OUTPATIENT
Start: 2025-05-29 | End: 2025-05-31 | Stop reason: HOSPADM

## 2025-05-29 RX ORDER — HYDROCODONE BITARTRATE AND ACETAMINOPHEN 500; 5 MG/1; MG/1
TABLET ORAL
Status: DISCONTINUED | OUTPATIENT
Start: 2025-05-29 | End: 2025-05-31 | Stop reason: HOSPADM

## 2025-05-29 RX ORDER — ACETAMINOPHEN 325 MG/1
650 TABLET ORAL EVERY 4 HOURS PRN
Status: DISCONTINUED | OUTPATIENT
Start: 2025-05-29 | End: 2025-05-31 | Stop reason: HOSPADM

## 2025-05-29 RX ORDER — SODIUM CHLORIDE 0.9 % (FLUSH) 0.9 %
10 SYRINGE (ML) INJECTION EVERY 12 HOURS PRN
Status: DISCONTINUED | OUTPATIENT
Start: 2025-05-29 | End: 2025-05-31 | Stop reason: HOSPADM

## 2025-05-29 RX ORDER — NALOXONE HCL 0.4 MG/ML
0.02 VIAL (ML) INJECTION
Status: DISCONTINUED | OUTPATIENT
Start: 2025-05-29 | End: 2025-05-31 | Stop reason: HOSPADM

## 2025-05-29 RX ADMIN — REMDESIVIR 200 MG: 100 INJECTION, POWDER, LYOPHILIZED, FOR SOLUTION INTRAVENOUS at 06:05

## 2025-05-29 RX ADMIN — FUROSEMIDE 20 MG: 10 INJECTION, SOLUTION INTRAMUSCULAR; INTRAVENOUS at 08:05

## 2025-05-29 NOTE — ED PROVIDER NOTES
"Source of History:  Patient and chart review    Chief complaint:  Shortness of Breath (Started during the night, denies any CP associated.)    HPI:  Katelyn Caba is a 84 y.o. female with a PMHx of BCC, HTN, HLD, HFpEF, (EF 55-60%, 03/2025), CAD, Aortic atherosclerosis, Left MCA stroke with no residual deficits, iron deficiency anemia requiring transfusion, and prior GI bleed  who presents to the emergency department with a chief complaint of gradual onset shortness of breath that started last night that she attributes to anxiety.  Patient reports being scheduled to receive "2 pints of blood" with hematology/oncology and was told that if she developed shortness of breath that she needed to come to the ED. Patient reports frequent shortness of breath over the last multiple months, worse with exertion.  Denies associated chest pain, nausea/vomiting, dizziness/lightheadedness, changes in vision, confusion or syncope.    Review of patient's allergies indicates:   Allergen Reactions    Jardiance [empagliflozin]      Medications Ordered Prior to Encounter[1]    PMH:  As per HPI and below:  Past Medical History:   Diagnosis Date    Cancer     basal cell carcinoma    Stroke due to embolism of right middle cerebral artery 02/18/2025     Past Surgical History:   Procedure Laterality Date    CARPAL TUNNEL RELEASE      ESOPHAGOGASTRODUODENOSCOPY N/A 8/22/2022    Procedure: EGD (ESOPHAGOGASTRODUODENOSCOPY);  Surgeon: Steven Lopez MD;  Location: [address]);  Service: Endoscopy;  Laterality: N/A;    ESOPHAGOGASTRODUODENOSCOPY N/A 12/5/2022    Procedure: EGD (ESOPHAGOGASTRODUODENOSCOPY);  Surgeon: Steven Lopez MD;  Location: [address]);  Service: Endoscopy;  Laterality: N/A;  Has estimated pulmonary artery pressure 45, schedule location per protocol.   Please schedule with Dr. Darryl Lopez-  Plavix stopped 8/23/22  pt requested to schedule after Thanksgiving/ inst portal-RB  pre call complete; " [facility] 11/28/22    ESOPHAGOGASTRODUODENOSCOPY N/A 7/11/2023    Procedure: EGD (ESOPHAGOGASTRODUODENOSCOPY);  Surgeon: Natalie Fall MD;  Location: [facility] ENDO;  Service: Endoscopy;  Laterality: N/A;    ESOPHAGOGASTRODUODENOSCOPY N/A 3/5/2025    Procedure: EGD (ESOPHAGOGASTRODUODENOSCOPY);  Surgeon: Roberto Salvador MD;  [facility]  Service: Endoscopy;  Laterality: N/A;    EYE SURGERY      left eye cataract    LEFT HEART CATHETERIZATION  4/17/2024    Procedure: Left heart cath;  Surgeon: Elisabeth De La Cruz MD;  Location: [facility] CATH;  Service: Cardiology;;    TONSILLECTOMY      TUBAL LIGATION         Social History     Socioeconomic History    Marital status:    Tobacco Use    Smoking status: Never     Passive exposure: Never    Smokeless tobacco: Never   Substance and Sexual Activity    Alcohol use: No    Drug use: No     Social Drivers of Health     Financial Resource Strain: Low Risk  (5/29/2025)    Overall Financial Resource Strain (CARDIA)     Difficulty of Paying Living Expenses: Not hard at all   Food Insecurity: No Food Insecurity (5/29/2025)    Hunger Vital Sign     Worried About Running Out of Food in the Last Year: Never true     Ran Out of Food in the Last Year: Never true   Transportation Needs: No Transportation Needs (5/29/2025)    PRAPARE - Transportation     Lack of Transportation (Medical): No     Lack of Transportation (Non-Medical): No   Physical Activity: Inactive (3/7/2025)    Exercise Vital Sign     Days of Exercise per Week: 0 days     Minutes of Exercise per Session: 0 min   Stress: No Stress Concern Present (5/29/2025)    [employer] - Occupational Stress Questionnaire     Feeling of Stress : Not at all   Housing Stability: Low Risk  (5/29/2025)    Housing Stability Vital Sign     Unable to Pay for Housing in the Last Year: No     Number of Times Moved in the Last Year: 0     Homeless in the Last Year: No       No family history on  file.    Physical Exam:      Vitals:    05/31/25 0811   BP: 129/68   Pulse: 73   Resp: 16   Temp: 97.7 °F (36.5 °C)     Physical Exam    Gen: No acute distress  Mental Status: Alert and answering questions appropriately  Skin: Warm, dry. No rashes appreciated.  HEENT: NC/AT  Pulm: Diffuse rales in her lower lung field bilaterally. No increased work of breathing, significant tachypnea, or conversational dyspnea    CV: Regular rate, no m/r/g  Abd: Soft, non distended, non tender to palpation. No guarding or rebound  MSK: No obvious deformities. WWP. No BLE edema.  Neuro: Awake. Speech normal. No focal neuro deficit observed    Procedures  Laboratory Studies:  Labs Reviewed   SARS-COV-2 RNA AMPLIFICATION, QUAL - Abnormal       Result Value    SARS COV-2 Molecular Positive (*)    CBC WITH DIFFERENTIAL - Abnormal    WBC 5.82      RBC 2.68 (*)     HGB 7.2 (*)     HCT 24.2 (*)     MCV 90      MCH 26.9 (*)     MCHC 29.8 (*)     RDW 14.6 (*)     Platelet Count 517 (*)     MPV 9.7      Nucleated RBC 0      Neut % 70.7      Lymph % 16.5 (*)     Mono % 8.9      Eos % 2.6      Basophil % 1.0      Imm Grans % 0.3      Neut # 4.11      Lymph # 0.96 (*)     Mono # 0.52      Eos # 0.15      Baso # 0.06      Imm Grans # 0.02     B-TYPE NATRIURETIC PEPTIDE - Abnormal    BNP 1,188 (*)    TROPONIN I HIGH SENSITIVITY - Abnormal    Troponin High Sensitive 149 (*)    TROPONIN I HIGH SENSITIVITY - Abnormal    Troponin High Sensitive 178 (*)    COMPREHENSIVE METABOLIC PANEL - Normal    Sodium 140      Potassium 3.5      Chloride 106      CO2 26      Glucose 98      BUN 23      Creatinine 0.6      Calcium 8.8      Protein Total 6.3      Albumin 3.6      Bilirubin Total 0.3            AST 20      ALT 16      Anion Gap 8      eGFR >60     CBC W/ AUTO DIFFERENTIAL    Narrative:     The following orders were created for panel order CBC Auto Differential.                  Procedure                               Abnormality         Status                                      ---------                               -----------         ------                                     CBC with Differential[[id number]]       Abnormal            Final result                                                 Please view results for these tests on the individual orders.   TYPE & SCREEN    Specimen Outdate 06/01/2025 23:59      Group & Rh A NEG      Indirect [place] NEG     PREPARE RBC SOFT    UNIT NUMBER [id number]      UNIT ABO/RH A NEG      DISPENSE STATUS Transfused      Unit Expiration [id number]      Product Code I1223K33      Unit Blood Type Code 0600      CROSSMATCH INTERPRETATION Compatible      UNIT NUMBER [id number]      UNIT ABO/RH A NEG      DISPENSE STATUS Transfused      Unit Expiration [id number]      Product Code Z5773O51      Unit Blood Type Code 0600      CROSSMATCH INTERPRETATION Compatible         Imaging Results              X-Ray Chest 1 View (Final result)  Result time 05/29/25 08:09:08      Final result by Yohannes Gruber III, MD (05/29/25 08:09:08)                   Impression:      Pulmonary edema pneumonia aspiration or sepsis.      Electronically signed by: Yohannes Gruber MD  Date:    05/29/2025  Time:    08:09               Narrative:    EXAMINATION:  XR CHEST 1 VIEW    CLINICAL HISTORY:  shortness of breath;    FINDINGS:  Chest one view:    Heart size is normal.  There is perihilar and lower lobe edema/infiltrate and pleural fluid.  Bones showed DJD.                                    Medications Given:  Medications   0.9%  NaCl infusion (for blood administration) (has no administration in time range)   atorvastatin tablet 80 mg (80 mg Oral Given 5/31/25 0910)   famotidine tablet 20 mg (20 mg Oral Given 5/31/25 0910)   sodium chloride 0.9% flush 10 mL (has no administration in time range)   naloxone 0.4 mg/mL injection 0.02 mg (has no administration in time range)   glucose chewable tablet 16 g (has no administration in time  range)   glucose chewable tablet 24 g (has no administration in time range)   dextrose 50% injection 12.5 g (has no administration in time range)   dextrose 50% injection 25 g (has no administration in time range)   glucagon (human recombinant) injection 1 mg (has no administration in time range)   acetaminophen tablet 650 mg (has no administration in time range)   albuterol-ipratropium 2.5 mg-0.5 mg/3 mL nebulizer solution 3 mL (has no administration in time range)   enoxaparin injection 30 mg (30 mg Subcutaneous Not Given 5/30/25 1700)   mupirocin 2 % ointment ( Nasal Given 5/31/25 0915)   furosemide tablet 20 mg (20 mg Oral Given 5/31/25 0910)   clopidogreL tablet 75 mg (75 mg Oral Given 5/31/25 0910)   remdesivir 200 mg in 0.9% NaCl 250 mL infusion (200 mg Intravenous New Bag 5/29/25 1847)     Followed by   remdesivir 100 mg in 0.9% NaCl 250 mL infusion (has no administration in time range)   furosemide injection 20 mg (20 mg Intravenous Given 5/29/25 0822)   potassium chloride SA CR tablet 40 mEq (40 mEq Oral Given 5/30/25 0934)          MDM:    Katelyn Caba is a afebrile hemodynamically stable 84 y.o. female with above medical history who presents with complaints of shortness of breath.. Exam significant for bilateral rales in lower lung fields without significant conversational dyspnea.     Differential Diagnosis includes, but is not limited to:  PE, MI/ACS, pneumothorax, pericardial effusion/tamonade, pneumonia, lung abscess, pericarditis/myocarditis, pleural effusion, lung mass, CHF exacerbation, COPD exacerbation, anemia, metabolic derangement, viral URI, viral syndrome.    EKG (per my interpretation) demonstrates normal sinus rhythm, ventricular rate 81 beats per minute, similar to prior EKG on 3/20/2025, no STEMI.   Labs significant for hemoglobin 7.2, BNP 1188, initial troponin 178, COVID positive.  Follow-up troponin pending.  CXR per my interpretation) demonstrates no pneumothorax, significant  pleural effusion, or lobar consolidation. Perihilar and lowe lobe edema appreciated.     Low suspicion for pulmonary embolism given clinical features, labs, and chest x-ray consistent with CHF exacerbation.  No unilateral lower extremity swelling, shortness of breath gradual onset.    Consented to for 2u pRBC. Given 20mg IV lasix.      Patient discussed with with hospital medicine who agreed to admit for CHF exacerbation, symptomatic anemia, ACS rule-out given troponemia, and COVID-19.    Medical Decision Making  Amount and/or Complexity of Data Reviewed  Labs: ordered.  Radiology: ordered.     Details: === Results for orders placed during the hospital encounter of 03/20/25 ===    Echo    - Interpretation Summary -  ·  Left Ventricle: The left ventricle is normal in size. Ventricular mass is normal. Normal wall thickness. Normal wall motion. There is normal systolic function with a visually estimated ejection fraction of 55 - 60%. There is indeterminate diastolic function. Elevated left ventricular filling pressure.  ·  Right Ventricle: The right ventricle is normal in size. Wall thickness is normal. Systolic function is normal.  ·  Mitral Valve: There is mild regurgitation.  ·  Tricuspid Valve: There is mild regurgitation.  ·  Pulmonary Artery: The estimated pulmonary artery systolic pressure is 24 mmHg.  ·  IVC/SVC: Normal venous pressure at 3 mmHg.  ·  Pericardium: Left pleural effusion.        Risk  Prescription drug management.  Decision regarding hospitalization.           Diagnostic Impression:    1. Shortness of breath    2. Chest pain         ED Disposition Condition    Admit                Patient and/or family understands the plan and is in agreement, verbalized understanding, questions answered    Filiberto Mai MD  Resident  Emergency Medicine          Attending Attestation:   Physician Attestation Statement for Resident:  As the supervising MD   Physician Attestation Statement: I have  personally seen and examined this patient.   I agree with the above history.  -:   As the supervising MD I agree with the above PE.     As the supervising MD I agree with the above treatment, course, plan, and disposition.                       Filiberto Mai MD  Resident  05/29/25 1059         [1]   No current facility-administered medications on file prior to encounter.     Current Outpatient Medications on File Prior to Encounter   Medication Sig Dispense Refill    atorvastatin (LIPITOR) 80 MG tablet Take 1 tablet (80 mg total) by mouth once daily. 90 tablet 3    atorvastatin (LIPITOR) 80 MG tablet Take 1 tablet (80 mg total) by mouth once daily. 90 tablet 3    clopidogreL (PLAVIX) 75 mg tablet Take 1 tablet (75 mg total) by mouth once daily. 90 tablet 3    cyanocobalamin (VITAMIN B-12) 1000 MCG tablet Take 1,000 mcg by mouth once daily.      ezetimibe (ZETIA) 10 mg tablet Take 1 tablet (10 mg total) by mouth once daily. 90 tablet 3    famotidine (PEPCID) 20 MG tablet Take 1 tablet (20 mg total) by mouth once daily. 30 tablet 11    furosemide (LASIX) 20 MG tablet Take 1 tablet (20 mg total) by mouth 2 (two) times a day. 60 tablet 11    pantoprazole (PROTONIX) 40 MG tablet Take 40 mg by mouth once daily. Take 1 tablet by mouth every day      potassium chloride SA (K-DUR,KLOR-CON) 20 MEQ tablet Take 1 tablet (20 mEq total) by mouth once daily. 30 tablet 11    vitamin D (VITAMIN D3) 1000 units Tab Take 1,000 Units by mouth once daily.          Mariana Diaz MD  05/31/25 8956

## 2025-05-29 NOTE — ASSESSMENT & PLAN NOTE
Patient is identified as High risk for severe complications of COVID 19 based on COVID risk score of 6   Initiate standard COVID protocols; COVID-19 testing ,Infection Control notification  and isolation- respiratory, contact and droplet per protocol    Diagnostics: CBC, CMP, and Portable CXR    Management: Initiate targeted therapy with Remdesivir, 200mg IV x1, followed by 100mg IV daily x5 days total and Inhaled bronchodilators as needed for shortness of breath.

## 2025-05-29 NOTE — SUBJECTIVE & OBJECTIVE
Past Medical History:   Diagnosis Date    Cancer     basal cell carcinoma    Stroke due to embolism of right middle cerebral artery 02/18/2025       Past Surgical History:   Procedure Laterality Date    CARPAL TUNNEL RELEASE      ESOPHAGOGASTRODUODENOSCOPY N/A 8/22/2022    Procedure: EGD (ESOPHAGOGASTRODUODENOSCOPY);  Surgeon: Steven Lopez MD;  [address]  Service: Endoscopy;  Laterality: N/A;    ESOPHAGOGASTRODUODENOSCOPY N/A 12/5/2022    Procedure: EGD (ESOPHAGOGASTRODUODENOSCOPY);  Surgeon: Steven Lopez MD;  Location: [facility] (2ND FLR);  Service: Endoscopy;  Laterality: N/A;  Has estimated pulmonary artery pressure 45, schedule location per protocol.   Please schedule with Dr. Darryl Lopez-  Plavix stopped 8/23/22  pt requested to schedule after Thanksgiving/ inst portal-RB  pre call complete; [facility] 11/28/22    ESOPHAGOGASTRODUODENOSCOPY N/A 7/11/2023    Procedure: EGD (ESOPHAGOGASTRODUODENOSCOPY);  Surgeon: Natalie Fall MD;  Location: [facility];  Service: Endoscopy;  Laterality: N/A;    ESOPHAGOGASTRODUODENOSCOPY N/A 3/5/2025    Procedure: EGD (ESOPHAGOGASTRODUODENOSCOPY);  Surgeon: Roberto Salvador MD;  [address]  Service: Endoscopy;  Laterality: N/A;    EYE SURGERY      left eye cataract    LEFT HEART CATHETERIZATION  4/17/2024    Procedure: Left heart cath;  Surgeon: Elisabeth De La Cruz MD;  Location: [facility] CATH;  Service: Cardiology;;    TONSILLECTOMY      TUBAL LIGATION         Review of patient's allergies indicates:   Allergen Reactions    Jardiance [empagliflozin]        No current facility-administered medications on file prior to encounter.     Current Outpatient Medications on File Prior to Encounter   Medication Sig    atorvastatin (LIPITOR) 80 MG tablet Take 1 tablet (80 mg total) by mouth once daily.    atorvastatin (LIPITOR) 80 MG tablet Take 1 tablet (80 mg total) by mouth once daily.    clopidogreL (PLAVIX) 75 mg tablet Take 1 tablet (75 mg  total) by mouth once daily.    cyanocobalamin (VITAMIN B-12) 1000 MCG tablet Take 1,000 mcg by mouth once daily.    ezetimibe (ZETIA) 10 mg tablet Take 1 tablet (10 mg total) by mouth once daily.    famotidine (PEPCID) 20 MG tablet Take 1 tablet (20 mg total) by mouth once daily.    furosemide (LASIX) 20 MG tablet Take 1 tablet (20 mg total) by mouth 2 (two) times a day.    pantoprazole (PROTONIX) 40 MG tablet Take 40 mg by mouth once daily. Take 1 tablet by mouth every day    potassium chloride SA (K-DUR,KLOR-CON) 20 MEQ tablet Take 1 tablet (20 mEq total) by mouth once daily.    vitamin D (VITAMIN D3) 1000 units Tab Take 1,000 Units by mouth once daily.     Family History    None       Tobacco Use    Smoking status: Never     Passive exposure: Never    Smokeless tobacco: Never   Substance and Sexual Activity    Alcohol use: No    Drug use: No    Sexual activity: Not on file     Review of Systems   Constitutional:  Negative for appetite change, chills and fever.   HENT:  Positive for congestion and rhinorrhea. Negative for sore throat.    Respiratory:  Positive for cough and shortness of breath. Negative for chest tightness.    Cardiovascular:  Positive for leg swelling (improved). Negative for chest pain.   Gastrointestinal:  Negative for abdominal distention, abdominal pain, nausea and vomiting.   Genitourinary:  Negative for dysuria, frequency and urgency.   Musculoskeletal:  Negative for arthralgias and myalgias.   Skin:  Negative for color change and pallor.   Neurological:  Negative for dizziness and light-headedness.   Psychiatric/Behavioral:  Negative for agitation and confusion.      Objective:     Vital Signs (Most Recent):  Temp: 98.1 °F (36.7 °C) (05/29/25 1644)  Pulse: 77 (05/29/25 1644)  Resp: 16 (05/29/25 1644)  BP: 127/74 (05/29/25 1644)  SpO2: (!) 92 % (05/29/25 1644) Vital Signs (24h Range):  Temp:  [96.2 °F (35.7 °C)-98.3 °F (36.8 °C)] 98.1 °F (36.7 °C)  Pulse:  [75-91] 77  Resp:  [16-32]  16  SpO2:  [92 %-99 %] 92 %  BP: (107-163)/(52-77) 127/74     Weight: 49.9 kg (110 lb)  Body mass index is 19.49 kg/m².     Physical Exam  Constitutional:       General: She is not in acute distress.     Appearance: She is not ill-appearing, toxic-appearing or diaphoretic.   HENT:      Head: Normocephalic and atraumatic.   Cardiovascular:      Rate and Rhythm: Normal rate and regular rhythm.      Heart sounds: No murmur heard.  Pulmonary:      Effort: Pulmonary effort is normal. No respiratory distress.      Breath sounds: Rales present. No wheezing.   Abdominal:      General: Abdomen is flat. There is no distension.      Palpations: Abdomen is soft.      Tenderness: There is no abdominal tenderness. There is no guarding or rebound.   Musculoskeletal:      Right lower leg: No edema.      Left lower leg: No edema.   Skin:     General: Skin is warm and dry.   Neurological:      Mental Status: She is alert and oriented to person, place, and time. Mental status is at baseline.                Significant Labs: All pertinent labs within the past 24 hours have been reviewed.    Significant Imaging: I have reviewed all pertinent imaging results/findings within the past 24 hours.

## 2025-05-29 NOTE — H&P
[facility] - Emergency Dept  [facility] Medicine  History & Physical    Patient Name: Katelyn Caba  MRN: [id number]  Patient Class: IP- Inpatient  Admission Date: 5/29/2025  Attending Physician: Tom Shepard*   Primary Care Provider: Brooklyn Burnham MD         Patient information was obtained from patient, relative(s), past medical records, and ER records.     Subjective:     Principal Problem:<principal problem not specified>    Chief Complaint:   Chief Complaint   Patient presents with    Shortness of Breath     Started during the night, denies any CP associated.        HPI:        Katelyn Caba is a 84 y.o. female with history of BCC, HTN, HLD, Aortic atherosclerosis, GI bleed, ISAIAH, HFpEF, CAD, L MCA CVA, who presented with shortness of breath. Patient stated that she was in her usual state of health yesterday and that her shortness of breath gradually worsened overnight and this morning. Patient attributes this shortness of breath to anxiety related to being told that she would need blood transfusions again (which had been scheduled for tomorrow outpatient by hematology) and her prior experiences with severe shortness of breath prompting ED visits. Denies chest pain or lightheadedness.  Patient presented to ED today at which time her Hb was 7.2 and two transfusions were ordered; patient was also given Lasix 20 mg IVP due to concern for volume overload. Of note patient was also found to be COVID positive. She reported having had some rhinorrhea over the previous several days as well as mild non-productive cough. She denied sick contacts. Denies fever. Following blood transfusion, patient was offered hospital admission for suspected heart failure exacerbation along with administration of Remdesivir for high-risk covid. Patient initially declined admission, but after further discussion patient agreed to be admitted.           Past Medical History:   Diagnosis Date    Cancer     basal cell carcinoma     Stroke due to embolism of right middle cerebral artery 02/18/2025       Past Surgical History:   Procedure Laterality Date    CARPAL TUNNEL RELEASE      ESOPHAGOGASTRODUODENOSCOPY N/A 8/22/2022    Procedure: EGD (ESOPHAGOGASTRODUODENOSCOPY);  Surgeon: Steven Lopez MD;  Location: [address]);  Service: Endoscopy;  Laterality: N/A;    ESOPHAGOGASTRODUODENOSCOPY N/A 12/5/2022    Procedure: EGD (ESOPHAGOGASTRODUODENOSCOPY);  Surgeon: Steven Lopez MD;  Location: [facility]);  Service: Endoscopy;  Laterality: N/A;  Has estimated pulmonary artery pressure 45, schedule location per protocol.   Please schedule with Dr. Darryl Lopez-  Plavix stopped 8/23/22  pt requested to schedule after Thanksgiving/ inst portal-RB  pre call complete; [facility] 11/28/22    ESOPHAGOGASTRODUODENOSCOPY N/A 7/11/2023    Procedure: EGD (ESOPHAGOGASTRODUODENOSCOPY);  Surgeon: Natalie Fall MD;  Location: [facility];  Service: Endoscopy;  Laterality: N/A;    ESOPHAGOGASTRODUODENOSCOPY N/A 3/5/2025    Procedure: EGD (ESOPHAGOGASTRODUODENOSCOPY);  Surgeon: Roberto Salvador MD;  Location: [address]);  Service: Endoscopy;  Laterality: N/A;    EYE SURGERY      left eye cataract    LEFT HEART CATHETERIZATION  4/17/2024    Procedure: Left heart cath;  Surgeon: Elisabeth De La Cruz MD;  Location: [facility] CATH;  Service: Cardiology;;    TONSILLECTOMY      TUBAL LIGATION         Review of patient's allergies indicates:   Allergen Reactions    Jardiance [empagliflozin]        No current facility-administered medications on file prior to encounter.     Current Outpatient Medications on File Prior to Encounter   Medication Sig    atorvastatin (LIPITOR) 80 MG tablet Take 1 tablet (80 mg total) by mouth once daily.    atorvastatin (LIPITOR) 80 MG tablet Take 1 tablet (80 mg total) by mouth once daily.    clopidogreL (PLAVIX) 75 mg tablet Take 1 tablet (75 mg total) by mouth once daily.    cyanocobalamin (VITAMIN B-12) 1000 MCG  tablet Take 1,000 mcg by mouth once daily.    ezetimibe (ZETIA) 10 mg tablet Take 1 tablet (10 mg total) by mouth once daily.    famotidine (PEPCID) 20 MG tablet Take 1 tablet (20 mg total) by mouth once daily.    furosemide (LASIX) 20 MG tablet Take 1 tablet (20 mg total) by mouth 2 (two) times a day.    pantoprazole (PROTONIX) 40 MG tablet Take 40 mg by mouth once daily. Take 1 tablet by mouth every day    potassium chloride SA (K-DUR,KLOR-CON) 20 MEQ tablet Take 1 tablet (20 mEq total) by mouth once daily.    vitamin D (VITAMIN D3) 1000 units Tab Take 1,000 Units by mouth once daily.     Family History    None       Tobacco Use    Smoking status: Never     Passive exposure: Never    Smokeless tobacco: Never   Substance and Sexual Activity    Alcohol use: No    Drug use: No    Sexual activity: Not on file     Review of Systems   Constitutional:  Negative for appetite change, chills and fever.   HENT:  Positive for congestion and rhinorrhea. Negative for sore throat.    Respiratory:  Positive for cough and shortness of breath. Negative for chest tightness.    Cardiovascular:  Positive for leg swelling (improved). Negative for chest pain.   Gastrointestinal:  Negative for abdominal distention, abdominal pain, nausea and vomiting.   Genitourinary:  Negative for dysuria, frequency and urgency.   Musculoskeletal:  Negative for arthralgias and myalgias.   Skin:  Negative for color change and pallor.   Neurological:  Negative for dizziness and light-headedness.   Psychiatric/Behavioral:  Negative for agitation and confusion.      Objective:     Vital Signs (Most Recent):  Temp: 98.1 °F (36.7 °C) (05/29/25 1644)  Pulse: 77 (05/29/25 1644)  Resp: 16 (05/29/25 1644)  BP: 127/74 (05/29/25 1644)  SpO2: (!) 92 % (05/29/25 1644) Vital Signs (24h Range):  Temp:  [96.2 °F (35.7 °C)-98.3 °F (36.8 °C)] 98.1 °F (36.7 °C)  Pulse:  [75-91] 77  Resp:  [16-32] 16  SpO2:  [92 %-99 %] 92 %  BP: (107-163)/(52-77) 127/74     Weight: 49.9 kg  (110 lb)  Body mass index is 19.49 kg/m².     Physical Exam  Constitutional:       General: She is not in acute distress.     Appearance: She is not ill-appearing, toxic-appearing or diaphoretic.   HENT:      Head: Normocephalic and atraumatic.   Cardiovascular:      Rate and Rhythm: Normal rate and regular rhythm.      Heart sounds: No murmur heard.  Pulmonary:      Effort: Pulmonary effort is normal. No respiratory distress.      Breath sounds: Rales present. No wheezing.   Abdominal:      General: Abdomen is flat. There is no distension.      Palpations: Abdomen is soft.      Tenderness: There is no abdominal tenderness. There is no guarding or rebound.   Musculoskeletal:      Right lower leg: No edema.      Left lower leg: No edema.   Skin:     General: Skin is warm and dry.   Neurological:      Mental Status: She is alert and oriented to person, place, and time. Mental status is at baseline.                Significant Labs: All pertinent labs within the past 24 hours have been reviewed.    Significant Imaging: I have reviewed all pertinent imaging results/findings within the past 24 hours.  Assessment/Plan:     Assessment & Plan  Symptomatic anemia  Iron deficiency anemia due to chronic blood loss  Anemia is likely due to chronic blood loss and Iron deficiency. Most recent hemoglobin and hematocrit are listed below.  Recent Labs     05/28/25  0733 05/29/25  0634   HGB 7.3* 7.2*   HCT 23.1* 24.2*     Plan  - Monitor serial CBC: Daily  - Transfuse PRBC if patient becomes hemodynamically unstable, symptomatic or H/H drops below 7/21.  - Patient has received 2 units of PRBCs on 5/29/25  (HFpEF) heart failure with preserved ejection fraction  CXR with edema  Elevated BNP  Given Lasix 20 mg in ED - monitor output  Strict I/Os  H/O ischemic left MCA stroke  CAD (coronary artery disease)  Mixed hyperlipidemia  Continue statin  Clarify if patient taking plavix - resume if Hb stable  COVID-19  Patient is identified as  High risk for severe complications of COVID 19 based on COVID risk score of 6   Initiate standard COVID protocols; COVID-19 testing ,Infection Control notification  and isolation- respiratory, contact and droplet per protocol    Diagnostics: CBC, CMP, and Portable CXR    Management: Initiate targeted therapy with Remdesivir, 200mg IV x1, followed by 100mg IV daily x5 days total and Inhaled bronchodilators as needed for shortness of breath.    VTE Risk Mitigation (From admission, onward)           Ordered     enoxaparin injection 40 mg  Daily         05/29/25 1718     IP VTE HIGH RISK PATIENT  Once         05/29/25 1718     Place sequential compression device  Until discontinued         05/29/25 1718                         Tom Shepard MD  Department of Hospital Medicine  [facility] - Emergency Dept

## 2025-05-29 NOTE — ASSESSMENT & PLAN NOTE
Anemia is likely due to chronic blood loss and Iron deficiency. Most recent hemoglobin and hematocrit are listed below.  Recent Labs     05/28/25  0733 05/29/25  0634   HGB 7.3* 7.2*   HCT 23.1* 24.2*     Plan  - Monitor serial CBC: Daily  - Transfuse PRBC if patient becomes hemodynamically unstable, symptomatic or H/H drops below 7/21.  - Patient has received 2 units of PRBCs on 5/29/25

## 2025-05-29 NOTE — HPI
Katelyn Caba is a 84 y.o. female with history of BCC, HTN, HLD, Aortic atherosclerosis, GI bleed, ISAIAH, HFpEF, CAD, L MCA CVA, who presented with shortness of breath. Patient stated that she was in her usual state of health yesterday and that her shortness of breath gradually worsened overnight and this morning. Patient attributes this shortness of breath to anxiety related to being told that she would need blood transfusions again (which had been scheduled for tomorrow outpatient by hematology) and her prior experiences with severe shortness of breath prompting ED visits. Denies chest pain or lightheadedness.  Patient presented to ED today at which time her Hb was 7.2 and two transfusions were ordered; patient was also given Lasix 20 mg IVP due to concern for volume overload. Of note patient was also found to be COVID positive. She reported having had some rhinorrhea over the previous several days as well as mild non-productive cough. She denied sick contacts. Denies fever. Following blood transfusion, patient was offered hospital admission for suspected heart failure exacerbation along with administration of Remdesivir for high-risk covid. Patient initially declined admission, but after further discussion patient agreed to be admitted.

## 2025-05-29 NOTE — Clinical Note
Diagnosis: Shortness of breath [786.05.ICD-9-CM]   Future Attending Provider: VAMSI AGUIAR [[id number]]   Reason for IP Medical Treatment  (Clinical interventions that can only be accomplished in the IP setting? ) :: Symptomatic anemia, COVID-19, CHF exacerbation

## 2025-05-29 NOTE — ED NOTES
[facility]  Expected date: 5/29/25  Expected time:   Means of arrival:   Comments:  12

## 2025-05-29 NOTE — CONSULTS
RD consulted for low BMI in the EDOU. Pt covid positive, expected discharge this afternoon. Unable to see pt at bedside at this time. Labs and med reviewed. UBW appears to be 104-110# x 2 years. No wounds noted. If patient does not discharge, RD to follow up and monitor.     Thanks,    Ruth Mccallum RD, LDN

## 2025-05-29 NOTE — ED TRIAGE NOTES
Katelyn Caba, a 84 y.o. female presents to the ED w/ complaint of shortness of breath. Pt states she gets frequent blood transfusions, thinks she needs one. Pt states she is scheduled for a transfusion tomorrow but was unable to wait till then because the SOB got worse even at rest. Pt reports she may have noticed some dark stools but nothing to be alarming.     Triage note:  Chief Complaint   Patient presents with    Shortness of Breath     Started during the night, denies any CP associated.     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 02/18/2025

## 2025-05-29 NOTE — ED NOTES
Assumed care of patient at this time from MACO Jimenez. Pt in hospital gown and currently lying in stretcher. Vital signs are stable. Pt ambulatory to restroom recently. No other complaints from pt at this time. Care ongoing.

## 2025-05-29 NOTE — PLAN OF CARE
Katelyn Caba is a 84 y.o. female with history of BCC, HTN, HLD, Aortic atherosclerosis, GI bleed, ISAIAH, HFpEF, CAD, L MCA CVA, who presented with shortness of breath. Patient stated that she was in her usual state of health yesterday and that her shortness of breath gradually worsened overnight and this morning. Patient attributes this shortness of breath to anxiety related to being told that she would need blood transfusions again (had been scheduled for tomorrow outpatient by hematology) and her prior experiences with severe shortness of breath that prompted ED visits. Patient presented to ED at which time her Hb was 7.2 and two transfusions were ordered; patient was also given Lasix IVP as there was evidence of volume overload contributing to her shortness of breath. Of note patient was also found to be COVID positive. She reported having had some rhinorrhea over the previous several days and mild cough. She denied sick contacts. Following blood transfusion patient was offered hospital admission for suspected heart failure exacerbation along with administration of Remdesivir for high-risk covid. Patient declined admission. Risks discussed. All questions answered. Patient was advised she could return at any time. She was discharged AMA.      Tom Shepard MD  Department of Hospital Medicine

## 2025-05-29 NOTE — CARE UPDATE
[facility] Care Support Interaction      I have reviewed the chart of Katelyn Caba who is hospitalized for <principal problem not specified>. The patient is currently located in the following unit: ED     Clinical support - ordered nutrition consult for malnutrition evaluation given BMI < 20.5        Anabella Mullins PA-C  [facility]

## 2025-05-30 PROBLEM — R06.00 DYSPNEA: Status: ACTIVE | Noted: 2025-05-30

## 2025-05-30 LAB
ABSOLUTE EOSINOPHIL (OHS): 0.33 K/UL
ABSOLUTE MONOCYTE (OHS): 0.73 K/UL (ref 0.3–1)
ABSOLUTE NEUTROPHIL COUNT (OHS): 3.09 K/UL (ref 1.8–7.7)
ANION GAP (OHS): 10 MMOL/L (ref 8–16)
BASOPHILS # BLD AUTO: 0.08 K/UL
BASOPHILS NFR BLD AUTO: 1.4 %
BUN SERPL-MCNC: 20 MG/DL (ref 8–23)
CALCIUM SERPL-MCNC: 8.4 MG/DL (ref 8.7–10.5)
CHLORIDE SERPL-SCNC: 108 MMOL/L (ref 95–110)
CO2 SERPL-SCNC: 25 MMOL/L (ref 23–29)
CREAT SERPL-MCNC: 0.5 MG/DL (ref 0.5–1.4)
ERYTHROCYTE [DISTWIDTH] IN BLOOD BY AUTOMATED COUNT: 15.4 % (ref 11.5–14.5)
GFR SERPLBLD CREATININE-BSD FMLA CKD-EPI: >60 ML/MIN/1.73/M2
GLUCOSE SERPL-MCNC: 84 MG/DL (ref 70–110)
HCT VFR BLD AUTO: 30.3 % (ref 37–48.5)
HGB BLD-MCNC: 9.7 GM/DL (ref 12–16)
IMM GRANULOCYTES # BLD AUTO: 0.02 K/UL (ref 0–0.04)
IMM GRANULOCYTES NFR BLD AUTO: 0.4 % (ref 0–0.5)
LYMPHOCYTES # BLD AUTO: 1.37 K/UL (ref 1–4.8)
MCH RBC QN AUTO: 26.8 PG (ref 27–31)
MCHC RBC AUTO-ENTMCNC: 32 G/DL (ref 32–36)
MCV RBC AUTO: 84 FL (ref 82–98)
NUCLEATED RBC (/100WBC) (OHS): 0 /100 WBC
PLATELET # BLD AUTO: 426 K/UL (ref 150–450)
PMV BLD AUTO: 9.9 FL (ref 9.2–12.9)
POTASSIUM SERPL-SCNC: 3.3 MMOL/L (ref 3.5–5.1)
RBC # BLD AUTO: 3.62 M/UL (ref 4–5.4)
RELATIVE EOSINOPHIL (OHS): 5.9 %
RELATIVE LYMPHOCYTE (OHS): 24.4 % (ref 18–48)
RELATIVE MONOCYTE (OHS): 13 % (ref 4–15)
RELATIVE NEUTROPHIL (OHS): 54.9 % (ref 38–73)
SODIUM SERPL-SCNC: 143 MMOL/L (ref 136–145)
TROPONIN I SERPL HS-MCNC: 191 NG/L
WBC # BLD AUTO: 5.62 K/UL (ref 3.9–12.7)

## 2025-05-30 PROCEDURE — 85025 COMPLETE CBC W/AUTO DIFF WBC: CPT | Performed by: STUDENT IN AN ORGANIZED HEALTH CARE EDUCATION/TRAINING PROGRAM

## 2025-05-30 PROCEDURE — 36415 COLL VENOUS BLD VENIPUNCTURE: CPT | Performed by: STUDENT IN AN ORGANIZED HEALTH CARE EDUCATION/TRAINING PROGRAM

## 2025-05-30 PROCEDURE — 84484 ASSAY OF TROPONIN QUANT: CPT | Performed by: STUDENT IN AN ORGANIZED HEALTH CARE EDUCATION/TRAINING PROGRAM

## 2025-05-30 PROCEDURE — 27000207 HC ISOLATION

## 2025-05-30 PROCEDURE — 25000003 PHARM REV CODE 250: Performed by: STUDENT IN AN ORGANIZED HEALTH CARE EDUCATION/TRAINING PROGRAM

## 2025-05-30 PROCEDURE — 63600175 PHARM REV CODE 636 W HCPCS: Mod: JZ,TB | Performed by: STUDENT IN AN ORGANIZED HEALTH CARE EDUCATION/TRAINING PROGRAM

## 2025-05-30 PROCEDURE — 82374 ASSAY BLOOD CARBON DIOXIDE: CPT | Performed by: STUDENT IN AN ORGANIZED HEALTH CARE EDUCATION/TRAINING PROGRAM

## 2025-05-30 PROCEDURE — 21400001 HC TELEMETRY ROOM

## 2025-05-30 RX ORDER — CLOPIDOGREL BISULFATE 75 MG/1
75 TABLET ORAL DAILY
Status: DISCONTINUED | OUTPATIENT
Start: 2025-05-30 | End: 2025-05-31 | Stop reason: HOSPADM

## 2025-05-30 RX ORDER — POTASSIUM CHLORIDE 20 MEQ/1
40 TABLET, EXTENDED RELEASE ORAL ONCE
Status: COMPLETED | OUTPATIENT
Start: 2025-05-30 | End: 2025-05-30

## 2025-05-30 RX ORDER — MUPIROCIN 20 MG/G
OINTMENT TOPICAL 2 TIMES DAILY
Status: DISCONTINUED | OUTPATIENT
Start: 2025-05-30 | End: 2025-05-31 | Stop reason: HOSPADM

## 2025-05-30 RX ORDER — FUROSEMIDE 20 MG/1
20 TABLET ORAL 2 TIMES DAILY
Status: DISCONTINUED | OUTPATIENT
Start: 2025-05-30 | End: 2025-05-31 | Stop reason: HOSPADM

## 2025-05-30 RX ADMIN — REMDESIVIR 100 MG: 100 INJECTION, POWDER, LYOPHILIZED, FOR SOLUTION INTRAVENOUS at 02:05

## 2025-05-30 RX ADMIN — ATORVASTATIN CALCIUM 80 MG: 40 TABLET, FILM COATED ORAL at 09:05

## 2025-05-30 RX ADMIN — MUPIROCIN: 20 OINTMENT TOPICAL at 09:05

## 2025-05-30 RX ADMIN — FUROSEMIDE 20 MG: 20 TABLET ORAL at 09:05

## 2025-05-30 RX ADMIN — POTASSIUM CHLORIDE 40 MEQ: 1500 TABLET, EXTENDED RELEASE ORAL at 09:05

## 2025-05-30 RX ADMIN — CLOPIDOGREL 75 MG: 75 TABLET ORAL at 12:05

## 2025-05-30 RX ADMIN — FAMOTIDINE 20 MG: 20 TABLET, FILM COATED ORAL at 09:05

## 2025-05-30 RX ADMIN — FUROSEMIDE 20 MG: 20 TABLET ORAL at 12:05

## 2025-05-30 NOTE — ED NOTES
Received report from Patricia DEWEY. Assumed care of patient. Patient is alert and resting comfortably on stretcher in NAD. Family member at bedside. Patient updated on plan of care, pt denies any needs or complaints at this time. Bed locked in lowest position, side rails up x2, call light within reach.

## 2025-05-30 NOTE — ED NOTES
Pt self transferred from stretcher to hospital bed upon bed arrival. Gait steady.   Family remains at bedside.

## 2025-05-30 NOTE — ASSESSMENT & PLAN NOTE
- CXR with edema, Elevated BNP  - tropininemia likely represents demand in setting of anemia, COVID infection, and heart failure exacerbation. Denies chest pain. No EKG changes  - Given Lasix 20 mg in ED - monitor output  - Strict I/Os  - will resume home lasix

## 2025-05-30 NOTE — ASSESSMENT & PLAN NOTE
Anemia is likely due to chronic blood loss and Iron deficiency. Most recent hemoglobin and hematocrit are listed below.  Recent Labs     05/28/25  0733 05/29/25  0634 05/30/25  0547   HGB 7.3* 7.2* 9.7*   HCT 23.1* 24.2* 30.3*     Plan  - Monitor serial CBC: Daily  - Transfuse PRBC if patient becomes hemodynamically unstable, symptomatic or H/H drops below 7/21.  - Patient has received 2 units of PRBCs on 5/29/25

## 2025-05-30 NOTE — ED NOTES
Assumed care of patient. Patient is alert and resting comfortably in bed in NAD. BP, cardiac, and O2 monitoring continued. Family member at bedside. Patient updated on plan of care, pt denies any needs or complaints at this time. Bed locked in lowest position, side rails up x2, call light within reach. VSS.

## 2025-05-30 NOTE — SUBJECTIVE & OBJECTIVE
Interval History: No acute events. Pt feels improved shortness of breath. Denies chest pain. Hb improved to 9.7  Appears comfortable on room air. Will resume home lasix.     Review of Systems  Objective:     Vital Signs (Most Recent):  Temp: 98.3 °F (36.8 °C) (05/30/25 1107)  Pulse: 72 (05/30/25 1107)  Resp: 18 (05/30/25 1107)  BP: 126/63 (05/30/25 1107)  SpO2: 95 % (05/30/25 1107) Vital Signs (24h Range):  Temp:  [96.2 °F (35.7 °C)-98.5 °F (36.9 °C)] 98.3 °F (36.8 °C)  Pulse:  [66-78] 72  Resp:  [16-20] 18  SpO2:  [90 %-98 %] 95 %  BP: (115-146)/(58-74) 126/63     Weight: 49.9 kg (110 lb)  Body mass index is 19.49 kg/m².  No intake or output data in the 24 hours ending 05/30/25 1337      Physical Exam  Constitutional:       General: She is not in acute distress.     Appearance: She is not ill-appearing, toxic-appearing or diaphoretic.   HENT:      Head: Normocephalic and atraumatic.   Cardiovascular:      Rate and Rhythm: Normal rate and regular rhythm.      Heart sounds: No murmur heard.  Pulmonary:      Effort: Pulmonary effort is normal. No respiratory distress.      Breath sounds: Rales present. No wheezing.   Abdominal:      General: Abdomen is flat. There is no distension.      Palpations: Abdomen is soft.      Tenderness: There is no abdominal tenderness. There is no guarding or rebound.   Musculoskeletal:      Right lower leg: No edema.      Left lower leg: No edema.   Skin:     General: Skin is warm and dry.   Neurological:      Mental Status: She is alert and oriented to person, place, and time. Mental status is at baseline.               Significant Labs: All pertinent labs within the past 24 hours have been reviewed.    Significant Imaging: I have reviewed all pertinent imaging results/findings within the past 24 hours.

## 2025-05-30 NOTE — ASSESSMENT & PLAN NOTE
Nutrition consulted. Most recent weight and BMI monitored-     Measurements:  Wt Readings from Last 1 Encounters:   05/29/25 49.9 kg (110 lb)   Body mass index is 19.49 kg/m².    Patient has been screened and assessed by RD.    Malnutrition Type:  Context:    Level:      Malnutrition Characteristic Summary:       Interventions/Recommendations (treatment strategy):

## 2025-05-30 NOTE — PLAN OF CARE
Problem: Adult Inpatient Plan of Care  Goal: Plan of Care Review  Outcome: Progressing  Goal: Patient-Specific Goal (Individualized)  Outcome: Progressing  Goal: Absence of Hospital-Acquired Illness or Injury  Outcome: Progressing  Goal: Optimal Comfort and Wellbeing  Outcome: Progressing  Goal: Readiness for Transition of Care  Outcome: Progressing       Pt arrived around 0120, on RA, family remain at the bedside, no c/o pain/N/V. VSS

## 2025-05-30 NOTE — PROGRESS NOTES
[facility] - Internal Medicine [facility]  Progress Note    Patient Name: Katelyn Caba  MRN: [id number]  Patient Class: IP- Inpatient   Admission Date: 5/29/2025  Length of Stay: 1 days  Attending Physician: Tom Shepard*  Primary Care Provider: Brooklyn Burnham MD        Subjective     Principal Problem:COVID-19        HPI:       Katelyn Caba is a 84 y.o. female with history of BCC, HTN, HLD, Aortic atherosclerosis, GI bleed, ISAIAH, HFpEF, CAD, L MCA CVA, who presented with shortness of breath. Patient stated that she was in her usual state of health yesterday and that her shortness of breath gradually worsened overnight and this morning. Patient attributes this shortness of breath to anxiety related to being told that she would need blood transfusions again (which had been scheduled for tomorrow outpatient by hematology) and her prior experiences with severe shortness of breath prompting ED visits. Denies chest pain or lightheadedness.  Patient presented to ED today at which time her Hb was 7.2 and two transfusions were ordered; patient was also given Lasix 20 mg IVP due to concern for volume overload. Of note patient was also found to be COVID positive. She reported having had some rhinorrhea over the previous several days as well as mild non-productive cough. She denied sick contacts. Denies fever. Following blood transfusion, patient was offered hospital admission for suspected heart failure exacerbation along with administration of Remdesivir for high-risk covid. Patient initially declined admission, but after further discussion patient agreed to be admitted.           Overview/Hospital Course:  No notes on file    Interval History: No acute events. Pt feels improved shortness of breath. Denies chest pain. Hb improved to 9.7  Appears comfortable on room air. Will resume home lasix.     Review of Systems  Objective:     Vital Signs (Most Recent):  Temp: 98.3 °F (36.8 °C)  (05/30/25 1107)  Pulse: 72 (05/30/25 1107)  Resp: 18 (05/30/25 1107)  BP: 126/63 (05/30/25 1107)  SpO2: 95 % (05/30/25 1107) Vital Signs (24h Range):  Temp:  [96.2 °F (35.7 °C)-98.5 °F (36.9 °C)] 98.3 °F (36.8 °C)  Pulse:  [66-78] 72  Resp:  [16-20] 18  SpO2:  [90 %-98 %] 95 %  BP: (115-146)/(58-74) 126/63     Weight: 49.9 kg (110 lb)  Body mass index is 19.49 kg/m².  No intake or output data in the 24 hours ending 05/30/25 1337      Physical Exam  Constitutional:       General: She is not in acute distress.     Appearance: She is not ill-appearing, toxic-appearing or diaphoretic.   HENT:      Head: Normocephalic and atraumatic.   Cardiovascular:      Rate and Rhythm: Normal rate and regular rhythm.      Heart sounds: No murmur heard.  Pulmonary:      Effort: Pulmonary effort is normal. No respiratory distress.      Breath sounds: Rales present. No wheezing.   Abdominal:      General: Abdomen is flat. There is no distension.      Palpations: Abdomen is soft.      Tenderness: There is no abdominal tenderness. There is no guarding or rebound.   Musculoskeletal:      Right lower leg: No edema.      Left lower leg: No edema.   Skin:     General: Skin is warm and dry.   Neurological:      Mental Status: She is alert and oriented to person, place, and time. Mental status is at baseline.               Significant Labs: All pertinent labs within the past 24 hours have been reviewed.    Significant Imaging: I have reviewed all pertinent imaging results/findings within the past 24 hours.      Assessment & Plan  Symptomatic anemia  Iron deficiency anemia due to chronic blood loss  Dyspnea  Anemia is likely due to chronic blood loss and Iron deficiency. Most recent hemoglobin and hematocrit are listed below.  Recent Labs     05/28/25  0733 05/29/25  0634 05/30/25  0547   HGB 7.3* 7.2* 9.7*   HCT 23.1* 24.2* 30.3*     Plan  - Monitor serial CBC: Daily  - Transfuse PRBC if patient becomes hemodynamically unstable, symptomatic or  H/H drops below 7/21.  - Patient has received 2 units of PRBCs on 5/29/25      (HFpEF) heart failure with preserved ejection fraction  - CXR with edema, Elevated BNP  - tropininemia likely represents demand in setting of anemia, COVID infection, and heart failure exacerbation. Denies chest pain. No EKG changes  - Given Lasix 20 mg in ED - monitor output  - Strict I/Os  - will resume home lasix  H/O ischemic left MCA stroke  CAD (coronary artery disease)  Mixed hyperlipidemia  Continue statin  Clarify if patient taking plavix - resume if Hb stable  COVID-19  Patient is identified as High risk for severe complications of COVID 19 based on COVID risk score of 6   Initiate standard COVID protocols; COVID-19 testing ,Infection Control notification  and isolation- respiratory, contact and droplet per protocol    Diagnostics: CBC, CMP, and Portable CXR    Management: Initiate targeted therapy with Remdesivir, 200mg IV x1, followed by 100mg IV daily x5 days total and Inhaled bronchodilators as needed for shortness of breath.    Moderate protein-calorie malnutrition  Nutrition consulted. Most recent weight and BMI monitored-     Measurements:  Wt Readings from Last 1 Encounters:   05/29/25 49.9 kg (110 lb)   Body mass index is 19.49 kg/m².    Patient has been screened and assessed by RD.    Malnutrition Type:  Context:    Level:      Malnutrition Characteristic Summary:       Interventions/Recommendations (treatment strategy):       VTE Risk Mitigation (From admission, onward)           Ordered     enoxaparin injection 30 mg  Daily         05/29/25 1742     IP VTE HIGH RISK PATIENT  Once         05/29/25 1718     Place sequential compression device  Until discontinued         05/29/25 1718                    Discharge Planning   TERE: 5/31/2025     Code Status: Full Code   Medical Readiness for Discharge Date:   Discharge Plan A: Home with family            Tom Shepard MD  Department of Hospital Medicine   [facility] - Internal Medicine Telemetry

## 2025-05-31 VITALS
RESPIRATION RATE: 18 BRPM | DIASTOLIC BLOOD PRESSURE: 56 MMHG | HEART RATE: 74 BPM | TEMPERATURE: 98 F | HEIGHT: 63 IN | SYSTOLIC BLOOD PRESSURE: 111 MMHG | OXYGEN SATURATION: 98 % | WEIGHT: 110 LBS | BODY MASS INDEX: 19.49 KG/M2

## 2025-05-31 LAB
ABSOLUTE EOSINOPHIL (OHS): 0.45 K/UL
ABSOLUTE MONOCYTE (OHS): 0.94 K/UL (ref 0.3–1)
ABSOLUTE NEUTROPHIL COUNT (OHS): 3.89 K/UL (ref 1.8–7.7)
ANION GAP (OHS): 9 MMOL/L (ref 8–16)
BASOPHILS # BLD AUTO: 0.1 K/UL
BASOPHILS NFR BLD AUTO: 1.4 %
BUN SERPL-MCNC: 22 MG/DL (ref 8–23)
CALCIUM SERPL-MCNC: 8.2 MG/DL (ref 8.7–10.5)
CHLORIDE SERPL-SCNC: 104 MMOL/L (ref 95–110)
CO2 SERPL-SCNC: 26 MMOL/L (ref 23–29)
CREAT SERPL-MCNC: 0.6 MG/DL (ref 0.5–1.4)
ERYTHROCYTE [DISTWIDTH] IN BLOOD BY AUTOMATED COUNT: 15.3 % (ref 11.5–14.5)
GFR SERPLBLD CREATININE-BSD FMLA CKD-EPI: >60 ML/MIN/1.73/M2
GLUCOSE SERPL-MCNC: 87 MG/DL (ref 70–110)
HCT VFR BLD AUTO: 32.2 % (ref 37–48.5)
HGB BLD-MCNC: 10 GM/DL (ref 12–16)
IMM GRANULOCYTES # BLD AUTO: 0.02 K/UL (ref 0–0.04)
IMM GRANULOCYTES NFR BLD AUTO: 0.3 % (ref 0–0.5)
LYMPHOCYTES # BLD AUTO: 1.85 K/UL (ref 1–4.8)
MCH RBC QN AUTO: 26.7 PG (ref 27–31)
MCHC RBC AUTO-ENTMCNC: 31.1 G/DL (ref 32–36)
MCV RBC AUTO: 86 FL (ref 82–98)
NUCLEATED RBC (/100WBC) (OHS): 0 /100 WBC
PLATELET # BLD AUTO: 445 K/UL (ref 150–450)
PMV BLD AUTO: 9.5 FL (ref 9.2–12.9)
POTASSIUM SERPL-SCNC: 3.6 MMOL/L (ref 3.5–5.1)
RBC # BLD AUTO: 3.74 M/UL (ref 4–5.4)
RELATIVE EOSINOPHIL (OHS): 6.2 %
RELATIVE LYMPHOCYTE (OHS): 25.5 % (ref 18–48)
RELATIVE MONOCYTE (OHS): 13 % (ref 4–15)
RELATIVE NEUTROPHIL (OHS): 53.6 % (ref 38–73)
SODIUM SERPL-SCNC: 139 MMOL/L (ref 136–145)
WBC # BLD AUTO: 7.25 K/UL (ref 3.9–12.7)

## 2025-05-31 PROCEDURE — 36415 COLL VENOUS BLD VENIPUNCTURE: CPT | Performed by: STUDENT IN AN ORGANIZED HEALTH CARE EDUCATION/TRAINING PROGRAM

## 2025-05-31 PROCEDURE — 25000003 PHARM REV CODE 250: Performed by: STUDENT IN AN ORGANIZED HEALTH CARE EDUCATION/TRAINING PROGRAM

## 2025-05-31 PROCEDURE — 99900035 HC TECH TIME PER 15 MIN (STAT)

## 2025-05-31 PROCEDURE — 63600175 PHARM REV CODE 636 W HCPCS: Mod: JZ,TB | Performed by: STUDENT IN AN ORGANIZED HEALTH CARE EDUCATION/TRAINING PROGRAM

## 2025-05-31 PROCEDURE — 94761 N-INVAS EAR/PLS OXIMETRY MLT: CPT

## 2025-05-31 RX ADMIN — CLOPIDOGREL 75 MG: 75 TABLET ORAL at 09:05

## 2025-05-31 RX ADMIN — REMDESIVIR 100 MG: 100 INJECTION, POWDER, LYOPHILIZED, FOR SOLUTION INTRAVENOUS at 11:05

## 2025-05-31 RX ADMIN — MUPIROCIN: 20 OINTMENT TOPICAL at 09:05

## 2025-05-31 RX ADMIN — FUROSEMIDE 20 MG: 20 TABLET ORAL at 09:05

## 2025-05-31 RX ADMIN — ATORVASTATIN CALCIUM 80 MG: 40 TABLET, FILM COATED ORAL at 09:05

## 2025-05-31 RX ADMIN — FAMOTIDINE 20 MG: 20 TABLET, FILM COATED ORAL at 09:05

## 2025-05-31 NOTE — PLAN OF CARE
[facility] - Internal Medicine Telemetry  Initial Discharge Assessment       Primary Care Provider: Brooklyn Burnham MD    Admission Diagnosis: Shortness of breath [R06.02]  Chest pain [R07.9]    Admission Date: 5/29/2025  Expected Discharge Date: 5/31/2025    Transition of Care Barriers: (P) None    Payor: MEDICARE / Plan: MEDICARE PART A & B / Product Type: Government /     Extended Emergency Contact Information  Primary Emergency Contact: Eder Doe  Mobile Phone: [phone]  Relation: Spouse   needed? No  Secondary Emergency Contact: Keegan Meza  Address: [address]           [place] [address] [place]  Mobile Phone: [phone]  Relation: Brother    Discharge Plan A: (P) Home with family  Discharge Plan B: (P) Home Health, Home with family      [id number] - [place], [place] - [address] [facility] [address]  [address]  [facility] [address]  Phone: [phone] Fax: [phone]    [id number] - [place], [place] - [address] AT [facility] & [facility]  [address]  [place] [address]  Phone: [phone] Fax: [phone]      Initial Assessment (most recent)       Adult Discharge Assessment - 05/31/25 1226          Discharge Assessment    Assessment Type Discharge Planning Assessment (P)      Confirmed/corrected address, phone number and insurance Yes (P)      Confirmed Demographics Correct on Facesheet (P)      Source of Information patient (P)      If unable to respond/provide information was family/caregiver contacted? Yes (P)      Communicated TERE with patient/caregiver Yes (P)      People in Home spouse (P)      Name(s) of People in Home Eder Doe (Spouse)  [phone] (P)      Do you expect to return to your current living situation? Yes (P)      Do you have help at home or someone to help you manage your care at home? Yes (P)      Who are your caregiver(s)  and their phone number(s)? Eder Doe (Spouse)  [phone] (P)      Prior to hospitilization cognitive status: Alert/Oriented (P)      Current cognitive status: Alert/Oriented (P)      Walking or Climbing Stairs Difficulty no (P)      Dressing/Bathing Difficulty no (P)      Home Accessibility stairs to enter home (P)      Equipment Currently Used at Home none (P)      Readmission within 30 days? No (P)      Patient currently being followed by outpatient case management? No (P)      Do you currently have service(s) that help you manage your care at home? No (P)      Do you take prescription medications? Yes (P)      Do you have prescription coverage? Yes (P)      Do you have any problems affording any of your prescribed medications? No (P)      Is the patient taking medications as prescribed? yes (P)      Who is going to help you get home at discharge? Eder Doe (Spouse)  [phone] (P)      How do you get to doctors appointments? family or friend will provide;car, drives self (P)      Are you on dialysis? No (P)      Do you take coumadin? No (P)      Discharge Plan A Home with family (P)      Discharge Plan B Home Health;Home with family (P)      DME Needed Upon Discharge  none (P)      Discharge Plan discussed with: Patient (P)      Transition of Care Barriers None (P)         Financial Resource Strain    How hard is it for you to pay for the very basics like food, housing, medical care, and heating? Not very hard (P)         Housing Stability    In the last 12 months, was there a time when you were not able to pay the mortgage or rent on time? No (P)      At any time in the past 12 months, were you homeless or living in a shelter (including now)? No (P)         Transportation Needs    In the past 12 months, has lack of transportation kept you from medical appointments or from getting medications? No (P)      In the past 12 months, has lack of transportation kept you from meetings, work, or from  getting things needed for daily living? No (P)         Food Insecurity    Within the past 12 months, you worried that your food would run out before you got the money to buy more. Never true (P)      Within the past 12 months, the food you bought just didn't last and you didn't have money to get more. Never true (P)         Alcohol Use    Q1: How often do you have a drink containing alcohol? Never (P)      Q2: How many drinks containing alcohol do you have on a typical day when you are drinking? Patient does not drink (P)      Q3: How often do you have six or more drinks on one occasion? Never (P)         Utilities    In the past 12 months has the electric, gas, oil, or water company threatened to shut off services in your home? No (P)         Health Literacy    How often do you need to have someone help you when you read instructions, pamphlets, or other written material from your doctor or pharmacy? Never (P)                  Discharge Plan A and Plan B have been determined by review of patient's clinical status, future medical and therapeutic needs, and coverage/benefits for post-acute care in coordination with multidisciplinary team members.                   MERCED Roman, LMSW  [facility]  Case Management  Ext. [phone]

## 2025-05-31 NOTE — PROGRESS NOTES
AVS virtually reviewed with patient and spouse in its entirety with emphasis on diet, medications, follow-up appointments and reasons to return to the ED. Patient also encouraged to utilize their patient portal. Ease and convenience of use reiterated. Education complete and patient voiced understanding. All questions answered. Discharge teaching complete.

## 2025-05-31 NOTE — PLAN OF CARE
Problem: Adult Inpatient Plan of Care  Goal: Plan of Care Review  Outcome: Progressing  Goal: Patient-Specific Goal (Individualized)  Outcome: Progressing  Goal: Absence of Hospital-Acquired Illness or Injury  Outcome: Progressing  Goal: Optimal Comfort and Wellbeing  Outcome: Progressing  Goal: Readiness for Transition of Care  Outcome: Progressing       Pt A&Ox4, on RA. Family remain at the bedside. No c/o pain/N/V. VSS

## 2025-06-02 ENCOUNTER — OUTPATIENT CASE MANAGEMENT (OUTPATIENT)
Dept: ADMINISTRATIVE | Facility: OTHER | Age: 85
End: 2025-06-02
Payer: MEDICARE

## 2025-06-02 ENCOUNTER — TELEPHONE (OUTPATIENT)
Dept: GASTROENTEROLOGY | Facility: CLINIC | Age: 85
End: 2025-06-02
Payer: MEDICARE

## 2025-06-02 ENCOUNTER — OFFICE VISIT (OUTPATIENT)
Dept: GASTROENTEROLOGY | Facility: CLINIC | Age: 85
End: 2025-06-02
Payer: MEDICARE

## 2025-06-02 ENCOUNTER — DOCUMENTATION ONLY (OUTPATIENT)
Dept: CARDIOLOGY | Facility: CLINIC | Age: 85
End: 2025-06-02
Payer: MEDICARE

## 2025-06-02 VITALS — HEIGHT: 63 IN | BODY MASS INDEX: 18.67 KG/M2 | WEIGHT: 105.38 LBS

## 2025-06-02 DIAGNOSIS — K55.20 AVM (ARTERIOVENOUS MALFORMATION) OF SMALL BOWEL, ACQUIRED: ICD-10-CM

## 2025-06-02 DIAGNOSIS — K31.819 AVM (ARTERIOVENOUS MALFORMATION) OF STOMACH, ACQUIRED: ICD-10-CM

## 2025-06-02 DIAGNOSIS — Z79.01 CURRENT USE OF ANTICOAGULANT THERAPY: ICD-10-CM

## 2025-06-02 DIAGNOSIS — Z79.01 CURRENT USE OF ANTICOAGULANT THERAPY: Primary | ICD-10-CM

## 2025-06-02 DIAGNOSIS — Z87.11 HISTORY OF PEPTIC ULCER DISEASE: ICD-10-CM

## 2025-06-02 DIAGNOSIS — K22.70 BARRETT'S ESOPHAGUS WITHOUT DYSPLASIA: ICD-10-CM

## 2025-06-02 DIAGNOSIS — D50.9 IRON DEFICIENCY ANEMIA, UNSPECIFIED IRON DEFICIENCY ANEMIA TYPE: Primary | ICD-10-CM

## 2025-06-02 PROCEDURE — 99214 OFFICE O/P EST MOD 30 MIN: CPT | Mod: S$PBB,,, | Performed by: NURSE PRACTITIONER

## 2025-06-02 PROCEDURE — 99999 PR PBB SHADOW E&M-EST. PATIENT-LVL IV: CPT | Mod: PBBFAC,,, | Performed by: NURSE PRACTITIONER

## 2025-06-02 PROCEDURE — 99214 OFFICE O/P EST MOD 30 MIN: CPT | Mod: PBBFAC,PO | Performed by: NURSE PRACTITIONER

## 2025-06-02 NOTE — ASSESSMENT & PLAN NOTE
Anemia is likely due to chronic blood loss and Iron deficiency. Most recent hemoglobin and hematocrit are listed below.  Recent Labs     05/31/25  0438   HGB 10.0*   HCT 32.2*     Plan  - Monitor serial CBC: Daily  - Transfuse PRBC if patient becomes hemodynamically unstable, symptomatic or H/H drops below 7/21.  - Patient has received 2 units of PRBCs on 5/29/25  - Hb stable following transfusion. No evidence of bleeding.  Patient deemed ready for discharge. Patient seen and examined day of discharge. Plan discussed with pt and family, who were agreeable and amenable; medications were discussed and reviewed, outpatient follow-up arranged, ER precautions were given, all questions were answered to their satisfaction, and Katelyn Caba  was subsequently discharged.

## 2025-06-02 NOTE — ASSESSMENT & PLAN NOTE
- CXR with edema, Elevated BNP  - tropininemia likely represents demand in setting of anemia, COVID infection, and heart failure exacerbation. Denies chest pain. No EKG changes  - Given Lasix 20 mg in ED - monitor output  - Strict I/Os  - resume home lasix

## 2025-06-02 NOTE — PLAN OF CARE
[facility] - Internal Medicine Telemetry      HOME HEALTH ORDERS  FACE TO FACE ENCOUNTER    Patient Name: Katelyn Caba  YOB: 1940    PCP: Brooklyn Burnham MD   PCP Address: [address] / [facility] [address]  PCP Phone Number: [phone]  PCP Fax: [phone]    Encounter Date: 5/29/25    Admit to Home Health    Diagnoses:  Active Hospital Problems    Diagnosis  POA    *COVID-19 [U07.1]  Yes    Dyspnea [R06.00]  Yes    (HFpEF) heart failure with preserved ejection fraction [I50.30]  Yes    CAD (coronary artery disease) [I25.10]  Yes    Moderate protein-calorie malnutrition [E44.0]  Yes    Symptomatic anemia [D64.9]  Yes    Iron deficiency anemia due to chronic blood loss [D50.0]  Yes    Mixed hyperlipidemia [E78.2]  Yes    H/O ischemic left MCA stroke [Z86.73]  Not Applicable      Resolved Hospital Problems   No resolved problems to display.       Follow Up Appointments:  Future Appointments   Date Time Provider Department Center   6/6/2025 10:40 AM Brooklyn Burnham MD [facility] FAM MED [facility]   6/9/2025  9:00 AM LAB, [facility] LAB [facility]   6/12/2025  8:00 AM [facility] MD [facility]       Allergies:  Review of patient's allergies indicates:   Allergen Reactions    Jardiance [empagliflozin]        Medications: Review discharge medications with patient and family and provide education.    Current Medications[1]     Medication List        CHANGE how you take these medications      atorvastatin 80 MG tablet  Commonly known as: LIPITOR  Take 1 tablet (80 mg total) by mouth once daily.  What changed: Another medication with the same name was removed. Continue taking this medication, and follow the directions you see here.            CONTINUE taking these medications      clopidogreL 75 mg tablet  Commonly known as: PLAVIX  Take 1 tablet (75 mg total) by mouth once daily.     cyanocobalamin 1000 MCG tablet  Commonly known as: VITAMIN B-12  Take 1,000 mcg  by mouth once daily.     ezetimibe 10 mg tablet  Commonly known as: ZETIA  Take 1 tablet (10 mg total) by mouth once daily.     famotidine 20 MG tablet  Commonly known as: PEPCID  Take 1 tablet (20 mg total) by mouth once daily.     furosemide 20 MG tablet  Commonly known as: LASIX  Take 1 tablet (20 mg total) by mouth 2 (two) times a day.     pantoprazole 40 MG tablet  Commonly known as: PROTONIX  Take 40 mg by mouth once daily. Take 1 tablet by mouth every day     potassium chloride SA 20 MEQ tablet  Commonly known as: K-DUR,KLOR-CON  Take 1 tablet (20 mEq total) by mouth once daily.     vitamin D 1000 units Tab  Commonly known as: VITAMIN D3  Take 1,000 Units by mouth once daily.                I have seen and examined this patient within the last 30 days. My clinical findings that support the need for the home health skilled services and home bound status are the following:no   Weakness/numbness causing balance and gait disturbance due to Weakness/Debility and Anemia making it taxing to leave home.     Diet:   cardiac diet    Labs:  Report Lab results to PCP.    Referrals/ Consults  Physical Therapy to evaluate and treat. Evaluate for home safety and equipment needs; Establish/upgrade home exercise program. Perform / instruct on therapeutic exercises, gait training, transfer training, and Range of Motion.  Occupational Therapy to evaluate and treat. Evaluate home environment for safety and equipment needs. Perform/Instruct on transfers, ADL training, ROM, and therapeutic exercises.    Activities:   activity as tolerated    Nursing:   Agency to admit patient within 24 hours of hospital discharge unless specified on physician order or at patient request    SN to complete comprehensive assessment including routine vital signs. Instruct on disease process and s/s of complications to report to MD. Review/verify medication list sent home with the patient at time of discharge  and instruct patient/caregiver as needed.  Frequency may be adjusted depending on start of care date.     Skilled nurse to perform up to 3 visits PRN for symptoms related to diagnosis    Notify MD if SBP > 160 or < 90; DBP > 90 or < 50; HR > 120 or < 50; Temp > 101; O2 < 88%    Ok to schedule additional visits based on staff availability and patient request on consecutive days within the home health episode.    When multiple disciplines ordered:    Start of Care occurs on Sunday - Wednesday schedule remaining discipline evaluations as ordered on separate consecutive days following the start of care.    Thursday SOC -schedule subsequent evaluations Friday and Monday the following week.     Friday - Saturday SOC - schedule subsequent discipline evaluations on consecutive days starting Monday of the following week.    For all post-discharge communication and subsequent orders please contact patient's primary care physician.     Miscellaneous   Heart Failure:      SN to instruct on the following:    Instruct on the definition of CHF.   Instruct on the signs/sympoms of CHF to be reported.   Instruct on and monitor daily weights.   Instruct on factors that cause exacerbation.   Instruct on action, dose, schedule, and side effects of medications.   Instruct on diet as prescribed.   Instruct on activity allowed.   Instruct on life-style modifications for life long management of CHF   SN to assess compliance with daily weights, diet, medications, fluid retention,    safety precautions, activities permitted and life-style modifications.   Additional 1-2 SN visits per week as needed for signs and symptoms     of CHF exacerbation.      For Weight Gain > 2-3 lbs in 1 day or 4-6 lbs over 1 week notify PCP    Home Health Aide:  Nursing Three times weekly, Physical Therapy Three times weekly, and Occupational Therapy Three times weekly    Wound Care Orders  no    I certify that this patient is confined to her home and needs intermittent skilled nursing care, physical  therapy, and occupational therapy.               [1]   No current facility-administered medications for this encounter.     Current Outpatient Medications   Medication Sig Dispense Refill    atorvastatin (LIPITOR) 80 MG tablet Take 1 tablet (80 mg total) by mouth once daily. 90 tablet 3    clopidogreL (PLAVIX) 75 mg tablet Take 1 tablet (75 mg total) by mouth once daily. 90 tablet 3    cyanocobalamin (VITAMIN B-12) 1000 MCG tablet Take 1,000 mcg by mouth once daily.      ezetimibe (ZETIA) 10 mg tablet Take 1 tablet (10 mg total) by mouth once daily. 90 tablet 3    famotidine (PEPCID) 20 MG tablet Take 1 tablet (20 mg total) by mouth once daily. 30 tablet 11    furosemide (LASIX) 20 MG tablet Take 1 tablet (20 mg total) by mouth 2 (two) times a day. 60 tablet 11    pantoprazole (PROTONIX) 40 MG tablet Take 40 mg by mouth once daily. Take 1 tablet by mouth every day      potassium chloride SA (K-DUR,KLOR-CON) 20 MEQ tablet Take 1 tablet (20 mEq total) by mouth once daily. 30 tablet 11    vitamin D (VITAMIN D3) 1000 units Tab Take 1,000 Units by mouth once daily.

## 2025-06-02 NOTE — ASSESSMENT & PLAN NOTE
Nutrition consulted. Most recent weight and BMI monitored-     Measurements:  Wt Readings from Last 1 Encounters:   06/02/25 47.8 kg (105 lb 6.1 oz)   Body mass index is 19.49 kg/m².    Patient has been screened and assessed by RD.    Malnutrition Type:  Context:    Level:      Malnutrition Characteristic Summary:       Interventions/Recommendations (treatment strategy):

## 2025-06-02 NOTE — PLAN OF CARE
[facility] - Internal Medicine Telemetry  Discharge Final Note    Primary Care Provider: Brooklyn Burnham MD    Expected Discharge Date: 5/31/2025    Patient discharged to home via personal transportation.     [facility] confirmed that patient is current w/ [facility], requesting HH orders for resumption of care.  sent request to attending/discharging MD for orders to be placed.     Discharge Plan A and Plan B have been determined by review of patient's clinical status, future medical and therapeutic needs, and coverage/benefits for post-acute care in coordination with multidisciplinary team members.        Final Discharge Note (most recent)       Final Note - 06/02/25 0901          Final Note    Assessment Type Final Discharge Note (P)      Anticipated Discharge Disposition Home-Health Care Svc (P)      What phone number can be called within the next 1-3 days to see how you are doing after discharge? [phone] (P)      Hospital Resources/Appts/Education Provided Provided education on problems/symptoms using teachback (P)         Post-Acute Status    Post-Acute Authorization Other;Home Health (P)      Home Health Status -- (P)    Pending HH orders    Other Status Awaiting f/u Appts (P)                      Important Message from Medicare                 Future Appointments   Date Time Provider Department Center   6/6/2025 10:40 AM Brooklyn Burnham MD [facility]   6/9/2025  9:00 AM LAB, [facility] LAB [facility]   6/12/2025  8:00 AM [facility]                 MERCED Roman, LMSW  [facility]  Case Management  Ext. [phone]

## 2025-06-03 ENCOUNTER — OUTPATIENT CASE MANAGEMENT (OUTPATIENT)
Dept: ADMINISTRATIVE | Facility: OTHER | Age: 85
End: 2025-06-03
Payer: MEDICARE

## 2025-06-09 ENCOUNTER — E-CONSULT (OUTPATIENT)
Dept: CARDIOLOGY | Facility: CLINIC | Age: 85
End: 2025-06-09
Payer: MEDICARE

## 2025-06-09 ENCOUNTER — LAB VISIT (OUTPATIENT)
Dept: LAB | Facility: HOSPITAL | Age: 85
End: 2025-06-09
Attending: NURSE PRACTITIONER
Payer: MEDICARE

## 2025-06-09 DIAGNOSIS — D50.0 IRON DEFICIENCY ANEMIA DUE TO CHRONIC BLOOD LOSS: ICD-10-CM

## 2025-06-09 DIAGNOSIS — Z01.810 PRE-OPERATIVE CARDIOVASCULAR EXAMINATION: Primary | ICD-10-CM

## 2025-06-09 LAB
ABSOLUTE EOSINOPHIL (OHS): 0.17 K/UL
ABSOLUTE MONOCYTE (OHS): 0.57 K/UL (ref 0.3–1)
ABSOLUTE NEUTROPHIL COUNT (OHS): 2.74 K/UL (ref 1.8–7.7)
ALBUMIN SERPL BCP-MCNC: 3.6 G/DL (ref 3.5–5.2)
ALP SERPL-CCNC: 87 UNIT/L (ref 40–150)
ALT SERPL W/O P-5'-P-CCNC: 14 UNIT/L (ref 10–44)
ANION GAP (OHS): 7 MMOL/L (ref 8–16)
AST SERPL-CCNC: 20 UNIT/L (ref 11–45)
BASOPHILS # BLD AUTO: 0.08 K/UL
BASOPHILS NFR BLD AUTO: 1.6 %
BILIRUB SERPL-MCNC: 0.3 MG/DL (ref 0.1–1)
BUN SERPL-MCNC: 21 MG/DL (ref 8–23)
CALCIUM SERPL-MCNC: 9.1 MG/DL (ref 8.7–10.5)
CHLORIDE SERPL-SCNC: 106 MMOL/L (ref 95–110)
CO2 SERPL-SCNC: 27 MMOL/L (ref 23–29)
CREAT SERPL-MCNC: 0.6 MG/DL (ref 0.5–1.4)
ERYTHROCYTE [DISTWIDTH] IN BLOOD BY AUTOMATED COUNT: 15.1 % (ref 11.5–14.5)
FERRITIN SERPL-MCNC: 48 NG/ML (ref 20–300)
GFR SERPLBLD CREATININE-BSD FMLA CKD-EPI: >60 ML/MIN/1.73/M2
GLUCOSE SERPL-MCNC: 83 MG/DL (ref 70–110)
HCT VFR BLD AUTO: 30.4 % (ref 37–48.5)
HGB BLD-MCNC: 9.4 GM/DL (ref 12–16)
IMM GRANULOCYTES # BLD AUTO: 0.01 K/UL (ref 0–0.04)
IMM GRANULOCYTES NFR BLD AUTO: 0.2 % (ref 0–0.5)
IRON SATN MFR SERPL: 10 % (ref 20–50)
IRON SERPL-MCNC: 34 UG/DL (ref 30–160)
LYMPHOCYTES # BLD AUTO: 1.39 K/UL (ref 1–4.8)
MCH RBC QN AUTO: 27.3 PG (ref 27–31)
MCHC RBC AUTO-ENTMCNC: 30.9 G/DL (ref 32–36)
MCV RBC AUTO: 88 FL (ref 82–98)
NUCLEATED RBC (/100WBC) (OHS): 0 /100 WBC
PLATELET # BLD AUTO: 374 K/UL (ref 150–450)
PMV BLD AUTO: 10.5 FL (ref 9.2–12.9)
POTASSIUM SERPL-SCNC: 4.5 MMOL/L (ref 3.5–5.1)
PROT SERPL-MCNC: 5.8 GM/DL (ref 6–8.4)
RBC # BLD AUTO: 3.44 M/UL (ref 4–5.4)
RELATIVE EOSINOPHIL (OHS): 3.4 %
RELATIVE LYMPHOCYTE (OHS): 28 % (ref 18–48)
RELATIVE MONOCYTE (OHS): 11.5 % (ref 4–15)
RELATIVE NEUTROPHIL (OHS): 55.3 % (ref 38–73)
SODIUM SERPL-SCNC: 140 MMOL/L (ref 136–145)
TIBC SERPL-MCNC: 324 UG/DL (ref 250–450)
TRANSFERRIN SERPL-MCNC: 219 MG/DL (ref 200–375)
WBC # BLD AUTO: 4.96 K/UL (ref 3.9–12.7)

## 2025-06-09 PROCEDURE — 82040 ASSAY OF SERUM ALBUMIN: CPT

## 2025-06-09 PROCEDURE — 82728 ASSAY OF FERRITIN: CPT

## 2025-06-09 PROCEDURE — 99451 NTRPROF PH1/NTRNET/EHR 5/>: CPT | Mod: S$PBB,,, | Performed by: INTERNAL MEDICINE

## 2025-06-09 PROCEDURE — 85025 COMPLETE CBC W/AUTO DIFF WBC: CPT

## 2025-06-09 PROCEDURE — 83540 ASSAY OF IRON: CPT

## 2025-06-09 PROCEDURE — 36415 COLL VENOUS BLD VENIPUNCTURE: CPT | Mod: PO

## 2025-06-09 NOTE — CONSULTS
[facility] - Cardiology  Response for E-Consult     Patient Name: Katelyn Caba  MRN: [id number]  Primary Care Provider: Brooklyn Burnham MD   Requesting Provider: Hunter Venegas DO  E-Consult to General Cardiology  Consult performed by: Dwight Hinton MD  Consult ordered by: Hunter Venegas DO          Chart reviewed personally.    No evidence seen in Epic of percutaneous coronary or peripheral arterial intervention within the last year.    As long as no significant chest pain or shortness of breath with exertion, patient is at acceptable risk to proceed from a Cardiology standpoint.    Okay to hold Plavix 5 days prior to procedure, restart as soon as safe postprocedure.    Additional future steps to consider: NA    Total time of Consultation: 5 minute    I did not speak to the requesting provider verbally about this.     *This eConsult is based on the clinical data available to me and is furnished without benefit of a physical examination. The eConsult will need to be interpreted in light of any clinical issues or changes in patient status not available to me at the time of filing this eConsults. Significant changes in patient condition or level of acuity should result in immediate formal consultation and reevaluation. Please alert me if you have further questions.    Thank you for this eConsult referral.     Dwight Hinton MD  [facility] Cardiology

## 2025-06-10 ENCOUNTER — TELEPHONE (OUTPATIENT)
Dept: GASTROENTEROLOGY | Facility: CLINIC | Age: 85
End: 2025-06-10
Payer: MEDICARE

## 2025-06-10 RX ORDER — PANTOPRAZOLE SODIUM 40 MG/1
40 TABLET, DELAYED RELEASE ORAL DAILY
Qty: 90 TABLET | Refills: 1 | Status: SHIPPED | OUTPATIENT
Start: 2025-06-10

## 2025-06-10 NOTE — TELEPHONE ENCOUNTER
Spoke to pt, rescheduled procedure due to pt not holding Plavix. Procedure rescheduled, reminded pt she will need to hold Plavix.   Pt verbalized understanding of procedure date.   Confirmation/ instructions sent via mail.

## 2025-06-12 ENCOUNTER — OFFICE VISIT (OUTPATIENT)
Dept: HEMATOLOGY/ONCOLOGY | Facility: CLINIC | Age: 85
End: 2025-06-12
Payer: MEDICARE

## 2025-06-12 VITALS
OXYGEN SATURATION: 98 % | SYSTOLIC BLOOD PRESSURE: 108 MMHG | BODY MASS INDEX: 18.79 KG/M2 | WEIGHT: 106.06 LBS | RESPIRATION RATE: 17 BRPM | DIASTOLIC BLOOD PRESSURE: 57 MMHG | TEMPERATURE: 98 F | HEIGHT: 63 IN | HEART RATE: 73 BPM

## 2025-06-12 DIAGNOSIS — K31.811 AVM (ARTERIOVENOUS MALFORMATION) OF STOMACH, ACQUIRED WITH HEMORRHAGE: ICD-10-CM

## 2025-06-12 DIAGNOSIS — Z87.19 HISTORY OF GI BLEED: ICD-10-CM

## 2025-06-12 DIAGNOSIS — D50.0 ANEMIA DUE TO CHRONIC BLOOD LOSS: Primary | ICD-10-CM

## 2025-06-12 DIAGNOSIS — D64.9 SYMPTOMATIC ANEMIA: ICD-10-CM

## 2025-06-12 PROCEDURE — 99213 OFFICE O/P EST LOW 20 MIN: CPT | Mod: PBBFAC,PN | Performed by: INTERNAL MEDICINE

## 2025-06-12 PROCEDURE — G2211 COMPLEX E/M VISIT ADD ON: HCPCS | Mod: ,,, | Performed by: INTERNAL MEDICINE

## 2025-06-12 PROCEDURE — 99215 OFFICE O/P EST HI 40 MIN: CPT | Mod: S$PBB,,, | Performed by: INTERNAL MEDICINE

## 2025-06-12 PROCEDURE — 99999 PR PBB SHADOW E&M-EST. PATIENT-LVL III: CPT | Mod: PBBFAC,,, | Performed by: INTERNAL MEDICINE

## 2025-06-12 NOTE — PROGRESS NOTES
Subjective     Patient ID: Katelyn Caba is a 84 y.o. female.    Chief Complaint: No chief complaint on file.    HPI Mrs. Caba presents today for follow-up.  This is the 1st time that I am seeing her.  She was previously followed by our Nurse practitioner.  Briefly, she is an 84-year-old female who was followed for anemia with evidence of GI bleeding and iron deficiency.  She had last being transfused on the 29th of May due to her hemoglobin being 7.2 gr/dl.    Most recent labs from 3 days ago are as follows:  Creatinine is 0.6 mg/dL, bilirubin is 0.3 mg/dL and transaminases are normal.  Ferritin is 48 ng/mL  WBCs 4900 per cubic mm, hemoglobin 9.4 grams/dL, hematocrit 30.4%, MCV 88 and platelets 374 K  1 stool sample was positive for occult blood    All of his CBCs for the last 3 months have shown moderate anemia.  A CBC on 03/03/2025 had shown a hemoglobin of 5.6 grams/dL with a hematocrit of 19.5 % with a MCV of 97.  At that time she had presented with melena.  EGD had shown a small hiatal hernia and several telangiectasias.    Review of Systems  Overall she feels well. She states that her stamina has increased since transfusions in late May.  She appears anxious but she denies any depression,  fevers, chills, night  sweats, weight loss, nausea, vomiting, diarrhea, constipation, diplopia,     blurred vision, headache, chest pain, palpitations, shortness of breath, breast pain, abdominal pain, extremity pain, or difficulty ambulating.  The remainder of the ten-point ROS, including general, skin, lymph, H/N, cardiorespiratory, GI, , Neuro, Endocrine, and psychiatric is negative.     Physical Exam       She is alert, oriented to time, place, person, pleasant, well      nourished, in no acute physical distress.   She is here with her                                  VITAL SIGNS:  Reviewed                                      HEENT:  Normal.  There are no nasal, oral, lip, gingival, auricular, lid,    or  conjunctival lesions.  Mucosae are moist and pink, and there is no        thrush.  Pupils are equal, reactive to light and accommodation.              Extraocular muscle movements are intact.  Maxillary teeth are missing while the mandibular dentition is fair.  There is no frontal or maxillary tenderness.                                     NECK:  Supple without JVD, adenopathy, or thyromegaly.                       LUNGS:  Clear to auscultation without wheezing, rales, or rhonchi.           CARDIOVASCULAR:  Reveals an S1, S2, no murmurs, no rubs, no gallops.         ABDOMEN:  Soft, nontender, without organomegaly.  Bowel sounds are    present.                                                                     EXTREMITIES:  No cyanosis, clubbing, or edema.                               BREASTS:  Deferred                                     LYMPHATIC:  There is no cervical, axillary, or supraclavicular adenopathy.   SKIN:  Warm and moist, without petechiae, rashes, induration, or ecchymoses except for an ecchymoses on the right upper eyelid.           NEUROLOGIC:  DTRs are 0-1+ bilaterally, symmetrical, motor function is 5/5,  and cranial nerves are  within normal limits.    ASSESSMENT  Normochromic normocytic anemia with evidence of GI bleed  AVMs on the most recent EGD    PLAN  I had a very long discussion with Mrs. Caba.  We went over the differential of anemia in general and in her case in particular.  We discussed the option of a bone marrow biopsy primarily to rule out MDS.  She elected to wait for now.  I urged her to proceed with a video capsule swallow that has been scheduled by the gastroenterologist.  We will see her in 2 weeks with a repeat CBC; I suspect that she will need blood again within the next few weeks.  I have cautioned her to returns sooner should she experience the symptoms of anemia, with which she is quite familiar  Her multiple questions were answered to her satisfaction.  I spent 40  minutes reviewing the available records and evaluating the patient, out of which over 50% of the time was spent face to face with the patient in counseling and coordinating this patient's care.  Visit today included increased complexity associated with the diagnostic workup and treatment of persistent anemia on an 84-year-old patient  Route Chart for Scheduling    Med Onc Chart Routing      Follow up with physician    Follow up with [facility] 2 weeks. See the NP in 2 weeks with labs 1 day prior   Infusion scheduling note    Injection scheduling note    Labs    Imaging    Pharmacy appointment    Other referrals

## 2025-06-17 ENCOUNTER — OUTPATIENT CASE MANAGEMENT (OUTPATIENT)
Dept: ADMINISTRATIVE | Facility: OTHER | Age: 85
End: 2025-06-17
Payer: MEDICARE

## 2025-06-17 NOTE — LETTER
Katelyn Caba  [id number] [address] [address]      Dear Katelyn Caba,     I am your nurse with [facility]s Out[facility] Department. I was unsuccessful in reaching you today. At your earliest convenience, I would like to discuss your healthcare progress.      Please contact me at [phone] from 8:00AM to 4:30 PM on Monday thru Friday.     As you know, [facility] is a program offered to you through [facility] where a nurse is available 24/7 to answer questions or provide medical advice, their number is [phone].    Thanks,    Ana Cristina Pop RN  Outpatient Care Management

## 2025-06-23 ENCOUNTER — TELEPHONE (OUTPATIENT)
Dept: HEMATOLOGY/ONCOLOGY | Facility: CLINIC | Age: 85
End: 2025-06-23
Payer: MEDICARE

## 2025-06-23 ENCOUNTER — LAB VISIT (OUTPATIENT)
Dept: LAB | Facility: HOSPITAL | Age: 85
End: 2025-06-23
Attending: INTERNAL MEDICINE
Payer: MEDICARE

## 2025-06-23 DIAGNOSIS — D50.0 ANEMIA DUE TO CHRONIC BLOOD LOSS: ICD-10-CM

## 2025-06-23 LAB
ABSOLUTE EOSINOPHIL (OHS): 0.28 K/UL
ABSOLUTE MONOCYTE (OHS): 0.65 K/UL (ref 0.3–1)
ABSOLUTE NEUTROPHIL COUNT (OHS): 3.7 K/UL (ref 1.8–7.7)
ALBUMIN SERPL BCP-MCNC: 3.7 G/DL (ref 3.5–5.2)
ALP SERPL-CCNC: 92 UNIT/L (ref 40–150)
ALT SERPL W/O P-5'-P-CCNC: 17 UNIT/L (ref 10–44)
ANION GAP (OHS): 12 MMOL/L (ref 8–16)
AST SERPL-CCNC: 22 UNIT/L (ref 11–45)
BASOPHILS # BLD AUTO: 0.05 K/UL
BASOPHILS NFR BLD AUTO: 0.8 %
BILIRUB SERPL-MCNC: 0.4 MG/DL (ref 0.1–1)
BUN SERPL-MCNC: 19 MG/DL (ref 8–23)
CALCIUM SERPL-MCNC: 9 MG/DL (ref 8.7–10.5)
CHLORIDE SERPL-SCNC: 101 MMOL/L (ref 95–110)
CO2 SERPL-SCNC: 26 MMOL/L (ref 23–29)
CREAT SERPL-MCNC: 0.7 MG/DL (ref 0.5–1.4)
ERYTHROCYTE [DISTWIDTH] IN BLOOD BY AUTOMATED COUNT: 17.2 % (ref 11.5–14.5)
FERRITIN SERPL-MCNC: 37 NG/ML (ref 20–300)
GFR SERPLBLD CREATININE-BSD FMLA CKD-EPI: >60 ML/MIN/1.73/M2
GLUCOSE SERPL-MCNC: 113 MG/DL (ref 70–110)
HCT VFR BLD AUTO: 27.9 % (ref 37–48.5)
HGB BLD-MCNC: 8.3 GM/DL (ref 12–16)
IMM GRANULOCYTES # BLD AUTO: 0.02 K/UL (ref 0–0.04)
IMM GRANULOCYTES NFR BLD AUTO: 0.3 % (ref 0–0.5)
LYMPHOCYTES # BLD AUTO: 1.29 K/UL (ref 1–4.8)
MCH RBC QN AUTO: 27.6 PG (ref 27–31)
MCHC RBC AUTO-ENTMCNC: 29.7 G/DL (ref 32–36)
MCV RBC AUTO: 93 FL (ref 82–98)
NUCLEATED RBC (/100WBC) (OHS): 0 /100 WBC
PLATELET # BLD AUTO: 424 K/UL (ref 150–450)
PMV BLD AUTO: 10.4 FL (ref 9.2–12.9)
POTASSIUM SERPL-SCNC: 4.2 MMOL/L (ref 3.5–5.1)
PROT SERPL-MCNC: 6.2 GM/DL (ref 6–8.4)
RBC # BLD AUTO: 3.01 M/UL (ref 4–5.4)
RELATIVE EOSINOPHIL (OHS): 4.7 %
RELATIVE LYMPHOCYTE (OHS): 21.5 % (ref 18–48)
RELATIVE MONOCYTE (OHS): 10.9 % (ref 4–15)
RELATIVE NEUTROPHIL (OHS): 61.8 % (ref 38–73)
SODIUM SERPL-SCNC: 139 MMOL/L (ref 136–145)
WBC # BLD AUTO: 5.99 K/UL (ref 3.9–12.7)

## 2025-06-23 PROCEDURE — 85025 COMPLETE CBC W/AUTO DIFF WBC: CPT

## 2025-06-23 PROCEDURE — 82728 ASSAY OF FERRITIN: CPT

## 2025-06-23 PROCEDURE — 36415 COLL VENOUS BLD VENIPUNCTURE: CPT | Mod: PO

## 2025-06-23 PROCEDURE — 80053 COMPREHEN METABOLIC PANEL: CPT

## 2025-06-23 NOTE — TELEPHONE ENCOUNTER
Telephoned patient in regards to CBC done today; patient was worried her levels were falling & needed blood.  Lab Results   Component Value Date    WBC 5.99 06/23/2025    HGB 8.3 (L) 06/23/2025    HCT 27.9 (L) 06/23/2025    MCV 93 06/23/2025     06/23/2025       Patient has decided to have labs drawn at her PCP on Friday, 06/27/25.  Appt for 06/24 cancelled.

## 2025-06-24 ENCOUNTER — OUTPATIENT CASE MANAGEMENT (OUTPATIENT)
Dept: ADMINISTRATIVE | Facility: OTHER | Age: 85
End: 2025-06-24
Payer: MEDICARE

## 2025-06-24 NOTE — PROGRESS NOTES
"Outpatient Care Management  Plan of Care Follow Up Visit    Patient: Katelyn Caba  MRN: [id number]  Date of Service: 06/24/2025  Completed by: Mayra Pop RN  Referral Date: 02/21/2025    Reason for Visit   Patient presents with    OPCM RN Follow Up Call       Brief Summary:   MEMBER'S CURRENT STATUS  :  -S&S of a stroke:  Patient states her blood pressure has been low to normal and every once in a while it gets to 121/D.  She states she does not remember the signs and symptoms of a stroke, nor the acronym.    -S&S of VTE and prevention:  Patient states she does not remember any of the signs and symptoms of VTE or how to prevent it.    -S&S of CHF:  Patient states she cannot think of the signs and symptoms of CHF at this time.  She states her weight is 106-107 and does not change.      -Blood transfusion:  She states her hemoglobin was 8.3 yesterday and she is concerned about not getting a blood transfusion until she is below 7.0.  She states she was told with her "heart condition" she should not wait until it is that low to get a transfusion.  She states she is till eating iron rich foods and is having her lab work drawn again on Friday, 6/27.  She is frightened.    -3 lb/5 lb rule:  Patient states that the 3 lb/5 lb rule is if she gains 3 lbs overnight but does not remember the 5 lb part of the rule, then she has to notify her doctor.      -Fluid restriction:  Patient states she is sticking to her 1.5 L fluid restriction.           INTERVENTIONS  :  -S&S of a stroke: PATRICIO also reiterated the acronym BEFAST for the signs and symptoms of a stroke.  Balance when walking, Eyes with vision changes, Face with one sided facial droop, Arm with weakness on one side, Speech slurred or unable to talk and Time noted of the onset of symptoms.     -S&S of VTE and prevention:  PATRICIO reiterated the S&S of VTE including pain or tenderness in an extremity not caused by injury; swelling of leg or arm; skin of extremity that is " "warm to touch, red or discolored (pale, blue).  CM educated patient that she can prevent a VTE by wearing compression socks and walking.      -S&S of CHF:  CM reiterated the S&S of CHF including difficulty breathing or shortness of breath, especially with activity; persistent cough; swelling of feet, legs or abdomen; unexplained weight gain; fatigue; weakness; loss of appetite; dizziness or rapid heartbeat.  CM commended patient for keeping up with her weight and encouraged her to continue this practice.      -Blood transfusion:  CM sent a message to Dr. Gee about patient's concerns of waiting until she is below 7.0 hemoglobin to get a transfusion.  CM received a response from Dr. Gee stating "I understand her reservations, but no one will transfuse with a hematocrit of 28%.  I am happy to discuss in person.  We do not have to wait for her to go down to hemoglobin of 7 but no one will transfuse at 8.3."  CM called patient to inform her of this.      -3 lb/5 lb rule:  CM reiterated that the 3 lb/5 lb rule is if the patient gains 3 lbs overnight or 5 lbs in one week, they should contact their PCP/Cardiologist of this weight gain so that the doctor can adjust medications if necessary.      -Fluid restriction:  CM commended patient for sticking to her fluid restriction and encouraged her to keep up this practice.            Next steps:   Follow up on what BP is running  Follow up on S&S of a stroke  Follow up on low sodium diet, 2300 mg  Follow up on S&S of CHF  Follow up on daily weights  Follow up on 3 lb/5 lb rule  Follow up on VTE and prevention      "

## 2025-06-25 ENCOUNTER — TELEPHONE (OUTPATIENT)
Dept: HEMATOLOGY/ONCOLOGY | Facility: CLINIC | Age: 85
End: 2025-06-25
Payer: MEDICARE

## 2025-06-25 NOTE — TELEPHONE ENCOUNTER
----- Message from David Gee MD sent at 6/24/2025  2:50 PM CDT -----  Regarding: RE: Transfusion  She may want to see me instead... I am guessing....  ----- Message -----  From: Trini Talbert RN  Sent: 6/24/2025   1:40 PM CDT  To: David Gee MD  Subject: FW: Transfusion                                  Luis spoke to her yesterday about this. Pt cancelled her follow up with Hayder and then with Luis. I can get her back in to see Hayder if you would like? Please advise.  ----- Message -----  From: Mayra Pop RN  Sent: 6/24/2025  12:48 PM CDT  To: [facility] Staff  Subject: Transfusion                                      Good afternoon,    I am writing to you to let you know about concerns that the patient has about her H&H.  Her last H&H on 6/23 was 8.3/27.9 which is down from 9.4/30.4 on 6/9.  She states she was told by the NP that she will get a transfusion when it gets down to 7.  She is concerned because of her heart history and the heart attack she suffered.  She states she does not understand why she has to wait until it at 7 to be transfused, why can she not get the transfusion now.  She states she has been reading that with a heart condition such as her's, that you do not have to wait until 7 to get the transfusion.  She is very frightened because of her recent hospitalization and states she does not know if she would survive it again.  Please advise.    Thank you,  Ana Cristina Pop RN  RN Case Manager  Outpatient Care Management  [facility]  [phone]  [email or web address]

## 2025-06-26 ENCOUNTER — LAB VISIT (OUTPATIENT)
Dept: LAB | Facility: HOSPITAL | Age: 85
End: 2025-06-26
Attending: NURSE PRACTITIONER
Payer: MEDICARE

## 2025-06-26 DIAGNOSIS — D50.0 IRON DEFICIENCY ANEMIA DUE TO CHRONIC BLOOD LOSS: ICD-10-CM

## 2025-06-26 LAB
ABSOLUTE EOSINOPHIL (OHS): 0.21 K/UL
ABSOLUTE MONOCYTE (OHS): 0.65 K/UL (ref 0.3–1)
ABSOLUTE NEUTROPHIL COUNT (OHS): 3.24 K/UL (ref 1.8–7.7)
BASOPHILS # BLD AUTO: 0.05 K/UL
BASOPHILS NFR BLD AUTO: 0.9 %
ERYTHROCYTE [DISTWIDTH] IN BLOOD BY AUTOMATED COUNT: 17.2 % (ref 11.5–14.5)
FERRITIN SERPL-MCNC: 28 NG/ML (ref 20–300)
HCT VFR BLD AUTO: 26.7 % (ref 37–48.5)
HGB BLD-MCNC: 8 GM/DL (ref 12–16)
IMM GRANULOCYTES # BLD AUTO: 0.01 K/UL (ref 0–0.04)
IMM GRANULOCYTES NFR BLD AUTO: 0.2 % (ref 0–0.5)
IRON SATN MFR SERPL: 4 % (ref 20–50)
IRON SERPL-MCNC: 15 UG/DL (ref 30–160)
LYMPHOCYTES # BLD AUTO: 1.13 K/UL (ref 1–4.8)
MCH RBC QN AUTO: 27.9 PG (ref 27–31)
MCHC RBC AUTO-ENTMCNC: 30 G/DL (ref 32–36)
MCV RBC AUTO: 93 FL (ref 82–98)
NUCLEATED RBC (/100WBC) (OHS): 0 /100 WBC
PLATELET # BLD AUTO: 418 K/UL (ref 150–450)
PMV BLD AUTO: 10.2 FL (ref 9.2–12.9)
RBC # BLD AUTO: 2.87 M/UL (ref 4–5.4)
RELATIVE EOSINOPHIL (OHS): 4 %
RELATIVE LYMPHOCYTE (OHS): 21.4 % (ref 18–48)
RELATIVE MONOCYTE (OHS): 12.3 % (ref 4–15)
RELATIVE NEUTROPHIL (OHS): 61.2 % (ref 38–73)
TIBC SERPL-MCNC: 366 UG/DL (ref 250–450)
TRANSFERRIN SERPL-MCNC: 247 MG/DL (ref 200–375)
WBC # BLD AUTO: 5.29 K/UL (ref 3.9–12.7)

## 2025-06-26 PROCEDURE — 82728 ASSAY OF FERRITIN: CPT

## 2025-06-26 PROCEDURE — 85025 COMPLETE CBC W/AUTO DIFF WBC: CPT

## 2025-06-26 PROCEDURE — 36415 COLL VENOUS BLD VENIPUNCTURE: CPT | Mod: PO

## 2025-06-26 PROCEDURE — 84466 ASSAY OF TRANSFERRIN: CPT

## 2025-06-27 ENCOUNTER — PATIENT MESSAGE (OUTPATIENT)
Dept: HEMATOLOGY/ONCOLOGY | Facility: CLINIC | Age: 85
End: 2025-06-27
Payer: MEDICARE

## 2025-06-28 ENCOUNTER — PATIENT MESSAGE (OUTPATIENT)
Dept: FAMILY MEDICINE | Facility: CLINIC | Age: 85
End: 2025-06-28
Payer: MEDICARE

## 2025-06-28 ENCOUNTER — HOSPITAL ENCOUNTER (INPATIENT)
Facility: HOSPITAL | Age: 85
LOS: 8 days | Discharge: NURSING, REHAB OR LONG-TERM CARE | DRG: 291 | End: 2025-07-06
Attending: STUDENT IN AN ORGANIZED HEALTH CARE EDUCATION/TRAINING PROGRAM | Admitting: HOSPITALIST
Payer: MEDICARE

## 2025-06-28 DIAGNOSIS — I50.9 CHF (CONGESTIVE HEART FAILURE): ICD-10-CM

## 2025-06-28 DIAGNOSIS — I63.411 EMBOLIC STROKE INVOLVING RIGHT MIDDLE CEREBRAL ARTERY: ICD-10-CM

## 2025-06-28 DIAGNOSIS — J96.01 ACUTE HYPOXIC RESPIRATORY FAILURE: Primary | ICD-10-CM

## 2025-06-28 DIAGNOSIS — R07.9 CHEST PAIN: ICD-10-CM

## 2025-06-28 DIAGNOSIS — R06.02 SOB (SHORTNESS OF BREATH): ICD-10-CM

## 2025-06-28 PROBLEM — J90 BILATERAL PLEURAL EFFUSION: Status: ACTIVE | Noted: 2025-06-28

## 2025-06-28 PROBLEM — R79.89 ELEVATED TROPONIN: Status: ACTIVE | Noted: 2025-06-28

## 2025-06-28 PROBLEM — D64.9 ANEMIA: Status: ACTIVE | Noted: 2025-06-28

## 2025-06-28 LAB
ABSOLUTE EOSINOPHIL (OHS): 0.02 K/UL
ABSOLUTE EOSINOPHIL (OHS): 0.1 K/UL
ABSOLUTE MONOCYTE (OHS): 0.52 K/UL (ref 0.3–1)
ABSOLUTE MONOCYTE (OHS): 0.64 K/UL (ref 0.3–1)
ABSOLUTE NEUTROPHIL COUNT (OHS): 5.86 K/UL (ref 1.8–7.7)
ABSOLUTE NEUTROPHIL COUNT (OHS): 6.92 K/UL (ref 1.8–7.7)
ALBUMIN SERPL BCP-MCNC: 3.6 G/DL (ref 3.5–5.2)
ALBUMIN SERPL BCP-MCNC: 4.1 G/DL (ref 3.5–5.2)
ALP SERPL-CCNC: 105 UNIT/L (ref 40–150)
ALT SERPL W/O P-5'-P-CCNC: 27 UNIT/L (ref 10–44)
ANION GAP (OHS): 10 MMOL/L (ref 8–16)
ANION GAP (OHS): 9 MMOL/L (ref 8–16)
AST SERPL-CCNC: 28 UNIT/L (ref 11–45)
BASOPHILS # BLD AUTO: 0.04 K/UL
BASOPHILS # BLD AUTO: 0.06 K/UL
BASOPHILS NFR BLD AUTO: 0.5 %
BASOPHILS NFR BLD AUTO: 0.8 %
BILIRUB SERPL-MCNC: 0.4 MG/DL (ref 0.1–1)
BNP SERPL-MCNC: 1270 PG/ML (ref 0–99)
BUN SERPL-MCNC: 25 MG/DL (ref 8–23)
BUN SERPL-MCNC: 25 MG/DL (ref 8–23)
CALCIUM SERPL-MCNC: 8.8 MG/DL (ref 8.7–10.5)
CALCIUM SERPL-MCNC: 9.2 MG/DL (ref 8.7–10.5)
CHLORIDE SERPL-SCNC: 104 MMOL/L (ref 95–110)
CHLORIDE SERPL-SCNC: 106 MMOL/L (ref 95–110)
CO2 SERPL-SCNC: 25 MMOL/L (ref 23–29)
CO2 SERPL-SCNC: 27 MMOL/L (ref 23–29)
CREAT SERPL-MCNC: 0.6 MG/DL (ref 0.5–1.4)
CREAT SERPL-MCNC: 0.7 MG/DL (ref 0.5–1.4)
D DIMER PPP IA.FEU-MCNC: 0.46 MG/L FEU
ERYTHROCYTE [DISTWIDTH] IN BLOOD BY AUTOMATED COUNT: 16.2 % (ref 11.5–14.5)
ERYTHROCYTE [DISTWIDTH] IN BLOOD BY AUTOMATED COUNT: 16.4 % (ref 11.5–14.5)
GFR SERPLBLD CREATININE-BSD FMLA CKD-EPI: >60 ML/MIN/1.73/M2
GFR SERPLBLD CREATININE-BSD FMLA CKD-EPI: >60 ML/MIN/1.73/M2
GLUCOSE SERPL-MCNC: 101 MG/DL (ref 70–110)
GLUCOSE SERPL-MCNC: 99 MG/DL (ref 70–110)
HCT VFR BLD AUTO: 25.2 % (ref 37–48.5)
HCT VFR BLD AUTO: 28.3 % (ref 37–48.5)
HGB BLD-MCNC: 7.5 GM/DL (ref 12–16)
HGB BLD-MCNC: 8.9 GM/DL (ref 12–16)
IMM GRANULOCYTES # BLD AUTO: 0.03 K/UL (ref 0–0.04)
IMM GRANULOCYTES # BLD AUTO: 0.05 K/UL (ref 0–0.04)
IMM GRANULOCYTES NFR BLD AUTO: 0.4 % (ref 0–0.5)
IMM GRANULOCYTES NFR BLD AUTO: 0.7 % (ref 0–0.5)
INDIRECT COOMBS: NORMAL
LYMPHOCYTES # BLD AUTO: 0.71 K/UL (ref 1–4.8)
LYMPHOCYTES # BLD AUTO: 0.81 K/UL (ref 1–4.8)
MAGNESIUM SERPL-MCNC: 2.1 MG/DL (ref 1.6–2.6)
MCH RBC QN AUTO: 27.2 PG (ref 27–31)
MCH RBC QN AUTO: 28.7 PG (ref 27–31)
MCHC RBC AUTO-ENTMCNC: 29.8 G/DL (ref 32–36)
MCHC RBC AUTO-ENTMCNC: 31.4 G/DL (ref 32–36)
MCV RBC AUTO: 91 FL (ref 82–98)
MCV RBC AUTO: 91 FL (ref 82–98)
NUCLEATED RBC (/100WBC) (OHS): 0 /100 WBC
NUCLEATED RBC (/100WBC) (OHS): 0 /100 WBC
OHS QRS DURATION: 80 MS
OHS QRS DURATION: 86 MS
OHS QRS DURATION: 90 MS
OHS QTC CALCULATION: 437 MS
OHS QTC CALCULATION: 450 MS
OHS QTC CALCULATION: 461 MS
PHOSPHATE SERPL-MCNC: 3.5 MG/DL (ref 2.7–4.5)
PHOSPHATE SERPL-MCNC: 3.6 MG/DL (ref 2.7–4.5)
PLATELET # BLD AUTO: 371 K/UL (ref 150–450)
PLATELET # BLD AUTO: 400 K/UL (ref 150–450)
PMV BLD AUTO: 9.8 FL (ref 9.2–12.9)
PMV BLD AUTO: 9.8 FL (ref 9.2–12.9)
POTASSIUM SERPL-SCNC: 4 MMOL/L (ref 3.5–5.1)
POTASSIUM SERPL-SCNC: 4.1 MMOL/L (ref 3.5–5.1)
PROT SERPL-MCNC: 6.9 GM/DL (ref 6–8.4)
RBC # BLD AUTO: 2.76 M/UL (ref 4–5.4)
RBC # BLD AUTO: 3.1 M/UL (ref 4–5.4)
RELATIVE EOSINOPHIL (OHS): 0.2 %
RELATIVE EOSINOPHIL (OHS): 1.3 %
RELATIVE LYMPHOCYTE (OHS): 10.8 % (ref 18–48)
RELATIVE LYMPHOCYTE (OHS): 8.6 % (ref 18–48)
RELATIVE MONOCYTE (OHS): 6.3 % (ref 4–15)
RELATIVE MONOCYTE (OHS): 8.5 % (ref 4–15)
RELATIVE NEUTROPHIL (OHS): 77.9 % (ref 38–73)
RELATIVE NEUTROPHIL (OHS): 84 % (ref 38–73)
RH BLD: NORMAL
SODIUM SERPL-SCNC: 140 MMOL/L (ref 136–145)
SODIUM SERPL-SCNC: 141 MMOL/L (ref 136–145)
SPECIMEN OUTDATE: NORMAL
TROPONIN I SERPL HS-MCNC: 171 NG/L
TROPONIN I SERPL HS-MCNC: 172 NG/L
TSH SERPL-ACNC: 2.53 UIU/ML (ref 0.4–4)
WBC # BLD AUTO: 7.52 K/UL (ref 3.9–12.7)
WBC # BLD AUTO: 8.24 K/UL (ref 3.9–12.7)

## 2025-06-28 PROCEDURE — 84132 ASSAY OF SERUM POTASSIUM: CPT

## 2025-06-28 PROCEDURE — 84100 ASSAY OF PHOSPHORUS: CPT | Performed by: STUDENT IN AN ORGANIZED HEALTH CARE EDUCATION/TRAINING PROGRAM

## 2025-06-28 PROCEDURE — 84484 ASSAY OF TROPONIN QUANT: CPT

## 2025-06-28 PROCEDURE — 96374 THER/PROPH/DIAG INJ IV PUSH: CPT

## 2025-06-28 PROCEDURE — 84100 ASSAY OF PHOSPHORUS: CPT | Performed by: HOSPITALIST

## 2025-06-28 PROCEDURE — 21400001 HC TELEMETRY ROOM

## 2025-06-28 PROCEDURE — 93005 ELECTROCARDIOGRAM TRACING: CPT

## 2025-06-28 PROCEDURE — 63600175 PHARM REV CODE 636 W HCPCS

## 2025-06-28 PROCEDURE — 84443 ASSAY THYROID STIM HORMONE: CPT | Performed by: STUDENT IN AN ORGANIZED HEALTH CARE EDUCATION/TRAINING PROGRAM

## 2025-06-28 PROCEDURE — 93010 ELECTROCARDIOGRAM REPORT: CPT | Mod: ,,, | Performed by: INTERNAL MEDICINE

## 2025-06-28 PROCEDURE — 83880 ASSAY OF NATRIURETIC PEPTIDE: CPT

## 2025-06-28 PROCEDURE — 85379 FIBRIN DEGRADATION QUANT: CPT

## 2025-06-28 PROCEDURE — 36415 COLL VENOUS BLD VENIPUNCTURE: CPT | Performed by: HOSPITALIST

## 2025-06-28 PROCEDURE — 83735 ASSAY OF MAGNESIUM: CPT | Performed by: STUDENT IN AN ORGANIZED HEALTH CARE EDUCATION/TRAINING PROGRAM

## 2025-06-28 PROCEDURE — 85025 COMPLETE CBC W/AUTO DIFF WBC: CPT

## 2025-06-28 PROCEDURE — 94761 N-INVAS EAR/PLS OXIMETRY MLT: CPT

## 2025-06-28 PROCEDURE — 99285 EMERGENCY DEPT VISIT HI MDM: CPT | Mod: 25

## 2025-06-28 PROCEDURE — 85025 COMPLETE CBC W/AUTO DIFF WBC: CPT | Performed by: STUDENT IN AN ORGANIZED HEALTH CARE EDUCATION/TRAINING PROGRAM

## 2025-06-28 PROCEDURE — 86850 RBC ANTIBODY SCREEN: CPT

## 2025-06-28 RX ORDER — SODIUM CHLORIDE 0.9 % (FLUSH) 0.9 %
5 SYRINGE (ML) INJECTION
Status: DISCONTINUED | OUTPATIENT
Start: 2025-06-28 | End: 2025-07-06 | Stop reason: HOSPADM

## 2025-06-28 RX ORDER — NALOXONE HCL 0.4 MG/ML
0.02 VIAL (ML) INJECTION
Status: DISCONTINUED | OUTPATIENT
Start: 2025-06-28 | End: 2025-07-06 | Stop reason: HOSPADM

## 2025-06-28 RX ORDER — EZETIMIBE 10 MG/1
10 TABLET ORAL DAILY
Status: DISCONTINUED | OUTPATIENT
Start: 2025-06-28 | End: 2025-07-06 | Stop reason: HOSPADM

## 2025-06-28 RX ORDER — ATORVASTATIN CALCIUM 40 MG/1
80 TABLET, FILM COATED ORAL DAILY
Status: DISCONTINUED | OUTPATIENT
Start: 2025-06-28 | End: 2025-07-06 | Stop reason: HOSPADM

## 2025-06-28 RX ORDER — FUROSEMIDE 10 MG/ML
40 INJECTION INTRAMUSCULAR; INTRAVENOUS 2 TIMES DAILY
Status: DISCONTINUED | OUTPATIENT
Start: 2025-06-28 | End: 2025-07-02

## 2025-06-28 RX ORDER — PANTOPRAZOLE SODIUM 20 MG/1
40 TABLET, DELAYED RELEASE ORAL DAILY
Status: DISCONTINUED | OUTPATIENT
Start: 2025-06-28 | End: 2025-07-06 | Stop reason: HOSPADM

## 2025-06-28 RX ORDER — IPRATROPIUM BROMIDE AND ALBUTEROL SULFATE 2.5; .5 MG/3ML; MG/3ML
3 SOLUTION RESPIRATORY (INHALATION) EVERY 4 HOURS PRN
Status: DISCONTINUED | OUTPATIENT
Start: 2025-06-28 | End: 2025-07-06 | Stop reason: HOSPADM

## 2025-06-28 RX ORDER — PROCHLORPERAZINE EDISYLATE 5 MG/ML
5 INJECTION INTRAMUSCULAR; INTRAVENOUS EVERY 6 HOURS PRN
Status: DISCONTINUED | OUTPATIENT
Start: 2025-06-28 | End: 2025-07-06 | Stop reason: HOSPADM

## 2025-06-28 RX ORDER — POLYETHYLENE GLYCOL 3350 17 G/17G
17 POWDER, FOR SOLUTION ORAL 2 TIMES DAILY PRN
Status: DISCONTINUED | OUTPATIENT
Start: 2025-06-28 | End: 2025-07-02

## 2025-06-28 RX ORDER — CYANOCOBALAMIN (VITAMIN B-12) 250 MCG
1000 TABLET ORAL DAILY
Status: DISCONTINUED | OUTPATIENT
Start: 2025-06-28 | End: 2025-07-06 | Stop reason: HOSPADM

## 2025-06-28 RX ORDER — FUROSEMIDE 10 MG/ML
20 INJECTION INTRAMUSCULAR; INTRAVENOUS
Status: COMPLETED | OUTPATIENT
Start: 2025-06-28 | End: 2025-06-28

## 2025-06-28 RX ORDER — ONDANSETRON 8 MG/1
8 TABLET, ORALLY DISINTEGRATING ORAL EVERY 8 HOURS PRN
Status: DISCONTINUED | OUTPATIENT
Start: 2025-06-28 | End: 2025-07-06 | Stop reason: HOSPADM

## 2025-06-28 RX ORDER — ENOXAPARIN SODIUM 100 MG/ML
40 INJECTION SUBCUTANEOUS EVERY 24 HOURS
Status: DISCONTINUED | OUTPATIENT
Start: 2025-06-28 | End: 2025-06-29

## 2025-06-28 RX ORDER — POTASSIUM CHLORIDE 20 MEQ/1
20 TABLET, EXTENDED RELEASE ORAL DAILY
Status: DISCONTINUED | OUTPATIENT
Start: 2025-06-28 | End: 2025-06-30

## 2025-06-28 RX ORDER — ACETAMINOPHEN 500 MG
1000 TABLET ORAL EVERY 8 HOURS PRN
Status: DISCONTINUED | OUTPATIENT
Start: 2025-06-28 | End: 2025-07-01

## 2025-06-28 RX ORDER — SIMETHICONE 80 MG
1 TABLET,CHEWABLE ORAL 4 TIMES DAILY PRN
Status: DISCONTINUED | OUTPATIENT
Start: 2025-06-28 | End: 2025-07-06 | Stop reason: HOSPADM

## 2025-06-28 RX ORDER — CHOLECALCIFEROL (VITAMIN D3) 25 MCG
1000 TABLET ORAL DAILY
Status: DISCONTINUED | OUTPATIENT
Start: 2025-06-28 | End: 2025-07-06 | Stop reason: HOSPADM

## 2025-06-28 RX ORDER — TALC
6 POWDER (GRAM) TOPICAL NIGHTLY PRN
Status: DISCONTINUED | OUTPATIENT
Start: 2025-06-28 | End: 2025-07-06 | Stop reason: HOSPADM

## 2025-06-28 RX ORDER — ALUMINUM HYDROXIDE, MAGNESIUM HYDROXIDE, AND SIMETHICONE 1200; 120; 1200 MG/30ML; MG/30ML; MG/30ML
30 SUSPENSION ORAL 4 TIMES DAILY PRN
Status: DISCONTINUED | OUTPATIENT
Start: 2025-06-28 | End: 2025-07-06 | Stop reason: HOSPADM

## 2025-06-28 RX ORDER — ACETAMINOPHEN 325 MG/1
650 TABLET ORAL EVERY 4 HOURS PRN
Status: DISCONTINUED | OUTPATIENT
Start: 2025-06-28 | End: 2025-07-01

## 2025-06-28 RX ORDER — CLOPIDOGREL BISULFATE 75 MG/1
75 TABLET ORAL DAILY
Status: DISCONTINUED | OUTPATIENT
Start: 2025-06-28 | End: 2025-07-06

## 2025-06-28 RX ADMIN — FUROSEMIDE 20 MG: 10 INJECTION, SOLUTION INTRAMUSCULAR; INTRAVENOUS at 09:06

## 2025-06-28 NOTE — HOSPITAL COURSE
Admitted 6/28 for SOB and management of AHRF. BL pleural effusions and volume overloaded state d/t inadvertant discontinuation of home Lasix. Diuresed with IV lasix with improvement. However, on 7/1, patient developed acute LSW/LFD. Vascular Neurology evaluated, obtained CTH w/ acute R MCA territory ischemia, CTA with R M2 occlusion. No TNK given d/t OOW, no thrombectomy due to core infarct. MRI brain performed 7/2 showed mild subarachnoid hemorrhagic conversion. Given h/o GIB and preference to ultimately start AC, GI consulted for EGD/C-scope, determined that endoscopic interventions not needed while IP and that VN team can trial Eliquis when stable from neurologic standpoint. Transitioned to home oral regimen Lasix 40 bid on 7/3 and later changed to 40mg daily on 7/5. Not requiring any supplemental O2 at present.

## 2025-06-28 NOTE — SUBJECTIVE & OBJECTIVE
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   Please schedule with Dr. Darryl Lopez-  Plavix stopped 8/23/22  pt requested to schedule after Thanksgiving/ inst portal-RB  pre call complete; [facility] 11/28/22    ESOPHAGOGASTRODUODENOSCOPY N/A 7/11/2023    Procedure: EGD (ESOPHAGOGASTRODUODENOSCOPY);  Surgeon: Natalie Fall MD;  Location: [facility];  Service: Endoscopy;  Laterality: N/A;    ESOPHAGOGASTRODUODENOSCOPY N/A 3/5/2025    Procedure: EGD (ESOPHAGOGASTRODUODENOSCOPY);  Surgeon: Roberto Salvador MD;  [address]  Service: Endoscopy;  Laterality: N/A;    EYE SURGERY      left eye cataract    LEFT HEART CATHETERIZATION  4/17/2024    Procedure: Left heart cath;  Surgeon: Elisabeth De La Cruz MD;  Location: [facility] CATH;  Service: Cardiology;;    TONSILLECTOMY      TUBAL LIGATION         Review of patient's allergies indicates:   Allergen Reactions    Jardiance [empagliflozin]        No current facility-administered medications on file prior to encounter.     Current Outpatient Medications on File Prior to Encounter   Medication Sig    atorvastatin (LIPITOR) 80 MG tablet Take 1 tablet (80 mg total) by mouth once daily.    clopidogreL (PLAVIX) 75 mg tablet Take 1 tablet (75 mg total) by mouth once daily.    cyanocobalamin (VITAMIN B-12) 1000 MCG tablet Take 1,000  mcg by mouth once daily.    ezetimibe (ZETIA) 10 mg tablet Take 1 tablet (10 mg total) by mouth once daily.    furosemide (LASIX) 20 MG tablet Take 1 tablet (20 mg total) by mouth 2 (two) times a day.    pantoprazole (PROTONIX) 40 MG tablet Take 1 tablet (40 mg total) by mouth once daily. Take 1 tablet by mouth every day    potassium chloride SA (K-DUR,KLOR-CON) 20 MEQ tablet Take 1 tablet (20 mEq total) by mouth once daily.    vitamin D (VITAMIN D3) 1000 units Tab Take 1,000 Units by mouth once daily.     Family History    None       Tobacco Use    Smoking status: Never     Passive exposure: Never    Smokeless tobacco: Never   Substance and Sexual Activity    Alcohol use: No    Drug use: No    Sexual activity: Not on file     Review of Systems   Constitutional:  Negative for activity change, chills and fever.   HENT:  Negative for trouble swallowing.    Eyes:  Negative for photophobia and visual disturbance.   Respiratory:  Positive for cough and shortness of breath. Negative for chest tightness.    Cardiovascular:  Negative for chest pain, palpitations and leg swelling.   Gastrointestinal:  Negative for abdominal pain, constipation, diarrhea, nausea and vomiting.   Genitourinary:  Negative for dysuria, frequency and hematuria.   Musculoskeletal:  Negative for back pain, gait problem and neck pain.   Skin:  Negative for rash and wound.   Neurological:  Negative for dizziness, syncope, speech difficulty and light-headedness.   Psychiatric/Behavioral:  Negative for agitation and confusion. The patient is not nervous/anxious.      Objective:     Vital Signs (Most Recent):  Temp: 97.6 °F (36.4 °C) (06/28/25 0706)  Pulse: 81 (06/28/25 1100)  Resp: 18 (06/28/25 1000)  BP: (!) 141/64 (06/28/25 1100)  SpO2: 97 % (06/28/25 1100) Vital Signs (24h Range):  Temp:  [97.6 °F (36.4 °C)] 97.6 °F (36.4 °C)  Pulse:  [75-81] 81  Resp:  [12-24] 18  SpO2:  [94 %-100 %] 97 %  BP: (125-151)/(58-71) 141/64     Weight: 48.1 kg (106  lb)  Body mass index is 18.78 kg/m².     Physical Exam  Vitals and nursing note reviewed.   Constitutional:       General: She is not in acute distress.     Appearance: She is well-developed. She is ill-appearing.   HENT:      Head: Normocephalic and atraumatic.      Mouth/Throat:      Pharynx: No oropharyngeal exudate.   Eyes:      Conjunctiva/sclera: Conjunctivae normal.      Pupils: Pupils are equal, round, and reactive to light.   Cardiovascular:      Rate and Rhythm: Normal rate and regular rhythm.      Heart sounds: Normal heart sounds.   Pulmonary:      Effort: Pulmonary effort is normal. No respiratory distress.      Breath sounds: Rales (bilateral middle and lower lungs) present. No wheezing.      Comments: On 2L NC. Decreased air movement to bilateral bases  Abdominal:      General: Bowel sounds are normal. There is no distension.      Palpations: Abdomen is soft.      Tenderness: There is no abdominal tenderness.   Musculoskeletal:         General: No tenderness. Normal range of motion.      Cervical back: Normal range of motion and neck supple.      Right lower leg: No edema.      Left lower leg: No edema.   Lymphadenopathy:      Cervical: No cervical adenopathy.   Skin:     General: Skin is warm and dry.      Capillary Refill: Capillary refill takes less than 2 seconds.      Findings: No rash.   Neurological:      Mental Status: She is alert and oriented to person, place, and time.      Cranial Nerves: No cranial nerve deficit.      Sensory: No sensory deficit.      Coordination: Coordination normal.   Psychiatric:         Behavior: Behavior normal.         Thought Content: Thought content normal.         Judgment: Judgment normal.              CRANIAL NERVES     CN III, IV, VI   Pupils are equal, round, and reactive to light.       Significant Labs: All pertinent labs within the past 24 hours have been reviewed.  CBC:   Recent Labs   Lab 06/28/25  0732 06/28/25  0944   WBC 7.52 8.24   HGB 8.9* 7.5*    HCT 28.3* 25.2*    371     CMP:   Recent Labs   Lab 06/28/25  0732      K 4.1      CO2 25      BUN 25*   CREATININE 0.7   CALCIUM 9.2   PROT 6.9   ALBUMIN 4.1   BILITOT 0.4   ALKPHOS 105   AST 28   ALT 27   ANIONGAP 9     Cardiac Markers:   Recent Labs   Lab 06/28/25  0732   BNP 1,270*     Troponin:   Recent Labs   Lab 06/28/25  0732 06/28/25  0923   TROPONINIHS 171* 172*       Significant Imaging: I have reviewed all pertinent imaging results/findings within the past 24 hours.  Imaging Results              X-Ray Chest AP Portable (Final result)  Result time 06/28/25 08:16:32      Final result by John Huggins MD (06/28/25 08:16:32)                   Impression:      As above.      Electronically signed by: John Huggins MD  Date:    06/28/2025  Time:    08:16               Narrative:    EXAMINATION:  XR CHEST AP PORTABLE    CLINICAL HISTORY:  Chest Pain;    TECHNIQUE:  Single frontal view of the chest was performed.    FINDINGS:  The lungs are significant for diffuse interstitial prominence with reticular basilar opacity overlying bilateral pleural fluid collections similar previous performed 05/29/2025.  There is no pneumothorax.  The cardiac silhouette appears enlarged.  There is calcification of the aorta.  The osseous structures demonstrate degenerative change.

## 2025-06-28 NOTE — ASSESSMENT & PLAN NOTE
- elevated on admit, but flat and at baseline  - denies chest pain  - likely elevated from demand in setting of pulmonary edema   - monitor tele

## 2025-06-28 NOTE — ED PROVIDER NOTES
Encounter Date: 6/28/2025       History     Chief Complaint   Patient presents with    Abnormal Lab     Low h/h requires blood transfusions, on home oxygen      HPI  84-year-old female remote history of blood clots presents to the emergency department today for worsening shortness of breath.  Not on oxygen at baseline.  Today she chose to come in, correction from the triage note, she came in because she is short of breath.  No chest pain no melena no blood in his stool, no hemoptysis no hematemesis no vomiting.  She was feeling much better yesterday, no fevers chills.  No dysuria.  No diarrhea.    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 02/18/2025     Past Surgical History:   Procedure Laterality Date    CARPAL TUNNEL RELEASE      ESOPHAGOGASTRODUODENOSCOPY N/A 8/22/2022    Procedure: EGD (ESOPHAGOGASTRODUODENOSCOPY);  Surgeon: Steven Lopez MD;  Location: [facility] ([address] [place] [address]);  Service: Endoscopy;  Laterality: N/A;    ESOPHAGOGASTRODUODENOSCOPY N/A 12/5/2022    Procedure: EGD (ESOPHAGOGASTRODUODENOSCOPY);  Surgeon: Steven Lopez MD;  Location: [facility] (2ND FLR);  Service: Endoscopy;  Laterality: N/A;  Has estimated pulmonary artery pressure 45, schedule location per protocol.   Please schedule with Dr. Darryl Lopez-  Plavix stopped 8/23/22  [facility]  pre call complete; [facility] 11/28/22    ESOPHAGOGASTRODUODENOSCOPY N/A 7/11/2023    Procedure: EGD (ESOPHAGOGASTRODUODENOSCOPY);  Surgeon: Natalie Fall MD;  Location: [facility];  Service: Endoscopy;  Laterality: N/A;    ESOPHAGOGASTRODUODENOSCOPY N/A 3/5/2025    Procedure: EGD (ESOPHAGOGASTRODUODENOSCOPY);  Surgeon: Roberto Salvador MD;  Location: [facility] (2ND FLR);  Service: Endoscopy;  Laterality: N/A;    EYE  SURGERY      left eye cataract    LEFT HEART CATHETERIZATION  4/17/2024    Procedure: Left heart cath;  Surgeon: Elisabeth De La Cruz MD;  Location: [facility] CATH;  Service: Cardiology;;    TONSILLECTOMY      TUBAL LIGATION       No family history on file.  Social History[1]  Review of Systems    Physical Exam     Initial Vitals [06/28/25 0706]   BP Pulse Resp Temp SpO2   (!) 151/63 77 (!) 24 97.6 °F (36.4 °C) 100 %      MAP       --         Physical Exam    Nursing note and vitals reviewed.  Constitutional: She appears well-developed and well-nourished.   HENT:   Head: Normocephalic and atraumatic.   Right Ear: External ear normal.   Left Ear: External ear normal.   Eyes: EOM are normal. Right eye exhibits no discharge. Left eye exhibits no discharge. No scleral icterus.   Neck: Neck supple. No tracheal deviation present. No JVD present.   Cardiovascular:  Normal heart sounds and intact distal pulses.     Exam reveals no gallop and no friction rub.       No murmur heard.  Pulses:       Radial pulses are 2+ on the right side and 2+ on the left side.        Dorsalis pedis pulses are 2+ on the right side and 2+ on the left side.   Pulmonary/Chest: Breath sounds normal. No stridor. No respiratory distress. She has no wheezes. She has no rhonchi. She has no rales. She exhibits no tenderness.   Abdominal: Abdomen is soft. She exhibits no distension and no mass. There is no abdominal tenderness. There is no rebound and no guarding.   Musculoskeletal:         General: Normal range of motion.      Cervical back: Neck supple.     Lymphadenopathy:     She has no cervical adenopathy.   Neurological: She is alert and oriented to person, place, and time. GCS score is 15.   Skin: Skin is warm and dry. Capillary refill takes less than 2 seconds.   Psychiatric: She has a normal mood and affect. Her behavior is normal. Judgment and thought content normal.         ED Course   Procedures  Labs Reviewed   COMPREHENSIVE METABOLIC PANEL - Abnormal        Result Value    Sodium 140      Potassium 4.1      Chloride 106      CO2 25      Glucose 101      BUN 25 (*)     Creatinine 0.7      Calcium 9.2      Protein Total 6.9      Albumin 4.1      Bilirubin Total 0.4            AST 28      ALT 27      Anion Gap 9      eGFR >60     CBC WITH DIFFERENTIAL - Abnormal    WBC 7.52      RBC 3.10 (*)     HGB 8.9 (*)     HCT 28.3 (*)     MCV 91      MCH 28.7      MCHC 31.4 (*)     RDW 16.4 (*)     Platelet Count 400      MPV 9.8      Nucleated RBC 0      Neut % 77.9 (*)     Lymph % 10.8 (*)     Mono % 8.5      Eos % 1.3      Basophil % 0.8      Imm Grans % 0.7 (*)     Neut # 5.86      Lymph # 0.81 (*)     Mono # 0.64      Eos # 0.10      Baso # 0.06      Imm Grans # 0.05 (*)    TROPONIN I HIGH SENSITIVITY - Abnormal    Troponin High Sensitive 171 (*)    B-TYPE NATRIURETIC PEPTIDE - Abnormal    BNP 1,270 (*)    TROPONIN I HIGH SENSITIVITY - Abnormal    Troponin High Sensitive 172 (*)    CBC WITH DIFFERENTIAL - Abnormal    WBC 8.24      RBC 2.76 (*)     HGB 7.5 (*)     HCT 25.2 (*)     MCV 91      MCH 27.2      MCHC 29.8 (*)     RDW 16.2 (*)     Platelet Count 371      MPV 9.8      Nucleated RBC 0      Neut % 84.0 (*)     Lymph % 8.6 (*)     Mono % 6.3      Eos % 0.2      Basophil % 0.5      Imm Grans % 0.4      Neut # 6.92      Lymph # 0.71 (*)     Mono # 0.52      Eos # 0.02      Baso # 0.04      Imm Grans # 0.03     D DIMER, QUANTITATIVE - Normal    D-Dimer 0.46     MAGNESIUM - Normal    Magnesium  2.1     PHOSPHORUS - Normal    Phosphorus Level 3.6     TSH - Normal    TSH 2.532     CBC W/ AUTO DIFFERENTIAL    Narrative:     The following orders were created for panel order CBC auto differential.  Procedure                               Abnormality         Status                     ---------                               -----------         ------                     CBC with Differential[[id number]]       Abnormal            Final result                 Please view  results for these tests on the individual orders.   CBC W/ AUTO DIFFERENTIAL    Narrative:     The following orders were created for panel order CBC auto differential.  Procedure                               Abnormality         Status                     ---------                               -----------         ------                     CBC with Differential[[id number]]       Abnormal            Final result                 Please view results for these tests on the individual orders.   EXTRA TUBES    Narrative:     The following orders were created for panel order EXTRA TUBES.  Procedure                               Abnormality         Status                     ---------                               -----------         ------                     Gold Top Hold[[id number]]                                                              Gold Top Hold[[id number]]                                                                Please view results for these tests on the individual orders.   GOLD TOP HOLD   GOLD TOP HOLD   RENAL FUNCTION PANEL   TYPE & SCREEN    Specimen Outdate 07/01/2025 23:59      Group & Rh A NEG      Indirect Kimberlyn NEG          ECG Results              EKG 12-lead (Final result)        Collection Time Result Time QRS Duration OHS QTC Calculation    06/28/25 07:13:31 06/28/25 09:19:05 90 461                     Final result by Interface, Lab In [facility] (06/28/25 09:19:12)                   Narrative:    Test Reason : R06.02,    Vent. Rate :  74 BPM     Atrial Rate :  74 BPM     P-R Int : 320 ms          QRS Dur :  90 ms      QT Int : 416 ms       P-R-T Axes :      2 108 degrees    QTcB Int : 461 ms    Sinus rhythm with 1st degree A-V block and artifact  Low voltage QRS  Cannot rule out Anterior infarct (cited on or before 26-Sep-2024)  ST and T wave abnormality, consider lateral ischemia  Abnormal ECG  When compared with ECG of 29-May-2025 06:33,  AK interval has increased  Confirmed by Geno  Nikko ([id number]) on 6/28/2025 9:19:02 AM    Referred By:            Confirmed By: Nikko Lora                                  Imaging Results              CT Chest Without Contrast (In process)                      X-Ray Chest AP Portable (Final result)  Result time 06/28/25 08:16:32      Final result by John Huggins MD (06/28/25 08:16:32)                   Impression:      As above.      Electronically signed by: John Huggins MD  Date:    06/28/2025  Time:    08:16               Narrative:    EXAMINATION:  XR CHEST AP PORTABLE    CLINICAL HISTORY:  Chest Pain;    TECHNIQUE:  Single frontal view of the chest was performed.    FINDINGS:  The lungs are significant for diffuse interstitial prominence with reticular basilar opacity overlying bilateral pleural fluid collections similar previous performed 05/29/2025.  There is no pneumothorax.  The cardiac silhouette appears enlarged.  There is calcification of the aorta.  The osseous structures demonstrate degenerative change.                                       Medications   sodium chloride 0.9% flush 5 mL (has no administration in time range)   albuterol-ipratropium 2.5 mg-0.5 mg/3 mL nebulizer solution 3 mL (has no administration in time range)   melatonin tablet 6 mg (has no administration in time range)   ondansetron disintegrating tablet 8 mg (has no administration in time range)   prochlorperazine injection Soln 5 mg (has no administration in time range)   polyethylene glycol packet 17 g (has no administration in time range)   simethicone chewable tablet 80 mg (has no administration in time range)   aluminum-magnesium hydroxide-simethicone 200-200-20 mg/5 mL suspension 30 mL (has no administration in time range)   acetaminophen tablet 650 mg (has no administration in time range)   acetaminophen tablet 1,000 mg (has no administration in time range)   naloxone 0.4 mg/mL injection 0.02 mg (has no administration in time range)   enoxaparin injection 40 mg (has no  administration in time range)   atorvastatin tablet 80 mg (0 mg Oral Hold 6/28/25 1215)   clopidogreL tablet 75 mg (0 mg Oral Hold 6/28/25 1215)   cyanocobalamin tablet 1,000 mcg (0 mcg Oral Hold 6/28/25 1215)   ezetimibe tablet 10 mg (0 mg Oral Hold 6/28/25 1215)   pantoprazole EC tablet 40 mg (0 mg Oral Hold 6/28/25 1215)   potassium chloride SA CR tablet 20 mEq (0 mEq Oral Hold 6/28/25 1215)   vitamin D 1000 units tablet 1,000 Units (0 Units Oral Hold 6/28/25 1215)   furosemide injection 40 mg (has no administration in time range)   furosemide injection 20 mg (20 mg Intravenous Given 6/28/25 0923)     Medical Decision Making  Amount and/or Complexity of Data Reviewed  Labs: ordered. Decision-making details documented in ED Course.  Radiology: ordered.    Risk  Prescription drug management.  Decision regarding hospitalization.               ED Course as of 06/28/25 1446   Sat Jun 28, 2025   0730 84-year-old female presents for shortness of breath and oxygen demand.  Differential diagnosis for this patient includes but isn't limited to pulmonary embolism, ACS, CHF exacerbation, severe electrolyte derangement.  CHF exacerbation less likely as patient does not have clinical signs of fluid overload. [KW]   0746 No widened QRS complex on EKG. [KW]   0750 Second EKG independently interpreted no STEMI, no QRS widening, poor QRS progression however appears similar to previous.  Initial EKG on arrival was low quality, repeat EKG ordered. [KW]   0801 Hemoglobin better than baseline negative D-dimer makes clinically significant pulmonary embolism less likely. [KW]   0801 Chest x-ray independently interpreted, increased opacities without focal consolidation [KW]   0844 BNP(!): 1,270 [KW]   0845 BNP(!): 1,270  Elevated BNP likely secondary to fluid overload which is supported by elevated troponin and chest x-ray that appears decreased from baseline [KW]   0845 Plan to admit patient to hospital medicine for diuresis [KW]   0858  Patient updated and in agreement with plan [KW]   1442 Patient is not in heart block today, patient has a elevated BNP making CHF likely the reason for fatigue.  Plan to admit to hospital medicine for IV diuresis.  Anticipate multiple doses of diuresis being needed to achieve euvolemia.  Vital signs stable at time of admission, I discussed case with hospital medicine who agreed to admit [KW]      ED Course User Index  [KW] Arpan Kelly MD                           Clinical Impression:  Final diagnoses:  [R06.02] SOB (shortness of breath)          ED Disposition Condition    Admit                       [1]   Social History  Tobacco Use    Smoking status: Never     Passive exposure: Never    Smokeless tobacco: Never   Substance Use Topics    Alcohol use: No    Drug use: No        Arpan Kelly MD  Resident  06/28/25 3823

## 2025-06-28 NOTE — ASSESSMENT & PLAN NOTE
- Patient is identified as having Diastolic (HFpEF) heart failure that is Acute on Chronic.   - CHF is currently uncontrolled due to Rales/crackles on pulmonary exam and Pulmonary edema/pleural effusion on CXR.   - Latest ECHO performed and demonstrates- Results for orders placed during the hospital encounter of 03/20/25    Echo    Interpretation Summary    Left Ventricle: The left ventricle is normal in size. Ventricular mass is normal. Normal wall thickness. Normal wall motion. There is normal systolic function with a visually estimated ejection fraction of 55 - 60%. There is indeterminate diastolic function. Elevated left ventricular filling pressure.    Right Ventricle: The right ventricle is normal in size. Wall thickness is normal. Systolic function is normal.    Mitral Valve: There is mild regurgitation.    Tricuspid Valve: There is mild regurgitation.    Pulmonary Artery: The estimated pulmonary artery systolic pressure is 24 mmHg.    IVC/SVC: Normal venous pressure at 3 mmHg.    Pericardium: Left pleural effusion.  .   - Continue Furosemide and monitor clinical status closely.   - Monitor on telemetry.   - Patient is on CHF pathway.    - Monitor strict Is&Os and daily weights.    - Place on fluid restriction of 1.5 L.   - Continue to stress to patient importance of self efficacy and  on diet for CHF.   - Last BNP reviewed- and noted below   Recent Labs   Lab 06/28/25  0732   BNP 1,270*   - starting IV diuresis with lasix 40 mg BID

## 2025-06-28 NOTE — ED NOTES
Assumed care of patient Katelyn Caba, a 84 y.o. female     Triage note:  Chief Complaint   Patient presents with    Abnormal Lab     Low h/h requires blood transfusions, on home oxygen

## 2025-06-28 NOTE — ASSESSMENT & PLAN NOTE
Nutrition consulted. Most recent weight and BMI monitored-     Measurements:  Wt Readings from Last 1 Encounters:   06/28/25 48.1 kg (106 lb)   Body mass index is 18.78 kg/m².    Patient has been screened and assessed by RD.    Malnutrition Type:  Context:    Level:      Malnutrition Characteristic Summary:       Interventions/Recommendations (treatment strategy):

## 2025-06-28 NOTE — ED TRIAGE NOTES
Pt arriving to ED c/o Low h/h requires frequent blood transfusions and they are unsure what is causing the low blood counts. +SOB and +fatigue. Denies blood in stool, - blood thinners

## 2025-06-28 NOTE — ASSESSMENT & PLAN NOTE
Anemia is likely due to acute blood loss which was from GI bleeds. Most recent hemoglobin and hematocrit are listed below.  Recent Labs     06/26/25  0704 06/28/25  0732 06/28/25  0944   HGB 8.0* 8.9* 7.5*   HCT 26.7* 28.3* 25.2*     Plan  - Monitor serial CBC: Daily  - Transfuse PRBC if patient becomes hemodynamically unstable, symptomatic or H/H drops below 7/21.  - Patient has not received any PRBC transfusions to date  - Patient's anemia is currently stable  - continue ppi. scheduled for EGD with GI 07/08  - follows closely with OP hematology. Considering OP bone marrow biopsy primarily to rule out MDS

## 2025-06-28 NOTE — H&P
[facility] - Emergency Dept  [facility] Medicine  History & Physical    Patient Name: Katelyn Caba  MRN: [id number]  Patient Class: IP- Inpatient  Admission Date: 6/28/2025  Attending Physician: Mick Gomez MD   Primary Care Provider: Brooklyn Burnham MD         Patient information was obtained from patient, spouse/SO, relative(s), and ER records.     Subjective:     Principal Problem:Acute hypoxic respiratory failure    Chief Complaint:   Chief Complaint   Patient presents with    Abnormal Lab     Low h/h requires blood transfusions, on home oxygen         HPI: Katelyn Caba is a 83 yo F with PMHx of BCC, HTN, HLD, aortic atherosclerosis, hx of GI bleeds, ISAIAH requiring frequent transfusions, HFpEF, CAD, L MCA CVA who presented to ED for shortness of breath. Seen with  and son at bedside. Son reports that patient has been having progressive shortness of breath over the past week that acutely worsened this morning. She believes this is due to her blood count being low. She recently was hospitalized 05/29-06/01 for anemia requiring 2 units to be transfused and Covid infection. She receives frequent blood transfusions and follows closely with hematology. She has a hx of GI bleeds and is scheduled for an OP EGD on 07/08/25. She denies any recent melena or hematochezia. Endorses dry cough. She has been compliant with her home lasix. She does not report any significant weight gain over the past week. She does have a history of tobacco use but quit 50 years ago. Unable to tell me how long she smoked for but believes she smoked about 0.5 ppd. Denies fever, chills, chest pain, palpitations, abdominal pain, n/v/d, and LE swelling.    In ED: Afebrile. BP elevated. Initially requiring 4L on arrival. Attempted to wean O2, but dropped to 88% on RA. Now satting well on 2L NC. CXR with bilateral pleural effusions. Given IV lasix 20 mg. Admitted to  for hypoxia.    Past Medical History:   Diagnosis Date     Cancer     basal cell carcinoma    Stroke due to embolism of right middle cerebral artery 02/18/2025       Past Surgical History:   Procedure Laterality Date    CARPAL TUNNEL RELEASE      ESOPHAGOGASTRODUODENOSCOPY N/A 8/22/2022    Procedure: EGD (ESOPHAGOGASTRODUODENOSCOPY);  Surgeon: Steven Lopez MD;  Location: [facility] FLR);  Service: Endoscopy;  Laterality: N/A;    ESOPHAGOGASTRODUODENOSCOPY N/A 12/5/2022    Procedure: EGD (ESOPHAGOGASTRODUODENOSCOPY);  Surgeon: Steven Lopez MD;  Location: [facility] (2ND FLR);  Service: Endoscopy;  Laterality: N/A;  Has estimated pulmonary artery pressure 45, schedule location per protocol.   Please schedule with Dr. Darryl Lopez-  Plavix stopped 8/23/22  pt requested to schedule after Thanksgiving/ inst portal-RB  pre call complete; [facility] 11/28/22    ESOPHAGOGASTRODUODENOSCOPY N/A 7/11/2023    Procedure: EGD (ESOPHAGOGASTRODUODENOSCOPY);  Surgeon: Natalie Fall MD;  Location: [facility] ENDO;  Service: Endoscopy;  Laterality: N/A;    ESOPHAGOGASTRODUODENOSCOPY N/A 3/5/2025    Procedure: EGD (ESOPHAGOGASTRODUODENOSCOPY);  Surgeon: Roberto Salvador MD;  [facility]  Service: Endoscopy;  Laterality: N/A;    EYE SURGERY      left eye cataract    LEFT HEART CATHETERIZATION  4/17/2024    Procedure: Left heart cath;  Surgeon: Elisabeth De La Cruz MD;  Location: [facility] CATH;  Service: Cardiology;;    TONSILLECTOMY      TUBAL LIGATION         Review of patient's allergies indicates:   Allergen Reactions    Jardiance [empagliflozin]        No current facility-administered medications on file prior to encounter.     Current Outpatient Medications on File Prior to Encounter   Medication Sig    atorvastatin (LIPITOR) 80 MG tablet Take 1 tablet (80 mg total) by mouth once daily.    clopidogreL (PLAVIX) 75 mg tablet Take 1 tablet (75 mg total) by mouth once daily.    cyanocobalamin (VITAMIN B-12) 1000 MCG tablet Take 1,000 mcg by mouth once daily.    ezetimibe  (ZETIA) 10 mg tablet Take 1 tablet (10 mg total) by mouth once daily.    furosemide (LASIX) 20 MG tablet Take 1 tablet (20 mg total) by mouth 2 (two) times a day.    pantoprazole (PROTONIX) 40 MG tablet Take 1 tablet (40 mg total) by mouth once daily. Take 1 tablet by mouth every day    potassium chloride SA (K-DUR,KLOR-CON) 20 MEQ tablet Take 1 tablet (20 mEq total) by mouth once daily.    vitamin D (VITAMIN D3) 1000 units Tab Take 1,000 Units by mouth once daily.     Family History    None       Tobacco Use    Smoking status: Never     Passive exposure: Never    Smokeless tobacco: Never   Substance and Sexual Activity    Alcohol use: No    Drug use: No    Sexual activity: Not on file     Review of Systems   Constitutional:  Negative for activity change, chills and fever.   HENT:  Negative for trouble swallowing.    Eyes:  Negative for photophobia and visual disturbance.   Respiratory:  Positive for cough and shortness of breath. Negative for chest tightness.    Cardiovascular:  Negative for chest pain, palpitations and leg swelling.   Gastrointestinal:  Negative for abdominal pain, constipation, diarrhea, nausea and vomiting.   Genitourinary:  Negative for dysuria, frequency and hematuria.   Musculoskeletal:  Negative for back pain, gait problem and neck pain.   Skin:  Negative for rash and wound.   Neurological:  Negative for dizziness, syncope, speech difficulty and light-headedness.   Psychiatric/Behavioral:  Negative for agitation and confusion. The patient is not nervous/anxious.      Objective:     Vital Signs (Most Recent):  Temp: 97.6 °F (36.4 °C) (06/28/25 0706)  Pulse: 81 (06/28/25 1100)  Resp: 18 (06/28/25 1000)  BP: (!) 141/64 (06/28/25 1100)  SpO2: 97 % (06/28/25 1100) Vital Signs (24h Range):  Temp:  [97.6 °F (36.4 °C)] 97.6 °F (36.4 °C)  Pulse:  [75-81] 81  Resp:  [12-24] 18  SpO2:  [94 %-100 %] 97 %  BP: (125-151)/(58-71) 141/64     Weight: 48.1 kg (106 lb)  Body mass index is 18.78 kg/m².      Physical Exam  Vitals and nursing note reviewed.   Constitutional:       General: She is not in acute distress.     Appearance: She is well-developed. She is ill-appearing.   HENT:      Head: Normocephalic and atraumatic.      Mouth/Throat:      Pharynx: No oropharyngeal exudate.   Eyes:      Conjunctiva/sclera: Conjunctivae normal.      Pupils: Pupils are equal, round, and reactive to light.   Cardiovascular:      Rate and Rhythm: Normal rate and regular rhythm.      Heart sounds: Normal heart sounds.   Pulmonary:      Effort: Pulmonary effort is normal. No respiratory distress.      Breath sounds: Rales (bilateral middle and lower lungs) present. No wheezing.      Comments: On 2L NC. Decreased air movement to bilateral bases  Abdominal:      General: Bowel sounds are normal. There is no distension.      Palpations: Abdomen is soft.      Tenderness: There is no abdominal tenderness.   Musculoskeletal:         General: No tenderness. Normal range of motion.      Cervical back: Normal range of motion and neck supple.      Right lower leg: No edema.      Left lower leg: No edema.   Lymphadenopathy:      Cervical: No cervical adenopathy.   Skin:     General: Skin is warm and dry.      Capillary Refill: Capillary refill takes less than 2 seconds.      Findings: No rash.   Neurological:      Mental Status: She is alert and oriented to person, place, and time.      Cranial Nerves: No cranial nerve deficit.      Sensory: No sensory deficit.      Coordination: Coordination normal.   Psychiatric:         Behavior: Behavior normal.         Thought Content: Thought content normal.         Judgment: Judgment normal.              CRANIAL NERVES     CN III, IV, VI   Pupils are equal, round, and reactive to light.       Significant Labs: All pertinent labs within the past 24 hours have been reviewed.  CBC:   Recent Labs   Lab 06/28/25  0732 06/28/25  0944   WBC 7.52 8.24   HGB 8.9* 7.5*   HCT 28.3* 25.2*    371     CMP:    Recent Labs   Lab 06/28/25  0732      K 4.1      CO2 25      BUN 25*   CREATININE 0.7   CALCIUM 9.2   PROT 6.9   ALBUMIN 4.1   BILITOT 0.4   ALKPHOS 105   AST 28   ALT 27   ANIONGAP 9     Cardiac Markers:   Recent Labs   Lab 06/28/25  0732   BNP 1,270*     Troponin:   Recent Labs   Lab 06/28/25  0732 06/28/25  0923   TROPONINIHS 171* 172*       Significant Imaging: I have reviewed all pertinent imaging results/findings within the past 24 hours.  Imaging Results              X-Ray Chest AP Portable (Final result)  Result time 06/28/25 08:16:32      Final result by John Huggins MD (06/28/25 08:16:32)                   Impression:      As above.      Electronically signed by: John Huggins MD  Date:    06/28/2025  Time:    08:16               Narrative:    EXAMINATION:  XR CHEST AP PORTABLE    CLINICAL HISTORY:  Chest Pain;    TECHNIQUE:  Single frontal view of the chest was performed.    FINDINGS:  The lungs are significant for diffuse interstitial prominence with reticular basilar opacity overlying bilateral pleural fluid collections similar previous performed 05/29/2025.  There is no pneumothorax.  The cardiac silhouette appears enlarged.  There is calcification of the aorta.  The osseous structures demonstrate degenerative change.                                    Assessment/Plan:     Assessment & Plan  Acute hypoxic respiratory failure  Solitary pulmonary nodule  Bilateral pleural effusion  Patient with Hypoxic Respiratory failure which is Acute on chronic.  she is not on home oxygen. Supplemental oxygen was provided and noted-      Signs/symptoms of respiratory failure include- lethargy. Contributing diagnoses includes - CHF and Pleural effusion Labs and images were reviewed. Patient Has not had a recent ABG. Will treat underlying causes and adjust management of respiratory failure as follows-   - start IV diuresis  - previous CT chest in 2023 with multiple lung nodules noted. Radiology  recommended repeat imaging within 3-6 months, but this was never performed. She has a history of remote tobacco use.  CT chest pending     (HFpEF) heart failure with preserved ejection fraction  - Patient is identified as having Diastolic (HFpEF) heart failure that is Acute on Chronic.   - CHF is currently uncontrolled due to Rales/crackles on pulmonary exam and Pulmonary edema/pleural effusion on CXR.   - Latest ECHO performed and demonstrates- Results for orders placed during the hospital encounter of 03/20/25    Echo    Interpretation Summary    Left Ventricle: The left ventricle is normal in size. Ventricular mass is normal. Normal wall thickness. Normal wall motion. There is normal systolic function with a visually estimated ejection fraction of 55 - 60%. There is indeterminate diastolic function. Elevated left ventricular filling pressure.    Right Ventricle: The right ventricle is normal in size. Wall thickness is normal. Systolic function is normal.    Mitral Valve: There is mild regurgitation.    Tricuspid Valve: There is mild regurgitation.    Pulmonary Artery: The estimated pulmonary artery systolic pressure is 24 mmHg.    IVC/SVC: Normal venous pressure at 3 mmHg.    Pericardium: Left pleural effusion.  .   - Continue Furosemide and monitor clinical status closely.   - Monitor on telemetry.   - Patient is on CHF pathway.    - Monitor strict Is&Os and daily weights.    - Place on fluid restriction of 1.5 L.   - Continue to stress to patient importance of self efficacy and  on diet for CHF.   - Last BNP reviewed- and noted below   Recent Labs   Lab 06/28/25  0732   BNP 1,270*   - starting IV diuresis with lasix 40 mg BID   H/O ischemic left MCA stroke  - continue statin, plavix, and zetia   Mixed hyperlipidemia  CAD (coronary artery disease)  - continue statin and zetia   Iron deficiency anemia due to chronic blood loss  Anemia is likely due to acute blood loss which was from GI bleeds. Most recent  hemoglobin and hematocrit are listed below.  Recent Labs     06/26/25  0704 06/28/25  0732 06/28/25  0944   HGB 8.0* 8.9* 7.5*   HCT 26.7* 28.3* 25.2*     Plan  - Monitor serial CBC: Daily  - Transfuse PRBC if patient becomes hemodynamically unstable, symptomatic or H/H drops below 7/21.  - Patient has not received any PRBC transfusions to date  - Patient's anemia is currently stable  - continue ppi. scheduled for EGD with GI 07/08  - follows closely with OP hematology. Considering OP bone marrow biopsy primarily to rule out MDS   Hypertension  Patient's blood pressure range in the last 24 hours was: BP  Min: 125/58  Max: 151/63.The patient's inpatient anti-hypertensive regimen is listed below:  Current Antihypertensives  furosemide injection 40 mg, 2 times daily, Intravenous    Plan  - BP is controlled  - starting IV diuresis  Moderate protein-calorie malnutrition  Nutrition consulted. Most recent weight and BMI monitored-     Measurements:  Wt Readings from Last 1 Encounters:   06/28/25 48.1 kg (106 lb)   Body mass index is 18.78 kg/m².    Patient has been screened and assessed by RD.    Malnutrition Type:  Context:    Level:      Malnutrition Characteristic Summary:       Interventions/Recommendations (treatment strategy):       Elevated troponin  - elevated on admit, but flat and at baseline  - denies chest pain  - likely elevated from demand in setting of pulmonary edema   - monitor tele   VTE Risk Mitigation (From admission, onward)           Ordered     enoxaparin injection 40 mg  Daily         06/28/25 0936     IP VTE HIGH RISK PATIENT  Once         06/28/25 0936                         Elvia Walters PA-C  Department of Hospital Medicine  [facility] - Emergency Dept

## 2025-06-28 NOTE — ASSESSMENT & PLAN NOTE
Patient's blood pressure range in the last 24 hours was: BP  Min: 125/58  Max: 151/63.The patient's inpatient anti-hypertensive regimen is listed below:  Current Antihypertensives  furosemide injection 40 mg, 2 times daily, Intravenous    Plan  - BP is controlled  - starting IV diuresis

## 2025-06-28 NOTE — HPI
Patient is a 84F with BCC, HTN, HLD, h/o GIB, ISAIAH requiring frequent transfusions, HFpEF, CAD, L MCA CVA admitted 6/28 for SOB/AHRF. Chronic BL effusions. Diuresing with lasix 40 IV bid (pulm evaluated during admission, no need to perform thoracentesis). Supplemental O2 requirements gradually decreased with aggressive diuresis. TTE 6/30 showed LVEF 65% with G3DD and reduced RV systolic function (which is new).     Stroke code 7/1/25 for LSW/LFD (NIHSS 10). CTH acute R MCA territory ischemia, CTA with R M2 occlusion. Unclear LKW, therefore TNK not given. No thrombectomy due to core infarct. On assessment, patient reports improving dysarthria and weakness. Concerned about constipation and claustrophobia wrt MRI.     Transferred to stroke service on 7/1/2025.  consulted to co-manage AHRF.

## 2025-06-28 NOTE — ASSESSMENT & PLAN NOTE
Patient with Hypoxic Respiratory failure which is Acute on chronic.  she is not on home oxygen. Supplemental oxygen was provided and noted-      Signs/symptoms of respiratory failure include- lethargy. Contributing diagnoses includes - CHF and Pleural effusion Labs and images were reviewed. Patient Has not had a recent ABG. Will treat underlying causes and adjust management of respiratory failure as follows-   - start IV diuresis  - previous CT chest in 2023 with multiple lung nodules noted. Radiology recommended repeat imaging within 3-6 months, but this was never performed. She has a history of remote tobacco use.  CT chest pending

## 2025-06-29 LAB
ABSOLUTE EOSINOPHIL (OHS): 0.24 K/UL
ABSOLUTE MONOCYTE (OHS): 0.82 K/UL (ref 0.3–1)
ABSOLUTE NEUTROPHIL COUNT (OHS): 4.21 K/UL (ref 1.8–7.7)
ALBUMIN SERPL BCP-MCNC: 3.5 G/DL (ref 3.5–5.2)
ANION GAP (OHS): 10 MMOL/L (ref 8–16)
ANION GAP (OHS): 7 MMOL/L (ref 8–16)
BASOPHILS # BLD AUTO: 0.06 K/UL
BASOPHILS NFR BLD AUTO: 0.9 %
BUN SERPL-MCNC: 22 MG/DL (ref 8–23)
BUN SERPL-MCNC: 24 MG/DL (ref 8–23)
CALCIUM SERPL-MCNC: 8.5 MG/DL (ref 8.7–10.5)
CALCIUM SERPL-MCNC: 9.1 MG/DL (ref 8.7–10.5)
CHLORIDE SERPL-SCNC: 101 MMOL/L (ref 95–110)
CHLORIDE SERPL-SCNC: 106 MMOL/L (ref 95–110)
CO2 SERPL-SCNC: 27 MMOL/L (ref 23–29)
CO2 SERPL-SCNC: 29 MMOL/L (ref 23–29)
CREAT SERPL-MCNC: 0.6 MG/DL (ref 0.5–1.4)
CREAT SERPL-MCNC: 0.7 MG/DL (ref 0.5–1.4)
ERYTHROCYTE [DISTWIDTH] IN BLOOD BY AUTOMATED COUNT: 16 % (ref 11.5–14.5)
ERYTHROCYTE [DISTWIDTH] IN BLOOD BY AUTOMATED COUNT: 16.2 % (ref 11.5–14.5)
GFR SERPLBLD CREATININE-BSD FMLA CKD-EPI: >60 ML/MIN/1.73/M2
GFR SERPLBLD CREATININE-BSD FMLA CKD-EPI: >60 ML/MIN/1.73/M2
GLUCOSE SERPL-MCNC: 105 MG/DL (ref 70–110)
GLUCOSE SERPL-MCNC: 87 MG/DL (ref 70–110)
HCT VFR BLD AUTO: 23.6 % (ref 37–48.5)
HCT VFR BLD AUTO: 25.6 % (ref 37–48.5)
HGB BLD-MCNC: 7.4 GM/DL (ref 12–16)
HGB BLD-MCNC: 7.8 GM/DL (ref 12–16)
IMM GRANULOCYTES # BLD AUTO: 0.01 K/UL (ref 0–0.04)
IMM GRANULOCYTES NFR BLD AUTO: 0.1 % (ref 0–0.5)
LYMPHOCYTES # BLD AUTO: 1.39 K/UL (ref 1–4.8)
MAGNESIUM SERPL-MCNC: 2 MG/DL (ref 1.6–2.6)
MCH RBC QN AUTO: 27.2 PG (ref 27–31)
MCH RBC QN AUTO: 27.5 PG (ref 27–31)
MCHC RBC AUTO-ENTMCNC: 30.5 G/DL (ref 32–36)
MCHC RBC AUTO-ENTMCNC: 31.4 G/DL (ref 32–36)
MCV RBC AUTO: 88 FL (ref 82–98)
MCV RBC AUTO: 89 FL (ref 82–98)
NUCLEATED RBC (/100WBC) (OHS): 0 /100 WBC
PHOSPHATE SERPL-MCNC: 3.7 MG/DL (ref 2.7–4.5)
PLATELET # BLD AUTO: 374 K/UL (ref 150–450)
PLATELET # BLD AUTO: 442 K/UL (ref 150–450)
PMV BLD AUTO: 10 FL (ref 9.2–12.9)
PMV BLD AUTO: 9.8 FL (ref 9.2–12.9)
POTASSIUM SERPL-SCNC: 3.8 MMOL/L (ref 3.5–5.1)
POTASSIUM SERPL-SCNC: 4.2 MMOL/L (ref 3.5–5.1)
PROCALCITONIN SERPL-MCNC: 0.03 NG/ML
RBC # BLD AUTO: 2.69 M/UL (ref 4–5.4)
RBC # BLD AUTO: 2.87 M/UL (ref 4–5.4)
RELATIVE EOSINOPHIL (OHS): 3.6 %
RELATIVE LYMPHOCYTE (OHS): 20.7 % (ref 18–48)
RELATIVE MONOCYTE (OHS): 12.2 % (ref 4–15)
RELATIVE NEUTROPHIL (OHS): 62.5 % (ref 38–73)
SODIUM SERPL-SCNC: 140 MMOL/L (ref 136–145)
SODIUM SERPL-SCNC: 140 MMOL/L (ref 136–145)
WBC # BLD AUTO: 6.73 K/UL (ref 3.9–12.7)
WBC # BLD AUTO: 7.24 K/UL (ref 3.9–12.7)

## 2025-06-29 PROCEDURE — 63600175 PHARM REV CODE 636 W HCPCS

## 2025-06-29 PROCEDURE — 25000003 PHARM REV CODE 250

## 2025-06-29 PROCEDURE — 80048 BASIC METABOLIC PNL TOTAL CA: CPT | Performed by: HOSPITALIST

## 2025-06-29 PROCEDURE — 84145 PROCALCITONIN (PCT): CPT | Performed by: HOSPITALIST

## 2025-06-29 PROCEDURE — 36415 COLL VENOUS BLD VENIPUNCTURE: CPT

## 2025-06-29 PROCEDURE — 36415 COLL VENOUS BLD VENIPUNCTURE: CPT | Performed by: HOSPITALIST

## 2025-06-29 PROCEDURE — 85027 COMPLETE CBC AUTOMATED: CPT

## 2025-06-29 PROCEDURE — 83735 ASSAY OF MAGNESIUM: CPT

## 2025-06-29 PROCEDURE — 85025 COMPLETE CBC W/AUTO DIFF WBC: CPT | Performed by: HOSPITALIST

## 2025-06-29 PROCEDURE — 80048 BASIC METABOLIC PNL TOTAL CA: CPT

## 2025-06-29 PROCEDURE — 99223 1ST HOSP IP/OBS HIGH 75: CPT | Mod: ,,, | Performed by: INTERNAL MEDICINE

## 2025-06-29 PROCEDURE — 94761 N-INVAS EAR/PLS OXIMETRY MLT: CPT

## 2025-06-29 PROCEDURE — 21400001 HC TELEMETRY ROOM

## 2025-06-29 RX ORDER — ENOXAPARIN SODIUM 100 MG/ML
30 INJECTION SUBCUTANEOUS EVERY 24 HOURS
Status: DISCONTINUED | OUTPATIENT
Start: 2025-06-29 | End: 2025-07-05

## 2025-06-29 RX ADMIN — CYANOCOBALAMIN TAB 1000 MCG 1000 MCG: 1000 TAB at 08:06

## 2025-06-29 RX ADMIN — ATORVASTATIN CALCIUM 80 MG: 40 TABLET, FILM COATED ORAL at 08:06

## 2025-06-29 RX ADMIN — FUROSEMIDE 40 MG: 10 INJECTION, SOLUTION INTRAVENOUS at 08:06

## 2025-06-29 RX ADMIN — PANTOPRAZOLE SODIUM 40 MG: 20 TABLET, DELAYED RELEASE ORAL at 08:06

## 2025-06-29 RX ADMIN — POTASSIUM CHLORIDE 20 MEQ: 1500 TABLET, EXTENDED RELEASE ORAL at 08:06

## 2025-06-29 RX ADMIN — Medication 1000 UNITS: at 08:06

## 2025-06-29 RX ADMIN — CLOPIDOGREL 75 MG: 75 TABLET ORAL at 08:06

## 2025-06-29 RX ADMIN — FUROSEMIDE 40 MG: 10 INJECTION, SOLUTION INTRAVENOUS at 05:06

## 2025-06-29 RX ADMIN — EZETIMIBE 10 MG: 10 TABLET ORAL at 08:06

## 2025-06-29 NOTE — ASSESSMENT & PLAN NOTE
Patient's blood pressure range in the last 24 hours was: BP  Min: 108/59  Max: 130/60.The patient's inpatient anti-hypertensive regimen is listed below:  Current Antihypertensives  furosemide injection 40 mg, 2 times daily, Intravenous    Plan  - BP is controlled  - starting IV diuresis

## 2025-06-29 NOTE — ASSESSMENT & PLAN NOTE
Nutrition consulted. Most recent weight and BMI monitored-     Measurements:  Wt Readings from Last 1 Encounters:   06/29/25 48 kg (105 lb 13.1 oz)   Body mass index is 18.75 kg/m².    Patient has been screened and assessed by RD.    Malnutrition Type:  Context:    Level:      Malnutrition Characteristic Summary:       Interventions/Recommendations (treatment strategy):

## 2025-06-29 NOTE — ASSESSMENT & PLAN NOTE
Anemia is likely due to acute blood loss which was from GI bleeds. Most recent hemoglobin and hematocrit are listed below.  Recent Labs     06/28/25  0732 06/28/25  0944 06/29/25  0701   HGB 8.9* 7.5* 7.4*   HCT 28.3* 25.2* 23.6*     Plan  - Monitor serial CBC: Daily  - Transfuse PRBC if patient becomes hemodynamically unstable, symptomatic or H/H drops below 7/21.  - Patient has not received any PRBC transfusions to date  - Patient's anemia is currently stable  - continue ppi. scheduled for EGD with GI 07/08  - follows closely with OP hematology. Considering OP bone marrow biopsy primarily to rule out MDS   6/29   EGD 3/25 Esophageal mucosal changes secondary to                          established long-segment Rodriguez's disease,                          classified as Rodriguez's stage C10-M10 per [facility]                          criteria.                          - 3 cm hiatal hernia.                          - One non-bleeding angioectasia in the stomach.                          Treated with argon plasma coagulation (APC).                          - Two non-bleeding angioectasias in the duodenum.                          Treated with argon plasma coagulation (APC).   FOBT ordered. consider PRBC transfusion

## 2025-06-29 NOTE — PLAN OF CARE
[facility] Surg  Initial Discharge Assessment       Primary Care Provider: Brooklyn Burnham MD    Admission Diagnosis: SOB (shortness of breath) [R06.02]  Chest pain [R07.9]    Admission Date: 6/28/2025  Expected Discharge Date: 6/30/2025         Payor: MEDICARE / Plan: MEDICARE PART A & B / Product Type: Government /     Extended Emergency Contact Information  Primary Emergency Contact: Eder Doe  Mobile Phone: [phone]  Relation: Spouse   needed? No  Secondary Emergency Contact: Keegan Meza  Address: [address]           [place] [address] [place]  Mobile Phone: [phone]  Relation: Brother              [id number] - [place], [place] - [address] [facility] [address] [facility]  [address] [place] [address]  Phone: [phone] Fax: [phone]    [id number] - [place] - [address] [facility] [address] [place] [address]  Phone: [phone] Fax: [phone]      Initial Assessment (most recent)       Adult Discharge Assessment - 06/29/25 1042          Discharge Assessment    Assessment Type Discharge Planning Assessment     Confirmed/corrected address, phone number and insurance Yes     Confirmed Demographics Correct on Facesheet     Source of Information patient;family     Communicated TERE with patient/caregiver Date not available/Unable to determine     People in Home spouse     Name(s) of People in Home [facility]     Do you expect to return to your current living situation? Yes     Do you have help at home or someone to help you manage your care at home? No     Prior to hospitilization cognitive status: Alert/Oriented     Current cognitive status: Alert/Oriented     Walking or Climbing Stairs Difficulty no     Dressing/Bathing Difficulty no     Home Accessibility stairs to enter home     Number of Stairs, Main Entrance two     Home Layout  Able to live on 1st floor     Equipment Currently Used at Home oxygen   brothers spare tanks when needed    Readmission within 30 days? No     Patient currently being followed by outpatient case management? No     Do you currently have service(s) that help you manage your care at home? No     Do you take prescription medications? Yes     Do you have prescription coverage? Yes     Do you have any problems affording any of your prescribed medications? TBD     Is the patient taking medications as prescribed? yes     Who is going to help you get home at discharge? [facility]                      Discharge Plan A and Plan B have been determined by review of patient's clinical status, future medical and therapeutic needs, and coverage/benefits for post-acute care in coordination with multidisciplinary team members.        CM spoke with patient and spouse Eder in Room at bedside. All information was verified on facesheet. Patient lives in a 1 story house with spouse, [place], [place] 2 story home. Patient states no assistance is needed. Patient can ambulate, drive, run errands, and complete ADL's independently. Patient does use her brother in laws extra oxygen tanks when she feels her H/H getting low.  Patient has used Home Health previously, but not a current patient. Patient is not on dialysis nor use Coumadin as a blood thinner. Patient takes medication as prescribed and has resources for all prescriptive needs. Patient will have help from family member upon discharge. Family will provide transportation home. All Questions and concerns addressed and whiteboard updated with CM contact information. Will continue to follow for course of hospitalization.     Monica Whitley RN  Case Management  [phone]

## 2025-06-29 NOTE — CONSULTS
Food & Nutrition  Education     Diet Education: Fluid/Na education  Time Spent: 10 minutes  Learners: Patient        Nutrition Education provided with handouts: Low Sodium Nutrition Therapy, Fluid-Restricted Nutrition Therapy         Comments: Discussed importance of a low sodium diet. Reviewed high sodium foods that should be avoided. Food labels, salt free seasonings, and recommended sodium intake reviewed. Encouraged healthy, fresh foods that are low in sodium that are good for consumption. Fluid intake and conversions discussed. Foods considered fluids were reviewed and encouraged to monitor. General healthy diet encouraged. All questions/concerns were addressed.         All questions and concerns answered. Dietitian's contact information provided.         Follow-Up: 7/7     Please Re-consult as needed           Thanks!  Payton Gotti, MS, RD, LDN

## 2025-06-29 NOTE — CONSULTS
Pulmonary & Critical Care Medicine Consult Note    Consultant Attending: Arsenio Ulloa MD  Consultant Fellow: Deepika Watson DO    Reason for Consult:     Pleural effusions     Subjective:      History of Present Illness:  85 yo female with a PMH of BCC, hypertension, hyperlipidemia, history of GI bleeds, HFpEF, CAD, MCA CVA who presented with shortness of breath. She says that over the past week she has felt more short of breath and it had worsened further on the day of presentation. She is on lasix at home and says she has been taking it. She weighs herself and says she has not gained much weight over the past few days. She also reports fatigue. She says she feels better with oxygen on.     She was placed on 4L NC in the ED. She is currently on 2L NC but oxygen saturations has been %. WBC not elevated. BNP elevated to 1200. CXR with bilateral pleural effusions. CT chest with bilateral pleural effusions and bibasilar consolidative areas. Previous echo from 3/2025 with normal EF, indeterminate diastolic function, elevated LV filling pressure, pleural effusion was also noted.  Pleural effusions noted on CXRs since 3/2025.     She has a remote history of smoking but quit 50 yeas ago.       Past Medical History:  Past Medical History:   Diagnosis Date    Cancer     basal cell carcinoma    Stroke due to embolism of right middle cerebral artery 02/18/2025       Past Surgical History:  Past Surgical History:   Procedure Laterality Date    CARPAL TUNNEL RELEASE      ESOPHAGOGASTRODUODENOSCOPY N/A 8/22/2022    Procedure: EGD (ESOPHAGOGASTRODUODENOSCOPY);  Surgeon: Steven Lopez MD;  Location: [facility] ([address] [place] [address]);  Service: Endoscopy;  Laterality: N/A;    ESOPHAGOGASTRODUODENOSCOPY N/A 12/5/2022    Procedure: EGD (ESOPHAGOGASTRODUODENOSCOPY);  Surgeon: Steven Lopez MD;  Location: [facility] ([address] [place] [address]);  Service: Endoscopy;  Laterality: N/A;  Has estimated pulmonary artery pressure 45, schedule location  per protocol.   Please schedule with Dr. Darryl Lopez-  Plavix stopped 8/23/22  pt requested to schedule after Thanksgiving/ inst portal-RB  pre call complete; [facility] 11/28/22    ESOPHAGOGASTRODUODENOSCOPY N/A 7/11/2023    Procedure: EGD (ESOPHAGOGASTRODUODENOSCOPY);  Surgeon: Natalie Fall MD;  Location: [facility] ENDO;  Service: Endoscopy;  Laterality: N/A;    ESOPHAGOGASTRODUODENOSCOPY N/A 3/5/2025    Procedure: EGD (ESOPHAGOGASTRODUODENOSCOPY);  Surgeon: Roberto Salvador MD;  Location: [facility] ([address] [place] [address]);  Service: Endoscopy;  Laterality: N/A;    EYE SURGERY      left eye cataract    LEFT HEART CATHETERIZATION  4/17/2024    Procedure: Left heart cath;  Surgeon: Elisabeth De La Cruz MD;  Location: [facility] CATH;  Service: Cardiology;;    TONSILLECTOMY      TUBAL LIGATION         Allergies:  Review of patient's allergies indicates:   Allergen Reactions    Jardiance [empagliflozin]        Medications:   In-Hospital Scheduled Medications:   atorvastatin  80 mg Oral Daily    clopidogreL  75 mg Oral Daily    cyanocobalamin  1,000 mcg Oral Daily    enoxparin  30 mg Subcutaneous Daily    ezetimibe  10 mg Oral Daily    furosemide (LASIX) injection  40 mg Intravenous BID    pantoprazole  40 mg Oral Daily    potassium chloride SA  20 mEq Oral Daily    vitamin D  1,000 Units Oral Daily      In-Hospital PRN Medications:    Current Facility-Administered Medications:     acetaminophen, 1,000 mg, Oral, Q8H PRN    acetaminophen, 650 mg, Oral, Q4H PRN    albuterol-ipratropium, 3 mL, Nebulization, Q4H PRN    aluminum-magnesium hydroxide-simethicone, 30 mL, Oral, QID PRN    melatonin, 6 mg, Oral, Nightly PRN    naloxone, 0.02 mg, Intravenous, PRN    ondansetron, 8 mg, Oral, Q8H PRN    polyethylene glycol, 17 g, Oral, BID PRN    prochlorperazine, 5 mg, Intravenous, Q6H PRN    simethicone, 1 tablet, Oral, QID PRN    sodium chloride 0.9%, 5 mL, Intravenous, PRN   In-Hospital IV Infusion Medications:     Home Medications:  Prior to Admission  medications    Medication Sig Start Date End Date Taking? Authorizing Provider   atorvastatin (LIPITOR) 80 MG tablet Take 1 tablet (80 mg total) by mouth once daily. 25 Yes Brooklyn Burnham MD   clopidogreL (PLAVIX) 75 mg tablet Take 1 tablet (75 mg total) by mouth once daily. 25 Yes Trini Zamudio PA-C   cyanocobalamin (VITAMIN B-12) 1000 MCG tablet Take 1,000 mcg by mouth once daily.   Yes Provider, Historical   ezetimibe (ZETIA) 10 mg tablet Take 1 tablet (10 mg total) by mouth once daily. 25 Yes Sowmya Ventura PA-C   furosemide (LASIX) 20 MG tablet Take 1 tablet (20 mg total) by mouth 2 (two) times a day. 25 Yes Sowmya Ventura PA-C   pantoprazole (PROTONIX) 40 MG tablet Take 1 tablet (40 mg total) by mouth once daily. Take 1 tablet by mouth every day 6/10/25  Yes Brooklyn Burnham MD   potassium chloride SA (K-DUR,KLOR-CON) 20 MEQ tablet Take 1 tablet (20 mEq total) by mouth once daily. 25 Yes Sowmya Ventura PA-C   vitamin D (VITAMIN D3) 1000 units Tab Take 1,000 Units by mouth once daily.   Yes Provider, Historical       Family History:  No family history on file.    Social History:  Social History[1]    Review of Systems:  Pertinent items are noted in HPI. All other systems are reviewed and are negative.     Objective:   Last 24 Hour Vital Signs:  BP  Min: 108/59  Max: 146/65  Temp  Av °F (36.7 °C)  Min: 97.9 °F (36.6 °C)  Max: 98 °F (36.7 °C)  Pulse  Av.1  Min: 66  Max: 81  Resp  Av.2  Min: 16  Max: 18  SpO2  Av.3 %  Min: 95 %  Max: 100 %  Weight  Av kg (105 lb 13.1 oz)  Min: 48 kg (105 lb 13.1 oz)  Max: 48 kg (105 lb 13.1 oz)  I/O last 3 completed shifts:  In: 480 [P.O.:480]  Out: 0     Physical Examination:  Physical Exam  Constitutional:       General: She is not in acute distress.  HENT:      Head: Normocephalic and atraumatic.   Eyes:      Extraocular Movements: Extraocular  movements intact.   Cardiovascular:      Rate and Rhythm: Normal rate.   Pulmonary:      Comments: Decreased breath sounds at the bases with some crackles   Musculoskeletal:      Right lower leg: No edema.      Left lower leg: No edema.   Skin:     General: Skin is warm and dry.   Neurological:      Mental Status: She is alert and oriented to person, place, and time.           Laboratory:  Trended Lab Data:  Recent Labs     06/28/25  0732 06/28/25  0944 06/28/25  1919 06/29/25  0701   WBC 7.52 8.24  --  7.24   HGB 8.9* 7.5*  --  7.4*   HCT 28.3* 25.2*  --  23.6*    371  --  374     --  141 140   K 4.1  --  4.0 3.8     --  104 106   CO2 25  --  27 27   BUN 25*  --  25* 22   CREATININE 0.7  --  0.6 0.6     --  99 87   BILITOT 0.4  --   --   --    AST 28  --   --   --    ALT 27  --   --   --    ALKPHOS 105  --   --   --    CALCIUM 9.2  --  8.8 8.5*   ALBUMIN 4.1  --  3.6  --    PROT 6.9  --   --   --    MG 2.1  --   --  2.0   PHOS 3.6  --  3.5  --        Cardiac:   Recent Labs   Lab 06/28/25  0732   BNP 1,270*       Urinalysis:   Lab Results   Component Value Date    LABURIN KLEBSIELLA PNEUMONIAE  >100,000 cfu/ml   (A) 08/22/2022    COLORU Colorless (A) 03/03/2025    SPECGRAV 1.010 03/03/2025    NITRITE Negative 03/03/2025    KETONESU Negative 03/03/2025    UROBILINOGEN Negative 06/02/2015       Microbiology:  Microbiology Results (last 7 days)       ** No results found for the last 168 hours. **            Radiology:  CT chest with bilateral pleural effusions R>L, bibasilar consolidative changes, some interlobular septal thickening     I have personally reviewed the above labs and imaging.    Current Medications:     Infusions:       Scheduled:   atorvastatin  80 mg Oral Daily    clopidogreL  75 mg Oral Daily    cyanocobalamin  1,000 mcg Oral Daily    enoxparin  30 mg Subcutaneous Daily    ezetimibe  10 mg Oral Daily    furosemide (LASIX) injection  40 mg Intravenous BID    pantoprazole  40  mg Oral Daily    potassium chloride SA  20 mEq Oral Daily    vitamin D  1,000 Units Oral Daily        PRN:    Current Facility-Administered Medications:     acetaminophen, 1,000 mg, Oral, Q8H PRN    acetaminophen, 650 mg, Oral, Q4H PRN    albuterol-ipratropium, 3 mL, Nebulization, Q4H PRN    aluminum-magnesium hydroxide-simethicone, 30 mL, Oral, QID PRN    melatonin, 6 mg, Oral, Nightly PRN    naloxone, 0.02 mg, Intravenous, PRN    ondansetron, 8 mg, Oral, Q8H PRN    polyethylene glycol, 17 g, Oral, BID PRN    prochlorperazine, 5 mg, Intravenous, Q6H PRN    simethicone, 1 tablet, Oral, QID PRN    sodium chloride 0.9%, 5 mL, Intravenous, PRN     Assessment:     Katelyn Caba is a 84 y.o. female with:  Patient Active Problem List    Diagnosis Date Noted    Anemia 06/28/2025    Bilateral pleural effusion 06/28/2025    Elevated troponin 06/28/2025    Dyspnea 05/30/2025    COVID-19 05/29/2025    (HFpEF) heart failure with preserved ejection fraction 03/25/2025    CAD (coronary artery disease) 03/25/2025    Moderate protein-calorie malnutrition 03/21/2025    Body mass index (BMI) less than 19 02/20/2025    Hemiparesis 02/20/2025    Reduced mobility 02/20/2025    History of basal cell carcinoma 02/20/2025    History of GI bleed 03/27/2024    Symptomatic anemia 03/26/2024    History of gastric ulcer 03/26/2024    Rodriguez's esophagus 03/26/2024    Hypertension 03/26/2024    Aortic atherosclerosis 05/18/2023    Solitary pulmonary nodule 04/12/2023    Iron deficiency anemia due to chronic blood loss 08/21/2022    AVM (arteriovenous malformation) of stomach, acquired with hemorrhage 08/21/2022    Acute hypoxic respiratory failure 08/21/2022    Speaking difficulty 07/28/2022    Mixed hyperlipidemia 07/21/2022    Right homonymous inferior quadrantanopia 07/21/2022    H/O ischemic left MCA stroke 07/20/2022    Venous insufficiency         Plan:     Bilateral pleural effusions  HFpEF  - Presents with shortness of breath,  worsening over the past week  - No signs of infection at this time   - BNP 1200 with CXR and CT chest with bilateral pleural effusions, effusions have been present since at least 3/2025 but are larger in size  - Ct chest shows some interlobular septal thickening and possible basilar consolidations  - Echo from 3/25 with normal EF, elevated LV filling pressures, and indeterminate diastolic function    - No signs or symptoms of infection at this time, pleural effusions have been present for months and fluid on bedside ultrasound appears simple; with elevation in BNP pleural effusions likely in the setting of heart failure and volume overload, based on the TAP-IT trial, thoracentesis in the setting of effusions secondary to heart failure would not shorten the length of hospital stay when compared to diuresis alone     Recommendations:  - Continue aggressive diuresis   - No indication for thoracentesis at this time   - Monitor electrolytes and replete as necessary   - Repeat echo   - Wean oxygen for goal spO2>92%    Thank you for allowing us to participate in the care of this patient. Please contact me if you have any questions regarding this consult.    Discussed with Dr. Ulloa.     Deepika Watson MD  [facility] Fellow         [1]   Social History  Tobacco Use    Smoking status: Never     Passive exposure: Never    Smokeless tobacco: Never   Substance Use Topics    Alcohol use: No    Drug use: No

## 2025-06-29 NOTE — ASSESSMENT & PLAN NOTE
Patient with Hypoxic Respiratory failure which is Acute on chronic.  she is not on home oxygen. Supplemental oxygen was provided and noted-      Signs/symptoms of respiratory failure include- lethargy. Contributing diagnoses includes - CHF and Pleural effusion Labs and images were reviewed. Patient Has not had a recent ABG. Will treat underlying causes and adjust management of respiratory failure as follows-   - start IV diuresis  - previous CT chest in 2023 with multiple lung nodules noted. Radiology recommended repeat imaging within 3-6 months, but this was never performed. She has a history of remote tobacco use.  CT chest pending     6/29  sats 95% on RA.  Patient with Hypoxic Respiratory failure which is Acute on chronic.  she is not on home oxygen. Supplemental oxygen was provided and noted-      Signs/symptoms of respiratory failure include- lethargy. Contributing diagnoses includes - CHF and Pleural effusion Labs and images were reviewed. Patient Has not had a recent ABG. Will treat underlying causes and adjust management of respiratory failure as follows-   - start IV diuresis  - previous CT chest in 2023 with multiple lung nodules noted. Radiology recommended repeat imaging within 3-6 months, but this was never performed. She has a history of remote tobacco use.  CT chest pending   6/29  CT chest in 2023 that showed multiple pulmonary nodules-- supposed to get repeat imaging with in 3-6 months. Repeat CT chest due to remote hx of tobacco use - r/o malignancy-  Bilateral lower lobe, inferior lingular, and right middle lobe consolidative change cannot exclude infection. Moderate bilateral pleural fluid collections. Pulmonology consuled for thoracentesis     Patient with Hypoxic Respiratory failure which is Acute on chronic.  she is not on home oxygen. Supplemental oxygen was provided and noted-      Signs/symptoms of respiratory failure include- lethargy. Contributing diagnoses includes - CHF and Pleural  effusion Labs and images were reviewed. Patient Has not had a recent ABG. Will treat underlying causes and adjust management of respiratory failure as follows-   - start IV diuresis  - previous CT chest in 2023 with multiple lung nodules noted. Radiology recommended repeat imaging within 3-6 months, but this was never performed. She has a history of remote tobacco use.  CT chest pending       6/29  CT chest in 2023 that showed multiple pulmonary nodules-- supposed to get repeat imaging with in 3-6 months. Repeat CT chest due to remote hx of tobacco use - r/o malignancy-  Bilateral lower lobe, inferior lingular, and right middle lobe consolidative change cannot exclude infection. Moderate bilateral pleural fluid collections. sats 95% on RA. Pulmonology consuled for thoracentesis

## 2025-06-29 NOTE — PROGRESS NOTES
[facility] Medicine  Progress Note    Patient Name: Katelyn Caba  MRN: [id number]  Patient Class: IP- Inpatient   Admission Date: 6/28/2025  Length of Stay: 1 days  Attending Physician: Mick Gomez MD  Primary Care Provider: Brooklyn Burnham MD        Subjective     Principal Problem:Acute hypoxic respiratory failure        HPI:  Katelyn Caba is a 85 yo F with PMHx of BCC, HTN, HLD, aortic atherosclerosis, hx of GI bleeds, ISAIAH requiring frequent transfusions, HFpEF, CAD, L MCA CVA who presented to ED for shortness of breath. Seen with  and son at bedside. Son reports that patient has been having progressive shortness of breath over the past week that acutely worsened this morning. She believes this is due to her blood count being low. She recently was hospitalized 05/29-06/01 for anemia requiring 2 units to be transfused and Covid infection. She receives frequent blood transfusions and follows closely with hematology. She has a hx of GI bleeds and is scheduled for an OP EGD on 07/08/25. She denies any recent melena or hematochezia. Endorses dry cough. She has been compliant with her home lasix. She does not report any significant weight gain over the past week. She does have a history of tobacco use but quit 50 years ago. Unable to tell me how long she smoked for but believes she smoked about 0.5 ppd. Denies fever, chills, chest pain, palpitations, abdominal pain, n/v/d, and LE swelling.    In ED: Afebrile. BP elevated. Initially requiring 4L on arrival. Attempted to wean O2, but dropped to 88% on RA. Now satting well on 2L NC. CXR with bilateral pleural effusions. Given IV lasix 20 mg. Admitted to  for hypoxia.    Overview/Hospital Course:  6/29 CX ray -lungs are significant for diffuse interstitial prominence with reticular basilar opacity overlying bilateral pleural fluid collections similar previous performed 05/29/2025. There is no pneumothorax. The cardiac silhouette  appears enlarged. There is calcification of the aorta. on  IV lasix 40mg q 12h. strict IO monitor. CT chest in 2023 that showed multiple pulmonary nodules-- supposed to get repeat imaging with in 3-6 months. Repeat CT chest due to remote hx of tobacco use - r/o malignancy-  Bilateral lower lobe, inferior lingular, and right middle lobe consolidative change cannot exclude infection. Moderate bilateral pleural fluid collections. sats 95% on RA. Pulmonology consuled for thoracentesis. Continue diuresis. Repeat echo            Review of Systems:   Pain scale:   Constitutional:  fever,  chills, headache, vision loss, hearing loss, weight loss, Generalized weakness, falls, loss of smell, loss of taste, poor appetite,  sore throat  Respiratory: cough, shortness of breath.   Cardiovascular: chest pain, dizziness, palpitations, orthopnea, swelling of feet, syncope  Gastrointestinal: nausea, vomiting, abdominal pain, diarrhea, black stool,  blood in stool, change in bowel habits, constipation  Genitourinary: hematuria, dysuria, urgency, frequency  Integument/Breast: rash,  pruritis  Hematologic/Lymphatic: easy bruising, lymphadenopathy  Musculoskeletal: arthralgias , myalgias, back pain, neck pain, knee pain  Neurological: confusion, seizures, tremors, slurred speech  Behavioral/Psych:  depression, anxiety, auditory or visual hallucinations     OBJECTIVE:     Physical Exam:  Body mass index is 18.75 kg/m².    Constitutional: Appears thin built   Head: Normocephalic and atraumatic.   Neck: Normal range of motion. Neck supple.   Cardiovascular: Normal heart rate.  Regular heart rhythm.  Pulmonary/Chest: Effort normal.   Abdominal: No distension.  No tenderness  Musculoskeletal: Normal range of motion. No edema.   Neurological: Alert and oriented to person, place, and time.   Skin: Skin is warm and dry.   Psychiatric: Normal mood and affect. Behavior is normal.                  Vital Signs  Temp: 98 °F (36.7 °C) (06/29/25  "1138)  Pulse: 79 (06/29/25 1138)  Resp: 18 (06/29/25 1138)  BP: 130/60 (06/29/25 1138)  SpO2: 100 % (06/29/25 1138)     24 Hour VS Range    Temp:  [97.9 °F (36.6 °C)-98 °F (36.7 °C)]   Pulse:  [66-81]   Resp:  [16-18]   BP: (108-130)/(56-60)   SpO2:  [95 %-100 %]     Intake/Output Summary (Last 24 hours) at 6/29/2025 1345  Last data filed at 6/29/2025 1230  Gross per 24 hour   Intake 480 ml   Output 700 ml   Net -220 ml         I/O This Shift:  I/O this shift:  In: -   Out: 700 [Urine:700]    Wt Readings from Last 3 Encounters:   06/29/25 48 kg (105 lb 13.1 oz)   06/12/25 48.1 kg (106 lb 0.7 oz)   06/06/25 7.258 kg (16 lb)       I have personally reviewed the vitals and recorded Intake/Output     Laboratory/Diagnostic Data:    CBC/Anemia Labs: Coags:    Recent Labs   Lab 06/23/25  0755 06/26/25  0704 06/28/25  0732 06/28/25  0944 06/29/25  0701   WBC 5.99 5.29 7.52 8.24 7.24   HGB 8.3* 8.0* 8.9* 7.5* 7.4*   HCT 27.9* 26.7* 28.3* 25.2* 23.6*    418 400 371 374   MCV 93 93 91 91 88   RDW 17.2* 17.2* 16.4* 16.2* 16.2*   IRON  --  15*  --   --   --    FERRITIN 37.0 28.0  --   --   --     No results for input(s): "PT", "INR", "APTT" in the last 168 hours.     Chemistries: ABG:   Recent Labs   Lab 06/23/25  0755 06/28/25  0732 06/28/25  1919 06/29/25  0701    140 141 140   K 4.2 4.1 4.0 3.8    106 104 106   CO2 26 25 27 27   BUN 19 25* 25* 22   CREATININE 0.7 0.7 0.6 0.6   CALCIUM 9.0 9.2 8.8 8.5*   PROT 6.2 6.9  --   --    BILITOT 0.4 0.4  --   --    ALKPHOS 92 105  --   --    ALT 17 27  --   --    AST 22 28  --   --    MG  --  2.1  --  2.0   PHOS  --  3.6 3.5  --     No results for input(s): "PH", "PCO2", "PO2", "HCO3", "POCSATURATED", "BE" in the last 168 hours.     POCT Glucose: HbA1c:    No results for input(s): "POCTGLUCOSE" in the last 168 hours. Hemoglobin A1C   Date Value Ref Range Status   03/20/2025 4.2 4.0 - 5.6 % Final     Comment:     ADA Screening Guidelines:  5.7-6.4%  Consistent with " "prediabetes  >or=6.5%  Consistent with diabetes    High levels of fetal hemoglobin interfere with the HbA1C  assay. Heterozygous hemoglobin variants (HbS, HgC, etc)do  not significantly interfere with this assay.   However, presence of multiple variants may affect accuracy.     02/18/2025 4.9 4.0 - 5.6 % Final     Comment:     ADA Screening Guidelines:  5.7-6.4%  Consistent with prediabetes  >or=6.5%  Consistent with diabetes    High levels of fetal hemoglobin interfere with the HbA1C  assay. Heterozygous hemoglobin variants (HbS, HgC, etc)do  not significantly interfere with this assay.   However, presence of multiple variants may affect accuracy.     11/01/2024 4.2 4.0 - 5.6 % Final     Comment:     ADA Screening Guidelines:  5.7-6.4%  Consistent with prediabetes  >or=6.5%  Consistent with diabetes    High levels of fetal hemoglobin interfere with the HbA1C  assay. Heterozygous hemoglobin variants (HbS, HgC, etc)do  not significantly interfere with this assay.   However, presence of multiple variants may affect accuracy.          Cardiac Enzymes: Ejection Fractions:    No results for input(s): "CPK", "CPKMB", "MB", "TROPONINI" in the last 72 hours. EF   Date Value Ref Range Status   08/21/2022 65 % Final   07/22/2022 55 % Final          No results for input(s): "COLORU", "APPEARANCEUA", "PHUR", "SPECGRAV", "PROTEINUA", "GLUCUA", "KETONESU", "BILIRUBINUA", "OCCULTUA", "NITRITE", "UROBILINOGEN", "LEUKOCYTESUR", "RBCUA", "WBCUA", "BACTERIA", "SQUAMEPITHEL", "HYALINECASTS" in the last 48 hours.    Invalid input(s): "WRIGHTSUR"    Lactate (Lactic Acid) (mmol/L)   Date Value   03/20/2025 1.5     BNP (pg/mL)   Date Value   06/28/2025 1,270 (H)   05/29/2025 1,188 (H)   05/28/2025 963 (H)   04/22/2025 1,257 (H)   03/20/2025 1,293 (H)   03/03/2025 1,512 (H)   08/21/2022 577 (H)     No results found for: "CRP", "SEDRATE"  D-Dimer (mg/L FEU)   Date Value   06/28/2025 0.46   08/21/2022 0.95 (H)     Ferritin (ng/mL)   Date " "Value   06/26/2025 28.0   06/23/2025 37.0   06/09/2025 48.0   04/29/2025 203.0   02/24/2025 64   01/07/2025 113   09/06/2024 56   07/05/2024 58   03/25/2024 8 (L)   07/06/2023 22   05/18/2023 43   03/29/2023 101   01/26/2023 724 (H)   12/16/2022 14 (L)     No results found for: "LDH"  Troponin I (ng/mL)   Date Value   04/18/2024 26.400 (HH)   04/17/2024 58.100 (HH)   04/17/2024 39.000 (HH)   04/17/2024 0.047 (H)   08/21/2022 0.010   08/21/2022 0.009   07/20/2022 <0.012     No results found for this or any previous visit.  SARS-CoV2 (COVID-19) Qualitative PCR (no units)   Date Value   08/22/2022 Not Detected     SARS-CoV-2 RNA, Amplification, Qual (no units)   Date Value   03/20/2025 Negative   03/03/2025 Negative   08/21/2022 Negative   07/20/2022 Negative       Microbiology labs for the last week  Microbiology Results (last 7 days)       ** No results found for the last 168 hours. **            Reviewed and noted in plan where applicable- Please see chart for full lab data.    Lines/Drains:  Peripheral IV Single Lumen 06/28/25 0731 20 G Right Forearm (Active)   Site Assessment Clean;Dry;Intact;No redness;No swelling;No warmth;No drainage 06/29/25 0352   Extremity Assessment Distal to IV No abnormal discoloration;No redness;No swelling 06/28/25 1517   Line Status Capped;Flushed;Saline locked 06/29/25 0352   Dressing Status Intact;Dry;Clean 06/29/25 0352   Dressing Intervention Integrity maintained 06/29/25 0352   Number of days: 1       Imaging  ECG Results              EKG 12-lead (Final result)        Collection Time Result Time QRS Duration OHS QTC Calculation    06/28/25 07:13:31 06/28/25 09:19:05 90 461                     Final result by Interface, Lab In [facility] (06/28/25 09:19:12)                   Narrative:    Test Reason : R06.02,    Vent. Rate :  74 BPM     Atrial Rate :  74 BPM     P-R Int : 320 ms          QRS Dur :  90 ms      QT Int : 416 ms       P-R-T Axes :      2 108 degrees    QTcB Int : 461 " ms    Sinus rhythm with 1st degree A-V block and artifact  Low voltage QRS  Cannot rule out Anterior infarct (cited on or before 26-Sep-2024)  ST and T wave abnormality, consider lateral ischemia  Abnormal ECG  When compared with ECG of 29-May-2025 06:33,  WV interval has increased  Confirmed by Nikko Lora ([id number]) on 6/28/2025 9:19:02 AM    Referred By:            Confirmed By: Nikko Lora                                    Results for orders placed during the hospital encounter of 03/20/25    Echo    Interpretation Summary    Left Ventricle: The left ventricle is normal in size. Ventricular mass is normal. Normal wall thickness. Normal wall motion. There is normal systolic function with a visually estimated ejection fraction of 55 - 60%. There is indeterminate diastolic function. Elevated left ventricular filling pressure.    Right Ventricle: The right ventricle is normal in size. Wall thickness is normal. Systolic function is normal.    Mitral Valve: There is mild regurgitation.    Tricuspid Valve: There is mild regurgitation.    Pulmonary Artery: The estimated pulmonary artery systolic pressure is 24 mmHg.    IVC/SVC: Normal venous pressure at 3 mmHg.    Pericardium: Left pleural effusion.      CT Chest Without Contrast  Narrative: EXAMINATION:  CT CHEST WITHOUT CONTRAST    CLINICAL HISTORY:  Abnormal xray - lung nodule, >= 1 cm;bilateral pleural effusions. CT chest from 2023 with multiple lung nodules;    TECHNIQUE:  Low dose axial images, sagittal and coronal reformations were obtained from the thoracic inlet to the lung bases. Contrast was not administered.    FINDINGS:  The soft tissues and vascular structures at the base the neck are unremarkable.  The mediastinum including the heart and great vessels is significant for calcification of the aorta, aortic annulus, and coronary arteries.  The visualized intra-abdominal content is significant for a 4.5 cm hypodensity within the right kidney which likely  represents a cyst.  The osseous structures demonstrate degenerative change.    The trachea is patent and free of any intraluminal filling defects.  There is bilateral lower lobe, right middle lobe, and inferior lingular consolidative change overlying bilateral pleural fluid collections.  There is centrilobular emphysema.  Impression: Bilateral lower lobe, inferior lingular, and right middle lobe consolidative change cannot exclude infection.    Moderate bilateral pleural fluid collections.    Electronically signed by: John Huggins MD  Date:    06/28/2025  Time:    16:02  X-Ray Chest AP Portable  Narrative: EXAMINATION:  XR CHEST AP PORTABLE    CLINICAL HISTORY:  Chest Pain;    TECHNIQUE:  Single frontal view of the chest was performed.    FINDINGS:  The lungs are significant for diffuse interstitial prominence with reticular basilar opacity overlying bilateral pleural fluid collections similar previous performed 05/29/2025.  There is no pneumothorax.  The cardiac silhouette appears enlarged.  There is calcification of the aorta.  The osseous structures demonstrate degenerative change.  Impression: As above.    Electronically signed by: John Huggins MD  Date:    06/28/2025  Time:    08:16      Labs, Imaging, EKG and Diagnostic results from 6/29/2025 were reviewed.    Medications:  Medication list was reviewed and changes noted under Assessment/Plan.  Medications Ordered Prior to Encounter[1]  Scheduled Medications:  Current Facility-Administered Medications   Medication Dose Route Frequency    atorvastatin  80 mg Oral Daily    clopidogreL  75 mg Oral Daily    cyanocobalamin  1,000 mcg Oral Daily    enoxparin  30 mg Subcutaneous Daily    ezetimibe  10 mg Oral Daily    furosemide (LASIX) injection  40 mg Intravenous BID    pantoprazole  40 mg Oral Daily    potassium chloride SA  20 mEq Oral Daily    vitamin D  1,000 Units Oral Daily     PRN:   Current Facility-Administered Medications:     acetaminophen, 1,000 mg, Oral,  Q8H PRN    acetaminophen, 650 mg, Oral, Q4H PRN    albuterol-ipratropium, 3 mL, Nebulization, Q4H PRN    aluminum-magnesium hydroxide-simethicone, 30 mL, Oral, QID PRN    melatonin, 6 mg, Oral, Nightly PRN    naloxone, 0.02 mg, Intravenous, PRN    ondansetron, 8 mg, Oral, Q8H PRN    polyethylene glycol, 17 g, Oral, BID PRN    prochlorperazine, 5 mg, Intravenous, Q6H PRN    simethicone, 1 tablet, Oral, QID PRN    sodium chloride 0.9%, 5 mL, Intravenous, PRN  Infusions:   Estimated Creatinine Clearance: 51 mL/min (based on SCr of 0.6 mg/dL).                 Assessment & Plan  Acute hypoxic respiratory failure  Solitary pulmonary nodule  Bilateral pleural effusion  Patient with Hypoxic Respiratory failure which is Acute on chronic.  she is not on home oxygen. Supplemental oxygen was provided and noted-      Signs/symptoms of respiratory failure include- lethargy. Contributing diagnoses includes - CHF and Pleural effusion Labs and images were reviewed. Patient Has not had a recent ABG. Will treat underlying causes and adjust management of respiratory failure as follows-   - start IV diuresis  - previous CT chest in 2023 with multiple lung nodules noted. Radiology recommended repeat imaging within 3-6 months, but this was never performed. She has a history of remote tobacco use.  CT chest pending     6/29  sats 95% on RA.  Patient with Hypoxic Respiratory failure which is Acute on chronic.  she is not on home oxygen. Supplemental oxygen was provided and noted-      Signs/symptoms of respiratory failure include- lethargy. Contributing diagnoses includes - CHF and Pleural effusion Labs and images were reviewed. Patient Has not had a recent ABG. Will treat underlying causes and adjust management of respiratory failure as follows-   - start IV diuresis  - previous CT chest in 2023 with multiple lung nodules noted. Radiology recommended repeat imaging within 3-6 months, but this was never performed. She has a history of  remote tobacco use.  CT chest pending   6/29  CT chest in 2023 that showed multiple pulmonary nodules-- supposed to get repeat imaging with in 3-6 months. Repeat CT chest due to remote hx of tobacco use - r/o malignancy-  Bilateral lower lobe, inferior lingular, and right middle lobe consolidative change cannot exclude infection. Moderate bilateral pleural fluid collections. Pulmonology consuled for thoracentesis     Patient with Hypoxic Respiratory failure which is Acute on chronic.  she is not on home oxygen. Supplemental oxygen was provided and noted-      Signs/symptoms of respiratory failure include- lethargy. Contributing diagnoses includes - CHF and Pleural effusion Labs and images were reviewed. Patient Has not had a recent ABG. Will treat underlying causes and adjust management of respiratory failure as follows-   - start IV diuresis  - previous CT chest in 2023 with multiple lung nodules noted. Radiology recommended repeat imaging within 3-6 months, but this was never performed. She has a history of remote tobacco use.  CT chest pending       6/29  CT chest in 2023 that showed multiple pulmonary nodules-- supposed to get repeat imaging with in 3-6 months. Repeat CT chest due to remote hx of tobacco use - r/o malignancy-  Bilateral lower lobe, inferior lingular, and right middle lobe consolidative change cannot exclude infection. Moderate bilateral pleural fluid collections. sats 95% on RA. Pulmonology consuled for thoracentesis   (HFpEF) heart failure with preserved ejection fraction  - Patient is identified as having Diastolic (HFpEF) heart failure that is Acute on Chronic.   - CHF is currently uncontrolled due to Rales/crackles on pulmonary exam and Pulmonary edema/pleural effusion on CXR.   - Latest ECHO performed and demonstrates- Results for orders placed during the hospital encounter of 03/20/25    Echo    Interpretation Summary    Left Ventricle: The left ventricle is normal in size. Ventricular  mass is normal. Normal wall thickness. Normal wall motion. There is normal systolic function with a visually estimated ejection fraction of 55 - 60%. There is indeterminate diastolic function. Elevated left ventricular filling pressure.    Right Ventricle: The right ventricle is normal in size. Wall thickness is normal. Systolic function is normal.    Mitral Valve: There is mild regurgitation.    Tricuspid Valve: There is mild regurgitation.    Pulmonary Artery: The estimated pulmonary artery systolic pressure is 24 mmHg.    IVC/SVC: Normal venous pressure at 3 mmHg.    Pericardium: Left pleural effusion.  .   - Continue Furosemide and monitor clinical status closely.   - Monitor on telemetry.   - Patient is on CHF pathway.    - Monitor strict Is&Os and daily weights.    - Place on fluid restriction of 1.5 L.   - Continue to stress to patient importance of self efficacy and  on diet for CHF.   - Last BNP reviewed- and noted below   Recent Labs   Lab 06/28/25  0732   BNP 1,270*   - starting IV diuresis with lasix 40 mg BID   H/O ischemic left MCA stroke  - continue statin, plavix, and zetia   Mixed hyperlipidemia  CAD (coronary artery disease)  - continue statin and zetia   - continue statin and zetia   Iron deficiency anemia due to chronic blood loss  Anemia is likely due to acute blood loss which was from GI bleeds. Most recent hemoglobin and hematocrit are listed below.  Recent Labs     06/28/25  0732 06/28/25  0944 06/29/25  0701   HGB 8.9* 7.5* 7.4*   HCT 28.3* 25.2* 23.6*     Plan  - Monitor serial CBC: Daily  - Transfuse PRBC if patient becomes hemodynamically unstable, symptomatic or H/H drops below 7/21.  - Patient has not received any PRBC transfusions to date  - Patient's anemia is currently stable  - continue ppi. scheduled for EGD with GI 07/08  - follows closely with OP hematology. Considering OP bone marrow biopsy primarily to rule out MDS   6/29   EGD 3/25 Esophageal mucosal changes secondary  to                          established long-segment Rodriguez's disease,                          classified as Rodriguez's stage C10-M10 per [facility]                          criteria.                          - 3 cm hiatal hernia.                          - One non-bleeding angioectasia in the stomach.                          Treated with argon plasma coagulation (APC).                          - Two non-bleeding angioectasias in the duodenum.                          Treated with argon plasma coagulation (APC).   FOBT ordered. consider PRBC transfusion   Hypertension  Patient's blood pressure range in the last 24 hours was: BP  Min: 108/59  Max: 130/60.The patient's inpatient anti-hypertensive regimen is listed below:  Current Antihypertensives  furosemide injection 40 mg, 2 times daily, Intravenous    Plan  - BP is controlled  - starting IV diuresis  Moderate protein-calorie malnutrition  Nutrition consulted. Most recent weight and BMI monitored-     Measurements:  Wt Readings from Last 1 Encounters:   06/29/25 48 kg (105 lb 13.1 oz)   Body mass index is 18.75 kg/m².    Patient has been screened and assessed by RD.    Malnutrition Type:  Context:    Level:      Malnutrition Characteristic Summary:       Interventions/Recommendations (treatment strategy):       Elevated troponin  - elevated on admit, but flat and at baseline  - denies chest pain  - likely elevated from demand in setting of pulmonary edema   - monitor tele     VTE Risk Mitigation (From admission, onward)           Ordered     enoxaparin injection 30 mg  Daily         06/29/25 1103     IP VTE HIGH RISK PATIENT  Once         06/28/25 0936                    Discharge Planning   TERE: 6/30/2025     Code Status: Full Code   Medical Readiness for Discharge Date:                            Mick Gomez MD  Department of Hospital Medicine   [facility] - Med Surg         [1]   No current facility-administered medications on file prior to encounter.      Current Outpatient Medications on File Prior to Encounter   Medication Sig Dispense Refill    atorvastatin (LIPITOR) 80 MG tablet Take 1 tablet (80 mg total) by mouth once daily. 90 tablet 3    clopidogreL (PLAVIX) 75 mg tablet Take 1 tablet (75 mg total) by mouth once daily. 90 tablet 3    cyanocobalamin (VITAMIN B-12) 1000 MCG tablet Take 1,000 mcg by mouth once daily.      ezetimibe (ZETIA) 10 mg tablet Take 1 tablet (10 mg total) by mouth once daily. 90 tablet 3    furosemide (LASIX) 20 MG tablet Take 1 tablet (20 mg total) by mouth 2 (two) times a day. 60 tablet 11    pantoprazole (PROTONIX) 40 MG tablet Take 1 tablet (40 mg total) by mouth once daily. Take 1 tablet by mouth every day 90 tablet 1    potassium chloride SA (K-DUR,KLOR-CON) 20 MEQ tablet Take 1 tablet (20 mEq total) by mouth once daily. 30 tablet 11    vitamin D (VITAMIN D3) 1000 units Tab Take 1,000 Units by mouth once daily.

## 2025-06-30 ENCOUNTER — DOCUMENTATION ONLY (OUTPATIENT)
Dept: CARDIOLOGY | Facility: CLINIC | Age: 85
End: 2025-06-30
Payer: MEDICARE

## 2025-06-30 DIAGNOSIS — R06.02 SOB (SHORTNESS OF BREATH): Primary | ICD-10-CM

## 2025-06-30 LAB
ALBUMIN SERPL BCP-MCNC: 3.5 G/DL (ref 3.5–5.2)
ANION GAP (OHS): 8 MMOL/L (ref 8–16)
ANION GAP (OHS): 9 MMOL/L (ref 8–16)
AORTIC SIZE INDEX (SOV): 1.9 CM/M2
AORTIC SIZE INDEX: 1.8 CM/M2
ASCENDING AORTA: 2.6 CM
AV AREA BY CONTINUOUS VTI: 1.7 CM2
AV INDEX (PROSTH): 0.58
AV LVOT MEAN GRADIENT: 2 MMHG
AV LVOT PEAK GRADIENT: 3 MMHG
AV MEAN GRADIENT: 6 MMHG
AV PEAK GRADIENT: 10 MMHG
AV VALVE AREA BY VELOCITY RATIO: 1.6 CM²
AV VALVE AREA: 1.6 CM2
AV VELOCITY RATIO: 0.56
BSA FOR ECHO PROCEDURE: 1.46 M2
BUN SERPL-MCNC: 23 MG/DL (ref 8–23)
BUN SERPL-MCNC: 25 MG/DL (ref 8–23)
CALCIUM SERPL-MCNC: 8.6 MG/DL (ref 8.7–10.5)
CALCIUM SERPL-MCNC: 8.8 MG/DL (ref 8.7–10.5)
CHLORIDE SERPL-SCNC: 100 MMOL/L (ref 95–110)
CHLORIDE SERPL-SCNC: 101 MMOL/L (ref 95–110)
CO2 SERPL-SCNC: 29 MMOL/L (ref 23–29)
CO2 SERPL-SCNC: 31 MMOL/L (ref 23–29)
CREAT SERPL-MCNC: 0.6 MG/DL (ref 0.5–1.4)
CREAT SERPL-MCNC: 0.7 MG/DL (ref 0.5–1.4)
CV ECHO LV RWT: 0.45 CM
DOP CALC AO PEAK VEL: 1.6 M/S
DOP CALC AO VTI: 35.8 CM
DOP CALC LVOT AREA: 2.8 CM2
DOP CALC LVOT DIAMETER: 1.9 CM
DOP CALC LVOT PEAK VEL: 0.9 M/S
DOP CALC LVOT STROKE VOLUME: 58.7 CM3
DOP CALCLVOT PEAK VEL VTI: 20.7 CM
E WAVE DECELERATION TIME: 185 MS
E/A RATIO: 2.04
E/E' RATIO: 16 M/S
ECHO EF ESTIMATED: 55 %
ECHO LV POSTERIOR WALL: 0.9 CM (ref 0.6–1.1)
ERYTHROCYTE [DISTWIDTH] IN BLOOD BY AUTOMATED COUNT: 15.9 % (ref 11.5–14.5)
FRACTIONAL SHORTENING: 27.5 % (ref 28–44)
GFR SERPLBLD CREATININE-BSD FMLA CKD-EPI: >60 ML/MIN/1.73/M2
GFR SERPLBLD CREATININE-BSD FMLA CKD-EPI: >60 ML/MIN/1.73/M2
GLUCOSE SERPL-MCNC: 102 MG/DL (ref 70–110)
GLUCOSE SERPL-MCNC: 95 MG/DL (ref 70–110)
HCT VFR BLD AUTO: 24.8 % (ref 37–48.5)
HGB BLD-MCNC: 7.7 GM/DL (ref 12–16)
INTERVENTRICULAR SEPTUM: 0.9 CM (ref 0.6–1.1)
LA MAJOR: 5.4 CM
LA MINOR: 5.4 CM
LA WIDTH: 4.5 CM
LEFT ATRIUM SIZE: 3.8 CM
LEFT ATRIUM VOLUME INDEX MOD: 54 ML/M2
LEFT ATRIUM VOLUME INDEX: 53 ML/M2
LEFT ATRIUM VOLUME MOD: 80 ML
LEFT ATRIUM VOLUME: 78 CM3
LEFT INTERNAL DIMENSION IN SYSTOLE: 2.9 CM (ref 2.1–4)
LEFT VENTRICLE DIASTOLIC VOLUME INDEX: 48.65 ML/M2
LEFT VENTRICLE DIASTOLIC VOLUME: 72 ML
LEFT VENTRICLE MASS INDEX: 74.1 G/M2
LEFT VENTRICLE SYSTOLIC VOLUME INDEX: 21.6 ML/M2
LEFT VENTRICLE SYSTOLIC VOLUME: 32 ML
LEFT VENTRICULAR INTERNAL DIMENSION IN DIASTOLE: 4 CM (ref 3.5–6)
LEFT VENTRICULAR MASS: 109.7 G
LV LATERAL E/E' RATIO: 12.3
LV SEPTAL E/E' RATIO: 24.5
MAGNESIUM SERPL-MCNC: 2 MG/DL (ref 1.6–2.6)
MCH RBC QN AUTO: 27.4 PG (ref 27–31)
MCHC RBC AUTO-ENTMCNC: 31 G/DL (ref 32–36)
MCV RBC AUTO: 88 FL (ref 82–98)
MV A" WAVE DURATION": 95.15 MS
MV PEAK A VEL: 0.48 M/S
MV PEAK E VEL: 0.98 M/S
OHS CV RV/LV RATIO: 0.7 CM
PHOSPHATE SERPL-MCNC: 4.1 MG/DL (ref 2.7–4.5)
PISA TR MAX VEL: 2.9 M/S
PLATELET # BLD AUTO: 410 K/UL (ref 150–450)
PMV BLD AUTO: 10.1 FL (ref 9.2–12.9)
POTASSIUM SERPL-SCNC: 3.5 MMOL/L (ref 3.5–5.1)
POTASSIUM SERPL-SCNC: 4.3 MMOL/L (ref 3.5–5.1)
PULM VEIN A" WAVE DURATION": 95.15 MS
PULM VEIN S/D RATIO: 0.71
PULMONIC VEIN PEAK A VELOCITY: 0.2 M/S
PV PEAK D VEL: 0.76 M/S
PV PEAK S VEL: 0.54 M/S
RA MAJOR: 4.6 CM
RA PRESSURE ESTIMATED: 3 MMHG
RA WIDTH: 3.75 CM
RBC # BLD AUTO: 2.81 M/UL (ref 4–5.4)
RIGHT VENTRICLE DIASTOLIC BASEL DIMENSION: 2.8 CM
RV TB RVSP: 6 MMHG
SINUS: 2.8 CM
SODIUM SERPL-SCNC: 138 MMOL/L (ref 136–145)
SODIUM SERPL-SCNC: 140 MMOL/L (ref 136–145)
STJ: 1.7 CM
TDI LATERAL: 0.08 M/S
TDI SEPTAL: 0.04 M/S
TDI: 0.06 M/S
TRICUSPID ANNULAR PLANE SYSTOLIC EXCURSION: 1.2 CM
TV PEAK GRADIENT: 33 MMHG
TV REST PULMONARY ARTERY PRESSURE: 37 MMHG
WBC # BLD AUTO: 6.44 K/UL (ref 3.9–12.7)
Z-SCORE OF LEFT VENTRICULAR DIMENSION IN END DIASTOLE: -0.98
Z-SCORE OF LEFT VENTRICULAR DIMENSION IN END SYSTOLE: 0.45

## 2025-06-30 PROCEDURE — 25000003 PHARM REV CODE 250: Performed by: HOSPITALIST

## 2025-06-30 PROCEDURE — 25000003 PHARM REV CODE 250

## 2025-06-30 PROCEDURE — 80048 BASIC METABOLIC PNL TOTAL CA: CPT

## 2025-06-30 PROCEDURE — 21400001 HC TELEMETRY ROOM

## 2025-06-30 PROCEDURE — 85027 COMPLETE CBC AUTOMATED: CPT

## 2025-06-30 PROCEDURE — 85045 AUTOMATED RETICULOCYTE COUNT: CPT | Performed by: HOSPITALIST

## 2025-06-30 PROCEDURE — 36415 COLL VENOUS BLD VENIPUNCTURE: CPT

## 2025-06-30 PROCEDURE — 63600175 PHARM REV CODE 636 W HCPCS: Mod: JZ,TB | Performed by: HOSPITALIST

## 2025-06-30 PROCEDURE — 25500020 PHARM REV CODE 255: Performed by: HOSPITALIST

## 2025-06-30 PROCEDURE — 63600175 PHARM REV CODE 636 W HCPCS

## 2025-06-30 PROCEDURE — 84132 ASSAY OF SERUM POTASSIUM: CPT | Performed by: HOSPITALIST

## 2025-06-30 PROCEDURE — 83735 ASSAY OF MAGNESIUM: CPT

## 2025-06-30 PROCEDURE — 36415 COLL VENOUS BLD VENIPUNCTURE: CPT | Performed by: HOSPITALIST

## 2025-06-30 RX ORDER — HYDROCODONE BITARTRATE AND ACETAMINOPHEN 500; 5 MG/1; MG/1
TABLET ORAL
Status: CANCELLED | OUTPATIENT
Start: 2025-06-30

## 2025-06-30 RX ORDER — POTASSIUM CHLORIDE 20 MEQ/1
40 TABLET, EXTENDED RELEASE ORAL ONCE
Status: COMPLETED | OUTPATIENT
Start: 2025-06-30 | End: 2025-06-30

## 2025-06-30 RX ORDER — FUROSEMIDE 40 MG/1
40 TABLET ORAL DAILY PRN
Qty: 60 TABLET | Refills: 1 | OUTPATIENT
Start: 2025-06-30 | End: 2026-06-30

## 2025-06-30 RX ADMIN — FERUMOXYTOL 1020 MG: 510 INJECTION INTRAVENOUS at 03:06

## 2025-06-30 RX ADMIN — CLOPIDOGREL 75 MG: 75 TABLET ORAL at 07:06

## 2025-06-30 RX ADMIN — ATORVASTATIN CALCIUM 80 MG: 40 TABLET, FILM COATED ORAL at 07:06

## 2025-06-30 RX ADMIN — EZETIMIBE 10 MG: 10 TABLET ORAL at 07:06

## 2025-06-30 RX ADMIN — PANTOPRAZOLE SODIUM 40 MG: 20 TABLET, DELAYED RELEASE ORAL at 07:06

## 2025-06-30 RX ADMIN — POTASSIUM CHLORIDE 40 MEQ: 1500 TABLET, EXTENDED RELEASE ORAL at 07:06

## 2025-06-30 RX ADMIN — CYANOCOBALAMIN TAB 1000 MCG 1000 MCG: 1000 TAB at 07:06

## 2025-06-30 RX ADMIN — FUROSEMIDE 40 MG: 10 INJECTION, SOLUTION INTRAVENOUS at 06:06

## 2025-06-30 RX ADMIN — Medication 1000 UNITS: at 07:06

## 2025-06-30 RX ADMIN — HUMAN ALBUMIN MICROSPHERES AND PERFLUTREN 0.11 MG: 10; .22 INJECTION, SOLUTION INTRAVENOUS at 11:06

## 2025-06-30 RX ADMIN — FUROSEMIDE 40 MG: 10 INJECTION, SOLUTION INTRAVENOUS at 07:06

## 2025-06-30 NOTE — ASSESSMENT & PLAN NOTE
Patient with Hypoxic Respiratory failure which is Acute on chronic.  she is not on home oxygen. Supplemental oxygen was provided and noted-      Signs/symptoms of respiratory failure include- lethargy. Contributing diagnoses includes - CHF and Pleural effusion Labs and images were reviewed. Patient Has not had a recent ABG. Will treat underlying causes and adjust management of respiratory failure as follows-   - start IV diuresis  - previous CT chest in 2023 with multiple lung nodules noted. Radiology recommended repeat imaging within 3-6 months, but this was never performed. She has a history of remote tobacco use.  CT chest pending     6/29  sats 95% on RA.  Patient with Hypoxic Respiratory failure which is Acute on chronic.  she is not on home oxygen. Supplemental oxygen was provided and noted-      Signs/symptoms of respiratory failure include- lethargy. Contributing diagnoses includes - CHF and Pleural effusion Labs and images were reviewed. Patient Has not had a recent ABG. Will treat underlying causes and adjust management of respiratory failure as follows-   - start IV diuresis  - previous CT chest in 2023 with multiple lung nodules noted. Radiology recommended repeat imaging within 3-6 months, but this was never performed. She has a history of remote tobacco use.  CT chest pending   6/29  CT chest in 2023 that showed multiple pulmonary nodules-- supposed to get repeat imaging with in 3-6 months. Repeat CT chest due to remote hx of tobacco use - r/o malignancy-  Bilateral lower lobe, inferior lingular, and right middle lobe consolidative change cannot exclude infection. Moderate bilateral pleural fluid collections. Pulmonology consuled for thoracentesis     Patient with Hypoxic Respiratory failure which is Acute on chronic.  she is not on home oxygen. Supplemental oxygen was provided and noted-      Signs/symptoms of respiratory failure include- lethargy. Contributing diagnoses includes - CHF and Pleural  effusion Labs and images were reviewed. Patient Has not had a recent ABG. Will treat underlying causes and adjust management of respiratory failure as follows-   - start IV diuresis  - previous CT chest in 2023 with multiple lung nodules noted. Radiology recommended repeat imaging within 3-6 months, but this was never performed. She has a history of remote tobacco use.  CT chest pending       6/29  CT chest in 2023 that showed multiple pulmonary nodules-- supposed to get repeat imaging with in 3-6 months. Repeat CT chest due to remote hx of tobacco use - r/o malignancy-  Bilateral lower lobe, inferior lingular, and right middle lobe consolidative change cannot exclude infection. Moderate bilateral pleural fluid collections. sats 95% on RA. Pulmonology consuled for thoracentesis   6/29 CX ray -lungs are significant for diffuse interstitial prominence with reticular basilar opacity overlying bilateral pleural fluid collections similar previous performed 05/29/2025. There is no pneumothorax. The cardiac silhouette appears enlarged. There is calcification of the aorta. on  IV lasix 40mg q 12h. strict IO monitor. CT chest in 2023 that showed multiple pulmonary nodules-- supposed to get repeat imaging with in 3-6 months. Repeat CT chest due to remote hx of tobacco use - r/o malignancy-  Bilateral lower lobe, inferior lingular, and right middle lobe consolidative change cannot exclude infection. Moderate bilateral pleural fluid collections. sats 95% on RA. Pulmonology consuled for thoracentesis. Chronic bilateral pleural effusions with chronically elevated BNP consistent with congestive heart failure. Recommend escalated diuresis Continue diuresis.   6/30 negative balance of  1.8 L. sats 96% on RA.  Patient with Hypoxic Respiratory failure which is Acute on chronic.  she is not on home oxygen. Supplemental oxygen was provided and noted-      Signs/symptoms of respiratory failure include- lethargy. Contributing diagnoses  includes - CHF and Pleural effusion Labs and images were reviewed. Patient Has not had a recent ABG. Will treat underlying causes and adjust management of respiratory failure as follows-   - start IV diuresis  - previous CT chest in 2023 with multiple lung nodules noted. Radiology recommended repeat imaging within 3-6 months, but this was never performed. She has a history of remote tobacco use.  CT chest pending     6/29  sats 95% on RA.  Patient with Hypoxic Respiratory failure which is Acute on chronic.  she is not on home oxygen. Supplemental oxygen was provided and noted-      Signs/symptoms of respiratory failure include- lethargy. Contributing diagnoses includes - CHF and Pleural effusion Labs and images were reviewed. Patient Has not had a recent ABG. Will treat underlying causes and adjust management of respiratory failure as follows-   - start IV diuresis  - previous CT chest in 2023 with multiple lung nodules noted. Radiology recommended repeat imaging within 3-6 months, but this was never performed. She has a history of remote tobacco use.  CT chest pending   6/29  CT chest in 2023 that showed multiple pulmonary nodules-- supposed to get repeat imaging with in 3-6 months. Repeat CT chest due to remote hx of tobacco use - r/o malignancy-  Bilateral lower lobe, inferior lingular, and right middle lobe consolidative change cannot exclude infection. Moderate bilateral pleural fluid collections. Pulmonology consuled for thoracentesis     Patient with Hypoxic Respiratory failure which is Acute on chronic.  she is not on home oxygen. Supplemental oxygen was provided and noted-      Signs/symptoms of respiratory failure include- lethargy. Contributing diagnoses includes - CHF and Pleural effusion Labs and images were reviewed. Patient Has not had a recent ABG. Will treat underlying causes and adjust management of respiratory failure as follows-   - start IV diuresis  - previous CT chest in 2023 with multiple  lung nodules noted. Radiology recommended repeat imaging within 3-6 months, but this was never performed. She has a history of remote tobacco use.  CT chest pending       6/29  CT chest in 2023 that showed multiple pulmonary nodules-- supposed to get repeat imaging with in 3-6 months. Repeat CT chest due to remote hx of tobacco use - r/o malignancy-  Bilateral lower lobe, inferior lingular, and right middle lobe consolidative change cannot exclude infection. Moderate bilateral pleural fluid collections. sats 95% on RA. Pulmonology consuled for thoracentesis   Patient with Hypoxic Respiratory failure which is Acute on chronic.  she is not on home oxygen. Supplemental oxygen was provided and noted-      Signs/symptoms of respiratory failure include- lethargy. Contributing diagnoses includes - CHF and Pleural effusion Labs and images were reviewed. Patient Has not had a recent ABG. Will treat underlying causes and adjust management of respiratory failure as follows-   - start IV diuresis  - previous CT chest in 2023 with multiple lung nodules noted. Radiology recommended repeat imaging within 3-6 months, but this was never performed. She has a history of remote tobacco use.  CT chest pending     6/29  sats 95% on RA.  Patient with Hypoxic Respiratory failure which is Acute on chronic.  she is not on home oxygen. Supplemental oxygen was provided and noted-      Signs/symptoms of respiratory failure include- lethargy. Contributing diagnoses includes - CHF and Pleural effusion Labs and images were reviewed. Patient Has not had a recent ABG. Will treat underlying causes and adjust management of respiratory failure as follows-   - start IV diuresis  - previous CT chest in 2023 with multiple lung nodules noted. Radiology recommended repeat imaging within 3-6 months, but this was never performed. She has a history of remote tobacco use.  CT chest pending   6/29  CT chest in 2023 that showed multiple pulmonary nodules--  supposed to get repeat imaging with in 3-6 months. Repeat CT chest due to remote hx of tobacco use - r/o malignancy-  Bilateral lower lobe, inferior lingular, and right middle lobe consolidative change cannot exclude infection. Moderate bilateral pleural fluid collections. Pulmonology consuled for thoracentesis     Patient with Hypoxic Respiratory failure which is Acute on chronic.  she is not on home oxygen. Supplemental oxygen was provided and noted-      Signs/symptoms of respiratory failure include- lethargy. Contributing diagnoses includes - CHF and Pleural effusion Labs and images were reviewed. Patient Has not had a recent ABG. Will treat underlying causes and adjust management of respiratory failure as follows-   - start IV diuresis  - previous CT chest in 2023 with multiple lung nodules noted. Radiology recommended repeat imaging within 3-6 months, but this was never performed. She has a history of remote tobacco use.  CT chest pending       6/29  CT chest in 2023 that showed multiple pulmonary nodules-- supposed to get repeat imaging with in 3-6 months. Repeat CT chest due to remote hx of tobacco use - r/o malignancy-  Bilateral lower lobe, inferior lingular, and right middle lobe consolidative change cannot exclude infection. Moderate bilateral pleural fluid collections. sats 95% on RA. Pulmonology consuled for thoracentesis . Chronic bilateral pleural effusions with chronically elevated BNP consistent with congestive heart failure. Recommend escalated diuresis Continue diuresis.   6/30  sats 96% on 3LNC. Repeat echo pending.

## 2025-06-30 NOTE — PROGRESS NOTES
"[facility]) nurse navigator notified of [facility] candidate in need of education and introduction to 4-6 week program.      PT aao x 3 while lying in bed #626 6/30/2025 with her  Mr. Eder Doe and her son Mr. Serafin Meza at bedside. Introduced self to pt as [facility] nurse navigator.     Patient given "[facility] Home Care Guide" which includes "Daily weight and symptom tracker".  Encouraged pt and family to review information.      Reviewed the following key points of [facility] program with pt and family:    Take your medications as directed. Call our provider if any Health Care Professional changes your heart/fluid medications.   Weight yourself daily. Daily dry weights. Upon waking, empty your bladder, weigh with as little clothes as possible before eating or drinking. Record weight in Symptom Tracker and compare these weights for fluid gain. Weight gain overnight of 3 - 4lbs is fluid, also gain of 5lbs in 3 days is fluid as well. Call us!  Follow low salt and limit fluid diet. Salt/sodium restrictions 2000 - 3000mg. Sodium makes you hold onto fluid. High sodium foods: deli meat, deli cheese, sausages, hot dogs, fast food, restaurant food, anything in a box, bottle, or can. Cook with fresh or frozen ingredients and use seasonings that are labeled NO SALT/SODIUM. Check portions sizes, salt is reported for one portion. A can may contain 2 - 3 portions. Fluid restriction 1.5 - 2 liters per day, measure all your fluid, the milk in your cereal, broth in your soup, yogurt, applesauce, Jello, and ice cream because anything that melts at room temperature is a liquid.   Stop smoking and start exercising. Brisk walking is good, don't walk like you're going to the hangman and DON'T WALK IN THE HEAT! Start slow and increase as tolerated. Remember if you don't use it, you lose it.   Go to all your appointments and call your team. Have your weight, blood pressure " and pulse ready when we call to do the phone check-ins and call us if you have fluid gain or questions.      Pt was given [employer] pamphlet Live more worry less with the Cardio[employer] HF System. The pt would prefer to have morning appts. A message will be sent to our MA for scheduling.     Pt reminded to follow Symptom tracker and to call at the onset of symptoms according to tracker.     Reviewed plan for follow up once discharged to include phone calls, in person and virtual visits to assist pt optimizing their heart failure medication regimen and encouraging healthy lifestyle modifications.  Reminded pt that program will assist them over the next 4-6 weeks and then patient will be transferred to long term care provider .  Reminded pt how to contact [facility] navigator via phone and or via [employer].     Pt instructed appointment will be printed on hospital discharge paperwork.     Pt also reminded Nurse will call 48-72 hours after discharge to check on them.     PT and family verbalize read back of some of the information given.  Encouraged pt and family to read over information often and contact team with any questions or concerns.

## 2025-06-30 NOTE — ASSESSMENT & PLAN NOTE
Patient's blood pressure range in the last 24 hours was: BP  Min: 96/52  Max: 130/60.The patient's inpatient anti-hypertensive regimen is listed below:  Current Antihypertensives  furosemide injection 40 mg, 2 times daily, Intravenous    Plan  - BP is controlled  - starting IV diuresis

## 2025-06-30 NOTE — ASSESSMENT & PLAN NOTE
Patient with Hypoxic Respiratory failure which is Acute on chronic.  she is not on home oxygen. Supplemental oxygen was provided and noted-      Signs/symptoms of respiratory failure include- lethargy. Contributing diagnoses includes - CHF and Pleural effusion Labs and images were reviewed. Patient Has not had a recent ABG. Will treat underlying causes and adjust management of respiratory failure as follows-   - start IV diuresis  - previous CT chest in 2023 with multiple lung nodules noted. Radiology recommended repeat imaging within 3-6 months, but this was never performed. She has a history of remote tobacco use.  CT chest pending     6/29  sats 95% on RA.  Patient with Hypoxic Respiratory failure which is Acute on chronic.  she is not on home oxygen. Supplemental oxygen was provided and noted-      Signs/symptoms of respiratory failure include- lethargy. Contributing diagnoses includes - CHF and Pleural effusion Labs and images were reviewed. Patient Has not had a recent ABG. Will treat underlying causes and adjust management of respiratory failure as follows-   - start IV diuresis  - previous CT chest in 2023 with multiple lung nodules noted. Radiology recommended repeat imaging within 3-6 months, but this was never performed. She has a history of remote tobacco use.  CT chest pending   6/29  CT chest in 2023 that showed multiple pulmonary nodules-- supposed to get repeat imaging with in 3-6 months. Repeat CT chest due to remote hx of tobacco use - r/o malignancy-  Bilateral lower lobe, inferior lingular, and right middle lobe consolidative change cannot exclude infection. Moderate bilateral pleural fluid collections. Pulmonology consuled for thoracentesis     Patient with Hypoxic Respiratory failure which is Acute on chronic.  she is not on home oxygen. Supplemental oxygen was provided and noted-      Signs/symptoms of respiratory failure include- lethargy. Contributing diagnoses includes - CHF and Pleural  effusion Labs and images were reviewed. Patient Has not had a recent ABG. Will treat underlying causes and adjust management of respiratory failure as follows-   - start IV diuresis  - previous CT chest in 2023 with multiple lung nodules noted. Radiology recommended repeat imaging within 3-6 months, but this was never performed. She has a history of remote tobacco use.  CT chest pending       6/29  CT chest in 2023 that showed multiple pulmonary nodules-- supposed to get repeat imaging with in 3-6 months. Repeat CT chest due to remote hx of tobacco use - r/o malignancy-  Bilateral lower lobe, inferior lingular, and right middle lobe consolidative change cannot exclude infection. Moderate bilateral pleural fluid collections. sats 95% on RA. Pulmonology consuled for thoracentesis   6/29 CX ray -lungs are significant for diffuse interstitial prominence with reticular basilar opacity overlying bilateral pleural fluid collections similar previous performed 05/29/2025. There is no pneumothorax. The cardiac silhouette appears enlarged. There is calcification of the aorta. on  IV lasix 40mg q 12h. strict IO monitor. CT chest in 2023 that showed multiple pulmonary nodules-- supposed to get repeat imaging with in 3-6 months. Repeat CT chest due to remote hx of tobacco use - r/o malignancy-  Bilateral lower lobe, inferior lingular, and right middle lobe consolidative change cannot exclude infection. Moderate bilateral pleural fluid collections. sats 95% on RA. Pulmonology consuled for thoracentesis. Chronic bilateral pleural effusions with chronically elevated BNP consistent with congestive heart failure. Recommend escalated diuresis Continue diuresis.   6/30 negative balance of  1.8 L. sats 96% on RA.  Patient with Hypoxic Respiratory failure which is Acute on chronic.  she is not on home oxygen. Supplemental oxygen was provided and noted-      Signs/symptoms of respiratory failure include- lethargy. Contributing diagnoses  includes - CHF and Pleural effusion Labs and images were reviewed. Patient Has not had a recent ABG. Will treat underlying causes and adjust management of respiratory failure as follows-   - start IV diuresis  - previous CT chest in 2023 with multiple lung nodules noted. Radiology recommended repeat imaging within 3-6 months, but this was never performed. She has a history of remote tobacco use.  CT chest pending     6/29  sats 95% on RA.  Patient with Hypoxic Respiratory failure which is Acute on chronic.  she is not on home oxygen. Supplemental oxygen was provided and noted-      Signs/symptoms of respiratory failure include- lethargy. Contributing diagnoses includes - CHF and Pleural effusion Labs and images were reviewed. Patient Has not had a recent ABG. Will treat underlying causes and adjust management of respiratory failure as follows-   - start IV diuresis  - previous CT chest in 2023 with multiple lung nodules noted. Radiology recommended repeat imaging within 3-6 months, but this was never performed. She has a history of remote tobacco use.  CT chest pending   6/29  CT chest in 2023 that showed multiple pulmonary nodules-- supposed to get repeat imaging with in 3-6 months. Repeat CT chest due to remote hx of tobacco use - r/o malignancy-  Bilateral lower lobe, inferior lingular, and right middle lobe consolidative change cannot exclude infection. Moderate bilateral pleural fluid collections. Pulmonology consuled for thoracentesis     Patient with Hypoxic Respiratory failure which is Acute on chronic.  she is not on home oxygen. Supplemental oxygen was provided and noted-      Signs/symptoms of respiratory failure include- lethargy. Contributing diagnoses includes - CHF and Pleural effusion Labs and images were reviewed. Patient Has not had a recent ABG. Will treat underlying causes and adjust management of respiratory failure as follows-   - start IV diuresis  - previous CT chest in 2023 with multiple  lung nodules noted. Radiology recommended repeat imaging within 3-6 months, but this was never performed. She has a history of remote tobacco use.  CT chest pending       6/29  CT chest in 2023 that showed multiple pulmonary nodules-- supposed to get repeat imaging with in 3-6 months. Repeat CT chest due to remote hx of tobacco use - r/o malignancy-  Bilateral lower lobe, inferior lingular, and right middle lobe consolidative change cannot exclude infection. Moderate bilateral pleural fluid collections. sats 95% on RA. Pulmonology consuled for thoracentesis   Patient with Hypoxic Respiratory failure which is Acute on chronic.  she is not on home oxygen. Supplemental oxygen was provided and noted-      Signs/symptoms of respiratory failure include- lethargy. Contributing diagnoses includes - CHF and Pleural effusion Labs and images were reviewed. Patient Has not had a recent ABG. Will treat underlying causes and adjust management of respiratory failure as follows-   - start IV diuresis  - previous CT chest in 2023 with multiple lung nodules noted. Radiology recommended repeat imaging within 3-6 months, but this was never performed. She has a history of remote tobacco use.  CT chest pending     6/29  sats 95% on RA.  Patient with Hypoxic Respiratory failure which is Acute on chronic.  she is not on home oxygen. Supplemental oxygen was provided and noted-      Signs/symptoms of respiratory failure include- lethargy. Contributing diagnoses includes - CHF and Pleural effusion Labs and images were reviewed. Patient Has not had a recent ABG. Will treat underlying causes and adjust management of respiratory failure as follows-   - start IV diuresis  - previous CT chest in 2023 with multiple lung nodules noted. Radiology recommended repeat imaging within 3-6 months, but this was never performed. She has a history of remote tobacco use.  CT chest pending   6/29  CT chest in 2023 that showed multiple pulmonary nodules--  supposed to get repeat imaging with in 3-6 months. Repeat CT chest due to remote hx of tobacco use - r/o malignancy-  Bilateral lower lobe, inferior lingular, and right middle lobe consolidative change cannot exclude infection. Moderate bilateral pleural fluid collections. Pulmonology consuled for thoracentesis     Patient with Hypoxic Respiratory failure which is Acute on chronic.  she is not on home oxygen. Supplemental oxygen was provided and noted-      Signs/symptoms of respiratory failure include- lethargy. Contributing diagnoses includes - CHF and Pleural effusion Labs and images were reviewed. Patient Has not had a recent ABG. Will treat underlying causes and adjust management of respiratory failure as follows-   - start IV diuresis  - previous CT chest in 2023 with multiple lung nodules noted. Radiology recommended repeat imaging within 3-6 months, but this was never performed. She has a history of remote tobacco use.  CT chest pending       6/29  CT chest in 2023 that showed multiple pulmonary nodules-- supposed to get repeat imaging with in 3-6 months. Repeat CT chest due to remote hx of tobacco use - r/o malignancy-  Bilateral lower lobe, inferior lingular, and right middle lobe consolidative change cannot exclude infection. Moderate bilateral pleural fluid collections. sats 95% on RA. Pulmonology consuled for thoracentesis . Chronic bilateral pleural effusions with chronically elevated BNP consistent with congestive heart failure. Recommend escalated diuresis Continue diuresis.

## 2025-06-30 NOTE — ASSESSMENT & PLAN NOTE
- continue statin and zetia   - continue statin and zetia   - continue statin and zetia   - continue statin and zetia

## 2025-06-30 NOTE — ASSESSMENT & PLAN NOTE
Nutrition consulted. Most recent weight and BMI monitored-     Measurements:  Wt Readings from Last 1 Encounters:   06/30/25 48.1 kg (106 lb 0.7 oz)   Body mass index is 18.78 kg/m².    Patient has been screened and assessed by RD.    Malnutrition Type:  Context:    Level:      Malnutrition Characteristic Summary:       Interventions/Recommendations (treatment strategy):

## 2025-06-30 NOTE — ASSESSMENT & PLAN NOTE
Nutrition consulted. Most recent weight and BMI monitored-     Measurements:  Wt Readings from Last 1 Encounters:   06/30/25 48 kg (105 lb 13.1 oz)   Body mass index is 18.75 kg/m².    Patient has been screened and assessed by RD.    Malnutrition Type:  Context:    Level:      Malnutrition Characteristic Summary:       Interventions/Recommendations (treatment strategy):

## 2025-06-30 NOTE — ASSESSMENT & PLAN NOTE
Patient's blood pressure range in the last 24 hours was: BP  Min: 96/52  Max: 121/61.The patient's inpatient anti-hypertensive regimen is listed below:  Current Antihypertensives  furosemide injection 40 mg, 2 times daily, Intravenous    Plan  - BP is controlled  - starting IV diuresis

## 2025-06-30 NOTE — ASSESSMENT & PLAN NOTE
Patient with Hypoxic Respiratory failure which is Acute on chronic.  she is not on home oxygen. Supplemental oxygen was provided and noted-      Signs/symptoms of respiratory failure include- lethargy. Contributing diagnoses includes - CHF and Pleural effusion Labs and images were reviewed. Patient Has not had a recent ABG. Will treat underlying causes and adjust management of respiratory failure as follows-   - start IV diuresis  - previous CT chest in 2023 with multiple lung nodules noted. Radiology recommended repeat imaging within 3-6 months, but this was never performed. She has a history of remote tobacco use.  CT chest pending     6/29  sats 95% on RA.  Patient with Hypoxic Respiratory failure which is Acute on chronic.  she is not on home oxygen. Supplemental oxygen was provided and noted-      Signs/symptoms of respiratory failure include- lethargy. Contributing diagnoses includes - CHF and Pleural effusion Labs and images were reviewed. Patient Has not had a recent ABG. Will treat underlying causes and adjust management of respiratory failure as follows-   - start IV diuresis  - previous CT chest in 2023 with multiple lung nodules noted. Radiology recommended repeat imaging within 3-6 months, but this was never performed. She has a history of remote tobacco use.  CT chest pending   6/29  CT chest in 2023 that showed multiple pulmonary nodules-- supposed to get repeat imaging with in 3-6 months. Repeat CT chest due to remote hx of tobacco use - r/o malignancy-  Bilateral lower lobe, inferior lingular, and right middle lobe consolidative change cannot exclude infection. Moderate bilateral pleural fluid collections. Pulmonology consuled for thoracentesis     Patient with Hypoxic Respiratory failure which is Acute on chronic.  she is not on home oxygen. Supplemental oxygen was provided and noted-      Signs/symptoms of respiratory failure include- lethargy. Contributing diagnoses includes - CHF and Pleural  effusion Labs and images were reviewed. Patient Has not had a recent ABG. Will treat underlying causes and adjust management of respiratory failure as follows-   - start IV diuresis  - previous CT chest in 2023 with multiple lung nodules noted. Radiology recommended repeat imaging within 3-6 months, but this was never performed. She has a history of remote tobacco use.  CT chest pending       6/29  CT chest in 2023 that showed multiple pulmonary nodules-- supposed to get repeat imaging with in 3-6 months. Repeat CT chest due to remote hx of tobacco use - r/o malignancy-  Bilateral lower lobe, inferior lingular, and right middle lobe consolidative change cannot exclude infection. Moderate bilateral pleural fluid collections. sats 95% on RA. Pulmonology consuled for thoracentesis   6/29 CX ray -lungs are significant for diffuse interstitial prominence with reticular basilar opacity overlying bilateral pleural fluid collections similar previous performed 05/29/2025. There is no pneumothorax. The cardiac silhouette appears enlarged. There is calcification of the aorta. on  IV lasix 40mg q 12h. strict IO monitor. CT chest in 2023 that showed multiple pulmonary nodules-- supposed to get repeat imaging with in 3-6 months. Repeat CT chest due to remote hx of tobacco use - r/o malignancy-  Bilateral lower lobe, inferior lingular, and right middle lobe consolidative change cannot exclude infection. Moderate bilateral pleural fluid collections. sats 95% on RA. Pulmonology consuled for thoracentesis. Chronic bilateral pleural effusions with chronically elevated BNP consistent with congestive heart failure. Recommend escalated diuresis Continue diuresis.   6/30 negative balance of  1.8 L. sats 96% on RA.  Patient with Hypoxic Respiratory failure which is Acute on chronic.  she is not on home oxygen. Supplemental oxygen was provided and noted-      Signs/symptoms of respiratory failure include- lethargy. Contributing diagnoses  includes - CHF and Pleural effusion Labs and images were reviewed. Patient Has not had a recent ABG. Will treat underlying causes and adjust management of respiratory failure as follows-   - start IV diuresis  - previous CT chest in 2023 with multiple lung nodules noted. Radiology recommended repeat imaging within 3-6 months, but this was never performed. She has a history of remote tobacco use.  CT chest pending     6/29  sats 95% on RA.  Patient with Hypoxic Respiratory failure which is Acute on chronic.  she is not on home oxygen. Supplemental oxygen was provided and noted-      Signs/symptoms of respiratory failure include- lethargy. Contributing diagnoses includes - CHF and Pleural effusion Labs and images were reviewed. Patient Has not had a recent ABG. Will treat underlying causes and adjust management of respiratory failure as follows-   - start IV diuresis  - previous CT chest in 2023 with multiple lung nodules noted. Radiology recommended repeat imaging within 3-6 months, but this was never performed. She has a history of remote tobacco use.  CT chest pending   6/29  CT chest in 2023 that showed multiple pulmonary nodules-- supposed to get repeat imaging with in 3-6 months. Repeat CT chest due to remote hx of tobacco use - r/o malignancy-  Bilateral lower lobe, inferior lingular, and right middle lobe consolidative change cannot exclude infection. Moderate bilateral pleural fluid collections. Pulmonology consuled for thoracentesis     Patient with Hypoxic Respiratory failure which is Acute on chronic.  she is not on home oxygen. Supplemental oxygen was provided and noted-      Signs/symptoms of respiratory failure include- lethargy. Contributing diagnoses includes - CHF and Pleural effusion Labs and images were reviewed. Patient Has not had a recent ABG. Will treat underlying causes and adjust management of respiratory failure as follows-   - start IV diuresis  - previous CT chest in 2023 with multiple  lung nodules noted. Radiology recommended repeat imaging within 3-6 months, but this was never performed. She has a history of remote tobacco use.  CT chest pending       6/29  CT chest in 2023 that showed multiple pulmonary nodules-- supposed to get repeat imaging with in 3-6 months. Repeat CT chest due to remote hx of tobacco use - r/o malignancy-  Bilateral lower lobe, inferior lingular, and right middle lobe consolidative change cannot exclude infection. Moderate bilateral pleural fluid collections. sats 95% on RA. Pulmonology consuled for thoracentesis   Patient with Hypoxic Respiratory failure which is Acute on chronic.  she is not on home oxygen. Supplemental oxygen was provided and noted-      Signs/symptoms of respiratory failure include- lethargy. Contributing diagnoses includes - CHF and Pleural effusion Labs and images were reviewed. Patient Has not had a recent ABG. Will treat underlying causes and adjust management of respiratory failure as follows-   - start IV diuresis  - previous CT chest in 2023 with multiple lung nodules noted. Radiology recommended repeat imaging within 3-6 months, but this was never performed. She has a history of remote tobacco use.  CT chest pending     6/29  sats 95% on RA.  Patient with Hypoxic Respiratory failure which is Acute on chronic.  she is not on home oxygen. Supplemental oxygen was provided and noted-      Signs/symptoms of respiratory failure include- lethargy. Contributing diagnoses includes - CHF and Pleural effusion Labs and images were reviewed. Patient Has not had a recent ABG. Will treat underlying causes and adjust management of respiratory failure as follows-   - start IV diuresis  - previous CT chest in 2023 with multiple lung nodules noted. Radiology recommended repeat imaging within 3-6 months, but this was never performed. She has a history of remote tobacco use.  CT chest pending   6/29  CT chest in 2023 that showed multiple pulmonary nodules--  supposed to get repeat imaging with in 3-6 months. Repeat CT chest due to remote hx of tobacco use - r/o malignancy-  Bilateral lower lobe, inferior lingular, and right middle lobe consolidative change cannot exclude infection. Moderate bilateral pleural fluid collections. Pulmonology consuled for thoracentesis     Patient with Hypoxic Respiratory failure which is Acute on chronic.  she is not on home oxygen. Supplemental oxygen was provided and noted-      Signs/symptoms of respiratory failure include- lethargy. Contributing diagnoses includes - CHF and Pleural effusion Labs and images were reviewed. Patient Has not had a recent ABG. Will treat underlying causes and adjust management of respiratory failure as follows-   - start IV diuresis  - previous CT chest in 2023 with multiple lung nodules noted. Radiology recommended repeat imaging within 3-6 months, but this was never performed. She has a history of remote tobacco use.  CT chest pending       6/29  CT chest in 2023 that showed multiple pulmonary nodules-- supposed to get repeat imaging with in 3-6 months. Repeat CT chest due to remote hx of tobacco use - r/o malignancy-  Bilateral lower lobe, inferior lingular, and right middle lobe consolidative change cannot exclude infection. Moderate bilateral pleural fluid collections. sats 95% on RA. Pulmonology consuled for thoracentesis . Chronic bilateral pleural effusions with chronically elevated BNP consistent with congestive heart failure. Recommend escalated diuresis Continue diuresis.   6/30  sats 96% on 3LNC. Repeat echo pending.       98

## 2025-06-30 NOTE — ASSESSMENT & PLAN NOTE
- Patient is identified as having Diastolic (HFpEF) heart failure that is Acute on Chronic.   - CHF is currently uncontrolled due to Rales/crackles on pulmonary exam and Pulmonary edema/pleural effusion on CXR.   - Latest ECHO performed and demonstrates- Results for orders placed during the hospital encounter of 03/20/25    Echo    Interpretation Summary    Left Ventricle: The left ventricle is normal in size. Ventricular mass is normal. Normal wall thickness. Normal wall motion. There is normal systolic function with a visually estimated ejection fraction of 55 - 60%. There is indeterminate diastolic function. Elevated left ventricular filling pressure.    Right Ventricle: The right ventricle is normal in size. Wall thickness is normal. Systolic function is normal.    Mitral Valve: There is mild regurgitation.    Tricuspid Valve: There is mild regurgitation.    Pulmonary Artery: The estimated pulmonary artery systolic pressure is 24 mmHg.    IVC/SVC: Normal venous pressure at 3 mmHg.    Pericardium: Left pleural effusion.  .   - Continue Furosemide and monitor clinical status closely.   - Monitor on telemetry.   - Patient is on CHF pathway.    - Monitor strict Is&Os and daily weights.    - Place on fluid restriction of 1.5 L.   - Continue to stress to patient importance of self efficacy and  on diet for CHF.   - Last BNP reviewed- and noted below   Recent Labs   Lab 06/28/25  0732   BNP 1,270*   - starting IV diuresis with lasix 40 mg BID   6/29 CX ray -lungs are significant for diffuse interstitial prominence with reticular basilar opacity overlying bilateral pleural fluid collections similar previous performed 05/29/2025. There is no pneumothorax. The cardiac silhouette appears enlarged. There is calcification of the aorta. on  IV lasix 40mg q 12h. strict IO monitor. CT chest in 2023 that showed multiple pulmonary nodules-- supposed to get repeat imaging with in 3-6 months. Repeat CT chest due to remote hx  of tobacco use - r/o malignancy-  Bilateral lower lobe, inferior lingular, and right middle lobe consolidative change cannot exclude infection. Moderate bilateral pleural fluid collections. sats 95% on RA. Pulmonology consuled for thoracentesis. Chronic bilateral pleural effusions with chronically elevated BNP consistent with congestive heart failure. Recommend escalated diuresis Continue diuresis.   6/30. negative balance of  1.8 L. sats 96% on RA. Repeat echo pending.

## 2025-06-30 NOTE — ASSESSMENT & PLAN NOTE
Anemia is likely due to acute blood loss which was from GI bleeds. Most recent hemoglobin and hematocrit are listed below.  Recent Labs     06/28/25  0944 06/29/25  0701 06/29/25  1605   HGB 7.5* 7.4* 7.8*   HCT 25.2* 23.6* 25.6*     Plan  - Monitor serial CBC: Daily  - Transfuse PRBC if patient becomes hemodynamically unstable, symptomatic or H/H drops below 7/21.  - Patient has not received any PRBC transfusions to date  - Patient's anemia is currently stable  - continue ppi. scheduled for EGD with GI 07/08  - follows closely with OP hematology. Considering OP bone marrow biopsy primarily to rule out MDS   6/29   EGD 3/25 Esophageal mucosal changes secondary to                          established long-segment Rodriguez's disease,                          classified as Rodriguez's stage C10-M10 per [place]                          criteria.                          - 3 cm hiatal hernia.                          - One non-bleeding angioectasia in the stomach.                          Treated with argon plasma coagulation (APC).                          - Two non-bleeding angioectasias in the duodenum.                          Treated with argon plasma coagulation (APC).   FOBT ordered. consider PRBC transfusion

## 2025-06-30 NOTE — ASSESSMENT & PLAN NOTE
Anemia is likely due to acute blood loss which was from GI bleeds. Most recent hemoglobin and hematocrit are listed below.  Recent Labs     06/29/25  0701 06/29/25  1605 06/30/25  0623   HGB 7.4* 7.8* 7.7*   HCT 23.6* 25.6* 24.8*     Plan  - Monitor serial CBC: Daily  - Transfuse PRBC if patient becomes hemodynamically unstable, symptomatic or H/H drops below 7/21.  - Patient has not received any PRBC transfusions to date  - Patient's anemia is currently stable  - continue ppi. scheduled for EGD with GI 07/08  - follows closely with OP hematology. Considering OP bone marrow biopsy primarily to rule out MDS   6/29   EGD 3/25 Esophageal mucosal changes secondary to                          established long-segment Rodriguez's disease,                          classified as Rodriguez's stage C10-M10 per [place] [facility]                          criteria.                          - 3 cm hiatal hernia.                          - One non-bleeding angioectasia in the stomach.                          Treated with argon plasma coagulation (APC).                          - Two non-bleeding angioectasias in the duodenum.                          Treated with argon plasma coagulation (APC).   FOBT ordered. consider PRBC transfusion   6/30 .  Ferraheme 1020mg IV x1 per hematology

## 2025-06-30 NOTE — PROGRESS NOTES
[facility] Medicine  Progress Note    Patient Name: Katelyn Caba  MRN: [id number]  Patient Class: IP- Inpatient   Admission Date: 6/28/2025  Length of Stay: 2 days  Attending Physician: Mick Gomez MD  Primary Care Provider: Brooklyn Burnham MD        Subjective     Principal Problem:Acute hypoxic respiratory failure        HPI:  Katelyn Caba is a 83 yo F with PMHx of BCC, HTN, HLD, aortic atherosclerosis, hx of GI bleeds, ISAIAH requiring frequent transfusions, HFpEF, CAD, L MCA CVA who presented to ED for shortness of breath. Seen with  and son at bedside. Son reports that patient has been having progressive shortness of breath over the past week that acutely worsened this morning. She believes this is due to her blood count being low. She recently was hospitalized 05/29-06/01 for anemia requiring 2 units to be transfused and Covid infection. She receives frequent blood transfusions and follows closely with hematology. She has a hx of GI bleeds and is scheduled for an OP EGD on 07/08/25. She denies any recent melena or hematochezia. Endorses dry cough. She has been compliant with her home lasix. She does not report any significant weight gain over the past week. She does have a history of tobacco use but quit 50 years ago. Unable to tell me how long she smoked for but believes she smoked about 0.5 ppd. Denies fever, chills, chest pain, palpitations, abdominal pain, n/v/d, and LE swelling.    In ED: Afebrile. BP elevated. Initially requiring 4L on arrival. Attempted to wean O2, but dropped to 88% on RA. Now satting well on 2L NC. CXR with bilateral pleural effusions. Given IV lasix 20 mg. Admitted to  for hypoxia.    Overview/Hospital Course:  6/29 CX ray -lungs are significant for diffuse interstitial prominence with reticular basilar opacity overlying bilateral pleural fluid collections similar previous performed 05/29/2025. There is no pneumothorax. The cardiac silhouette  appears enlarged. There is calcification of the aorta. on  IV lasix 40mg q 12h. strict IO monitor. CT chest in 2023 that showed multiple pulmonary nodules-- supposed to get repeat imaging with in 3-6 months. Repeat CT chest due to remote hx of tobacco use - r/o malignancy-  Bilateral lower lobe, inferior lingular, and right middle lobe consolidative change cannot exclude infection. Moderate bilateral pleural fluid collections. sats 95% on RA. Pulmonology consuled for thoracentesis. Chronic bilateral pleural effusions with chronically elevated BNP consistent with congestive heart failure. Recommend escalated diuresis Continue diuresis.   6/30 prior FOBT +. Recurrent anemia, transfusion dependent with treatment for bleeding bowel pathology. Normochromic normocytic anemia with evidence of GI bleed - AVMs . follows with hematology - previously discussed the option of a bone marrow biopsy primarily to rule out MDS. patient  elected to wait for now. recommended video capsule swallow that has been scheduled by the gastroenterologist. negative balance of  1.8 L. sats 96% on 3LNC. Repeat echo pending. on lasix 20mg BID at home.  Ferraheme 1020mg IV x1 per hematology           Review of Systems:   Pain scale:   Constitutional:  fever,  chills, headache, vision loss, hearing loss, weight loss, Generalized weakness, falls, loss of smell, loss of taste, poor appetite,  sore throat  Respiratory: cough, shortness of breath.   Cardiovascular: chest pain, dizziness, palpitations, orthopnea, swelling of feet, syncope  Gastrointestinal: nausea, vomiting, abdominal pain, diarrhea, black stool,  blood in stool, change in bowel habits, constipation  Genitourinary: hematuria, dysuria, urgency, frequency  Integument/Breast: rash,  pruritis  Hematologic/Lymphatic: easy bruising, lymphadenopathy  Musculoskeletal: arthralgias , myalgias, back pain, neck pain, knee pain  Neurological: confusion, seizures, tremors, slurred  "speech  Behavioral/Psych:  depression, anxiety, auditory or visual hallucinations     OBJECTIVE:     Physical Exam:  Body mass index is 18.75 kg/m².    Constitutional: Appears thin built   Head: Normocephalic and atraumatic.   Neck: Normal range of motion. Neck supple.   Cardiovascular: Normal heart rate.  Regular heart rhythm.  Pulmonary/Chest: Effort normal.   Abdominal: No distension.  No tenderness  Musculoskeletal: Normal range of motion. No edema.   Neurological: Alert and oriented to person, place, and time.   Skin: Skin is warm and dry.   Psychiatric: Normal mood and affect. Behavior is normal.                  Vital Signs  Temp: 97.5 °F (36.4 °C) (06/30/25 0738)  Pulse: 101 (06/30/25 1131)  Resp: 18 (06/30/25 0359)  BP: 121/61 (06/30/25 1131)  SpO2: 99 % (06/30/25 1131)     24 Hour VS Range    Temp:  [97.5 °F (36.4 °C)-98 °F (36.7 °C)]   Pulse:  []   Resp:  [18]   BP: ()/(49-64)   SpO2:  [95 %-99 %]     Intake/Output Summary (Last 24 hours) at 6/30/2025 1302  Last data filed at 6/30/2025 0941  Gross per 24 hour   Intake 480 ml   Output 2800 ml   Net -2320 ml         I/O This Shift:  I/O this shift:  In: -   Out: 700 [Urine:700]    Wt Readings from Last 3 Encounters:   06/30/25 48 kg (105 lb 13.1 oz)   06/12/25 48.1 kg (106 lb 0.7 oz)   06/06/25 7.258 kg (16 lb)       I have personally reviewed the vitals and recorded Intake/Output     Laboratory/Diagnostic Data:    CBC/Anemia Labs: Coags:    Recent Labs   Lab 06/26/25  0704 06/28/25  0732 06/29/25  0701 06/29/25  1605 06/30/25  0623   WBC 5.29   < > 7.24 6.73 6.44   HGB 8.0*   < > 7.4* 7.8* 7.7*   HCT 26.7*   < > 23.6* 25.6* 24.8*      < > 374 442 410   MCV 93   < > 88 89 88   RDW 17.2*   < > 16.2* 16.0* 15.9*   IRON 15*  --   --   --   --    FERRITIN 28.0  --   --   --   --     < > = values in this interval not displayed.    No results for input(s): "PT", "INR", "APTT" in the last 168 hours.     Chemistries: ABG:   Recent Labs   Lab " "06/28/25  0732 06/28/25  1919 06/29/25  0701 06/29/25  1605 06/30/25  0623    141 140 140 138   K 4.1 4.0 3.8 4.2 3.5    104 106 101 100   CO2 25 27 27 29 29   BUN 25* 25* 22 24* 25*   CREATININE 0.7 0.6 0.6 0.7 0.6   CALCIUM 9.2 8.8 8.5* 9.1 8.6*   PROT 6.9  --   --   --   --    BILITOT 0.4  --   --   --   --    ALKPHOS 105  --   --   --   --    ALT 27  --   --   --   --    AST 28  --   --   --   --    MG 2.1  --  2.0  --  2.0   PHOS 3.6 3.5  --  3.7  --     No results for input(s): "PH", "PCO2", "PO2", "HCO3", "POCSATURATED", "BE" in the last 168 hours.     POCT Glucose: HbA1c:    No results for input(s): "POCTGLUCOSE" in the last 168 hours. Hemoglobin A1C   Date Value Ref Range Status   03/20/2025 4.2 4.0 - 5.6 % Final     Comment:     ADA Screening Guidelines:  5.7-6.4%  Consistent with prediabetes  >or=6.5%  Consistent with diabetes    High levels of fetal hemoglobin interfere with the HbA1C  assay. Heterozygous hemoglobin variants (HbS, HgC, etc)do  not significantly interfere with this assay.   However, presence of multiple variants may affect accuracy.     02/18/2025 4.9 4.0 - 5.6 % Final     Comment:     ADA Screening Guidelines:  5.7-6.4%  Consistent with prediabetes  >or=6.5%  Consistent with diabetes    High levels of fetal hemoglobin interfere with the HbA1C  assay. Heterozygous hemoglobin variants (HbS, HgC, etc)do  not significantly interfere with this assay.   However, presence of multiple variants may affect accuracy.     11/01/2024 4.2 4.0 - 5.6 % Final     Comment:     ADA Screening Guidelines:  5.7-6.4%  Consistent with prediabetes  >or=6.5%  Consistent with diabetes    High levels of fetal hemoglobin interfere with the HbA1C  assay. Heterozygous hemoglobin variants (HbS, HgC, etc)do  not significantly interfere with this assay.   However, presence of multiple variants may affect accuracy.          Cardiac Enzymes: Ejection Fractions:    No results for input(s): "CPK", "CPKMB", "MB", " ""TROPONINI" in the last 72 hours. EF   Date Value Ref Range Status   08/21/2022 65 % Final   07/22/2022 55 % Final          No results for input(s): "COLORU", "APPEARANCEUA", "PHUR", "SPECGRAV", "PROTEINUA", "GLUCUA", "KETONESU", "BILIRUBINUA", "OCCULTUA", "NITRITE", "UROBILINOGEN", "LEUKOCYTESUR", "RBCUA", "WBCUA", "BACTERIA", "SQUAMEPITHEL", "HYALINECASTS" in the last 48 hours.    Invalid input(s): "WRIGHTSUR"    Procalcitonin (ng/mL)   Date Value   06/29/2025 0.03     Lactate (Lactic Acid) (mmol/L)   Date Value   03/20/2025 1.5     BNP (pg/mL)   Date Value   06/28/2025 1,270 (H)   05/29/2025 1,188 (H)   05/28/2025 963 (H)   04/22/2025 1,257 (H)   03/20/2025 1,293 (H)   03/03/2025 1,512 (H)   08/21/2022 577 (H)     No results found for: "CRP", "SEDRATE"  D-Dimer (mg/L FEU)   Date Value   06/28/2025 0.46   08/21/2022 0.95 (H)     Ferritin (ng/mL)   Date Value   06/26/2025 28.0   06/23/2025 37.0   06/09/2025 48.0   04/29/2025 203.0   02/24/2025 64   01/07/2025 113   09/06/2024 56   07/05/2024 58   03/25/2024 8 (L)   07/06/2023 22   05/18/2023 43   03/29/2023 101   01/26/2023 724 (H)   12/16/2022 14 (L)     No results found for: "LDH"  Troponin I (ng/mL)   Date Value   04/18/2024 26.400 (HH)   04/17/2024 58.100 (HH)   04/17/2024 39.000 (HH)   04/17/2024 0.047 (H)   08/21/2022 0.010   08/21/2022 0.009   07/20/2022 <0.012     No results found for this or any previous visit.  SARS-CoV2 (COVID-19) Qualitative PCR (no units)   Date Value   08/22/2022 Not Detected     SARS-CoV-2 RNA, Amplification, Qual (no units)   Date Value   03/20/2025 Negative   03/03/2025 Negative   08/21/2022 Negative   07/20/2022 Negative       Microbiology labs for the last week  Microbiology Results (last 7 days)       ** No results found for the last 168 hours. **            Reviewed and noted in plan where applicable- Please see chart for full lab data.    Lines/Drains:  Peripheral IV Single Lumen 06/28/25 0731 20 G Right Forearm (Active) "   Site Assessment Clean;Dry;Intact;No redness;No swelling;No warmth;No drainage 06/29/25 0352   Extremity Assessment Distal to IV No abnormal discoloration;No redness;No swelling 06/28/25 1517   Line Status Capped;Flushed;Saline locked 06/29/25 0352   Dressing Status Intact;Dry;Clean 06/29/25 0352   Dressing Intervention Integrity maintained 06/29/25 0352   Number of days: 1       Imaging  ECG Results              EKG 12-lead (Final result)        Collection Time Result Time QRS Duration OHS QTC Calculation    06/28/25 07:13:31 06/28/25 09:19:05 90 461                     Final result by Interface, Lab In [facility] (06/28/25 09:19:12)                   Narrative:    Test Reason : R06.02,    Vent. Rate :  74 BPM     Atrial Rate :  74 BPM     P-R Int : 320 ms          QRS Dur :  90 ms      QT Int : 416 ms       P-R-T Axes :      2 108 degrees    QTcB Int : 461 ms    Sinus rhythm with 1st degree A-V block and artifact  Low voltage QRS  Cannot rule out Anterior infarct (cited on or before 26-Sep-2024)  ST and T wave abnormality, consider lateral ischemia  Abnormal ECG  When compared with ECG of 29-May-2025 06:33,  ND interval has increased  Confirmed by Nikko Lora ([id number]) on 6/28/2025 9:19:02 AM    Referred By:            Confirmed By: Nikko Lora                                    Results for orders placed during the hospital encounter of 03/20/25    Echo    Interpretation Summary    Left Ventricle: The left ventricle is normal in size. Ventricular mass is normal. Normal wall thickness. Normal wall motion. There is normal systolic function with a visually estimated ejection fraction of 55 - 60%. There is indeterminate diastolic function. Elevated left ventricular filling pressure.    Right Ventricle: The right ventricle is normal in size. Wall thickness is normal. Systolic function is normal.    Mitral Valve: There is mild regurgitation.    Tricuspid Valve: There is mild regurgitation.    Pulmonary Artery: The estimated  pulmonary artery systolic pressure is 24 mmHg.    IVC/SVC: Normal venous pressure at 3 mmHg.    Pericardium: Left pleural effusion.      CT Chest Without Contrast  Narrative: EXAMINATION:  CT CHEST WITHOUT CONTRAST    CLINICAL HISTORY:  Abnormal xray - lung nodule, >= 1 cm;bilateral pleural effusions. CT chest from 2023 with multiple lung nodules;    TECHNIQUE:  Low dose axial images, sagittal and coronal reformations were obtained from the thoracic inlet to the lung bases. Contrast was not administered.    FINDINGS:  The soft tissues and vascular structures at the base the neck are unremarkable.  The mediastinum including the heart and great vessels is significant for calcification of the aorta, aortic annulus, and coronary arteries.  The visualized intra-abdominal content is significant for a 4.5 cm hypodensity within the right kidney which likely represents a cyst.  The osseous structures demonstrate degenerative change.    The trachea is patent and free of any intraluminal filling defects.  There is bilateral lower lobe, right middle lobe, and inferior lingular consolidative change overlying bilateral pleural fluid collections.  There is centrilobular emphysema.  Impression: Bilateral lower lobe, inferior lingular, and right middle lobe consolidative change cannot exclude infection.    Moderate bilateral pleural fluid collections.    Electronically signed by: Jhon Huggins MD  Date:    06/28/2025  Time:    16:02  X-Ray Chest AP Portable  Narrative: EXAMINATION:  XR CHEST AP PORTABLE    CLINICAL HISTORY:  Chest Pain;    TECHNIQUE:  Single frontal view of the chest was performed.    FINDINGS:  The lungs are significant for diffuse interstitial prominence with reticular basilar opacity overlying bilateral pleural fluid collections similar previous performed 05/29/2025.  There is no pneumothorax.  The cardiac silhouette appears enlarged.  There is calcification of the aorta.  The osseous structures demonstrate  degenerative change.  Impression: As above.    Electronically signed by: John Huggins MD  Date:    06/28/2025  Time:    08:16      Labs, Imaging, EKG and Diagnostic results from 6/30/2025 were reviewed.    Medications:  Medication list was reviewed and changes noted under Assessment/Plan.  Medications Ordered Prior to Encounter[1]  Scheduled Medications:  Current Facility-Administered Medications   Medication Dose Route Frequency    atorvastatin  80 mg Oral Daily    clopidogreL  75 mg Oral Daily    cyanocobalamin  1,000 mcg Oral Daily    enoxparin  30 mg Subcutaneous Daily    ezetimibe  10 mg Oral Daily    ferumoxytol  1,020 mg Intravenous Once    furosemide (LASIX) injection  40 mg Intravenous BID    pantoprazole  40 mg Oral Daily    vitamin D  1,000 Units Oral Daily     PRN:   Current Facility-Administered Medications:     acetaminophen, 1,000 mg, Oral, Q8H PRN    acetaminophen, 650 mg, Oral, Q4H PRN    albuterol-ipratropium, 3 mL, Nebulization, Q4H PRN    aluminum-magnesium hydroxide-simethicone, 30 mL, Oral, QID PRN    melatonin, 6 mg, Oral, Nightly PRN    naloxone, 0.02 mg, Intravenous, PRN    ondansetron, 8 mg, Oral, Q8H PRN    polyethylene glycol, 17 g, Oral, BID PRN    prochlorperazine, 5 mg, Intravenous, Q6H PRN    simethicone, 1 tablet, Oral, QID PRN    sodium chloride 0.9%, 5 mL, Intravenous, PRN  Infusions:   Estimated Creatinine Clearance: 51 mL/min (based on SCr of 0.6 mg/dL).                 Assessment & Plan  Acute hypoxic respiratory failure  Solitary pulmonary nodule  Bilateral pleural effusion  Patient with Hypoxic Respiratory failure which is Acute on chronic.  she is not on home oxygen. Supplemental oxygen was provided and noted-      Signs/symptoms of respiratory failure include- lethargy. Contributing diagnoses includes - CHF and Pleural effusion Labs and images were reviewed. Patient Has not had a recent ABG. Will treat underlying causes and adjust management of respiratory failure as follows-    - start IV diuresis  - previous CT chest in 2023 with multiple lung nodules noted. Radiology recommended repeat imaging within 3-6 months, but this was never performed. She has a history of remote tobacco use.  CT chest pending     6/29  sats 95% on RA.  Patient with Hypoxic Respiratory failure which is Acute on chronic.  she is not on home oxygen. Supplemental oxygen was provided and noted-      Signs/symptoms of respiratory failure include- lethargy. Contributing diagnoses includes - CHF and Pleural effusion Labs and images were reviewed. Patient Has not had a recent ABG. Will treat underlying causes and adjust management of respiratory failure as follows-   - start IV diuresis  - previous CT chest in 2023 with multiple lung nodules noted. Radiology recommended repeat imaging within 3-6 months, but this was never performed. She has a history of remote tobacco use.  CT chest pending   6/29  CT chest in 2023 that showed multiple pulmonary nodules-- supposed to get repeat imaging with in 3-6 months. Repeat CT chest due to remote hx of tobacco use - r/o malignancy-  Bilateral lower lobe, inferior lingular, and right middle lobe consolidative change cannot exclude infection. Moderate bilateral pleural fluid collections. Pulmonology consuled for thoracentesis     Patient with Hypoxic Respiratory failure which is Acute on chronic.  she is not on home oxygen. Supplemental oxygen was provided and noted-      Signs/symptoms of respiratory failure include- lethargy. Contributing diagnoses includes - CHF and Pleural effusion Labs and images were reviewed. Patient Has not had a recent ABG. Will treat underlying causes and adjust management of respiratory failure as follows-   - start IV diuresis  - previous CT chest in 2023 with multiple lung nodules noted. Radiology recommended repeat imaging within 3-6 months, but this was never performed. She has a history of remote tobacco use.  CT chest pending       6/29  CT chest in  2023 that showed multiple pulmonary nodules-- supposed to get repeat imaging with in 3-6 months. Repeat CT chest due to remote hx of tobacco use - r/o malignancy-  Bilateral lower lobe, inferior lingular, and right middle lobe consolidative change cannot exclude infection. Moderate bilateral pleural fluid collections. sats 95% on RA. Pulmonology consuled for thoracentesis   6/29 CX ray -lungs are significant for diffuse interstitial prominence with reticular basilar opacity overlying bilateral pleural fluid collections similar previous performed 05/29/2025. There is no pneumothorax. The cardiac silhouette appears enlarged. There is calcification of the aorta. on  IV lasix 40mg q 12h. strict IO monitor. CT chest in 2023 that showed multiple pulmonary nodules-- supposed to get repeat imaging with in 3-6 months. Repeat CT chest due to remote hx of tobacco use - r/o malignancy-  Bilateral lower lobe, inferior lingular, and right middle lobe consolidative change cannot exclude infection. Moderate bilateral pleural fluid collections. sats 95% on RA. Pulmonology consuled for thoracentesis. Chronic bilateral pleural effusions with chronically elevated BNP consistent with congestive heart failure. Recommend escalated diuresis Continue diuresis.   6/30 negative balance of  1.8 L. sats 96% on RA.  Patient with Hypoxic Respiratory failure which is Acute on chronic.  she is not on home oxygen. Supplemental oxygen was provided and noted-      Signs/symptoms of respiratory failure include- lethargy. Contributing diagnoses includes - CHF and Pleural effusion Labs and images were reviewed. Patient Has not had a recent ABG. Will treat underlying causes and adjust management of respiratory failure as follows-   - start IV diuresis  - previous CT chest in 2023 with multiple lung nodules noted. Radiology recommended repeat imaging within 3-6 months, but this was never performed. She has a history of remote tobacco use.  CT chest pending      6/29  sats 95% on RA.  Patient with Hypoxic Respiratory failure which is Acute on chronic.  she is not on home oxygen. Supplemental oxygen was provided and noted-      Signs/symptoms of respiratory failure include- lethargy. Contributing diagnoses includes - CHF and Pleural effusion Labs and images were reviewed. Patient Has not had a recent ABG. Will treat underlying causes and adjust management of respiratory failure as follows-   - start IV diuresis  - previous CT chest in 2023 with multiple lung nodules noted. Radiology recommended repeat imaging within 3-6 months, but this was never performed. She has a history of remote tobacco use.  CT chest pending   6/29  CT chest in 2023 that showed multiple pulmonary nodules-- supposed to get repeat imaging with in 3-6 months. Repeat CT chest due to remote hx of tobacco use - r/o malignancy-  Bilateral lower lobe, inferior lingular, and right middle lobe consolidative change cannot exclude infection. Moderate bilateral pleural fluid collections. Pulmonology consuled for thoracentesis     Patient with Hypoxic Respiratory failure which is Acute on chronic.  she is not on home oxygen. Supplemental oxygen was provided and noted-      Signs/symptoms of respiratory failure include- lethargy. Contributing diagnoses includes - CHF and Pleural effusion Labs and images were reviewed. Patient Has not had a recent ABG. Will treat underlying causes and adjust management of respiratory failure as follows-   - start IV diuresis  - previous CT chest in 2023 with multiple lung nodules noted. Radiology recommended repeat imaging within 3-6 months, but this was never performed. She has a history of remote tobacco use.  CT chest pending       6/29  CT chest in 2023 that showed multiple pulmonary nodules-- supposed to get repeat imaging with in 3-6 months. Repeat CT chest due to remote hx of tobacco use - r/o malignancy-  Bilateral lower lobe, inferior lingular, and right middle lobe  consolidative change cannot exclude infection. Moderate bilateral pleural fluid collections. sats 95% on RA. Pulmonology consuled for thoracentesis   Patient with Hypoxic Respiratory failure which is Acute on chronic.  she is not on home oxygen. Supplemental oxygen was provided and noted-      Signs/symptoms of respiratory failure include- lethargy. Contributing diagnoses includes - CHF and Pleural effusion Labs and images were reviewed. Patient Has not had a recent ABG. Will treat underlying causes and adjust management of respiratory failure as follows-   - start IV diuresis  - previous CT chest in 2023 with multiple lung nodules noted. Radiology recommended repeat imaging within 3-6 months, but this was never performed. She has a history of remote tobacco use.  CT chest pending     6/29  sats 95% on RA.  Patient with Hypoxic Respiratory failure which is Acute on chronic.  she is not on home oxygen. Supplemental oxygen was provided and noted-      Signs/symptoms of respiratory failure include- lethargy. Contributing diagnoses includes - CHF and Pleural effusion Labs and images were reviewed. Patient Has not had a recent ABG. Will treat underlying causes and adjust management of respiratory failure as follows-   - start IV diuresis  - previous CT chest in 2023 with multiple lung nodules noted. Radiology recommended repeat imaging within 3-6 months, but this was never performed. She has a history of remote tobacco use.  CT chest pending   6/29  CT chest in 2023 that showed multiple pulmonary nodules-- supposed to get repeat imaging with in 3-6 months. Repeat CT chest due to remote hx of tobacco use - r/o malignancy-  Bilateral lower lobe, inferior lingular, and right middle lobe consolidative change cannot exclude infection. Moderate bilateral pleural fluid collections. Pulmonology consuled for thoracentesis     Patient with Hypoxic Respiratory failure which is Acute on chronic.  she is not on home oxygen.  Supplemental oxygen was provided and noted-      Signs/symptoms of respiratory failure include- lethargy. Contributing diagnoses includes - CHF and Pleural effusion Labs and images were reviewed. Patient Has not had a recent ABG. Will treat underlying causes and adjust management of respiratory failure as follows-   - start IV diuresis  - previous CT chest in 2023 with multiple lung nodules noted. Radiology recommended repeat imaging within 3-6 months, but this was never performed. She has a history of remote tobacco use.  CT chest pending       6/29  CT chest in 2023 that showed multiple pulmonary nodules-- supposed to get repeat imaging with in 3-6 months. Repeat CT chest due to remote hx of tobacco use - r/o malignancy-  Bilateral lower lobe, inferior lingular, and right middle lobe consolidative change cannot exclude infection. Moderate bilateral pleural fluid collections. sats 95% on RA. Pulmonology consuled for thoracentesis . Chronic bilateral pleural effusions with chronically elevated BNP consistent with congestive heart failure. Recommend escalated diuresis Continue diuresis.   6/30  sats 96% on 3LNC. Repeat echo pending.      (HFpEF) heart failure with preserved ejection fraction  - Patient is identified as having Diastolic (HFpEF) heart failure that is Acute on Chronic.   - CHF is currently uncontrolled due to Rales/crackles on pulmonary exam and Pulmonary edema/pleural effusion on CXR.   - Latest ECHO performed and demonstrates- Results for orders placed during the hospital encounter of 03/20/25    Echo    Interpretation Summary    Left Ventricle: The left ventricle is normal in size. Ventricular mass is normal. Normal wall thickness. Normal wall motion. There is normal systolic function with a visually estimated ejection fraction of 55 - 60%. There is indeterminate diastolic function. Elevated left ventricular filling pressure.    Right Ventricle: The right ventricle is normal in size. Wall thickness  is normal. Systolic function is normal.    Mitral Valve: There is mild regurgitation.    Tricuspid Valve: There is mild regurgitation.    Pulmonary Artery: The estimated pulmonary artery systolic pressure is 24 mmHg.    IVC/SVC: Normal venous pressure at 3 mmHg.    Pericardium: Left pleural effusion.  .   - Continue Furosemide and monitor clinical status closely.   - Monitor on telemetry.   - Patient is on CHF pathway.    - Monitor strict Is&Os and daily weights.    - Place on fluid restriction of 1.5 L.   - Continue to stress to patient importance of self efficacy and  on diet for CHF.   - Last BNP reviewed- and noted below   Recent Labs   Lab 06/28/25  0732   BNP 1,270*   - starting IV diuresis with lasix 40 mg BID   6/29 CX ray -lungs are significant for diffuse interstitial prominence with reticular basilar opacity overlying bilateral pleural fluid collections similar previous performed 05/29/2025. There is no pneumothorax. The cardiac silhouette appears enlarged. There is calcification of the aorta. on  IV lasix 40mg q 12h. strict IO monitor. CT chest in 2023 that showed multiple pulmonary nodules-- supposed to get repeat imaging with in 3-6 months. Repeat CT chest due to remote hx of tobacco use - r/o malignancy-  Bilateral lower lobe, inferior lingular, and right middle lobe consolidative change cannot exclude infection. Moderate bilateral pleural fluid collections. sats 95% on RA. Pulmonology consuled for thoracentesis. Chronic bilateral pleural effusions with chronically elevated BNP consistent with congestive heart failure. Recommend escalated diuresis Continue diuresis.   6/30. negative balance of  1.8 L. sats 96% on RA. Repeat echo pending.   H/O ischemic left MCA stroke  - continue statin, plavix, and zetia   Mixed hyperlipidemia  CAD (coronary artery disease)  - continue statin and zetia   - continue statin and zetia   - continue statin and zetia   - continue statin and zetia   Iron deficiency  anemia due to chronic blood loss  Anemia is likely due to acute blood loss which was from GI bleeds. Most recent hemoglobin and hematocrit are listed below.  Recent Labs     06/29/25  0701 06/29/25  1605 06/30/25  0623   HGB 7.4* 7.8* 7.7*   HCT 23.6* 25.6* 24.8*     Plan  - Monitor serial CBC: Daily  - Transfuse PRBC if patient becomes hemodynamically unstable, symptomatic or H/H drops below 7/21.  - Patient has not received any PRBC transfusions to date  - Patient's anemia is currently stable  - continue ppi. scheduled for EGD with GI 07/08  - follows closely with OP hematology. Considering OP bone marrow biopsy primarily to rule out MDS   6/29   EGD 3/25 Esophageal mucosal changes secondary to                          established long-segment Rodrgiuez's disease,                          classified as Rodriguez's stage C10-M10 per [facility]                          criteria.                          - 3 cm hiatal hernia.                          - One non-bleeding angioectasia in the stomach.                          Treated with argon plasma coagulation (APC).                          - Two non-bleeding angioectasias in the duodenum.                          Treated with argon plasma coagulation (APC).   FOBT ordered. consider PRBC transfusion   6/30 .  Ferraheme 1020mg IV x1 per hematology   Hypertension  Patient's blood pressure range in the last 24 hours was: BP  Min: 96/52  Max: 121/61.The patient's inpatient anti-hypertensive regimen is listed below:  Current Antihypertensives  furosemide injection 40 mg, 2 times daily, Intravenous    Plan  - BP is controlled  - starting IV diuresis  Moderate protein-calorie malnutrition  Nutrition consulted. Most recent weight and BMI monitored-     Measurements:  Wt Readings from Last 1 Encounters:   06/30/25 48 kg (105 lb 13.1 oz)   Body mass index is 18.75 kg/m².    Patient has been screened and assessed by RD.    Malnutrition Type:  Context:    Level:      Malnutrition  Characteristic Summary:       Interventions/Recommendations (treatment strategy):       Elevated troponin  - elevated on admit, but flat and at baseline  - denies chest pain  - likely elevated from demand in setting of pulmonary edema   - monitor tele     VTE Risk Mitigation (From admission, onward)           Ordered     enoxaparin injection 30 mg  Daily         06/29/25 1103     IP VTE HIGH RISK PATIENT  Once         06/28/25 0936                    Discharge Planning   TERE: 7/1/2025     Code Status: Full Code   Medical Readiness for Discharge Date:                            Mick Gomez MD  Department of Hospital Medicine   [facility]         [1]   No current facility-administered medications on file prior to encounter.     Current Outpatient Medications on File Prior to Encounter   Medication Sig Dispense Refill    atorvastatin (LIPITOR) 80 MG tablet Take 1 tablet (80 mg total) by mouth once daily. 90 tablet 3    clopidogreL (PLAVIX) 75 mg tablet Take 1 tablet (75 mg total) by mouth once daily. 90 tablet 3    cyanocobalamin (VITAMIN B-12) 1000 MCG tablet Take 1,000 mcg by mouth once daily.      ezetimibe (ZETIA) 10 mg tablet Take 1 tablet (10 mg total) by mouth once daily. 90 tablet 3    furosemide (LASIX) 20 MG tablet Take 1 tablet (20 mg total) by mouth 2 (two) times a day. 60 tablet 11    pantoprazole (PROTONIX) 40 MG tablet Take 1 tablet (40 mg total) by mouth once daily. Take 1 tablet by mouth every day 90 tablet 1    potassium chloride SA (K-DUR,KLOR-CON) 20 MEQ tablet Take 1 tablet (20 mEq total) by mouth once daily. 30 tablet 11    vitamin D (VITAMIN D3) 1000 units Tab Take 1,000 Units by mouth once daily.      empagliflozin (JARDIANCE) 10 mg tablet Take 10 mg by mouth once daily.

## 2025-07-01 PROBLEM — R29.810 FACIAL DROOP: Status: RESOLVED | Noted: 2025-07-01 | Resolved: 2025-07-01

## 2025-07-01 PROBLEM — I63.9 ACUTE CVA (CEREBROVASCULAR ACCIDENT): Status: ACTIVE | Noted: 2025-02-18

## 2025-07-01 PROBLEM — R29.810 FACIAL DROOP: Status: ACTIVE | Noted: 2025-07-01

## 2025-07-01 LAB
ALBUMIN SERPL BCP-MCNC: 3.7 G/DL (ref 3.5–5.2)
ANION GAP (OHS): 11 MMOL/L (ref 8–16)
ANION GAP (OHS): 11 MMOL/L (ref 8–16)
BUN SERPL-MCNC: 22 MG/DL (ref 8–23)
BUN SERPL-MCNC: 29 MG/DL (ref 8–23)
CALCIUM SERPL-MCNC: 9.3 MG/DL (ref 8.7–10.5)
CALCIUM SERPL-MCNC: 9.5 MG/DL (ref 8.7–10.5)
CHLORIDE SERPL-SCNC: 98 MMOL/L (ref 95–110)
CHLORIDE SERPL-SCNC: 99 MMOL/L (ref 95–110)
CO2 SERPL-SCNC: 28 MMOL/L (ref 23–29)
CO2 SERPL-SCNC: 30 MMOL/L (ref 23–29)
CREAT SERPL-MCNC: 0.6 MG/DL (ref 0.5–1.4)
CREAT SERPL-MCNC: 0.7 MG/DL (ref 0.5–1.4)
ERYTHROCYTE [DISTWIDTH] IN BLOOD BY AUTOMATED COUNT: 15.8 % (ref 11.5–14.5)
GFR SERPLBLD CREATININE-BSD FMLA CKD-EPI: >60 ML/MIN/1.73/M2
GFR SERPLBLD CREATININE-BSD FMLA CKD-EPI: >60 ML/MIN/1.73/M2
GLUCOSE SERPL-MCNC: 96 MG/DL (ref 70–110)
GLUCOSE SERPL-MCNC: 97 MG/DL (ref 70–110)
HCT VFR BLD AUTO: 24.1 % (ref 37–48.5)
HGB BLD-MCNC: 7.6 GM/DL (ref 12–16)
MAGNESIUM SERPL-MCNC: 2 MG/DL (ref 1.6–2.6)
MCH RBC QN AUTO: 27.4 PG (ref 27–31)
MCHC RBC AUTO-ENTMCNC: 31.5 G/DL (ref 32–36)
MCV RBC AUTO: 87 FL (ref 82–98)
PHOSPHATE SERPL-MCNC: 4.1 MG/DL (ref 2.7–4.5)
PLATELET # BLD AUTO: 387 K/UL (ref 150–450)
PMV BLD AUTO: 9.8 FL (ref 9.2–12.9)
POCT GLUCOSE: 119 MG/DL (ref 70–110)
POTASSIUM SERPL-SCNC: 3.6 MMOL/L (ref 3.5–5.1)
POTASSIUM SERPL-SCNC: 3.7 MMOL/L (ref 3.5–5.1)
RBC # BLD AUTO: 2.77 M/UL (ref 4–5.4)
RETICS/RBC NFR AUTO: 1.8 % (ref 0.5–2.5)
SODIUM SERPL-SCNC: 138 MMOL/L (ref 136–145)
SODIUM SERPL-SCNC: 139 MMOL/L (ref 136–145)
WBC # BLD AUTO: 6.38 K/UL (ref 3.9–12.7)

## 2025-07-01 PROCEDURE — 25500020 PHARM REV CODE 255: Performed by: HOSPITALIST

## 2025-07-01 PROCEDURE — 99900035 HC TECH TIME PER 15 MIN (STAT)

## 2025-07-01 PROCEDURE — 63600175 PHARM REV CODE 636 W HCPCS

## 2025-07-01 PROCEDURE — 36415 COLL VENOUS BLD VENIPUNCTURE: CPT

## 2025-07-01 PROCEDURE — 27000221 HC OXYGEN, UP TO 24 HOURS

## 2025-07-01 PROCEDURE — 25000003 PHARM REV CODE 250: Performed by: HOSPITALIST

## 2025-07-01 PROCEDURE — 82040 ASSAY OF SERUM ALBUMIN: CPT | Performed by: HOSPITALIST

## 2025-07-01 PROCEDURE — 36415 COLL VENOUS BLD VENIPUNCTURE: CPT | Performed by: HOSPITALIST

## 2025-07-01 PROCEDURE — 99223 1ST HOSP IP/OBS HIGH 75: CPT | Mod: ,,, | Performed by: STUDENT IN AN ORGANIZED HEALTH CARE EDUCATION/TRAINING PROGRAM

## 2025-07-01 PROCEDURE — 94640 AIRWAY INHALATION TREATMENT: CPT

## 2025-07-01 PROCEDURE — 63600175 PHARM REV CODE 636 W HCPCS: Performed by: HOSPITALIST

## 2025-07-01 PROCEDURE — 83735 ASSAY OF MAGNESIUM: CPT

## 2025-07-01 PROCEDURE — 25000003 PHARM REV CODE 250

## 2025-07-01 PROCEDURE — 80048 BASIC METABOLIC PNL TOTAL CA: CPT

## 2025-07-01 PROCEDURE — 11000001 HC ACUTE MED/SURG PRIVATE ROOM

## 2025-07-01 PROCEDURE — 85027 COMPLETE CBC AUTOMATED: CPT

## 2025-07-01 PROCEDURE — 94761 N-INVAS EAR/PLS OXIMETRY MLT: CPT

## 2025-07-01 PROCEDURE — 25000242 PHARM REV CODE 250 ALT 637 W/ HCPCS

## 2025-07-01 RX ORDER — POTASSIUM CHLORIDE 20 MEQ/1
40 TABLET, EXTENDED RELEASE ORAL ONCE
Status: COMPLETED | OUTPATIENT
Start: 2025-07-01 | End: 2025-07-01

## 2025-07-01 RX ORDER — ACETAMINOPHEN 325 MG/1
650 TABLET ORAL EVERY 6 HOURS PRN
Status: DISCONTINUED | OUTPATIENT
Start: 2025-07-01 | End: 2025-07-06 | Stop reason: HOSPADM

## 2025-07-01 RX ADMIN — PANTOPRAZOLE SODIUM 40 MG: 20 TABLET, DELAYED RELEASE ORAL at 08:07

## 2025-07-01 RX ADMIN — IOHEXOL 75 ML: 350 INJECTION, SOLUTION INTRAVENOUS at 11:07

## 2025-07-01 RX ADMIN — ATORVASTATIN CALCIUM 80 MG: 40 TABLET, FILM COATED ORAL at 08:07

## 2025-07-01 RX ADMIN — ENOXAPARIN SODIUM 30 MG: 30 INJECTION SUBCUTANEOUS at 06:07

## 2025-07-01 RX ADMIN — POLYETHYLENE GLYCOL 3350 17 G: 17 POWDER, FOR SOLUTION ORAL at 06:07

## 2025-07-01 RX ADMIN — FUROSEMIDE 40 MG: 10 INJECTION, SOLUTION INTRAVENOUS at 06:07

## 2025-07-01 RX ADMIN — CYANOCOBALAMIN TAB 1000 MCG 1000 MCG: 1000 TAB at 08:07

## 2025-07-01 RX ADMIN — FUROSEMIDE 40 MG: 10 INJECTION, SOLUTION INTRAVENOUS at 08:07

## 2025-07-01 RX ADMIN — CLOPIDOGREL 75 MG: 75 TABLET ORAL at 08:07

## 2025-07-01 RX ADMIN — IPRATROPIUM BROMIDE AND ALBUTEROL SULFATE 3 ML: 2.5; .5 SOLUTION RESPIRATORY (INHALATION) at 10:07

## 2025-07-01 RX ADMIN — Medication 1000 UNITS: at 08:07

## 2025-07-01 RX ADMIN — POTASSIUM CHLORIDE 40 MEQ: 1500 TABLET, EXTENDED RELEASE ORAL at 08:07

## 2025-07-01 RX ADMIN — EZETIMIBE 10 MG: 10 TABLET ORAL at 08:07

## 2025-07-01 NOTE — HPI
Katelyn Caba is a 84 y.o. female with PMH of HTN, HLD, inferolateral STEMI (4/2024, s/p cath with no intervention, hx of GI bleeds with AVM of stomach, ISAIAH requiring frequent transfusions, HFpEF, CAD, prior R MCA stroke (2/2025), BCC, former smoker (quit 50 years ago) that presented to [facility] ED 6/28/25 with SOB and found to have bilateral pleural effusions on CXR for which she was admitted to  for further eval and management of hypoxia. See  notes for details of hospital course.     Stroke code activated 7/1/25 for new severe L sided weakness and LFD. LKW unclear, sometime evening of 6/30. Family at bedside reports around 5:30am 7/1/25 noticing patient having LSW and needing assistance to ambulate to the bathroom. Sometime later in the morning of 7/1/25 patient's nurse noticed patient was unable to move L side, prompting stroke code activation. Neuro exam significant for R gaze preference, L hemiparesis (arm>>leg), L facial droop, expressive aphasia, dysarthria, and inattention to L side. NIHSS 10. CTH showed acute ischemic changes in R MCA territory, CTA revealed R M2 occlusion. Determined not a candidate for acute stroke interventions, OOW for TNK and no thrombectomy due to core infarct on CTH. Patient will be transferred to  primary service for ongoing stroke workup, will consult  for co-management of active medical comorbidities.    Left MCA stroke in 2022, initially had language issues that resolved but had right inferior quadrantanopsia. Discharged on DAPT, then aspirin monotherapy.     Right MCA stroke in 2/2025: left sided weakness, left facial droop, and dysarthria. CTA with right M1 occlusion. s/p TNK and DSA w/o thrombectomy as lesion migrated to distal MCA branches after TNK administered. Echo unremarkable.

## 2025-07-01 NOTE — NURSING
During patient assessment, left-sided facial droop noted- pt told to smile- asymmetrical. LUE/LLE unable to hold up nor feel sensation. RUE/RUE no deficits noted. Unable to follow finger to left side, patient looking right and up when calling name - nurse at foot of bed. AAOx4. VSS. Denies pain/SOB. MD, charge nurse, Rapid team notified. Stroke code called. CTA ordered. Neuro/PT/OT/Speech consulted. Family at bedside.

## 2025-07-01 NOTE — ASSESSMENT & PLAN NOTE
Anemia is likely due to acute blood loss which was from GI bleeds. Most recent hemoglobin and hematocrit are listed below.  Recent Labs     06/29/25  1605 06/30/25  0623 07/01/25  0316   HGB 7.8* 7.7* 7.6*   HCT 25.6* 24.8* 24.1*     Plan  - Monitor serial CBC: Daily  - Transfuse PRBC if patient becomes hemodynamically unstable, symptomatic or H/H drops below 7/21.  - Patient has not received any PRBC transfusions to date  - Patient's anemia is currently stable  - Continue PPI, scheduled for EGD with GI on 7/8  - Given Ferraheme 1020mg IV x1 per hematology on 6/30  -  consulted for co-management, appreciate assistance

## 2025-07-01 NOTE — ASSESSMENT & PLAN NOTE
7/1 Left facial droop and Left UE weakness noted on exam. vascular neurology consulted. CTHead shows early infarct changes on the R and CTA shows R M2 occlusion. LKW last night, therefore out of window for TNK. per Neuro IR regarding intervention - given the core infarct on CTH already she is unfortunately not a candidate for thrombectomy. MRI brain ordered. PT/OT/SLP eval .  Neurochecks q 4h

## 2025-07-01 NOTE — ASSESSMENT & PLAN NOTE
Patient's blood pressure range in the last 24 hours was: BP  Min: 110/58  Max: 142/61.The patient's inpatient anti-hypertensive regimen is listed below:  Current Antihypertensives  furosemide injection 40 mg, 2 times daily, Intravenous    Plan  - BP is controlled  - starting IV diuresis

## 2025-07-01 NOTE — PHYSICIAN QUERY
Due to the conflicting clinical picture, please clinically validate the diagnosis of Acute Hypoxic Respiratory Failure. If validated, please provide additional clinical support for the diagnosis.

## 2025-07-01 NOTE — ASSESSMENT & PLAN NOTE
-Stroke risk factor  -TTE with EF 55-60%, no WMA  -Strict I/Os and dialy weights  -Fluid restriction of 1.5L   -Continue lasix  -HM consulted for co-management, appreciate recs

## 2025-07-01 NOTE — CODE/ RAPID DOCUMENTATION
"  RAPID RESPONSE NURSE STROKE CODE NOTE         Admit Date: 2025  LOS: 3  Code Status: Full Code   Date of Consult: 2025  : 1940  Age: 84 y.o.  Weight:   Wt Readings from Last 1 Encounters:   25 47.5 kg (104 lb 11.5 oz)     Sex: female  Race: White   Bed: [address] [place] [address] A:   MRN: [id number]  Time Rapid Response Team page Received: 11:07 AM  Time Rapid Response Team at Bedside: 11:07 AM  Time Rapid Response Team left Bedside: 11:45 AM  Was the patient discharged from an ICU this admission? No   Was the patient discharged from a PACU within last 24 hours? No   Did the patient receive conscious sedation/general anesthesia in last 24 hours? No  Was the patient in the ED within the past 24 hours? No  Was the patient on NIPPV within the past 24 hours? No   Did this progress into an ARC or CPA: No  Attending Physician: Mick Gomez MD  Primary Service: [facility] MED S       SITUATION    Notified by Bedside RN via phone call.  Called to evaluate the patient for Neuro    BACKGROUND    Why is the patient in the hospital?: Acute hypoxic respiratory failure  Patient has a past medical history of Cancer and Stroke due to embolism of right middle cerebral artery.    ASSESSMENT    Last VS: BP (!) 140/63   Pulse 75   Temp 98 °F (36.7 °C) (Oral)   Resp 19   Ht 5' 3" (1.6 m)   Wt 47.5 kg (104 lb 11.5 oz)   SpO2 99%   BMI 18.55 kg/m²     24H Vital Sign Range:  Temp:  [97.6 °F (36.4 °C)-98.2 °F (36.8 °C)]   Pulse:  [70-78]   Resp:  [16-20]   BP: (110-142)/(56-77)   SpO2:  [93 %-100 %]     Last know well time: Unknown. Pt's  reports pt seemed "weaker on the left side" since ~5:00 AM this morning. Bedside RN reports pt was ambulating w/o difficulty at ~6:30 PM last night.     Glucose time: 11:09 AM   Glucose result: 119    Physical Exam  Constitutional:       Appearance: She is ill-appearing.      Interventions: Nasal cannula in place.   Eyes:      Pupils: Pupils are equal, round, and reactive to light. "   Cardiovascular:      Rate and Rhythm: Normal rate and regular rhythm.   Pulmonary:      Effort: Pulmonary effort is normal. No respiratory distress.   Abdominal:      General: There is no distension.   Skin:     General: Skin is warm and dry.      Coloration: Skin is pale.   Neurological:      Mental Status: She is oriented to person, place, and time and easily aroused.      GCS: GCS eye subscore is 3. GCS verbal subscore is 5. GCS motor subscore is 6.      Cranial Nerves: Cranial nerve deficit, dysarthria and facial asymmetry present.      Sensory: Sensory deficit present.      Motor: Weakness present.      Comments: Drowsy.    Psychiatric:         Behavior: Behavior is cooperative.        07/01/25 1107   NIH Stroke Scale   1a. Level of Consciousness 1-->Not alert, but arousable by minor stimulation to obey, answer, or respond   1b. LOC Questions 0-->Answers both questions correctly   1c. LOC Commands 1-->Performs one task correctly   2. Best Gaze 1-->Partial gaze palsy, gaze is abnormal in one or both eyes, but forced deviation or total gaze paresis is not present   3. Visual 0-->No visual loss   4. Facial Palsy 2-->Partial paralysis (total or near-total paralysis of lower face)   5a. Motor Arm, Left 2-->Some effort against gravity, limb cannot get to or maintain (if cued) 90 (or 45) degrees, drifts down to bed, but has some effort against gravity   5b. Motor Arm, Right 0-->No drift, limb holds 90 (or 45) degrees for full 10 secs   6a. Motor Leg, Left 2-->Some effort against gravity, leg falls to bed by 5 secs, but has some effort against gravity   6b. Motor Leg, Right 0-->No drift, leg holds 30 degree position for full 5 secs   7. Limb Ataxia 0-->Absent   8. Sensory 2-->Severe to total sensory loss, patient is not aware of being touched in the face, arm, and leg   9. Best Language 0-->No aphasia, normal   10. Dysarthria 1-->Mild-to-moderate dysarthria, patient slurs at least some words and, at worst, can be  understood with some difficulty   11. Extinction and Inattention (formerly Neglect) 1-->Visual, tactile, auditory, spatial, or personal inattention or extinction to bilateral simultaneous stimulation in one of the sensory modalities   Total (NIH Stroke Scale) 13         Time Stroke Code initiated: 11:07 AM  Stroke team Arrival time: 11:12 AM  Stroke Code activation triggers: Left sided deficits    Time arrived at CT: 11:20 AM  Time CT completed: 11:30 AM    VAN positive or negative: positive    Thrombolytic decision: No  Thrombolytic bolus (mg and time): N/A  Thrombolytic infusion (mg and time): N/A  Thrombolytic end time: N/A    IR decision: Pending  IR arrival: N/A  IR end time: N/A     RECOMMENDATIONS    We recommend: Continuous SpO2 monitoring, Maintain IV access, Strict I/O, Aspiration precautions, Seizure precautions, Implement fall precautions, ensure sitter if necessary, Delirium prevention, Monitor for signs of neurological changes, Notify Rapid Response of decline in patient status, and keep pt NPO pending IR decision    FOLLOW-UP/PLAN    Call the Rapid Response Nurse, Lois Klein RN at [phone] for additional questions or concerns.    PHYSICIAN ESCALATION    Orders received and case discussed with vascular neurology, ANJU Lenz and BECCA Woods MD    Disposition: Remain in room 626 per Dr. Woods.

## 2025-07-01 NOTE — ASSESSMENT & PLAN NOTE
-Stroke risk factor  -SBP goal <220, maintain MAP >65  -BP range in the last 24 hrs: BP  Min: 112/56  Max: 142/61  -Current antihypertensive regimen:   --PRN labetalol/hydralazine

## 2025-07-01 NOTE — NURSING
Stroke code activated, as per primary nurse pt significantly weaker on left side. Yesterday, pt was able to ambulate but today, lethargic and weak on right side. ; /63 MAP (90)

## 2025-07-01 NOTE — SUBJECTIVE & OBJECTIVE
Past Medical History:   Diagnosis Date    Cancer     basal cell carcinoma    Stroke due to embolism of right middle cerebral artery 02/18/2025     Past Surgical History:   Procedure Laterality Date    CARPAL TUNNEL RELEASE      ESOPHAGOGASTRODUODENOSCOPY N/A 8/22/2022    Procedure: EGD (ESOPHAGOGASTRODUODENOSCOPY);  Surgeon: Steven Lopez MD;  Location: [address]);  Service: Endoscopy;  Laterality: N/A;    ESOPHAGOGASTRODUODENOSCOPY N/A 12/5/2022    Procedure: EGD (ESOPHAGOGASTRODUODENOSCOPY);  Surgeon: Steven Lopez MD;  Location: [facility] ([address] [place] [address]);  Service: Endoscopy;  Laterality: N/A;  Has estimated pulmonary artery pressure 45, schedule location per protocol.   Please schedule with Dr. Darryl Lopez-  Plavix stopped 8/23/22  pt requested to schedule after Thanksgiving/ inst portal-RB  pre call complete; [facility] 11/28/22    ESOPHAGOGASTRODUODENOSCOPY N/A 7/11/2023    Procedure: EGD (ESOPHAGOGASTRODUODENOSCOPY);  Surgeon: Natalie Fall MD;  Location: [facility];  Service: Endoscopy;  Laterality: N/A;    ESOPHAGOGASTRODUODENOSCOPY N/A 3/5/2025    Procedure: EGD (ESOPHAGOGASTRODUODENOSCOPY);  Surgeon: Roberto Salvador MD;  Location: [address]);  Service: Endoscopy;  Laterality: N/A;    EYE SURGERY      left eye cataract    LEFT HEART CATHETERIZATION  4/17/2024    Procedure: Left heart cath;  Surgeon: Elisabeth De La Cruz MD;  Location: [facility] CATH;  Service: Cardiology;;    TONSILLECTOMY      TUBAL LIGATION       Social History[1]  Review of patient's allergies indicates:   Allergen Reactions    Jardiance [empagliflozin]        Medications: I have reviewed the current medication administration record.    Prescriptions Prior to Admission[2]    Review of Systems   Constitutional:  Negative for fatigue.   HENT:  Negative for drooling and trouble swallowing.    Eyes:  Negative for visual disturbance.   Respiratory:  Negative for choking.    Cardiovascular:  Negative for palpitations.    Gastrointestinal:  Negative for nausea and vomiting.   Musculoskeletal:  Negative for neck stiffness.   Skin:  Negative for color change.   Neurological:  Positive for facial asymmetry, speech difficulty and weakness. Negative for headaches.   Psychiatric/Behavioral:  Negative for confusion.    All other systems reviewed and are negative.    Objective:     Vital Signs (Most Recent):  Temp: 98 °F (36.7 °C) (07/01/25 1534)  Pulse: 74 (07/01/25 1631)  Resp: 18 (07/01/25 1534)  BP: 122/65 (07/01/25 1534)  SpO2: 100 % (07/01/25 1534)    Vital Signs Range (Last 24H):  Temp:  [97.6 °F (36.4 °C)-98.2 °F (36.8 °C)]   Pulse:  [66-80]   Resp:  [16-20]   BP: (112-142)/(56-77)   SpO2:  [93 %-100 %]        Physical Exam  Vitals and nursing note reviewed.   Constitutional:       General: She is not in acute distress.  HENT:      Head: Normocephalic.      Nose: Nose normal.      Mouth/Throat:      Mouth: Mucous membranes are moist.   Eyes:      General: Visual field deficit (L neglect) present.      Conjunctiva/sclera: Conjunctivae normal.      Comments: R gaze preference   Cardiovascular:      Rate and Rhythm: Normal rate.   Pulmonary:      Effort: Pulmonary effort is normal. No respiratory distress.   Abdominal:      General: There is no distension.   Musculoskeletal:         General: No deformity or signs of injury.      Cervical back: No rigidity.   Skin:     General: Skin is warm and dry.   Neurological:      Mental Status: She is alert.      Cranial Nerves: Dysarthria and facial asymmetry present.      Sensory: Sensory deficit present.      Motor: Weakness (LSW) present.   Psychiatric:         Attention and Perception: She is inattentive.         Behavior: Behavior is cooperative.              Neurological Exam:   LOC: alert  Attention Span: Good   Language: Expressive aphasia  Articulation: Dysarthria  Orientation: Person, Place, Time   Visual Fields: Visual neglect  EOM (CN III, IV, VI): Gaze preference  right  Facial  "Movement (CN VII): Lower facial weakness on the Left  Motor:      ---LUE Paresis: 3/5     ---LLE  Paresis: 4/5     ---RUE Normal 5/5     ---RLE Normal 5/5  Sensation: Jean Paul-hypoesthesia left        Laboratory:  CMP:   Recent Labs   Lab 07/01/25  1530   CALCIUM 9.5   ALBUMIN 3.7      K 3.7   CO2 30*   CL 98   BUN 22   CREATININE 0.6     CBC:   Recent Labs   Lab 07/01/25  0316   WBC 6.38   RBC 2.77*   HGB 7.6*   HCT 24.1*      MCV 87   MCH 27.4   MCHC 31.5*     Lipid Panel: No results for input(s): "CHOL", "LDLCALC", "HDL", "TRIG" in the last 168 hours.    Coagulation: No results for input(s): "PT", "INR", "APTT" in the last 168 hours.    Hgb A1C: No results for input(s): "HGBA1C" in the last 168 hours.    TSH:   Recent Labs   Lab 06/28/25  0732   TSH 2.532       Diagnostic Results:      Brain/Vessel Imaging:  CTA stroke multiphase 7/1/2025  Impression:  Moderate size acute infarct centered in the right temporal lobe.  No acute intracranial hemorrhage.  Proximal right M2 occlusion and subtotal occlusion vs stenosis in right M3 branch.  Bilateral pleural effusions with possible pulomary edema.  Pulmonary nodules and ground glass opacities.  See recent chest CT for further details.  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      Cardiac Evaluation:   TTE 7/1/2025    Left Ventricle: The left ventricle is normal in size. Normal wall thickness. There is concentric remodeling. There is normal systolic function with a visually estimated ejection fraction of 60 - 65%. Grade III diastolic dysfunction.    Right Ventricle: The right ventricle is normal in size measuring 2.8 cm. Wall thickness is normal. Systolic function is reduced.    Left Atrium: The left atrium is severely dilated    Aortic Valve: The aortic valve is a trileaflet valve. There is mild aortic valve sclerosis.    Mitral Valve: There is mild regurgitation.    Pulmonary Artery: The estimated pulmonary artery systolic pressure is 37 mmHg.    IVC/SVC: " Normal venous pressure at 3 mmHg.         [1]   Social History  Tobacco Use    Smoking status: Never     Passive exposure: Never    Smokeless tobacco: Never   Substance Use Topics    Alcohol use: No    Drug use: No   [2]   Medications Prior to Admission   Medication Sig Dispense Refill Last Dose/Taking    atorvastatin (LIPITOR) 80 MG tablet Take 1 tablet (80 mg total) by mouth once daily. 90 tablet 3 6/27/2025    clopidogreL (PLAVIX) 75 mg tablet Take 1 tablet (75 mg total) by mouth once daily. 90 tablet 3 6/27/2025    cyanocobalamin (VITAMIN B-12) 1000 MCG tablet Take 1,000 mcg by mouth once daily.   6/27/2025    ezetimibe (ZETIA) 10 mg tablet Take 1 tablet (10 mg total) by mouth once daily. 90 tablet 3 6/27/2025    furosemide (LASIX) 20 MG tablet Take 1 tablet (20 mg total) by mouth 2 (two) times a day. 60 tablet 11 6/27/2025    pantoprazole (PROTONIX) 40 MG tablet Take 1 tablet (40 mg total) by mouth once daily. Take 1 tablet by mouth every day 90 tablet 1 6/27/2025    potassium chloride SA (K-DUR,KLOR-CON) 20 MEQ tablet Take 1 tablet (20 mEq total) by mouth once daily. 30 tablet 11 6/27/2025    vitamin D (VITAMIN D3) 1000 units Tab Take 1,000 Units by mouth once daily.   6/27/2025    empagliflozin (JARDIANCE) 10 mg tablet Take 10 mg by mouth once daily.

## 2025-07-01 NOTE — CONSULTS
Thanh Williamson - Neurosurgery ([facility])  Vascular Neurology  Comprehensive Stroke Center  Consult Note    Inpatient consult to Vascular Neurology  Consult performed by: Carmen Matthews PA-C  Consult ordered by: Mick Gomez MD        Assessment/Plan:       * Embolic stroke involving right middle cerebral artery  Katelyn Caba is a 84 y.o. female with PMH of HTN, HLD, aortic atherosclerosis, hx of GI bleeds, ISAIAH requiring frequent transfusions, HFpEF, CAD, prior R MCA stroke (2/2025), BCC, former smoker (quit 50 years ago) that presented to [facility] ED 6/28/25 with SOB and found to have bilateral pleural effusions on CXR for which she was admitted to  for further eval and management of hypoxia. See  notes for details of hospital course.     Stroke code activated 7/1/25 for new severe L sided weakness and LFD. LKW unclear, sometime evening of 6/30. Family at bedside reports around 5:30am 7/1/25 noticing patient having LSW and needing assistance to ambulate to the bathroom. Sometime later in the morning of 7/1/25 patient's nurse noticed patient was unable to move L side, prompting stroke code activation. Neuro exam significant for R gaze preference, L hemiparesis (arm>>leg), L facial droop, expressive aphasia, dysarthria, and inattention to L side. NIHSS 10. CTH showed acute ischemic changes in R MCA territory, CTA revealed R M2 occlusion. Determined not a candidate for acute stroke interventions, OOW for TNK and no thrombectomy due to core infarct on CTH.  Patient will be transferred to  primary service for ongoing stroke workup, will consult  for co-management of active medical comorbidities.      Antithrombotics for secondary stroke prevention: Antiplatelets: Clopidogrel: 75 mg daily    Statins for secondary stroke prevention and hyperlipidemia, if present: Statins: Atorvastatin- 80 mg daily  Zetia 10mg daily    Aggressive risk factor modification: HTN, HLD, Diet, Exercise, CAD     Rehab efforts: The  patient has been evaluated by a stroke team provider and the therapy needs have been fully considered based off the presenting complaints and exam findings. The following therapy evaluations are needed: PT evaluate and treat, OT evaluate and treat, SLP evaluate and treat    Diagnostics ordered/pending: HgbA1C to assess blood glucose levels, Lipid Profile to assess cholesterol levels, MRI head without contrast to assess brain parenchyma    VTE prophylaxis: Enoxaparin (CrCl < 30 ml/min) 30 mg SQ every 24 hours  Mechanical prophylaxis: Place SCDs    BP parameters: Infarct: No intervention, SBP <220        Bilateral pleural effusion  -Admitted to  primary service on 6/28 for ongoing workup and management  -IV diuresis started with lasix 40mg BID  -Pulm consulted and rec'd continue aggressive diuresis, no indication for thoracentesis at this time; per pulm note: no signs or symptoms of infection at this time, pleural effusions have been present for months and fluid on bedside ultrasound appears simple; with elevation in BNP pleural effusions likely in the setting of heart failure and volume overload, based on the TAP-IT trial, thoracentesis in the setting of effusions secondary to heart failure would not shorten the length of hospital stay when compared to diuresis alone      consulted for co-management now that patient was transferred to  primary service    CAD (coronary artery disease)  -Stroke risk factor  -Continue plavix, statin, zetia    (HFpEF) heart failure with preserved ejection fraction  -Stroke risk factor  -TTE with EF 55-60%, no WMA  -Strict I/Os and dialy weights  -Fluid restriction of 1.5L   -Continue lasix  - consulted for co-management, appreciate recs    Hypertension  -Stroke risk factor  -SBP goal <220, maintain MAP >65  -BP range in the last 24 hrs: BP  Min: 112/56  Max: 142/61  -Current antihypertensive regimen:   --PRN labetalol/hydralazine    Iron deficiency anemia due to chronic blood  loss  Anemia is likely due to acute blood loss which was from GI bleeds. Most recent hemoglobin and hematocrit are listed below.  Recent Labs     06/29/25  1605 06/30/25  0623 07/01/25  0316   HGB 7.8* 7.7* 7.6*   HCT 25.6* 24.8* 24.1*     Plan  - Monitor serial CBC: Daily  - Transfuse PRBC if patient becomes hemodynamically unstable, symptomatic or H/H drops below 7/21.  - Patient has not received any PRBC transfusions to date  - Patient's anemia is currently stable  - Continue PPI, scheduled for EGD with GI on 7/8  - Given Ferraheme 1020mg IV x1 per hematology on 6/30  - HM consulted for co-management, appreciate assistance    Mixed hyperlipidemia  -Stroke risk factor  -Lipid panel pending, goal <70  -Atorvastatin 80mg and zetia 10mg daily        STROKE DOCUMENTATION     Acute Stroke Times   Last Known Normal Date: 06/30/25  Unknown Normal Time: Unknown Time  Symptom Onset Time:  (found with symptoms around 530am 7/1/25)  Stroke Team Called Date: 07/01/25  Stroke Team Called Time: 1108  Stroke Team Arrival Date: 07/01/25  Stroke Team Arrival Time: 1111  CT Interpretation Time: 1133  Thrombolytic Therapy Recommended: No  CTA Interpretation Time: 1133  Thrombectomy Recommended: No (large core infarct on CTH)    NIH Scale:  1a. Level of Consciousness: 0-->Alert, keenly responsive  1b. LOC Questions: 0-->Answers both questions correctly  1c. LOC Commands: 0-->Performs both tasks correctly  2. Best Gaze: 2-->Forced deviation, or total gaze paresis not overcome by the oculocephalic maneuver  3. Visual: 0-->No visual loss  4. Facial Palsy: 2-->Partial paralysis (total or near-total paralysis of lower face)  5a. Motor Arm, Left: 2-->Some effort against gravity, limb cannot get to or maintain (if cued) 90 (or 45) degrees, drifts down to bed, but has some effort against gravity  5b. Motor Arm, Right: 0-->No drift, limb holds 90 (or 45) degrees for full 10 secs  6a. Motor Leg, Left: 1-->Drift, leg falls by the end of the  5-sec period but does not hit bed  6b. Motor Leg, Right: 0-->No drift, leg holds 30 degree position for full 5 secs  7. Limb Ataxia: 0-->Absent  8. Sensory: 1-->Mild-to-moderate sensory loss, patient feels pinprick is less sharp or is dull on the affected side, or there is a loss of superficial pain with pinprick, but patient is aware of being touched  9. Best Language: 0-->No aphasia, normal  10. Dysarthria: 1-->Mild-to-moderate dysarthria, patient slurs at least some words and, at worst, can be understood with some difficulty  11. Extinction and Inattention (formerly Neglect): 1-->Visual, tactile, auditory, spatial, or personal inattention or extinction to bilateral simultaneous stimulation in one of the sensory modalities  Total (NIH Stroke Scale): 10    Modified [place] Score: 0  [place] Coma Scale:    ABCD2 Score:    [id number] Score:   HAS -BLED Score:   ICH Score:   Hunt & Hoffman Classification:       Thrombolysis Candidate? No, Out of window - Symptom onset > 4.5 hours    Delays to Thrombolysis?  Not Applicable    Interventional Revascularization Candidate?   Is the patient eligible for mechanical endovascular reperfusion (RUDY)?  No; Large core infarct on imaging     Delays to Thrombectomy? Not Applicable    Hemorrhagic change of an Ischemic Stroke: Does this patient have an ischemic stroke with hemorrhagic changes? No     Subjective:     History of Present Illness:  Katelyn Caba is a 84 y.o. female with PMH of HTN, HLD, aortic atherosclerosis, hx of GI bleeds, ISAIAH requiring frequent transfusions, HFpEF, CAD, prior R MCA stroke (2/2025), BCC, former smoker (quit 50 years ago) that presented to [facility] ED 6/28/25 with SOB and found to have bilateral pleural effusions on CXR for which she was admitted to  for further eval and management of hypoxia. See  notes for details of hospital course. Stroke code activated 7/1/25 for new severe L sided weakness and LFD. LKW unclear, sometime evening of 6/30. Family  at bedside reports around 5:30am 7/1/25 noticing patient having LSW and needing assistance to ambulate to the bathroom. Sometime later in the morning of 7/1/25 patient's nurse noticed patient was unable to move L side, prompting stroke code activation. Neuro exam significant for R gaze preference, L hemiparesis (arm>>leg), L facial droop, expressive aphasia, dysarthria, and inattention to L side. NIHSS 10. CTH showed acute ischemic changes in R MCA territory, CTA revealed R M2 occlusion. Determined not a candidate for acute stroke interventions, OOW for TNK and no thrombectomy due to core infarct on CTH. Patient will be transferred to  primary service for ongoing stroke workup, will consult  for co-management of active medical comorbidities.            Past Medical History:   Diagnosis Date    Cancer     basal cell carcinoma    Stroke due to embolism of right middle cerebral artery 02/18/2025     Past Surgical History:   Procedure Laterality Date    CARPAL TUNNEL RELEASE      ESOPHAGOGASTRODUODENOSCOPY N/A 8/22/2022    Procedure: EGD (ESOPHAGOGASTRODUODENOSCOPY);  Surgeon: Steven Lopez MD;  Location: [facility] ([address] [place] [address]);  Service: Endoscopy;  Laterality: N/A;    ESOPHAGOGASTRODUODENOSCOPY N/A 12/5/2022    Procedure: EGD (ESOPHAGOGASTRODUODENOSCOPY);  Surgeon: Steven Lopez MD;  Location: [address]);  Service: Endoscopy;  Laterality: N/A;  Has estimated pulmonary artery pressure 45, schedule location per protocol.   Please schedule with Dr. Darryl Lopez-  Plavix stopped 8/23/22  pt requested to schedule after [facility]/ [facility] portal-RB  pre call complete; [facility] 11/28/22    ESOPHAGOGASTRODUODENOSCOPY N/A 7/11/2023    Procedure: EGD (ESOPHAGOGASTRODUODENOSCOPY);  Surgeon: Natalie Fall MD;  Location: [facility];  Service: Endoscopy;  Laterality: N/A;    ESOPHAGOGASTRODUODENOSCOPY N/A 3/5/2025    Procedure: EGD (ESOPHAGOGASTRODUODENOSCOPY);  Surgeon: Roberto Salvador MD;  Location: [facility]  [facility] (2ND FLR);  Service: Endoscopy;  Laterality: N/A;    EYE SURGERY      left eye cataract    LEFT HEART CATHETERIZATION  4/17/2024    Procedure: Left heart cath;  Surgeon: Elisabeth De La Cruz MD;  Location: [facility] CATH;  Service: Cardiology;;    TONSILLECTOMY      TUBAL LIGATION       Social History[1]  Review of patient's allergies indicates:   Allergen Reactions    Jardiance [empagliflozin]        Medications: I have reviewed the current medication administration record.    Prescriptions Prior to Admission[2]    Review of Systems   Constitutional:  Negative for fatigue.   HENT:  Negative for drooling and trouble swallowing.    Eyes:  Negative for visual disturbance.   Respiratory:  Negative for choking.    Cardiovascular:  Negative for palpitations.   Gastrointestinal:  Negative for nausea and vomiting.   Musculoskeletal:  Negative for neck stiffness.   Skin:  Negative for color change.   Neurological:  Positive for facial asymmetry, speech difficulty and weakness. Negative for headaches.   Psychiatric/Behavioral:  Negative for confusion.    All other systems reviewed and are negative.    Objective:     Vital Signs (Most Recent):  Temp: 98 °F (36.7 °C) (07/01/25 1534)  Pulse: 74 (07/01/25 1631)  Resp: 18 (07/01/25 1534)  BP: 122/65 (07/01/25 1534)  SpO2: 100 % (07/01/25 1534)    Vital Signs Range (Last 24H):  Temp:  [97.6 °F (36.4 °C)-98.2 °F (36.8 °C)]   Pulse:  [66-80]   Resp:  [16-20]   BP: (112-142)/(56-77)   SpO2:  [93 %-100 %]        Physical Exam  Vitals and nursing note reviewed.   Constitutional:       General: She is not in acute distress.  HENT:      Head: Normocephalic.      Nose: Nose normal.      Mouth/Throat:      Mouth: Mucous membranes are moist.   Eyes:      General: Visual field deficit (L neglect) present.      Conjunctiva/sclera: Conjunctivae normal.      Comments: R gaze preference   Cardiovascular:      Rate and Rhythm: Normal rate.   Pulmonary:      Effort: Pulmonary effort is normal. No  "respiratory distress.   Abdominal:      General: There is no distension.   Musculoskeletal:         General: No deformity or signs of injury.      Cervical back: No rigidity.   Skin:     General: Skin is warm and dry.   Neurological:      Mental Status: She is alert.      Cranial Nerves: Dysarthria and facial asymmetry present.      Sensory: Sensory deficit present.      Motor: Weakness (LSW) present.   Psychiatric:         Attention and Perception: She is inattentive.         Behavior: Behavior is cooperative.              Neurological Exam:   LOC: alert  Attention Span: Good   Language: Expressive aphasia  Articulation: Dysarthria  Orientation: Person, Place, Time   Visual Fields: Visual neglect  EOM (CN III, IV, VI): Gaze preference  right  Facial Movement (CN VII): Lower facial weakness on the Left  Motor:      ---LUE Paresis: 3/5     ---LLE  Paresis: 4/5     ---RUE Normal 5/5     ---RLE Normal 5/5  Sensation: Jean Paul-hypoesthesia left        Laboratory:  CMP:   Recent Labs   Lab 07/01/25  1530   CALCIUM 9.5   ALBUMIN 3.7      K 3.7   CO2 30*   CL 98   BUN 22   CREATININE 0.6     CBC:   Recent Labs   Lab 07/01/25  0316   WBC 6.38   RBC 2.77*   HGB 7.6*   HCT 24.1*      MCV 87   MCH 27.4   MCHC 31.5*     Lipid Panel: No results for input(s): "CHOL", "LDLCALC", "HDL", "TRIG" in the last 168 hours.    Coagulation: No results for input(s): "PT", "INR", "APTT" in the last 168 hours.    Hgb A1C: No results for input(s): "HGBA1C" in the last 168 hours.    TSH:   Recent Labs   Lab 06/28/25  0732   TSH 2.532       Diagnostic Results:      Brain/Vessel Imaging:  CTA stroke multiphase 7/1/2025  Impression:  Moderate size acute infarct centered in the right temporal lobe.  No acute intracranial hemorrhage.  Proximal right M2 occlusion and subtotal occlusion vs stenosis in right M3 branch.  Bilateral pleural effusions with possible pulomary edema.  Pulmonary nodules and ground glass opacities.  See recent chest CT " for further details.  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      Cardiac Evaluation:   TTE 7/1/2025    Left Ventricle: The left ventricle is normal in size. Normal wall thickness. There is concentric remodeling. There is normal systolic function with a visually estimated ejection fraction of 60 - 65%. Grade III diastolic dysfunction.    Right Ventricle: The right ventricle is normal in size measuring 2.8 cm. Wall thickness is normal. Systolic function is reduced.    Left Atrium: The left atrium is severely dilated    Aortic Valve: The aortic valve is a trileaflet valve. There is mild aortic valve sclerosis.    Mitral Valve: There is mild regurgitation.    Pulmonary Artery: The estimated pulmonary artery systolic pressure is 37 mmHg.    IVC/SVC: Normal venous pressure at 3 mmHg.      Carmen Matthews PA-C  [facility]  Department of Vascular Neurology   [facility])        [1]   Social History  Tobacco Use    Smoking status: Never     Passive exposure: Never    Smokeless tobacco: Never   Substance Use Topics    Alcohol use: No    Drug use: No   [2]   Medications Prior to Admission   Medication Sig Dispense Refill Last Dose/Taking    atorvastatin (LIPITOR) 80 MG tablet Take 1 tablet (80 mg total) by mouth once daily. 90 tablet 3 6/27/2025    clopidogreL (PLAVIX) 75 mg tablet Take 1 tablet (75 mg total) by mouth once daily. 90 tablet 3 6/27/2025    cyanocobalamin (VITAMIN B-12) 1000 MCG tablet Take 1,000 mcg by mouth once daily.   6/27/2025    ezetimibe (ZETIA) 10 mg tablet Take 1 tablet (10 mg total) by mouth once daily. 90 tablet 3 6/27/2025    furosemide (LASIX) 20 MG tablet Take 1 tablet (20 mg total) by mouth 2 (two) times a day. 60 tablet 11 6/27/2025    pantoprazole (PROTONIX) 40 MG tablet Take 1 tablet (40 mg total) by mouth once daily. Take 1 tablet by mouth every day 90 tablet 1 6/27/2025    potassium chloride SA (K-DUR,KLOR-CON) 20 MEQ tablet Take 1 tablet (20 mEq  total) by mouth once daily. 30 tablet 11 6/27/2025    vitamin D (VITAMIN D3) 1000 units Tab Take 1,000 Units by mouth once daily.   6/27/2025    empagliflozin (JARDIANCE) 10 mg tablet Take 10 mg by mouth once daily.

## 2025-07-01 NOTE — ASSESSMENT & PLAN NOTE
Nutrition consulted. Most recent weight and BMI monitored-     Measurements:  Wt Readings from Last 1 Encounters:   07/01/25 47.5 kg (104 lb 11.5 oz)   Body mass index is 18.55 kg/m².    Patient has been screened and assessed by RD.    Malnutrition Type:  Context:    Level:      Malnutrition Characteristic Summary:       Interventions/Recommendations (treatment strategy):

## 2025-07-01 NOTE — ASSESSMENT & PLAN NOTE
7/1left facial droop and Left UE weakness noted on exam. vascular neurology consulted. CTA stroke protocol. MRI brain ordered. PT/OT/SLP eval . Neurochecks q 4h

## 2025-07-01 NOTE — PROGRESS NOTES
[facility] Medicine  Progress Note    Patient Name: Katelyn Caba  MRN: [id number]  Patient Class: IP- Inpatient   Admission Date: 6/28/2025  Length of Stay: 3 days  Attending Physician: Mick Gomez MD  Primary Care Provider: Brooklyn Burnham MD        Subjective     Principal Problem:Acute hypoxic respiratory failure        HPI:  Katelyn Caba is a 85 yo F with PMHx of BCC, HTN, HLD, aortic atherosclerosis, hx of GI bleeds, ISAIAH requiring frequent transfusions, HFpEF, CAD, L MCA CVA who presented to ED for shortness of breath. Seen with  and son at bedside. Son reports that patient has been having progressive shortness of breath over the past week that acutely worsened this morning. She believes this is due to her blood count being low. She recently was hospitalized 05/29-06/01 for anemia requiring 2 units to be transfused and Covid infection. She receives frequent blood transfusions and follows closely with hematology. She has a hx of GI bleeds and is scheduled for an OP EGD on 07/08/25. She denies any recent melena or hematochezia. Endorses dry cough. She has been compliant with her home lasix. She does not report any significant weight gain over the past week. She does have a history of tobacco use but quit 50 years ago. Unable to tell me how long she smoked for but believes she smoked about 0.5 ppd. Denies fever, chills, chest pain, palpitations, abdominal pain, n/v/d, and LE swelling.    In ED: Afebrile. BP elevated. Initially requiring 4L on arrival. Attempted to wean O2, but dropped to 88% on RA. Now satting well on 2L NC. CXR with bilateral pleural effusions. Given IV lasix 20 mg. Admitted to  for hypoxia.    Overview/Hospital Course:  6/29 CX ray -lungs are significant for diffuse interstitial prominence with reticular basilar opacity overlying bilateral pleural fluid collections similar previous performed 05/29/2025. There is no pneumothorax. The cardiac silhouette  appears enlarged. There is calcification of the aorta. on  IV lasix 40mg q 12h. strict IO monitor. CT chest in 2023 that showed multiple pulmonary nodules-- supposed to get repeat imaging with in 3-6 months. Repeat CT chest due to remote hx of tobacco use - r/o malignancy-  Bilateral lower lobe, inferior lingular, and right middle lobe consolidative change cannot exclude infection. Moderate bilateral pleural fluid collections. sats 95% on RA. Pulmonology consuled for thoracentesis. Chronic bilateral pleural effusions with chronically elevated BNP consistent with congestive heart failure. Recommend escalated diuresis Continue diuresis.   6/30 prior FOBT +. Recurrent anemia, transfusion dependent with treatment for bleeding bowel pathology. Normochromic normocytic anemia with evidence of GI bleed - AVMs . follows with hematology - previously discussed the option of a bone marrow biopsy primarily to rule out MDS. patient  elected to wait for now. recommended video capsule swallow that has been scheduled by the gastroenterologist. negative balance of  1.8 L. sats 96% on 3LNC. Repeat echo pending. on lasix 20mg BID at home.  Ferraheme 1020mg IV x1 per hematology   7/1 negative balance of  4.3L . K replaced. sats 94% on 2LNC. left facial droop and Left UE weakness noted on exam. vascular neurology consulted. CTHead shows early infarct changes on the R and CTA shows R M2 occlusion. LKW last night, therefore out of window for TNK. per Neuro IR regarding intervention - given the core infarct on CTH already she is unfortunately not a candidate for thrombectomy. MRI brain ordered. PT/OT/SLP eval .  Neurochecks q 4h             Review of Systems:   Pain scale:   Constitutional:  fever,  chills, headache, vision loss, hearing loss, weight loss, Generalized weakness, falls, loss of smell, loss of taste, poor appetite,  sore throat  Respiratory: cough, shortness of breath.   Cardiovascular: chest pain, dizziness, palpitations,  orthopnea, swelling of feet, syncope  Gastrointestinal: nausea, vomiting, abdominal pain, diarrhea, black stool,  blood in stool, change in bowel habits, constipation  Genitourinary: hematuria, dysuria, urgency, frequency  Integument/Breast: rash,  pruritis  Hematologic/Lymphatic: easy bruising, lymphadenopathy  Musculoskeletal: arthralgias , myalgias, back pain, neck pain, knee pain  Neurological: confusion, seizures, tremors, slurred speech  Behavioral/Psych:  depression, anxiety, auditory or visual hallucinations     OBJECTIVE:     Physical Exam:  Body mass index is 18.55 kg/m².    Constitutional: Appears thin built   Head: Normocephalic and atraumatic.   Neck: Normal range of motion. Neck supple.   Cardiovascular: Normal heart rate.  Regular heart rhythm.  Pulmonary/Chest: Effort normal.   Abdominal: No distension.  No tenderness  Musculoskeletal: Normal range of motion. No edema.   Neurological: Alert and oriented to person, place, and time. left facial droop. left UE weakness 3-4/5. poor left hand    Skin: Skin is warm and dry.   Psychiatric: Normal mood and affect. Behavior is normal.                  Vital Signs  Temp: 98 °F (36.7 °C) (07/01/25 1109)  Pulse: 75 (07/01/25 1115)  Resp: 19 (07/01/25 1115)  BP: (!) 140/63 (07/01/25 1115)  SpO2: 99 % (07/01/25 1115)     24 Hour VS Range    Temp:  [97.6 °F (36.4 °C)-98.2 °F (36.8 °C)]   Pulse:  [70-78]   Resp:  [16-20]   BP: (110-142)/(56-77)   SpO2:  [93 %-100 %]     Intake/Output Summary (Last 24 hours) at 7/1/2025 1221  Last data filed at 7/1/2025 0554  Gross per 24 hour   Intake 480 ml   Output 2300 ml   Net -1820 ml         I/O This Shift:  No intake/output data recorded.    Wt Readings from Last 3 Encounters:   07/01/25 47.5 kg (104 lb 11.5 oz)   06/12/25 48.1 kg (106 lb 0.7 oz)   06/06/25 7.258 kg (16 lb)       I have personally reviewed the vitals and recorded Intake/Output     Laboratory/Diagnostic Data:    CBC/Anemia Labs: Coags:    Recent Labs   Lab  "06/26/25  0704 06/28/25  0732 06/29/25  1605 06/30/25  0623 07/01/25  0316   WBC 5.29   < > 6.73 6.44 6.38   HGB 8.0*   < > 7.8* 7.7* 7.6*   HCT 26.7*   < > 25.6* 24.8* 24.1*      < > 442 410 387   MCV 93   < > 89 88 87   RDW 17.2*   < > 16.0* 15.9* 15.8*   IRON 15*  --   --   --   --    FERRITIN 28.0  --   --   --   --    RETIC  --   --   --  1.8  --     < > = values in this interval not displayed.    No results for input(s): "PT", "INR", "APTT" in the last 168 hours.     Chemistries: ABG:   Recent Labs   Lab 06/28/25  0732 06/28/25  1919 06/29/25  0701 06/29/25  1605 06/30/25  0623 06/30/25  1523 07/01/25  0316    141 140 140 138 140 138   K 4.1 4.0 3.8 4.2 3.5 4.3 3.6    104 106 101 100 101 99   CO2 25 27 27 29 29 31* 28   BUN 25* 25* 22 24* 25* 23 29*   CREATININE 0.7 0.6 0.6 0.7 0.6 0.7 0.7   CALCIUM 9.2 8.8 8.5* 9.1 8.6* 8.8 9.3   PROT 6.9  --   --   --   --   --   --    BILITOT 0.4  --   --   --   --   --   --    ALKPHOS 105  --   --   --   --   --   --    ALT 27  --   --   --   --   --   --    AST 28  --   --   --   --   --   --    MG 2.1  --  2.0  --  2.0  --  2.0   PHOS 3.6 3.5  --  3.7  --  4.1  --     No results for input(s): "PH", "PCO2", "PO2", "HCO3", "POCSATURATED", "BE" in the last 168 hours.     POCT Glucose: HbA1c:    Recent Labs   Lab 07/01/25  1109   POCTGLUCOSE 119*    Hemoglobin A1C   Date Value Ref Range Status   03/20/2025 4.2 4.0 - 5.6 % Final     Comment:     ADA Screening Guidelines:  5.7-6.4%  Consistent with prediabetes  >or=6.5%  Consistent with diabetes    High levels of fetal hemoglobin interfere with the HbA1C  assay. Heterozygous hemoglobin variants (HbS, HgC, etc)do  not significantly interfere with this assay.   However, presence of multiple variants may affect accuracy.     02/18/2025 4.9 4.0 - 5.6 % Final     Comment:     ADA Screening Guidelines:  5.7-6.4%  Consistent with prediabetes  >or=6.5%  Consistent with diabetes    High levels of fetal hemoglobin " "interfere with the HbA1C  assay. Heterozygous hemoglobin variants (HbS, HgC, etc)do  not significantly interfere with this assay.   However, presence of multiple variants may affect accuracy.     11/01/2024 4.2 4.0 - 5.6 % Final     Comment:     ADA Screening Guidelines:  5.7-6.4%  Consistent with prediabetes  >or=6.5%  Consistent with diabetes    High levels of fetal hemoglobin interfere with the HbA1C  assay. Heterozygous hemoglobin variants (HbS, HgC, etc)do  not significantly interfere with this assay.   However, presence of multiple variants may affect accuracy.          Cardiac Enzymes: Ejection Fractions:    No results for input(s): "CPK", "CPKMB", "MB", "TROPONINI" in the last 72 hours. EF   Date Value Ref Range Status   08/21/2022 65 % Final   07/22/2022 55 % Final          No results for input(s): "COLORU", "APPEARANCEUA", "PHUR", "SPECGRAV", "PROTEINUA", "GLUCUA", "KETONESU", "BILIRUBINUA", "OCCULTUA", "NITRITE", "UROBILINOGEN", "LEUKOCYTESUR", "RBCUA", "WBCUA", "BACTERIA", "SQUAMEPITHEL", "HYALINECASTS" in the last 48 hours.    Invalid input(s): "WRIGHTSUR"    Procalcitonin (ng/mL)   Date Value   06/29/2025 0.03     Lactate (Lactic Acid) (mmol/L)   Date Value   03/20/2025 1.5     BNP (pg/mL)   Date Value   06/28/2025 1,270 (H)   05/29/2025 1,188 (H)   05/28/2025 963 (H)   04/22/2025 1,257 (H)   03/20/2025 1,293 (H)   03/03/2025 1,512 (H)   08/21/2022 577 (H)     No results found for: "CRP", "SEDRATE"  D-Dimer (mg/L FEU)   Date Value   06/28/2025 0.46   08/21/2022 0.95 (H)     Ferritin (ng/mL)   Date Value   06/26/2025 28.0   06/23/2025 37.0   06/09/2025 48.0   04/29/2025 203.0   02/24/2025 64   01/07/2025 113   09/06/2024 56   07/05/2024 58   03/25/2024 8 (L)   07/06/2023 22   05/18/2023 43   03/29/2023 101   01/26/2023 724 (H)   12/16/2022 14 (L)     No results found for: "LDH"  Troponin I (ng/mL)   Date Value   04/18/2024 26.400 ()   04/17/2024 58.100 (HH)   04/17/2024 39.000 ()   04/17/2024 0.047 " (H)   08/21/2022 0.010   08/21/2022 0.009   07/20/2022 <0.012     No results found for this or any previous visit.  SARS-CoV2 (COVID-19) Qualitative PCR (no units)   Date Value   08/22/2022 Not Detected     SARS-CoV-2 RNA, Amplification, Qual (no units)   Date Value   03/20/2025 Negative   03/03/2025 Negative   08/21/2022 Negative   07/20/2022 Negative       Microbiology labs for the last week  Microbiology Results (last 7 days)       ** No results found for the last 168 hours. **            Reviewed and noted in plan where applicable- Please see chart for full lab data.    Lines/Drains:  Peripheral IV Single Lumen 06/28/25 0731 20 G Right Forearm (Active)   Site Assessment Clean;Dry;Intact;No redness;No swelling;No warmth;No drainage 06/29/25 0352   Extremity Assessment Distal to IV No abnormal discoloration;No redness;No swelling 06/28/25 1517   Line Status Capped;Flushed;Saline locked 06/29/25 0352   Dressing Status Intact;Dry;Clean 06/29/25 0352   Dressing Intervention Integrity maintained 06/29/25 0352   Number of days: 1       Imaging  ECG Results              EKG 12-lead (Final result)        Collection Time Result Time QRS Duration OHS QTC Calculation    06/28/25 07:13:31 06/28/25 09:19:05 90 461                     Final result by Interface, Lab In [facility] (06/28/25 09:19:12)                   Narrative:    Test Reason : R06.02,    Vent. Rate :  74 BPM     Atrial Rate :  74 BPM     P-R Int : 320 ms          QRS Dur :  90 ms      QT Int : 416 ms       P-R-T Axes :      2 108 degrees    QTcB Int : 461 ms    Sinus rhythm with 1st degree A-V block and artifact  Low voltage QRS  Cannot rule out Anterior infarct (cited on or before 26-Sep-2024)  ST and T wave abnormality, consider lateral ischemia  Abnormal ECG  When compared with ECG of 29-May-2025 06:33,  OH interval has increased  Confirmed by Nikko Lora ([id number]) on 6/28/2025 9:19:02 AM    Referred By:            Confirmed By: Nikko Lora                                     Results for orders placed during the hospital encounter of 03/20/25    Echo    Interpretation Summary    Left Ventricle: The left ventricle is normal in size. Ventricular mass is normal. Normal wall thickness. Normal wall motion. There is normal systolic function with a visually estimated ejection fraction of 55 - 60%. There is indeterminate diastolic function. Elevated left ventricular filling pressure.    Right Ventricle: The right ventricle is normal in size. Wall thickness is normal. Systolic function is normal.    Mitral Valve: There is mild regurgitation.    Tricuspid Valve: There is mild regurgitation.    Pulmonary Artery: The estimated pulmonary artery systolic pressure is 24 mmHg.    IVC/SVC: Normal venous pressure at 3 mmHg.    Pericardium: Left pleural effusion.      X-Ray Chest 1 View  Narrative: EXAMINATION:  XR CHEST 1 VIEW    CLINICAL HISTORY:  hypoxia;    FINDINGS:  Chest one view:    There is cardiomegaly and aortic plaque.  There is moderate edema.  The bones showed DJD.  Impression: CHF.    Electronically signed by: Yohannes Gruber MD  Date:    07/01/2025  Time:    10:04      Labs, Imaging, EKG and Diagnostic results from 7/1/2025 were reviewed.    Medications:  Medication list was reviewed and changes noted under Assessment/Plan.  Medications Ordered Prior to Encounter[1]  Scheduled Medications:  Current Facility-Administered Medications   Medication Dose Route Frequency    atorvastatin  80 mg Oral Daily    clopidogreL  75 mg Oral Daily    cyanocobalamin  1,000 mcg Oral Daily    enoxparin  30 mg Subcutaneous Daily    ezetimibe  10 mg Oral Daily    furosemide (LASIX) injection  40 mg Intravenous BID    pantoprazole  40 mg Oral Daily    vitamin D  1,000 Units Oral Daily     PRN:   Current Facility-Administered Medications:     acetaminophen, 1,000 mg, Oral, Q8H PRN    acetaminophen, 650 mg, Oral, Q4H PRN    albuterol-ipratropium, 3 mL, Nebulization, Q4H PRN    aluminum-magnesium  hydroxide-simethicone, 30 mL, Oral, QID PRN    melatonin, 6 mg, Oral, Nightly PRN    naloxone, 0.02 mg, Intravenous, PRN    ondansetron, 8 mg, Oral, Q8H PRN    polyethylene glycol, 17 g, Oral, BID PRN    prochlorperazine, 5 mg, Intravenous, Q6H PRN    simethicone, 1 tablet, Oral, QID PRN    sodium chloride 0.9%, 5 mL, Intravenous, PRN  Infusions:   Estimated Creatinine Clearance: 43.3 mL/min (based on SCr of 0.7 mg/dL).                 Assessment & Plan  Acute hypoxic respiratory failure  Solitary pulmonary nodule  Bilateral pleural effusion  Patient with Hypoxic Respiratory failure which is Acute on chronic.  she is not on home oxygen. Supplemental oxygen was provided and noted-      Signs/symptoms of respiratory failure include- lethargy. Contributing diagnoses includes - CHF and Pleural effusion Labs and images were reviewed. Patient Has not had a recent ABG. Will treat underlying causes and adjust management of respiratory failure as follows-   - start IV diuresis  - previous CT chest in 2023 with multiple lung nodules noted. Radiology recommended repeat imaging within 3-6 months, but this was never performed. She has a history of remote tobacco use.  CT chest pending     6/29  sats 95% on RA.  Patient with Hypoxic Respiratory failure which is Acute on chronic.  she is not on home oxygen. Supplemental oxygen was provided and noted-      Signs/symptoms of respiratory failure include- lethargy. Contributing diagnoses includes - CHF and Pleural effusion Labs and images were reviewed. Patient Has not had a recent ABG. Will treat underlying causes and adjust management of respiratory failure as follows-   - start IV diuresis  - previous CT chest in 2023 with multiple lung nodules noted. Radiology recommended repeat imaging within 3-6 months, but this was never performed. She has a history of remote tobacco use.  CT chest pending   6/29  CT chest in 2023 that showed multiple pulmonary nodules-- supposed to get repeat  imaging with in 3-6 months. Repeat CT chest due to remote hx of tobacco use - r/o malignancy-  Bilateral lower lobe, inferior lingular, and right middle lobe consolidative change cannot exclude infection. Moderate bilateral pleural fluid collections. Pulmonology consuled for thoracentesis     Patient with Hypoxic Respiratory failure which is Acute on chronic.  she is not on home oxygen. Supplemental oxygen was provided and noted-      Signs/symptoms of respiratory failure include- lethargy. Contributing diagnoses includes - CHF and Pleural effusion Labs and images were reviewed. Patient Has not had a recent ABG. Will treat underlying causes and adjust management of respiratory failure as follows-   - start IV diuresis  - previous CT chest in 2023 with multiple lung nodules noted. Radiology recommended repeat imaging within 3-6 months, but this was never performed. She has a history of remote tobacco use.  CT chest pending       6/29  CT chest in 2023 that showed multiple pulmonary nodules-- supposed to get repeat imaging with in 3-6 months. Repeat CT chest due to remote hx of tobacco use - r/o malignancy-  Bilateral lower lobe, inferior lingular, and right middle lobe consolidative change cannot exclude infection. Moderate bilateral pleural fluid collections. sats 95% on RA. Pulmonology consuled for thoracentesis   6/29 CX ray -lungs are significant for diffuse interstitial prominence with reticular basilar opacity overlying bilateral pleural fluid collections similar previous performed 05/29/2025. There is no pneumothorax. The cardiac silhouette appears enlarged. There is calcification of the aorta. on  IV lasix 40mg q 12h. strict IO monitor. CT chest in 2023 that showed multiple pulmonary nodules-- supposed to get repeat imaging with in 3-6 months. Repeat CT chest due to remote hx of tobacco use - r/o malignancy-  Bilateral lower lobe, inferior lingular, and right middle lobe consolidative change cannot exclude  infection. Moderate bilateral pleural fluid collections. sats 95% on RA. Pulmonology consuled for thoracentesis. Chronic bilateral pleural effusions with chronically elevated BNP consistent with congestive heart failure. Recommend escalated diuresis Continue diuresis.   6/30 negative balance of  1.8 L. sats 96% on RA.  Patient with Hypoxic Respiratory failure which is Acute on chronic.  she is not on home oxygen. Supplemental oxygen was provided and noted-      Signs/symptoms of respiratory failure include- lethargy. Contributing diagnoses includes - CHF and Pleural effusion Labs and images were reviewed. Patient Has not had a recent ABG. Will treat underlying causes and adjust management of respiratory failure as follows-   - start IV diuresis  - previous CT chest in 2023 with multiple lung nodules noted. Radiology recommended repeat imaging within 3-6 months, but this was never performed. She has a history of remote tobacco use.  CT chest pending     6/29  sats 95% on RA.  Patient with Hypoxic Respiratory failure which is Acute on chronic.  she is not on home oxygen. Supplemental oxygen was provided and noted-      Signs/symptoms of respiratory failure include- lethargy. Contributing diagnoses includes - CHF and Pleural effusion Labs and images were reviewed. Patient Has not had a recent ABG. Will treat underlying causes and adjust management of respiratory failure as follows-   - start IV diuresis  - previous CT chest in 2023 with multiple lung nodules noted. Radiology recommended repeat imaging within 3-6 months, but this was never performed. She has a history of remote tobacco use.  CT chest pending   6/29  CT chest in 2023 that showed multiple pulmonary nodules-- supposed to get repeat imaging with in 3-6 months. Repeat CT chest due to remote hx of tobacco use - r/o malignancy-  Bilateral lower lobe, inferior lingular, and right middle lobe consolidative change cannot exclude infection. Moderate bilateral  pleural fluid collections. Pulmonology consuled for thoracentesis     Patient with Hypoxic Respiratory failure which is Acute on chronic.  she is not on home oxygen. Supplemental oxygen was provided and noted-      Signs/symptoms of respiratory failure include- lethargy. Contributing diagnoses includes - CHF and Pleural effusion Labs and images were reviewed. Patient Has not had a recent ABG. Will treat underlying causes and adjust management of respiratory failure as follows-   - start IV diuresis  - previous CT chest in 2023 with multiple lung nodules noted. Radiology recommended repeat imaging within 3-6 months, but this was never performed. She has a history of remote tobacco use.  CT chest pending       6/29  CT chest in 2023 that showed multiple pulmonary nodules-- supposed to get repeat imaging with in 3-6 months. Repeat CT chest due to remote hx of tobacco use - r/o malignancy-  Bilateral lower lobe, inferior lingular, and right middle lobe consolidative change cannot exclude infection. Moderate bilateral pleural fluid collections. sats 95% on RA. Pulmonology consuled for thoracentesis   Patient with Hypoxic Respiratory failure which is Acute on chronic.  she is not on home oxygen. Supplemental oxygen was provided and noted-      Signs/symptoms of respiratory failure include- lethargy. Contributing diagnoses includes - CHF and Pleural effusion Labs and images were reviewed. Patient Has not had a recent ABG. Will treat underlying causes and adjust management of respiratory failure as follows-   - start IV diuresis  - previous CT chest in 2023 with multiple lung nodules noted. Radiology recommended repeat imaging within 3-6 months, but this was never performed. She has a history of remote tobacco use.  CT chest pending     6/29  sats 95% on RA.  Patient with Hypoxic Respiratory failure which is Acute on chronic.  she is not on home oxygen. Supplemental oxygen was provided and noted-      Signs/symptoms of  respiratory failure include- lethargy. Contributing diagnoses includes - CHF and Pleural effusion Labs and images were reviewed. Patient Has not had a recent ABG. Will treat underlying causes and adjust management of respiratory failure as follows-   - start IV diuresis  - previous CT chest in 2023 with multiple lung nodules noted. Radiology recommended repeat imaging within 3-6 months, but this was never performed. She has a history of remote tobacco use.  CT chest pending   6/29  CT chest in 2023 that showed multiple pulmonary nodules-- supposed to get repeat imaging with in 3-6 months. Repeat CT chest due to remote hx of tobacco use - r/o malignancy-  Bilateral lower lobe, inferior lingular, and right middle lobe consolidative change cannot exclude infection. Moderate bilateral pleural fluid collections. Pulmonology consuled for thoracentesis     Patient with Hypoxic Respiratory failure which is Acute on chronic.  she is not on home oxygen. Supplemental oxygen was provided and noted-      Signs/symptoms of respiratory failure include- lethargy. Contributing diagnoses includes - CHF and Pleural effusion Labs and images were reviewed. Patient Has not had a recent ABG. Will treat underlying causes and adjust management of respiratory failure as follows-   - start IV diuresis  - previous CT chest in 2023 with multiple lung nodules noted. Radiology recommended repeat imaging within 3-6 months, but this was never performed. She has a history of remote tobacco use.  CT chest pending       6/29  CT chest in 2023 that showed multiple pulmonary nodules-- supposed to get repeat imaging with in 3-6 months. Repeat CT chest due to remote hx of tobacco use - r/o malignancy-  Bilateral lower lobe, inferior lingular, and right middle lobe consolidative change cannot exclude infection. Moderate bilateral pleural fluid collections. sats 95% on RA. Pulmonology consuled for thoracentesis . Chronic bilateral pleural effusions with  chronically elevated BNP consistent with congestive heart failure. Recommend escalated diuresis Continue diuresis.   6/30  sats 96% on 3LNC. Repeat echo - left ventricle is normal in size. Normal wall thickness. There is concentric remodeling. There is normal systolic function with a visually estimated ejection fraction of 60 - 65%. Grade III diastolic dysfunction.    Right Ventricle: The right ventricle is normal in size measuring 2.8 cm. Wall thickness is normal. Systolic function is reduced.    Left Atrium: The left atrium is severely dilated    Aortic Valve: The aortic valve is a trileaflet valve. There is mild aortic valve sclerosis.    Mitral Valve: There is mild regurgitation.    Pulmonary Artery: The estimated pulmonary artery systolic pressure is 37 mmHg.    IVC/SVC: Normal venous pressure at 3 mmHg.    7/1 negative balance of  4.3L . K replaced. sats 94% on 2LNC    (HFpEF) heart failure with preserved ejection fraction  - Patient is identified as having Diastolic (HFpEF) heart failure that is Acute on Chronic.   - CHF is currently uncontrolled due to Rales/crackles on pulmonary exam and Pulmonary edema/pleural effusion on CXR.   - Latest ECHO performed and demonstrates- Results for orders placed during the hospital encounter of 03/20/25    Echo    Interpretation Summary    Left Ventricle: The left ventricle is normal in size. Ventricular mass is normal. Normal wall thickness. Normal wall motion. There is normal systolic function with a visually estimated ejection fraction of 55 - 60%. There is indeterminate diastolic function. Elevated left ventricular filling pressure.    Right Ventricle: The right ventricle is normal in size. Wall thickness is normal. Systolic function is normal.    Mitral Valve: There is mild regurgitation.    Tricuspid Valve: There is mild regurgitation.    Pulmonary Artery: The estimated pulmonary artery systolic pressure is 24 mmHg.    IVC/SVC: Normal venous pressure at 3 mmHg.     Pericardium: Left pleural effusion.  .   - Continue Furosemide and monitor clinical status closely.   - Monitor on telemetry.   - Patient is on CHF pathway.    - Monitor strict Is&Os and daily weights.    - Place on fluid restriction of 1.5 L.   - Continue to stress to patient importance of self efficacy and  on diet for CHF.   - Last BNP reviewed- and noted below   Recent Labs   Lab 06/28/25  0732   BNP 1,270*   - starting IV diuresis with lasix 40 mg BID   6/29 CX ray -lungs are significant for diffuse interstitial prominence with reticular basilar opacity overlying bilateral pleural fluid collections similar previous performed 05/29/2025. There is no pneumothorax. The cardiac silhouette appears enlarged. There is calcification of the aorta. on  IV lasix 40mg q 12h. strict IO monitor. CT chest in 2023 that showed multiple pulmonary nodules-- supposed to get repeat imaging with in 3-6 months. Repeat CT chest due to remote hx of tobacco use - r/o malignancy-  Bilateral lower lobe, inferior lingular, and right middle lobe consolidative change cannot exclude infection. Moderate bilateral pleural fluid collections. sats 95% on RA. Pulmonology consuled for thoracentesis. Chronic bilateral pleural effusions with chronically elevated BNP consistent with congestive heart failure. Recommend escalated diuresis Continue diuresis.   6/30. negative balance of  1.8 L. sats 96% on RA. Repeat echo pending.   H/O ischemic left MCA stroke  - continue statin, plavix, and zetia   Mixed hyperlipidemia  CAD (coronary artery disease)  - continue statin and zetia   - continue statin and zetia   - continue statin and zetia   - continue statin and zetia   Iron deficiency anemia due to chronic blood loss  Anemia is likely due to acute blood loss which was from GI bleeds. Most recent hemoglobin and hematocrit are listed below.  Recent Labs     06/29/25  1605 06/30/25  0623 07/01/25  0316   HGB 7.8* 7.7* 7.6*   HCT 25.6* 24.8* 24.1*      Plan  - Monitor serial CBC: Daily  - Transfuse PRBC if patient becomes hemodynamically unstable, symptomatic or H/H drops below 7/21.  - Patient has not received any PRBC transfusions to date  - Patient's anemia is currently stable  - continue ppi. scheduled for EGD with GI 07/08  - follows closely with OP hematology. Considering OP bone marrow biopsy primarily to rule out MDS   6/29   EGD 3/25 Esophageal mucosal changes secondary to                          established long-segment Rodriguez's disease,                          classified as Rodriguez's stage C10-M10 per [place]                          criteria.                          - 3 cm hiatal hernia.                          - One non-bleeding angioectasia in the stomach.                          Treated with argon plasma coagulation (APC).                          - Two non-bleeding angioectasias in the duodenum.                          Treated with argon plasma coagulation (APC).   FOBT ordered. consider PRBC transfusion   6/30 .  Ferraheme 1020mg IV x1 per hematology   Hypertension  Patient's blood pressure range in the last 24 hours was: BP  Min: 110/58  Max: 142/61.The patient's inpatient anti-hypertensive regimen is listed below:  Current Antihypertensives  furosemide injection 40 mg, 2 times daily, Intravenous    Plan  - BP is controlled  - starting IV diuresis  Moderate protein-calorie malnutrition  Nutrition consulted. Most recent weight and BMI monitored-     Measurements:  Wt Readings from Last 1 Encounters:   07/01/25 47.5 kg (104 lb 11.5 oz)   Body mass index is 18.55 kg/m².    Patient has been screened and assessed by RD.    Malnutrition Type:  Context:    Level:      Malnutrition Characteristic Summary:       Interventions/Recommendations (treatment strategy):       Elevated troponin  - elevated on admit, but flat and at baseline  - denies chest pain  - likely elevated from demand in setting of pulmonary edema   - monitor tele   Acute CVA  (cerebrovascular accident)  7/1 Left facial droop and Left UE weakness noted on exam. vascular neurology consulted. CTHead shows early infarct changes on the R and CTA shows R M2 occlusion. LKW last night, therefore out of window for TNK. per Neuro IR regarding intervention - given the core infarct on CTH already she is unfortunately not a candidate for thrombectomy. MRI brain ordered. PT/OT/SLP eval .  Neurochecks q 4h     Facial droop (Resolved: 7/1/2025)  7/1left facial droop and Left UE weakness noted on exam. vascular neurology consulted. CTA stroke protocol. MRI brain ordered. PT/OT/SLP eval . Neurochecks q 4h       VTE Risk Mitigation (From admission, onward)           Ordered     enoxaparin injection 30 mg  Daily         06/29/25 1103     IP VTE HIGH RISK PATIENT  Once         06/28/25 0936                    Discharge Planning   TERE: 7/3/2025     Code Status: Full Code   Medical Readiness for Discharge Date:   Discharge Plan A: Home                        Mick Gomez MD  Department of Hospital Medicine   [facility]         [1]   No current facility-administered medications on file prior to encounter.     Current Outpatient Medications on File Prior to Encounter   Medication Sig Dispense Refill    atorvastatin (LIPITOR) 80 MG tablet Take 1 tablet (80 mg total) by mouth once daily. 90 tablet 3    clopidogreL (PLAVIX) 75 mg tablet Take 1 tablet (75 mg total) by mouth once daily. 90 tablet 3    cyanocobalamin (VITAMIN B-12) 1000 MCG tablet Take 1,000 mcg by mouth once daily.      ezetimibe (ZETIA) 10 mg tablet Take 1 tablet (10 mg total) by mouth once daily. 90 tablet 3    furosemide (LASIX) 20 MG tablet Take 1 tablet (20 mg total) by mouth 2 (two) times a day. 60 tablet 11    pantoprazole (PROTONIX) 40 MG tablet Take 1 tablet (40 mg total) by mouth once daily. Take 1 tablet by mouth every day 90 tablet 1    potassium chloride SA (K-DUR,KLOR-CON) 20 MEQ tablet Take 1 tablet (20 mEq total) by  mouth once daily. 30 tablet 11    vitamin D (VITAMIN D3) 1000 units Tab Take 1,000 Units by mouth once daily.      empagliflozin (JARDIANCE) 10 mg tablet Take 10 mg by mouth once daily.

## 2025-07-01 NOTE — CARE UPDATE
RAPID RESPONSE NURSE FOLLOW-UP NOTE       Followed up with patient for a stroke code. Pt transferred to vascular neurology's primary service and moved to neuro-progressive unit.  No acute issues at this time.  Reviewed plan of care with Bedside nurse, Tamiko. Neurological assessment unchanged from this morning as pt continues to exhibit left side deficits.   Team will continue to follow.  Please call Rapid Response RN, Lois Klein RN with any questions or concerns at [phone].

## 2025-07-01 NOTE — ASSESSMENT & PLAN NOTE
Patient with Hypoxic Respiratory failure which is Acute on chronic.  she is not on home oxygen. Supplemental oxygen was provided and noted-      Signs/symptoms of respiratory failure include- lethargy. Contributing diagnoses includes - CHF and Pleural effusion Labs and images were reviewed. Patient Has not had a recent ABG. Will treat underlying causes and adjust management of respiratory failure as follows-   - start IV diuresis  - previous CT chest in 2023 with multiple lung nodules noted. Radiology recommended repeat imaging within 3-6 months, but this was never performed. She has a history of remote tobacco use.  CT chest pending     6/29  sats 95% on RA.  Patient with Hypoxic Respiratory failure which is Acute on chronic.  she is not on home oxygen. Supplemental oxygen was provided and noted-      Signs/symptoms of respiratory failure include- lethargy. Contributing diagnoses includes - CHF and Pleural effusion Labs and images were reviewed. Patient Has not had a recent ABG. Will treat underlying causes and adjust management of respiratory failure as follows-   - start IV diuresis  - previous CT chest in 2023 with multiple lung nodules noted. Radiology recommended repeat imaging within 3-6 months, but this was never performed. She has a history of remote tobacco use.  CT chest pending   6/29  CT chest in 2023 that showed multiple pulmonary nodules-- supposed to get repeat imaging with in 3-6 months. Repeat CT chest due to remote hx of tobacco use - r/o malignancy-  Bilateral lower lobe, inferior lingular, and right middle lobe consolidative change cannot exclude infection. Moderate bilateral pleural fluid collections. Pulmonology consuled for thoracentesis     Patient with Hypoxic Respiratory failure which is Acute on chronic.  she is not on home oxygen. Supplemental oxygen was provided and noted-      Signs/symptoms of respiratory failure include- lethargy. Contributing diagnoses includes - CHF and Pleural  effusion Labs and images were reviewed. Patient Has not had a recent ABG. Will treat underlying causes and adjust management of respiratory failure as follows-   - start IV diuresis  - previous CT chest in 2023 with multiple lung nodules noted. Radiology recommended repeat imaging within 3-6 months, but this was never performed. She has a history of remote tobacco use.  CT chest pending       6/29  CT chest in 2023 that showed multiple pulmonary nodules-- supposed to get repeat imaging with in 3-6 months. Repeat CT chest due to remote hx of tobacco use - r/o malignancy-  Bilateral lower lobe, inferior lingular, and right middle lobe consolidative change cannot exclude infection. Moderate bilateral pleural fluid collections. sats 95% on RA. Pulmonology consuled for thoracentesis   6/29 CX ray -lungs are significant for diffuse interstitial prominence with reticular basilar opacity overlying bilateral pleural fluid collections similar previous performed 05/29/2025. There is no pneumothorax. The cardiac silhouette appears enlarged. There is calcification of the aorta. on  IV lasix 40mg q 12h. strict IO monitor. CT chest in 2023 that showed multiple pulmonary nodules-- supposed to get repeat imaging with in 3-6 months. Repeat CT chest due to remote hx of tobacco use - r/o malignancy-  Bilateral lower lobe, inferior lingular, and right middle lobe consolidative change cannot exclude infection. Moderate bilateral pleural fluid collections. sats 95% on RA. Pulmonology consuled for thoracentesis. Chronic bilateral pleural effusions with chronically elevated BNP consistent with congestive heart failure. Recommend escalated diuresis Continue diuresis.   6/30 negative balance of  1.8 L. sats 96% on RA.  Patient with Hypoxic Respiratory failure which is Acute on chronic.  she is not on home oxygen. Supplemental oxygen was provided and noted-      Signs/symptoms of respiratory failure include- lethargy. Contributing diagnoses  includes - CHF and Pleural effusion Labs and images were reviewed. Patient Has not had a recent ABG. Will treat underlying causes and adjust management of respiratory failure as follows-   - start IV diuresis  - previous CT chest in 2023 with multiple lung nodules noted. Radiology recommended repeat imaging within 3-6 months, but this was never performed. She has a history of remote tobacco use.  CT chest pending     6/29  sats 95% on RA.  Patient with Hypoxic Respiratory failure which is Acute on chronic.  she is not on home oxygen. Supplemental oxygen was provided and noted-      Signs/symptoms of respiratory failure include- lethargy. Contributing diagnoses includes - CHF and Pleural effusion Labs and images were reviewed. Patient Has not had a recent ABG. Will treat underlying causes and adjust management of respiratory failure as follows-   - start IV diuresis  - previous CT chest in 2023 with multiple lung nodules noted. Radiology recommended repeat imaging within 3-6 months, but this was never performed. She has a history of remote tobacco use.  CT chest pending   6/29  CT chest in 2023 that showed multiple pulmonary nodules-- supposed to get repeat imaging with in 3-6 months. Repeat CT chest due to remote hx of tobacco use - r/o malignancy-  Bilateral lower lobe, inferior lingular, and right middle lobe consolidative change cannot exclude infection. Moderate bilateral pleural fluid collections. Pulmonology consuled for thoracentesis     Patient with Hypoxic Respiratory failure which is Acute on chronic.  she is not on home oxygen. Supplemental oxygen was provided and noted-      Signs/symptoms of respiratory failure include- lethargy. Contributing diagnoses includes - CHF and Pleural effusion Labs and images were reviewed. Patient Has not had a recent ABG. Will treat underlying causes and adjust management of respiratory failure as follows-   - start IV diuresis  - previous CT chest in 2023 with multiple  lung nodules noted. Radiology recommended repeat imaging within 3-6 months, but this was never performed. She has a history of remote tobacco use.  CT chest pending       6/29  CT chest in 2023 that showed multiple pulmonary nodules-- supposed to get repeat imaging with in 3-6 months. Repeat CT chest due to remote hx of tobacco use - r/o malignancy-  Bilateral lower lobe, inferior lingular, and right middle lobe consolidative change cannot exclude infection. Moderate bilateral pleural fluid collections. sats 95% on RA. Pulmonology consuled for thoracentesis   Patient with Hypoxic Respiratory failure which is Acute on chronic.  she is not on home oxygen. Supplemental oxygen was provided and noted-      Signs/symptoms of respiratory failure include- lethargy. Contributing diagnoses includes - CHF and Pleural effusion Labs and images were reviewed. Patient Has not had a recent ABG. Will treat underlying causes and adjust management of respiratory failure as follows-   - start IV diuresis  - previous CT chest in 2023 with multiple lung nodules noted. Radiology recommended repeat imaging within 3-6 months, but this was never performed. She has a history of remote tobacco use.  CT chest pending     6/29  sats 95% on RA.  Patient with Hypoxic Respiratory failure which is Acute on chronic.  she is not on home oxygen. Supplemental oxygen was provided and noted-      Signs/symptoms of respiratory failure include- lethargy. Contributing diagnoses includes - CHF and Pleural effusion Labs and images were reviewed. Patient Has not had a recent ABG. Will treat underlying causes and adjust management of respiratory failure as follows-   - start IV diuresis  - previous CT chest in 2023 with multiple lung nodules noted. Radiology recommended repeat imaging within 3-6 months, but this was never performed. She has a history of remote tobacco use.  CT chest pending   6/29  CT chest in 2023 that showed multiple pulmonary nodules--  supposed to get repeat imaging with in 3-6 months. Repeat CT chest due to remote hx of tobacco use - r/o malignancy-  Bilateral lower lobe, inferior lingular, and right middle lobe consolidative change cannot exclude infection. Moderate bilateral pleural fluid collections. Pulmonology consuled for thoracentesis     Patient with Hypoxic Respiratory failure which is Acute on chronic.  she is not on home oxygen. Supplemental oxygen was provided and noted-      Signs/symptoms of respiratory failure include- lethargy. Contributing diagnoses includes - CHF and Pleural effusion Labs and images were reviewed. Patient Has not had a recent ABG. Will treat underlying causes and adjust management of respiratory failure as follows-   - start IV diuresis  - previous CT chest in 2023 with multiple lung nodules noted. Radiology recommended repeat imaging within 3-6 months, but this was never performed. She has a history of remote tobacco use.  CT chest pending       6/29  CT chest in 2023 that showed multiple pulmonary nodules-- supposed to get repeat imaging with in 3-6 months. Repeat CT chest due to remote hx of tobacco use - r/o malignancy-  Bilateral lower lobe, inferior lingular, and right middle lobe consolidative change cannot exclude infection. Moderate bilateral pleural fluid collections. sats 95% on RA. Pulmonology consuled for thoracentesis . Chronic bilateral pleural effusions with chronically elevated BNP consistent with congestive heart failure. Recommend escalated diuresis Continue diuresis.   6/30  sats 96% on 3LNC. Repeat echo - left ventricle is normal in size. Normal wall thickness. There is concentric remodeling. There is normal systolic function with a visually estimated ejection fraction of 60 - 65%. Grade III diastolic dysfunction.    Right Ventricle: The right ventricle is normal in size measuring 2.8 cm. Wall thickness is normal. Systolic function is reduced.    Left Atrium: The left atrium is severely  dilated    Aortic Valve: The aortic valve is a trileaflet valve. There is mild aortic valve sclerosis.    Mitral Valve: There is mild regurgitation.    Pulmonary Artery: The estimated pulmonary artery systolic pressure is 37 mmHg.    IVC/SVC: Normal venous pressure at 3 mmHg.    7/1 negative balance of  4.3L . K replaced. sats 94% on 2LNC

## 2025-07-01 NOTE — ASSESSMENT & PLAN NOTE
-Admitted to  primary service on 6/28 for ongoing workup and management  -IV diuresis started with lasix 40mg BID  -Pulm consulted and rec'd continue aggressive diuresis, no indication for thoracentesis at this time; per pulm note: no signs or symptoms of infection at this time, pleural effusions have been present for months and fluid on bedside ultrasound appears simple; with elevation in BNP pleural effusions likely in the setting of heart failure and volume overload, based on the TAP-IT trial, thoracentesis in the setting of effusions secondary to heart failure would not shorten the length of hospital stay when compared to diuresis alone      consulted for co-management now that patient was transferred to  primary service

## 2025-07-01 NOTE — ASSESSMENT & PLAN NOTE
Katelyn Caba is a 84 y.o. female with PMH of HTN, HLD, aortic atherosclerosis, hx of GI bleeds, ISAIAH requiring frequent transfusions, HFpEF, CAD, prior R MCA stroke (2/2025), BCC, former smoker (quit 50 years ago) that presented to [facility] ED 6/28/25 with SOB and found to have bilateral pleural effusions on CXR for which she was admitted to  for further eval and management of hypoxia. See  notes for details of hospital course.     Stroke code activated 7/1/25 for new severe L sided weakness and LFD. LKW unclear, sometime evening of 6/30. Family at bedside reports around 5:30am 7/1/25 noticing patient having LSW and needing assistance to ambulate to the bathroom. Sometime later in the morning of 7/1/25 patient's nurse noticed patient was unable to move L side, prompting stroke code activation. Neuro exam significant for R gaze preference, L hemiparesis (arm>>leg), L facial droop, expressive aphasia, dysarthria, and inattention to L side. NIHSS 10. CTH showed acute ischemic changes in R MCA territory, CTA revealed R M2 occlusion. Determined not a candidate for acute stroke interventions, OOW for TNK and no thrombectomy due to core infarct on CTH.  Patient will be transferred to  primary service for ongoing stroke workup, will consult  for co-management of active medical comorbidities.      Antithrombotics for secondary stroke prevention: Antiplatelets: Clopidogrel: 75 mg daily    Statins for secondary stroke prevention and hyperlipidemia, if present: Statins: Atorvastatin- 80 mg daily  Zetia 10mg daily    Aggressive risk factor modification: HTN, HLD, Diet, Exercise, CAD     Rehab efforts: The patient has been evaluated by a stroke team provider and the therapy needs have been fully considered based off the presenting complaints and exam findings. The following therapy evaluations are needed: PT evaluate and treat, OT evaluate and treat, SLP evaluate and treat    Diagnostics ordered/pending: HgbA1C to assess  blood glucose levels, Lipid Profile to assess cholesterol levels, MRI head without contrast to assess brain parenchyma    VTE prophylaxis: Enoxaparin (CrCl < 30 ml/min) 30 mg SQ every 24 hours  Mechanical prophylaxis: Place SCDs    BP parameters: Infarct: No intervention, SBP <220

## 2025-07-01 NOTE — PLAN OF CARE
Lupillo met with family at bedside who stated that plan of care is is undetermined at this time. Pt's family to explore HH once pt's medical status is determined. Family reported that all services will be in home, no placement.     Discharge Plan A and Plan B have been determined by review of patient's clinical status, future medical and therapeutic needs, and coverage/benefits for post-acute care in coordination with multidisciplinary team members.     LUPILLO Marroquin  Case Management  [phone]

## 2025-07-01 NOTE — ASSESSMENT & PLAN NOTE
Anemia is likely due to acute blood loss which was from GI bleeds. Most recent hemoglobin and hematocrit are listed below.  Recent Labs     06/29/25  1605 06/30/25  0623 07/01/25  0316   HGB 7.8* 7.7* 7.6*   HCT 25.6* 24.8* 24.1*     Plan  - Monitor serial CBC: Daily  - Transfuse PRBC if patient becomes hemodynamically unstable, symptomatic or H/H drops below 7/21.  - Patient has not received any PRBC transfusions to date  - Patient's anemia is currently stable  - continue ppi. scheduled for EGD with GI 07/08  - follows closely with OP hematology. Considering OP bone marrow biopsy primarily to rule out MDS   6/29   EGD 3/25 Esophageal mucosal changes secondary to                          established long-segment Rodriguez's disease,                          classified as Rodriguez's stage C10-M10 per [place]                          criteria.                          - 3 cm hiatal hernia.                          - One non-bleeding angioectasia in the stomach.                          Treated with argon plasma coagulation (APC).                          - Two non-bleeding angioectasias in the duodenum.                          Treated with argon plasma coagulation (APC).   FOBT ordered. consider PRBC transfusion   6/30 .  Ferraheme 1020mg IV x1 per hematology

## 2025-07-02 PROBLEM — K31.819 GASTRIC AVM: Status: ACTIVE | Noted: 2022-08-21

## 2025-07-02 LAB
ABSOLUTE EOSINOPHIL (OHS): 0.04 K/UL
ABSOLUTE MONOCYTE (OHS): 0.84 K/UL (ref 0.3–1)
ABSOLUTE NEUTROPHIL COUNT (OHS): 5.7 K/UL (ref 1.8–7.7)
ALBUMIN SERPL BCP-MCNC: 3.9 G/DL (ref 3.5–5.2)
ALP SERPL-CCNC: 101 UNIT/L (ref 40–150)
ALT SERPL W/O P-5'-P-CCNC: 16 UNIT/L (ref 10–44)
ANION GAP (OHS): 11 MMOL/L (ref 8–16)
AST SERPL-CCNC: 20 UNIT/L (ref 11–45)
BASOPHILS # BLD AUTO: 0.05 K/UL
BASOPHILS NFR BLD AUTO: 0.6 %
BILIRUB SERPL-MCNC: 0.5 MG/DL (ref 0.1–1)
BUN SERPL-MCNC: 20 MG/DL (ref 8–23)
CALCIUM SERPL-MCNC: 10.1 MG/DL (ref 8.7–10.5)
CHLORIDE SERPL-SCNC: 100 MMOL/L (ref 95–110)
CHOLEST SERPL-MCNC: 140 MG/DL (ref 120–199)
CHOLEST/HDLC SERPL: 3.1 {RATIO} (ref 2–5)
CO2 SERPL-SCNC: 30 MMOL/L (ref 23–29)
CREAT SERPL-MCNC: 0.6 MG/DL (ref 0.5–1.4)
EAG (OHS): 77 MG/DL (ref 68–131)
ERYTHROCYTE [DISTWIDTH] IN BLOOD BY AUTOMATED COUNT: 15.7 % (ref 11.5–14.5)
GFR SERPLBLD CREATININE-BSD FMLA CKD-EPI: >60 ML/MIN/1.73/M2
GLUCOSE SERPL-MCNC: 93 MG/DL (ref 70–110)
HBA1C MFR BLD: 4.3 % (ref 4–5.6)
HCT VFR BLD AUTO: 27.4 % (ref 37–48.5)
HDLC SERPL-MCNC: 45 MG/DL (ref 40–75)
HDLC SERPL: 32.1 % (ref 20–50)
HGB BLD-MCNC: 8.6 GM/DL (ref 12–16)
IMM GRANULOCYTES # BLD AUTO: 0.03 K/UL (ref 0–0.04)
IMM GRANULOCYTES NFR BLD AUTO: 0.4 % (ref 0–0.5)
LDLC SERPL CALC-MCNC: 72.8 MG/DL (ref 63–159)
LYMPHOCYTES # BLD AUTO: 1.32 K/UL (ref 1–4.8)
MAGNESIUM SERPL-MCNC: 2.2 MG/DL (ref 1.6–2.6)
MCH RBC QN AUTO: 27.3 PG (ref 27–31)
MCHC RBC AUTO-ENTMCNC: 31.4 G/DL (ref 32–36)
MCV RBC AUTO: 87 FL (ref 82–98)
NONHDLC SERPL-MCNC: 95 MG/DL
NUCLEATED RBC (/100WBC) (OHS): 0 /100 WBC
PHOSPHATE SERPL-MCNC: 4.5 MG/DL (ref 2.7–4.5)
PLATELET # BLD AUTO: 457 K/UL (ref 150–450)
PMV BLD AUTO: 9.6 FL (ref 9.2–12.9)
POCT GLUCOSE: 114 MG/DL (ref 70–110)
POTASSIUM SERPL-SCNC: 4 MMOL/L (ref 3.5–5.1)
PROT SERPL-MCNC: 6.8 GM/DL (ref 6–8.4)
RBC # BLD AUTO: 3.15 M/UL (ref 4–5.4)
RELATIVE EOSINOPHIL (OHS): 0.5 %
RELATIVE LYMPHOCYTE (OHS): 16.5 % (ref 18–48)
RELATIVE MONOCYTE (OHS): 10.5 % (ref 4–15)
RELATIVE NEUTROPHIL (OHS): 71.5 % (ref 38–73)
SODIUM SERPL-SCNC: 141 MMOL/L (ref 136–145)
TRIGL SERPL-MCNC: 111 MG/DL (ref 30–150)
WBC # BLD AUTO: 7.98 K/UL (ref 3.9–12.7)

## 2025-07-02 PROCEDURE — 99233 SBSQ HOSP IP/OBS HIGH 50: CPT | Mod: GC,,, | Performed by: PSYCHIATRY & NEUROLOGY

## 2025-07-02 PROCEDURE — 97166 OT EVAL MOD COMPLEX 45 MIN: CPT

## 2025-07-02 PROCEDURE — 97162 PT EVAL MOD COMPLEX 30 MIN: CPT

## 2025-07-02 PROCEDURE — 97530 THERAPEUTIC ACTIVITIES: CPT

## 2025-07-02 PROCEDURE — 97535 SELF CARE MNGMENT TRAINING: CPT

## 2025-07-02 PROCEDURE — 63600175 PHARM REV CODE 636 W HCPCS

## 2025-07-02 PROCEDURE — 84100 ASSAY OF PHOSPHORUS: CPT

## 2025-07-02 PROCEDURE — 94761 N-INVAS EAR/PLS OXIMETRY MLT: CPT

## 2025-07-02 PROCEDURE — 25000003 PHARM REV CODE 250: Performed by: NURSE PRACTITIONER

## 2025-07-02 PROCEDURE — 92610 EVALUATE SWALLOWING FUNCTION: CPT

## 2025-07-02 PROCEDURE — 85025 COMPLETE CBC W/AUTO DIFF WBC: CPT

## 2025-07-02 PROCEDURE — 80053 COMPREHEN METABOLIC PANEL: CPT

## 2025-07-02 PROCEDURE — 11000001 HC ACUTE MED/SURG PRIVATE ROOM

## 2025-07-02 PROCEDURE — 83036 HEMOGLOBIN GLYCOSYLATED A1C: CPT

## 2025-07-02 PROCEDURE — 63600175 PHARM REV CODE 636 W HCPCS: Performed by: NURSE PRACTITIONER

## 2025-07-02 PROCEDURE — 80061 LIPID PANEL: CPT

## 2025-07-02 PROCEDURE — 36415 COLL VENOUS BLD VENIPUNCTURE: CPT

## 2025-07-02 PROCEDURE — 83735 ASSAY OF MAGNESIUM: CPT

## 2025-07-02 PROCEDURE — 97112 NEUROMUSCULAR REEDUCATION: CPT

## 2025-07-02 PROCEDURE — 25000003 PHARM REV CODE 250

## 2025-07-02 PROCEDURE — 63600175 PHARM REV CODE 636 W HCPCS: Performed by: HOSPITALIST

## 2025-07-02 PROCEDURE — 92523 SPEECH SOUND LANG COMPREHEN: CPT

## 2025-07-02 RX ORDER — LORAZEPAM 2 MG/ML
0.5 INJECTION INTRAMUSCULAR ONCE
Status: COMPLETED | OUTPATIENT
Start: 2025-07-02 | End: 2025-07-02

## 2025-07-02 RX ORDER — FUROSEMIDE 10 MG/ML
40 INJECTION INTRAMUSCULAR; INTRAVENOUS DAILY
Status: DISCONTINUED | OUTPATIENT
Start: 2025-07-03 | End: 2025-07-03

## 2025-07-02 RX ORDER — LORAZEPAM 2 MG/ML
1 INJECTION INTRAMUSCULAR ONCE
Status: COMPLETED | OUTPATIENT
Start: 2025-07-02 | End: 2025-07-02

## 2025-07-02 RX ORDER — MUPIROCIN 20 MG/G
OINTMENT TOPICAL 2 TIMES DAILY
Status: DISCONTINUED | OUTPATIENT
Start: 2025-07-02 | End: 2025-07-06 | Stop reason: HOSPADM

## 2025-07-02 RX ORDER — POLYETHYLENE GLYCOL 3350 17 G/17G
17 POWDER, FOR SOLUTION ORAL 2 TIMES DAILY
Status: DISCONTINUED | OUTPATIENT
Start: 2025-07-02 | End: 2025-07-06 | Stop reason: HOSPADM

## 2025-07-02 RX ADMIN — CYANOCOBALAMIN TAB 1000 MCG 1000 MCG: 1000 TAB at 09:07

## 2025-07-02 RX ADMIN — ENOXAPARIN SODIUM 30 MG: 30 INJECTION SUBCUTANEOUS at 04:07

## 2025-07-02 RX ADMIN — LORAZEPAM 0.5 MG: 2 INJECTION INTRAMUSCULAR; INTRAVENOUS at 02:07

## 2025-07-02 RX ADMIN — FUROSEMIDE 40 MG: 10 INJECTION, SOLUTION INTRAVENOUS at 09:07

## 2025-07-02 RX ADMIN — ATORVASTATIN CALCIUM 80 MG: 40 TABLET, FILM COATED ORAL at 09:07

## 2025-07-02 RX ADMIN — PANTOPRAZOLE SODIUM 40 MG: 20 TABLET, DELAYED RELEASE ORAL at 09:07

## 2025-07-02 RX ADMIN — LORAZEPAM 1 MG: 2 INJECTION INTRAMUSCULAR; INTRAVENOUS at 04:07

## 2025-07-02 RX ADMIN — Medication 1000 UNITS: at 09:07

## 2025-07-02 RX ADMIN — POLYETHYLENE GLYCOL 3350 17 G: 17 POWDER, FOR SOLUTION ORAL at 11:07

## 2025-07-02 RX ADMIN — MUPIROCIN: 20 OINTMENT TOPICAL at 10:07

## 2025-07-02 RX ADMIN — EZETIMIBE 10 MG: 10 TABLET ORAL at 09:07

## 2025-07-02 RX ADMIN — CLOPIDOGREL 75 MG: 75 TABLET ORAL at 09:07

## 2025-07-02 RX ADMIN — POLYETHYLENE GLYCOL 3350 17 G: 17 POWDER, FOR SOLUTION ORAL at 09:07

## 2025-07-02 NOTE — CONSULTS
Thanh Williamson - Neurosurgery ([facility])  [facility] Medicine  Consult Note    Patient Name: Katelyn Caba  MRN: [id number]  Admission Date: 6/28/2025  Hospital Length of Stay: 4 days  Attending Physician: Yuriy Diaz MD   Primary Care Provider: Brooklyn Burnham MD           Patient information was obtained from patient, past medical records, and ER records.     Inpatient consult to [facility] Medicine-General  Consult performed by: Raya Bergeron MD  Consult ordered by: Carmen Matthews PA-C        Subjective:     Principal Problem: Embolic stroke involving right middle cerebral artery    Chief Complaint:   Chief Complaint   Patient presents with    Abnormal Lab     Low h/h requires blood transfusions, on home oxygen         HPI: Patient is a 84F with BCC, HTN, HLD, h/o GIB, ISAIAH requiring frequent transfusions, HFpEF, CAD, L MCA CVA admitted 6/28 for SOB/AHRF. Chronic BL effusions. Diuresing with lasix 40 IV bid (pulm evaluated during admission, no need to perform thoracentesis). Supplemental O2 requirements gradually decreased with aggressive diuresis. TTE 6/30 showed LVEF 65% with G3DD and reduced RV systolic function (which is new).   Stroke code 7/1/25 for LSW/LFD (NIHSS 10). CTH acute R MCA territory ischemia, CTA with R M2 occlusion. Unclear LKW, therefore TNK not given. No thrombectomy due to core infarct. On assessment, patient reports improving dysarthria and weakness. Concerned about constipation and claustrophobia wrt MRI.     Transferred to stroke service on 7/1.  consulted to co manage AHRF.         Past Medical History:   Diagnosis Date    Cancer     basal cell carcinoma    Stroke due to embolism of right middle cerebral artery 02/18/2025       Past Surgical History:   Procedure Laterality Date    CARPAL TUNNEL RELEASE      ESOPHAGOGASTRODUODENOSCOPY N/A 8/22/2022    Procedure: EGD (ESOPHAGOGASTRODUODENOSCOPY);  Surgeon: Steven Lopez MD;  Location: [facility] ([address] [place] [address]);  Service: Endoscopy;   Laterality: N/A;    ESOPHAGOGASTRODUODENOSCOPY N/A 12/5/2022    Procedure: EGD (ESOPHAGOGASTRODUODENOSCOPY);  Surgeon: Steven Lopez MD;  Location: [facility] (2ND FLR);  Service: Endoscopy;  Laterality: N/A;  Has estimated pulmonary artery pressure 45, schedule location per protocol.   Please schedule with Dr. Darryl Lopez-  Plavix stopped 8/23/22  pt requested to schedule after Thanksgiving/ [facility] portal-RB  pre call complete; [facility] 11/28/22    ESOPHAGOGASTRODUODENOSCOPY N/A 7/11/2023    Procedure: EGD (ESOPHAGOGASTRODUODENOSCOPY);  Surgeon: Natalie Fall MD;  Location: [facility];  Service: Endoscopy;  Laterality: N/A;    ESOPHAGOGASTRODUODENOSCOPY N/A 3/5/2025    Procedure: EGD (ESOPHAGOGASTRODUODENOSCOPY);  Surgeon: Roberto Salvador MD;  [facility]  Service: Endoscopy;  Laterality: N/A;    EYE SURGERY      left eye cataract    LEFT HEART CATHETERIZATION  4/17/2024    Procedure: Left heart cath;  Surgeon: Elisabeth De La Cruz MD;  Location: [facility] CATH;  Service: Cardiology;;    TONSILLECTOMY      TUBAL LIGATION         Review of patient's allergies indicates:   Allergen Reactions    Jardiance [empagliflozin]        No current facility-administered medications on file prior to encounter.     Current Outpatient Medications on File Prior to Encounter   Medication Sig    atorvastatin (LIPITOR) 80 MG tablet Take 1 tablet (80 mg total) by mouth once daily.    clopidogreL (PLAVIX) 75 mg tablet Take 1 tablet (75 mg total) by mouth once daily.    cyanocobalamin (VITAMIN B-12) 1000 MCG tablet Take 1,000 mcg by mouth once daily.    ezetimibe (ZETIA) 10 mg tablet Take 1 tablet (10 mg total) by mouth once daily.    furosemide (LASIX) 20 MG tablet Take 1 tablet (20 mg total) by mouth 2 (two) times a day.    pantoprazole (PROTONIX) 40 MG tablet Take 1 tablet (40 mg total) by mouth once daily. Take 1 tablet by mouth every day    potassium chloride SA (K-DUR,KLOR-CON) 20 MEQ tablet Take 1 tablet (20 mEq  total) by mouth once daily.    vitamin D (VITAMIN D3) 1000 units Tab Take 1,000 Units by mouth once daily.    empagliflozin (JARDIANCE) 10 mg tablet Take 10 mg by mouth once daily.     Family History    None       Tobacco Use    Smoking status: Never     Passive exposure: Never    Smokeless tobacco: Never   Substance and Sexual Activity    Alcohol use: No    Drug use: No    Sexual activity: Not on file     Review of Systems   Constitutional:  Negative for chills and fever.   Respiratory:  Positive for shortness of breath.    Cardiovascular:  Negative for chest pain.   Gastrointestinal:  Positive for abdominal pain and constipation. Negative for abdominal distention, diarrhea, nausea and vomiting.   Neurological:  Positive for facial asymmetry and weakness.   Psychiatric/Behavioral:  Negative for agitation, behavioral problems and confusion.      Objective:     Vital Signs (Most Recent):  Temp: 98.1 °F (36.7 °C) (07/02/25 1253)  Pulse: 78 (07/02/25 1253)  Resp: 18 (07/02/25 0730)  BP: 133/63 (07/02/25 1253)  SpO2: 100 % (07/02/25 1253) Vital Signs (24h Range):  Temp:  [97.7 °F (36.5 °C)-98.9 °F (37.2 °C)] 98.1 °F (36.7 °C)  Pulse:  [62-85] 78  Resp:  [16-20] 18  SpO2:  [99 %-100 %] 100 %  BP: (108-160)/(53-75) 133/63     Weight: 47.5 kg (104 lb 11.5 oz)  Body mass index is 18.55 kg/m².     Physical Exam  Vitals and nursing note reviewed.   Constitutional:       General: She is not in acute distress.  HENT:      Head: Normocephalic.      Nose: Nose normal.      Mouth/Throat:      Mouth: Mucous membranes are moist.   Eyes:      General: Visual field deficit (L neglect) present.      Conjunctiva/sclera: Conjunctivae normal.      Comments: R gaze preference   Cardiovascular:      Rate and Rhythm: Normal rate.   Pulmonary:      Effort: Pulmonary effort is normal. No respiratory distress.      Breath sounds: Normal breath sounds. No wheezing or rales.   Abdominal:      General: There is no distension.   Musculoskeletal:          General: No deformity or signs of injury.      Cervical back: No rigidity.   Skin:     General: Skin is warm and dry.   Neurological:      Mental Status: She is alert.      Cranial Nerves: Dysarthria and facial asymmetry present.      Sensory: Sensory deficit present.      Motor: Weakness (LSW) present.   Psychiatric:         Attention and Perception: She is inattentive.         Behavior: Behavior is cooperative.          Significant Labs: All pertinent labs within the past 24 hours have been reviewed.  CBC:   Recent Labs   Lab 07/01/25  0316 07/02/25  0848   WBC 6.38 7.98   HGB 7.6* 8.6*   HCT 24.1* 27.4*    457*     CMP:   Recent Labs   Lab 06/30/25  1523 07/01/25  0316 07/01/25  1530 07/02/25  0848    138 139 141   K 4.3 3.6 3.7 4.0    99 98 100   CO2 31* 28 30* 30*    97 96 93   BUN 23 29* 22 20   CREATININE 0.7 0.7 0.6 0.6   CALCIUM 8.8 9.3 9.5 10.1   PROT  --   --   --  6.8   ALBUMIN 3.5  --  3.7 3.9   BILITOT  --   --   --  0.5   ALKPHOS  --   --   --  101   AST  --   --   --  20   ALT  --   --   --  16   ANIONGAP 8 11 11 11       Significant Imaging: I have reviewed all pertinent imaging results/findings within the past 24 hours.  Assessment/Plan:     #AHRF  #Bilateral pleural effusions, chronic  #HFpEF  AHRF likely from CHF exacerbation caused by suboptimal diruesis given new RV dysunfction (patients notably prone to volume overload given poor compensatory mechansisms by RV). Pulm evaluated on 6/29 for thoracentesis, determined that continued diuresis would be sufficient given chronicity. Diuresed well with Lasix 40mg IV bid. Now with  mild contraction alkalosis. Improving O2 requirement.     Home regimen: Lasix 20 bid, Lipitor 80, Zetia 10, Plavix 75, Jardiance 10    - Lasix 40mg IV qd: Will likely ultimately need Lasix 40mg PO bid for augmented diuresis at home setting  - Can consider adding Aldactone 25mg - some mild benefit in HFpEF and can facilitate K retention given  need for aggressive diuresis  - Wean O2  - I/O    #R MCA CVA  #previous L MCA CVA  Now with LSW and dysarthria. Improving.     - AP agents per stroke service  - AC if needed will need to be weighed against risk of GIB, risk benefit discussion can be conducted by primary    #ISAIAH  Monitor CBC daily  Completed Feraheme 6/30       VTE Risk Mitigation (From admission, onward)           Ordered     enoxaparin injection 30 mg  Daily         06/29/25 1103     IP VTE HIGH RISK PATIENT  Once         06/28/25 0936                        Thank you for your consult. I will follow-up with patient. Please contact us if you have any additional questions.    Raya Bergeron MD  Department of Hospital Medicine   [facility] - Neurosurgery ([facility])

## 2025-07-02 NOTE — PT/OT/SLP EVAL
Physical Therapy  Co-Evaluation and treatment. With OT    Patient Name:  Katelyn Caba   MRN:  [id number]    Recommendations:     Discharge Recommendations: High Intensity Therapy   Discharge Equipment Recommendations: wheelchair   Barriers to discharge: Inaccessible home and Decreased caregiver support    Assessment:     Katelyn Caba is a 84 y.o. female admitted with a medical diagnosis of Embolic stroke involving right middle cerebral artery.  She presents with the following impairments/functional limitations: weakness, impaired endurance, impaired functional mobility, gait instability, impaired balance, decreased safety awareness, decreased lower extremity function pt tolerated treatment well and will benefit from skilled PT 4x/wk to progress physically. Pt is significantly below previous functional level, increased risk of falls and increased burden of care currently. Patient presents with good participation and motivation to return to prior level of function with high intensity therapy.  The patient demonstrates appropriate endurance to participate in up to 3 hours or 15hrs of combined therapy post acute. Pt came to ED with c/o SOB. Pt is s/p embolic stroke R MCA 7/1/25.      Rehab Prognosis: Good; patient would benefit from acute skilled PT services to address these deficits and reach maximum level of function.    Recent Surgery: * No surgery found *      Plan:     During this hospitalization, patient to be seen 4 x/week to address the identified rehab impairments via gait training, therapeutic activities, therapeutic exercises, neuromuscular re-education and progress toward the following goals:    Plan of Care Expires:  07/31/25    Subjective     Chief Complaint: pt had no complaints during treatment.   Patient/Family Comments/goals: to get better and go home.   Pain/Comfort:  Pain Rating 1: 0/10  Pain Rating Post-Intervention 1: 0/10    Patients cultural, spiritual, Protestant conflicts given the current  situation: no    Living Environment:  Pt is retired and lives with her retired  in 2nd floor apt with steps and B handrails in NO. They have 1 story house with slab entrance further from NO.   Prior to admission, patients level of function was Independent .  Equipment used at home: rollator, crutches, grab bar (grab bar in tub).  DME owned (not currently used): none.  Upon discharge, patient will have assistance from .    Objective:      Patient found supine with telemetry, oxygen, PureWick (hep lock IV)  upon PT entry to room.    General Precautions: Standard, fall  Orthopedic Precautions:    Braces:    Respiratory Status: Nasal cannula, flow 1 L/min    Exams:  Cognitive Exam:  Patient is oriented to Person, Place, and Situation, month but not year  RLE ROM: WFL  RLE Strength: WFL  LLE ROM: WFL  LLE Strength: 3/5 for major muscle groups    Functional Mobility:  Bed Mobility:   pt needed verbal cues for hand placement and sequencing for functional mobility.   Rolling Left:  minimum assistance  Rolling Right: minimum assistance  Supine to Sit: minimum assistance head of bed elevated.   Sit to Supine: moderate assistance    Balance: pt sat on EOB with max assist for static sitting balance. Pt leaned to L side with sitting. PT provided tactile cues at the trunk to weight shift center of gravity over base of support for sitting balance and alignment. Pt needed verbal cues to hold head upright with sitting. Pt had L sided neglect.     Due to pt complex medical condition, the skill of 2 licensed therapists is needed to maximize treatment session and progression towards goals  Pt white board updated with current therapists name and level of mobility assistance needed.         AM-PAC 6 CLICK MOBILITY  Total Score:12       Treatment & Education:  Pt,  and son received verbal instructions in role of PT and POC. All verbally expressed understanding of such.     Patient left supine with all lines intact,  call button in reach, and x-ray tech  present.    GOALS:   Multidisciplinary Problems       Physical Therapy Goals          Problem: Physical Therapy    Goal Priority Disciplines Outcome Interventions   Physical Therapy Goal     PT, PT/OT Progressing    Description: Goals to be met by: 25     Patient will increase functional independence with mobility by performin. Supine to sit with Stand-by Assistance  2. Sit to stand transfer with Moderate Assistance with AD.   3. Bed to chair transfer with Minimal Assistance using AD  4. Gait  x 20 feet with Moderate Assistance using AD.   5. Wheelchair propulsion x30 feet with Minimal Assistance using bilateral uppper extremities  6. Ascend/descend 12 stair with bilateral Handrails Moderate Assistance .   7. Sitting at edge of bed x10 minutes with Contact Guard Assistance and perform functional activities.   8. Lower extremity exercise program x15 reps per handout, with assistance as needed to increased strength for increased functional mobility.                          DME Justifications:  Katelyn Caba has a mobility limitation that significantly impairs her ability to participate in one or more mobility related activities of daily living (MRADLs) such as toileting, feeding, dressing, grooming, and bathing in customary locations in the home.  The mobility limitation cannot be sufficiently resolved by the use of a cane or walker.   The use of a manual wheelchair will significantly improve the patients ability to participate in MRADLS and the patient will use it on regular basis in the home.  Katelyn Caba has expressed her willingness to use a manual wheelchair in the home. Patients upper body strength is sufficient for propulsion.  She also has a caregiver who is available, willing, and able to provide assistance with the wheelchair when needed.      History:     Past Medical History:   Diagnosis Date    Cancer     basal cell carcinoma    Stroke due to embolism  of right middle cerebral artery 02/18/2025       Past Surgical History:   Procedure Laterality Date    CARPAL TUNNEL RELEASE      ESOPHAGOGASTRODUODENOSCOPY N/A 8/22/2022    Procedure: EGD (ESOPHAGOGASTRODUODENOSCOPY);  Surgeon: Steven Lopez MD;  Location: [address]);  Service: Endoscopy;  Laterality: N/A;    ESOPHAGOGASTRODUODENOSCOPY N/A 12/5/2022    Procedure: EGD (ESOPHAGOGASTRODUODENOSCOPY);  Surgeon: Steven Lopez MD;  Location: [address]);  Service: Endoscopy;  Laterality: N/A;  Has estimated pulmonary artery pressure 45, schedule location per protocol.   Please schedule with Dr. Darryl Lopez-  Plavix stopped 8/23/22  pt requested to schedule after Thanksgiving/ inst portal-RB  pre call complete; [facility] 11/28/22    ESOPHAGOGASTRODUODENOSCOPY N/A 7/11/2023    Procedure: EGD (ESOPHAGOGASTRODUODENOSCOPY);  Surgeon: Natalie Fall MD;  Location: [facility];  Service: Endoscopy;  Laterality: N/A;    ESOPHAGOGASTRODUODENOSCOPY N/A 3/5/2025    Procedure: EGD (ESOPHAGOGASTRODUODENOSCOPY);  Surgeon: Roberto Salvador MD;  Location: [address]);  Service: Endoscopy;  Laterality: N/A;    EYE SURGERY      left eye cataract    LEFT HEART CATHETERIZATION  4/17/2024    Procedure: Left heart cath;  Surgeon: Elisabeth De La Cruz MD;  Location: [facility] CATH;  Service: Cardiology;;    TONSILLECTOMY      TUBAL LIGATION         Time Tracking:     PT Received On: 07/02/25  PT Start Time: 1013     PT Stop Time: 1029  PT Total Time (min): 16 min     Billable Minutes: Evaluation 8 min  and Neuromuscular Re-education 8 min       07/02/2025

## 2025-07-02 NOTE — CONSULTS
[facility]  Gastroenterology  Consult Note    Patient Name: Katelyn Caba  MRN: [id number]  Admission Date: 6/28/2025  Hospital Length of Stay: 4 days  Code Status: Full Code   Attending Provider: Yuriy Diaz MD   Consulting Provider: Millie Taveras MD  Primary Care Physician: Brooklyn Burnham MD  Principal Problem:Embolic stroke involving right middle cerebral artery    Inpatient consult to Gastroenterology  Consult performed by: Millie Taveras MD  Consult ordered by: Juan M Henderson MD        Subjective:     HPI: Katelyn Caba is a 84 y.o. female with history of BCC, HTN, HLD, h/o GIB, ISAIAH requiring frequent transfusions, HFpEF, CAD, L MCA CVA admitted 6/28 for SOB/AHRF with ongoing diuresis. She had stroke code on 7/1 and was noted to have R MCA stroke thought to be 2/2/ embolic origin. Patient out of window for TNK or thrombectomy.     Gi was consulted for recommendations and possible EGD given her history of AVMs and angiectasias given potential plan for therapeutic anticoagulation.     EGD 3/2025  Impression:   Esophageal mucosal changes secondary to  established long-segment Rordiguez's disease, classified as Rodriguez's stage C10-M10 per [place] criteria. 3 cm hiatal hernia. One non-bleeding angioectasia in the stomach. Treated with argon plasma coagulation (APC). Two non-bleeding angioectasias in the duodenum. Treated with argon plasma coagulation (APC). No specimens collected.     EGD 07/2023  Impression: Esophageal mucosal changes secondary to established long-segment, non-dysplastic Rodriguez's disease. Small hiatal hernia.  Gastritis. Biopsied.  Multiple gastric polyps, consistent with fundic gland polyps.   Biopsy - Stomach, antrum/body, biopsy: Antral and oxyntic mucosa with mild chronic inflammation.  No H.pylori identified on H&E stain slide. No intestinal metaplasia or dysplasia.     EGD 12/2022  Impression: Esophageal mucosal changes consistent with long-segment Rodriguez's  esophagus, classified as Rodriguez's stage C7-M7 per [place] criteria. Biopsied. 3 cm type-I sliding hiatal hernia. Multiple fundic gland polyps.Normal antrum. Normal examined duodenum.     EGD 8/2022  Impression:  Esophageal mucosal changes consistent with long-segment Rodriguez's esophagus, classified as Rodriguez's stage C7-M7 per [place] criteria. Not biopsied at this time due to anemia and bleeding evaluation with gastric ulcer present. 3 cm type-I sliding hiatal hernia. Multiple fundic gland polyps. Gastritis. Biopsied. Non-bleeding gastric ulcer with a clean ulcer  base (Vinh Class III). Three non-bleeding angioectasias in the duodenum. Treated with argon plasma coagulation (APC). Three non-bleeding angioectasias in the duodenum. Treated with argon plasma coagulation  (APC).     Past Medical History:   Diagnosis Date    Cancer     basal cell carcinoma    Stroke due to embolism of right middle cerebral artery 02/18/2025       Past Surgical History:   Procedure Laterality Date    CARPAL TUNNEL RELEASE      ESOPHAGOGASTRODUODENOSCOPY N/A 8/22/2022    Procedure: EGD (ESOPHAGOGASTRODUODENOSCOPY);  Surgeon: Steven Lopez MD;  Location: [facility] ([address] [place] [address]);  Service: Endoscopy;  Laterality: N/A;    ESOPHAGOGASTRODUODENOSCOPY N/A 12/5/2022    Procedure: EGD (ESOPHAGOGASTRODUODENOSCOPY);  Surgeon: Steven Lopez MD;  Location: [facility] ([address] [place] [address]);  Service: Endoscopy;  Laterality: N/A;  Has estimated pulmonary artery pressure 45, schedule location per protocol.   Please schedule with Dr. Darryl Lopez-  Plavix stopped 8/23/22  [facility]  pre call complete; [facility] 11/28/22    ESOPHAGOGASTRODUODENOSCOPY N/A 7/11/2023    Procedure: EGD (ESOPHAGOGASTRODUODENOSCOPY);  Surgeon: Natalie Fall MD;  Location: [facility];  Service: Endoscopy;  Laterality: N/A;    ESOPHAGOGASTRODUODENOSCOPY N/A 3/5/2025    Procedure: EGD (ESOPHAGOGASTRODUODENOSCOPY);  Surgeon: Modesto  Roberto DRAPER MD;  Location: [facility] ([address] [place] [address]);  Service: Endoscopy;  Laterality: N/A;    EYE SURGERY      left eye cataract    LEFT HEART CATHETERIZATION  4/17/2024    Procedure: Left heart cath;  Surgeon: Elisabeth De La Cruz MD;  Location: [facility] CATH;  Service: Cardiology;;    TONSILLECTOMY      TUBAL LIGATION         No family history on file.    Social History[1]    Medications Ordered Prior to Encounter[2]    Review of patient's allergies indicates:   Allergen Reactions    Jardiance [empagliflozin]        ROS   Denies any acute distress. Son assists with ROS.Denies abdominal pain or blood in stool.   Objective:     Vitals:    07/02/25 1547   BP: (!) 140/68   Pulse: 84   Resp:    Temp: 97.5 °F (36.4 °C)         Constitutional:  not in acute distress and oriented to place and person  Respiratory: no accessory muscle use  GI: soft, non-tender, without masses or organomegaly  Musculoskeletal: no muscular tenderness noted  Neurological: alert, oriented x2  Rectal: NO visible external hemorrhoids, reports of normal colored stool    Significant Labs:  Recent Labs   Lab 06/30/25  0623 07/01/25  0316 07/02/25  0848   HGB 7.7* 7.6* 8.6*       Lab Results   Component Value Date    WBC 7.98 07/02/2025    HGB 8.6 (L) 07/02/2025    HCT 27.4 (L) 07/02/2025    MCV 87 07/02/2025     (H) 07/02/2025       Lab Results   Component Value Date     07/02/2025    K 4.0 07/02/2025     07/02/2025    CO2 30 (H) 07/02/2025    BUN 20 07/02/2025    CREATININE 0.6 07/02/2025    CALCIUM 10.1 07/02/2025    ANIONGAP 11 07/02/2025    ESTGFRAFRICA >60 07/22/2022    EGFRNONAA >60 07/22/2022       Lab Results   Component Value Date    ALT 16 07/02/2025    AST 20 07/02/2025    ALKPHOS 101 07/02/2025    BILITOT 0.5 07/02/2025       Lab Results   Component Value Date    INR 1.1 02/18/2025    INR 1.0 04/17/2024    INR 0.9 03/29/2023       Significant Imaging:  Reviewed pertinent radiology findings.       Assessment/Plan:     Katelyn Caba is a  84 y.o. female with history of BCC, HTN, HLD, h/o GIB and AVMs, ISAIAH requiring frequent transfusions, HFpEF, CAD, L MCA CVA admitted 6/28  with new R MCA stroke thought to be 2/2/ embolic origin. GI consulted for recommendations and possible EGD given her history of AVMs and angiectasias given potential plan for therapeutic anticoagulation.       Problem List:  Acute on chronic R MCA CVA   Long-segment Rodriguez's disease,  3 cm hiatal hernia.   Non-bleeding angioectasia in the stomach. S/p APC 3/2025   Two non-bleeding angioectasias in the duodenum s/p APC 3/2025.         Recommendations:    - Reviewed previous EGDs and patients tolerance of plavix prior to admission and continuation during admission.   - Noted prior EGDs and treatments as recently as 3/2025.   - Endoscopic intervention may not catch all AVMS/ Angiectasias and given stable hemoglobin and vitals, okay to proceed with trial of anticoagulation and monitor.     - Trend Hgb and transfuse for goal Hgb > 7, unless otherwise indicated    - Please correct any coagulopathy with platelets and FFP for goal of platelets >50K and INR <2.0  - Please notify GI team if there is significant change in patient's clinical status      Thank you for involving us in the care of Katelyn Caba. Please call with any additional questions, concerns or changes in the patient's clinical status.     Millie Taveras MD  Gastroenterology Fellow PGY IV  [facility]                  [1]   Social History  Socioeconomic History    Marital status:    Tobacco Use    Smoking status: Never     Passive exposure: Never    Smokeless tobacco: Never   Substance and Sexual Activity    Alcohol use: No    Drug use: No     Social Drivers of Health     Financial Resource Strain: Low Risk  (6/29/2025)    Overall Financial Resource Strain (CARDIA)     Difficulty of Paying Living Expenses: Not hard at all   Food Insecurity: No Food Insecurity (6/29/2025)    Hunger Vital Sign      Worried About Running Out of Food in the Last Year: Never true     Ran Out of Food in the Last Year: Never true   Transportation Needs: No Transportation Needs (6/29/2025)    PRAPARE - Transportation     Lack of Transportation (Medical): No     Lack of Transportation (Non-Medical): No   Physical Activity: Inactive (3/7/2025)    Exercise Vital Sign     Days of Exercise per Week: 0 days     Minutes of Exercise per Session: 0 min   Stress: No Stress Concern Present (6/29/2025)    [employer] - Occupational Stress Questionnaire     Feeling of Stress : Not at all   Housing Stability: Low Risk  (6/29/2025)    Housing Stability Vital Sign     Unable to Pay for Housing in the Last Year: No     Number of Times Moved in the Last Year: 0     Homeless in the Last Year: No   [2]   No current facility-administered medications on file prior to encounter.     Current Outpatient Medications on File Prior to Encounter   Medication Sig Dispense Refill    atorvastatin (LIPITOR) 80 MG tablet Take 1 tablet (80 mg total) by mouth once daily. 90 tablet 3    clopidogreL (PLAVIX) 75 mg tablet Take 1 tablet (75 mg total) by mouth once daily. 90 tablet 3    cyanocobalamin (VITAMIN B-12) 1000 MCG tablet Take 1,000 mcg by mouth once daily.      ezetimibe (ZETIA) 10 mg tablet Take 1 tablet (10 mg total) by mouth once daily. 90 tablet 3    furosemide (LASIX) 20 MG tablet Take 1 tablet (20 mg total) by mouth 2 (two) times a day. 60 tablet 11    pantoprazole (PROTONIX) 40 MG tablet Take 1 tablet (40 mg total) by mouth once daily. Take 1 tablet by mouth every day 90 tablet 1    potassium chloride SA (K-DUR,KLOR-CON) 20 MEQ tablet Take 1 tablet (20 mEq total) by mouth once daily. 30 tablet 11    vitamin D (VITAMIN D3) 1000 units Tab Take 1,000 Units by mouth once daily.      empagliflozin (JARDIANCE) 10 mg tablet Take 10 mg by mouth once daily.

## 2025-07-02 NOTE — ASSESSMENT & PLAN NOTE
Pt with history of gastric AVM on EGD 3/5/2025 which were treated. Spoke with GI of possible anticoagulation in setting of AVM.     Plan  - f/u GI

## 2025-07-02 NOTE — PT/OT/SLP EVAL
Occupational Therapy  Co-Evaluation & Treatment    Name: Katelyn Caba  MRN: [id number]  Admitting Diagnosis: Embolic stroke involving right middle cerebral artery  Recent Surgery: * No surgery found *      Recommendations:     Discharge Recommendations: High Intensity Therapy  Discharge Equipment Recommendations:  bedside commode, bath bench  Barriers to discharge:  Inaccessible home environment    Assessment:     Katelyn Caba is a 84 y.o. female with a medical diagnosis of Embolic stroke involving right middle cerebral artery.  She presents with the following performance deficits affecting function: weakness, impaired endurance, impaired self care skills, impaired functional mobility, gait instability, impaired balance, visual deficits, decreased upper extremity function, decreased lower extremity function, decreased safety awareness, impaired fine motor, impaired coordination, decreased ROM.      Pt agreeable to session, motivated to participate, and tolerated well. Pt presenting with significant left visual neglect and LUE hemiparesis, limiting her ability to participate in ADL routines and desired occupations. Pt would benefit from skilled OT services in the acute care setting to improve activity tolerance and functional endurance, increase participation in self-care routines, improve functional strength needed for safety with functional transfers and mobility, and facilitate a return to PLOF and least restrictive home environment. Patient has demonstrated sufficient progression to warrant high intensity therapy evidenced by objectives noted below.     Rehab Prognosis: Good; patient would benefit from acute skilled OT services to address these deficits and reach maximum level of function.       Plan:     Patient to be seen 4 x/week to address the above listed problems via self-care/home management, therapeutic activities, therapeutic exercises, neuromuscular re-education  Plan of Care Expires: 07/30/25  Plan  "of Care Reviewed with: patient, spouse, son    Subjective     Chief Complaint: none reported  Patient/Family Comments/goals: "When am I supposed to go down for my MRI?"    Occupational Profile:  Living Environment: Pt lives with her  in a second floor apartment with a flight of stairs and BHR to reach unit. She has access to a tub/shower with grab bars.   Previous level of function: IND with ADLs  Equipment Used at Home: oxygen, grab bar, rollator (adjustable bed)  Assistance upon Discharge:  available to assist as needed    Pain/Comfort:  Pain Rating 1: 0/10    Patients cultural, spiritual, Jain conflicts given the current situation: no    Objective:     Communicated with: Nursing prior to session.  Patient found HOB elevated with telemetryJohnathan upon OT entry to room.    General Precautions: Standard, fall  Orthopedic Precautions: N/A  Braces: N/A  Respiratory Status: Nasal cannula, flow 1 L/min    Occupational Performance:    Bed Mobility:    Patient completed Rolling/Turning to Left with  minimum assistance  Patient completed Rolling/Turning to Right with minimum assistance  Patient completed Supine to Sit with minimum assistance  Patient completed Sit to Supine with moderate assistance    Functional Mobility/Transfers:  Functional Mobility: Pt able to tolerate sitting EOB with max assist provided secondary to L posterolateral lean. Pt requiring verbal and tactile cues for cervical extension, scapular retraction, and use of BUE for support EOB to promote upright posturing while EOB.     Activities of Daily Living:  Upper Body Dressing: maximal assistance to adjust hospital gown in seated EOB  Toileting: dependence Pure Wick    Cognitive/Visual Perceptual:  Cognitive/Psychosocial Skills:     -       Oriented to: Person, Place, Time (month but not year), and Situation   -       Follows Commands/attention:Follows two-step commands  -       Communication: clear/fluent  -       Memory: No " Deficits noted  -       Safety awareness/insight to disability: impaired   -       Mood/Affect/Coping skills/emotional control: Appropriate to situation, Cooperative, and Pleasant  Visual/Perceptual:      -       Impaired: tracking and visual field - L neglect, diminished tracking to upper and lower visual fields    Physical Exam:  Postural examination/scapula alignment:    -       Rounded shoulders  -       Forward head  -       Posterior pelvic tilt  -       Abnormal trunk flexion  Sensation:    -       Intact  light/touch BUE  Upper Extremity Range of Motion:     -       Right Upper Extremity: WFL  -       Left Upper Extremity: WFL except ~90 degrees shoulder flexion achieved actively, full PROM without pain, diminished wrist extension  Upper Extremity Strength:    -       Right Upper Extremity: 3+/5  -       Left Upper Extremity: 2/5   Strength:    -       Right Upper Extremity: WFL  -       Left Upper Extremity: Deficits: no active  noted  Fine Motor Coordination:    -       Intact  Right hand thumb/finger opposition skills  -       Impaired  Right hand thumb/finger opposition skills unable to coordinate  Gross motor coordination:  L hemiplegia/paresis    AMPAC 6 Click ADL:  AMPAC Total Score: 12    Treatment & Education:  Provided education on the role of OT, POC, and therapy goals while in the acute care setting.   Provided education on the importance of continued mobilization and participation in OOB activities to increase functional endurance and activity tolerance for increased participation in ADL routines.   All questions/concerns within the scope of OT answered/addressed - pt verbalized understanding.   Instructed pt to call for assistance when wanting to ambulate or participate in OOB activities to promote safety and prevent falls.   White board updated.    Co-evaluation/treatment performed due to patient's multiple deficits requiring two skilled therapists to appropriately and safely assess  patient's strength, endurance, functional mobility, and ADL performance while facilitating functional tasks in addition to accommodating for patient's activity tolerance and medical acuity.    Patient left HOB elevated with all lines intact, call button in reach, and family and x-ray team present    GOALS:   Multidisciplinary Problems       Occupational Therapy Goals          Problem: Occupational Therapy    Goal Priority Disciplines Outcome Interventions   Occupational Therapy Goal     OT, PT/OT Progressing    Description: Goals to be met by: 7/30/25.     Patient will increase functional independence with ADLs by performing:    UE Dressing with Stand-by Assistance.  LE Dressing with Stand-by Assistance.  Grooming while standing with Stand-by Assistance.  Toileting from toilet with Stand-by Assistance for hygiene and clothing management.   Toilet transfer to toilet with Stand-by Assistance.  Upper extremity exercise program x10 reps per handout, with independence.                         DME Justifications:   Katelyn requires a commode for home use because she is confined to a single room.    History:     Past Medical History:   Diagnosis Date    Cancer     basal cell carcinoma    Stroke due to embolism of right middle cerebral artery 02/18/2025         Past Surgical History:   Procedure Laterality Date    CARPAL TUNNEL RELEASE      ESOPHAGOGASTRODUODENOSCOPY N/A 8/22/2022    Procedure: EGD (ESOPHAGOGASTRODUODENOSCOPY);  Surgeon: Steven Lopez MD;  [facility] ([address] [place] [address]);  Service: Endoscopy;  Laterality: N/A;    ESOPHAGOGASTRODUODENOSCOPY N/A 12/5/2022    Procedure: EGD (ESOPHAGOGASTRODUODENOSCOPY);  Surgeon: Steven Lopez MD;  Location: [facility] ([address] [place] [address]);  Service: Endoscopy;  Laterality: N/A;  Has estimated pulmonary artery pressure 45, schedule location per protocol.   Please schedule with Dr. Darryl Lopez-  Plavix stopped 8/23/22  pt requested to schedule after Thanksgiving/ inst  portal-RB  pre call complete; [facility] 11/28/22    ESOPHAGOGASTRODUODENOSCOPY N/A 7/11/2023    Procedure: EGD (ESOPHAGOGASTRODUODENOSCOPY);  Surgeon: Natalei Fall MD;  Location: [facility] ENDO;  Service: Endoscopy;  Laterality: N/A;    ESOPHAGOGASTRODUODENOSCOPY N/A 3/5/2025    Procedure: EGD (ESOPHAGOGASTRODUODENOSCOPY);  Surgeon: Roberto Salvador MD;  Location: [facility] ([address] [place] [address]);  Service: Endoscopy;  Laterality: N/A;    EYE SURGERY      left eye cataract    LEFT HEART CATHETERIZATION  4/17/2024    Procedure: Left heart cath;  Surgeon: Elisabeth De La Cruz MD;  Location: [facility] CATH;  Service: Cardiology;;    TONSILLECTOMY      TUBAL LIGATION         Time Tracking:     OT Date of Treatment: 07/02/25  OT Start Time: 1010  OT Stop Time: 1028  OT Total Time (min): 18 min    Billable Minutes:Evaluation 8 minutes  Therapeutic Activity 8 minutes    7/2/2025

## 2025-07-02 NOTE — ASSESSMENT & PLAN NOTE
Anemia is likely due to acute blood loss which was from GI bleeds. Most recent hemoglobin and hematocrit are listed below.  Recent Labs     06/30/25  0623 07/01/25  0316 07/02/25  0848   HGB 7.7* 7.6* 8.6*   HCT 24.8* 24.1* 27.4*       Plan  - Monitor serial CBC: Daily  - Transfuse PRBC if patient becomes hemodynamically unstable, symptomatic or H/H drops below 7/21.  - Patient has not received any PRBC transfusions to date  - Patient's anemia is currently stable  - Continue PPI, scheduled for EGD with GI on 7/8  - Given Ferraheme 1020mg IV x1 per hematology on 6/30  -  consulted for co-management, appreciate assistance

## 2025-07-02 NOTE — SUBJECTIVE & OBJECTIVE
Past Medical History:   Diagnosis Date    Cancer     basal cell carcinoma    Stroke due to embolism of right middle cerebral artery 02/18/2025       Past Surgical History:   Procedure Laterality Date    CARPAL TUNNEL RELEASE      ESOPHAGOGASTRODUODENOSCOPY N/A 8/22/2022    Procedure: EGD (ESOPHAGOGASTRODUODENOSCOPY);  Surgeon: Steven Lopez MD;  [address]  Service: Endoscopy;  Laterality: N/A;    ESOPHAGOGASTRODUODENOSCOPY N/A 12/5/2022    Procedure: EGD (ESOPHAGOGASTRODUODENOSCOPY);  Surgeon: Steven Lopez MD;  Location: [facility] (2ND FLR);  Service: Endoscopy;  Laterality: N/A;  Has estimated pulmonary artery pressure 45, schedule location per protocol.   Please schedule with Dr. Darryl Lopez-  Plavix stopped 8/23/22  pt requested to schedule after Thanksgiving/ inst portal-RB  pre call complete; [facility] 11/28/22    ESOPHAGOGASTRODUODENOSCOPY N/A 7/11/2023    Procedure: EGD (ESOPHAGOGASTRODUODENOSCOPY);  Surgeon: Natalie Fall MD;  Location: [facility];  Service: Endoscopy;  Laterality: N/A;    ESOPHAGOGASTRODUODENOSCOPY N/A 3/5/2025    Procedure: EGD (ESOPHAGOGASTRODUODENOSCOPY);  Surgeon: Roberto Salvador MD;  [address]  Service: Endoscopy;  Laterality: N/A;    EYE SURGERY      left eye cataract    LEFT HEART CATHETERIZATION  4/17/2024    Procedure: Left heart cath;  Surgeon: Elisabeth De La Cruz MD;  Location: [facility] CATH;  Service: Cardiology;;    TONSILLECTOMY      TUBAL LIGATION         Review of patient's allergies indicates:   Allergen Reactions    Jardiance [empagliflozin]        No current facility-administered medications on file prior to encounter.     Current Outpatient Medications on File Prior to Encounter   Medication Sig    atorvastatin (LIPITOR) 80 MG tablet Take 1 tablet (80 mg total) by mouth once daily.    clopidogreL (PLAVIX) 75 mg tablet Take 1 tablet (75 mg total) by mouth once daily.    cyanocobalamin (VITAMIN B-12) 1000 MCG tablet Take 1,000  mcg by mouth once daily.    ezetimibe (ZETIA) 10 mg tablet Take 1 tablet (10 mg total) by mouth once daily.    furosemide (LASIX) 20 MG tablet Take 1 tablet (20 mg total) by mouth 2 (two) times a day.    pantoprazole (PROTONIX) 40 MG tablet Take 1 tablet (40 mg total) by mouth once daily. Take 1 tablet by mouth every day    potassium chloride SA (K-DUR,KLOR-CON) 20 MEQ tablet Take 1 tablet (20 mEq total) by mouth once daily.    vitamin D (VITAMIN D3) 1000 units Tab Take 1,000 Units by mouth once daily.    empagliflozin (JARDIANCE) 10 mg tablet Take 10 mg by mouth once daily.     Family History    None       Tobacco Use    Smoking status: Never     Passive exposure: Never    Smokeless tobacco: Never   Substance and Sexual Activity    Alcohol use: No    Drug use: No    Sexual activity: Not on file     Review of Systems   Constitutional:  Negative for chills and fever.   Respiratory:  Positive for shortness of breath.    Cardiovascular:  Negative for chest pain.   Gastrointestinal:  Positive for abdominal pain and constipation. Negative for abdominal distention, diarrhea, nausea and vomiting.   Neurological:  Positive for facial asymmetry and weakness.   Psychiatric/Behavioral:  Negative for agitation, behavioral problems and confusion.      Objective:     Vital Signs (Most Recent):  Temp: 98.1 °F (36.7 °C) (07/02/25 1253)  Pulse: 78 (07/02/25 1253)  Resp: 18 (07/02/25 0730)  BP: 133/63 (07/02/25 1253)  SpO2: 100 % (07/02/25 1253) Vital Signs (24h Range):  Temp:  [97.7 °F (36.5 °C)-98.9 °F (37.2 °C)] 98.1 °F (36.7 °C)  Pulse:  [62-85] 78  Resp:  [16-20] 18  SpO2:  [99 %-100 %] 100 %  BP: (108-160)/(53-75) 133/63     Weight: 47.5 kg (104 lb 11.5 oz)  Body mass index is 18.55 kg/m².     Physical Exam  Vitals and nursing note reviewed.   Constitutional:       General: She is not in acute distress.  HENT:      Head: Normocephalic.      Nose: Nose normal.      Mouth/Throat:      Mouth: Mucous membranes are moist.   Eyes:       General: Visual field deficit (L neglect) present.      Conjunctiva/sclera: Conjunctivae normal.      Comments: R gaze preference   Cardiovascular:      Rate and Rhythm: Normal rate.   Pulmonary:      Effort: Pulmonary effort is normal. No respiratory distress.      Breath sounds: Normal breath sounds. No wheezing or rales.   Abdominal:      General: There is no distension.   Musculoskeletal:         General: No deformity or signs of injury.      Cervical back: No rigidity.   Skin:     General: Skin is warm and dry.   Neurological:      Mental Status: She is alert.      Cranial Nerves: Dysarthria and facial asymmetry present.      Sensory: Sensory deficit present.      Motor: Weakness (LSW) present.   Psychiatric:         Attention and Perception: She is inattentive.         Behavior: Behavior is cooperative.          Significant Labs: All pertinent labs within the past 24 hours have been reviewed.  CBC:   Recent Labs   Lab 07/01/25  0316 07/02/25  0848   WBC 6.38 7.98   HGB 7.6* 8.6*   HCT 24.1* 27.4*    457*     CMP:   Recent Labs   Lab 06/30/25  1523 07/01/25  0316 07/01/25  1530 07/02/25  0848    138 139 141   K 4.3 3.6 3.7 4.0    99 98 100   CO2 31* 28 30* 30*    97 96 93   BUN 23 29* 22 20   CREATININE 0.7 0.7 0.6 0.6   CALCIUM 8.8 9.3 9.5 10.1   PROT  --   --   --  6.8   ALBUMIN 3.5  --  3.7 3.9   BILITOT  --   --   --  0.5   ALKPHOS  --   --   --  101   AST  --   --   --  20   ALT  --   --   --  16   ANIONGAP 8 11 11 11       Significant Imaging: I have reviewed all pertinent imaging results/findings within the past 24 hours.

## 2025-07-02 NOTE — PLAN OF CARE
Problem: Physical Therapy  Goal: Physical Therapy Goal  Description: Goals to be met by: 25     Patient will increase functional independence with mobility by performin. Supine to sit with Stand-by Assistance  2. Sit to stand transfer with Moderate Assistance with AD.   3. Bed to chair transfer with Minimal Assistance using AD  4. Gait  x 20 feet with Moderate Assistance using AD.   5. Wheelchair propulsion x30 feet with Minimal Assistance using bilateral uppper extremities  6. Ascend/descend 12 stair with bilateral Handrails Moderate Assistance .   7. Sitting at edge of bed x10 minutes with Contact Guard Assistance and perform functional activities.   8. Lower extremity exercise program x15 reps per handout, with assistance as needed to increased strength for increased functional mobility.     Outcome: Progressing   Evaluation completed and goals appropriate. 2025

## 2025-07-02 NOTE — PROGRESS NOTES
[facility] - Neurosurgery ([facility])  Vascular Neurology  Comprehensive Stroke Center  Progress Note    Assessment/Plan:     * Embolic stroke involving right middle cerebral artery  Katelyn Caba is a 84 y.o. female with PMH of HTN, HLD, aortic atherosclerosis, hx of GI bleeds, ISAIAH requiring frequent transfusions, HFpEF, CAD, prior R MCA stroke (2/2025), BCC, former smoker (quit 50 years ago) that presented to [facility] ED 6/28/25 with SOB and found to have bilateral pleural effusions on CXR for which she was admitted to  for further eval and management of hypoxia. See  notes for details of hospital course.     Stroke code activated 7/1/25 for new severe L sided weakness and LFD. LKW unclear, sometime evening of 6/30. Family at bedside reports around 5:30am 7/1/25 noticing patient having LSW and needing assistance to ambulate to the bathroom. Sometime later in the morning of 7/1/25 patient's nurse noticed patient was unable to move L side, prompting stroke code activation. Neuro exam significant for R gaze preference, L hemiparesis (arm>>leg), L facial droop, expressive aphasia, dysarthria, and inattention to L side. NIHSS 10. CTH showed acute ischemic changes in R MCA territory, CTA revealed R M2 occlusion. Determined not a candidate for acute stroke interventions, OOW for TNK and no thrombectomy due to core infarct on CTH.  Patient will be transferred to  primary service for ongoing stroke workup, will consult  for co-management of active medical comorbidities.    Left MCA stroke in 2022, initially had language issues that resolved but had right inferior quadrantanopsia. Discharged on DAPT, then aspirin monotherapy.     Right MCA stroke in 2/2025: left sided weakness, left facial droop, and dysarthria. CTA with right M1 occlusion. s/p TNK and DSA w/o thrombectomy as lesion migrated to distal MCA branches after TNK administered. Echo unremarkable.       Antithrombotics for secondary stroke prevention:  Antiplatelets: Clopidogrel: 75 mg daily    Statins for secondary stroke prevention and hyperlipidemia, if present: Statins: Atorvastatin- 80 mg daily  Zetia 10mg daily    Aggressive risk factor modification: HTN, HLD, Diet, Exercise, CAD     Rehab efforts: The patient has been evaluated by a stroke team provider and the therapy needs have been fully considered based off the presenting complaints and exam findings. The following therapy evaluations are needed: PT evaluate and treat, OT evaluate and treat, SLP evaluate and treat    Diagnostics ordered/pending: MRI head without contrast to assess brain parenchyma    VTE prophylaxis: Enoxaparin (CrCl < 30 ml/min) 30 mg SQ every 24 hours  Mechanical prophylaxis: Place SCDs    BP parameters: Infarct: No intervention, SBP <220        Bilateral pleural effusion  -Admitted to  primary service on 6/28 for ongoing workup and management  -IV diuresis started with lasix 40mg BID  -Pulm consulted and rec'd continue aggressive diuresis, no indication for thoracentesis at this time; per pulm note: no signs or symptoms of infection at this time, pleural effusions have been present for months and fluid on bedside ultrasound appears simple; with elevation in BNP pleural effusions likely in the setting of heart failure and volume overload, based on the TAP-IT trial, thoracentesis in the setting of effusions secondary to heart failure would not shorten the length of hospital stay when compared to diuresis alone      consulted for co-management now that patient was transferred to  primary service    CAD (coronary artery disease)  -Stroke risk factor  -Continue plavix, statin, zetia    (HFpEF) heart failure with preserved ejection fraction  -Stroke risk factor  -TTE with EF 55-60%, no WMA  -Strict I/Os and dialy weights  -Fluid restriction of 1.5L   -Continue lasix  - consulted for co-management, appreciate recs    Hypertension  -Stroke risk factor  -SBP goal <220, maintain MAP  >65  -BP range in the last 24 hrs: BP  Min: 112/56  Max: 142/61  -Current antihypertensive regimen:   --PRN labetalol/hydralazine    Acute hypoxic respiratory failure  Likely due to volume overload. SOB improved after diuresis.     Plan  - Continue diuresis till euvolemic     Gastric AVM  Pt with history of gastric AVM on EGD 3/5/2025 which were treated. Spoke with GI of possible anticoagulation in setting of AVM.     Plan  - f/u GI    Iron deficiency anemia due to chronic blood loss  Anemia is likely due to acute blood loss which was from GI bleeds. Most recent hemoglobin and hematocrit are listed below.  Recent Labs     06/30/25  0623 07/01/25  0316 07/02/25  0848   HGB 7.7* 7.6* 8.6*   HCT 24.8* 24.1* 27.4*       Plan  - Monitor serial CBC: Daily  - Transfuse PRBC if patient becomes hemodynamically unstable, symptomatic or H/H drops below 7/21.  - Patient has not received any PRBC transfusions to date  - Patient's anemia is currently stable  - Continue PPI, scheduled for EGD with GI on 7/8  - Given Ferraheme 1020mg IV x1 per hematology on 6/30  - HM consulted for co-management, appreciate assistance    Mixed hyperlipidemia  -Stroke risk factor  -Lipid panel pending, goal <70  -Atorvastatin 80mg and zetia 10mg daily         84 y.o. female with PMH of HTN, HLD, inferolateral STEMI (4/2024, s/p cath with no intervention, hx of GI bleeds with AVM of stomach, ISAIAH requiring frequent transfusions, HFpEF, CAD, prior R MCA stroke (2/2025), BCC, former smoker (quit 50 years ago) that presented to [facility] ED 6/28/25 with SOB and found to have bilateral pleural effusions on CXR for which she was admitted to  for further eval and management of hypoxia.     On  7/1, pt with new severe left sided weakness and left facial droop. Code stroke activated. CTH showed acute ischemic changes in R MCA territory, CTA revealed R M2 occlusion. On plavix prior from previous stroke in 2/2025. With her multiple episodes of strokes, despite  being on anti-platelets, might need to switch to DOAC. However, pt has history of gastric avms and GI bleed. HgB stable on Plavix. Consulted GI on whether its okay to anticoagulate or not.     STROKE DOCUMENTATION   Acute Stroke Times   Last Known Normal Date: 06/30/25  Unknown Normal Time: Unknown Time  Symptom Onset Time:  (found with symptoms around 530am 7/1/25)  Stroke Team Called Date: 07/01/25  Stroke Team Called Time: 1108  Stroke Team Arrival Date: 07/01/25  Stroke Team Arrival Time: 1111  CT Interpretation Time: 1133  Thrombolytic Therapy Recommended: No  CTA Interpretation Time: 1133  Thrombectomy Recommended: No (large core infarct on CTH)    NIH Scale:  1a. Level of Consciousness: 0-->Alert, keenly responsive  1b. LOC Questions: 0-->Answers both questions correctly  1c. LOC Commands: 0-->Performs both tasks correctly  2. Best Gaze: 1-->Partial gaze palsy, gaze is abnormal in one or both eyes, but forced deviation or total gaze paresis is not present  3. Visual: 0-->No visual loss  4. Facial Palsy: 1-->Minor paralysis (flattened nasolabial fold, asymmetry on smiling)  5a. Motor Arm, Left: 1-->Drift, limb holds 90 (or 45) degrees, but drifts down before full 10 seconds, does not hit bed or other support  5b. Motor Arm, Right: 1-->Drift, limb holds 90 (or 45) degrees, but drifts down before full 10 secs, does not hit bed or other support  6a. Motor Leg, Left: 1-->Drift, leg falls by the end of the 5-sec period but does not hit bed  6b. Motor Leg, Right: 1-->Drift, leg falls by the end of the 5-sec period but does not hit bed  7. Limb Ataxia: 0-->Absent  8. Sensory: 0-->Normal, no sensory loss  9. Best Language: 0-->No aphasia, normal  10. Dysarthria: 1-->Mild-to-moderate dysarthria, patient slurs at least some words and, at worst, can be understood with some difficulty  11. Extinction and Inattention (formerly Neglect): 0-->No abnormality  Total (NIH Stroke Scale): 7       Modified Jones Score: 0  Gabriel  Coma Scale:    ABCD2 Score:    [id number] Score:   HAS -BLED Score:   ICH Score:   Hunt & Hoffman Classification:      Hemorrhagic change of an Ischemic Stroke: Does this patient have an ischemic stroke with hemorrhagic changes? No     Neurologic Chief Complaint: R MCA stroke    Subjective:     Interval History: Patient is seen for follow-up neurological assessment and treatment recommendations:     HPI, Past Medical, Family, and Social History remains the same as documented in the initial encounter.     Review of Systems  Scheduled Meds:   atorvastatin  80 mg Oral Daily    clopidogreL  75 mg Oral Daily    cyanocobalamin  1,000 mcg Oral Daily    enoxparin  30 mg Subcutaneous Daily    ezetimibe  10 mg Oral Daily    [START ON 7/3/2025] furosemide (LASIX) injection  40 mg Intravenous Daily    mupirocin   Nasal BID    pantoprazole  40 mg Oral Daily    polyethylene glycol  17 g Oral BID    vitamin D  1,000 Units Oral Daily     Continuous Infusions:  PRN Meds:  Current Facility-Administered Medications:     acetaminophen, 650 mg, Oral, Q6H PRN    albuterol-ipratropium, 3 mL, Nebulization, Q4H PRN    aluminum-magnesium hydroxide-simethicone, 30 mL, Oral, QID PRN    melatonin, 6 mg, Oral, Nightly PRN    naloxone, 0.02 mg, Intravenous, PRN    ondansetron, 8 mg, Oral, Q8H PRN    prochlorperazine, 5 mg, Intravenous, Q6H PRN    simethicone, 1 tablet, Oral, QID PRN    sodium chloride 0.9%, 5 mL, Intravenous, PRN    Objective:     Vital Signs (Most Recent):  Temp: 97.5 °F (36.4 °C) (07/02/25 1547)  Pulse: 84 (07/02/25 1547)  Resp: 18 (07/02/25 0730)  BP: (!) 140/68 (07/02/25 1547)  SpO2: 99 % (07/02/25 1547)  BP Location: Right arm    Vital Signs Range (Last 24H):  Temp:  [97.5 °F (36.4 °C)-98.9 °F (37.2 °C)]   Pulse:  [62-85]   Resp:  [16-20]   BP: (108-160)/(53-75)   SpO2:  [99 %-100 %]   BP Location: Right arm       Physical Exam  Vitals and nursing note reviewed.   Pulmonary:      Effort: Pulmonary effort is normal.       Breath sounds: Normal breath sounds.      Comments: On 1L O2    Neurological:      Mental Status: She is alert.              Neurological Exam:   LOC: alert  Language: No aphasia  Articulation: Mild Dysarthria   Orientation: Person, Place, Time   Visual Fields: Right sided visual neglect  Motor: Arm left  Paresis: 3/5  Leg left  Paresis: 4/5  Arm right  Normal 5/5  Leg right Normal 5/5    Laboratory:  CMP:   Recent Labs   Lab 07/02/25  0848   CALCIUM 10.1   ALBUMIN 3.9   PROT 6.8      K 4.0   CO2 30*      BUN 20   CREATININE 0.6   ALKPHOS 101   ALT 16   AST 20   BILITOT 0.5     Lipid Panel:   Recent Labs   Lab 07/02/25  0848   CHOL 140   LDLCALC 72.8   HDL 45   TRIG 111     Hgb A1C:   Recent Labs   Lab 07/02/25  0848   HGBA1C 4.3     TSH:   Recent Labs   Lab 06/28/25  0732   TSH 2.532       Diagnostic Results     Brain Imaging   MRI brain pending    Vessel Imaging   07/01/2025  CTA Head and Neck: Moderate size acute infarct centered in the right temporal lobe.  No acute intracranial hemorrhage.     Proximal right M2 occlusion and subtotal occlusion vs stenosis in right M3 branch.    Cardiac Imaging     Left Ventricle: The left ventricle is normal in size. Normal wall thickness. There is concentric remodeling. There is normal systolic function with a visually estimated ejection fraction of 60 - 65%. Grade III diastolic dysfunction.    Right Ventricle: The right ventricle is normal in size measuring 2.8 cm. Wall thickness is normal. Systolic function is reduced.    Left Atrium: The left atrium is severely dilated    Aortic Valve: The aortic valve is a trileaflet valve. There is mild aortic valve sclerosis.    Mitral Valve: There is mild regurgitation.    Pulmonary Artery: The estimated pulmonary artery systolic pressure is 37 mmHg.    IVC/SVC: Normal venous pressure at 3 mmHg.    Juan M Henderson MD  Comprehensive Stroke Center  Department of Vascular Neurology   [facility])

## 2025-07-02 NOTE — ASSESSMENT & PLAN NOTE
Likely due to volume overload. SOB improved after diuresis.     Plan  - Continue diuresis till euvolemic

## 2025-07-02 NOTE — PLAN OF CARE
Problem: SLP  Goal: SLP Goal  Description: Speech Language Pathology Goals  Goals expected to be met by 7/15    1. Pt will tolerate least restrictive and safest diet to maintain hydration and nutritional needs without signs of airway compromise   2. Pt will recall functional information after delay with 80% accuracy  3. Pt will complete complex comprehension tasks with 80% accuracy  4. Pt will participate in ongoing assessment of visual-spatial, reading, writing, and higher level cognitive skills  Outcome: Progressing     Bedside swallow evaluation completed. Recommend continuing regular diet with aspiration precautions. Pt would benefit in ongoing speech language cognitive assessment. SLP will continue to follow.

## 2025-07-02 NOTE — PT/OT/SLP EVAL
Speech Language Pathology Evaluation  Cognitive/Bedside Swallow    Patient Name:  Katelyn Caba   MRN:  [id number]  Admitting Diagnosis: Embolic stroke involving right middle cerebral artery    Recommendations:                  General Recommendations:  Speech/language therapy, Cognitive-linguistic therapy, and monitor diet tolerance  Diet recommendations:  Regular Diet - IDDSI Level 7, Thin liquids - IDDSI Level 0   Aspiration Precautions: 1 bite/sip at a time, Assistance with meals, Check for pocketing/oral residue, Feed only when awake/alert, Frequent oral care, HOB to 90 degrees, Meds crushed in puree, Monitor for s/s of aspiration, Small bites/sips, and Standard aspiration precautions   General Precautions: Standard, aspiration, fall  Communication strategies:  none  Discharge recommendations:   (tbd)   Barriers to Discharge:  None    Assessment:     Katelyn Caba is a 84 y.o. female with an SLP diagnosis of mild oral dysphagia though with tolerance of regular solids and thin liquids. She presents with cognitive linguistic impairment.      History:     Past Medical History:   Diagnosis Date    Cancer     basal cell carcinoma    Stroke due to embolism of right middle cerebral artery 02/18/2025       Past Surgical History:   Procedure Laterality Date    CARPAL TUNNEL RELEASE      ESOPHAGOGASTRODUODENOSCOPY N/A 8/22/2022    Procedure: EGD (ESOPHAGOGASTRODUODENOSCOPY);  Surgeon: Steven Lopez MD;  Location: [address]);  Service: Endoscopy;  Laterality: N/A;    ESOPHAGOGASTRODUODENOSCOPY N/A 12/5/2022    Procedure: EGD (ESOPHAGOGASTRODUODENOSCOPY);  Surgeon: Steven Lopez MD;  [facility]: [facility] ([address] [place] [address]);  Service: Endoscopy;  Laterality: N/A;  Has estimated pulmonary artery pressure 45, schedule location per protocol.   Please schedule with Dr. Darryl Lopez-  Plavix stopped 8/23/22  pt requested to schedule after [facility]/ [facility] portal-RB  pre call complete; [facility] 11/28/22     ESOPHAGOGASTRODUODENOSCOPY N/A 7/11/2023    Procedure: EGD (ESOPHAGOGASTRODUODENOSCOPY);  Surgeon: Natalie Fall MD;  Location: [facility] ENDO;  Service: Endoscopy;  Laterality: N/A;    ESOPHAGOGASTRODUODENOSCOPY N/A 3/5/2025    Procedure: EGD (ESOPHAGOGASTRODUODENOSCOPY);  Surgeon: Roberto Salvador MD;  Location: [facility] ENDO (2ND FLR);  Service: Endoscopy;  Laterality: N/A;    EYE SURGERY      left eye cataract    LEFT HEART CATHETERIZATION  4/17/2024    Procedure: Left heart cath;  Surgeon: Elisabeth De La Cruz MD;  Location: [facility] CATH;  Service: Cardiology;;    TONSILLECTOMY      TUBAL LIGATION       HPI: Katelyn Caba is a 83 yo F with PMHx of BCC, HTN, HLD, aortic atherosclerosis, hx of GI bleeds, ISAIAH requiring frequent transfusions, HFpEF, CAD, L MCA CVA who presented to ED for shortness of breath. Seen with  and son at bedside. Son reports that patient has been having progressive shortness of breath over the past week that acutely worsened this morning. She believes this is due to her blood count being low. She recently was hospitalized 05/29-06/01 for anemia requiring 2 units to be transfused and Covid infection. She receives frequent blood transfusions and follows closely with hematology. She has a hx of GI bleeds and is scheduled for an OP EGD on 07/08/25. She denies any recent melena or hematochezia. Endorses dry cough. She has been compliant with her home lasix. She does not report any significant weight gain over the past week. She does have a history of tobacco use but quit 50 years ago. Unable to tell me how long she smoked for but believes she smoked about 0.5 ppd. Denies fever, chills, chest pain, palpitations, abdominal pain, n/v/d, and LE swelling.  In ED: Afebrile. BP elevated. Initially requiring 4L on arrival. Attempted to wean O2, but dropped to 88% on RA. Now satting well on 2L NC. CXR with bilateral pleural effusions. Given IV lasix 20 mg. Admitted to  for hypoxia.    Social History: Patient  "lives with spouse. Pt reports she is independently in cooking, cleaning, finances, and managing medications.    Prior Intubation HX:  none    Modified Barium Swallow: none    Chest X-Rays: 7/1/25 "Impression: CHF."    Prior diet: regular/thin liquids.    Subjective     Pt awake and alert with family members present at bedside. Nursing cleared for session.     Pain/Comfort:  Pain Rating 1: 0/10    Respiratory Status: Nasal cannula, flow 2 L/min    Objective:     Cognitive Status:    Arousal/Alertness Appropriate response to stimuli  Attention No obvious deficits observed   Orientation Oriented x4  Memory Delayedindependently recalled 0/3 unrelated words after a 3 minute delay, increasing to 3/3 with semantic cues. Pt with difficulty recalling recently reviewed compensatory strategies after short delay and recall general information answering personal information questions accurately   Problem Solving Solutions 3/3  Reasoning 4/4     Receptive Language:   Comprehension:      Questions Complex yes/no 2/4  Commands  multistep basic commands 3/3    Pragmatics:    WFL    Expressive Language:  Verbal:    Naming Confrontation 5/5 and Divergent responsive named 15 animals in provided 1 minute time frame (norm 15-20)  Conversational speech no apparent word finding deficits in conversation      Motor Speech:  WFL    Voice:   WFL    Visual-Spatial:  L neglect though able to attend to L with cues    Reading:   tba     Written Expression:   tba    Oral Musculature Evaluation  Oral Musculature: facial asymmetry present, left weakness  Dentition: partials  Oral Labial Strength and Mobility: impaired seal  Lingual Strength and Mobility: impaired left lateral movement  Volitional Cough: elicited  Volitional Swallow: elicited  Voice Prior to PO Intake: clear    Bedside Swallow Eval:   Consistencies Assessed:  Thin liquids via straw x9  Puree via tsp x5  Solids osvaldo crackers     Oral Phase:   Anterior loss from L corner   Prolonged " mastication  Oral residue mild on L. Pt following cues to clear    Pharyngeal Phase:   no overt clinical signs/symptoms of aspiration  no overt clinical signs/symptoms of pharyngeal dysphagia    Compensatory Strategies  None    Treatment: Recommend continuing regular diet with thin liquids with standard aspiration precautions. Pt educated and demonstrated understanding of compensatory strategies to clear L side oral residue. Pt would benefit from ongoing speech therapy. SLP discussed role of SLP, aspiration precautions, signs of aspiration, L sided weakness and compensatory strategies for residue, diet recommendations, and ongoing SLP POC. Pt and family expressed understanding. SLP will continue to follow.     Goals:   Multidisciplinary Problems       SLP Goals          Problem: SLP    Goal Priority Disciplines Outcome   SLP Goal     SLP Progressing   Description: Speech Language Pathology Goals  Goals expected to be met by 7/15    1. Pt will tolerate least restrictive and safest diet to maintain hydration and nutritional needs without signs of airway compromise   2. Pt will recall functional information after delay with 80% accuracy  3. Pt will complete complex comprehension tasks with 80% accuracy  4. Pt will participate in ongoing assessment of visual-spatial, reading, writing, and higher level cognitive skills                              Plan:     Patient to be seen:  4 x/week   Plan of Care expires:  07/29/25  Plan of Care reviewed with:  patient, spouse, son   SLP Follow-Up:  Yes       Time Tracking:     SLP Treatment Date:   07/02/25  Speech Start Time:  0938  Speech Stop Time:  1005     Speech Total Time (min):  27 min    Billable Minutes: Eval 9 , Eval Swallow and Oral Function 9, and Self Care/Home Management Training 9    07/02/2025

## 2025-07-02 NOTE — SUBJECTIVE & OBJECTIVE
Neurologic Chief Complaint: R MCA stroke    Subjective:     Interval History: Patient is seen for follow-up neurological assessment and treatment recommendations:     HPI, Past Medical, Family, and Social History remains the same as documented in the initial encounter.     Review of Systems  Scheduled Meds:   atorvastatin  80 mg Oral Daily    clopidogreL  75 mg Oral Daily    cyanocobalamin  1,000 mcg Oral Daily    enoxparin  30 mg Subcutaneous Daily    ezetimibe  10 mg Oral Daily    [START ON 7/3/2025] furosemide (LASIX) injection  40 mg Intravenous Daily    mupirocin   Nasal BID    pantoprazole  40 mg Oral Daily    polyethylene glycol  17 g Oral BID    vitamin D  1,000 Units Oral Daily     Continuous Infusions:  PRN Meds:  Current Facility-Administered Medications:     acetaminophen, 650 mg, Oral, Q6H PRN    albuterol-ipratropium, 3 mL, Nebulization, Q4H PRN    aluminum-magnesium hydroxide-simethicone, 30 mL, Oral, QID PRN    melatonin, 6 mg, Oral, Nightly PRN    naloxone, 0.02 mg, Intravenous, PRN    ondansetron, 8 mg, Oral, Q8H PRN    prochlorperazine, 5 mg, Intravenous, Q6H PRN    simethicone, 1 tablet, Oral, QID PRN    sodium chloride 0.9%, 5 mL, Intravenous, PRN    Objective:     Vital Signs (Most Recent):  Temp: 97.5 °F (36.4 °C) (07/02/25 1547)  Pulse: 84 (07/02/25 1547)  Resp: 18 (07/02/25 0730)  BP: (!) 140/68 (07/02/25 1547)  SpO2: 99 % (07/02/25 1547)  BP Location: Right arm    Vital Signs Range (Last 24H):  Temp:  [97.5 °F (36.4 °C)-98.9 °F (37.2 °C)]   Pulse:  [62-85]   Resp:  [16-20]   BP: (108-160)/(53-75)   SpO2:  [99 %-100 %]   BP Location: Right arm       Physical Exam  Vitals and nursing note reviewed.   Pulmonary:      Effort: Pulmonary effort is normal.      Breath sounds: Normal breath sounds.      Comments: On 1L O2    Neurological:      Mental Status: She is alert.              Neurological Exam:   LOC: alert  Language: No aphasia  Articulation: Mild Dysarthria   Orientation: Person, Place,  Time   Visual Fields: Right sided visual neglect  Motor: Arm left  Paresis: 3/5  Leg left  Paresis: 4/5  Arm right  Normal 5/5  Leg right Normal 5/5    Laboratory:  CMP:   Recent Labs   Lab 07/02/25  0848   CALCIUM 10.1   ALBUMIN 3.9   PROT 6.8      K 4.0   CO2 30*      BUN 20   CREATININE 0.6   ALKPHOS 101   ALT 16   AST 20   BILITOT 0.5     Lipid Panel:   Recent Labs   Lab 07/02/25  0848   CHOL 140   LDLCALC 72.8   HDL 45   TRIG 111     Hgb A1C:   Recent Labs   Lab 07/02/25  0848   HGBA1C 4.3     TSH:   Recent Labs   Lab 06/28/25  0732   TSH 2.532       Diagnostic Results     Brain Imaging   MRI brain pending    Vessel Imaging   07/01/2025  CTA Head and Neck: Moderate size acute infarct centered in the right temporal lobe.  No acute intracranial hemorrhage.     Proximal right M2 occlusion and subtotal occlusion vs stenosis in right M3 branch.    Cardiac Imaging     Left Ventricle: The left ventricle is normal in size. Normal wall thickness. There is concentric remodeling. There is normal systolic function with a visually estimated ejection fraction of 60 - 65%. Grade III diastolic dysfunction.    Right Ventricle: The right ventricle is normal in size measuring 2.8 cm. Wall thickness is normal. Systolic function is reduced.    Left Atrium: The left atrium is severely dilated    Aortic Valve: The aortic valve is a trileaflet valve. There is mild aortic valve sclerosis.    Mitral Valve: There is mild regurgitation.    Pulmonary Artery: The estimated pulmonary artery systolic pressure is 37 mmHg.    IVC/SVC: Normal venous pressure at 3 mmHg.

## 2025-07-02 NOTE — NURSING
MRI messaged staff nurse informing pt has a hair piece that has metal in it that requires machines to take it off. MRI staff unable to complete procedure. Pt also not doing well with lorazepam given IV prior to procedure.

## 2025-07-02 NOTE — CARE UPDATE
Unit [facility] Care Support Interaction      I have reviewed the chart of Katelyn Caba who is hospitalized for Embolic stroke involving right middle cerebral artery. The patient is currently located in the following unit: [facility]        I have assisted the primary physician in management of the following:      MRSA Decolonization - Mupirocin ordered    NEAL Bustamante  Unit Based [facility]

## 2025-07-02 NOTE — ASSESSMENT & PLAN NOTE
Katelyn Caba is a 84 y.o. female with PMH of HTN, HLD, aortic atherosclerosis, hx of GI bleeds, ISAIAH requiring frequent transfusions, HFpEF, CAD, prior R MCA stroke (2/2025), BCC, former smoker (quit 50 years ago) that presented to [facility] ED 6/28/25 with SOB and found to have bilateral pleural effusions on CXR for which she was admitted to  for further eval and management of hypoxia. See  notes for details of hospital course.     Stroke code activated 7/1/25 for new severe L sided weakness and LFD. LKW unclear, sometime evening of 6/30. Family at bedside reports around 5:30am 7/1/25 noticing patient having LSW and needing assistance to ambulate to the bathroom. Sometime later in the morning of 7/1/25 patient's nurse noticed patient was unable to move L side, prompting stroke code activation. Neuro exam significant for R gaze preference, L hemiparesis (arm>>leg), L facial droop, expressive aphasia, dysarthria, and inattention to L side. NIHSS 10. CTH showed acute ischemic changes in R MCA territory, CTA revealed R M2 occlusion. Determined not a candidate for acute stroke interventions, OOW for TNK and no thrombectomy due to core infarct on CTH.  Patient will be transferred to  primary service for ongoing stroke workup, will consult  for co-management of active medical comorbidities.    Left MCA stroke in 2022, initially had language issues that resolved but had right inferior quadrantanopsia. Discharged on DAPT, then aspirin monotherapy.     Right MCA stroke in 2/2025: left sided weakness, left facial droop, and dysarthria. CTA with right M1 occlusion. s/p TNK and DSA w/o thrombectomy as lesion migrated to distal MCA branches after TNK administered. Echo unremarkable.       Antithrombotics for secondary stroke prevention: Antiplatelets: Clopidogrel: 75 mg daily    Statins for secondary stroke prevention and hyperlipidemia, if present: Statins: Atorvastatin- 80 mg daily  Zetia 10mg daily    Aggressive risk  factor modification: HTN, HLD, Diet, Exercise, CAD     Rehab efforts: The patient has been evaluated by a stroke team provider and the therapy needs have been fully considered based off the presenting complaints and exam findings. The following therapy evaluations are needed: PT evaluate and treat, OT evaluate and treat, SLP evaluate and treat    Diagnostics ordered/pending: MRI head without contrast to assess brain parenchyma    VTE prophylaxis: Enoxaparin (CrCl < 30 ml/min) 30 mg SQ every 24 hours  Mechanical prophylaxis: Place SCDs    BP parameters: Infarct: No intervention, SBP <220

## 2025-07-02 NOTE — PLAN OF CARE
Problem: Occupational Therapy  Goal: Occupational Therapy Goal  Description: Goals to be met by: 7/30/25.     Patient will increase functional independence with ADLs by performing:    UE Dressing with Stand-by Assistance.  LE Dressing with Stand-by Assistance.  Grooming while standing with Stand-by Assistance.  Toileting from toilet with Stand-by Assistance for hygiene and clothing management.   Toilet transfer to toilet with Stand-by Assistance.  Upper extremity exercise program x10 reps per handout, with independence.    Outcome: Progressing     OT evaluation completed and goals established.

## 2025-07-02 NOTE — PHYSICIAN QUERY
Due to the conflicting clinical picture, please clinically validate the diagnosis of Acute Hypoxic Respiratory Failure. If validated, please provide additional clinical support for the diagnosis.  The respiratory condition is confirmed. Additional clinical support/decision making indicators for the diagnosis include (please specify): progressive shortness of breath, Initially requiring 4L on arrival- saturation 88% on room air, rales on ausculatation with bilateral pleural effusion

## 2025-07-02 NOTE — HOSPITAL COURSE
84 y.o. female with PMH of HTN, HLD, inferolateral STEMI (4/2024, s/p cath with no intervention, hx of GI bleeds with AVM of stomach, ISAIAH requiring frequent transfusions, HFpEF, CAD, prior R MCA stroke (2/2025), BCC, former smoker (quit 50 years ago) that presented to [facility] ED 6/28/25 with SOB and found to have bilateral pleural effusions on CXR for which she was admitted to  for further eval and management of hypoxia.     On  7/1, pt with new severe left sided weakness and left facial droop. Code stroke activated. CTH showed acute ischemic changes in R MCA territory, CTA revealed R M2 occlusion. On plavix prior from previous stroke in 2/2025. With her multiple episodes of strokes, despite being on anti-platelets, might need to switch to DOAC. However, pt has history of gastric avms and GI bleed. HgB stable on Plavix. Consulted GI on whether its okay to anticoagulate or not.     07/02/2025 Brain MRI: Re demonstration of moderate sized acute infarct in the right temporal lobe, with small component extending into the right parietal and frontal lobes.  There has been interval hemorrhagic transformation with subarachnoid extension.     07/04/2025: Plan for repeat CT head tomorrow to determine if we want to start anticoagulation  07/05/2025: Repeat CT head w/o any hemorrhage. Started eliquis 2.5 mg bid   07/06/2025: On Eliquis 2.5 mg bid. HgB stable. Discontinued Plavix. Discharging to [facility].

## 2025-07-03 PROBLEM — I51.7 ENLARGED LA (LEFT ATRIUM): Status: ACTIVE | Noted: 2025-07-03

## 2025-07-03 LAB
ABSOLUTE EOSINOPHIL (OHS): 0.12 K/UL
ABSOLUTE MONOCYTE (OHS): 1.05 K/UL (ref 0.3–1)
ABSOLUTE NEUTROPHIL COUNT (OHS): 5.45 K/UL (ref 1.8–7.7)
ALBUMIN SERPL BCP-MCNC: 3.7 G/DL (ref 3.5–5.2)
ALP SERPL-CCNC: 97 UNIT/L (ref 40–150)
ALT SERPL W/O P-5'-P-CCNC: 12 UNIT/L (ref 10–44)
ANION GAP (OHS): 13 MMOL/L (ref 8–16)
AST SERPL-CCNC: 24 UNIT/L (ref 11–45)
BASOPHILS # BLD AUTO: 0.05 K/UL
BASOPHILS NFR BLD AUTO: 0.6 %
BILIRUB SERPL-MCNC: 0.6 MG/DL (ref 0.1–1)
BUN SERPL-MCNC: 19 MG/DL (ref 8–23)
CALCIUM SERPL-MCNC: 10.1 MG/DL (ref 8.7–10.5)
CHLORIDE SERPL-SCNC: 99 MMOL/L (ref 95–110)
CO2 SERPL-SCNC: 26 MMOL/L (ref 23–29)
CREAT SERPL-MCNC: 0.6 MG/DL (ref 0.5–1.4)
ERYTHROCYTE [DISTWIDTH] IN BLOOD BY AUTOMATED COUNT: 15.6 % (ref 11.5–14.5)
GFR SERPLBLD CREATININE-BSD FMLA CKD-EPI: >60 ML/MIN/1.73/M2
GLUCOSE SERPL-MCNC: 86 MG/DL (ref 70–110)
HCT VFR BLD AUTO: 28.7 % (ref 37–48.5)
HGB BLD-MCNC: 9.1 GM/DL (ref 12–16)
IMM GRANULOCYTES # BLD AUTO: 0.03 K/UL (ref 0–0.04)
IMM GRANULOCYTES NFR BLD AUTO: 0.4 % (ref 0–0.5)
LYMPHOCYTES # BLD AUTO: 1.39 K/UL (ref 1–4.8)
MAGNESIUM SERPL-MCNC: 2.4 MG/DL (ref 1.6–2.6)
MCH RBC QN AUTO: 27.5 PG (ref 27–31)
MCHC RBC AUTO-ENTMCNC: 31.7 G/DL (ref 32–36)
MCV RBC AUTO: 87 FL (ref 82–98)
NUCLEATED RBC (/100WBC) (OHS): 0 /100 WBC
PHOSPHATE SERPL-MCNC: 4.4 MG/DL (ref 2.7–4.5)
PLATELET # BLD AUTO: 437 K/UL (ref 150–450)
PMV BLD AUTO: 9.8 FL (ref 9.2–12.9)
POTASSIUM SERPL-SCNC: 4.1 MMOL/L (ref 3.5–5.1)
PROT SERPL-MCNC: 6.6 GM/DL (ref 6–8.4)
RBC # BLD AUTO: 3.31 M/UL (ref 4–5.4)
RELATIVE EOSINOPHIL (OHS): 1.5 %
RELATIVE LYMPHOCYTE (OHS): 17.2 % (ref 18–48)
RELATIVE MONOCYTE (OHS): 13 % (ref 4–15)
RELATIVE NEUTROPHIL (OHS): 67.3 % (ref 38–73)
SODIUM SERPL-SCNC: 138 MMOL/L (ref 136–145)
WBC # BLD AUTO: 8.09 K/UL (ref 3.9–12.7)

## 2025-07-03 PROCEDURE — 25000003 PHARM REV CODE 250

## 2025-07-03 PROCEDURE — 85025 COMPLETE CBC W/AUTO DIFF WBC: CPT

## 2025-07-03 PROCEDURE — 92507 TX SP LANG VOICE COMM INDIV: CPT

## 2025-07-03 PROCEDURE — 63600175 PHARM REV CODE 636 W HCPCS

## 2025-07-03 PROCEDURE — 80053 COMPREHEN METABOLIC PANEL: CPT

## 2025-07-03 PROCEDURE — 83735 ASSAY OF MAGNESIUM: CPT

## 2025-07-03 PROCEDURE — 99233 SBSQ HOSP IP/OBS HIGH 50: CPT | Mod: GC,,, | Performed by: PSYCHIATRY & NEUROLOGY

## 2025-07-03 PROCEDURE — 94761 N-INVAS EAR/PLS OXIMETRY MLT: CPT

## 2025-07-03 PROCEDURE — 84100 ASSAY OF PHOSPHORUS: CPT

## 2025-07-03 PROCEDURE — 97535 SELF CARE MNGMENT TRAINING: CPT

## 2025-07-03 PROCEDURE — 36415 COLL VENOUS BLD VENIPUNCTURE: CPT

## 2025-07-03 PROCEDURE — 11000001 HC ACUTE MED/SURG PRIVATE ROOM

## 2025-07-03 PROCEDURE — 51798 US URINE CAPACITY MEASURE: CPT

## 2025-07-03 PROCEDURE — 27000221 HC OXYGEN, UP TO 24 HOURS

## 2025-07-03 PROCEDURE — 97112 NEUROMUSCULAR REEDUCATION: CPT

## 2025-07-03 PROCEDURE — 92526 ORAL FUNCTION THERAPY: CPT

## 2025-07-03 PROCEDURE — 63600175 PHARM REV CODE 636 W HCPCS: Performed by: HOSPITALIST

## 2025-07-03 PROCEDURE — 99900035 HC TECH TIME PER 15 MIN (STAT)

## 2025-07-03 RX ORDER — FUROSEMIDE 40 MG/1
40 TABLET ORAL 2 TIMES DAILY
Status: DISCONTINUED | OUTPATIENT
Start: 2025-07-03 | End: 2025-07-05

## 2025-07-03 RX ADMIN — PANTOPRAZOLE SODIUM 40 MG: 20 TABLET, DELAYED RELEASE ORAL at 09:07

## 2025-07-03 RX ADMIN — POLYETHYLENE GLYCOL 3350 17 G: 17 POWDER, FOR SOLUTION ORAL at 08:07

## 2025-07-03 RX ADMIN — ATORVASTATIN CALCIUM 80 MG: 40 TABLET, FILM COATED ORAL at 09:07

## 2025-07-03 RX ADMIN — EZETIMIBE 10 MG: 10 TABLET ORAL at 09:07

## 2025-07-03 RX ADMIN — MUPIROCIN: 20 OINTMENT TOPICAL at 08:07

## 2025-07-03 RX ADMIN — Medication 1000 UNITS: at 09:07

## 2025-07-03 RX ADMIN — CLOPIDOGREL 75 MG: 75 TABLET ORAL at 09:07

## 2025-07-03 RX ADMIN — CYANOCOBALAMIN TAB 1000 MCG 1000 MCG: 1000 TAB at 09:07

## 2025-07-03 RX ADMIN — FUROSEMIDE 40 MG: 10 INJECTION, SOLUTION INTRAMUSCULAR; INTRAVENOUS at 09:07

## 2025-07-03 RX ADMIN — FUROSEMIDE 40 MG: 40 TABLET ORAL at 05:07

## 2025-07-03 RX ADMIN — ENOXAPARIN SODIUM 30 MG: 30 INJECTION SUBCUTANEOUS at 05:07

## 2025-07-03 RX ADMIN — MUPIROCIN: 20 OINTMENT TOPICAL at 09:07

## 2025-07-03 RX ADMIN — POLYETHYLENE GLYCOL 3350 17 G: 17 POWDER, FOR SOLUTION ORAL at 10:07

## 2025-07-03 NOTE — PT/OT/SLP PROGRESS
Speech Language Pathology Treatment    Patient Name:  Katelyn Caba   MRN:  [id number]  Admitting Diagnosis: Embolic stroke involving right middle cerebral artery    Recommendations:                 General Recommendations:  Speech/language therapy, Cognitive-linguistic therapy, and monitor diet tolerance  Diet recommendations:  Regular Diet - IDDSI Level 7, Thin liquids - IDDSI Level 0   Aspiration Precautions: 1 bite/sip at a time, Assistance with meals, Check for pocketing/oral residue, Feed only when awake/alert, Frequent oral care, HOB to 90 degrees, Meds crushed in puree, Monitor for s/s of aspiration, Small bites/sips, and Standard aspiration precautions   General Precautions: Standard, aspiration, fall  Communication strategies:  none  Discharge recommendations:   (tbd)   Barriers to Discharge:  None    Assessment:     Katelyn Caba is a 84 y.o. female with an SLP diagnosis of mild oral dysphagia though with tolerance of regular solids and thin liquids. She presents with cognitive linguistic impairment.      Subjective     Pt awake and alert with family present at bedside. Nursing cleared for session    Pain/Comfort:  Pain Rating 1: 0/10    Respiratory Status: Nasal cannula, flow 2 L/min    Objective:     Has the patient been evaluated by SLP for swallowing?   Yes  Keep patient NPO? No   Current Respiratory Status:        Pt seen for ongoing swallowing and speech therapy. Pt awake and alert upon arrival. Pt completing reading task accurately. Pt completing functional money and time related math problems with 100% accuracy though pt requiring frequent repetition of information to completed task. Pt accuracy stating steps of functional cooking sequences. Pt attending to L with cues. Pt administering trials of thin liquids via straw and egg biscuit. Pt administering small bites. Mild L sided residue that pt required cues to clear. Recommend continuing regular diet with thin liquids. SLP reviewed compensatory  strategies for L sided residue. Pt expressed understanding but was unable to recall strategies after short delay. SLP discussed ongoing recommendations and SLP POC. Pt and family expressed understanding.     Goals:   Multidisciplinary Problems       SLP Goals          Problem: SLP    Goal Priority Disciplines Outcome   SLP Goal     SLP Progressing   Description: Speech Language Pathology Goals  Goals expected to be met by 7/15    1. Pt will tolerate least restrictive and safest diet to maintain hydration and nutritional needs without signs of airway compromise   2. Pt will recall functional information after delay with 80% accuracy  3. Pt will complete complex comprehension tasks with 80% accuracy  4. Pt will participate in ongoing assessment of visual-spatial, reading, writing, and higher level cognitive skills                              Plan:     Patient to be seen:  3 x/week   Plan of Care expires:  07/29/25  Plan of Care reviewed with:  patient   SLP Follow-Up:  Yes       Time Tracking:     SLP Treatment Date:   07/03/25  Speech Start Time:  0924  Speech Stop Time:  0940     Speech Total Time (min):  16 min    Billable Minutes: Speech Therapy Individual 8 and Treatment Swallowing Dysfunction 8    07/03/2025

## 2025-07-03 NOTE — PT/OT/SLP PROGRESS
"Occupational Therapy   Treatment    Name: Katelyn Caba  MRN: [id number]  Admitting Diagnosis:  Embolic stroke involving right middle cerebral artery       Recommendations:     Discharge Recommendations: High Intensity Therapy  Discharge Equipment Recommendations:  bath bench, bedside commode  Barriers to discharge:  None    Assessment:     Katelyn Caba is a 84 y.o. female with a medical diagnosis of Embolic stroke involving right middle cerebral artery.  She presents with performance deficits affecting function are weakness, impaired balance, impaired self care skills, decreased coordination, decreased upper extremity function, impaired functional mobility, gait instability, decreased lower extremity function, impaired endurance.     Rehab Prognosis:  Good; patient would benefit from acute skilled OT services to address these deficits and reach maximum level of function.       Plan:     Patient to be seen 4 x/week to address the above listed problems via self-care/home management, therapeutic exercises, neuromuscular re-education, therapeutic activities  Plan of Care Expires: 07/30/25  Plan of Care Reviewed with: patient    Subjective     Patient:  "I woke up confused.  I thought I was in a different room."   Pain/Comfort:  Pain Rating 1: 0/10  Pain Rating Post-Intervention 1: 0/10    Objective:     Communicated with: Nurse prior to session.  Patient found supine with bed alarm, telemetry, peripheral IV, PureWick, oxygen upon OT entry to room.    General Precautions: Standard, aspiration, fall    Orthopedic Precautions:N/A  Braces: N/A  Respiratory Status: Nasal cannula, flow 2 L/min     Occupational Performance:     Bed Mobility:    Patient completed Rolling/Turning to Left with  supervision  Patient completed Rolling/Turning to Right with supervision  Patient completed Supine to Sit with stand by assistance  Patient completed Sit to Supine with stand by assistance     Functional Mobility/Transfers:  Patient " completed Sit <> Stand Transfer with minimum assistance  with  no assistive device   Patient completed Bed <> Chair Transfer using Stand Pivot technique with minimum assistance with no assistive device    Activities of Daily Living:  Grooming: stand by assistance while seated EOB  Upper Body Dressing: stand by assistance while seated EOB  Lower Body Dressing: moderate assistance while seated EOB    AMPAC 6 Click ADL: 17    Treatment & Education:  Patient alert and oriented x 3; able to follow 4/4 one step commands.  Patient attentive and interactive throughout the session.  Patient able to identify 5/5 body parts.  Patient left supine with all lines intact, call button in reach, and bed alarm on    GOALS:   Multidisciplinary Problems       Occupational Therapy Goals          Problem: Occupational Therapy    Goal Priority Disciplines Outcome Interventions   Occupational Therapy Goal     OT, PT/OT Progressing    Description: Goals to be met by: 7/30/25.     Patient will increase functional independence with ADLs by performing:    UE Dressing with Stand-by Assistance.  LE Dressing with Stand-by Assistance.  Grooming while standing with Stand-by Assistance.  Toileting from toilet with Stand-by Assistance for hygiene and clothing management.   Toilet transfer to toilet with Stand-by Assistance.  Upper extremity exercise program x10 reps per handout, with independence.                         Time Tracking:     OT Date of Treatment: 07/03/25  OT Start Time: 0724  OT Stop Time: 0748  OT Total Time (min): 24 min    Billable Minutes:Self Care/Home Management 12  Neuromuscular Re-education 12    OT/JEFF: OT          7/3/2025

## 2025-07-03 NOTE — ASSESSMENT & PLAN NOTE
Pt with history of gastric AVM on EGD 3/5/2025 which were treated. Spoke with GI of possible anticoagulation in setting of AVM.     Plan  Per GI: Endoscopic intervention may not catch all AVMS/ Angiectasias and given stable hemoglobin and vitals, okay to proceed with trial of anticoagulation and monitor.

## 2025-07-03 NOTE — PLAN OF CARE
Problem: Adult Inpatient Plan of Care  Goal: Plan of Care Review  Outcome: Progressing  Goal: Patient-Specific Goal (Individualized)  Outcome: Progressing  Goal: Absence of Hospital-Acquired Illness or Injury  Outcome: Progressing  Goal: Optimal Comfort and Wellbeing  Outcome: Progressing  Goal: Readiness for Transition of Care  Outcome: Progressing   POC and meds reviewed with patient and family, all needs addressed.   Applicable):  No results found for: \"HIV\", \"HEPCAB\"    COVID-19 Screening (If Applicable): No results found for: \"COVID19\"        Anesthesia Evaluation  Patient summary reviewed and Nursing notes reviewed  Airway: Mallampati: II  TM distance: >3 FB   Neck ROM: full  Mouth opening: > = 3 FB   Dental: normal exam         Pulmonary:Negative Pulmonary ROS breath sounds clear to auscultation                             Cardiovascular:  Exercise tolerance: good (>4 METS)  (+) hypertension: moderate      NYHA Classification: I  ECG reviewed  Rhythm: regular  Rate: normal           Beta Blocker:  Not on Beta Blocker         Neuro/Psych:   Negative Neuro/Psych ROS              GI/Hepatic/Renal:   (+) morbid obesity         ROS comment: S/P Gastric sleeve.   Endo/Other: Negative Endo/Other ROS                    Abdominal:   (+) obese          Vascular: negative vascular ROS.         Other Findings:       Anesthesia Plan      general     ASA 2       Induction: intravenous.    MIPS: Postoperative opioids intended and Prophylactic antiemetics administered.  Anesthetic plan and risks discussed with patient and spouse.    Use of blood products discussed with patient whom consented to blood products.    Plan discussed with JOHNNA.                Marek Atkinson DO   3/19/2025

## 2025-07-03 NOTE — PLAN OF CARE
Goals remain appropriate.  Problem: Occupational Therapy  Goal: Occupational Therapy Goal  Description: Goals to be met by: 7/30/25.     Patient will increase functional independence with ADLs by performing:    UE Dressing with Stand-by Assistance.  LE Dressing with Stand-by Assistance.  Grooming while standing with Stand-by Assistance.  Toileting from toilet with Stand-by Assistance for hygiene and clothing management.   Toilet transfer to toilet with Stand-by Assistance.  Upper extremity exercise program x10 reps per handout, with independence.    Outcome: Progressing

## 2025-07-03 NOTE — SUBJECTIVE & OBJECTIVE
Neurologic Chief Complaint: R MCA stroke    Subjective:     Interval History: Patient is seen for follow-up neurological assessment and treatment recommendations:     HPI, Past Medical, Family, and Social History remains the same as documented in the initial encounter.     Review of Systems  Scheduled Meds:   atorvastatin  80 mg Oral Daily    clopidogreL  75 mg Oral Daily    cyanocobalamin  1,000 mcg Oral Daily    enoxparin  30 mg Subcutaneous Daily    ezetimibe  10 mg Oral Daily    furosemide  40 mg Oral BID    mupirocin   Nasal BID    pantoprazole  40 mg Oral Daily    polyethylene glycol  17 g Oral BID    vitamin D  1,000 Units Oral Daily     Continuous Infusions:  PRN Meds:  Current Facility-Administered Medications:     acetaminophen, 650 mg, Oral, Q6H PRN    albuterol-ipratropium, 3 mL, Nebulization, Q4H PRN    aluminum-magnesium hydroxide-simethicone, 30 mL, Oral, QID PRN    melatonin, 6 mg, Oral, Nightly PRN    naloxone, 0.02 mg, Intravenous, PRN    ondansetron, 8 mg, Oral, Q8H PRN    prochlorperazine, 5 mg, Intravenous, Q6H PRN    simethicone, 1 tablet, Oral, QID PRN    sodium chloride 0.9%, 5 mL, Intravenous, PRN    Objective:     Vital Signs (Most Recent):  Temp: 97.9 °F (36.6 °C) (07/03/25 0748)  Pulse: 79 (07/03/25 1002)  Resp: 18 (07/03/25 1002)  BP: 128/70 (07/03/25 0748)  SpO2: 99 % (07/03/25 1002)  BP Location: Left arm    Vital Signs Range (Last 24H):  Temp:  [97.5 °F (36.4 °C)-98.1 °F (36.7 °C)]   Pulse:  [62-84]   Resp:  [17-20]   BP: (115-143)/(61-70)   SpO2:  [99 %-100 %]   BP Location: Left arm       Physical Exam  Vitals and nursing note reviewed.   Pulmonary:      Effort: Pulmonary effort is normal.      Breath sounds: Normal breath sounds.      Comments: On 1L O2    Neurological:      Mental Status: She is alert.              Neurological Exam:   LOC: alert  Language: No aphasia  Articulation: No dysarthria  Orientation: Person, Place, Time   Visual Fields: Visual neglect  Motor: Arm left   Paresis: 4/5  Leg left  Paresis: 4/5  Arm right  Normal 5/5  Leg right Normal 5/5  Sensation: Jean Paul-hypoesthesia left    Laboratory:  CMP:   Recent Labs   Lab 07/03/25  0559   CALCIUM 10.1   ALBUMIN 3.7   PROT 6.6      K 4.1   CO2 26   CL 99   BUN 19   CREATININE 0.6   ALKPHOS 97   ALT 12   AST 24   BILITOT 0.6     Lipid Panel:   Recent Labs   Lab 07/02/25  0848   CHOL 140   LDLCALC 72.8   HDL 45   TRIG 111     Hgb A1C:   Recent Labs   Lab 07/02/25  0848   HGBA1C 4.3     TSH:   Recent Labs   Lab 06/28/25  0732   TSH 2.532       Diagnostic Results     Brain Imaging   MRI brain 07/02/2025  Redemonstration of moderate sized acute infarct in the right temporal lobe, with small component extending into the right parietal and frontal lobes.  There has been interval hemorrhagic transformation with subarachnoid extension.  Follow-up with noncontrast CT scan of the head may be obtained.     Abnormal signal of the SWI sequences, most likely secondary to scanning parameters.    Vessel Imaging   07/01/2025  CTA Head and Neck: Moderate size acute infarct centered in the right temporal lobe.  No acute intracranial hemorrhage.     Proximal right M2 occlusion and subtotal occlusion vs stenosis in right M3 branch.    Cardiac Imaging     Left Ventricle: The left ventricle is normal in size. Normal wall thickness. There is concentric remodeling. There is normal systolic function with a visually estimated ejection fraction of 60 - 65%. Grade III diastolic dysfunction.    Right Ventricle: The right ventricle is normal in size measuring 2.8 cm. Wall thickness is normal. Systolic function is reduced.    Left Atrium: The left atrium is severely dilated    Aortic Valve: The aortic valve is a trileaflet valve. There is mild aortic valve sclerosis.    Mitral Valve: There is mild regurgitation.    Pulmonary Artery: The estimated pulmonary artery systolic pressure is 37 mmHg.    IVC/SVC: Normal venous pressure at 3 mmHg.

## 2025-07-03 NOTE — PROGRESS NOTES
[facility] - Neurosurgery ([facility])  [facility] Medicine  Consult Note    Patient Name: Katelyn Caba  MRN: [id number]  Admission Date: 6/28/2025  Hospital Length of Stay: 5 days  Attending Physician: Yuriy Diaz MD   Primary Care Provider: Brooklyn Burnham MD           Patient information was obtained from patient, past medical records, and ER records.       Subjective:     Principal Problem: Embolic stroke involving right middle cerebral artery    Chief Complaint:   Chief Complaint   Patient presents with    Abnormal Lab     Low h/h requires blood transfusions, on home oxygen         HPI: Patient is a 84F with BCC, HTN, HLD, h/o GIB, ISAIAH requiring frequent transfusions, HFpEF, CAD, L MCA CVA admitted 6/28 for SOB/AHRF. Chronic BL effusions. Diuresing with lasix 40 IV bid (pulm evaluated during admission, no need to perform thoracentesis). Supplemental O2 requirements gradually decreased with aggressive diuresis. TTE 6/30 showed LVEF 65% with G3DD and reduced RV systolic function (which is new).   Stroke code 7/1/25 for LSW/LFD (NIHSS 10). CTH acute R MCA territory ischemia, CTA with R M2 occlusion. Unclear LKW, therefore TNK not given. No thrombectomy due to core infarct. On assessment, patient reports improving dysarthria and weakness. Concerned about constipation and claustrophobia wrt MRI.     Transferred to stroke service on 7/1.  consulted to co manage AHRF.       Hospital Course: Admitted 6/28 for SOB and management of AHRF. BL pleural effusions and volume overloaded state d/t inadvertant discontinuation of home Lasix. Diuresed with IV lasix with improvement. However, on 7/1, patient developed acute LSW/LFD. Vascular Neurology evaluated, obtained CTH w/ acute R MCA territory ischemia, CTA with R M2 occlusion. No TNK given d/t OOW, no thrombectomy due to core infarct. MRI brain performed 7/2 showed mild subarachnoid hemorrhagic conversion. Given h/o GIB and preference to ultimately start AC, GI consulted  for EGD/C-scope, determined that endoscopic interventions not needed while IP and that VN team can trial Eliquis when stable from neurologic standpoint. Transitioned to home oral regimen Lasix 40 bid on 7/3.      Interval History: MRI brain hemorrhagic conversion. Sleepy this AM. Slight abd pain. Had BM 7/2.       Objective:     Vital Signs (Most Recent):  Temp: 97.9 °F (36.6 °C) (07/03/25 0748)  Pulse: 72 (07/03/25 1121)  Resp: 18 (07/03/25 1002)  BP: 128/70 (07/03/25 0748)  SpO2: 99 % (07/03/25 1002) Vital Signs (24h Range):  Temp:  [97.5 °F (36.4 °C)-98 °F (36.7 °C)] 97.9 °F (36.6 °C)  Pulse:  [71-84] 72  Resp:  [17-20] 18  SpO2:  [99 %-100 %] 99 %  BP: (115-143)/(61-70) 128/70     Weight: 47.5 kg (104 lb 11.5 oz)  Body mass index is 18.55 kg/m².    Intake/Output Summary (Last 24 hours) at 7/3/2025 1406  Last data filed at 7/3/2025 0005  Gross per 24 hour   Intake 200 ml   Output 0 ml   Net 200 ml         Physical Exam  Vitals and nursing note reviewed.   Constitutional:       General: She is not in acute distress.  HENT:      Head: Normocephalic.      Nose: Nose normal.      Mouth/Throat:      Mouth: Mucous membranes are moist.   Eyes:      General: Visual field deficit (L neglect) present.      Conjunctiva/sclera: Conjunctivae normal.      Comments: R gaze preference   Cardiovascular:      Rate and Rhythm: Normal rate.   Pulmonary:      Effort: Pulmonary effort is normal. No respiratory distress.      Breath sounds: Normal breath sounds. No wheezing or rales.   Abdominal:      General: There is no distension.   Musculoskeletal:         General: No deformity or signs of injury.      Cervical back: No rigidity.   Skin:     General: Skin is warm and dry.   Neurological:      Mental Status: She is alert.      Cranial Nerves: Dysarthria and facial asymmetry present.      Sensory: Sensory deficit present.      Motor: Weakness (LSW) present.   Psychiatric:         Attention and Perception: She is inattentive.          Behavior: Behavior is cooperative.               Significant Labs: All pertinent labs within the past 24 hours have been reviewed.  CBC:   Recent Labs   Lab 07/02/25  0848 07/03/25  0559   WBC 7.98 8.09   HGB 8.6* 9.1*   HCT 27.4* 28.7*   * 437     CMP:   Recent Labs   Lab 07/01/25  1530 07/02/25  0848 07/03/25  0559    141 138   K 3.7 4.0 4.1   CL 98 100 99   CO2 30* 30* 26   GLU 96 93 86   BUN 22 20 19   CREATININE 0.6 0.6 0.6   CALCIUM 9.5 10.1 10.1   PROT  --  6.8 6.6   ALBUMIN 3.7 3.9 3.7   BILITOT  --  0.5 0.6   ALKPHOS  --  101 97   AST  --  20 24   ALT  --  16 12   ANIONGAP 11 11 13       Significant Imaging: I have reviewed all pertinent imaging results/findings within the past 24 hours.  Assessment/Plan:     #AHRF  #Bilateral pleural effusions, chronic  #HFpEF  AHRF likely from CHF exacerbation caused by suboptimal diruesis given new RV dysunfction (patients notably prone to volume overload given poor compensatory mechansisms by RV). Pulm evaluated on 6/29 for thoracentesis, determined that continued diuresis would be sufficient given chronicity. Diuresed well with Lasix 40mg IV bid. Now with  mild contraction alkalosis. Improving O2 requirement. ADHF d/t inadvertently not taking home Lasix (accidentally held instead of Plavix).     Home regimen: Lasix 20 bid, Lipitor 80, Zetia 10, Plavix 75, Jardiance 10    - Lasix 40mg Po bid  - Can consider adding Aldactone 25mg - some mild benefit in HFpEF and can facilitate K retention given need for aggressive diuresis  - Wean O2  - I/O    #R MCA CVA  #previous L MCA CVA  Now with LSW and dysarthria. Improving. Slight hemorrhagic conversion on MRI brain.     - AP agents per stroke service  - Timing of AC start per primary, no need for scope per GI    #ISAIAH  Monitor CBC daily  Completed Feraheme 6/30       VTE Risk Mitigation (From admission, onward)           Ordered     enoxaparin injection 30 mg  Daily         06/29/25 1103     IP VTE HIGH RISK PATIENT   Once         06/28/25 8176                        Thank you for your consult. I will follow-up with patient. Please contact us if you have any additional questions.    Raya Bergeron MD  Department of Hospital Medicine   [facility] - Neurosurgery ([facility])

## 2025-07-03 NOTE — SUBJECTIVE & OBJECTIVE
Interval History: Patient reports feeling well today and appetite improving. Good engagement with care team. Reduced subjective dyspnea and only used nasal prong O2 once last night. No other issues reported.      Objective:     Vital Signs (Most Recent):  Temp: 97.6 °F (36.4 °C) (07/05/25 0310)  Pulse: 73 (07/05/25 0810)  Resp: 20 (07/05/25 0310)  BP: (!) 114/55 (07/05/25 0810)  SpO2: 98 % (07/05/25 0810) Vital Signs (24h Range):  Temp:  [97.5 °F (36.4 °C)-98 °F (36.7 °C)] 97.9 °F (36.6 °C)  Pulse:  [71-84] 72  Resp:  [17-20] 18  SpO2:  [99 %-100 %] 99 %  BP: (115-143)/(61-70) 128/70     Weight: 47.5 kg (104 lb 11.5 oz)  Body mass index is 18.55 kg/m².    Intake/Output Summary (Last 24 hours) at 7/5/2025 1127  Last data filed at 7/5/2025 0529  Gross per 24 hour   Intake 100 ml   Output 580 ml   Net -480 ml         Physical Exam  Vitals and nursing note reviewed.   Constitutional:       General: She is not in acute distress.  HENT:      Head: Normocephalic.      Nose: Nose normal.      Mouth/Throat:      Mouth: Mucous membranes are moist.   Eyes:      General: Visual field deficit (L neglect) present.      Conjunctiva/sclera: Conjunctivae normal.      Comments: R gaze preference   Cardiovascular:      Rate and Rhythm: Normal rate.   Pulmonary:      Effort: Pulmonary effort is normal. No respiratory distress.      Breath sounds: Examination of the right-lower field reveals decreased breath sounds. Examination of the left-lower field reveals decreased breath sounds. Decreased breath sounds present. No wheezing or rales.   Abdominal:      General: There is no distension.   Musculoskeletal:         General: No deformity or signs of injury.      Cervical back: No rigidity.   Skin:     General: Skin is warm and dry.   Neurological:      Mental Status: She is alert and oriented to person, place, and time.      Cranial Nerves: Facial asymmetry present. No dysarthria.      Sensory: Sensory deficit present.      Motor:  Weakness (LSW) present.   Psychiatric:         Attention and Perception: She is inattentive.         Behavior: Behavior is cooperative.            Significant Labs: All pertinent labs within the past 24 hours have been reviewed.  CBC:   Recent Labs   Lab 07/04/25 0456 07/05/25  0713   WBC 6.19 5.65   HGB 8.8* 9.9*   HCT 27.5* 31.8*   * 527*     CMP:   Recent Labs   Lab 07/04/25 0456 07/05/25 0713    141   K 3.6 3.7   CL 97 96   CO2 29 31*   GLU 92 92   BUN 21 22   CREATININE 0.7 0.7   CALCIUM 9.7 9.7   PROT 6.5 6.7   ALBUMIN 3.7 3.8   BILITOT 0.6 0.6   ALKPHOS 95 99   AST 24 30   ALT 11 19   ANIONGAP 12 14       Significant Imaging: I have reviewed all pertinent imaging results/findings within the past 24 hours.

## 2025-07-03 NOTE — ASSESSMENT & PLAN NOTE
Anemia is likely due to acute blood loss which was from GI bleeds. Most recent hemoglobin and hematocrit are listed below.  Recent Labs     07/01/25  0316 07/02/25  0848 07/03/25  0559   HGB 7.6* 8.6* 9.1*   HCT 24.1* 27.4* 28.7*       Plan  - Monitor serial CBC: Daily  - Transfuse PRBC if patient becomes hemodynamically unstable, symptomatic or H/H drops below 7/21.  - Patient has not received any PRBC transfusions to date  - Patient's anemia is currently stable  - Continue PPI  - Given Ferraheme 1020mg IV x1 per hematology on 6/30  - HM consulted for co-management, appreciate assistance

## 2025-07-03 NOTE — ASSESSMENT & PLAN NOTE
"Katelyn Caba is a 84 y.o. female with PMH of HTN, HLD, aortic atherosclerosis, hx of GI bleeds, ISAIAH requiring frequent transfusions, HFpEF, CAD, prior R MCA stroke (2/2025), BCC, former smoker (quit 50 years ago) that presented to [facility] ED 6/28/25 with SOB and found to have bilateral pleural effusions on CXR for which she was admitted to  for further eval and management of hypoxia. See  notes for details of hospital course.     Stroke code activated 7/1/25 for new severe L sided weakness and LFD. LKW unclear, sometime evening of 6/30. Family at bedside reports around 5:30am 7/1/25 noticing patient having LSW and needing assistance to ambulate to the bathroom. Sometime later in the morning of 7/1/25 patient's nurse noticed patient was unable to move L side, prompting stroke code activation. Neuro exam significant for R gaze preference, L hemiparesis (arm>>leg), L facial droop, expressive aphasia, dysarthria, and inattention to L side. NIHSS 10. CTH showed acute ischemic changes in R MCA territory, CTA revealed R M2 occlusion. Determined not a candidate for acute stroke interventions, OOW for TNK and no thrombectomy due to core infarct on CTH.  Patient will be transferred to  primary service for ongoing stroke workup, will consult  for co-management of active medical comorbidities.    Left MCA stroke in 2022, initially had language issues that resolved but had right inferior quadrantanopsia. Discharged on DAPT, then aspirin monotherapy.     Right MCA stroke in 2/2025: left sided weakness, left facial droop, and dysarthria. CTA with right M1 occlusion. s/p TNK and DSA w/o thrombectomy as lesion migrated to distal MCA branches after TNK administered. Echo unremarkable.      07/03/2025, Brain MRI shows "Redemonstration of moderate sized acute infarct in the right temporal lobe, with small component extending into the right parietal and frontal lobes.  There has been interval hemorrhagic transformation with " "subarachnoid extension. " With 3 episodes of cryptogenic strokes, full dose anticoagulation may be indicated but with AVMs held off. However, with hemorrhagic conversion, will hold off anticoagulation and continue with plavix.       Antithrombotics for secondary stroke prevention: Antiplatelets: Clopidogrel: 75 mg daily    Statins for secondary stroke prevention and hyperlipidemia, if present: Statins: Atorvastatin- 80 mg daily  Zetia 10mg daily    Aggressive risk factor modification: HTN, HLD, Diet, Exercise, CAD     Rehab efforts: The patient has been evaluated by a stroke team provider and the therapy needs have been fully considered based off the presenting complaints and exam findings. The following therapy evaluations are needed: PT evaluate and treat, OT evaluate and treat, SLP evaluate and treat    Diagnostics ordered/pending:     VTE prophylaxis: Enoxaparin (CrCl < 30 ml/min) 30 mg SQ every 24 hours  Mechanical prophylaxis: Place SCDs    BP parameters: Infarct: No intervention, SBP <220  ICH: SBP Goal Range 130-150      "

## 2025-07-03 NOTE — PLAN OF CARE
POC updated and reviewed with the patient and spouse at the bedside. Questions regarding POC were encouraged and addressed. VSS, see flowsheets. Tele maintained per provider's order. Patient is AOX 4 at this time. Fall and safety precautions maintained, no signs of injury noted during shift. Patient repositioned independently in bed for comfort. Upon exiting room, patient's bed locked in low position, side rails up x 3, bed alarm set, with call light within reach. Instructed patient to call staff for mobility, verbalized understanding. Stroke book and stroke education reviewed with the patient and spouse at the bedside, see education flowsheets for details. No acute signs of distress noted at this time.     Noted no urine output overnight. Random bladder scan done twice showing 0 mL and 14 mLs random residual urine. Offered and encouraged fluid intake during daytime. Also observed patient's sleepiness for the whole shift, though able to wake up upon calling patient's name. Mental status observed. Still AO x4. No other new neuro deficits noted.    Problem: Adult Inpatient Plan of Care  Goal: Plan of Care Review  Outcome: Progressing  Goal: Patient-Specific Goal (Individualized)  Outcome: Progressing  Goal: Absence of Hospital-Acquired Illness or Injury  Outcome: Progressing  Goal: Optimal Comfort and Wellbeing  Outcome: Progressing  Goal: Readiness for Transition of Care  Outcome: Progressing     Problem: Infection  Goal: Absence of Infection Signs and Symptoms  Outcome: Progressing     Problem: Fall Injury Risk  Goal: Absence of Fall and Fall-Related Injury  Outcome: Progressing     Problem: Skin Injury Risk Increased  Goal: Skin Health and Integrity  Outcome: Progressing

## 2025-07-03 NOTE — PLAN OF CARE
[facility] (Hospital)  Discharge Reassessment    Primary Care Provider: Brooklyn Burnham MD    Expected Discharge Date: 7/8/2025    Reassessment (most recent)       Discharge Reassessment - 07/03/25 1633          Discharge Reassessment    Assessment Type Discharge Planning Reassessment     Did the patient's condition or plan change since previous assessment? Yes     Discharge Plan discussed with: Patient;Adult children     Communicated TERE with patient/caregiver Yes     Discharge Plan A Rehab     Discharge Plan B Home Health     DME Needed Upon Discharge  other (see comments)   TBD    Transition of Care Barriers None     Why the patient remains in the hospital Requires continued medical care        Post-Acute Status    Post-Acute Authorization Placement     Post-Acute Placement Status Patient List Provided                     Met with patient and her family to review the discharge recommendation of Rehab and they are agreeable to the plan    Patient/family provided list of facilities in-network with patient's payor plan. Providers that are owned, operated, or affiliated with [facility] are included on the list. Patient's son stated that the family will review the list and notify this CM of their choice.      Briseyda Yoder RN  Ext [phone]

## 2025-07-03 NOTE — PROGRESS NOTES
"[facility] ([facility])  Vascular Neurology  Comprehensive Stroke Center  Progress Note    Assessment/Plan:     * Embolic stroke involving right middle cerebral artery  Katelyn Caba is a 84 y.o. female with PMH of HTN, HLD, aortic atherosclerosis, hx of GI bleeds, ISAIAH requiring frequent transfusions, HFpEF, CAD, prior R MCA stroke (2/2025), BCC, former smoker (quit 50 years ago) that presented to [facility] ED 6/28/25 with SOB and found to have bilateral pleural effusions on CXR for which she was admitted to  for further eval and management of hypoxia. See  notes for details of hospital course.     Stroke code activated 7/1/25 for new severe L sided weakness and LFD. LKW unclear, sometime evening of 6/30. Family at bedside reports around 5:30am 7/1/25 noticing patient having LSW and needing assistance to ambulate to the bathroom. Sometime later in the morning of 7/1/25 patient's nurse noticed patient was unable to move L side, prompting stroke code activation. Neuro exam significant for R gaze preference, L hemiparesis (arm>>leg), L facial droop, expressive aphasia, dysarthria, and inattention to L side. NIHSS 10. CTH showed acute ischemic changes in R MCA territory, CTA revealed R M2 occlusion. Determined not a candidate for acute stroke interventions, OOW for TNK and no thrombectomy due to core infarct on CTH.  Patient will be transferred to  primary service for ongoing stroke workup, will consult  for co-management of active medical comorbidities.    Left MCA stroke in 2022, initially had language issues that resolved but had right inferior quadrantanopsia. Discharged on DAPT, then aspirin monotherapy.     Right MCA stroke in 2/2025: left sided weakness, left facial droop, and dysarthria. CTA with right M1 occlusion. s/p TNK and DSA w/o thrombectomy as lesion migrated to distal MCA branches after TNK administered. Echo unremarkable.      07/03/2025, Brain MRI shows "Redemonstration of moderate " "sized acute infarct in the right temporal lobe, with small component extending into the right parietal and frontal lobes.  There has been interval hemorrhagic transformation with subarachnoid extension. " With 3 episodes of cryptogenic strokes, full dose anticoagulation may be indicated but with AVMs held off. However, with hemorrhagic conversion, will hold off anticoagulation and continue with plavix.       Antithrombotics for secondary stroke prevention: Antiplatelets: Clopidogrel: 75 mg daily    Statins for secondary stroke prevention and hyperlipidemia, if present: Statins: Atorvastatin- 80 mg daily  Zetia 10mg daily    Aggressive risk factor modification: HTN, HLD, Diet, Exercise, CAD     Rehab efforts: The patient has been evaluated by a stroke team provider and the therapy needs have been fully considered based off the presenting complaints and exam findings. The following therapy evaluations are needed: PT evaluate and treat, OT evaluate and treat, SLP evaluate and treat    Diagnostics ordered/pending:     VTE prophylaxis: Enoxaparin (CrCl < 30 ml/min) 30 mg SQ every 24 hours  Mechanical prophylaxis: Place SCDs    BP parameters: Infarct: No intervention, SBP <220  ICH: SBP Goal Range 130-150        Bilateral pleural effusion  -Admitted to  primary service on 6/28 for ongoing workup and management  -IV diuresis started with lasix 40mg BID  -Pulm consulted and rec'd continue aggressive diuresis, no indication for thoracentesis at this time; per pulm note: no signs or symptoms of infection at this time, pleural effusions have been present for months and fluid on bedside ultrasound appears simple; with elevation in BNP pleural effusions likely in the setting of heart failure and volume overload, based on the TAP-IT trial, thoracentesis in the setting of effusions secondary to heart failure would not shorten the length of hospital stay when compared to diuresis alone      consulted for co-management now that " patient was transferred to  primary service    CAD (coronary artery disease)  -Stroke risk factor  -Continue plavix, statin, zetia    (HFpEF) heart failure with preserved ejection fraction  -Stroke risk factor  -TTE with EF 55-60%, no WMA  -Strict I/Os and dialy weights  -Fluid restriction of 1.5L   -Continue lasix  - consulted for co-management, appreciate recs    Hypertension  -Stroke risk factor  -SBP goal <140, maintain MAP >65  -BP range in the last 24 hrs: BP  Min: 115/61  Max: 143/61  -Current antihypertensive regimen:   --PRN labetalol/hydralazine    Acute hypoxic respiratory failure  Likely due to volume overload. SOB improved after diuresis.     Plan  - Continue diuresis till euvolemic     Gastric AVM  Pt with history of gastric AVM on EGD 3/5/2025 which were treated. Spoke with GI of possible anticoagulation in setting of AVM.     Plan  Per GI: Endoscopic intervention may not catch all AVMS/ Angiectasias and given stable hemoglobin and vitals, okay to proceed with trial of anticoagulation and monitor.     Iron deficiency anemia due to chronic blood loss  Anemia is likely due to acute blood loss which was from GI bleeds. Most recent hemoglobin and hematocrit are listed below.  Recent Labs     07/01/25  0316 07/02/25  0848 07/03/25  0559   HGB 7.6* 8.6* 9.1*   HCT 24.1* 27.4* 28.7*       Plan  - Monitor serial CBC: Daily  - Transfuse PRBC if patient becomes hemodynamically unstable, symptomatic or H/H drops below 7/21.  - Patient has not received any PRBC transfusions to date  - Patient's anemia is currently stable  - Continue PPI  - Given Ferraheme 1020mg IV x1 per hematology on 6/30  -  consulted for co-management, appreciate assistance    Mixed hyperlipidemia  -Stroke risk factor  -Lipid panel pending, goal <70  -Atorvastatin 80mg and zetia 10mg daily         84 y.o. female with PMH of HTN, HLD, inferolateral STEMI (4/2024, s/p cath with no intervention, hx of GI bleeds with AVM of stomach, ISAIAH  requiring frequent transfusions, HFpEF, CAD, prior R MCA stroke (2/2025), BCC, former smoker (quit 50 years ago) that presented to [facility] ED 6/28/25 with SOB and found to have bilateral pleural effusions on CXR for which she was admitted to  for further eval and management of hypoxia.     On  7/1, pt with new severe left sided weakness and left facial droop. Code stroke activated. CTH showed acute ischemic changes in R MCA territory, CTA revealed R M2 occlusion. On plavix prior from previous stroke in 2/2025. With her multiple episodes of strokes, despite being on anti-platelets, might need to switch to DOAC. However, pt has history of gastric avms and GI bleed. HgB stable on Plavix. Consulted GI on whether its okay to anticoagulate or not.     07/02/2025 Brain MRI: Re demonstration of moderate sized acute infarct in the right temporal lobe, with small component extending into the right parietal and frontal lobes.  There has been interval hemorrhagic transformation with subarachnoid extension.         STROKE DOCUMENTATION   Acute Stroke Times   Last Known Normal Date: 06/30/25  Unknown Normal Time: Unknown Time  Symptom Onset Time:  (found with symptoms around 530am 7/1/25)  Stroke Team Called Date: 07/01/25  Stroke Team Called Time: 1108  Stroke Team Arrival Date: 07/01/25  Stroke Team Arrival Time: 1111  CT Interpretation Time: 1133  Thrombolytic Therapy Recommended: No  CTA Interpretation Time: 1133  Thrombectomy Recommended: No (large core infarct on CTH)    NIH Scale:  1a. Level of Consciousness: 0-->Alert, keenly responsive  1b. LOC Questions: 0-->Answers both questions correctly  1c. LOC Commands: 0-->Performs both tasks correctly  2. Best Gaze: 1-->Partial gaze palsy, gaze is abnormal in one or both eyes, but forced deviation or total gaze paresis is not present  3. Visual: 1-->Partial hemianopia  4. Facial Palsy: 1-->Minor paralysis (flattened nasolabial fold, asymmetry on smiling)  5a. Motor Arm, Left:  1-->Drift, limb holds 90 (or 45) degrees, but drifts down before full 10 seconds, does not hit bed or other support  5b. Motor Arm, Right: 0-->No drift, limb holds 90 (or 45) degrees for full 10 secs  6a. Motor Leg, Left: 1-->Drift, leg falls by the end of the 5-sec period but does not hit bed  6b. Motor Leg, Right: 0-->No drift, leg holds 30 degree position for full 5 secs  7. Limb Ataxia: 0-->Absent  8. Sensory: 2-->Severe to total sensory loss, patient is not aware of being touched in the face, arm, and leg  9. Best Language: 0-->No aphasia, normal  10. Dysarthria: 0-->Normal  11. Extinction and Inattention (formerly Neglect): 0-->No abnormality  Total (NIH Stroke Scale): 7       Modified [place] Score: 0  Lyons Coma Scale:    ABCD2 Score:    [id number] Score:   HAS -BLED Score:   ICH Score:   Hunt & Hoffman Classification:      Hemorrhagic change of an Ischemic Stroke: Does this patient have an ischemic stroke with hemorrhagic changes? Yes on MRI. Cannot grade. No change in symptoms.   Neurologic Chief Complaint: R MCA stroke    Subjective:     Interval History: Patient is seen for follow-up neurological assessment and treatment recommendations:     HPI, Past Medical, Family, and Social History remains the same as documented in the initial encounter.     Review of Systems  Scheduled Meds:   atorvastatin  80 mg Oral Daily    clopidogreL  75 mg Oral Daily    cyanocobalamin  1,000 mcg Oral Daily    enoxparin  30 mg Subcutaneous Daily    ezetimibe  10 mg Oral Daily    furosemide  40 mg Oral BID    mupirocin   Nasal BID    pantoprazole  40 mg Oral Daily    polyethylene glycol  17 g Oral BID    vitamin D  1,000 Units Oral Daily     Continuous Infusions:  PRN Meds:  Current Facility-Administered Medications:     acetaminophen, 650 mg, Oral, Q6H PRN    albuterol-ipratropium, 3 mL, Nebulization, Q4H PRN    aluminum-magnesium hydroxide-simethicone, 30 mL, Oral, QID PRN    melatonin, 6 mg, Oral, Nightly PRN    naloxone,  0.02 mg, Intravenous, PRN    ondansetron, 8 mg, Oral, Q8H PRN    prochlorperazine, 5 mg, Intravenous, Q6H PRN    simethicone, 1 tablet, Oral, QID PRN    sodium chloride 0.9%, 5 mL, Intravenous, PRN    Objective:     Vital Signs (Most Recent):  Temp: 97.9 °F (36.6 °C) (07/03/25 0748)  Pulse: 79 (07/03/25 1002)  Resp: 18 (07/03/25 1002)  BP: 128/70 (07/03/25 0748)  SpO2: 99 % (07/03/25 1002)  BP Location: Left arm    Vital Signs Range (Last 24H):  Temp:  [97.5 °F (36.4 °C)-98.1 °F (36.7 °C)]   Pulse:  [62-84]   Resp:  [17-20]   BP: (115-143)/(61-70)   SpO2:  [99 %-100 %]   BP Location: Left arm       Physical Exam  Vitals and nursing note reviewed.   Pulmonary:      Effort: Pulmonary effort is normal.      Breath sounds: Normal breath sounds.      Comments: On 1L O2    Neurological:      Mental Status: She is alert.              Neurological Exam:   LOC: alert  Language: No aphasia  Articulation: No dysarthria  Orientation: Person, Place, Time   Visual Fields: Visual neglect  Motor: Arm left  Paresis: 4/5  Leg left  Paresis: 4/5  Arm right  Normal 5/5  Leg right Normal 5/5  Sensation: Jean Paul-hypoesthesia left    Laboratory:  CMP:   Recent Labs   Lab 07/03/25  0559   CALCIUM 10.1   ALBUMIN 3.7   PROT 6.6      K 4.1   CO2 26   CL 99   BUN 19   CREATININE 0.6   ALKPHOS 97   ALT 12   AST 24   BILITOT 0.6     Lipid Panel:   Recent Labs   Lab 07/02/25  0848   CHOL 140   LDLCALC 72.8   HDL 45   TRIG 111     Hgb A1C:   Recent Labs   Lab 07/02/25  0848   HGBA1C 4.3     TSH:   Recent Labs   Lab 06/28/25  0732   TSH 2.532       Diagnostic Results     Brain Imaging   MRI brain 07/02/2025  Redemonstration of moderate sized acute infarct in the right temporal lobe, with small component extending into the right parietal and frontal lobes.  There has been interval hemorrhagic transformation with subarachnoid extension.  Follow-up with noncontrast CT scan of the head may be obtained.     Abnormal signal of the SWI sequences, most  likely secondary to scanning parameters.    Vessel Imaging   07/01/2025  CTA Head and Neck: Moderate size acute infarct centered in the right temporal lobe.  No acute intracranial hemorrhage.     Proximal right M2 occlusion and subtotal occlusion vs stenosis in right M3 branch.    Cardiac Imaging     Left Ventricle: The left ventricle is normal in size. Normal wall thickness. There is concentric remodeling. There is normal systolic function with a visually estimated ejection fraction of 60 - 65%. Grade III diastolic dysfunction.    Right Ventricle: The right ventricle is normal in size measuring 2.8 cm. Wall thickness is normal. Systolic function is reduced.    Left Atrium: The left atrium is severely dilated    Aortic Valve: The aortic valve is a trileaflet valve. There is mild aortic valve sclerosis.    Mitral Valve: There is mild regurgitation.    Pulmonary Artery: The estimated pulmonary artery systolic pressure is 37 mmHg.    IVC/SVC: Normal venous pressure at 3 mmHg.    Juan M Henderson MD  Comprehensive Stroke Center  Department of Vascular Neurology   [facility])

## 2025-07-03 NOTE — ASSESSMENT & PLAN NOTE
-Stroke risk factor  -SBP goal <140, maintain MAP >65  -BP range in the last 24 hrs: BP  Min: 115/61  Max: 143/61  -Current antihypertensive regimen:   --PRN labetalol/hydralazine

## 2025-07-04 LAB
ABSOLUTE EOSINOPHIL (OHS): 0.15 K/UL
ABSOLUTE MONOCYTE (OHS): 0.78 K/UL (ref 0.3–1)
ABSOLUTE NEUTROPHIL COUNT (OHS): 4 K/UL (ref 1.8–7.7)
ALBUMIN SERPL BCP-MCNC: 3.7 G/DL (ref 3.5–5.2)
ALP SERPL-CCNC: 95 UNIT/L (ref 40–150)
ALT SERPL W/O P-5'-P-CCNC: 11 UNIT/L (ref 10–44)
ANION GAP (OHS): 12 MMOL/L (ref 8–16)
AST SERPL-CCNC: 24 UNIT/L (ref 11–45)
BASOPHILS # BLD AUTO: 0.09 K/UL
BASOPHILS NFR BLD AUTO: 1.5 %
BILIRUB SERPL-MCNC: 0.6 MG/DL (ref 0.1–1)
BUN SERPL-MCNC: 21 MG/DL (ref 8–23)
CALCIUM SERPL-MCNC: 9.7 MG/DL (ref 8.7–10.5)
CHLORIDE SERPL-SCNC: 97 MMOL/L (ref 95–110)
CO2 SERPL-SCNC: 29 MMOL/L (ref 23–29)
CREAT SERPL-MCNC: 0.7 MG/DL (ref 0.5–1.4)
ERYTHROCYTE [DISTWIDTH] IN BLOOD BY AUTOMATED COUNT: 15.6 % (ref 11.5–14.5)
GFR SERPLBLD CREATININE-BSD FMLA CKD-EPI: >60 ML/MIN/1.73/M2
GLUCOSE SERPL-MCNC: 92 MG/DL (ref 70–110)
HCT VFR BLD AUTO: 27.5 % (ref 37–48.5)
HGB BLD-MCNC: 8.8 GM/DL (ref 12–16)
IMM GRANULOCYTES # BLD AUTO: 0.02 K/UL (ref 0–0.04)
IMM GRANULOCYTES NFR BLD AUTO: 0.3 % (ref 0–0.5)
LYMPHOCYTES # BLD AUTO: 1.15 K/UL (ref 1–4.8)
MAGNESIUM SERPL-MCNC: 2.2 MG/DL (ref 1.6–2.6)
MCH RBC QN AUTO: 27.1 PG (ref 27–31)
MCHC RBC AUTO-ENTMCNC: 32 G/DL (ref 32–36)
MCV RBC AUTO: 85 FL (ref 82–98)
NUCLEATED RBC (/100WBC) (OHS): 0 /100 WBC
PHOSPHATE SERPL-MCNC: 4.2 MG/DL (ref 2.7–4.5)
PLATELET # BLD AUTO: 470 K/UL (ref 150–450)
PMV BLD AUTO: 9.8 FL (ref 9.2–12.9)
POTASSIUM SERPL-SCNC: 3.6 MMOL/L (ref 3.5–5.1)
PROT SERPL-MCNC: 6.5 GM/DL (ref 6–8.4)
RBC # BLD AUTO: 3.25 M/UL (ref 4–5.4)
RELATIVE EOSINOPHIL (OHS): 2.4 %
RELATIVE LYMPHOCYTE (OHS): 18.6 % (ref 18–48)
RELATIVE MONOCYTE (OHS): 12.6 % (ref 4–15)
RELATIVE NEUTROPHIL (OHS): 64.6 % (ref 38–73)
SODIUM SERPL-SCNC: 138 MMOL/L (ref 136–145)
WBC # BLD AUTO: 6.19 K/UL (ref 3.9–12.7)

## 2025-07-04 PROCEDURE — 97535 SELF CARE MNGMENT TRAINING: CPT

## 2025-07-04 PROCEDURE — 97112 NEUROMUSCULAR REEDUCATION: CPT

## 2025-07-04 PROCEDURE — 11000001 HC ACUTE MED/SURG PRIVATE ROOM

## 2025-07-04 PROCEDURE — 36415 COLL VENOUS BLD VENIPUNCTURE: CPT

## 2025-07-04 PROCEDURE — 85025 COMPLETE CBC W/AUTO DIFF WBC: CPT

## 2025-07-04 PROCEDURE — 84100 ASSAY OF PHOSPHORUS: CPT

## 2025-07-04 PROCEDURE — 25000003 PHARM REV CODE 250

## 2025-07-04 PROCEDURE — 99233 SBSQ HOSP IP/OBS HIGH 50: CPT | Mod: GC,,, | Performed by: PSYCHIATRY & NEUROLOGY

## 2025-07-04 PROCEDURE — 63600175 PHARM REV CODE 636 W HCPCS: Performed by: HOSPITALIST

## 2025-07-04 PROCEDURE — 83735 ASSAY OF MAGNESIUM: CPT

## 2025-07-04 PROCEDURE — 82040 ASSAY OF SERUM ALBUMIN: CPT

## 2025-07-04 PROCEDURE — 94761 N-INVAS EAR/PLS OXIMETRY MLT: CPT

## 2025-07-04 RX ORDER — SPIRONOLACTONE 25 MG/1
25 TABLET ORAL DAILY
Status: DISCONTINUED | OUTPATIENT
Start: 2025-07-04 | End: 2025-07-06 | Stop reason: HOSPADM

## 2025-07-04 RX ADMIN — Medication 1000 UNITS: at 08:07

## 2025-07-04 RX ADMIN — CLOPIDOGREL 75 MG: 75 TABLET ORAL at 08:07

## 2025-07-04 RX ADMIN — POTASSIUM BICARBONATE 40 MEQ: 391 TABLET, EFFERVESCENT ORAL at 08:07

## 2025-07-04 RX ADMIN — EZETIMIBE 10 MG: 10 TABLET ORAL at 08:07

## 2025-07-04 RX ADMIN — FUROSEMIDE 40 MG: 40 TABLET ORAL at 06:07

## 2025-07-04 RX ADMIN — PANTOPRAZOLE SODIUM 40 MG: 20 TABLET, DELAYED RELEASE ORAL at 09:07

## 2025-07-04 RX ADMIN — ENOXAPARIN SODIUM 30 MG: 30 INJECTION SUBCUTANEOUS at 05:07

## 2025-07-04 RX ADMIN — ATORVASTATIN CALCIUM 80 MG: 40 TABLET, FILM COATED ORAL at 08:07

## 2025-07-04 RX ADMIN — MUPIROCIN: 20 OINTMENT TOPICAL at 09:07

## 2025-07-04 RX ADMIN — CYANOCOBALAMIN TAB 1000 MCG 1000 MCG: 1000 TAB at 08:07

## 2025-07-04 RX ADMIN — SPIRONOLACTONE 25 MG: 25 TABLET, FILM COATED ORAL at 08:07

## 2025-07-04 RX ADMIN — FUROSEMIDE 40 MG: 40 TABLET ORAL at 08:07

## 2025-07-04 NOTE — PROGRESS NOTES
[facility] ([facility])  [facility] Medicine  Consult Note    Patient Name: Katelyn Caba  MRN: [id number]  Admission Date: 6/28/2025  Hospital Length of Stay: 6 days  Attending Physician: Tj Grace MD   Primary Care Provider: Brooklyn Burnham MD           Patient information was obtained from patient, past medical records, and ER records.       Subjective:     Principal Problem: Embolic stroke involving right middle cerebral artery    Chief Complaint:   Chief Complaint   Patient presents with    Abnormal Lab     Low h/h requires blood transfusions, on home oxygen         HPI: Patient is a 84F with BCC, HTN, HLD, h/o GIB, ISAIAH requiring frequent transfusions, HFpEF, CAD, L MCA CVA admitted 6/28 for SOB/AHRF. Chronic BL effusions. Diuresing with lasix 40 IV bid (pulm evaluated during admission, no need to perform thoracentesis). Supplemental O2 requirements gradually decreased with aggressive diuresis. TTE 6/30 showed LVEF 65% with G3DD and reduced RV systolic function (which is new).   Stroke code 7/1/25 for LSW/LFD (NIHSS 10). CTH acute R MCA territory ischemia, CTA with R M2 occlusion. Unclear LKW, therefore TNK not given. No thrombectomy due to core infarct. On assessment, patient reports improving dysarthria and weakness. Concerned about constipation and claustrophobia wrt MRI.     Transferred to stroke service on 7/1.  consulted to co manage AHRF.       Hospital Course: Admitted 6/28 for SOB and management of AHRF. BL pleural effusions and volume overloaded state d/t inadvertant discontinuation of home Lasix. Diuresed with IV lasix with improvement. However, on 7/1, patient developed acute LSW/LFD. Vascular Neurology evaluated, obtained CTH w/ acute R MCA territory ischemia, CTA with R M2 occlusion. No TNK given d/t OOW, no thrombectomy due to core infarct. MRI brain performed 7/2 showed mild subarachnoid hemorrhagic conversion. Given h/o GIB and preference to ultimately start AC, GI  consulted for EGD/C-scope, determined that endoscopic interventions not needed while IP and that VN team can trial Eliquis when stable from neurologic standpoint. Transitioned to home oral regimen Lasix 40 bid on 7/3.      Interval History: DELMY VSS      Objective:     Vital Signs (Most Recent):  Temp: 97.9 °F (36.6 °C) (07/03/25 0748)  Pulse: 72 (07/03/25 1121)  Resp: 18 (07/03/25 1002)  BP: 128/70 (07/03/25 0748)  SpO2: 99 % (07/03/25 1002) Vital Signs (24h Range):  Temp:  [97.5 °F (36.4 °C)-98 °F (36.7 °C)] 97.9 °F (36.6 °C)  Pulse:  [71-84] 72  Resp:  [17-20] 18  SpO2:  [99 %-100 %] 99 %  BP: (115-143)/(61-70) 128/70     Weight: 47.5 kg (104 lb 11.5 oz)  Body mass index is 18.55 kg/m².    Intake/Output Summary (Last 24 hours) at 7/3/2025 1406  Last data filed at 7/3/2025 0005  Gross per 24 hour   Intake 200 ml   Output 0 ml   Net 200 ml         Physical Exam  Vitals and nursing note reviewed.   Constitutional:       General: She is not in acute distress.  HENT:      Head: Normocephalic.      Nose: Nose normal.      Mouth/Throat:      Mouth: Mucous membranes are moist.   Eyes:      General: Visual field deficit (L neglect) present.      Conjunctiva/sclera: Conjunctivae normal.      Comments: R gaze preference   Cardiovascular:      Rate and Rhythm: Normal rate.   Pulmonary:      Effort: Pulmonary effort is normal. No respiratory distress.      Breath sounds: Normal breath sounds. No wheezing or rales.   Abdominal:      General: There is no distension.   Musculoskeletal:         General: No deformity or signs of injury.      Cervical back: No rigidity.   Skin:     General: Skin is warm and dry.   Neurological:      Mental Status: She is alert.      Cranial Nerves: Dysarthria and facial asymmetry present.      Sensory: Sensory deficit present.      Motor: Weakness (LSW) present.   Psychiatric:         Attention and Perception: She is inattentive.         Behavior: Behavior is cooperative.               Significant  Labs: All pertinent labs within the past 24 hours have been reviewed.  CBC:   Recent Labs   Lab 07/02/25  0848 07/03/25  0559   WBC 7.98 8.09   HGB 8.6* 9.1*   HCT 27.4* 28.7*   * 437     CMP:   Recent Labs   Lab 07/01/25  1530 07/02/25  0848 07/03/25  0559    141 138   K 3.7 4.0 4.1   CL 98 100 99   CO2 30* 30* 26   GLU 96 93 86   BUN 22 20 19   CREATININE 0.6 0.6 0.6   CALCIUM 9.5 10.1 10.1   PROT  --  6.8 6.6   ALBUMIN 3.7 3.9 3.7   BILITOT  --  0.5 0.6   ALKPHOS  --  101 97   AST  --  20 24   ALT  --  16 12   ANIONGAP 11 11 13       Significant Imaging: I have reviewed all pertinent imaging results/findings within the past 24 hours.  Assessment/Plan:     #AHRF  #Bilateral pleural effusions, chronic  #HFpEF  AHRF likely from CHF exacerbation caused by suboptimal diruesis given new RV dysunfction (patients notably prone to volume overload given poor compensatory mechansisms by RV). Pulm evaluated on 6/29 for thoracentesis, determined that continued diuresis would be sufficient given chronicity. Diuresed well with Lasix 40mg IV bid. Now with  mild contraction alkalosis. Improving O2 requirement. ADHF d/t inadvertently not taking home Lasix (accidentally held instead of Plavix).     Home regimen: Lasix 20 bid, Lipitor 80, Zetia 10, Plavix 75, Jardiance 10    - Lasix 40mg Po bid started 7/4  - Wean O2  - I/O    #R MCA CVA  #previous L MCA CVA  Now with LSW and dysarthria. Improving. Slight hemorrhagic conversion on MRI brain.     - AP agents per stroke service  - Timing of AC start per primary, no need for scope per GI    #ISAIAH  Monitor CBC daily  Completed Feraheme 6/30       VTE Risk Mitigation (From admission, onward)           Ordered     enoxaparin injection 30 mg  Daily         06/29/25 1103     IP VTE HIGH RISK PATIENT  Once         06/28/25 0936                        Thank you for your consult. I will follow-up with patient. Please contact us if you have any additional questions.    Raya  MD Elyssa  Department of Hospital Medicine   [facility] Neuro[facility] ([facility])

## 2025-07-04 NOTE — ASSESSMENT & PLAN NOTE
Anemia is likely due to acute blood loss which was from GI bleeds. Most recent hemoglobin and hematocrit are listed below.  Recent Labs     07/02/25  0848 07/03/25  0559 07/04/25  0456   HGB 8.6* 9.1* 8.8*   HCT 27.4* 28.7* 27.5*       Plan  - Monitor serial CBC: Daily  - Transfuse PRBC if patient becomes hemodynamically unstable, symptomatic or H/H drops below 7/21.  - Patient has not received any PRBC transfusions to date  - Patient's anemia is currently stable  - Continue PPI  - Given Ferraheme 1020mg IV x1 per hematology on 6/30  - HM consulted for co-management, appreciate assistance

## 2025-07-04 NOTE — PLAN OF CARE
POC updated and reviewed with the patient at the bedside. Questions regarding POC were encouraged and addressed. VSS, see flowsheets. Tele maintained per provider's order. Patient is AOX 5 at this time. Fall and safety precautions maintained, no signs of injury noted during shift. Patient repositioned independentlyin bed for comfort. Upon exiting room, patient's bed locked in low position, side rails up x 3, bed alarm set, with call light within reach. Instructed patient to call staff for mobility, verbalized understanding. Stroke book and stroke education reviewed with the patient and spouse at the bedside, see education flowsheets for details. No acute signs of distress noted at this time.     Problem: Adult Inpatient Plan of Care  Goal: Plan of Care Review  Outcome: Progressing  Goal: Patient-Specific Goal (Individualized)  Outcome: Progressing  Goal: Absence of Hospital-Acquired Illness or Injury  Outcome: Progressing  Goal: Optimal Comfort and Wellbeing  Outcome: Progressing  Goal: Readiness for Transition of Care  Outcome: Progressing     Problem: Infection  Goal: Absence of Infection Signs and Symptoms  Outcome: Progressing     Problem: Fall Injury Risk  Goal: Absence of Fall and Fall-Related Injury  Outcome: Progressing     Problem: Skin Injury Risk Increased  Goal: Skin Health and Integrity  Outcome: Progressing

## 2025-07-04 NOTE — PT/OT/SLP PROGRESS
"Occupational Therapy   Treatment    Name: Katelyn Caba  MRN: [id number]  Admitting Diagnosis:  Embolic stroke involving right middle cerebral artery       Recommendations:     Discharge Recommendations: High Intensity Therapy  Discharge Equipment Recommendations:  bath bench, bedside commode  Barriers to discharge:  None    Assessment:     Katelyn Caba is a 84 y.o. female with a medical diagnosis of Embolic stroke involving right middle cerebral artery.  She presents with performance deficits affecting function are impaired endurance, impaired balance, impaired self care skills, impaired functional mobility, decreased coordination, decreased upper extremity function, decreased lower extremity function, decreased safety awareness.     Rehab Prognosis:  Good; patient would benefit from acute skilled OT services to address these deficits and reach maximum level of function.       Plan:     Patient to be seen 4 x/week to address the above listed problems via therapeutic exercises, therapeutic activities, self-care/home management, neuromuscular re-education  Plan of Care Expires: 07/30/25  Plan of Care Reviewed with: patient, spouse, son    Subjective     Patient:  "I kept him up last night."  Pain/Comfort:  Pain Rating 1: 0/10  Pain Rating Post-Intervention 1: 0/10    Objective:     Communicated with: Nurse prior to session.  Patient found supine with SCD, bed alarm, telemetry, peripheral IV, PureWick, oxygen upon OT entry to room.    General Precautions: Standard, aspiration, fall    Orthopedic Precautions:N/A  Braces: N/A  Respiratory Status: Nasal cannula, flow 2 L/min     Occupational Performance:     Bed Mobility:    Patient completed Rolling/Turning to Left with  supervision  Patient completed Rolling/Turning to Right with supervision  Patient completed Supine to Sit with stand by assistance  Patient completed Sit to Supine with contact guard assistance     Functional Mobility/Transfers:  Patient completed " Sit <> Stand Transfer with minimum assistance  with  no assistive device   Min assist with stand pivot transfers     Activities of Daily Living:  Grooming: minimum assistance while standing  Upper Body Dressing: minimum assistance while seated EOB  Lower Body Dressing: moderate assistance while seated EOB    AMPAC 6 Click ADL: 15    Treatment & Education:  Patient alert and oriented x 3; able to follow 4/4 one step commands.  Patient attentive and interactive throughout the session. Left UE AAROM performed one set x 10 rep in all planes of motion.  Impairments with light touch and proprioception, left UE.  Patient's son and  present.      Patient left supine with all lines intact, call button in reach, and bed alarm on    GOALS:   Multidisciplinary Problems       Occupational Therapy Goals          Problem: Occupational Therapy    Goal Priority Disciplines Outcome Interventions   Occupational Therapy Goal     OT, PT/OT Progressing    Description: Goals to be met by: 7/30/25.     Patient will increase functional independence with ADLs by performing:    UE Dressing with Stand-by Assistance.  LE Dressing with Stand-by Assistance.  Grooming while standing with Stand-by Assistance.  Toileting from toilet with Stand-by Assistance for hygiene and clothing management.   Toilet transfer to toilet with Stand-by Assistance.  Upper extremity exercise program x10 reps per handout, with independence.                         Time Tracking:     OT Date of Treatment: 07/04/25  OT Start Time: 0823  OT Stop Time: 0847  OT Total Time (min): 24 min    Billable Minutes:Self Care/Home Management 12  Neuromuscular Re-education 12    OT/JEFF: OT          7/4/2025

## 2025-07-04 NOTE — ASSESSMENT & PLAN NOTE
Katelyn Caba is a 84 y.o. female with PMH of HTN, HLD, aortic atherosclerosis, hx of GI bleeds, ISAIAH requiring frequent transfusions, HFpEF, CAD, prior R MCA stroke (2/2025), BCC, former smoker (quit 50 years ago) that presented to [facility] ED 6/28/25 with SOB and found to have bilateral pleural effusions on CXR for which she was admitted to  for further eval and management of hypoxia. See  notes for details of hospital course.     Stroke code activated 7/1/25 for new severe L sided weakness and LFD. LKW unclear, sometime evening of 6/30. Family at bedside reports around 5:30am 7/1/25 noticing patient having LSW and needing assistance to ambulate to the bathroom. Sometime later in the morning of 7/1/25 patient's nurse noticed patient was unable to move L side, prompting stroke code activation. Neuro exam significant for R gaze preference, L hemiparesis (arm>>leg), L facial droop, expressive aphasia, dysarthria, and inattention to L side. NIHSS 10. CTH showed acute ischemic changes in R MCA territory, CTA revealed R M2 occlusion. Determined not a candidate for acute stroke interventions, OOW for TNK and no thrombectomy due to core infarct on CTH.  Patient will be transferred to  primary service for ongoing stroke workup, will consult  for co-management of active medical comorbidities.    Left MCA stroke in 2022, initially had language issues that resolved but had right inferior quadrantanopsia. Discharged on DAPT, then aspirin monotherapy.     Right MCA stroke in 2/2025: left sided weakness, left facial droop, and dysarthria. CTA with right M1 occlusion. s/p TNK and DSA w/o thrombectomy as lesion migrated to distal MCA branches after TNK administered. Echo unremarkable.     07/05/2025: Repeat CT head w/o any hemorrhage. Started eliquis 2.5 mg bid       Antithrombotics for secondary stroke prevention: Antiplatelets: Clopidogrel: 75 mg daily    Statins for secondary stroke prevention and hyperlipidemia, if  present: Statins: Atorvastatin- 80 mg daily  Zetia 10mg daily    Aggressive risk factor modification: HTN, HLD, Diet, Exercise, CAD     Rehab efforts: The patient has been evaluated by a stroke team provider and the therapy needs have been fully considered based off the presenting complaints and exam findings. The following therapy evaluations are needed: PT evaluate and treat, OT evaluate and treat, SLP evaluate and treat    Diagnostics ordered/pending:     VTE prophylaxis: Enoxaparin (CrCl < 30 ml/min) 30 mg SQ every 24 hours  Mechanical prophylaxis: Place SCDs    BP parameters: Infarct: No intervention, SBP <220  ICH: SBP Goal Range 130-150

## 2025-07-04 NOTE — PROGRESS NOTES
[facility] - Neurosurgery ([facility])  Vascular Neurology  Comprehensive Stroke Center  Progress Note    Assessment/Plan:     * Embolic stroke involving right middle cerebral artery  Katelyn Caba is a 84 y.o. female with PMH of HTN, HLD, aortic atherosclerosis, hx of GI bleeds, ISAIAH requiring frequent transfusions, HFpEF, CAD, prior R MCA stroke (2/2025), BCC, former smoker (quit 50 years ago) that presented to [facility] ED 6/28/25 with SOB and found to have bilateral pleural effusions on CXR for which she was admitted to  for further eval and management of hypoxia. See  notes for details of hospital course.     Stroke code activated 7/1/25 for new severe L sided weakness and LFD. LKW unclear, sometime evening of 6/30. Family at bedside reports around 5:30am 7/1/25 noticing patient having LSW and needing assistance to ambulate to the bathroom. Sometime later in the morning of 7/1/25 patient's nurse noticed patient was unable to move L side, prompting stroke code activation. Neuro exam significant for R gaze preference, L hemiparesis (arm>>leg), L facial droop, expressive aphasia, dysarthria, and inattention to L side. NIHSS 10. CTH showed acute ischemic changes in R MCA territory, CTA revealed R M2 occlusion. Determined not a candidate for acute stroke interventions, OOW for TNK and no thrombectomy due to core infarct on CTH.  Patient will be transferred to  primary service for ongoing stroke workup, will consult  for co-management of active medical comorbidities.    Left MCA stroke in 2022, initially had language issues that resolved but had right inferior quadrantanopsia. Discharged on DAPT, then aspirin monotherapy.     Right MCA stroke in 2/2025: left sided weakness, left facial droop, and dysarthria. CTA with right M1 occlusion. s/p TNK and DSA w/o thrombectomy as lesion migrated to distal MCA branches after TNK administered. Echo unremarkable.     07/04/2025. Will repeat CT Head tomorrow to determine if  we can start anticoagulation       Antithrombotics for secondary stroke prevention: Antiplatelets: Clopidogrel: 75 mg daily    Statins for secondary stroke prevention and hyperlipidemia, if present: Statins: Atorvastatin- 80 mg daily  Zetia 10mg daily    Aggressive risk factor modification: HTN, HLD, Diet, Exercise, CAD     Rehab efforts: The patient has been evaluated by a stroke team provider and the therapy needs have been fully considered based off the presenting complaints and exam findings. The following therapy evaluations are needed: PT evaluate and treat, OT evaluate and treat, SLP evaluate and treat    Diagnostics ordered/pending:     VTE prophylaxis: Enoxaparin (CrCl < 30 ml/min) 30 mg SQ every 24 hours  Mechanical prophylaxis: Place SCDs    BP parameters: Infarct: No intervention, SBP <220  ICH: SBP Goal Range 130-150        Bilateral pleural effusion  -Admitted to  primary service on 6/28 for ongoing workup and management  -IV diuresis started with lasix 40mg BID  -Pulm consulted and rec'd continue aggressive diuresis, no indication for thoracentesis at this time; per pulm note: no signs or symptoms of infection at this time, pleural effusions have been present for months and fluid on bedside ultrasound appears simple; with elevation in BNP pleural effusions likely in the setting of heart failure and volume overload, based on the TAP-IT trial, thoracentesis in the setting of effusions secondary to heart failure would not shorten the length of hospital stay when compared to diuresis alone      consulted for co-management now that patient was transferred to  primary service    CAD (coronary artery disease)  -Stroke risk factor  -Continue plavix, statin, zetia    (HFpEF) heart failure with preserved ejection fraction  -Stroke risk factor  -TTE with EF 55-60%, no WMA  -Strict I/Os and dialy weights  -Fluid restriction of 1.5L   -Continue lasix  - consulted for co-management, appreciate  recs    Hypertension  -Stroke risk factor  -SBP goal <140, maintain MAP >65  -BP range in the last 24 hrs: BP  Min: 115/61  Max: 143/61  -Current antihypertensive regimen:   --PRN labetalol/hydralazine    Acute hypoxic respiratory failure  Likely due to volume overload. SOB improved after diuresis.     Plan  - Continue diuresis till euvolemic     Gastric AVM  Pt with history of gastric AVM on EGD 3/5/2025 which were treated. Spoke with GI of possible anticoagulation in setting of AVM.     Plan  Per GI: Endoscopic intervention may not catch all AVMS/ Angiectasias and given stable hemoglobin and vitals, okay to proceed with trial of anticoagulation and monitor.     Iron deficiency anemia due to chronic blood loss  Anemia is likely due to acute blood loss which was from GI bleeds. Most recent hemoglobin and hematocrit are listed below.  Recent Labs     07/02/25  0848 07/03/25  0559 07/04/25  0456   HGB 8.6* 9.1* 8.8*   HCT 27.4* 28.7* 27.5*       Plan  - Monitor serial CBC: Daily  - Transfuse PRBC if patient becomes hemodynamically unstable, symptomatic or H/H drops below 7/21.  - Patient has not received any PRBC transfusions to date  - Patient's anemia is currently stable  - Continue PPI  - Given Ferraheme 1020mg IV x1 per hematology on 6/30  -  consulted for co-management, appreciate assistance    Mixed hyperlipidemia  -Stroke risk factor  -Lipid panel pending, goal <70  -Atorvastatin 80mg and zetia 10mg daily         84 y.o. female with PMH of HTN, HLD, inferolateral STEMI (4/2024, s/p cath with no intervention, hx of GI bleeds with AVM of stomach, ISAIAH requiring frequent transfusions, HFpEF, CAD, prior R MCA stroke (2/2025), BCC, former smoker (quit 50 years ago) that presented to [facility] ED 6/28/25 with SOB and found to have bilateral pleural effusions on CXR for which she was admitted to  for further eval and management of hypoxia.     On  7/1, pt with new severe left sided weakness and left facial droop. Code  stroke activated. CTH showed acute ischemic changes in R MCA territory, CTA revealed R M2 occlusion. On plavix prior from previous stroke in 2/2025. With her multiple episodes of strokes, despite being on anti-platelets, might need to switch to DOAC. However, pt has history of gastric avms and GI bleed. HgB stable on Plavix. Consulted GI on whether its okay to anticoagulate or not.     07/02/2025 Brain MRI: Re demonstration of moderate sized acute infarct in the right temporal lobe, with small component extending into the right parietal and frontal lobes.  There has been interval hemorrhagic transformation with subarachnoid extension.     07/04/2025: Plan for repeat CT head tomorrow to determine if we want to start anticoagulation      STROKE DOCUMENTATION   Acute Stroke Times   Last Known Normal Date: 06/30/25  Unknown Normal Time: Unknown Time  Symptom Onset Time:  (found with symptoms around 530am 7/1/25)  Stroke Team Called Date: 07/01/25  Stroke Team Called Time: 1108  Stroke Team Arrival Date: 07/01/25  Stroke Team Arrival Time: 1111  CT Interpretation Time: 1133  Thrombolytic Therapy Recommended: No  CTA Interpretation Time: 1133  Thrombectomy Recommended: No (large core infarct on CTH)    NIH Scale:  1a. Level of Consciousness: 0-->Alert, keenly responsive  1b. LOC Questions: 0-->Answers both questions correctly  1c. LOC Commands: 0-->Performs both tasks correctly  2. Best Gaze: 1-->Partial gaze palsy, gaze is abnormal in one or both eyes, but forced deviation or total gaze paresis is not present  3. Visual: 0-->No visual loss  4. Facial Palsy: 0-->Normal symmetrical movements  5a. Motor Arm, Left: 1-->Drift, limb holds 90 (or 45) degrees, but drifts down before full 10 seconds, does not hit bed or other support  5b. Motor Arm, Right: 0-->No drift, limb holds 90 (or 45) degrees for full 10 secs  6a. Motor Leg, Left: 1-->Drift, leg falls by the end of the 5-sec period but does not hit bed  6b. Motor Leg,  Right: 0-->No drift, leg holds 30 degree position for full 5 secs  7. Limb Ataxia: 0-->Absent  8. Sensory: 1-->Mild-to-moderate sensory loss, patient feels pinprick is less sharp or is dull on the affected side, or there is a loss of superficial pain with pinprick, but patient is aware of being touched  9. Best Language: 0-->No aphasia, normal  10. Dysarthria: 0-->Normal  11. Extinction and Inattention (formerly Neglect): 0-->No abnormality  Total (NIH Stroke Scale): 4       Modified Keegan Score: 1  Gabriel Coma Scale:    ABCD2 Score:    [id number] Score:   HAS -BLED Score:   ICH Score:   Hunt & Hoffman Classification:      Hemorrhagic change of an Ischemic Stroke: Does this patient have an ischemic stroke with hemorrhagic changes? Yes on MRI. Cannot grade. No change in symptoms.   Neurologic Chief Complaint: R MCA stroke    Subjective:     Interval History: Patient is seen for follow-up neurological assessment and treatment recommendations:     HPI, Past Medical, Family, and Social History remains the same as documented in the initial encounter.     Review of Systems  Scheduled Meds:   atorvastatin  80 mg Oral Daily    clopidogreL  75 mg Oral Daily    cyanocobalamin  1,000 mcg Oral Daily    enoxparin  30 mg Subcutaneous Daily    ezetimibe  10 mg Oral Daily    furosemide  40 mg Oral BID    mupirocin   Nasal BID    pantoprazole  40 mg Oral Daily    polyethylene glycol  17 g Oral BID    spironolactone  25 mg Oral Daily    vitamin D  1,000 Units Oral Daily     Continuous Infusions:  PRN Meds:  Current Facility-Administered Medications:     acetaminophen, 650 mg, Oral, Q6H PRN    albuterol-ipratropium, 3 mL, Nebulization, Q4H PRN    aluminum-magnesium hydroxide-simethicone, 30 mL, Oral, QID PRN    melatonin, 6 mg, Oral, Nightly PRN    naloxone, 0.02 mg, Intravenous, PRN    ondansetron, 8 mg, Oral, Q8H PRN    prochlorperazine, 5 mg, Intravenous, Q6H PRN    simethicone, 1 tablet, Oral, QID PRN    sodium chloride 0.9%, 5  mL, Intravenous, PRN    Objective:     Vital Signs (Most Recent):  Temp: 97.4 °F (36.3 °C) (07/04/25 0730)  Pulse: 80 (07/04/25 0730)  Resp: 17 (07/04/25 0730)  BP: (!) 106/51 (07/04/25 0730)  SpO2: 97 % (07/04/25 0730)  BP Location: Right arm    Vital Signs Range (Last 24H):  Temp:  [97.4 °F (36.3 °C)-98.2 °F (36.8 °C)]   Pulse:  [72-82]   Resp:  [17-18]   BP: (106-143)/(51-76)   SpO2:  [94 %-100 %]   BP Location: Right arm       Physical Exam  Vitals and nursing note reviewed.   Pulmonary:      Effort: Pulmonary effort is normal.      Breath sounds: Normal breath sounds.      Comments: On 1L O2    Neurological:      Mental Status: She is alert.              Neurological Exam:   LOC: alert  Language: No aphasia  Articulation: No dysarthria  Orientation: Person, Place, Time   Visual Fields: Visual neglect  Motor: Arm left  Paresis: 4/5  Leg left  Paresis: 4/5  Arm right  Normal 5/5  Leg right Normal 5/5  Sensation: Jean Paul-hypoesthesia left    Laboratory:  CMP:   Recent Labs   Lab 07/04/25  0456   CALCIUM 9.7   ALBUMIN 3.7   PROT 6.5      K 3.6   CO2 29   CL 97   BUN 21   CREATININE 0.7   ALKPHOS 95   ALT 11   AST 24   BILITOT 0.6     Lipid Panel:   Recent Labs   Lab 07/02/25  0848   CHOL 140   LDLCALC 72.8   HDL 45   TRIG 111     Hgb A1C:   Recent Labs   Lab 07/02/25  0848   HGBA1C 4.3     TSH:   Recent Labs   Lab 06/28/25  0732   TSH 2.532       Diagnostic Results     Brain Imaging   MRI brain 07/02/2025  Redemonstration of moderate sized acute infarct in the right temporal lobe, with small component extending into the right parietal and frontal lobes.  There has been interval hemorrhagic transformation with subarachnoid extension.  Follow-up with noncontrast CT scan of the head may be obtained.     Abnormal signal of the SWI sequences, most likely secondary to scanning parameters.    Vessel Imaging   07/01/2025  CTA Head and Neck: Moderate size acute infarct centered in the right temporal lobe.  No acute  intracranial hemorrhage.     Proximal right M2 occlusion and subtotal occlusion vs stenosis in right M3 branch.    Cardiac Imaging     Left Ventricle: The left ventricle is normal in size. Normal wall thickness. There is concentric remodeling. There is normal systolic function with a visually estimated ejection fraction of 60 - 65%. Grade III diastolic dysfunction.    Right Ventricle: The right ventricle is normal in size measuring 2.8 cm. Wall thickness is normal. Systolic function is reduced.    Left Atrium: The left atrium is severely dilated    Aortic Valve: The aortic valve is a trileaflet valve. There is mild aortic valve sclerosis.    Mitral Valve: There is mild regurgitation.    Pulmonary Artery: The estimated pulmonary artery systolic pressure is 37 mmHg.    IVC/SVC: Normal venous pressure at 3 mmHg.    Juan M Henderson MD  Comprehensive Stroke Center  Department of Vascular Neurology   [facility] Neurosurgery [facility])

## 2025-07-04 NOTE — PLAN OF CARE
Problem: Adult Inpatient Plan of Care  Goal: Plan of Care Review  Outcome: Progressing  Goal: Patient-Specific Goal (Individualized)  Outcome: Progressing  Goal: Absence of Hospital-Acquired Illness or Injury  Outcome: Progressing  Goal: Optimal Comfort and Wellbeing  Outcome: Progressing  Goal: Readiness for Transition of Care  Outcome: Progressing   POC ands meds reviewed with patient, all needs addressed.

## 2025-07-04 NOTE — SUBJECTIVE & OBJECTIVE
Neurologic Chief Complaint: R MCA stroke    Subjective:     Interval History: Patient is seen for follow-up neurological assessment and treatment recommendations:     HPI, Past Medical, Family, and Social History remains the same as documented in the initial encounter.     Review of Systems  Scheduled Meds:   apixaban  2.5 mg Oral BID    atorvastatin  80 mg Oral Daily    clopidogreL  75 mg Oral Daily    cyanocobalamin  1,000 mcg Oral Daily    ezetimibe  10 mg Oral Daily    furosemide  40 mg Oral BID    mupirocin   Nasal BID    pantoprazole  40 mg Oral Daily    polyethylene glycol  17 g Oral BID    spironolactone  25 mg Oral Daily    vitamin D  1,000 Units Oral Daily     Continuous Infusions:  PRN Meds:  Current Facility-Administered Medications:     acetaminophen, 650 mg, Oral, Q6H PRN    albuterol-ipratropium, 3 mL, Nebulization, Q4H PRN    aluminum-magnesium hydroxide-simethicone, 30 mL, Oral, QID PRN    melatonin, 6 mg, Oral, Nightly PRN    naloxone, 0.02 mg, Intravenous, PRN    ondansetron, 8 mg, Oral, Q8H PRN    prochlorperazine, 5 mg, Intravenous, Q6H PRN    simethicone, 1 tablet, Oral, QID PRN    sodium chloride 0.9%, 5 mL, Intravenous, PRN    Objective:     Vital Signs (Most Recent):  Temp: 97.6 °F (36.4 °C) (07/05/25 0310)  Pulse: 73 (07/05/25 0810)  Resp: 20 (07/05/25 0310)  BP: (!) 114/55 (07/05/25 0810)  SpO2: 98 % (07/05/25 0810)  BP Location: Left arm    Vital Signs Range (Last 24H):  Temp:  [97.5 °F (36.4 °C)-98.4 °F (36.9 °C)]   Pulse:  [61-79]   Resp:  [18-20]   BP: (101-132)/(55-83)   SpO2:  [97 %-100 %]   BP Location: Left arm       Physical Exam  Vitals and nursing note reviewed.   Pulmonary:      Effort: Pulmonary effort is normal.      Breath sounds: Normal breath sounds.      Comments: On 1L O2    Neurological:      Mental Status: She is alert.              Neurological Exam:   LOC: alert  Language: No aphasia  Articulation: No dysarthria  Orientation: Person, Place, Time   Visual Fields:  "Visual neglect  Motor: Arm left  Paresis: 4/5  Leg left  Paresis: 4/5  Arm right  Normal 5/5  Leg right Normal 5/5  Sensation: Jean Paul-hypoesthesia left    Laboratory:  CMP:   Recent Labs   Lab 07/05/25  0713   CALCIUM 9.7   ALBUMIN 3.8   PROT 6.7      K 3.7   CO2 31*   CL 96   BUN 22   CREATININE 0.7   ALKPHOS 99   ALT 19   AST 30   BILITOT 0.6     Lipid Panel:   Recent Labs   Lab 07/02/25  0848   CHOL 140   LDLCALC 72.8   HDL 45   TRIG 111     Hgb A1C:   Recent Labs   Lab 07/02/25  0848   HGBA1C 4.3     TSH:   No results for input(s): "TSH" in the last 168 hours.      Diagnostic Results     Brain Imaging   MRI brain 07/02/2025  Redemonstration of moderate sized acute infarct in the right temporal lobe, with small component extending into the right parietal and frontal lobes.  There has been interval hemorrhagic transformation with subarachnoid extension.  Follow-up with noncontrast CT scan of the head may be obtained.     Abnormal signal of the SWI sequences, most likely secondary to scanning parameters.    Vessel Imaging   07/01/2025  CTA Head and Neck: Moderate size acute infarct centered in the right temporal lobe.  No acute intracranial hemorrhage.     Proximal right M2 occlusion and subtotal occlusion vs stenosis in right M3 branch.    Cardiac Imaging     Left Ventricle: The left ventricle is normal in size. Normal wall thickness. There is concentric remodeling. There is normal systolic function with a visually estimated ejection fraction of 60 - 65%. Grade III diastolic dysfunction.    Right Ventricle: The right ventricle is normal in size measuring 2.8 cm. Wall thickness is normal. Systolic function is reduced.    Left Atrium: The left atrium is severely dilated    Aortic Valve: The aortic valve is a trileaflet valve. There is mild aortic valve sclerosis.    Mitral Valve: There is mild regurgitation.    Pulmonary Artery: The estimated pulmonary artery systolic pressure is 37 mmHg.    IVC/SVC: Normal " venous pressure at 3 mmHg.

## 2025-07-05 LAB
ABSOLUTE EOSINOPHIL (OHS): 0.16 K/UL
ABSOLUTE MONOCYTE (OHS): 0.88 K/UL (ref 0.3–1)
ABSOLUTE NEUTROPHIL COUNT (OHS): 3.25 K/UL (ref 1.8–7.7)
ALBUMIN SERPL BCP-MCNC: 3.8 G/DL (ref 3.5–5.2)
ALP SERPL-CCNC: 99 UNIT/L (ref 40–150)
ALT SERPL W/O P-5'-P-CCNC: 19 UNIT/L (ref 10–44)
ANION GAP (OHS): 14 MMOL/L (ref 8–16)
AST SERPL-CCNC: 30 UNIT/L (ref 11–45)
BASOPHILS # BLD AUTO: 0.08 K/UL
BASOPHILS NFR BLD AUTO: 1.4 %
BILIRUB SERPL-MCNC: 0.6 MG/DL (ref 0.1–1)
BUN SERPL-MCNC: 22 MG/DL (ref 8–23)
CALCIUM SERPL-MCNC: 9.7 MG/DL (ref 8.7–10.5)
CHLORIDE SERPL-SCNC: 96 MMOL/L (ref 95–110)
CO2 SERPL-SCNC: 31 MMOL/L (ref 23–29)
CREAT SERPL-MCNC: 0.7 MG/DL (ref 0.5–1.4)
ERYTHROCYTE [DISTWIDTH] IN BLOOD BY AUTOMATED COUNT: 15.9 % (ref 11.5–14.5)
GFR SERPLBLD CREATININE-BSD FMLA CKD-EPI: >60 ML/MIN/1.73/M2
GLUCOSE SERPL-MCNC: 92 MG/DL (ref 70–110)
HCT VFR BLD AUTO: 31.8 % (ref 37–48.5)
HGB BLD-MCNC: 9.9 GM/DL (ref 12–16)
IMM GRANULOCYTES # BLD AUTO: 0.01 K/UL (ref 0–0.04)
IMM GRANULOCYTES NFR BLD AUTO: 0.2 % (ref 0–0.5)
LYMPHOCYTES # BLD AUTO: 1.27 K/UL (ref 1–4.8)
MAGNESIUM SERPL-MCNC: 2.4 MG/DL (ref 1.6–2.6)
MCH RBC QN AUTO: 27.2 PG (ref 27–31)
MCHC RBC AUTO-ENTMCNC: 31.1 G/DL (ref 32–36)
MCV RBC AUTO: 87 FL (ref 82–98)
NUCLEATED RBC (/100WBC) (OHS): 0 /100 WBC
PHOSPHATE SERPL-MCNC: 3.7 MG/DL (ref 2.7–4.5)
PLATELET # BLD AUTO: 527 K/UL (ref 150–450)
PMV BLD AUTO: 9.9 FL (ref 9.2–12.9)
POTASSIUM SERPL-SCNC: 3.7 MMOL/L (ref 3.5–5.1)
PROT SERPL-MCNC: 6.7 GM/DL (ref 6–8.4)
RBC # BLD AUTO: 3.64 M/UL (ref 4–5.4)
RELATIVE EOSINOPHIL (OHS): 2.8 %
RELATIVE LYMPHOCYTE (OHS): 22.5 % (ref 18–48)
RELATIVE MONOCYTE (OHS): 15.6 % (ref 4–15)
RELATIVE NEUTROPHIL (OHS): 57.5 % (ref 38–73)
SODIUM SERPL-SCNC: 141 MMOL/L (ref 136–145)
WBC # BLD AUTO: 5.65 K/UL (ref 3.9–12.7)

## 2025-07-05 PROCEDURE — 11000001 HC ACUTE MED/SURG PRIVATE ROOM

## 2025-07-05 PROCEDURE — 80053 COMPREHEN METABOLIC PANEL: CPT

## 2025-07-05 PROCEDURE — 25000003 PHARM REV CODE 250

## 2025-07-05 PROCEDURE — 94761 N-INVAS EAR/PLS OXIMETRY MLT: CPT

## 2025-07-05 PROCEDURE — 99233 SBSQ HOSP IP/OBS HIGH 50: CPT | Mod: GC,,, | Performed by: PSYCHIATRY & NEUROLOGY

## 2025-07-05 PROCEDURE — 84100 ASSAY OF PHOSPHORUS: CPT

## 2025-07-05 PROCEDURE — 85025 COMPLETE CBC W/AUTO DIFF WBC: CPT

## 2025-07-05 PROCEDURE — 36415 COLL VENOUS BLD VENIPUNCTURE: CPT

## 2025-07-05 PROCEDURE — 83735 ASSAY OF MAGNESIUM: CPT

## 2025-07-05 RX ORDER — FUROSEMIDE 40 MG/1
40 TABLET ORAL DAILY
Status: DISCONTINUED | OUTPATIENT
Start: 2025-07-06 | End: 2025-07-06 | Stop reason: HOSPADM

## 2025-07-05 RX ADMIN — FUROSEMIDE 40 MG: 40 TABLET ORAL at 10:07

## 2025-07-05 RX ADMIN — MUPIROCIN: 20 OINTMENT TOPICAL at 10:07

## 2025-07-05 RX ADMIN — ATORVASTATIN CALCIUM 80 MG: 40 TABLET, FILM COATED ORAL at 10:07

## 2025-07-05 RX ADMIN — EZETIMIBE 10 MG: 10 TABLET ORAL at 10:07

## 2025-07-05 RX ADMIN — APIXABAN 2.5 MG: 2.5 TABLET, FILM COATED ORAL at 10:07

## 2025-07-05 RX ADMIN — SPIRONOLACTONE 25 MG: 25 TABLET, FILM COATED ORAL at 10:07

## 2025-07-05 RX ADMIN — Medication 1000 UNITS: at 10:07

## 2025-07-05 RX ADMIN — PANTOPRAZOLE SODIUM 40 MG: 20 TABLET, DELAYED RELEASE ORAL at 10:07

## 2025-07-05 RX ADMIN — APIXABAN 2.5 MG: 2.5 TABLET, FILM COATED ORAL at 12:07

## 2025-07-05 RX ADMIN — CYANOCOBALAMIN TAB 1000 MCG 1000 MCG: 1000 TAB at 10:07

## 2025-07-05 RX ADMIN — CLOPIDOGREL 75 MG: 75 TABLET ORAL at 10:07

## 2025-07-05 NOTE — PLAN OF CARE
POC updated and reviewed with the patient and spouse at the bedside. Questions regarding POC were encouraged and addressed. VSS, see flowsheets. Tele maintained per provider's order. Patient is AOX 4 at this time. Fall and safety precautions maintained, no signs of injury noted during shift. Patient repositioned independently in bed for comfort. Upon exiting room, patient's bed locked in low position, side rails up x 3, bed alarm set, with call light within reach. Instructed patient to call staff for mobility, verbalized understanding. Stroke book and stroke education reviewed with the patient and spouse at the bedside, see education flowsheets for details. No acute signs of distress noted at this time.     Problem: Adult Inpatient Plan of Care  Goal: Plan of Care Review  Outcome: Progressing  Goal: Patient-Specific Goal (Individualized)  Outcome: Progressing  Goal: Absence of Hospital-Acquired Illness or Injury  Outcome: Progressing  Goal: Optimal Comfort and Wellbeing  Outcome: Progressing  Goal: Readiness for Transition of Care  Outcome: Progressing     Problem: Infection  Goal: Absence of Infection Signs and Symptoms  Outcome: Progressing     Problem: Fall Injury Risk  Goal: Absence of Fall and Fall-Related Injury  Outcome: Progressing     Problem: Skin Injury Risk Increased  Goal: Skin Health and Integrity  Outcome: Progressing

## 2025-07-05 NOTE — PROGRESS NOTES
[facility] - Neurosurgery ([facility])  Vascular Neurology  Comprehensive Stroke Center  Progress Note    Assessment/Plan:     * Embolic stroke involving right middle cerebral artery  Katelyn Caba is a 84 y.o. female with PMH of HTN, HLD, aortic atherosclerosis, hx of GI bleeds, ISAIAH requiring frequent transfusions, HFpEF, CAD, prior R MCA stroke (2/2025), BCC, former smoker (quit 50 years ago) that presented to [facility] ED 6/28/25 with SOB and found to have bilateral pleural effusions on CXR for which she was admitted to  for further eval and management of hypoxia. See  notes for details of hospital course.     Stroke code activated 7/1/25 for new severe L sided weakness and LFD. LKW unclear, sometime evening of 6/30. Family at bedside reports around 5:30am 7/1/25 noticing patient having LSW and needing assistance to ambulate to the bathroom. Sometime later in the morning of 7/1/25 patient's nurse noticed patient was unable to move L side, prompting stroke code activation. Neuro exam significant for R gaze preference, L hemiparesis (arm>>leg), L facial droop, expressive aphasia, dysarthria, and inattention to L side. NIHSS 10. CTH showed acute ischemic changes in R MCA territory, CTA revealed R M2 occlusion. Determined not a candidate for acute stroke interventions, OOW for TNK and no thrombectomy due to core infarct on CTH.  Patient will be transferred to  primary service for ongoing stroke workup, will consult  for co-management of active medical comorbidities.    Left MCA stroke in 2022, initially had language issues that resolved but had right inferior quadrantanopsia. Discharged on DAPT, then aspirin monotherapy.     Right MCA stroke in 2/2025: left sided weakness, left facial droop, and dysarthria. CTA with right M1 occlusion. s/p TNK and DSA w/o thrombectomy as lesion migrated to distal MCA branches after TNK administered. Echo unremarkable.     07/05/2025: Repeat CT head w/o any hemorrhage. Started  eliquis 2.5 mg bid       Antithrombotics for secondary stroke prevention: Antiplatelets: Clopidogrel: 75 mg daily    Statins for secondary stroke prevention and hyperlipidemia, if present: Statins: Atorvastatin- 80 mg daily  Zetia 10mg daily    Aggressive risk factor modification: HTN, HLD, Diet, Exercise, CAD     Rehab efforts: The patient has been evaluated by a stroke team provider and the therapy needs have been fully considered based off the presenting complaints and exam findings. The following therapy evaluations are needed: PT evaluate and treat, OT evaluate and treat, SLP evaluate and treat    Diagnostics ordered/pending:     VTE prophylaxis: Enoxaparin (CrCl < 30 ml/min) 30 mg SQ every 24 hours  Mechanical prophylaxis: Place SCDs    BP parameters: Infarct: No intervention, SBP <220  ICH: SBP Goal Range 130-150        Bilateral pleural effusion  -Admitted to  primary service on 6/28 for ongoing workup and management  -IV diuresis started with lasix 40mg BID  -Pulm consulted and rec'd continue aggressive diuresis, no indication for thoracentesis at this time; per pulm note: no signs or symptoms of infection at this time, pleural effusions have been present for months and fluid on bedside ultrasound appears simple; with elevation in BNP pleural effusions likely in the setting of heart failure and volume overload, based on the TAP-IT trial, thoracentesis in the setting of effusions secondary to heart failure would not shorten the length of hospital stay when compared to diuresis alone      consulted for co-management now that patient was transferred to  primary service    CAD (coronary artery disease)  -Stroke risk factor  -Continue plavix, statin, zetia    (HFpEF) heart failure with preserved ejection fraction  -Stroke risk factor  -TTE with EF 55-60%, no WMA  -Strict I/Os and dialy weights  -Fluid restriction of 1.5L   -Continue lasix  - consulted for co-management, appreciate  recs    Hypertension  -Stroke risk factor  -SBP goal <140, maintain MAP >65  -BP range in the last 24 hrs: BP  Min: 115/61  Max: 143/61  -Current antihypertensive regimen:   --PRN labetalol/hydralazine    Acute hypoxic respiratory failure  Likely due to volume overload. SOB improved after diuresis.     Plan  - Continue diuresis till euvolemic     Gastric AVM  Pt with history of gastric AVM on EGD 3/5/2025 which were treated. Spoke with GI of possible anticoagulation in setting of AVM.     Plan  Per GI: Endoscopic intervention may not catch all AVMS/ Angiectasias and given stable hemoglobin and vitals, okay to proceed with trial of anticoagulation and monitor.     Iron deficiency anemia due to chronic blood loss  Anemia is likely due to acute blood loss which was from GI bleeds. Most recent hemoglobin and hematocrit are listed below.  Recent Labs     07/02/25  0848 07/03/25  0559 07/04/25  0456   HGB 8.6* 9.1* 8.8*   HCT 27.4* 28.7* 27.5*       Plan  - Monitor serial CBC: Daily  - Transfuse PRBC if patient becomes hemodynamically unstable, symptomatic or H/H drops below 7/21.  - Patient has not received any PRBC transfusions to date  - Patient's anemia is currently stable  - Continue PPI  - Given Ferraheme 1020mg IV x1 per hematology on 6/30  -  consulted for co-management, appreciate assistance    Mixed hyperlipidemia  -Stroke risk factor  -Lipid panel pending, goal <70  -Atorvastatin 80mg and zetia 10mg daily         84 y.o. female with PMH of HTN, HLD, inferolateral STEMI (4/2024, s/p cath with no intervention, hx of GI bleeds with AVM of stomach, ISAIAH requiring frequent transfusions, HFpEF, CAD, prior R MCA stroke (2/2025), BCC, former smoker (quit 50 years ago) that presented to [facility] ED 6/28/25 with SOB and found to have bilateral pleural effusions on CXR for which she was admitted to  for further eval and management of hypoxia.     On  7/1, pt with new severe left sided weakness and left facial droop. Code  stroke activated. CTH showed acute ischemic changes in R MCA territory, CTA revealed R M2 occlusion. On plavix prior from previous stroke in 2/2025. With her multiple episodes of strokes, despite being on anti-platelets, might need to switch to DOAC. However, pt has history of gastric avms and GI bleed. HgB stable on Plavix. Consulted GI on whether its okay to anticoagulate or not.     07/02/2025 Brain MRI: Re demonstration of moderate sized acute infarct in the right temporal lobe, with small component extending into the right parietal and frontal lobes.  There has been interval hemorrhagic transformation with subarachnoid extension.     07/04/2025: Plan for repeat CT head tomorrow to determine if we want to start anticoagulation  07/05/2025: Repeat CT head w/o any hemorrhage. Started eliquis 2.5 mg bid     STROKE DOCUMENTATION   Acute Stroke Times   Last Known Normal Date: 06/30/25  Unknown Normal Time: Unknown Time  Symptom Onset Time:  (found with symptoms around 530am 7/1/25)  Stroke Team Called Date: 07/01/25  Stroke Team Called Time: 1108  Stroke Team Arrival Date: 07/01/25  Stroke Team Arrival Time: 1111  CT Interpretation Time: 1133  Thrombolytic Therapy Recommended: No  CTA Interpretation Time: 1133  Thrombectomy Recommended: No (large core infarct on CTH)    NIH Scale:  1a. Level of Consciousness: 0-->Alert, keenly responsive  1b. LOC Questions: 0-->Answers both questions correctly  1c. LOC Commands: 0-->Performs both tasks correctly  2. Best Gaze: 1-->Partial gaze palsy, gaze is abnormal in one or both eyes, but forced deviation or total gaze paresis is not present  3. Visual: 0-->No visual loss  4. Facial Palsy: 0-->Normal symmetrical movements  5a. Motor Arm, Left: 1-->Drift, limb holds 90 (or 45) degrees, but drifts down before full 10 seconds, does not hit bed or other support  5b. Motor Arm, Right: 0-->No drift, limb holds 90 (or 45) degrees for full 10 secs  6a. Motor Leg, Left: 1-->Drift, leg  falls by the end of the 5-sec period but does not hit bed  6b. Motor Leg, Right: 0-->No drift, leg holds 30 degree position for full 5 secs  7. Limb Ataxia: 0-->Absent  8. Sensory: 1-->Mild-to-moderate sensory loss, patient feels pinprick is less sharp or is dull on the affected side, or there is a loss of superficial pain with pinprick, but patient is aware of being touched  9. Best Language: 0-->No aphasia, normal  10. Dysarthria: 0-->Normal  11. Extinction and Inattention (formerly Neglect): 0-->No abnormality  Total (NIH Stroke Scale): 4       Modified Bourbon Score: 0  Gabriel Coma Scale:    ABCD2 Score:    [id number] Score:   HAS -BLED Score:   ICH Score:   Hunt & Hoffman Classification:      Hemorrhagic change of an Ischemic Stroke: Does this patient have an ischemic stroke with hemorrhagic changes? No     Neurologic Chief Complaint: R MCA stroke    Subjective:     Interval History: Patient is seen for follow-up neurological assessment and treatment recommendations:     HPI, Past Medical, Family, and Social History remains the same as documented in the initial encounter.     Review of Systems  Scheduled Meds:   apixaban  2.5 mg Oral BID    atorvastatin  80 mg Oral Daily    clopidogreL  75 mg Oral Daily    cyanocobalamin  1,000 mcg Oral Daily    ezetimibe  10 mg Oral Daily    furosemide  40 mg Oral BID    mupirocin   Nasal BID    pantoprazole  40 mg Oral Daily    polyethylene glycol  17 g Oral BID    spironolactone  25 mg Oral Daily    vitamin D  1,000 Units Oral Daily     Continuous Infusions:  PRN Meds:  Current Facility-Administered Medications:     acetaminophen, 650 mg, Oral, Q6H PRN    albuterol-ipratropium, 3 mL, Nebulization, Q4H PRN    aluminum-magnesium hydroxide-simethicone, 30 mL, Oral, QID PRN    melatonin, 6 mg, Oral, Nightly PRN    naloxone, 0.02 mg, Intravenous, PRN    ondansetron, 8 mg, Oral, Q8H PRN    prochlorperazine, 5 mg, Intravenous, Q6H PRN    simethicone, 1 tablet, Oral, QID PRN     "sodium chloride 0.9%, 5 mL, Intravenous, PRN    Objective:     Vital Signs (Most Recent):  Temp: 97.6 °F (36.4 °C) (07/05/25 0310)  Pulse: 73 (07/05/25 0810)  Resp: 20 (07/05/25 0310)  BP: (!) 114/55 (07/05/25 0810)  SpO2: 98 % (07/05/25 0810)  BP Location: Left arm    Vital Signs Range (Last 24H):  Temp:  [97.5 °F (36.4 °C)-98.4 °F (36.9 °C)]   Pulse:  [61-79]   Resp:  [18-20]   BP: (101-132)/(55-83)   SpO2:  [97 %-100 %]   BP Location: Left arm       Physical Exam  Vitals and nursing note reviewed.   Pulmonary:      Effort: Pulmonary effort is normal.      Breath sounds: Normal breath sounds.      Comments: On 1L O2    Neurological:      Mental Status: She is alert.              Neurological Exam:   LOC: alert  Language: No aphasia  Articulation: No dysarthria  Orientation: Person, Place, Time   Visual Fields: Visual neglect  Motor: Arm left  Paresis: 4/5  Leg left  Paresis: 4/5  Arm right  Normal 5/5  Leg right Normal 5/5  Sensation: Jean Paul-hypoesthesia left    Laboratory:  CMP:   Recent Labs   Lab 07/05/25  0713   CALCIUM 9.7   ALBUMIN 3.8   PROT 6.7      K 3.7   CO2 31*   CL 96   BUN 22   CREATININE 0.7   ALKPHOS 99   ALT 19   AST 30   BILITOT 0.6     Lipid Panel:   Recent Labs   Lab 07/02/25  0848   CHOL 140   LDLCALC 72.8   HDL 45   TRIG 111     Hgb A1C:   Recent Labs   Lab 07/02/25  0848   HGBA1C 4.3     TSH:   No results for input(s): "TSH" in the last 168 hours.      Diagnostic Results     Brain Imaging   MRI brain 07/02/2025  Redemonstration of moderate sized acute infarct in the right temporal lobe, with small component extending into the right parietal and frontal lobes.  There has been interval hemorrhagic transformation with subarachnoid extension.  Follow-up with noncontrast CT scan of the head may be obtained.     Abnormal signal of the SWI sequences, most likely secondary to scanning parameters.    Vessel Imaging   07/01/2025  CTA Head and Neck: Moderate size acute infarct centered in the right " temporal lobe.  No acute intracranial hemorrhage.     Proximal right M2 occlusion and subtotal occlusion vs stenosis in right M3 branch.    Cardiac Imaging     Left Ventricle: The left ventricle is normal in size. Normal wall thickness. There is concentric remodeling. There is normal systolic function with a visually estimated ejection fraction of 60 - 65%. Grade III diastolic dysfunction.    Right Ventricle: The right ventricle is normal in size measuring 2.8 cm. Wall thickness is normal. Systolic function is reduced.    Left Atrium: The left atrium is severely dilated    Aortic Valve: The aortic valve is a trileaflet valve. There is mild aortic valve sclerosis.    Mitral Valve: There is mild regurgitation.    Pulmonary Artery: The estimated pulmonary artery systolic pressure is 37 mmHg.    IVC/SVC: Normal venous pressure at 3 mmHg.    Juan M Henderson MD  Comprehensive Stroke Center  Department of Vascular Neurology   [facility] Neurosurgery [facility]

## 2025-07-05 NOTE — PROGRESS NOTES
[facility] - Neurosurgery ([facility] Medicine  Progress Note    Patient Name: Katelyn Caba  MRN: [id number]  Patient Class: IP- Inpatient   Admission Date: 6/28/2025  Length of Stay: 7 days  Attending Physician: jT Grace MD  Primary Care Provider: Brooklyn Burnham MD        Subjective     Principal Problem:Embolic stroke involving right middle cerebral artery        HPI:  Patient is a 84F with BCC, HTN, HLD, h/o GIB, ISAIAH requiring frequent transfusions, HFpEF, CAD, L MCA CVA admitted 6/28 for SOB/AHRF. Chronic BL effusions. Diuresing with lasix 40 IV bid (pulm evaluated during admission, no need to perform thoracentesis). Supplemental O2 requirements gradually decreased with aggressive diuresis. TTE 6/30 showed LVEF 65% with G3DD and reduced RV systolic function (which is new).     Stroke code 7/1/25 for LSW/LFD (NIHSS 10). CTH acute R MCA territory ischemia, CTA with R M2 occlusion. Unclear LKW, therefore TNK not given. No thrombectomy due to core infarct. On assessment, patient reports improving dysarthria and weakness. Concerned about constipation and claustrophobia wrt MRI.     Transferred to stroke service on 7/1/2025. HM consulted to co-manage AHRF.     Overview/Hospital Course:  Admitted 6/28 for SOB and management of AHRF. BL pleural effusions and volume overloaded state d/t inadvertant discontinuation of home Lasix. Diuresed with IV lasix with improvement. However, on 7/1, patient developed acute LSW/LFD. Vascular Neurology evaluated, obtained CTH w/ acute R MCA territory ischemia, CTA with R M2 occlusion. No TNK given d/t OOW, no thrombectomy due to core infarct. MRI brain performed 7/2 showed mild subarachnoid hemorrhagic conversion. Given h/o GIB and preference to ultimately start AC, GI consulted for EGD/C-scope, determined that endoscopic interventions not needed while IP and that VN team can trial Eliquis when stable from neurologic standpoint. Transitioned to home oral regimen  Lasix 40 bid on 7/3 and later changed to 40mg daily on 7/5. Not requiring any supplemental O2 at present.    Interval History: Patient reports feeling well today and appetite improving. Good engagement with care team. Reduced subjective dyspnea and only used nasal prong O2 once last night. No other issues reported.      Objective:     Vital Signs (Most Recent):  Temp: 97.6 °F (36.4 °C) (07/05/25 0310)  Pulse: 73 (07/05/25 0810)  Resp: 20 (07/05/25 0310)  BP: (!) 114/55 (07/05/25 0810)  SpO2: 98 % (07/05/25 0810) Vital Signs (24h Range):  Temp:  [97.5 °F (36.4 °C)-98 °F (36.7 °C)] 97.9 °F (36.6 °C)  Pulse:  [71-84] 72  Resp:  [17-20] 18  SpO2:  [99 %-100 %] 99 %  BP: (115-143)/(61-70) 128/70     Weight: 47.5 kg (104 lb 11.5 oz)  Body mass index is 18.55 kg/m².    Intake/Output Summary (Last 24 hours) at 7/5/2025 1127  Last data filed at 7/5/2025 0529  Gross per 24 hour   Intake 100 ml   Output 580 ml   Net -480 ml         Physical Exam  Vitals and nursing note reviewed.   Constitutional:       General: She is not in acute distress.  HENT:      Head: Normocephalic.      Nose: Nose normal.      Mouth/Throat:      Mouth: Mucous membranes are moist.   Eyes:      General: Visual field deficit (L neglect) present.      Conjunctiva/sclera: Conjunctivae normal.      Comments: R gaze preference   Cardiovascular:      Rate and Rhythm: Normal rate.   Pulmonary:      Effort: Pulmonary effort is normal. No respiratory distress.      Breath sounds: Examination of the right-lower field reveals decreased breath sounds. Examination of the left-lower field reveals decreased breath sounds. Decreased breath sounds present. No wheezing or rales.   Abdominal:      General: There is no distension.   Musculoskeletal:         General: No deformity or signs of injury.      Cervical back: No rigidity.   Skin:     General: Skin is warm and dry.   Neurological:      Mental Status: She is alert and oriented to person, place, and time.       Cranial Nerves: Facial asymmetry present. No dysarthria.      Sensory: Sensory deficit present.      Motor: Weakness (LSW) present.   Psychiatric:         Attention and Perception: She is inattentive.         Behavior: Behavior is cooperative.            Significant Labs: All pertinent labs within the past 24 hours have been reviewed.  CBC:   Recent Labs   Lab 07/04/25 0456 07/05/25  0713   WBC 6.19 5.65   HGB 8.8* 9.9*   HCT 27.5* 31.8*   * 527*     CMP:   Recent Labs   Lab 07/04/25 0456 07/05/25  0713    141   K 3.6 3.7   CL 97 96   CO2 29 31*   GLU 92 92   BUN 21 22   CREATININE 0.7 0.7   CALCIUM 9.7 9.7   PROT 6.5 6.7   ALBUMIN 3.7 3.8   BILITOT 0.6 0.6   ALKPHOS 95 99   AST 24 30   ALT 11 19   ANIONGAP 12 14       Significant Imaging: I have reviewed all pertinent imaging results/findings within the past 24 hours.    Assessment/Plan:      #AHRF  #Bilateral pleural effusions, chronic  #HFpEF  AHRF likely from CHF exacerbation caused by suboptimal diruesis given new RV dysunfction (patients notably prone to volume overload given poor compensatory mechansisms by RV). Pulm evaluated on 6/29 for thoracentesis, determined that continued diuresis would be sufficient given chronicity. Diuresed well with Lasix 40mg IV bid. Now with  mild contraction alkalosis. Improving O2 requirement. ADHF d/t inadvertently not taking home Lasix (accidentally held instead of Plavix).      Home regimen: Lasix 20 bid, Lipitor 80, Zetia 10, Plavix 75, Jardiance 10     - Change to Lasix 40mg Po daily 7/5  - Weaned off supplemental O2  - I/O monitoring     #R MCA CVA  #previous L MCA CVA  Now with LSW and some dysarthria. Improving. Slight hemorrhagic conversion on MRI brain.      - AP agents per stroke service  - Timing of AC start per primary, no need for scope per GI     #ISAIAH  - Monitor CBC daily  - Completed Feraheme 6/30     VTE Risk Mitigation (From admission, onward)           Ordered     apixaban tablet 2.5 mg  2  times daily         07/05/25 1112     IP VTE HIGH RISK PATIENT  Once         06/28/25 0936                    Discharge Planning   TERE: 7/8/2025     Code Status: Full Code   Medical Readiness for Discharge Date: 7/5/2025  Discharge Plan A: Rehab        Please place Justification for DME      Juan J Russo MD  Department of Hospital Medicine   [facility] - Neurosurgery ([facility])

## 2025-07-05 NOTE — PLAN OF CARE
Problem: Adult Inpatient Plan of Care  Goal: Plan of Care Review  Outcome: Progressing  Goal: Patient-Specific Goal (Individualized)  Outcome: Progressing  Goal: Absence of Hospital-Acquired Illness or Injury  Outcome: Progressing  Goal: Optimal Comfort and Wellbeing  Outcome: Progressing  Goal: Readiness for Transition of Care  Outcome: Progressing     Problem: Infection  Goal: Absence of Infection Signs and Symptoms  Outcome: Progressing     Problem: Fall Injury Risk  Goal: Absence of Fall and Fall-Related Injury  Outcome: Progressing     Problem: Skin Injury Risk Increased  Goal: Skin Health and Integrity  Outcome: Progressing         POC updated and reviewed with the patient at the bedside. Questions regarding POC were encouraged and addressed. VSS, see flowsheets. Tele maintained per provider's order. Patient is AOX 4 at this time.  Seizure, fall, and safety precautions maintained, no signs of injury noted during shift. Patient repositioned independently in bed for comfort. Upon exiting room, patient's bed locked in low position, side rails up x 3, bed alarm on, with call light within reach. Instructed patient to call staff for mobility, verbalized understanding. Stroke book and stroke education reviewed with the patient at the bedside, see education flowsheets for details. No acute signs of distress noted at this time.

## 2025-07-05 NOTE — PLAN OF CARE
07/05/25 1146   Post-Acute Status   Post-Acute Authorization Placement   Post-Acute Placement Status Referrals Sent   Discharge Plan   Discharge Plan A Rehab   Discharge Plan B Skilled Nursing Facility;Home Health     Met with patient and family at bed side to review discharge recommendation of post acute placement and is agreeable to plan    Patient/family provided list of facilities in-network with patient's payor plan. Providers that are owned, operated, or affiliated with [facility] are included on the list.     Notified that referral sent to below listed facilities from in-network list based on proximity to home/family support:   [facility]    Patient/family instructed to identify preference.    Preferred Facility: (if more than 1, listed in order of descending preference)  [facility]    If an additional preferred facility not listed above is identified, additional referral to be sent. If above facilities unable to accept, will send additional referrals to in-network providers.    PM&R ordered.

## 2025-07-06 VITALS
OXYGEN SATURATION: 96 % | WEIGHT: 104.75 LBS | RESPIRATION RATE: 18 BRPM | DIASTOLIC BLOOD PRESSURE: 70 MMHG | SYSTOLIC BLOOD PRESSURE: 117 MMHG | TEMPERATURE: 98 F | HEART RATE: 73 BPM | BODY MASS INDEX: 18.56 KG/M2 | HEIGHT: 63 IN

## 2025-07-06 LAB
ABSOLUTE EOSINOPHIL (OHS): 0.16 K/UL
ABSOLUTE MONOCYTE (OHS): 0.78 K/UL (ref 0.3–1)
ABSOLUTE NEUTROPHIL COUNT (OHS): 3.42 K/UL (ref 1.8–7.7)
ALBUMIN SERPL BCP-MCNC: 3.7 G/DL (ref 3.5–5.2)
ALP SERPL-CCNC: 94 UNIT/L (ref 40–150)
ALT SERPL W/O P-5'-P-CCNC: 20 UNIT/L (ref 10–44)
ANION GAP (OHS): 11 MMOL/L (ref 8–16)
AST SERPL-CCNC: 32 UNIT/L (ref 11–45)
BASOPHILS # BLD AUTO: 0.08 K/UL
BASOPHILS NFR BLD AUTO: 1.4 %
BILIRUB SERPL-MCNC: 0.6 MG/DL (ref 0.1–1)
BUN SERPL-MCNC: 26 MG/DL (ref 8–23)
CALCIUM SERPL-MCNC: 9.6 MG/DL (ref 8.7–10.5)
CHLORIDE SERPL-SCNC: 95 MMOL/L (ref 95–110)
CO2 SERPL-SCNC: 32 MMOL/L (ref 23–29)
CREAT SERPL-MCNC: 0.7 MG/DL (ref 0.5–1.4)
ERYTHROCYTE [DISTWIDTH] IN BLOOD BY AUTOMATED COUNT: 16.1 % (ref 11.5–14.5)
GFR SERPLBLD CREATININE-BSD FMLA CKD-EPI: >60 ML/MIN/1.73/M2
GLUCOSE SERPL-MCNC: 100 MG/DL (ref 70–110)
HCT VFR BLD AUTO: 30 % (ref 37–48.5)
HGB BLD-MCNC: 9.4 GM/DL (ref 12–16)
IMM GRANULOCYTES # BLD AUTO: 0 K/UL (ref 0–0.04)
IMM GRANULOCYTES NFR BLD AUTO: 0 % (ref 0–0.5)
LYMPHOCYTES # BLD AUTO: 1.32 K/UL (ref 1–4.8)
MAGNESIUM SERPL-MCNC: 2.4 MG/DL (ref 1.6–2.6)
MCH RBC QN AUTO: 27.1 PG (ref 27–31)
MCHC RBC AUTO-ENTMCNC: 31.3 G/DL (ref 32–36)
MCV RBC AUTO: 87 FL (ref 82–98)
NUCLEATED RBC (/100WBC) (OHS): 0 /100 WBC
PHOSPHATE SERPL-MCNC: 3.4 MG/DL (ref 2.7–4.5)
PLATELET # BLD AUTO: 534 K/UL (ref 150–450)
PMV BLD AUTO: 9.5 FL (ref 9.2–12.9)
POTASSIUM SERPL-SCNC: 3.7 MMOL/L (ref 3.5–5.1)
PROT SERPL-MCNC: 6.6 GM/DL (ref 6–8.4)
RBC # BLD AUTO: 3.47 M/UL (ref 4–5.4)
RELATIVE EOSINOPHIL (OHS): 2.8 %
RELATIVE LYMPHOCYTE (OHS): 22.9 % (ref 18–48)
RELATIVE MONOCYTE (OHS): 13.5 % (ref 4–15)
RELATIVE NEUTROPHIL (OHS): 59.4 % (ref 38–73)
SODIUM SERPL-SCNC: 138 MMOL/L (ref 136–145)
WBC # BLD AUTO: 5.76 K/UL (ref 3.9–12.7)

## 2025-07-06 PROCEDURE — 25000003 PHARM REV CODE 250: Performed by: NURSE PRACTITIONER

## 2025-07-06 PROCEDURE — 99900035 HC TECH TIME PER 15 MIN (STAT)

## 2025-07-06 PROCEDURE — 25000003 PHARM REV CODE 250

## 2025-07-06 PROCEDURE — 94761 N-INVAS EAR/PLS OXIMETRY MLT: CPT

## 2025-07-06 PROCEDURE — 84100 ASSAY OF PHOSPHORUS: CPT

## 2025-07-06 PROCEDURE — 83735 ASSAY OF MAGNESIUM: CPT

## 2025-07-06 PROCEDURE — 36415 COLL VENOUS BLD VENIPUNCTURE: CPT

## 2025-07-06 PROCEDURE — 80053 COMPREHEN METABOLIC PANEL: CPT

## 2025-07-06 PROCEDURE — 85025 COMPLETE CBC W/AUTO DIFF WBC: CPT

## 2025-07-06 RX ORDER — FUROSEMIDE 40 MG/1
40 TABLET ORAL DAILY
Status: ON HOLD
Start: 2025-07-06 | End: 2026-07-06

## 2025-07-06 RX ORDER — FUROSEMIDE 40 MG/1
40 TABLET ORAL DAILY
Qty: 30 TABLET | Refills: 11 | Status: SHIPPED | OUTPATIENT
Start: 2025-07-06 | End: 2025-07-06

## 2025-07-06 RX ORDER — SPIRONOLACTONE 25 MG/1
25 TABLET ORAL DAILY
Status: ON HOLD
Start: 2025-07-06 | End: 2025-10-04

## 2025-07-06 RX ADMIN — ATORVASTATIN CALCIUM 80 MG: 40 TABLET, FILM COATED ORAL at 10:07

## 2025-07-06 RX ADMIN — APIXABAN 2.5 MG: 2.5 TABLET, FILM COATED ORAL at 10:07

## 2025-07-06 RX ADMIN — CYANOCOBALAMIN TAB 1000 MCG 1000 MCG: 1000 TAB at 10:07

## 2025-07-06 RX ADMIN — PANTOPRAZOLE SODIUM 40 MG: 20 TABLET, DELAYED RELEASE ORAL at 10:07

## 2025-07-06 RX ADMIN — Medication 1000 UNITS: at 10:07

## 2025-07-06 RX ADMIN — SPIRONOLACTONE 25 MG: 25 TABLET, FILM COATED ORAL at 10:07

## 2025-07-06 RX ADMIN — POLYETHYLENE GLYCOL 3350 17 G: 17 POWDER, FOR SOLUTION ORAL at 10:07

## 2025-07-06 RX ADMIN — EZETIMIBE 10 MG: 10 TABLET ORAL at 10:07

## 2025-07-06 RX ADMIN — CLOPIDOGREL 75 MG: 75 TABLET ORAL at 10:07

## 2025-07-06 RX ADMIN — MUPIROCIN: 20 OINTMENT TOPICAL at 10:07

## 2025-07-06 RX ADMIN — FUROSEMIDE 40 MG: 40 TABLET ORAL at 10:07

## 2025-07-06 NOTE — PROGRESS NOTES
[facility] - Neurosurgery ([facility])  Vascular Neurology  Comprehensive Stroke Center  Progress Note    Assessment/Plan:     * Embolic stroke involving right middle cerebral artery  Katelyn Caba is a 84 y.o. female with PMH of HTN, HLD, aortic atherosclerosis, hx of GI bleeds, ISAIAH requiring frequent transfusions, HFpEF, CAD, prior R MCA stroke (2/2025), BCC, former smoker (quit 50 years ago) that presented to [facility] ED 6/28/25 with SOB and found to have bilateral pleural effusions on CXR for which she was admitted to  for further eval and management of hypoxia. See  notes for details of hospital course.     Stroke code activated 7/1/25 for new severe L sided weakness and LFD. LKW unclear, sometime evening of 6/30. Family at bedside reports around 5:30am 7/1/25 noticing patient having LSW and needing assistance to ambulate to the bathroom. Sometime later in the morning of 7/1/25 patient's nurse noticed patient was unable to move L side, prompting stroke code activation. Neuro exam significant for R gaze preference, L hemiparesis (arm>>leg), L facial droop, expressive aphasia, dysarthria, and inattention to L side. NIHSS 10. CTH showed acute ischemic changes in R MCA territory, CTA revealed R M2 occlusion. Determined not a candidate for acute stroke interventions, OOW for TNK and no thrombectomy due to core infarct on CTH.  Patient will be transferred to  primary service for ongoing stroke workup, will consult  for co-management of active medical comorbidities.    Left MCA stroke in 2022, initially had language issues that resolved but had right inferior quadrantanopsia. Discharged on DAPT, then aspirin monotherapy.     Right MCA stroke in 2/2025: left sided weakness, left facial droop, and dysarthria. CTA with right M1 occlusion. s/p TNK and DSA w/o thrombectomy as lesion migrated to distal MCA branches after TNK administered. Echo unremarkable.     07/06/2025: Pt on eliquis 2.5 mg bid. Discontinued plavix.  Pt is stable for discharge to [facility] rehab.       Antithrombotics for secondary stroke prevention: Anticoagulants: Apixaban 2.5 mg BID     Statins for secondary stroke prevention and hyperlipidemia, if present: Statins: Atorvastatin- 80 mg daily  Zetia 10mg daily    Aggressive risk factor modification: HTN, HLD, Diet, Exercise, CAD     Rehab efforts: The patient has been evaluated by a stroke team provider and the therapy needs have been fully considered based off the presenting complaints and exam findings. The following therapy evaluations are needed: PT evaluate and treat, OT evaluate and treat, SLP evaluate and treat    Diagnostics ordered/pending:     VTE prophylaxis: Enoxaparin (CrCl < 30 ml/min) 30 mg SQ every 24 hours  Mechanical prophylaxis: Place SCDs    BP parameters: Infarct: No intervention, SBP <220  ICH: SBP Goal Range 130-150        Bilateral pleural effusion  -Admitted to  primary service on 6/28 for ongoing workup and management  -IV diuresis started with lasix 40mg BID  -Pulm consulted and rec'd continue aggressive diuresis, no indication for thoracentesis at this time; per pulm note: no signs or symptoms of infection at this time, pleural effusions have been present for months and fluid on bedside ultrasound appears simple; with elevation in BNP pleural effusions likely in the setting of heart failure and volume overload, based on the TAP-IT trial, thoracentesis in the setting of effusions secondary to heart failure would not shorten the length of hospital stay when compared to diuresis alone      consulted for co-management now that patient was transferred to  primary service    CAD (coronary artery disease)  -Stroke risk factor  -Continue plavix, statin, zetia    (HFpEF) heart failure with preserved ejection fraction  -Stroke risk factor  -TTE with EF 55-60%, no WMA  -Strict I/Os and dialy weights  -Fluid restriction of 1.5L   -Continue lasix  - consulted for co-management, appreciate  recs    Hypertension  -Stroke risk factor  -SBP goal <140, maintain MAP >65  -BP range in the last 24 hrs: BP  Min: 115/61  Max: 143/61  -Current antihypertensive regimen:   --PRN labetalol/hydralazine    Acute hypoxic respiratory failure  Likely due to volume overload. SOB improved after diuresis.     Plan  - Continue diuresis till euvolemic     Gastric AVM  Pt with history of gastric AVM on EGD 3/5/2025 which were treated. Spoke with GI of possible anticoagulation in setting of AVM.     Plan  Per GI: Endoscopic intervention may not catch all AVMS/ Angiectasias and given stable hemoglobin and vitals, okay to proceed with trial of anticoagulation and monitor.     Iron deficiency anemia due to chronic blood loss  Anemia is likely due to acute blood loss which was from GI bleeds. Most recent hemoglobin and hematocrit are listed below.  Recent Labs     07/02/25  0848 07/03/25  0559 07/04/25  0456   HGB 8.6* 9.1* 8.8*   HCT 27.4* 28.7* 27.5*       Plan  - Monitor serial CBC: Daily  - Transfuse PRBC if patient becomes hemodynamically unstable, symptomatic or H/H drops below 7/21.  - Patient has not received any PRBC transfusions to date  - Patient's anemia is currently stable  - Continue PPI  - Given Ferraheme 1020mg IV x1 per hematology on 6/30  -  consulted for co-management, appreciate assistance    Mixed hyperlipidemia  -Stroke risk factor  -Lipid panel pending, goal <70  -Atorvastatin 80mg and zetia 10mg daily         84 y.o. female with PMH of HTN, HLD, inferolateral STEMI (4/2024, s/p cath with no intervention, hx of GI bleeds with AVM of stomach, ISAIAH requiring frequent transfusions, HFpEF, CAD, prior R MCA stroke (2/2025), BCC, former smoker (quit 50 years ago) that presented to [facility] ED 6/28/25 with SOB and found to have bilateral pleural effusions on CXR for which she was admitted to  for further eval and management of hypoxia.     On  7/1, pt with new severe left sided weakness and left facial droop. Code  stroke activated. CTH showed acute ischemic changes in R MCA territory, CTA revealed R M2 occlusion. On plavix prior from previous stroke in 2/2025. With her multiple episodes of strokes, despite being on anti-platelets, might need to switch to DOAC. However, pt has history of gastric avms and GI bleed. HgB stable on Plavix. Consulted GI on whether its okay to anticoagulate or not.     07/02/2025 Brain MRI: Re demonstration of moderate sized acute infarct in the right temporal lobe, with small component extending into the right parietal and frontal lobes.  There has been interval hemorrhagic transformation with subarachnoid extension.     07/04/2025: Plan for repeat CT head tomorrow to determine if we want to start anticoagulation  07/05/2025: Repeat CT head w/o any hemorrhage. Started eliquis 2.5 mg bid     STROKE DOCUMENTATION   Acute Stroke Times   Last Known Normal Date: 06/30/25  Unknown Normal Time: Unknown Time  Symptom Onset Time:  (found with symptoms around 530am 7/1/25)  Stroke Team Called Date: 07/01/25  Stroke Team Called Time: 1108  Stroke Team Arrival Date: 07/01/25  Stroke Team Arrival Time: 1111  CT Interpretation Time: 1133  Thrombolytic Therapy Recommended: No  CTA Interpretation Time: 1133  Thrombectomy Recommended: No (large core infarct on CTH)    NIH Scale:  1a. Level of Consciousness: 0-->Alert, keenly responsive  1b. LOC Questions: 0-->Answers both questions correctly  1c. LOC Commands: 0-->Performs both tasks correctly  2. Best Gaze: 1-->Partial gaze palsy, gaze is abnormal in one or both eyes, but forced deviation or total gaze paresis is not present  3. Visual: 0-->No visual loss  4. Facial Palsy: 0-->Normal symmetrical movements  5a. Motor Arm, Left: 1-->Drift, limb holds 90 (or 45) degrees, but drifts down before full 10 seconds, does not hit bed or other support  5b. Motor Arm, Right: 0-->No drift, limb holds 90 (or 45) degrees for full 10 secs  6a. Motor Leg, Left: 1-->Drift, leg  falls by the end of the 5-sec period but does not hit bed  6b. Motor Leg, Right: 0-->No drift, leg holds 30 degree position for full 5 secs  7. Limb Ataxia: 0-->Absent  8. Sensory: 1-->Mild-to-moderate sensory loss, patient feels pinprick is less sharp or is dull on the affected side, or there is a loss of superficial pain with pinprick, but patient is aware of being touched  9. Best Language: 0-->No aphasia, normal  10. Dysarthria: 0-->Normal  11. Extinction and Inattention (formerly Neglect): 0-->No abnormality  Total (NIH Stroke Scale): 4       Modified [place] Score: 0  Gabriel Coma Scale:    ABCD2 Score:    [id number] Score:   HAS -BLED Score:   ICH Score:   Hunt & Hoffman Classification:      Hemorrhagic change of an Ischemic Stroke: Does this patient have an ischemic stroke with hemorrhagic changes? No     Neurologic Chief Complaint: R MCA stroke    Subjective:     Interval History: Patient is seen for follow-up neurological assessment and treatment recommendations:     HPI, Past Medical, Family, and Social History remains the same as documented in the initial encounter.     Review of Systems  Scheduled Meds:   apixaban  2.5 mg Oral BID    atorvastatin  80 mg Oral Daily    cyanocobalamin  1,000 mcg Oral Daily    ezetimibe  10 mg Oral Daily    furosemide  40 mg Oral Daily    mupirocin   Nasal BID    pantoprazole  40 mg Oral Daily    polyethylene glycol  17 g Oral BID    spironolactone  25 mg Oral Daily    vitamin D  1,000 Units Oral Daily     Continuous Infusions:  PRN Meds:  Current Facility-Administered Medications:     acetaminophen, 650 mg, Oral, Q6H PRN    albuterol-ipratropium, 3 mL, Nebulization, Q4H PRN    aluminum-magnesium hydroxide-simethicone, 30 mL, Oral, QID PRN    melatonin, 6 mg, Oral, Nightly PRN    naloxone, 0.02 mg, Intravenous, PRN    ondansetron, 8 mg, Oral, Q8H PRN    prochlorperazine, 5 mg, Intravenous, Q6H PRN    simethicone, 1 tablet, Oral, QID PRN    sodium chloride 0.9%, 5 mL,  "Intravenous, PRN    Objective:     Vital Signs (Most Recent):  Temp: 97.9 °F (36.6 °C) (07/06/25 0822)  Pulse: 72 (07/06/25 0822)  Resp: 18 (07/06/25 0822)  BP: 117/70 (07/06/25 0822)  SpO2: 100 % (07/06/25 0822)  BP Location: Left arm    Vital Signs Range (Last 24H):  Temp:  [97.4 °F (36.3 °C)-98.1 °F (36.7 °C)]   Pulse:  [72-82]   Resp:  [16-18]   BP: (104-132)/(46-70)   SpO2:  [97 %-100 %]   BP Location: Left arm       Physical Exam  Vitals and nursing note reviewed.   Pulmonary:      Effort: Pulmonary effort is normal.      Breath sounds: Normal breath sounds.      Comments:     Neurological:      Mental Status: She is alert.              Neurological Exam:   LOC: alert  Language: No aphasia  Articulation: No dysarthria  Orientation: Person, Place, Time   Visual Fields: Visual neglect  Motor: Arm left  Paresis: 4/5  Leg left  Paresis: 4/5  Arm right  Normal 5/5  Leg right Normal 5/5  Sensation: Jean Paul-hypoesthesia left    Laboratory:  CMP:   Recent Labs   Lab 07/06/25  0414   CALCIUM 9.6   ALBUMIN 3.7   PROT 6.6      K 3.7   CO2 32*   CL 95   BUN 26*   CREATININE 0.7   ALKPHOS 94   ALT 20   AST 32   BILITOT 0.6     Lipid Panel:   Recent Labs   Lab 07/02/25  0848   CHOL 140   LDLCALC 72.8   HDL 45   TRIG 111     Hgb A1C:   Recent Labs   Lab 07/02/25  0848   HGBA1C 4.3     TSH:   No results for input(s): "TSH" in the last 168 hours.      Diagnostic Results     Brain Imaging   CT Head w/o Contrast. 07/05/2025  Evolving moderate right posterior temporal infarction. No hyperdensity to correlate with questioned hemorrhagic transformation seen on MRI which may be related to underlying anemia or in part artifactual on MRI with iron supplementation to be considered. Clinical correlation advised   MRI brain 07/02/2025  Redemonstration of moderate sized acute infarct in the right temporal lobe, with small component extending into the right parietal and frontal lobes.  There has been interval hemorrhagic transformation " with subarachnoid extension.  Follow-up with noncontrast CT scan of the head may be obtained.     Abnormal signal of the SWI sequences, most likely secondary to scanning parameters.    Vessel Imaging   07/01/2025  CTA Head and Neck: Moderate size acute infarct centered in the right temporal lobe.  No acute intracranial hemorrhage.     Proximal right M2 occlusion and subtotal occlusion vs stenosis in right M3 branch.    Cardiac Imaging     Left Ventricle: The left ventricle is normal in size. Normal wall thickness. There is concentric remodeling. There is normal systolic function with a visually estimated ejection fraction of 60 - 65%. Grade III diastolic dysfunction.    Right Ventricle: The right ventricle is normal in size measuring 2.8 cm. Wall thickness is normal. Systolic function is reduced.    Left Atrium: The left atrium is severely dilated    Aortic Valve: The aortic valve is a trileaflet valve. There is mild aortic valve sclerosis.    Mitral Valve: There is mild regurgitation.    Pulmonary Artery: The estimated pulmonary artery systolic pressure is 37 mmHg.    IVC/SVC: Normal venous pressure at 3 mmHg.    Juan M Henderson MD  Comprehensive Stroke Center  Department of Vascular Neurology   [facility])

## 2025-07-06 NOTE — CONSULTS
Inpatient consult to Physical Medicine Rehab  Consult performed by: Savanna Kendall NP  Consult ordered by: Tj Grace MD      Consult received.      Savanna Kendall NP  Physical Medicine & Rehabilitation   07/06/2025

## 2025-07-06 NOTE — DISCHARGE INSTRUCTIONS
Please take lasix 40 mg daily. Stopped Plavix. Started on Eliquis 2.5 mg BID.    Please follow up with Vascular Neurology in 2-3 weeks.

## 2025-07-06 NOTE — PLAN OF CARE
[facility]    FACILITY TRANSFER ORDERS      Patient Name: Katelyn Caba  YOB: 1940    PCP: Brooklyn Burnham MD   PCP Address: [address] / [place] [address]  PCP Phone Number: [phone]  PCP Fax: [phone]    Encounter Date: 07/06/2025    Admit to: [facility]    Vital Signs:  Routine    Diagnoses:   Active Hospital Problems    Diagnosis  POA    *Embolic stroke involving right middle cerebral artery [I63.411]  Yes    Enlarged LA (left atrium) [I51.7]  Yes    Bilateral pleural effusion [J90]  Yes    Elevated troponin [R79.89]  Yes    (HFpEF) heart failure with preserved ejection fraction [I50.30]  Yes    CAD (coronary artery disease) [I25.10]  Yes    Moderate protein-calorie malnutrition [E44.0]  Yes    Hypertension [I10]  Yes    Solitary pulmonary nodule [R91.1]  Yes    Iron deficiency anemia due to chronic blood loss [D50.0]  Yes    Acute hypoxic respiratory failure [J96.01]  Yes    Gastric AVM [K31.819]  Yes     EGD 3-5-25   - Esophageal mucosal changes secondary to  established long-segment Rodriguez's disease,  classified as Rodriguez's stage C10-M10 per [place] criteria.    3 cm hiatal hernia.   - One non-bleeding angioectasia in the stomach. Treated with argon plasma coagulation (APC).   - Two non-bleeding angioectasias in the duodenum.Treated with argon plasma coagulation (APC).       Mixed hyperlipidemia [E78.2]  Yes    H/O ischemic left MCA stroke [Z86.73]  Not Applicable      Resolved Hospital Problems    Diagnosis Date Resolved POA    Facial droop [R29.810] 07/01/2025 Yes       Allergies:  Review of patient's allergies indicates:   Allergen Reactions    Jardiance [empagliflozin]        Diet: cardiac diet    Activities: Activity as tolerated    Goals of Care Treatment Preferences:  Code Status: Full Code      Nursing: Per facility     Labs: CBC per facility, monitor hgb      CONSULTS:    Physical Therapy to evaluate and treat.  and Occupational  Therapy to evaluate and treat.    MISCELLANEOUS CARE:  Per facility    WOUND CARE ORDERS  None    Medications: Review discharge medications with patient and family and provide education.         Medication List        START taking these medications      apixaban 2.5 mg Tab  Commonly known as: ELIQUIS  Take 1 tablet (2.5 mg total) by mouth 2 (two) times daily.     spironolactone 25 MG tablet  Commonly known as: ALDACTONE  Take 1 tablet (25 mg total) by mouth once daily.            CHANGE how you take these medications      furosemide 40 MG tablet  Commonly known as: LASIX  Take 1 tablet (40 mg total) by mouth once daily.  What changed:   medication strength  how much to take  when to take this            CONTINUE taking these medications      atorvastatin 80 MG tablet  Commonly known as: LIPITOR  Take 1 tablet (80 mg total) by mouth once daily.     cyanocobalamin 1000 MCG tablet  Commonly known as: VITAMIN B-12  Take 1,000 mcg by mouth once daily.     ezetimibe 10 mg tablet  Commonly known as: ZETIA  Take 1 tablet (10 mg total) by mouth once daily.     JARDIANCE 10 mg tablet  Generic drug: empagliflozin  Take 10 mg by mouth once daily.     pantoprazole 40 MG tablet  Commonly known as: PROTONIX  Take 1 tablet (40 mg total) by mouth once daily. Take 1 tablet by mouth every day     potassium chloride SA 20 MEQ tablet  Commonly known as: K-DUR,KLOR-CON  Take 1 tablet (20 mEq total) by mouth once daily.     vitamin D 1000 units Tab  Commonly known as: VITAMIN D3  Take 1,000 Units by mouth once daily.            STOP taking these medications      clopidogreL 75 mg tablet  Commonly known as: PLAVIX                Immunizations Administered as of 7/6/2025       No immunizations on file.            This patient has had both covid vaccinations    Some patients may experience side effects after vaccination.  These may include fever, headache, muscle or joint aches.  Most symptoms resolve with 24-48 hours and do not require  urgent medical evaluation unless they persist for more than 72 hours or symptoms are concerning for an unrelated medical condition.          _________________________________  Juan M Henderson MD  07/06/2025

## 2025-07-06 NOTE — SUBJECTIVE & OBJECTIVE
Neurologic Chief Complaint: R MCA stroke    Subjective:     Interval History: Patient is seen for follow-up neurological assessment and treatment recommendations:     HPI, Past Medical, Family, and Social History remains the same as documented in the initial encounter.     Review of Systems  Scheduled Meds:   apixaban  2.5 mg Oral BID    atorvastatin  80 mg Oral Daily    cyanocobalamin  1,000 mcg Oral Daily    ezetimibe  10 mg Oral Daily    furosemide  40 mg Oral Daily    mupirocin   Nasal BID    pantoprazole  40 mg Oral Daily    polyethylene glycol  17 g Oral BID    spironolactone  25 mg Oral Daily    vitamin D  1,000 Units Oral Daily     Continuous Infusions:  PRN Meds:  Current Facility-Administered Medications:     acetaminophen, 650 mg, Oral, Q6H PRN    albuterol-ipratropium, 3 mL, Nebulization, Q4H PRN    aluminum-magnesium hydroxide-simethicone, 30 mL, Oral, QID PRN    melatonin, 6 mg, Oral, Nightly PRN    naloxone, 0.02 mg, Intravenous, PRN    ondansetron, 8 mg, Oral, Q8H PRN    prochlorperazine, 5 mg, Intravenous, Q6H PRN    simethicone, 1 tablet, Oral, QID PRN    sodium chloride 0.9%, 5 mL, Intravenous, PRN    Objective:     Vital Signs (Most Recent):  Temp: 97.9 °F (36.6 °C) (07/06/25 0822)  Pulse: 72 (07/06/25 0822)  Resp: 18 (07/06/25 0822)  BP: 117/70 (07/06/25 0822)  SpO2: 100 % (07/06/25 0822)  BP Location: Left arm    Vital Signs Range (Last 24H):  Temp:  [97.4 °F (36.3 °C)-98.1 °F (36.7 °C)]   Pulse:  [72-82]   Resp:  [16-18]   BP: (104-132)/(46-70)   SpO2:  [97 %-100 %]   BP Location: Left arm       Physical Exam  Vitals and nursing note reviewed.   Pulmonary:      Effort: Pulmonary effort is normal.      Breath sounds: Normal breath sounds.      Comments: On 1L O2    Neurological:      Mental Status: She is alert.              Neurological Exam:   LOC: alert  Language: No aphasia  Articulation: No dysarthria  Orientation: Person, Place, Time   Visual Fields: Visual neglect  Motor: Arm left   [FreeTextEntry1] : Pt with  multiple fibroids and menorrhagia negative endo bx presents for surgical consult desires definitive surgical tx. "Paresis: 4/5  Leg left  Paresis: 4/5  Arm right  Normal 5/5  Leg right Normal 5/5  Sensation: Jean Paul-hypoesthesia left    Laboratory:  CMP:   Recent Labs   Lab 07/06/25  0414   CALCIUM 9.6   ALBUMIN 3.7   PROT 6.6      K 3.7   CO2 32*   CL 95   BUN 26*   CREATININE 0.7   ALKPHOS 94   ALT 20   AST 32   BILITOT 0.6     Lipid Panel:   Recent Labs   Lab 07/02/25  0848   CHOL 140   LDLCALC 72.8   HDL 45   TRIG 111     Hgb A1C:   Recent Labs   Lab 07/02/25  0848   HGBA1C 4.3     TSH:   No results for input(s): "TSH" in the last 168 hours.      Diagnostic Results     Brain Imaging   MRI brain 07/02/2025  Redemonstration of moderate sized acute infarct in the right temporal lobe, with small component extending into the right parietal and frontal lobes.  There has been interval hemorrhagic transformation with subarachnoid extension.  Follow-up with noncontrast CT scan of the head may be obtained.     Abnormal signal of the SWI sequences, most likely secondary to scanning parameters.    Vessel Imaging   07/01/2025  CTA Head and Neck: Moderate size acute infarct centered in the right temporal lobe.  No acute intracranial hemorrhage.     Proximal right M2 occlusion and subtotal occlusion vs stenosis in right M3 branch.    Cardiac Imaging     Left Ventricle: The left ventricle is normal in size. Normal wall thickness. There is concentric remodeling. There is normal systolic function with a visually estimated ejection fraction of 60 - 65%. Grade III diastolic dysfunction.    Right Ventricle: The right ventricle is normal in size measuring 2.8 cm. Wall thickness is normal. Systolic function is reduced.    Left Atrium: The left atrium is severely dilated    Aortic Valve: The aortic valve is a trileaflet valve. There is mild aortic valve sclerosis.    Mitral Valve: There is mild regurgitation.    Pulmonary Artery: The estimated pulmonary artery systolic pressure is 37 mmHg.    IVC/SVC: Normal venous pressure at 3 mmHg.  "

## 2025-07-06 NOTE — PLAN OF CARE
Problem: Adult Inpatient Plan of Care  Goal: Plan of Care Review  Outcome: Progressing  Goal: Patient-Specific Goal (Individualized)  Outcome: Progressing  Goal: Absence of Hospital-Acquired Illness or Injury  Outcome: Progressing  Goal: Optimal Comfort and Wellbeing  Outcome: Progressing  Goal: Readiness for Transition of Care  Outcome: Progressing     Problem: Infection  Goal: Absence of Infection Signs and Symptoms  Outcome: Progressing     Problem: Fall Injury Risk  Goal: Absence of Fall and Fall-Related Injury  Outcome: Progressing     Problem: Skin Injury Risk Increased  Goal: Skin Health and Integrity  Outcome: Progressing  POC updated and reviewed with the patient/family at the bedside. Questions regarding POC were encouraged and addressed. VSS, see flowsheets. Tele maintained per provider's order. Patient is AOX 4 at this time. NAEON. Seizure, fall, and safety precautions maintained, no signs of injury noted during shift. Patient repositioned independently/ with assistance in bed for comfort. Upon exiting room, patient's bed locked in low position, side rails up x 4, bed alarm and  at bedside , with call light within reach. Instructed patient to call staff for mobility, verbalized understanding. Stroke book and stroke education reviewed with the patient/ family at the bedside, see education flowsheets for details. No acute signs of distress noted at this time.

## 2025-07-06 NOTE — ASSESSMENT & PLAN NOTE
Katelyn Caba is a 84 y.o. 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 See  notes for details of hospital course.     Stroke code activated 7/1/25 for new severe L sided weakness and LFD. LKW unclear, sometime evening of 6/30. Family at bedside reports around 5:30am 7/1/25 noticing patient having LSW and needing assistance to ambulate to the bathroom. Sometime later in the morning of 7/1/25 patient's nurse noticed patient was unable to move L side, prompting stroke code activation. Neuro exam significant for R gaze preference, L hemiparesis (arm>>leg), L facial droop, expressive aphasia, dysarthria, and inattention to L side. NIHSS 10. CTH showed acute ischemic changes in R MCA territory, CTA revealed R M2 occlusion. Determined not a candidate for acute stroke interventions, OOW for TNK and no thrombectomy due to core infarct on CTH.  Patient will be transferred to  primary service for ongoing stroke workup, will consult  for co-management of active medical comorbidities.    Left MCA stroke in 2022, initially had language issues that resolved but had right inferior quadrantanopsia. Discharged on DAPT, then aspirin monotherapy.     Right MCA stroke in 2/2025: left sided weakness, left facial droop, and dysarthria. CTA with right M1 occlusion. s/p TNK and DSA w/o thrombectomy as lesion migrated to distal MCA branches after TNK administered. Echo unremarkable.     07/06/2025: Pt on eliquis 2.5 mg bid. Discontinued plavix. Pt is stable for discharge to [facility] rehab.       Antithrombotics for secondary stroke prevention: Anticoagulants: Apixaban 2.5 mg BID     Statins for secondary stroke prevention  and hyperlipidemia, if present: Statins: Atorvastatin- 80 mg daily  Zetia 10mg daily    Aggressive risk factor modification: HTN, HLD, Diet, Exercise, CAD     Rehab efforts: The patient has been evaluated by a stroke team provider and the therapy needs have been fully considered based off the presenting complaints and exam findings. The following therapy evaluations are needed: PT evaluate and treat, OT evaluate and treat, SLP evaluate and treat    Diagnostics ordered/pending:     VTE prophylaxis: Enoxaparin (CrCl < 30 ml/min) 30 mg SQ every 24 hours  Mechanical prophylaxis: Place SCDs    BP parameters: Infarct: No intervention, SBP <220  ICH: SBP Goal Range 130-150

## 2025-07-06 NOTE — PLAN OF CARE
[facility] ([facility])  Discharge Final Note    Primary Care Provider: Brooklyn Burnham MD    Expected Discharge Date: 7/6/2025    Final Discharge Note (most recent)       Final Note - 07/06/25 1059          Final Note    Assessment Type Final Discharge Note     Anticipated Discharge Disposition Rehab Facility     What phone number can be called within the next 1-3 days to see how you are doing after discharge? [phone]     Hospital Resources/Appts/Education Provided Provided patient/caregiver with written discharge plan information;Appointments scheduled and added to AVS        Post-Acute Status    Post-Acute Authorization Placement     Post-Acute Placement Status Set-up Complete/Auth obtained     Patient choice form signed by patient/caregiver List with quality metrics by geographic area provided     Discharge Delays None known at this time                     Important Message from Medicare             After-discharge care                Destination       *[facility]   Service: Inpatient Rehabilitation    [address]  [place] [address]   Phone: [phone]                     Patient marked medically ready and is agreeable to discharge. Patient to discharge to [facility] via [facility]. All necessary follow up appointments have been scheduled and added to patient AVS. No other case management needs at this time.     Future Appointments   Date Time Provider Department Center   7/9/2025  9:30 AM LAB, APPOINTMENT [facility]   7/9/2025 10:00 AM Marylou Fernandes PA-C [facility]   7/9/2025 10:00 AM [facility] HEART FAILURE NURSE [facility]       SHANE Camp, MSW  Case Management  [facility]

## 2025-07-06 NOTE — DISCHARGE SUMMARY
Thanh Williamson - Neurosurgery ([facility]  Vascular Neurology  Comprehensive Stroke Center  Discharge Summary     Summary:     Admit Date: 6/28/2025  7:13 AM    Discharge Date and Time: 07/06/2025 10:22 AM    Attending Physician: Tj Grace MD     Discharge Provider: Juan M Henderson MD    History of Present Illness: Katelyn Caba is a 84 y.o. 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 See  notes for details of hospital course.     Stroke code activated 7/1/25 for new severe L sided weakness and LFD. LKW unclear, sometime evening of 6/30. Family at bedside reports around 5:30am 7/1/25 noticing patient having LSW and needing assistance to ambulate to the bathroom. Sometime later in the morning of 7/1/25 patient's nurse noticed patient was unable to move L side, prompting stroke code activation. Neuro exam significant for R gaze preference, L hemiparesis (arm>>leg), L facial droop, expressive aphasia, dysarthria, and inattention to L side. NIHSS 10. CTH showed acute ischemic changes in R MCA territory, CTA revealed R M2 occlusion. Determined not a candidate for acute stroke interventions, OOW for TNK and no thrombectomy due to core infarct on CTH. Patient will be transferred to  primary service for ongoing stroke workup, will consult  for co-management of active medical comorbidities.    Left MCA stroke in 2022, initially had language issues that resolved but had right inferior quadrantanopsia. Discharged on DAPT, then aspirin monotherapy.     Right MCA stroke in 2/2025: left sided weakness, left facial droop, and dysarthria. CTA with right  M1 occlusion. s/p TNK and DSA w/o thrombectomy as lesion migrated to distal MCA branches after TNK administered. Echo unremarkable.                     Hospital Course (synopsis of major diagnoses, care, treatment, and services provided during the course of the hospital stay): 84 y.o. 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     On  7/1, pt with new severe left sided weakness and left facial droop. Code stroke activated. CTH showed acute ischemic changes in R MCA territory, CTA revealed R M2 occlusion. On plavix prior from previous stroke in 2/2025. With her multiple episodes of strokes, despite being on anti-platelets, might need to switch to DOAC. However, pt has history of gastric avms and GI bleed. HgB stable on Plavix. Consulted GI on whether its okay to anticoagulate or not.     07/02/2025 Brain MRI: Re demonstration of moderate sized acute infarct in the right temporal lobe, with small component extending into the right parietal and frontal lobes.  There has been interval hemorrhagic transformation with subarachnoid extension.     07/04/2025: Plan for repeat CT head tomorrow to determine if we want to start anticoagulation  07/05/2025: Repeat CT head w/o any hemorrhage. Started eliquis 2.5 mg bid   07/06/2025: On Eliquis 2.5 mg bid. HgB stable. Discontinued Plavix. Discharging to [facility].     Goals of Care Treatment Preferences:  Code Status: Full Code      Stroke Etiology: Ischemic Cardioembolic Embolic stroke to R MCA. Enlarged LA but no afib documented.    STROKE DOCUMENTATION   Acute Stroke Times   Last Known Normal Date:  06/30/25  Unknown Normal Time: Unknown Time  Symptom Onset Time:  (found with symptoms around 530am 7/1/25)  Stroke Team Called Date: 07/01/25  Stroke Team Called Time: 1108  Stroke Team Arrival Date: 07/01/25  Stroke Team Arrival Time: 1111  CT Interpretation Time: 1133  Thrombolytic Therapy Recommended: No  CTA Interpretation Time: 1133  Thrombectomy Recommended: No (large core infarct on CTH)     NIH Scale:  1a. Level of Consciousness: 0-->Alert, keenly responsive  1b. LOC Questions: 0-->Answers both questions correctly  1c. LOC Commands: 0-->Performs both tasks correctly  2. Best Gaze: 1-->Partial gaze palsy, gaze is abnormal in one or both eyes, but forced deviation or total gaze paresis is not present  3. Visual: 0-->No visual loss  4. Facial Palsy: 0-->Normal symmetrical movements  5a. Motor Arm, Left: 1-->Drift, limb holds 90 (or 45) degrees, but drifts down before full 10 seconds, does not hit bed or other support  5b. Motor Arm, Right: 0-->No drift, limb holds 90 (or 45) degrees for full 10 secs  6a. Motor Leg, Left: 1-->Drift, leg falls by the end of the 5-sec period but does not hit bed  6b. Motor Leg, Right: 0-->No drift, leg holds 30 degree position for full 5 secs  7. Limb Ataxia: 0-->Absent  8. Sensory: 1-->Mild-to-moderate sensory loss, patient feels pinprick is less sharp or is dull on the affected side, or there is a loss of superficial pain with pinprick, but patient is aware of being touched  9. Best Language: 0-->No aphasia, normal  10. Dysarthria: 0-->Normal  11. Extinction and Inattention (formerly Neglect): 0-->No abnormality  Total (NIH Stroke Scale): 4        Modified Wharton Score: 0  Gabriel Coma Scale:    ABCD2 Score:    [id number] Score:   HAS -BLED Score:   ICH Score:   Hunt & Hoffman Classification:       Assessment/Plan:     Diagnostic Results:      Brain Imaging   CT Head w/o Contrast. 07/05/2025  Evolving moderate right posterior temporal infarction. No hyperdensity to correlate  with questioned hemorrhagic transformation seen on MRI which may be related to underlying anemia or in part artifactual on MRI with iron supplementation to be considered. Clinical correlation advised   MRI brain 07/02/2025  Redemonstration of moderate sized acute infarct in the right temporal lobe, with small component extending into the right parietal and frontal lobes.  There has been interval hemorrhagic transformation with subarachnoid extension.  Follow-up with noncontrast CT scan of the head may be obtained.     Abnormal signal of the SWI sequences, most likely secondary to scanning parameters.     Vessel Imaging   07/01/2025  CTA Head and Neck: Moderate size acute infarct centered in the right temporal lobe.  No acute intracranial hemorrhage.     Proximal right M2 occlusion and subtotal occlusion vs stenosis in right M3 branch.     Cardiac Imaging     Left Ventricle: The left ventricle is normal in size. Normal wall thickness. There is concentric remodeling. There is normal systolic function with a visually estimated ejection fraction of 60 - 65%. Grade III diastolic dysfunction.    Right Ventricle: The right ventricle is normal in size measuring 2.8 cm. Wall thickness is normal. Systolic function is reduced.    Left Atrium: The left atrium is severely dilated    Aortic Valve: The aortic valve is a trileaflet valve. There is mild aortic valve sclerosis.    Mitral Valve: There is mild regurgitation.    Pulmonary Artery: The estimated pulmonary artery systolic pressure is 37 mmHg.    IVC/SVC: Normal venous pressure at 3 mmHg.    Interventions: None    Complications: None    Disposition: [facility]    Final Active Diagnoses:    Diagnosis Date Noted POA    PRINCIPAL PROBLEM:  Embolic stroke involving right middle cerebral artery [I63.411] 02/18/2025 Yes    Enlarged LA (left atrium) [I51.7] 07/03/2025 Yes    Bilateral pleural effusion [J90] 06/28/2025 Yes    Elevated troponin [R79.89] 06/28/2025 Yes    (HFpEF)  heart failure with preserved ejection fraction [I50.30] 03/25/2025 Yes    CAD (coronary artery disease) [I25.10] 03/25/2025 Yes    Moderate protein-calorie malnutrition [E44.0] 03/21/2025 Yes    Hypertension [I10] 03/26/2024 Yes    Solitary pulmonary nodule [R91.1] 04/12/2023 Yes    Iron deficiency anemia due to chronic blood loss [D50.0] 08/21/2022 Yes    Acute hypoxic respiratory failure [J96.01] 08/21/2022 Yes    Gastric AVM [K31.819] 08/21/2022 Yes    Mixed hyperlipidemia [E78.2] 07/21/2022 Yes    H/O ischemic left MCA stroke [Z86.73] 07/20/2022 Not Applicable      Problems Resolved During this Admission:    Diagnosis Date Noted Date Resolved POA    Facial droop [R29.810] 07/01/2025 07/01/2025 Yes     No new Assessment & Plan notes have been filed under this hospital service since the last note was generated.  Service: Vascular Neurology      Recommendations:     Post-discharge complication risks: None    Stroke Education given to: patient    Follow-up in Stroke Clinic in 4-6 weeks.     Discharge Plan:  Statin: Atorvastatin 80mg  Anticoagulant: Apixaban 2.5    Follow Up:      Patient Instructions:      Ambulatory referral/consult to Vascular Neurology   Standing Status: Future   Referral Priority: Routine Referral Type: Consultation   Referral Reason: Specialty Services Required   Requested Specialty: Vascular Neurology   Number of Visits Requested: 1     Diet Cardiac   Order Comments: See Stroke Patient Education Guide Booklet for details.     Call 911 for any of the following:   Order Comments: Call 911  right away if any of the following warning signs come on suddenly, even if the symptoms only last for a few minutes. With stroke, timing is very important.   - Warning Signs of Stroke:  - Weakness: You may feel a sudden weakness, tingling or loss of feeling on one side of your face or body.  - Vision Problems: You may have sudden double vision or trouble seeing in one or both eyes.  - Speech Problems: You may  have sudden trouble talking, slured speech, or problems understanding others.  - Headache: You may have sudden, severe headache.  - Movement Problems: You may experience dizziness, a feeling of spinning, a loss of balance, a feeling of falling or blackouts.       Medications:  Transfer Medications (for Discharge Readmit only): Current Medications[1]    Juan M Henderson MD  [facility]  Department of Vascular Neurology   [facility] Neurosurgery ([facility])          [1]   Current Facility-Administered Medications   Medication Dose Route Frequency Provider Last Rate Last Admin    acetaminophen tablet 650 mg  650 mg Oral Q6H PRN Carmen Matthews PA-C        albuterol-ipratropium 2.5 mg-0.5 mg/3 mL nebulizer solution 3 mL  3 mL Nebulization Q4H PRN Elvia Walters PA-C   3 mL at 07/01/25 2258    aluminum-magnesium hydroxide-simethicone 200-200-20 mg/5 mL suspension 30 mL  30 mL Oral QID PRN Elvia Walters PA-C        apixaban tablet 2.5 mg  2.5 mg Oral BID Juan M Henderson MD   2.5 mg at 07/05/25 2226    atorvastatin tablet 80 mg  80 mg Oral Daily Elvia Walters PA-C   80 mg at 07/05/25 1004    cyanocobalamin tablet 1,000 mcg  1,000 mcg Oral Daily Elvia Waletrs PA-C   1,000 mcg at 07/05/25 1004    ezetimibe tablet 10 mg  10 mg Oral Daily Elvia Walters PA-C   10 mg at 07/05/25 1004    furosemide tablet 40 mg  40 mg Oral Daily Juan J Russo MD        melatonin tablet 6 mg  6 mg Oral Nightly PRN Elvia Walters PA-C        mupirocin 2 % ointment   Nasal BID Art Driver FNP-C   Given at 07/05/25 2226    naloxone 0.4 mg/mL injection 0.02 mg  0.02 mg Intravenous PRN Elvia Walters PA-C        ondansetron disintegrating tablet 8 mg  8 mg Oral Q8H PRN Elvia Walters PA-C        pantoprazole EC tablet 40 mg  40 mg Oral Daily Elvia Walters PA-C   40 mg at 07/05/25 1004    polyethylene glycol packet 17 g  17 g Oral BID Juan M Henderson MD   17 g at 07/03/25 2040     prochlorperazine injection Soln 5 mg  5 mg Intravenous Q6H PRN Elvia Walters PA-C        simethicone chewable tablet 80 mg  1 tablet Oral QID PRN Elvia Walters PA-C        sodium chloride 0.9% flush 5 mL  5 mL Intravenous PRN Elvia Walters PA-C        spironolactone tablet 25 mg  25 mg Oral Daily Juan M Henderson MD   25 mg at 07/05/25 1004    vitamin D 1000 units tablet 1,000 Units  1,000 Units Oral Daily Elvia Walters PA-C   1,000 Units at 07/05/25 1004

## 2025-07-08 ENCOUNTER — OUTPATIENT CASE MANAGEMENT (OUTPATIENT)
Dept: ADMINISTRATIVE | Facility: OTHER | Age: 85
End: 2025-07-08
Payer: MEDICARE

## 2025-07-10 ENCOUNTER — OUTPATIENT CASE MANAGEMENT (OUTPATIENT)
Dept: ADMINISTRATIVE | Facility: OTHER | Age: 85
End: 2025-07-10
Payer: MEDICARE

## 2025-07-15 ENCOUNTER — DOCUMENT SCAN (OUTPATIENT)
Dept: HOME HEALTH SERVICES | Facility: HOSPITAL | Age: 85
End: 2025-07-15
Payer: MEDICARE

## 2025-07-17 ENCOUNTER — OUTPATIENT CASE MANAGEMENT (OUTPATIENT)
Dept: ADMINISTRATIVE | Facility: OTHER | Age: 85
End: 2025-07-17
Payer: MEDICARE

## 2025-07-22 ENCOUNTER — TELEPHONE (OUTPATIENT)
Dept: HEMATOLOGY/ONCOLOGY | Facility: CLINIC | Age: 85
End: 2025-07-22
Payer: MEDICARE

## 2025-07-22 DIAGNOSIS — D50.0 ANEMIA DUE TO CHRONIC BLOOD LOSS: Primary | ICD-10-CM

## 2025-07-22 DIAGNOSIS — D50.0 IRON DEFICIENCY ANEMIA DUE TO CHRONIC BLOOD LOSS: Primary | ICD-10-CM

## 2025-07-22 RX ORDER — HEPARIN 100 UNIT/ML
500 SYRINGE INTRAVENOUS
OUTPATIENT
Start: 2025-07-22

## 2025-07-22 RX ORDER — EPINEPHRINE 0.3 MG/.3ML
0.3 INJECTION SUBCUTANEOUS ONCE AS NEEDED
OUTPATIENT
Start: 2025-07-22

## 2025-07-22 RX ORDER — SODIUM CHLORIDE 0.9 % (FLUSH) 0.9 %
10 SYRINGE (ML) INJECTION
OUTPATIENT
Start: 2025-07-22

## 2025-07-22 NOTE — TELEPHONE ENCOUNTER
Copied from CRM #[id number]. Topic: Appointments - Hospital Follow Up  >> Jul 22, 2025  1:11 PM Codey Pena wrote:  Type: Needs Medical Advice  Who Called:  zunilda Ramsay Call Back Number: [phone]    Additional Information: patient needs a hospital follow up and sooner than 8/6 , please call to further assist thank you .

## 2025-07-22 NOTE — PROGRESS NOTES
Recently admitted to [facility] for stroke; labs from 07/21/25:  Presently in rehab.  Lab Results   Component Value Date    WBC 7.32 07/21/2025    HGB 8.8 (L) 07/21/2025    HCT 28.7 (L) 07/21/2025    MCV 95 07/21/2025     (H) 07/21/2025       Injectafer 750 mg weekly x 2 submitted for auth.  Plan to infuse with authorization once discharged from rehab.    F/U in clinic with me in 8 weeks post infusions with CBC, Iron studies/ferritin 1-2 days prior.

## 2025-07-24 ENCOUNTER — OUTPATIENT CASE MANAGEMENT (OUTPATIENT)
Dept: ADMINISTRATIVE | Facility: OTHER | Age: 85
End: 2025-07-24
Payer: MEDICARE

## 2025-07-28 ENCOUNTER — LAB VISIT (OUTPATIENT)
Dept: LAB | Facility: HOSPITAL | Age: 85
End: 2025-07-28
Payer: MEDICARE

## 2025-07-28 ENCOUNTER — TELEPHONE (OUTPATIENT)
Dept: CARDIOLOGY | Facility: CLINIC | Age: 85
End: 2025-07-28
Payer: MEDICARE

## 2025-07-28 ENCOUNTER — OFFICE VISIT (OUTPATIENT)
Dept: FAMILY MEDICINE | Facility: CLINIC | Age: 85
End: 2025-07-28
Payer: MEDICARE

## 2025-07-28 ENCOUNTER — INFUSION (OUTPATIENT)
Dept: INFUSION THERAPY | Facility: HOSPITAL | Age: 85
End: 2025-07-28
Attending: NURSE PRACTITIONER
Payer: MEDICARE

## 2025-07-28 VITALS
DIASTOLIC BLOOD PRESSURE: 65 MMHG | OXYGEN SATURATION: 100 % | WEIGHT: 96.19 LBS | HEART RATE: 74 BPM | BODY MASS INDEX: 17.04 KG/M2 | HEIGHT: 63 IN | SYSTOLIC BLOOD PRESSURE: 111 MMHG

## 2025-07-28 VITALS
TEMPERATURE: 98 F | SYSTOLIC BLOOD PRESSURE: 111 MMHG | BODY MASS INDEX: 17.04 KG/M2 | HEART RATE: 71 BPM | DIASTOLIC BLOOD PRESSURE: 53 MMHG | RESPIRATION RATE: 17 BRPM | OXYGEN SATURATION: 100 % | HEIGHT: 63 IN | WEIGHT: 96.19 LBS

## 2025-07-28 DIAGNOSIS — D50.0 IRON DEFICIENCY ANEMIA DUE TO CHRONIC BLOOD LOSS: Primary | ICD-10-CM

## 2025-07-28 DIAGNOSIS — D50.0 IRON DEFICIENCY ANEMIA DUE TO CHRONIC BLOOD LOSS: ICD-10-CM

## 2025-07-28 DIAGNOSIS — I50.32 CHRONIC HEART FAILURE WITH PRESERVED EJECTION FRACTION: ICD-10-CM

## 2025-07-28 DIAGNOSIS — I63.9 CEREBRAL INFARCTION, UNSPECIFIED MECHANISM: Primary | ICD-10-CM

## 2025-07-28 DIAGNOSIS — E78.2 MIXED HYPERLIPIDEMIA: ICD-10-CM

## 2025-07-28 LAB
ABSOLUTE EOSINOPHIL (OHS): 0.1 K/UL
ABSOLUTE MONOCYTE (OHS): 0.78 K/UL (ref 0.3–1)
ABSOLUTE NEUTROPHIL COUNT (OHS): 3.85 K/UL (ref 1.8–7.7)
BASOPHILS # BLD AUTO: 0.07 K/UL
BASOPHILS NFR BLD AUTO: 1.2 %
ERYTHROCYTE [DISTWIDTH] IN BLOOD BY AUTOMATED COUNT: 19.3 % (ref 11.5–14.5)
FERRITIN SERPL-MCNC: 594 NG/ML (ref 20–300)
HCT VFR BLD AUTO: 27.2 % (ref 37–48.5)
HGB BLD-MCNC: 8.5 GM/DL (ref 12–16)
IMM GRANULOCYTES # BLD AUTO: 0.02 K/UL (ref 0–0.04)
IMM GRANULOCYTES NFR BLD AUTO: 0.3 % (ref 0–0.5)
IRON SATN MFR SERPL: 17 % (ref 20–50)
IRON SERPL-MCNC: 56 UG/DL (ref 30–160)
LYMPHOCYTES # BLD AUTO: 1.22 K/UL (ref 1–4.8)
MCH RBC QN AUTO: 29.8 PG (ref 27–31)
MCHC RBC AUTO-ENTMCNC: 31.3 G/DL (ref 32–36)
MCV RBC AUTO: 95 FL (ref 82–98)
NUCLEATED RBC (/100WBC) (OHS): 0 /100 WBC
PLATELET # BLD AUTO: 524 K/UL (ref 150–450)
PMV BLD AUTO: 10.1 FL (ref 9.2–12.9)
RBC # BLD AUTO: 2.85 M/UL (ref 4–5.4)
RELATIVE EOSINOPHIL (OHS): 1.7 %
RELATIVE LYMPHOCYTE (OHS): 20.2 % (ref 18–48)
RELATIVE MONOCYTE (OHS): 12.9 % (ref 4–15)
RELATIVE NEUTROPHIL (OHS): 63.7 % (ref 38–73)
TIBC SERPL-MCNC: 323 UG/DL (ref 250–450)
TRANSFERRIN SERPL-MCNC: 218 MG/DL (ref 200–375)
WBC # BLD AUTO: 6.04 K/UL (ref 3.9–12.7)

## 2025-07-28 PROCEDURE — 99213 OFFICE O/P EST LOW 20 MIN: CPT | Mod: PBBFAC,PO | Performed by: NURSE PRACTITIONER

## 2025-07-28 PROCEDURE — 99214 OFFICE O/P EST MOD 30 MIN: CPT | Mod: S$PBB,,, | Performed by: NURSE PRACTITIONER

## 2025-07-28 PROCEDURE — 25000003 PHARM REV CODE 250: Mod: PN | Performed by: NURSE PRACTITIONER

## 2025-07-28 PROCEDURE — 85025 COMPLETE CBC W/AUTO DIFF WBC: CPT

## 2025-07-28 PROCEDURE — 99999 PR PBB SHADOW E&M-EST. PATIENT-LVL III: CPT | Mod: PBBFAC,,, | Performed by: NURSE PRACTITIONER

## 2025-07-28 PROCEDURE — 82728 ASSAY OF FERRITIN: CPT

## 2025-07-28 PROCEDURE — 96365 THER/PROPH/DIAG IV INF INIT: CPT | Mod: PN

## 2025-07-28 PROCEDURE — 84466 ASSAY OF TRANSFERRIN: CPT

## 2025-07-28 PROCEDURE — 63600175 PHARM REV CODE 636 W HCPCS: Mod: JZ,TB,PN | Performed by: NURSE PRACTITIONER

## 2025-07-28 PROCEDURE — 36415 COLL VENOUS BLD VENIPUNCTURE: CPT | Mod: PO

## 2025-07-28 PROCEDURE — A4216 STERILE WATER/SALINE, 10 ML: HCPCS | Mod: PN | Performed by: NURSE PRACTITIONER

## 2025-07-28 RX ORDER — SODIUM CHLORIDE 0.9 % (FLUSH) 0.9 %
10 SYRINGE (ML) INJECTION
Status: DISCONTINUED | OUTPATIENT
Start: 2025-07-28 | End: 2025-07-28 | Stop reason: HOSPADM

## 2025-07-28 RX ORDER — PANTOPRAZOLE SODIUM 40 MG/1
40 FOR SUSPENSION ORAL
COMMUNITY
Start: 2025-07-22 | End: 2025-07-28 | Stop reason: SDUPTHER

## 2025-07-28 RX ORDER — HEPARIN 100 UNIT/ML
500 SYRINGE INTRAVENOUS
OUTPATIENT
Start: 2025-08-04

## 2025-07-28 RX ORDER — HEPARIN 100 UNIT/ML
500 SYRINGE INTRAVENOUS
Status: DISCONTINUED | OUTPATIENT
Start: 2025-07-28 | End: 2025-07-28 | Stop reason: HOSPADM

## 2025-07-28 RX ORDER — EZETIMIBE 10 MG/1
10 TABLET ORAL DAILY
Qty: 90 TABLET | Refills: 3 | Status: SHIPPED | OUTPATIENT
Start: 2025-07-28 | End: 2026-07-23

## 2025-07-28 RX ORDER — EPINEPHRINE 0.3 MG/.3ML
0.3 INJECTION SUBCUTANEOUS ONCE AS NEEDED
Status: DISCONTINUED | OUTPATIENT
Start: 2025-07-28 | End: 2025-07-28 | Stop reason: HOSPADM

## 2025-07-28 RX ORDER — PANTOPRAZOLE SODIUM 40 MG/1
40 TABLET, DELAYED RELEASE ORAL DAILY
Qty: 90 TABLET | Refills: 3 | Status: SHIPPED | OUTPATIENT
Start: 2025-07-28

## 2025-07-28 RX ORDER — EPINEPHRINE 0.3 MG/.3ML
0.3 INJECTION SUBCUTANEOUS ONCE AS NEEDED
OUTPATIENT
Start: 2025-08-04

## 2025-07-28 RX ORDER — ATORVASTATIN CALCIUM 80 MG/1
80 TABLET, FILM COATED ORAL DAILY
Qty: 90 TABLET | Refills: 3 | Status: SHIPPED | OUTPATIENT
Start: 2025-07-28 | End: 2026-07-28

## 2025-07-28 RX ORDER — SODIUM CHLORIDE 0.9 % (FLUSH) 0.9 %
10 SYRINGE (ML) INJECTION
OUTPATIENT
Start: 2025-08-04

## 2025-07-28 RX ADMIN — FERRIC CARBOXYMALTOSE INJECTION 750 MG: 50 INJECTION, SOLUTION INTRAVENOUS at 02:07

## 2025-07-28 RX ADMIN — Medication 10 ML: at 03:07

## 2025-07-28 RX ADMIN — SODIUM CHLORIDE: 9 INJECTION, SOLUTION INTRAVENOUS at 02:07

## 2025-07-28 NOTE — TELEPHONE ENCOUNTER
Heart Failure Transitional Care Clinic    Attempted to call Ms. Katelyn Caba to schedule an appt. The pt's number was answered. No one said hello, upon me saying hello still now answer. The the phone hung up. This was attempted twice.

## 2025-07-28 NOTE — PROGRESS NOTES
Subjective:       Patient ID: Katelyn Caba is a 84 y.o. female.    Chief Complaint: Hospital Follow Up  Katelyn Caba presents today for hospital follow up. Last seen in 6/6/2025 by PCP, FREDY Burnham MD.     Per discharge summary 7/23/2025:  History of Present Illness/Past Medical History:  Ms. Katelyn Caba is a 84 y.o. 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 See HM at [facility] notes for details of hospital course.   Stroke code activated 7/1/25 for new severe L sided weakness and LFD.   LKW unclear, sometime evening of 6/30. Family at bedside reports around 5:30am 7/1/25 noticing patient having LSW and needing assistance to ambulate to the bathroom. Sometime later in the morning of 7/1/25 patient's nurse noticed patient was unable to move L side, prompting stroke code activation.   Neuro exam significant for R gaze preference, L hemiparesis (arm>>leg), L facial droop, expressive aphasia, dysarthria, and inattention to L side. NIHSS 10.   CTH showed acute ischemic changes in R MCA territory, CTA revealed R M2 occlusion.   Determined not a candidate for acute stroke interventions, OOW for TNK and no thrombectomy due to core infarct on CTH. Patient was transferred to  primary service for ongoing stroke workup,   And  for co-management of active medical comorbidities.  Pt with h/o Left MCA stroke in 2022, initially had language issues that resolved but had right inferior quadrantanopsia. Discharged on DAPT, then aspirin monotherapy.   Right MCA stroke in 2/ 2025: left sided weakness, left facial droop, and dysarthria.   CTA with right M1  occlusion. s/p TNK and DSA w/o thrombectomy as lesion migrated to distal MCA branches after TNK administered. Echo unremarkable.   Acute Hospital Course  84 y.o. 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   On 7/1, pt with new severe left sided weakness and left facial droop.   Code stroke activated. CTH showed acute ischemic changes in R MCA territory, CTA revealed R M2 occlusion. On plavix prior from previous stroke in 2/2025. With her multiple episodes of strokes, despite being on anti-platelets, might need to switch to DOAC. However, pt has history of gastric avms and GI bleed. HgB stable on Plavix. Consulted GI on whether its okay to anticoagulate or not.   07/02/2025 Brain MRI: Re demonstration of moderate sized acute infarct in the right temporal lobe, with small component extending into the right parietal and frontal lobes.   There has been interval hemorrhagic transformation with subarachnoid extension.   07/04/2025: Plan for repeat CT head tomorrow to determine if we want to start anticoagulation  07/05/2025: Repeat CT head w/o any hemorrhage. Started eliquis 2.5 mg bid   07/06/2025: On Eliquis 2.5 mg bid. HgB stable. Discontinued Plavix, prior to discharging to [facility].  Stroke Etiology: Ischemic Cardioembolic Embolic stroke to R MCA. Enlarged LA but no afib documented.  PT/OT/SLP evaluated and recommended IRF.   Patient was deemed medically stable and transferred to [facility] to participate in acute inpatient rehabilitation on 7/6/2025.       Hospital Course:  Patient was admitted and enrolled in a  "comprehensive rehabilitation program, including PT, OT, and as required SLP.     Transitional Care Note    Family and/or Caretaker present at visit?  Yes.  Diagnostic tests reviewed/disposition: No diagnosic tests pending after this hospitalization.  Disease/illness education: medication reconciliation  Home health/community services discussion/referrals: Patient has home health established at [facility].   Establishment or re-establishment of referral orders for community resources: No other necessary community resources.   Discussion with other health care providers: No discussion with other health care providers necessary.     HPI    Problem List[1]    Current Medications[2]    The following portions of the patient's history were reviewed and updated as appropriate: allergies, past family history, past medical history, past social history and past surgical history.    Review of Systems   Constitutional:  Negative for appetite change, fatigue, fever and unexpected weight change.   HENT:  Negative for hearing loss, rhinorrhea, sneezing, sore throat and trouble swallowing.    Eyes:  Negative for visual disturbance.   Respiratory:  Negative for cough, shortness of breath and wheezing.    Cardiovascular:  Negative for chest pain and palpitations.   Gastrointestinal:  Negative for abdominal pain, constipation, diarrhea, nausea and vomiting.   Genitourinary:  Negative for difficulty urinating, dysuria, frequency and hematuria.   Musculoskeletal:  Negative for arthralgias and myalgias.   Neurological:  Negative for dizziness, weakness and numbness.   Psychiatric/Behavioral:  Negative for sleep disturbance. The patient is not nervous/anxious.        Objective:      /65 (BP Location: Right arm, Patient Position: Sitting)   Pulse 74   Ht 5' 3" (1.6 m)   Wt 43.6 kg (96 lb 3.2 oz)   SpO2 100%   BMI 17.04 kg/m²     Physical Exam  Constitutional:       General: She is not in acute distress.     Appearance: Normal " appearance.   Cardiovascular:      Rate and Rhythm: Normal rate and regular rhythm.      Pulses: Normal pulses.      Heart sounds: Normal heart sounds.   Pulmonary:      Effort: Pulmonary effort is normal.      Breath sounds: Normal breath sounds.   Musculoskeletal:         General: Normal range of motion.   Skin:     General: Skin is warm and dry.   Neurological:      Mental Status: She is alert and oriented to person, place, and time.   Psychiatric:         Mood and Affect: Mood normal.         Behavior: Behavior normal.         Assessment:       1. Cerebral infarction, unspecified mechanism    2. Mixed hyperlipidemia    3. Chronic heart failure with preserved ejection fraction        Plan:   Katelyn was seen today for hospital follow up.    Diagnoses and all orders for this visit:    Cerebral infarction, unspecified mechanism    Mixed hyperlipidemia  -     ezetimibe (ZETIA) 10 mg tablet; Take 1 tablet (10 mg total) by mouth once daily.  -     atorvastatin (LIPITOR) 80 MG tablet; Take 1 tablet (80 mg total) by mouth once daily.    Chronic heart failure with preserved ejection fraction    Other orders  -     pantoprazole (PROTONIX) 40 MG tablet; Take 1 tablet (40 mg total) by mouth once daily. Take 1 tablet by mouth every day      Labs as ordered; f/u with cardiology, neurology, and hematology as scheduled.          [1]   Patient Active Problem List  Diagnosis    Venous insufficiency    H/O ischemic left MCA stroke    Mixed hyperlipidemia    Right homonymous inferior quadrantanopia    Speaking difficulty    Iron deficiency anemia due to chronic blood loss    Gastric AVM    Acute hypoxic respiratory failure    Solitary pulmonary nodule    Aortic atherosclerosis    Symptomatic anemia    History of gastric ulcer    Rodriguez's esophagus    Hypertension    History of GI bleed    Embolic stroke involving right middle cerebral artery    Body mass index (BMI) less than 19    Hemiparesis    Reduced mobility    History of  basal cell carcinoma    Moderate protein-calorie malnutrition    (HFpEF) heart failure with preserved ejection fraction    CAD (coronary artery disease)    COVID-19    Dyspnea    Anemia    Bilateral pleural effusion    Elevated troponin    Enlarged LA (left atrium)   [2]   Current Outpatient Medications:     apixaban (ELIQUIS) 2.5 mg Tab, Take 1 tablet (2.5 mg total) by mouth 2 (two) times daily., Disp: , Rfl:     cyanocobalamin (VITAMIN B-12) 1000 MCG tablet, Take 1,000 mcg by mouth once daily., Disp: , Rfl:     furosemide (LASIX) 40 MG tablet, Take 1 tablet (40 mg total) by mouth once daily., Disp: , Rfl:     potassium chloride SA (K-DUR,KLOR-CON) 20 MEQ tablet, Take 1 tablet (20 mEq total) by mouth once daily., Disp: 30 tablet, Rfl: 11    spironolactone (ALDACTONE) 25 MG tablet, Take 1 tablet (25 mg total) by mouth once daily., Disp: , Rfl:     vitamin D (VITAMIN D3) 1000 units Tab, Take 1,000 Units by mouth once daily., Disp: , Rfl:     atorvastatin (LIPITOR) 80 MG tablet, Take 1 tablet (80 mg total) by mouth once daily., Disp: 90 tablet, Rfl: 3    empagliflozin (JARDIANCE) 10 mg tablet, Take 10 mg by mouth once daily. (Patient not taking: Reported on 7/28/2025), Disp: , Rfl:     ezetimibe (ZETIA) 10 mg tablet, Take 1 tablet (10 mg total) by mouth once daily., Disp: 90 tablet, Rfl: 3    pantoprazole (PROTONIX) 40 MG tablet, Take 1 tablet (40 mg total) by mouth once daily. Take 1 tablet by mouth every day, Disp: 90 tablet, Rfl: 3

## 2025-07-28 NOTE — PLAN OF CARE
Problem: Adult Inpatient Plan of Care  Goal: Plan of Care Review  Outcome: Progressing  Flowsheets (Taken 7/28/2025 1528)  Plan of Care Reviewed With: patient  Goal: Patient-Specific Goal (Individualized)  Outcome: Progressing  Flowsheets (Taken 7/28/2025 1528)  Individualized Care Needs: recliner, blanket, conversation, W/C  Anxieties, Fears or Concerns: none  Patient/Family-Specific Goals (Include Timeframe): no s/s of reaction with tx     Problem: Fatigue  Goal: Improved Activity Tolerance  Outcome: Progressing  Intervention: Promote Improved Energy  Flowsheets (Taken 7/28/2025 1532)  Fatigue Management: paced activity encouraged  Sleep/Rest Enhancement: natural light exposure provided  Activity Management: Ambulated -L4  Environmental Support: environmental consistency promoted   Pt. tolerated IV Injectafer well. VS stable, no adverse reactions noted. 30 min post observation completed. Discussed future appointments, NAD noted. Pt. discharged to home via W/C with no assistance needed, accompanied by .

## 2025-07-29 ENCOUNTER — DOCUMENTATION ONLY (OUTPATIENT)
Dept: HEMATOLOGY/ONCOLOGY | Facility: CLINIC | Age: 85
End: 2025-07-29
Payer: MEDICARE

## 2025-07-29 ENCOUNTER — TELEPHONE (OUTPATIENT)
Dept: HEMATOLOGY/ONCOLOGY | Facility: CLINIC | Age: 85
End: 2025-07-29
Payer: MEDICARE

## 2025-07-29 ENCOUNTER — HOSPITAL ENCOUNTER (INPATIENT)
Facility: HOSPITAL | Age: 85
LOS: 2 days | Discharge: HOME | DRG: 378 | End: 2025-08-01
Attending: STUDENT IN AN ORGANIZED HEALTH CARE EDUCATION/TRAINING PROGRAM | Admitting: STUDENT IN AN ORGANIZED HEALTH CARE EDUCATION/TRAINING PROGRAM
Payer: MEDICARE

## 2025-07-29 ENCOUNTER — EXTERNAL HOME HEALTH (OUTPATIENT)
Dept: HOME HEALTH SERVICES | Facility: HOSPITAL | Age: 85
End: 2025-07-29
Payer: MEDICARE

## 2025-07-29 DIAGNOSIS — R07.9 CHEST PAIN: ICD-10-CM

## 2025-07-29 DIAGNOSIS — R06.02 SHORTNESS OF BREATH: ICD-10-CM

## 2025-07-29 DIAGNOSIS — D64.9 ANEMIA, UNSPECIFIED TYPE: Primary | ICD-10-CM

## 2025-07-29 DIAGNOSIS — I50.32 CHRONIC HEART FAILURE WITH PRESERVED EJECTION FRACTION: ICD-10-CM

## 2025-07-29 DIAGNOSIS — D50.0 IRON DEFICIENCY ANEMIA DUE TO CHRONIC BLOOD LOSS: Primary | ICD-10-CM

## 2025-07-29 DIAGNOSIS — R06.02 SOB (SHORTNESS OF BREATH): ICD-10-CM

## 2025-07-29 PROCEDURE — 83880 ASSAY OF NATRIURETIC PEPTIDE: CPT | Performed by: PHYSICIAN ASSISTANT

## 2025-07-29 PROCEDURE — 82040 ASSAY OF SERUM ALBUMIN: CPT | Performed by: PHYSICIAN ASSISTANT

## 2025-07-29 PROCEDURE — 85025 COMPLETE CBC W/AUTO DIFF WBC: CPT | Performed by: PHYSICIAN ASSISTANT

## 2025-07-29 PROCEDURE — 93005 ELECTROCARDIOGRAM TRACING: CPT

## 2025-07-29 PROCEDURE — 93010 ELECTROCARDIOGRAM REPORT: CPT | Mod: ,,, | Performed by: INTERNAL MEDICINE

## 2025-07-29 PROCEDURE — 99285 EMERGENCY DEPT VISIT HI MDM: CPT | Mod: 25

## 2025-07-29 PROCEDURE — 84484 ASSAY OF TROPONIN QUANT: CPT | Performed by: PHYSICIAN ASSISTANT

## 2025-07-30 ENCOUNTER — ANESTHESIA EVENT (OUTPATIENT)
Dept: ENDOSCOPY | Facility: HOSPITAL | Age: 85
End: 2025-07-30
Payer: MEDICARE

## 2025-07-30 ENCOUNTER — OUTPATIENT CASE MANAGEMENT (OUTPATIENT)
Dept: ADMINISTRATIVE | Facility: OTHER | Age: 85
End: 2025-07-30
Payer: MEDICARE

## 2025-07-30 PROBLEM — R06.02 SHORTNESS OF BREATH: Status: RESOLVED | Noted: 2025-07-30 | Resolved: 2025-07-30

## 2025-07-30 PROBLEM — R06.02 SHORTNESS OF BREATH: Status: ACTIVE | Noted: 2025-07-30

## 2025-07-30 PROBLEM — E44.0 MODERATE MALNUTRITION: Status: ACTIVE | Noted: 2025-07-30

## 2025-07-30 LAB
ABO + RH BLD: NORMAL
ABSOLUTE EOSINOPHIL (OHS): 0.04 K/UL
ABSOLUTE EOSINOPHIL (OHS): 0.09 K/UL
ABSOLUTE MONOCYTE (OHS): 0.71 K/UL (ref 0.3–1)
ABSOLUTE MONOCYTE (OHS): 0.92 K/UL (ref 0.3–1)
ABSOLUTE NEUTROPHIL COUNT (OHS): 4.54 K/UL (ref 1.8–7.7)
ABSOLUTE NEUTROPHIL COUNT (OHS): 5.42 K/UL (ref 1.8–7.7)
ALBUMIN SERPL BCP-MCNC: 3.7 G/DL (ref 3.5–5.2)
ALP SERPL-CCNC: 99 UNIT/L (ref 40–150)
ALT SERPL W/O P-5'-P-CCNC: 29 UNIT/L (ref 0–55)
ANION GAP (OHS): 12 MMOL/L (ref 8–16)
ANION GAP (OHS): 12 MMOL/L (ref 8–16)
AST SERPL-CCNC: 37 UNIT/L (ref 0–50)
BASOPHILS # BLD AUTO: 0.04 K/UL
BASOPHILS # BLD AUTO: 0.06 K/UL
BASOPHILS NFR BLD AUTO: 0.5 %
BASOPHILS NFR BLD AUTO: 0.9 %
BILIRUB SERPL-MCNC: 0.4 MG/DL (ref 0.1–1)
BLD PROD TYP BPU: NORMAL
BLOOD UNIT EXPIRATION DATE: NORMAL
BLOOD UNIT TYPE CODE: 600
BUN SERPL-MCNC: 37 MG/DL (ref 8–23)
BUN SERPL-MCNC: 39 MG/DL (ref 8–23)
CALCIUM SERPL-MCNC: 9 MG/DL (ref 8.7–10.5)
CALCIUM SERPL-MCNC: 9.7 MG/DL (ref 8.7–10.5)
CHLORIDE SERPL-SCNC: 91 MMOL/L (ref 95–110)
CHLORIDE SERPL-SCNC: 94 MMOL/L (ref 95–110)
CO2 SERPL-SCNC: 28 MMOL/L (ref 23–29)
CO2 SERPL-SCNC: 30 MMOL/L (ref 23–29)
CREAT SERPL-MCNC: 0.9 MG/DL (ref 0.5–1.4)
CREAT SERPL-MCNC: 0.9 MG/DL (ref 0.5–1.4)
CROSSMATCH INTERPRETATION: NORMAL
DISPENSE STATUS: NORMAL
ERYTHROCYTE [DISTWIDTH] IN BLOOD BY AUTOMATED COUNT: 17.7 % (ref 11.5–14.5)
ERYTHROCYTE [DISTWIDTH] IN BLOOD BY AUTOMATED COUNT: 19.4 % (ref 11.5–14.5)
ERYTHROCYTE [DISTWIDTH] IN BLOOD BY AUTOMATED COUNT: 19.6 % (ref 11.5–14.5)
GFR SERPLBLD CREATININE-BSD FMLA CKD-EPI: >60 ML/MIN/1.73/M2
GFR SERPLBLD CREATININE-BSD FMLA CKD-EPI: >60 ML/MIN/1.73/M2
GLUCOSE SERPL-MCNC: 115 MG/DL (ref 70–110)
GLUCOSE SERPL-MCNC: 117 MG/DL (ref 70–110)
HCT VFR BLD AUTO: 21.4 % (ref 37–48.5)
HCT VFR BLD AUTO: 24.1 % (ref 37–48.5)
HCT VFR BLD AUTO: 25.9 % (ref 37–48.5)
HGB BLD-MCNC: 6.8 GM/DL (ref 12–16)
HGB BLD-MCNC: 7.6 GM/DL (ref 12–16)
HGB BLD-MCNC: 8.5 GM/DL (ref 12–16)
IMM GRANULOCYTES # BLD AUTO: 0.04 K/UL (ref 0–0.04)
IMM GRANULOCYTES # BLD AUTO: 0.05 K/UL (ref 0–0.04)
IMM GRANULOCYTES NFR BLD AUTO: 0.5 % (ref 0–0.5)
IMM GRANULOCYTES NFR BLD AUTO: 0.7 % (ref 0–0.5)
INDIRECT COOMBS: NORMAL
LYMPHOCYTES # BLD AUTO: 1.06 K/UL (ref 1–4.8)
LYMPHOCYTES # BLD AUTO: 1.19 K/UL (ref 1–4.8)
MAGNESIUM SERPL-MCNC: 2.2 MG/DL (ref 1.6–2.6)
MCH RBC QN AUTO: 29.8 PG (ref 27–31)
MCH RBC QN AUTO: 30.2 PG (ref 27–31)
MCH RBC QN AUTO: 30.5 PG (ref 27–31)
MCHC RBC AUTO-ENTMCNC: 31.5 G/DL (ref 32–36)
MCHC RBC AUTO-ENTMCNC: 31.8 G/DL (ref 32–36)
MCHC RBC AUTO-ENTMCNC: 32.8 G/DL (ref 32–36)
MCV RBC AUTO: 93 FL (ref 82–98)
MCV RBC AUTO: 95 FL (ref 82–98)
MCV RBC AUTO: 95 FL (ref 82–98)
NT-PROBNP SERPL-MCNC: 7779 PG/ML
NUCLEATED RBC (/100WBC) (OHS): 0 /100 WBC
NUCLEATED RBC (/100WBC) (OHS): 1 /100 WBC
OHS QRS DURATION: 94 MS
OHS QTC CALCULATION: 471 MS
PLATELET # BLD AUTO: 419 K/UL (ref 150–450)
PLATELET # BLD AUTO: 448 K/UL (ref 150–450)
PLATELET # BLD AUTO: 481 K/UL (ref 150–450)
PMV BLD AUTO: 9.7 FL (ref 9.2–12.9)
PMV BLD AUTO: 9.8 FL (ref 9.2–12.9)
PMV BLD AUTO: 9.8 FL (ref 9.2–12.9)
POCT GLUCOSE: 110 MG/DL (ref 70–110)
POTASSIUM SERPL-SCNC: 3.9 MMOL/L (ref 3.5–5.1)
POTASSIUM SERPL-SCNC: 4 MMOL/L (ref 3.5–5.1)
PROT SERPL-MCNC: 6.7 GM/DL (ref 6–8.4)
RBC # BLD AUTO: 2.25 M/UL (ref 4–5.4)
RBC # BLD AUTO: 2.55 M/UL (ref 4–5.4)
RBC # BLD AUTO: 2.79 M/UL (ref 4–5.4)
RELATIVE EOSINOPHIL (OHS): 0.5 %
RELATIVE EOSINOPHIL (OHS): 1.3 %
RELATIVE LYMPHOCYTE (OHS): 14.5 % (ref 18–48)
RELATIVE LYMPHOCYTE (OHS): 17.4 % (ref 18–48)
RELATIVE MONOCYTE (OHS): 13.4 % (ref 4–15)
RELATIVE MONOCYTE (OHS): 9.7 % (ref 4–15)
RELATIVE NEUTROPHIL (OHS): 66.3 % (ref 38–73)
RELATIVE NEUTROPHIL (OHS): 74.3 % (ref 38–73)
RH BLD: NORMAL
SODIUM SERPL-SCNC: 133 MMOL/L (ref 136–145)
SODIUM SERPL-SCNC: 134 MMOL/L (ref 136–145)
SPECIMEN OUTDATE: NORMAL
TROPONIN I SERPL HS-MCNC: 317 NG/L
TROPONIN I SERPL HS-MCNC: 384 NG/L
UNIT NUMBER: NORMAL
WBC # BLD AUTO: 6.7 K/UL (ref 3.9–12.7)
WBC # BLD AUTO: 6.85 K/UL (ref 3.9–12.7)
WBC # BLD AUTO: 7.31 K/UL (ref 3.9–12.7)

## 2025-07-30 PROCEDURE — 96374 THER/PROPH/DIAG INJ IV PUSH: CPT

## 2025-07-30 PROCEDURE — 36415 COLL VENOUS BLD VENIPUNCTURE: CPT | Performed by: STUDENT IN AN ORGANIZED HEALTH CARE EDUCATION/TRAINING PROGRAM

## 2025-07-30 PROCEDURE — 63600175 PHARM REV CODE 636 W HCPCS: Performed by: PHYSICIAN ASSISTANT

## 2025-07-30 PROCEDURE — 85025 COMPLETE CBC W/AUTO DIFF WBC: CPT | Performed by: STUDENT IN AN ORGANIZED HEALTH CARE EDUCATION/TRAINING PROGRAM

## 2025-07-30 PROCEDURE — 63600175 PHARM REV CODE 636 W HCPCS: Mod: JZ,TB | Performed by: STUDENT IN AN ORGANIZED HEALTH CARE EDUCATION/TRAINING PROGRAM

## 2025-07-30 PROCEDURE — 85027 COMPLETE CBC AUTOMATED: CPT

## 2025-07-30 PROCEDURE — 25000003 PHARM REV CODE 250: Performed by: STUDENT IN AN ORGANIZED HEALTH CARE EDUCATION/TRAINING PROGRAM

## 2025-07-30 PROCEDURE — 94761 N-INVAS EAR/PLS OXIMETRY MLT: CPT

## 2025-07-30 PROCEDURE — 99223 1ST HOSP IP/OBS HIGH 75: CPT | Mod: GC,,, | Performed by: INTERNAL MEDICINE

## 2025-07-30 PROCEDURE — 86920 COMPATIBILITY TEST SPIN: CPT | Performed by: STUDENT IN AN ORGANIZED HEALTH CARE EDUCATION/TRAINING PROGRAM

## 2025-07-30 PROCEDURE — 84484 ASSAY OF TROPONIN QUANT: CPT | Performed by: PHYSICIAN ASSISTANT

## 2025-07-30 PROCEDURE — 86850 RBC ANTIBODY SCREEN: CPT

## 2025-07-30 PROCEDURE — 11000001 HC ACUTE MED/SURG PRIVATE ROOM

## 2025-07-30 PROCEDURE — 25000003 PHARM REV CODE 250

## 2025-07-30 PROCEDURE — 83735 ASSAY OF MAGNESIUM: CPT

## 2025-07-30 PROCEDURE — 36430 TRANSFUSION BLD/BLD COMPNT: CPT

## 2025-07-30 PROCEDURE — 86920 COMPATIBILITY TEST SPIN: CPT

## 2025-07-30 PROCEDURE — P9016 RBC LEUKOCYTES REDUCED: HCPCS

## 2025-07-30 PROCEDURE — 80048 BASIC METABOLIC PNL TOTAL CA: CPT

## 2025-07-30 PROCEDURE — 36415 COLL VENOUS BLD VENIPUNCTURE: CPT

## 2025-07-30 PROCEDURE — 99900035 HC TECH TIME PER 15 MIN (STAT)

## 2025-07-30 RX ORDER — NALOXONE HCL 0.4 MG/ML
0.02 VIAL (ML) INJECTION
Status: DISCONTINUED | OUTPATIENT
Start: 2025-07-30 | End: 2025-08-01 | Stop reason: HOSPADM

## 2025-07-30 RX ORDER — PROCHLORPERAZINE EDISYLATE 5 MG/ML
5 INJECTION INTRAMUSCULAR; INTRAVENOUS EVERY 6 HOURS PRN
Status: DISCONTINUED | OUTPATIENT
Start: 2025-07-30 | End: 2025-08-01 | Stop reason: HOSPADM

## 2025-07-30 RX ORDER — HYDROCODONE BITARTRATE AND ACETAMINOPHEN 500; 5 MG/1; MG/1
TABLET ORAL
Status: DISCONTINUED | OUTPATIENT
Start: 2025-07-30 | End: 2025-07-31

## 2025-07-30 RX ORDER — LANOLIN ALCOHOL/MO/W.PET/CERES
1000 CREAM (GRAM) TOPICAL DAILY
Status: DISCONTINUED | OUTPATIENT
Start: 2025-07-30 | End: 2025-07-31

## 2025-07-30 RX ORDER — EZETIMIBE 10 MG/1
10 TABLET ORAL DAILY
Status: DISCONTINUED | OUTPATIENT
Start: 2025-07-30 | End: 2025-07-31

## 2025-07-30 RX ORDER — SIMETHICONE 80 MG
1 TABLET,CHEWABLE ORAL 4 TIMES DAILY PRN
Status: DISCONTINUED | OUTPATIENT
Start: 2025-07-30 | End: 2025-08-01 | Stop reason: HOSPADM

## 2025-07-30 RX ORDER — CHOLECALCIFEROL (VITAMIN D3) 25 MCG
1000 TABLET ORAL DAILY
Status: DISCONTINUED | OUTPATIENT
Start: 2025-07-30 | End: 2025-07-31

## 2025-07-30 RX ORDER — ACETAMINOPHEN 325 MG/1
650 TABLET ORAL EVERY 4 HOURS PRN
Status: DISCONTINUED | OUTPATIENT
Start: 2025-07-30 | End: 2025-08-01 | Stop reason: HOSPADM

## 2025-07-30 RX ORDER — IPRATROPIUM BROMIDE AND ALBUTEROL SULFATE 2.5; .5 MG/3ML; MG/3ML
3 SOLUTION RESPIRATORY (INHALATION) EVERY 4 HOURS PRN
Status: DISCONTINUED | OUTPATIENT
Start: 2025-07-30 | End: 2025-08-01 | Stop reason: HOSPADM

## 2025-07-30 RX ORDER — TALC
6 POWDER (GRAM) TOPICAL NIGHTLY PRN
Status: DISCONTINUED | OUTPATIENT
Start: 2025-07-30 | End: 2025-08-01 | Stop reason: HOSPADM

## 2025-07-30 RX ORDER — POLYETHYLENE GLYCOL 3350 17 G/17G
17 POWDER, FOR SOLUTION ORAL DAILY
Status: DISCONTINUED | OUTPATIENT
Start: 2025-07-30 | End: 2025-08-01 | Stop reason: HOSPADM

## 2025-07-30 RX ORDER — ONDANSETRON 8 MG/1
8 TABLET, ORALLY DISINTEGRATING ORAL EVERY 8 HOURS PRN
Status: DISCONTINUED | OUTPATIENT
Start: 2025-07-30 | End: 2025-08-01 | Stop reason: HOSPADM

## 2025-07-30 RX ORDER — PANTOPRAZOLE SODIUM 40 MG/1
40 TABLET, DELAYED RELEASE ORAL 2 TIMES DAILY
Status: DISCONTINUED | OUTPATIENT
Start: 2025-07-30 | End: 2025-07-31

## 2025-07-30 RX ORDER — POTASSIUM CHLORIDE 20 MEQ/1
20 TABLET, EXTENDED RELEASE ORAL DAILY
Status: DISCONTINUED | OUTPATIENT
Start: 2025-07-30 | End: 2025-07-30

## 2025-07-30 RX ORDER — POLYETHYLENE GLYCOL 3350 17 G/17G
17 POWDER, FOR SOLUTION ORAL 2 TIMES DAILY PRN
Status: DISCONTINUED | OUTPATIENT
Start: 2025-07-30 | End: 2025-08-01 | Stop reason: HOSPADM

## 2025-07-30 RX ORDER — SODIUM CHLORIDE 0.9 % (FLUSH) 0.9 %
5 SYRINGE (ML) INJECTION
Status: DISCONTINUED | OUTPATIENT
Start: 2025-07-30 | End: 2025-08-01 | Stop reason: HOSPADM

## 2025-07-30 RX ORDER — POTASSIUM CHLORIDE 20 MEQ/1
20 TABLET, EXTENDED RELEASE ORAL ONCE
Status: DISCONTINUED | OUTPATIENT
Start: 2025-07-30 | End: 2025-07-30

## 2025-07-30 RX ORDER — ATORVASTATIN CALCIUM 20 MG/1
80 TABLET, FILM COATED ORAL DAILY
Status: DISCONTINUED | OUTPATIENT
Start: 2025-07-30 | End: 2025-07-31

## 2025-07-30 RX ORDER — SPIRONOLACTONE 25 MG/1
25 TABLET ORAL DAILY
Status: DISCONTINUED | OUTPATIENT
Start: 2025-07-30 | End: 2025-07-30

## 2025-07-30 RX ORDER — ACETAMINOPHEN 500 MG
1000 TABLET ORAL EVERY 8 HOURS PRN
Status: DISCONTINUED | OUTPATIENT
Start: 2025-07-30 | End: 2025-08-01 | Stop reason: HOSPADM

## 2025-07-30 RX ORDER — FUROSEMIDE 10 MG/ML
80 INJECTION INTRAMUSCULAR; INTRAVENOUS
Status: COMPLETED | OUTPATIENT
Start: 2025-07-30 | End: 2025-07-30

## 2025-07-30 RX ORDER — ALUMINUM HYDROXIDE, MAGNESIUM HYDROXIDE, AND SIMETHICONE 1200; 120; 1200 MG/30ML; MG/30ML; MG/30ML
30 SUSPENSION ORAL 4 TIMES DAILY PRN
Status: DISCONTINUED | OUTPATIENT
Start: 2025-07-30 | End: 2025-08-01 | Stop reason: HOSPADM

## 2025-07-30 RX ORDER — FUROSEMIDE 40 MG/1
40 TABLET ORAL DAILY
Status: DISCONTINUED | OUTPATIENT
Start: 2025-07-30 | End: 2025-07-30

## 2025-07-30 RX ADMIN — EZETIMIBE 10 MG: 10 TABLET ORAL at 08:07

## 2025-07-30 RX ADMIN — POTASSIUM CHLORIDE 20 MEQ: 1500 TABLET, EXTENDED RELEASE ORAL at 08:07

## 2025-07-30 RX ADMIN — Medication 1000 UNITS: at 08:07

## 2025-07-30 RX ADMIN — PANTOPRAZOLE SODIUM 40 MG: 40 TABLET, DELAYED RELEASE ORAL at 08:07

## 2025-07-30 RX ADMIN — ATORVASTATIN CALCIUM 80 MG: 20 TABLET, FILM COATED ORAL at 08:07

## 2025-07-30 RX ADMIN — FERUMOXYTOL 510 MG: 510 INJECTION INTRAVENOUS at 05:07

## 2025-07-30 RX ADMIN — CYANOCOBALAMIN TAB 1000 MCG 1000 MCG: 1000 TAB at 08:07

## 2025-07-30 RX ADMIN — POLYETHYLENE GLYCOL 3350 17 G: 17 POWDER, FOR SOLUTION ORAL at 09:07

## 2025-07-30 RX ADMIN — PANTOPRAZOLE SODIUM 40 MG: 40 TABLET, DELAYED RELEASE ORAL at 09:07

## 2025-07-30 RX ADMIN — FUROSEMIDE 80 MG: 10 INJECTION, SOLUTION INTRAVENOUS at 01:07

## 2025-07-30 NOTE — HPI
Katelyn Caba is a 83 yo F with PMHx of BCC, HTN, HLD, aortic atherosclerosis, hx of GI bleeds, ISAIAH requiring frequent transfusions, HFpEF, CAD, L MCA CVA who presented to ED for shortness of breath. She reports being at home resting when she gradually became short of breath a few hours ago. Son at bedside report compliance with all home meds, including lasix and aldactone. Family has closely monitored her Na intake and she is at her dry weight. She denies melena or hematochezia. Endorses some mild intermittent abdominal cramping and feels like she is constipated. She took miralax yesterday and had minimal relief. She receives frequent blood/iron transfusions and follows closely with hematology. Last iron transfusion was 07/28. Denies fever, chills, chest pain, palpitations, cough, n/v/d, dysuria, headaches and LE swelling.    Of note, patient was hospitalized from - for acute respiratory failure with bilateral pleural effusions 2/2 CHF exacerbation. During that hospitalization, she suffered from an acute R MCA CVA with residual L hemiparesis. Her plavix was stopped and she was started on eliquis 2.5 mg BID. She was then at [facility] from 07/06-07/23.      In ED: Afebrile. HDS. Satting well on RA. No leukocytosis. Hgb 7.6, down from 8.5 the day prior. . CMP with Na 134 and . Pro BNP significantly elevated to 7779. HS trop elevated to 384. CXR notes fibrotic and emphysematous changes, but no acute process. Given IV lasix 80 mg x1. Admitted to .

## 2025-07-30 NOTE — PLAN OF CARE
Recommendations     Interventions: General healthful diet, Medical food supplement therapy, Vitamin and mineral supplement therapy     Recommendations:   1. Continue clear liquid diet as tolerated     - advance diet as tolerated     - please continue to document PO % intake via flowsheets     2. Recommend boost breeze TID    3. Continue boost plus TID when diet is advanced     4. Encourage good intake     5. RD to monitor weight, labs, meds, intake, tolerance     Goal #1: PO intake will meet greater than or equal to 50% of estimated needs by RD follow up  Nutrition Goal Status #1: new  Goal #2: Maintain weight throughout hospitalization  Nutrition Goal Status #2: new  Communication of RD Recs:  (POC)     Nutrition Discharge Planning     Nutrition Discharge Planning: General healthy diet, Oral supplement regimen (comments)  Oral supplement regimen (comments): oral nutrition supplement of choice and MVI

## 2025-07-30 NOTE — ASSESSMENT & PLAN NOTE
- continue statin, zetia.  Recommended permanent discontinuation of Eliquis given number of bleeding events  - recently completed [facility] and is current with HH

## 2025-07-30 NOTE — H&P (VIEW-ONLY)
[facility] Step[facility]  Gastroenterology  Consult Note    Patient Name: Katelyn Caba  MRN: [id number]  Admission Date: 7/29/2025  Hospital Length of Stay: 0 days  Code Status: Full Code   Attending Provider: Evan Muniz MD   Consulting Provider: Breanna Lares MD  Primary Care Physician: Brooklyn Burnham MD  Principal Problem:Shortness of breath    Inpatient consult to Gastroenterology  Consult performed by: Breanna Lares MD  Consult ordered by: Evan Muniz MD        Subjective:     HPI:  84F with hx of BCC, HTN, HLD, aortic atherosclerosis, hemorrhoids, hx of GI bleeds, ISAIAH requiring frequent transfusions, HFpEF, CAD, L MCA CVA presented to the hospital with shortness of breath. Patient denies any chest pain, lightheadedness, dizziness, or abdominal pain. She has had occasional black stools. Patient reports some constipation but states her most recent BM was yesterday and it consisted of dark brown pellets, she denies any bright red blood in her stool. Patient denies any use of NSAIDs. She receives blood/iron transfusions regularly for chronic normocytic anemia. Her last iron transfusion was 7/28/25. Patient is on eliquis, last dose was 7/29/25 around 2pm. Since admission patient has received 1unit pRBCs.    Hgb 6.8 from 8.5 on admission 7/28/25. Baseline ranges between 7-9. Iron studies show normal iron, TIBC, and transferrin. Ferritin 594 and iron saturation 17.  BUN 37, Cr 0.9    No prior colonoscopies    Upper GI Endoscopy 3/5/25  Impression:              - Esophageal mucosal changes secondary to established long-segment Rodriguez's disease, classified as Rodriguez's stage C10-M10 per [place] criteria.   - 3 cm hiatal hernia.   - One non-bleeding angioectasia in the stomach. Treated with argon plasma coagulation (APC).   - Two non-bleeding angioectasias in the duodenum. Treated with argon plasma coagulation (APC).   - No specimens collected.     GI consulted for evaluation of  suspected upper GI bleed    Past Medical History:   Diagnosis Date    Cancer     basal cell carcinoma    Stroke due to embolism of right middle cerebral artery 02/18/2025       Past Surgical History:   Procedure Laterality Date    CARPAL TUNNEL RELEASE      ESOPHAGOGASTRODUODENOSCOPY N/A 8/22/2022    Procedure: EGD (ESOPHAGOGASTRODUODENOSCOPY);  Surgeon: Steven Lopez MD;  Location: [facility] (2ND FLR);  Service: Endoscopy;  Laterality: N/A;    ESOPHAGOGASTRODUODENOSCOPY N/A 12/5/2022    Procedure: EGD (ESOPHAGOGASTRODUODENOSCOPY);  Surgeon: Steven Lopez MD;  Location: [facility] (2ND FLR);  Service: Endoscopy;  Laterality: N/A;  Has estimated pulmonary artery pressure 45, schedule location per protocol.   Please schedule with Dr. Darryl Lopez-  Plavix stopped 8/23/22  pt requested to schedule after Thanksgiving/ inst portal-RB  pre call complete; [facility] 11/28/22    ESOPHAGOGASTRODUODENOSCOPY N/A 7/11/2023    Procedure: EGD (ESOPHAGOGASTRODUODENOSCOPY);  Surgeon: Natalie Fall MD;  Location: [facility];  Service: Endoscopy;  Laterality: N/A;    ESOPHAGOGASTRODUODENOSCOPY N/A 3/5/2025    Procedure: EGD (ESOPHAGOGASTRODUODENOSCOPY);  Surgeon: Roberto Salvador MD;  [facility]  Service: Endoscopy;  Laterality: N/A;    EYE SURGERY      left eye cataract    LEFT HEART CATHETERIZATION  4/17/2024    Procedure: Left heart cath;  Surgeon: Elisabeth De La Cruz MD;  Location: [facility] CATH;  Service: Cardiology;;    TONSILLECTOMY      TUBAL LIGATION         Review of patient's allergies indicates:   Allergen Reactions    Jardiance [empagliflozin]      Family History    None       Tobacco Use    Smoking status: Never     Passive exposure: Never    Smokeless tobacco: Never   Substance and Sexual Activity    Alcohol use: No    Drug use: No    Sexual activity: Not on file     Review of Systems  Objective:     Vital Signs (Most Recent):  Temp: 97.4 °F (36.3 °C) (07/30/25 1030)  Pulse: 72 (07/30/25 1107)  Resp:  18 (07/30/25 1030)  BP: (!) 112/57 (07/30/25 1030)  SpO2: 97 % (07/30/25 1030) Vital Signs (24h Range):  Temp:  [96.7 °F (35.9 °C)-98 °F (36.7 °C)] 97.4 °F (36.3 °C)  Pulse:  [70-78] 72  Resp:  [17-20] 18  SpO2:  [92 %-100 %] 97 %  BP: ()/(53-65) 112/57     Weight: 42.4 kg (93 lb 7.6 oz) (07/30/25 0800)  Body mass index is 17.1 kg/m².      Intake/Output Summary (Last 24 hours) at 7/30/2025 1440  Last data filed at 7/30/2025 1343  Gross per 24 hour   Intake 1097 ml   Output 675 ml   Net 422 ml       Lines/Drains/Airways       Drain  Duration             Female External Urinary Catheter w/ Suction 07/30/25 0123 <1 day              Peripheral Intravenous Line  Duration             Peripheral IV Single Lumen 07/29/25 2325 20 G Left;Posterior Hand <1 day                     Physical Exam  Constitutional:       Comments: pale   HENT:      Head: Normocephalic and atraumatic.   Eyes:      Pupils: Pupils are equal, round, and reactive to light.   Cardiovascular:      Rate and Rhythm: Normal rate.   Pulmonary:      Effort: No respiratory distress.      Breath sounds: No wheezing.   Abdominal:      General: There is no distension.      Tenderness: There is no abdominal tenderness. There is no guarding.   Neurological:      Mental Status: She is alert and oriented to person, place, and time. Mental status is at baseline.      Motor: No weakness.   Psychiatric:         Mood and Affect: Mood normal.         Behavior: Behavior normal.         Thought Content: Thought content normal.         Judgment: Judgment normal.          Significant Labs:  All pertinent lab results from the last 24 hours have been reviewed.    Significant Imaging:  Imaging results within the past 24 hours have been reviewed.  Assessment/Plan:     Oncology  Anemia  Katelyn Caba is a 83 yo F with PMHx of BCC, HTN, HLD, aortic atherosclerosis, hx of GI bleeds, ISAIAH requiring frequent transfusions, HFpEF, CAD, L MCA CVA who presented to ED for shortness  of breath.She notes have black tarry stool for a few weeks now. No blood noted with her stool. No bleeding from any other orifice. Receives iron transfusions. Iron profile is normal. Most recent EGD 03/2025 with 1x bleeding angioectasia in stomach 2x nonbleeding angioectasia in the duodenum. Current hgb is 6.8, undergoing transfusion. Concern for UGIB.    Impression: possible UGIB for investigation    Plan:  - Plan for EGD/push enteroscopy tomorrow  - Keep NPO   - Trend Hgb and transfuse for goal Hgb > 7, unless otherwise indicated  - Maintain IV access with 2 large bore IV's  - Intravascular resuscitation/support with IVF's   - Hold all NSAIDs and anticoagulants, unless contraindicated  - Bolus IV pantoprazole 80 mg followed by 40 mg BID  - Please correct any coagulopathy with platelets and FFP for goal of platelets >50K and INR <2.0  - Please notify GI team if there is significant change in patient's clinical status         Thank you for your consult. I will follow-up with patient. Please contact us if you have any additional questions.    Breanna Lares MD  Gastroenterology  [facility] Step[facility]

## 2025-07-30 NOTE — ED TRIAGE NOTES
Pt arrives via POV with spouse at beside. Pt reports having increased SOB starting this afternoon. Pt has a hx of CHF. Pt denies any precipitating factors at this time.

## 2025-07-30 NOTE — PLAN OF CARE
[facility] - [facility] Step[facility]  Initial Discharge Assessment       Primary Care Provider: Brooklyn Burnham MD    Admission Diagnosis: Shortness of breath [R06.02]  SOB (shortness of breath) [R06.02]  Chest pain [R07.9]    Admission Date: 7/29/2025  Expected Discharge Date: 8/1/2025    Transition of Care Barriers: None    Payor: MEDICARE / Plan: MEDICARE PART A & B / Product Type: Government /     Extended Emergency Contact Information  Primary Emergency Contact: Eder Doe  Mobile Phone: [phone]  Relation: Spouse   needed? No  Secondary Emergency Contact: Keegan Meza  Address: [address]           [place] [address] [place]  Mobile Phone: [phone]  Relation: Brother    Discharge Plan A: Home Health  Discharge Plan B: Skilled Nursing Facility      [id number] - [place], [place] - [address]  [address]  [facility] [address]  Phone: [phone] Fax: [phone]    [id number] - [place] [place] [place] - [address] AT [address] & [facility] [facility]  [address]  [place] [address]  Phone: [phone] Fax: [phone]      Initial Assessment (most recent)       Adult Discharge Assessment - 07/30/25 1119          Discharge Assessment    Assessment Type Discharge Planning Assessment     Confirmed/corrected address, phone number and insurance Yes     Confirmed Demographics Correct on Facesheet     Source of Information patient;family;health record     Communicated TERE with patient/caregiver Date not available/Unable to determine     People in Home spouse     Name(s) of People in Home Eder Doe (spouse) [phone]     Do you expect to return to your current living situation? Yes     Do you have help at home or someone to help you manage your care at home? Yes     Who are your caregiver(s) and their phone number(s)? Eder Doe (spouse) [phone]      Prior to hospitilization cognitive status: Alert/Oriented     Current cognitive status: Alert/Oriented     Walking or Climbing Stairs Difficulty yes     Walking or Climbing Stairs ambulation difficulty, requires equipment     Mobility Management Rollator     Dressing/Bathing Difficulty yes     Dressing/Bathing bathing difficulty, assistance 1 person;dressing difficulty, assistance 1 person     Dressing/Bathing Management  assist     Equipment Currently Used at Home bath bench;rollator     Readmission within 30 days? Yes     Patient currently being followed by outpatient case management? Yes     If yes, name of outpatient case management following: [facility] outpatient case management     Do you currently have service(s) that help you manage your care at home? Yes     Name and Contact number of agency [facility]     Is the pt/caregiver preference to resume services with current agency Yes     Do you take prescription medications? Yes     Do you have prescription coverage? Yes     Do you have any problems affording any of your prescribed medications? No     Is the patient taking medications as prescribed? yes     Who is going to help you get home at discharge? Eder Doe (spouse) [phone]     How do you get to doctors appointments? family or friend will provide     Are you on dialysis? No     Do you take coumadin? No     Discharge Plan A Home Health     Discharge Plan B Skilled Nursing Facility     Discharge Plan discussed with: Patient;Spouse/sig other     Name(s) and Number(s) Eder Doe (spouse) [phone]     Transition of Care Barriers None                     Readmission Assessment (most recent)       Readmission Assessment - 07/30/25 1021          Readmission    Was this a planned readmission? No     When you left the hospital where did you go? Other   IPR    Did patient/caregiver refused recommended DC plan? No     Tell me about what happened between when you left the hospital and the day you  returned. SOB     When did you start not feeling well? night before     Did you try to manage your symptoms your self? Yes     Did you call anyone? No     Did you try to see or did see a doctor or nurse before you came? No     Did you have  a follow-up appointment on discharge? Yes     Did you go? Yes                    SW met with pt,  Jonas, and friend Michelle at bedside to discuss discharge planning.  Pt has been staying in an apartment with [facility] at [address], [address], [place], [address] where she uses a rollator for ambulation and gets assistance from [facility] for ADLs.  Pt is current with [facility] (confirmed with liablane Lugo).  PCP is Dr Brooklyn Burnham.  Pt will have transportation and assistance from [facility] at discharge.  Discharge Plan A and Plan B have been determined by review of patient's clinical status, future medical and therapeutic needs, and coverage/benefits for post-acute care in coordination with multidisciplinary team members.  Will continue to follow.      Reema Head, DEBBI  [facility]  [id number]

## 2025-07-30 NOTE — ASSESSMENT & PLAN NOTE
Patient's blood pressure range in the last 24 hours was: BP  Min: 99/53  Max: 139/65.The patient's inpatient anti-hypertensive regimen is listed below:  Current Antihypertensives  furosemide tablet 40 mg, Daily, Oral  spironolactone tablet 25 mg, Daily, Oral    Plan  - BP is controlled, no changes needed to their regimen

## 2025-07-30 NOTE — H&P
Thanh Williamson - Cardiology [facility] Medicine  History & Physical    Patient Name: Katelyn Caba  MRN: [id number]  Patient Class: IP- Inpatient  Admission Date: 7/29/2025  Attending Physician: Evan Muniz MD   Primary Care Provider: Brooklyn Burnham MD         Patient information was obtained from patient, relative(s), and ER records.     Subjective:     Principal Problem:Shortness of breath    Chief Complaint:   Chief Complaint   Patient presents with    Shortness of Breath     Pt states shortness of breath since earlier tonight while resting. Pt has history of heart failure and receives iron infusions, last one was yesterday, denies any pain        HPI: Katelyn Caba is a 85 yo F with PMHx of BCC, HTN, HLD, aortic atherosclerosis, hx of GI bleeds, ISAIAH requiring frequent transfusions, HFpEF, CAD, L MCA CVA who presented to ED for shortness of breath. She reports being at home resting when she gradually became short of breath a few hours ago. Son at bedside report compliance with all home meds, including lasix and aldactone. Family has closely monitored her Na intake and she is at her dry weight. She denies melena or hematochezia. Endorses some mild intermittent abdominal cramping and feels like she is constipated. She took miralax yesterday and had minimal relief. She receives frequent blood/iron transfusions and follows closely with hematology. Last iron transfusion was 07/28. Denies fever, chills, chest pain, palpitations, cough, n/v/d, dysuria, headaches and LE swelling.    Of note, patient was hospitalized from - for acute respiratory failure with bilateral pleural effusions 2/2 CHF exacerbation. During that hospitalization, she suffered from an acute R MCA CVA with residual L hemiparesis. Her plavix was stopped and she was started on eliquis 2.5 mg BID. She was then at [facility] from 07/06-07/23.      In ED: Afebrile. HDS. Satting well on RA. No leukocytosis. Hgb 7.6, down from 8.5 the day  prior. . CMP with Na 134 and . Pro BNP significantly elevated to 7779. HS trop elevated to 384. CXR notes fibrotic and emphysematous changes, but no acute process. Given IV lasix 80 mg x1. Admitted to .     Past Medical History:   Diagnosis Date    Cancer     basal cell carcinoma    Stroke due to embolism of right middle cerebral artery 02/18/2025       Past Surgical History:   Procedure Laterality Date    CARPAL TUNNEL RELEASE      ESOPHAGOGASTRODUODENOSCOPY N/A 8/22/2022    Procedure: EGD (ESOPHAGOGASTRODUODENOSCOPY);  Surgeon: Steven Lopez MD;  [facility]  Service: Endoscopy;  Laterality: N/A;    ESOPHAGOGASTRODUODENOSCOPY N/A 12/5/2022    Procedure: EGD (ESOPHAGOGASTRODUODENOSCOPY);  Surgeon: Steven Lopez MD;  Location[facility] ([address] [place] [address]);  Service: Endoscopy;  Laterality: N/A;  Has estimated pulmonary artery pressure 45, schedule location per protocol.   Please schedule with Dr. Darryl Lopez-  Plavix stopped 8/23/22  [facility]  pre call complete; [facility] 11/28/22    ESOPHAGOGASTRODUODENOSCOPY N/A 7/11/2023    Procedure: EGD (ESOPHAGOGASTRODUODENOSCOPY);  Surgeon: Natalie Fall MD;  Location: [facility];  Service: Endoscopy;  Laterality: N/A;    ESOPHAGOGASTRODUODENOSCOPY N/A 3/5/2025    Procedure: EGD (ESOPHAGOGASTRODUODENOSCOPY);  Surgeon: Roberto Salvador MD;  Location: [facility] ([address] [place] [address]);  Service: Endoscopy;  Laterality: N/A;    EYE SURGERY      left eye cataract    LEFT HEART CATHETERIZATION  4/17/2024    Procedure: Left heart cath;  Surgeon: Elisabeth De La Cruz MD;  Location: [facility] CATH;  Service: Cardiology;;    TONSILLECTOMY      TUBAL LIGATION         Review of patient's allergies indicates:   Allergen Reactions    Jardiance [empagliflozin]        No current facility-administered medications on file prior to encounter.     Current Outpatient Medications on File Prior to Encounter   Medication Sig    apixaban  (ELIQUIS) 2.5 mg Tab Take 1 tablet (2.5 mg total) by mouth 2 (two) times daily.    atorvastatin (LIPITOR) 80 MG tablet Take 1 tablet (80 mg total) by mouth once daily.    cyanocobalamin (VITAMIN B-12) 1000 MCG tablet Take 1,000 mcg by mouth once daily.    empagliflozin (JARDIANCE) 10 mg tablet Take 10 mg by mouth once daily. (Patient not taking: Reported on 7/28/2025)    ezetimibe (ZETIA) 10 mg tablet Take 1 tablet (10 mg total) by mouth once daily.    furosemide (LASIX) 40 MG tablet Take 1 tablet (40 mg total) by mouth once daily.    pantoprazole (PROTONIX) 40 MG tablet Take 1 tablet (40 mg total) by mouth once daily. Take 1 tablet by mouth every day    potassium chloride SA (K-DUR,KLOR-CON) 20 MEQ tablet Take 1 tablet (20 mEq total) by mouth once daily.    spironolactone (ALDACTONE) 25 MG tablet Take 1 tablet (25 mg total) by mouth once daily.    vitamin D (VITAMIN D3) 1000 units Tab Take 1,000 Units by mouth once daily.     Family History    None       Tobacco Use    Smoking status: Never     Passive exposure: Never    Smokeless tobacco: Never   Substance and Sexual Activity    Alcohol use: No    Drug use: No    Sexual activity: Not on file     Review of Systems   Constitutional:  Negative for activity change, chills and fever.   HENT:  Negative for trouble swallowing.    Eyes:  Negative for photophobia and visual disturbance.   Respiratory:  Positive for shortness of breath. Negative for cough and chest tightness.    Cardiovascular:  Negative for chest pain, palpitations and leg swelling.   Gastrointestinal:  Negative for abdominal pain, constipation, diarrhea, nausea and vomiting.   Genitourinary:  Negative for dysuria, frequency and hematuria.   Musculoskeletal:  Negative for back pain, gait problem and neck pain.   Skin:  Negative for rash and wound.   Neurological:  Negative for dizziness, syncope, speech difficulty and light-headedness.   Psychiatric/Behavioral:  Negative for agitation and confusion. The  patient is not nervous/anxious.      Objective:     Vital Signs (Most Recent):  Temp: 97.4 °F (36.3 °C) (07/29/25 2114)  Pulse: 78 (07/30/25 0100)  Resp: 20 (07/30/25 0030)  BP: 127/61 (07/30/25 0100)  SpO2: 100 % (07/30/25 0100) Vital Signs (24h Range):  Temp:  [97.4 °F (36.3 °C)] 97.4 °F (36.3 °C)  Pulse:  [71-78] 78  Resp:  [18-20] 20  SpO2:  [100 %] 100 %  BP: (113-139)/(56-65) 127/61        Body mass index is 17.6 kg/m².     Physical Exam  Vitals and nursing note reviewed.   Constitutional:       General: She is not in acute distress.     Appearance: She is well-developed.   HENT:      Head: Normocephalic and atraumatic.      Mouth/Throat:      Pharynx: No oropharyngeal exudate.   Eyes:      Conjunctiva/sclera: Conjunctivae normal.      Pupils: Pupils are equal, round, and reactive to light.   Cardiovascular:      Rate and Rhythm: Normal rate and regular rhythm.      Heart sounds: Normal heart sounds.   Pulmonary:      Effort: Pulmonary effort is normal. No respiratory distress.      Breath sounds: Wheezing (R base) present.   Abdominal:      General: Bowel sounds are normal. There is no distension.      Palpations: Abdomen is soft.      Tenderness: There is no abdominal tenderness.   Musculoskeletal:         General: No tenderness. Normal range of motion.      Cervical back: Normal range of motion and neck supple.      Right lower leg: No edema.      Left lower leg: Edema (trace) present.   Lymphadenopathy:      Cervical: No cervical adenopathy.   Skin:     General: Skin is warm and dry.      Capillary Refill: Capillary refill takes less than 2 seconds.      Findings: No rash.   Neurological:      Mental Status: She is alert and oriented to person, place, and time.      Cranial Nerves: No cranial nerve deficit.      Sensory: No sensory deficit.      Coordination: Coordination normal.   Psychiatric:         Behavior: Behavior normal.         Thought Content: Thought content normal.         Judgment: Judgment  normal.              CRANIAL NERVES     CN III, IV, VI   Pupils are equal, round, and reactive to light.       Significant Labs: All pertinent labs within the past 24 hours have been reviewed.  CBC:   Recent Labs   Lab 07/28/25  1100 07/29/25  2325   WBC 6.04 7.31   HGB 8.5* 7.6*   HCT 27.2* 24.1*   * 481*     CMP:   Recent Labs   Lab 07/29/25  2325   *   K 4.0   CL 94*   CO2 28   *   BUN 39*   CREATININE 0.9   CALCIUM 9.7   PROT 6.7   ALBUMIN 3.7   BILITOT 0.4   ALKPHOS 99   AST 37   ALT 29   ANIONGAP 12     Cardiac Markers:   Recent Labs   Lab 07/29/25  2325   BNP 7,779*     Troponin:   Recent Labs   Lab 07/29/25  2325 07/30/25  0121   TROPONINIHS 384* 317*       Significant Imaging: I have reviewed all pertinent imaging results/findings within the past 24 hours.  Imaging Results              X-Ray Chest AP Portable (Final result)  Result time 07/30/25 00:59:56      Final result by Serafin Farrell MD (07/30/25 00:59:56)                   Impression:      No evidence of acute chest disease.      Electronically signed by: Serafin Farrell  Date:    07/30/2025  Time:    00:59               Narrative:    EXAMINATION:  XR CHEST AP PORTABLE    CLINICAL HISTORY:  Shortness of breath    TECHNIQUE:  Single frontal view of the chest was performed.    COMPARISON:  07/02/2025    FINDINGS:  Heart mediastinal contours appear normal in size and configuration with the trachea midline.  The lungs and pleural spaces appear clear.  Fibrotic changes and emphysematous changes remain.  Degenerative shoulder and spine changes in the bones.                                    Assessment/Plan:     Assessment & Plan  Shortness of breath  Endorsing SOB that started tonight and has since resolved. Denies cough or any associated symptoms. Hx of CHF and emphysema. CXR without edema or consolidation. Satting well on RA. BNP significantly elevated.     - Diuresed with IV lasix in ED. Appears close to euvolemic so will hold  on further doses of IV diuretics    (HFpEF) heart failure with preserved ejection fraction  - Patient is identified as having Diastolic (HFpEF) and R sided  heart failure that is Chronic.   - CHF is currently controlled.   - Latest ECHO performed and demonstrates- Results for orders placed during the hospital encounter of 06/28/25    Echo    Interpretation Summary    Left Ventricle: The left ventricle is normal in size. Normal wall thickness. There is concentric remodeling. There is normal systolic function with a visually estimated ejection fraction of 60 - 65%. Grade III diastolic dysfunction.    Right Ventricle: The right ventricle is normal in size measuring 2.8 cm. Wall thickness is normal. Systolic function is reduced.    Left Atrium: The left atrium is severely dilated    Aortic Valve: The aortic valve is a trileaflet valve. There is mild aortic valve sclerosis.    Mitral Valve: There is mild regurgitation.    Pulmonary Artery: The estimated pulmonary artery systolic pressure is 37 mmHg.    IVC/SVC: Normal venous pressure at 3 mmHg.  .   - Continue Furosemide Aldactone and monitor clinical status closely.   - Monitor on telemetry.   - Patient is on CHF pathway.    - Monitor strict Is&Os and daily weights.    - Place on fluid restriction of 1.5 L.   - Continue to stress to patient importance of self efficacy and  on diet for CHF.   - Last BNP reviewed- and noted below   Recent Labs   Lab 07/29/25  2325   BNP 7,779*   - despite BNP elevation, her CXR is clear and she is close to euvolemic on exam  - given IV lasix 80 mg in ED. Will monitor output and restart po dose in AM  Elevated troponin  - chronically elevated, but more elevated than baseline on admit. HS trop 384 >> 317  - no acute ischemic changes on EKG  - denies any recent episodes of chest pain. Low suspicion for ACS  - monitor tele   H/O ischemic left MCA stroke  Embolic stroke involving right middle cerebral artery  - continue statin, zetia  and eliquis  - recently completed [facility] and is current with HH    Mixed hyperlipidemia  - continue statin and zetia   Symptomatic anemia  Anemia is likely due to chronic blood loss and Iron deficiency. Most recent hemoglobin and hematocrit are listed below.  Recent Labs     07/28/25  1100 07/29/25  2325   HGB 8.5* 7.6*   HCT 27.2* 24.1*     Plan  - Monitor serial CBC: Daily  - Transfuse PRBC if patient becomes hemodynamically unstable, symptomatic or H/H drops below 7/21.  - Patient has not received any PRBC transfusions to date  - Patient's anemia is currently stable  - Just received Iron transfusion on 07/28. Anemia may be contributing to her SOB   Rodriguez's esophagus  History of gastric ulcer  - continue ppi BID   Hypertension  Patient's blood pressure range in the last 24 hours was: BP  Min: 99/53  Max: 139/65.The patient's inpatient anti-hypertensive regimen is listed below:  Current Antihypertensives  furosemide tablet 40 mg, Daily, Oral  spironolactone tablet 25 mg, Daily, Oral    Plan  - BP is controlled, no changes needed to their regimen  Body mass index (BMI) less than 19  - start Boost BID   CAD (coronary artery disease)  Patient with known CAD, which is controlled Will continue zetia and Statin and monitor for S/Sx of angina/ACS. Continue to monitor on telemetry.   VTE Risk Mitigation (From admission, onward)           Ordered     apixaban tablet 2.5 mg  2 times daily         07/30/25 0242     IP VTE HIGH RISK PATIENT  Once         07/30/25 0117     Reason for No Pharmacological VTE Prophylaxis  Once        Question:  Reasons:  Answer:  Already adequately anticoagulated on oral Anticoagulants    07/30/25 0117                               Elvia Walters PA-C  Department of Hospital Medicine  [facility] - Cardiology Stepdown

## 2025-07-30 NOTE — ASSESSMENT & PLAN NOTE
Anemia is likely due to chronic blood loss and Iron deficiency. Most recent hemoglobin and hematocrit are listed below.  Recent Labs     07/28/25  1100 07/29/25  2325   HGB 8.5* 7.6*   HCT 27.2* 24.1*     Plan  - Monitor serial CBC: Daily  - Transfuse PRBC if patient becomes hemodynamically unstable, symptomatic or H/H drops below 7/21.  - Patient has not received any PRBC transfusions to date  - Patient's anemia is currently stable  - Just received Iron transfusion on 07/28. Anemia may be contributing to her SOB

## 2025-07-30 NOTE — NURSING
CHAVA Walters PA-C notified for hgb 7.6 with no type and screen. Asked for type and screen. Per CRAIG, will place order. Will continue to monitor.     0638: CRAIG notified of hgb 6.8. Will need blood consent. Per CRAIG, will visit to get blood consent. Will continue to monitor.

## 2025-07-30 NOTE — PROGRESS NOTES
Thanh Williamson - Cardiology [facility] Medicine  Progress Note    Patient Name: Katelyn Caba  MRN: [id number]  Patient Class: IP- Inpatient   Admission Date: 7/29/2025  Length of Stay: 0 days  Attending Physician: Evan Muniz MD  Primary Care Provider: Brooklyn Burnham MD        Subjective     Principal Problem:Symptomatic anemia        HPI:  Katelyn Caba is a 85 yo F with PMHx of BCC, HTN, HLD, aortic atherosclerosis, hx of GI bleeds, ISAIAH requiring frequent transfusions, HFpEF, CAD, L MCA CVA who presented to ED for shortness of breath. She reports being at home resting when she gradually became short of breath a few hours ago. Son at bedside report compliance with all home meds, including lasix and aldactone. Family has closely monitored her Na intake and she is at her dry weight. She denies melena or hematochezia. Endorses some mild intermittent abdominal cramping and feels like she is constipated. She took miralax yesterday and had minimal relief. She receives frequent blood/iron transfusions and follows closely with hematology. Last iron transfusion was 07/28. Denies fever, chills, chest pain, palpitations, cough, n/v/d, dysuria, headaches and LE swelling.    Of note, patient was hospitalized from - for acute respiratory failure with bilateral pleural effusions 2/2 CHF exacerbation. During that hospitalization, she suffered from an acute R MCA CVA with residual L hemiparesis. Her plavix was stopped and she was started on eliquis 2.5 mg BID. She was then at [facility] from 07/06-07/23.      In ED: Afebrile. HDS. Satting well on RA. No leukocytosis. Hgb 7.6, down from 8.5 the day prior. . CMP with Na 134 and . Pro BNP significantly elevated to 7779. HS trop elevated to 384. CXR notes fibrotic and emphysematous changes, but no acute process. Given IV lasix 80 mg x1. Admitted to .     Overview/Hospital Course:  Ms. Caba is an 84-year-old lady with hypertension, coronary artery disease,  heart failure with preserved ejection fraction, history of embolic left MCA CVA on Eliquis, history of GI bleeds related to Rodriguez's esophagus and gastric/duodenal angioectasias, iron deficiency anemia requiring frequent transfusions.  She was admitted with symptomatic anemia thought to be due to upper GI bleed.  Patient has had intermittent dark stools.  Patient has worsening anemia with hemoglobin down to 6.8 from 8.5 within a day.  Patient getting transfused 1 unit of PRBCs today.  She has elevated ferritin but low iron saturation.  Recently given 750 mg of Injectafer.  Will give another 510 mg of Feraheme.  I recommended permanent discontinuation of Eliquis given number of previous severe GI bleed episodes.  This is at least her 3rd, and it sounds like she has had some minor episodes as well.  Gastroenterology is consulted and planning endoscopy tomorrow.  Will continue to monitor CBC.  Holding Lasix and spironolactone in the setting of apparent volume contraction.  Patient has elevated BUN to creatinine ratio, evidence of contraction alkalosis, hypotension.    Interval History:  Dyspnea is somewhat improved.  Blood transfusion initiated at time of my exam.  Patient and family going to discuss decision making around Eliquis discontinuation and code status.    Review of Systems   Constitutional:  Positive for activity change. Negative for appetite change, chills and fever.   HENT:  Negative for congestion, sinus pain and sore throat.    Eyes:  Negative for photophobia, pain and visual disturbance.   Respiratory:  Positive for shortness of breath. Negative for cough, chest tightness and wheezing.    Cardiovascular:  Negative for chest pain, palpitations and leg swelling.   Gastrointestinal:  Negative for abdominal distention, abdominal pain, constipation, diarrhea, nausea and vomiting.   Endocrine: Negative for polydipsia and polyuria.   Genitourinary:  Negative for decreased urine volume, dysuria and frequency.    Musculoskeletal:  Negative for arthralgias, joint swelling and myalgias.   Skin:  Negative for color change, pallor, rash and wound.   Neurological:  Negative for dizziness, weakness, numbness and headaches.   Psychiatric/Behavioral:  Negative for behavioral problems, confusion and hallucinations. The patient is nervous/anxious.      Objective:     Vital Signs (Most Recent):  Temp: 98.5 °F (36.9 °C) (07/30/25 1519)  Pulse: 73 (07/30/25 1519)  Resp: 18 (07/30/25 1519)  BP: (!) 106/56 (07/30/25 1519)  SpO2: 98 % (07/30/25 1519) Vital Signs (24h Range):  Temp:  [96.7 °F (35.9 °C)-98.5 °F (36.9 °C)] 98.5 °F (36.9 °C)  Pulse:  [70-78] 73  Resp:  [17-20] 18  SpO2:  [92 %-100 %] 98 %  BP: ()/(53-65) 106/56     Weight: 42.4 kg (93 lb 7.6 oz)  Body mass index is 17.1 kg/m².    Intake/Output Summary (Last 24 hours) at 7/30/2025 1620  Last data filed at 7/30/2025 1548  Gross per 24 hour   Intake 1097 ml   Output 925 ml   Net 172 ml         Physical Exam  Vitals and nursing note reviewed.   Constitutional:       General: She is not in acute distress.     Appearance: She is well-developed.      Comments: Underweight   HENT:      Head: Normocephalic and atraumatic.      Right Ear: External ear normal.      Left Ear: External ear normal.      Nose: Nose normal.   Eyes:      Conjunctiva/sclera: Conjunctivae normal.      Pupils: Pupils are equal, round, and reactive to light.   Cardiovascular:      Rate and Rhythm: Normal rate and regular rhythm.   Pulmonary:      Effort: Pulmonary effort is normal. No respiratory distress.      Breath sounds: Normal breath sounds. No wheezing.   Abdominal:      General: Bowel sounds are normal. There is no distension.      Palpations: Abdomen is soft.      Tenderness: There is no abdominal tenderness.   Musculoskeletal:         General: No tenderness. Normal range of motion.      Cervical back: Normal range of motion and neck supple.      Right lower leg: Edema present.      Left lower leg:  Edema present.   Skin:     General: Skin is warm and dry.      Coloration: Skin is pale.   Neurological:      Mental Status: She is alert and oriented to person, place, and time.   Psychiatric:         Thought Content: Thought content normal.               Significant Labs: All pertinent labs within the past 24 hours have been reviewed.  CBC:   Recent Labs   Lab 07/29/25 2325 07/30/25  0531 07/30/25  1452   WBC 7.31 6.70 6.85   HGB 7.6* 6.8* 8.5*   HCT 24.1* 21.4* 25.9*   * 448 419     CMP:   Recent Labs   Lab 07/29/25 2325 07/30/25  0531   * 133*   K 4.0 3.9   CL 94* 91*   CO2 28 30*   * 115*   BUN 39* 37*   CREATININE 0.9 0.9   CALCIUM 9.7 9.0   PROT 6.7  --    ALBUMIN 3.7  --    BILITOT 0.4  --    ALKPHOS 99  --    AST 37  --    ALT 29  --    ANIONGAP 12 12       Significant Imaging: I have reviewed all pertinent imaging results/findings within the past 24 hours.      Assessment & Plan  Symptomatic anemia  Anemia is likely due to chronic blood loss and Iron deficiency. Most recent hemoglobin and hematocrit are listed below.  Recent Labs     07/29/25 2325 07/30/25  0531 07/30/25  1452   HGB 7.6* 6.8* 8.5*   HCT 24.1* 21.4* 25.9*     Plan  - Monitor serial CBC: Daily  - Transfuse PRBC if patient becomes hemodynamically unstable, symptomatic or H/H drops below 7/21.  - Patient has received a transfusion of 1 unit of PRBCs on 07/30  - Patient's anemia is currently worsening  - Just received Iron transfusion on 07/28.  Providing additional IV iron on 07/30.  Should no longer need oral iron   Rodriguez's esophagus  History of gastric ulcer  - continue ppi BID   -GI consulted.  Planning endoscopy on 7/31   -currently on clear liquids diet.  will be NPO at midnight.  (HFpEF) heart failure with preserved ejection fraction  - Patient is identified as having Diastolic (HFpEF) and R sided  heart failure that is Chronic.   - CHF is currently controlled.   - Latest ECHO performed and demonstrates- Results  for orders placed during the hospital encounter of 06/28/25    Echo    Interpretation Summary    Left Ventricle: The left ventricle is normal in size. Normal wall thickness. There is concentric remodeling. There is normal systolic function with a visually estimated ejection fraction of 60 - 65%. Grade III diastolic dysfunction.    Right Ventricle: The right ventricle is normal in size measuring 2.8 cm. Wall thickness is normal. Systolic function is reduced.    Left Atrium: The left atrium is severely dilated    Aortic Valve: The aortic valve is a trileaflet valve. There is mild aortic valve sclerosis.    Mitral Valve: There is mild regurgitation.    Pulmonary Artery: The estimated pulmonary artery systolic pressure is 37 mmHg.    IVC/SVC: Normal venous pressure at 3 mmHg.  .   - holding Lasix and Aldactone in the setting of probable hypovolemia.  Resume as indicated  - Monitor on telemetry.   - Monitor strict Is&Os and daily weights.    - Continue to stress to patient importance of self efficacy and  on diet for CHF.   - Last BNP reviewed- and noted below   Recent Labs   Lab 07/29/25  2325   BNP 7,779*     - given IV lasix 80 mg in ED. continue monitor volume status  Elevated troponin  - chronically elevated, but more elevated than baseline on admit. HS trop 384 >> 317  - no acute ischemic changes on EKG  - denies any recent episodes of chest pain. Low suspicion for ACS  - monitor tele   H/O ischemic left MCA stroke  Embolic stroke involving right middle cerebral artery  - continue statin, zetia.  Recommended permanent discontinuation of Eliquis given number of bleeding events  - recently completed [facility] and is current with HH    Mixed hyperlipidemia  - continue statin and zetia   Hypertension  Patient's blood pressure range in the last 24 hours was: BP  Min: 99/53  Max: 139/65.The patient's inpatient anti-hypertensive regimen is listed below:  Current Antihypertensives       Plan  - BP is controlled, no  changes needed to their regimen  Body mass index (BMI) less than 19  - start Boost BID   CAD (coronary artery disease)  Patient with known CAD, which is controlled Will continue zetia and Statin and monitor for S/Sx of angina/ACS. Continue to monitor on telemetry.   Moderate malnutrition  Nutrition consulted. Most recent weight and BMI monitored-     Measurements:  Wt Readings from Last 1 Encounters:   07/30/25 42.4 kg (93 lb 7.6 oz)   Body mass index is 17.1 kg/m².    Patient has been screened and assessed by RD.    Malnutrition Type:  Context: chronic illness  Level: moderate    Malnutrition Characteristic Summary:  Weight Loss (Malnutrition): greater than 20% in 1 year (26 lbs/21.8%)  Subcutaneous Fat (Malnutrition): moderate depletion  Muscle Mass (Malnutrition): moderate depletion    Interventions/Recommendations (treatment strategy):  1. Continue clear liquid diet as tolerated   - advance diet as tolerated   - please continue to document PO % intake via flowsheets   2. Recommend boost breeze TID  3. Continue boost plus TID when diet is advanced   4. Encourage good intake   5. RD to monitor weight, labs, meds, intake, tolerance    VTE Risk Mitigation (From admission, onward)           Ordered     IP VTE HIGH RISK PATIENT  Once         07/30/25 0117     Reason for No Pharmacological VTE Prophylaxis  Once        Question:  Reasons:  Answer:  Already adequately anticoagulated on oral Anticoagulants    07/30/25 0117                    Discharge Planning   TERE: 8/1/2025     Code Status: Full Code   Medical Readiness for Discharge Date:   Discharge Plan A: Home Health                        Evan Muniz MD  Department of Hospital Medicine   [facility] - Cardiology Stepdown

## 2025-07-30 NOTE — HPI
84F with hx of BCC, HTN, HLD, aortic atherosclerosis, hemorrhoids, hx of GI bleeds, ISAIAH requiring frequent transfusions, HFpEF, CAD, L MCA CVA presented to the hospital with shortness of breath. Patient denies any chest pain, lightheadedness, dizziness, or abdominal pain. She has had occasional black stools. Patient reports some constipation but states her most recent BM was yesterday and it consisted of dark brown pellets, she denies any bright red blood in her stool. Patient denies any use of NSAIDs. She receives blood/iron transfusions regularly for chronic normocytic anemia. Her last iron transfusion was 7/28/25. Patient is on eliquis, last dose was 7/29/25 around 2pm. Since admission patient has received 1unit pRBCs.    Hgb 6.8 from 8.5 on admission 7/28/25. Baseline ranges between 7-9. Iron studies show normal iron, TIBC, and transferrin. Ferritin 594 and iron saturation 17.  BUN 37, Cr 0.9    No prior colonoscopies    Upper GI Endoscopy 3/5/25  Impression:              - Esophageal mucosal changes secondary to established long-segment Rodriguez's disease, classified as Rodriguez's stage C10-M10 per [facility] criteria.   - 3 cm hiatal hernia.   - One non-bleeding angioectasia in the stomach. Treated with argon plasma coagulation (APC).   - Two non-bleeding angioectasias in the duodenum. Treated with argon plasma coagulation (APC).   - No specimens collected.     GI consulted for evaluation of suspected upper GI bleed

## 2025-07-30 NOTE — ANESTHESIA PREPROCEDURE EVALUATION
[facility]  Anesthesia Pre-Operative Evaluation         Patient Name: Katelyn Caba  YOB: 1940  MRN: [id number]    SUBJECTIVE:     Pre-operative evaluation for Procedure(s) (LRB):  EGD (ESOPHAGOGASTRODUODENOSCOPY) (N/A)     07/30/2025    Katelyn Caba is a 84 y.o. female w/ a significant PMHx of hypertension, coronary artery disease, heart failure with preserved ejection fraction, history of embolic left MCA CVA on Eliquis, history of GI bleeds related to Rodriguez's esophagus and gastric/duodenal angioectasias, iron deficiency anemia requiring frequent transfusions. She was admitted with symptomatic anemia thought to be due to upper GI bleed. Patient has had intermittent dark stools. S/p 1u pRBC.     Now presenting for the above procedure(s).     Paper consent completed and placed in physical chart.     2D ECHO:  TTE:  Results for orders placed during the hospital encounter of 06/28/25    Echo    Interpretation Summary    Left Ventricle: The left ventricle is normal in size. Normal wall thickness. There is concentric remodeling. There is normal systolic function with a visually estimated ejection fraction of 60 - 65%. Grade III diastolic dysfunction.    Right Ventricle: The right ventricle is normal in size measuring 2.8 cm. Wall thickness is normal. Systolic function is reduced.    Left Atrium: The left atrium is severely dilated    Aortic Valve: The aortic valve is a trileaflet valve. There is mild aortic valve sclerosis.    Mitral Valve: There is mild regurgitation.    Pulmonary Artery: The estimated pulmonary artery systolic pressure is 37 mmHg.    IVC/SVC: Normal venous pressure at 3 mmHg.      EPHRAIM:  No results found for this or any previous visit.    Prev airway:     Intubation     Date/Time: 2/18/2025 12:17 PM     Performed by: Naila Barker CRNA  Authorized by: Epi Rivera MD    Intubation:     Induction:  Intravenous    Intubated:  Postinduction    Mask  Ventilation:  Easy mask    Attempts:  1    Attempted By:  CRNA    Method of Intubation:  Video laryngoscopy    Blade:  Santana 3    Laryngeal View Grade: Grade I - full view of cords      Difficult Airway Encountered?: No      Complications:  None    Airway Device:  Oral endotracheal tube    Airway Device Size:  7.5    Style/Cuff Inflation:  Cuffed (inflated to minimal occlusive pressure)    Tube secured:  20    Secured at:  The lips    Placement Verified By:  Capnometry    Complicating Factors:  None    Findings Post-Intubation:  BS equal bilateral and atraumatic/condition of teeth unchanged          LDA:   Peripheral IV Single Lumen 07/29/25 2325 20 G Left;Posterior Hand (Active)   Site Assessment Clean;Dry;Intact 07/30/25 1700   Line Securement Device Secured with sutureless device 07/30/25 1700   Extremity Assessment Distal to IV No abnormal discoloration 07/30/25 1700   Line Status Flushed 07/30/25 1600   Dressing Status Clean;Dry;Intact 07/30/25 1600   Dressing Intervention Integrity maintained 07/30/25 1600   Number of days: 0       Female External Urinary Catheter w/ Suction 07/30/25 0123 (Active)   Skin no redness;no breakdown 07/30/25 1600   Tolerance no signs/symptoms of discomfort 07/30/25 1600   Suction Continuous suction at 60 mmHg 07/30/25 1600   Output (mL) 0 mL 07/30/25 0600   Number of days: 0       Drips:       Problem List[1]    Review of patient's allergies indicates:   Allergen Reactions    Jardiance [empagliflozin]        Current Inpatient Medications:    atorvastatin  80 mg Oral Daily    cyanocobalamin  1,000 mcg Oral Daily    ezetimibe  10 mg Oral Daily    pantoprazole  40 mg Oral BID    polyethylene glycol  17 g Oral Daily    vitamin D  1,000 Units Oral Daily       Current Outpatient Medications   Medication Instructions    apixaban (ELIQUIS) 2.5 mg, Oral, 2 times daily    atorvastatin (LIPITOR) 80 mg, Oral, Daily    cyanocobalamin (VITAMIN B-12) 1,000 mcg, Daily    ezetimibe (ZETIA) 10 mg,  Oral, Daily    furosemide (LASIX) 40 mg, Oral, Daily    pantoprazole (PROTONIX) 40 mg, Oral, Daily, Take 1 tablet by mouth every day    potassium chloride SA (K-DUR,KLOR-CON) 20 MEQ tablet 20 mEq, Oral, Daily    spironolactone (ALDACTONE) 25 mg, Oral, Daily    vitamin D (VITAMIN D3) 1,000 Units, Daily       Surgical History  Past Surgical History:   Procedure Laterality Date    CARPAL TUNNEL RELEASE      ESOPHAGOGASTRODUODENOSCOPY N/A 8/22/2022    Procedure: EGD (ESOPHAGOGASTRODUODENOSCOPY);  Surgeon: Steven Lopez MD;  Location: [facility] ([address] [place] [address]);  Service: Endoscopy;  Laterality: N/A;    ESOPHAGOGASTRODUODENOSCOPY N/A 12/5/2022    Procedure: EGD (ESOPHAGOGASTRODUODENOSCOPY);  Surgeon: Steven Lopez MD;  Location: [address]);  Service: Endoscopy;  Laterality: N/A;  Has estimated pulmonary artery pressure 45, schedule location per protocol.   Please schedule with Dr. Darryl Lopez-  Plavix stopped 8/23/22  pt requested to schedule after Thanksgiving/ inst portal-RB  pre call complete; [facility] 11/28/22    ESOPHAGOGASTRODUODENOSCOPY N/A 7/11/2023    Procedure: EGD (ESOPHAGOGASTRODUODENOSCOPY);  Surgeon: Natalie Fall MD;  Location: [facility];  Service: Endoscopy;  Laterality: N/A;    ESOPHAGOGASTRODUODENOSCOPY N/A 3/5/2025    Procedure: EGD (ESOPHAGOGASTRODUODENOSCOPY);  Surgeon: Roberto Salvador MD;  [facility]  Service: Endoscopy;  Laterality: N/A;    EYE SURGERY      left eye cataract    LEFT HEART CATHETERIZATION  4/17/2024    Procedure: Left heart cath;  Surgeon: Elisabeth De La Cruz MD;  Location: [facility] CATH;  Service: Cardiology;;    TONSILLECTOMY      TUBAL LIGATION         Social History:  Tobacco Use: Low Risk  (7/30/2025)    Patient History     Smoking Tobacco Use: Never     Smokeless Tobacco Use: Never     Passive Exposure: Never     Alcohol Use: Not At Risk (7/6/2025)    Received from [facility]    AUDIT-C     Frequency of Alcohol Consumption: Never     Average  Number of Drinks: Patient does not drink     Frequency of Binge Drinking: Never         OBJECTIVE:     Vital Signs Range (Last 24H):  Temp:  [35.9 °C (96.7 °F)-36.9 °C (98.5 °F)]   Pulse:  [70-78]   Resp:  [17-20]   BP: ()/(53-65)   SpO2:  [92 %-100 %]       Significant Labs:  Lab Results   Component Value Date    WBC 6.85 07/30/2025    HGB 8.5 (L) 07/30/2025    HCT 25.9 (L) 07/30/2025     07/30/2025    INR 1.1 02/18/2025       Lab Results   Component Value Date     (L) 07/30/2025    K 3.9 07/30/2025    CL 91 (L) 07/30/2025    CREATININE 0.9 07/30/2025    BUN 37 (H) 07/30/2025    CO2 30 (H) 07/30/2025       Lab Results   Component Value Date    TSH 2.532 06/28/2025    HGBA1C 4.3 07/02/2025       EKG:   Results for orders placed or performed during the hospital encounter of 07/29/25   EKG 12-lead    Collection Time: 07/29/25  9:19 PM   Result Value Ref Range    QRS Duration 94 ms    OHS QTC Calculation 471 ms    Narrative    Test Reason : R06.02,    Vent. Rate :  73 BPM     Atrial Rate :  73 BPM     P-R Int : 128 ms          QRS Dur :  94 ms      QT Int : 428 ms       P-R-T Axes :  89  50  65 degrees    QTcB Int : 471 ms    Normal sinus rhythm  Cannot rule out Anterior infarct (cited on or before 26-Sep-2024)  T wave abnormality, consider lateral ischemia  Abnormal ECG  When compared with ECG of 28-Jun-2025 07:48,  No significant change was found  Confirmed by Lisandra Barbosa ([id number]) on 7/30/2025 1:37:56 PM    Referred By: AAAREFERRAL SELF           Confirmed By: Lisandra Barbosa       ASSESSMENT/PLAN:        Pre-op Assessment    I have reviewed the Patient Summary Reports.    I have reviewed the NPO Status.      Review of Systems  Anesthesia Hx:  No problems with previous Anesthesia   History of prior surgery of interest to airway management or planning:  Previous anesthesia: General        Denies Family Hx of Anesthesia complications.    Denies Personal Hx of Anesthesia complications.                     Social:  Non-Smoker, No Alcohol Use       Hematology/Oncology:       -- Anemia:                  Denies Current/Recent Cancer                Cardiovascular:     Hypertension    Denies CAD.     Denies Dysrhythmias.    Denies CHF.    hyperlipidemia                               Pulmonary:    Denies COPD.  Denies Asthma.     Denies Sleep Apnea.                Renal/:   Denies Chronic Renal Disease.                Hepatic/GI:      Denies GERD. Denies Liver Disease.               Musculoskeletal:  Denies Arthritis.               Neurological:   CVA Denies Neuromuscular Disease.   Denies Seizures.          Denies Chronic Pain Syndrome                         Endocrine:  Denies Diabetes.   Denies Hyperthyroidism.       Denies Obesity / BMI > 30  Psych:   denies anxiety denies depression                Physical Exam  General: Cooperative, Alert and Oriented    Airway:  Mallampati: II   Mouth Opening: Normal  TM Distance: Normal  Tongue: Normal  Neck ROM: Normal ROM    Dental:  Intact, Dentures  Upper dentures  Chest/Lungs:  Normal Respiratory Rate    Heart:  Rate: Normal  Rhythm: Regular Rhythm        Anesthesia Plan  Type of Anesthesia, risks & benefits discussed:    Anesthesia Type: Gen Supraglottic Airway, MAC  Intra-op Monitoring Plan: Standard ASA Monitors  Post Op Pain Control Plan: multimodal analgesia and IV/PO Opioids PRN  Induction:  IV  Informed Consent: Informed consent signed with the Patient and all parties understand the risks and agree with anesthesia plan.  All questions answered.   ASA Score: 3  Day of Surgery Review of History & Physical: H&P Update referred to the surgeon/provider.    Ready For Surgery From Anesthesia Perspective.     .           [1]   Patient Active Problem List  Diagnosis    Venous insufficiency    H/O ischemic left MCA stroke    Mixed hyperlipidemia    Right homonymous inferior quadrantanopia    Speaking difficulty    Iron deficiency anemia due to chronic blood loss    Gastric  AVM    Acute hypoxic respiratory failure    Solitary pulmonary nodule    Aortic atherosclerosis    Symptomatic anemia    History of gastric ulcer    Rodriguez's esophagus    Hypertension    History of GI bleed    Embolic stroke involving right middle cerebral artery    Body mass index (BMI) less than 19    Hemiparesis    Reduced mobility    History of basal cell carcinoma    Moderate protein-calorie malnutrition    (HFpEF) heart failure with preserved ejection fraction    CAD (coronary artery disease)    COVID-19    Dyspnea    Bilateral pleural effusion    Elevated troponin    Enlarged LA (left atrium)    Moderate malnutrition

## 2025-07-30 NOTE — ASSESSMENT & PLAN NOTE
- continue ppi BID   -GI consulted.  Planning endoscopy on 7/31   -currently on clear liquids diet.  will be NPO at midnight.

## 2025-07-30 NOTE — ASSESSMENT & PLAN NOTE
- chronically elevated, but more elevated than baseline on admit. HS trop 384 >> 317  - no acute ischemic changes on EKG  - denies any recent episodes of chest pain. Low suspicion for ACS  - monitor tele

## 2025-07-30 NOTE — PLAN OF CARE
Plan of care discussed with patient/family. fall precautions in place.Patient received 1 unit of RBC, tolerated well. Patient has no complaints of pain. Discussed medications and care. Patient/family has no questions at this time. Plan of care ongoing.      Problem: Hospitalized Older Adult  Goal: Optimal Cognitive Function  Outcome: Progressing  Goal: Effective Bowel Elimination  Outcome: Progressing  Goal: Optimal Coping  Outcome: Progressing  Goal: Fluid and Electrolyte Balance  Outcome: Progressing  Goal: Optimal Functional Ability  Outcome: Progressing  Goal: Improved Oral Intake  Outcome: Progressing  Goal: Adequate Sleep/Rest  Outcome: Progressing  Goal: Effective Urinary Elimination  Outcome: Progressing     Problem: Adult Inpatient Plan of Care  Goal: Plan of Care Review  Outcome: Progressing  Goal: Patient-Specific Goal (Individualized)  Outcome: Progressing  Goal: Absence of Hospital-Acquired Illness or Injury  Outcome: Progressing  Goal: Optimal Comfort and Wellbeing  Outcome: Progressing  Goal: Readiness for Transition of Care  Outcome: Progressing     Problem: Infection  Goal: Absence of Infection Signs and Symptoms  Outcome: Progressing     Problem: Fall Injury Risk  Goal: Absence of Fall and Fall-Related Injury  Outcome: Progressing     Problem: Skin Injury Risk Increased  Goal: Skin Health and Integrity  Outcome: Progressing

## 2025-07-30 NOTE — HOSPITAL COURSE
Ms. 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  She was admitted with symptomatic anemia thought to be due to upper GI bleed.  Patient has had intermittent dark stools.  Patient has worsening anemia with hemoglobin down to 6.8 from 8.5 within a day.  Patient getting transfused 1 unit of PRBCs today.  She has elevated ferritin but low iron saturation.  Recently given 750 mg of Injectafer.  Will give another 510 mg of Feraheme.  I recommended permanent discontinuation of Eliquis given number of previous severe GI bleed episodes.  This is at least her 3rd, and it sounds like she has had some minor episodes as well.  Gastroenterology is consulted and planning endoscopy tomorrow.  Will continue to monitor CBC.  Holding Lasix and spironolactone in the setting of apparent volume contraction.  Patient has elevated BUN to creatinine ratio, evidence of contraction alkalosis, hypotension.    7/31- here for recurrent GI bleeding, s/p Blood transfusion, while on eliquis for stroke prevention. Eliquis was stopped for now. Hs if previous stroke, CAD, HFpEF,  Patient and family going to discuss decision making around Eliquis discontinuation and code status. They are aware of risk. EGD planned.     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 reached to the proximal jejunum. she could have more past where we were able to reach. There is no bleeding presently, If pt stops eliquis she may not have more significant bleeding. Would repeat procedure if she bleeds again.     8/1- Long discussion yesterday afternoon and today with pt, her  and son about outpt needs. They have had difficulty getting into the Hematology clinic. Receiving weekly IV  Iron  infusions, Received one in hospital 7/29. This pt may need periodic blood transfusions due to chronic intermittent GI bleeding. They have elected to hold eliquis for now, but may resume once anemia, strength and CHF symptoms resolved. Pt has lost physical function. CM to arrange secure appointments. BP 91/53 , Pulse 72  Hb 8.0. retic cnt 5.3 ( high), indicating presence of immature cells. ECHO is near Nl. Pt likely had mild high output CHF due to anemia with hx of HFpEF as cause of volume overload. No longer on lasix or spironolactone. Will need to gradually resume po lasix  F/u GI, Hematology, PCP.  with serial  weekly lab.   - Passed walk test: during walk Sats only decreased to 92% and after sitting came back up to 97% quickly.

## 2025-07-30 NOTE — PLAN OF CARE
Neuro: AAO X 4    Cardiac: SR    Respiratory: RA    GI: Cardiac    : External catheter    Lines: PIV X 1    GTT: None    HGB 6.8. Type and screen ordered. Glucose checked. Free from fall and injuries.       Problem: Hospitalized Older Adult  Goal: Optimal Cognitive Function  Outcome: Progressing  Goal: Effective Bowel Elimination  Outcome: Progressing  Goal: Optimal Coping  Outcome: Progressing  Goal: Fluid and Electrolyte Balance  Outcome: Progressing  Goal: Optimal Functional Ability  Outcome: Progressing  Goal: Improved Oral Intake  Outcome: Progressing  Goal: Adequate Sleep/Rest  Outcome: Progressing  Goal: Effective Urinary Elimination  Outcome: Progressing

## 2025-07-30 NOTE — SUBJECTIVE & OBJECTIVE
Past Medical History:   Diagnosis Date    Cancer     basal cell carcinoma    Stroke due to embolism of right middle cerebral artery 02/18/2025       Past Surgical History:   Procedure Laterality Date    CARPAL TUNNEL RELEASE      ESOPHAGOGASTRODUODENOSCOPY N/A 8/22/2022    Procedure: EGD (ESOPHAGOGASTRODUODENOSCOPY);  Surgeon: Steven Lopez MD;  [address]  Service: Endoscopy;  Laterality: N/A;    ESOPHAGOGASTRODUODENOSCOPY N/A 12/5/2022    Procedure: EGD (ESOPHAGOGASTRODUODENOSCOPY);  Surgeon: Steven Lopez MD;  Location: [facility] (2ND FLR);  Service: Endoscopy;  Laterality: N/A;  Has estimated pulmonary artery pressure 45, schedule location per protocol.   Please schedule with Dr. Darryl Lopez-  Plavix stopped 8/23/22  pt requested to schedule after Thanksgiving/ inst portal-RB  pre call complete; [facility] 11/28/22    ESOPHAGOGASTRODUODENOSCOPY N/A 7/11/2023    Procedure: EGD (ESOPHAGOGASTRODUODENOSCOPY);  Surgeon: Natalie Fall MD;  Location: [facility];  Service: Endoscopy;  Laterality: N/A;    ESOPHAGOGASTRODUODENOSCOPY N/A 3/5/2025    Procedure: EGD (ESOPHAGOGASTRODUODENOSCOPY);  Surgeon: Roberto Salvador MD;  [address]  Service: Endoscopy;  Laterality: N/A;    EYE SURGERY      left eye cataract    LEFT HEART CATHETERIZATION  4/17/2024    Procedure: Left heart cath;  Surgeon: Elisabeth De La Cruz MD;  Location: [facility] CATH;  Service: Cardiology;;    TONSILLECTOMY      TUBAL LIGATION         Review of patient's allergies indicates:   Allergen Reactions    Jardiance [empagliflozin]        No current facility-administered medications on file prior to encounter.     Current Outpatient Medications on File Prior to Encounter   Medication Sig    apixaban (ELIQUIS) 2.5 mg Tab Take 1 tablet (2.5 mg total) by mouth 2 (two) times daily.    atorvastatin (LIPITOR) 80 MG tablet Take 1 tablet (80 mg total) by mouth once daily.    cyanocobalamin (VITAMIN B-12) 1000 MCG tablet Take  1,000 mcg by mouth once daily.    empagliflozin (JARDIANCE) 10 mg tablet Take 10 mg by mouth once daily. (Patient not taking: Reported on 7/28/2025)    ezetimibe (ZETIA) 10 mg tablet Take 1 tablet (10 mg total) by mouth once daily.    furosemide (LASIX) 40 MG tablet Take 1 tablet (40 mg total) by mouth once daily.    pantoprazole (PROTONIX) 40 MG tablet Take 1 tablet (40 mg total) by mouth once daily. Take 1 tablet by mouth every day    potassium chloride SA (K-DUR,KLOR-CON) 20 MEQ tablet Take 1 tablet (20 mEq total) by mouth once daily.    spironolactone (ALDACTONE) 25 MG tablet Take 1 tablet (25 mg total) by mouth once daily.    vitamin D (VITAMIN D3) 1000 units Tab Take 1,000 Units by mouth once daily.     Family History    None       Tobacco Use    Smoking status: Never     Passive exposure: Never    Smokeless tobacco: Never   Substance and Sexual Activity    Alcohol use: No    Drug use: No    Sexual activity: Not on file     Review of Systems   Constitutional:  Negative for activity change, chills and fever.   HENT:  Negative for trouble swallowing.    Eyes:  Negative for photophobia and visual disturbance.   Respiratory:  Positive for shortness of breath. Negative for cough and chest tightness.    Cardiovascular:  Negative for chest pain, palpitations and leg swelling.   Gastrointestinal:  Negative for abdominal pain, constipation, diarrhea, nausea and vomiting.   Genitourinary:  Negative for dysuria, frequency and hematuria.   Musculoskeletal:  Negative for back pain, gait problem and neck pain.   Skin:  Negative for rash and wound.   Neurological:  Negative for dizziness, syncope, speech difficulty and light-headedness.   Psychiatric/Behavioral:  Negative for agitation and confusion. The patient is not nervous/anxious.      Objective:     Vital Signs (Most Recent):  Temp: 97.4 °F (36.3 °C) (07/29/25 2114)  Pulse: 78 (07/30/25 0100)  Resp: 20 (07/30/25 0030)  BP: 127/61 (07/30/25 0100)  SpO2: 100 % (07/30/25  0100) Vital Signs (24h Range):  Temp:  [97.4 °F (36.3 °C)] 97.4 °F (36.3 °C)  Pulse:  [71-78] 78  Resp:  [18-20] 20  SpO2:  [100 %] 100 %  BP: (113-139)/(56-65) 127/61        Body mass index is 17.6 kg/m².     Physical Exam  Vitals and nursing note reviewed.   Constitutional:       General: She is not in acute distress.     Appearance: She is well-developed.   HENT:      Head: Normocephalic and atraumatic.      Mouth/Throat:      Pharynx: No oropharyngeal exudate.   Eyes:      Conjunctiva/sclera: Conjunctivae normal.      Pupils: Pupils are equal, round, and reactive to light.   Cardiovascular:      Rate and Rhythm: Normal rate and regular rhythm.      Heart sounds: Normal heart sounds.   Pulmonary:      Effort: Pulmonary effort is normal. No respiratory distress.      Breath sounds: Wheezing (R base) present.   Abdominal:      General: Bowel sounds are normal. There is no distension.      Palpations: Abdomen is soft.      Tenderness: There is no abdominal tenderness.   Musculoskeletal:         General: No tenderness. Normal range of motion.      Cervical back: Normal range of motion and neck supple.      Right lower leg: No edema.      Left lower leg: Edema (trace) present.   Lymphadenopathy:      Cervical: No cervical adenopathy.   Skin:     General: Skin is warm and dry.      Capillary Refill: Capillary refill takes less than 2 seconds.      Findings: No rash.   Neurological:      Mental Status: She is alert and oriented to person, place, and time.      Cranial Nerves: No cranial nerve deficit.      Sensory: No sensory deficit.      Coordination: Coordination normal.   Psychiatric:         Behavior: Behavior normal.         Thought Content: Thought content normal.         Judgment: Judgment normal.              CRANIAL NERVES     CN III, IV, VI   Pupils are equal, round, and reactive to light.       Significant Labs: All pertinent labs within the past 24 hours have been reviewed.  CBC:   Recent Labs   Lab  07/28/25  1100 07/29/25  2325   WBC 6.04 7.31   HGB 8.5* 7.6*   HCT 27.2* 24.1*   * 481*     CMP:   Recent Labs   Lab 07/29/25 2325   *   K 4.0   CL 94*   CO2 28   *   BUN 39*   CREATININE 0.9   CALCIUM 9.7   PROT 6.7   ALBUMIN 3.7   BILITOT 0.4   ALKPHOS 99   AST 37   ALT 29   ANIONGAP 12     Cardiac Markers:   Recent Labs   Lab 07/29/25 2325   BNP 7,779*     Troponin:   Recent Labs   Lab 07/29/25  2325 07/30/25  0121   TROPONINIHS 384* 317*       Significant Imaging: I have reviewed all pertinent imaging results/findings within the past 24 hours.  Imaging Results              X-Ray Chest AP Portable (Final result)  Result time 07/30/25 00:59:56      Final result by Serafin Farrell MD (07/30/25 00:59:56)                   Impression:      No evidence of acute chest disease.      Electronically signed by: Serafin Farrell  Date:    07/30/2025  Time:    00:59               Narrative:    EXAMINATION:  XR CHEST AP PORTABLE    CLINICAL HISTORY:  Shortness of breath    TECHNIQUE:  Single frontal view of the chest was performed.    COMPARISON:  07/02/2025    FINDINGS:  Heart mediastinal contours appear normal in size and configuration with the trachea midline.  The lungs and pleural spaces appear clear.  Fibrotic changes and emphysematous changes remain.  Degenerative shoulder and spine changes in the bones.

## 2025-07-30 NOTE — PROGRESS NOTES
[facility] - Cardiology Stepdown  Adult Nutrition  Progress Note    SUMMARY       Recommendations    Interventions: General healthful diet, Medical food supplement therapy, Vitamin and mineral supplement therapy    Recommendations:   1. Continue clear liquid diet as tolerated     - advance diet as tolerated     - please continue to document PO % intake via flowsheets     2. Recommend boost breeze TID    3. Continue boost plus TID when diet is advanced     4. Encourage good intake     5. RD to monitor weight, labs, meds, intake, tolerance    Goal #1: PO intake will meet greater than or equal to 50% of estimated needs by RD follow up  Nutrition Goal Status #1: new  Goal #2: Maintain weight throughout hospitalization  Nutrition Goal Status #2: new  Communication of RD Recs:  (POC)    Nutrition Discharge Planning    Nutrition Discharge Planning: General healthy diet, Oral supplement regimen (comments)  Oral supplement regimen (comments): oral nutrition supplement of choice and MVI    Assessment and Plan    Endocrine  Moderate malnutrition  Malnutrition Type:  Context: chronic illness  Level: moderate    Related to (etiology):   Physiological causes resulting in anorexia or diminished intake    Signs and Symptoms (as evidenced by):   Muscle and fat depletion    Significant wt loss    Malnutrition Characteristic Summary:  Weight Loss (Malnutrition): greater than 20% in 1 year (26 lbs/21.8%)  Subcutaneous Fat (Malnutrition): moderate depletion  Muscle Mass (Malnutrition): moderate depletion    Interventions/Recommendations (treatment strategy):  Collaboration with other providers    Nutrition Diagnosis Status:   Continues         Malnutrition Assessment    Malnutrition Context: chronic illness  Malnutrition Level: moderate          Weight Loss (Malnutrition): greater than 20% in 1 year (26 lbs/21.8%)  Subcutaneous Fat (Malnutrition): moderate depletion  Muscle Mass (Malnutrition): moderate depletion   Orbital Region:  "moderate depletion  Cheek Region: moderate depletion  Upper Arm Region : moderate depletion   [place] Region (Muscle Loss): severe depletion  Clavicle Bone Region (Muscle Loss): moderate depletion  Clavicle and Acromion Bone Region (Muscle Loss): severe depletion  Dorsal Hand (Muscle Loss): severe depletion  Patellar Region (Muscle Loss): moderate depletion  Anterior Thigh Region (Muscle Loss): moderate depletion  Posterior Calf Region (Muscle Loss): moderate depletion     Reason for Assessment    Reason For Assessment: identified at risk by screening criteria (MST 3)  Diagnosis:  (shortness of breath)  General Information Comments: RD team familiar with this pt. Pt admitted with shortness of breath. PMHx: BCC, HTN, HLD, aortic atherosclerosis, hx of GI bleeds, ISAIAH requiring frequent transfusions, HFpEF, CAD, L MCA CVA. Recent surgical hx: Esophagogastroduodenoscopy 3/5. Pt presented with shortness of breath. She reports being at home resting when she gradually became short of breath a few hours ago. Son at bedside report compliance with all home meds, including lasix and aldactone. No edema noted. No wounds noted. No GI s/s - BM: 7/29. Pt reported having a fair appetite. Having a poor intake due to not liking the food - PO: 0-25%. Pt was diagnosed with malnutrition on 3/31/25. Pt reported wt is loss is due to being in the hospital. NFPE completed on 7/30.    Nutrition/Diet History    Nutrition Intake History: about 2 meals  Spiritual, Cultural Beliefs, Sabianist Practices, Values that Affect Care: no  Food Allergies: NKFA  Factors Affecting Nutritional Intake:  (does not like hospital food)  Nutrition-related SDOH: Adequate food in home environment    Anthropometrics    Height: 5' 2" (157.5 cm)  Height (inches): 62 in  Height Method: Estimated  Weight: 42.4 kg (93 lb 7.6 oz)  Weight (lb): 93.48 lb  Weight Method: Standard Scale  Ideal Body Weight (IBW), Female: 110 lb  % Ideal Body Weight, Female (lb): 84.78 %  BMI " (Calculated): 17.1  BMI Grade: less than 18.5 - underweight    Lab/Procedures/Meds    Pertinent Labs Reviewed: reviewed  Pertinent Labs Comments: H/H 6.8/21.4 low, Na 133 low, Cl 91 low, BUN 37 high, glucose 115 high  Pertinent Medications Reviewed: reviewed  Scheduled Meds:   atorvastatin  80 mg Oral Daily    cyanocobalamin  1,000 mcg Oral Daily    ezetimibe  10 mg Oral Daily    pantoprazole  40 mg Oral BID    polyethylene glycol  17 g Oral Daily    vitamin D  1,000 Units Oral Daily     Continuous Infusions:  PRN Meds:.  Current Facility-Administered Medications:     0.9%  NaCl infusion (for blood administration), , Intravenous, Q24H PRN    0.9%  NaCl infusion (for blood administration), , Intravenous, Q24H PRN    acetaminophen, 1,000 mg, Oral, Q8H PRN    acetaminophen, 650 mg, Oral, Q4H PRN    albuterol-ipratropium, 3 mL, Nebulization, Q4H PRN    aluminum-magnesium hydroxide-simethicone, 30 mL, Oral, QID PRN    melatonin, 6 mg, Oral, Nightly PRN    naloxone, 0.02 mg, Intravenous, PRN    ondansetron, 8 mg, Oral, Q8H PRN    polyethylene glycol, 17 g, Oral, BID PRN    prochlorperazine, 5 mg, Intravenous, Q6H PRN    simethicone, 1 tablet, Oral, QID PRN    sodium chloride 0.9%, 5 mL, Intravenous, PRN    Estimated/Assessed Needs    Weight Used For Calorie Calculations: 42.4 kg (93 lb 7.6 oz)  Energy Calorie Requirements (kcal): 1644 kcal  Energy Need Method: [place]-St Jeor (* 1.4 + 500 kcal for wt gain)  Protein Requirements: 64-85 g/pro (1.5-2.0 g/kg)  Weight Used For Protein Calculations: 42.4 kg (93 lb 7.6 oz)        RDA Method (mL): 1644  CHO Requirement: 206 g    Nutrition Prescription Ordered    Current Diet Order: clear liquid  Oral Nutrition Supplement: boost plus TID    Evaluation of Received Nutrient/Fluid Intake    Intake/Output Summary (Last 24 hours) at 7/30/2025 1615  Last data filed at 7/30/2025 1548  Gross per 24 hour   Intake 1097 ml   Output 925 ml   Net 172 ml     Energy Calories Required: not  meeting needs  Protein Required: not meeting needs  Fluid Required:  (per MD)  Tolerance: tolerating  % Intake of Estimated Energy Needs: 0 - 25 %  % Meal Intake: 0 - 25 %    Nutrition Risk    Level of Risk/Frequency of Follow-up: high (2/week)     Monitor and Evaluation    Monitor and Evaluation: Energy intake, Food and beverage intake, Protein intake, Carbohydrate intake, Diet order, Weight, Electrolyte and renal panel, Gastrointestinal profile, Glucose/endocrine profile, Inflammatory profile, Lipid profile (NFPE completed on 7/30)     Nutrition Follow-Up    RD Follow-up?: Yes

## 2025-07-30 NOTE — ASSESSMENT & PLAN NOTE
>>ASSESSMENT AND PLAN FOR ANEMIA WRITTEN ON 7/30/2025  2:40 PM BY VASQUEZ MEYER MD    Katelyn Caba is a 85 yo F with PMHx of BCC, HTN, HLD, aortic atherosclerosis, hx of GI bleeds, ISAIAH requiring frequent transfusions, HFpEF, CAD, L MCA CVA who presented to ED for shortness of breath.She notes have black tarry stool for a few weeks now. No blood noted with her stool. No bleeding from any other orifice. Receives iron transfusions. Iron profile is normal. Most recent EGD 03/2025 with 1x bleeding angioectasia in stomach 2x nonbleeding angioectasia in the duodenum. Current hgb is 6.8, undergoing transfusion. Concern for UGIB.    Impression: possible UGIB for investigation    Plan:  - Plan for EGD/push enteroscopy tomorrow  - Keep NPO   - Trend Hgb and transfuse for goal Hgb > 7, unless otherwise indicated  - Maintain IV access with 2 large bore IV's  - Intravascular resuscitation/support with IVF's   - Hold all NSAIDs and anticoagulants, unless contraindicated  - Bolus IV pantoprazole 80 mg followed by 40 mg BID  - Please correct any coagulopathy with platelets and FFP for goal of platelets >50K and INR <2.0  - Please notify GI team if there is significant change in patient's clinical status

## 2025-07-30 NOTE — ASSESSMENT & PLAN NOTE
Patient with known CAD, which is controlled Will continue zetia and Statin and monitor for S/Sx of angina/ACS. Continue to monitor on telemetry.

## 2025-07-30 NOTE — PLAN OF CARE
Ms. 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  She was admitted with respiratory distress though was never hypoxic.  Patient has worsening anemia with hemoglobin down to 6.8 from 8.5.  She has elevated ferritin but low iron saturation.  Will give transfusion of PRBCs.  Holding Eliquis given suspected active GI bleed.  Holding Lasix and spironolactone in the setting of apparent volume contraction.  Patient has elevated BUN to creatinine ratio, evidence of contraction alkalosis, hypotension.

## 2025-07-30 NOTE — SUBJECTIVE & OBJECTIVE
Past Medical History:   Diagnosis Date    Cancer     basal cell carcinoma    Stroke due to embolism of right middle cerebral artery 02/18/2025       Past Surgical History:   Procedure Laterality Date    CARPAL TUNNEL RELEASE      ESOPHAGOGASTRODUODENOSCOPY N/A 8/22/2022    Procedure: EGD (ESOPHAGOGASTRODUODENOSCOPY);  Surgeon: Steven Lopez MD;  Location: [facility] (2ND FLR);  Service: Endoscopy;  Laterality: N/A;    ESOPHAGOGASTRODUODENOSCOPY N/A 12/5/2022    Procedure: EGD (ESOPHAGOGASTRODUODENOSCOPY);  Surgeon: Steven Lopez MD;  Location: [facility] (2ND FLR);  Service: Endoscopy;  Laterality: N/A;  Has estimated pulmonary artery pressure 45, schedule location per protocol.   Please schedule with Dr. Darryl Lopez-  Plavix stopped 8/23/22  pt requested to schedule after Thanksgiving/ inst portal-RB  pre call complete; [facility] 11/28/22    ESOPHAGOGASTRODUODENOSCOPY N/A 7/11/2023    Procedure: EGD (ESOPHAGOGASTRODUODENOSCOPY);  Surgeon: Natalie Fall MD;  Location: [facility];  Service: Endoscopy;  Laterality: N/A;    ESOPHAGOGASTRODUODENOSCOPY N/A 3/5/2025    Procedure: EGD (ESOPHAGOGASTRODUODENOSCOPY);  Surgeon: Roberto Salvador MD;  Location: [facility] (2ND FLR);  Service: Endoscopy;  Laterality: N/A;    EYE SURGERY      left eye cataract    LEFT HEART CATHETERIZATION  4/17/2024    Procedure: Left heart cath;  Surgeon: Elisabeth De La Cruz MD;  Location: [facility] CATH;  Service: Cardiology;;    TONSILLECTOMY      TUBAL LIGATION         Review of patient's allergies indicates:   Allergen Reactions    Jardiance [empagliflozin]      Family History    None       Tobacco Use    Smoking status: Never     Passive exposure: Never    Smokeless tobacco: Never   Substance and Sexual Activity    Alcohol use: No    Drug use: No    Sexual activity: Not on file     Review of Systems  Objective:     Vital Signs (Most Recent):  Temp: 97.4 °F (36.3 °C) (07/30/25 1030)  Pulse: 72 (07/30/25 1107)  Resp: 18 (07/30/25 1030)  BP: (!)  112/57 (07/30/25 1030)  SpO2: 97 % (07/30/25 1030) Vital Signs (24h Range):  Temp:  [96.7 °F (35.9 °C)-98 °F (36.7 °C)] 97.4 °F (36.3 °C)  Pulse:  [70-78] 72  Resp:  [17-20] 18  SpO2:  [92 %-100 %] 97 %  BP: ()/(53-65) 112/57     Weight: 42.4 kg (93 lb 7.6 oz) (07/30/25 0800)  Body mass index is 17.1 kg/m².      Intake/Output Summary (Last 24 hours) at 7/30/2025 1440  Last data filed at 7/30/2025 1343  Gross per 24 hour   Intake 1097 ml   Output 675 ml   Net 422 ml       Lines/Drains/Airways       Drain  Duration             Female External Urinary Catheter w/ Suction 07/30/25 0123 <1 day              Peripheral Intravenous Line  Duration             Peripheral IV Single Lumen 07/29/25 2325 20 G Left;Posterior Hand <1 day                     Physical Exam  Constitutional:       Comments: pale   HENT:      Head: Normocephalic and atraumatic.   Eyes:      Pupils: Pupils are equal, round, and reactive to light.   Cardiovascular:      Rate and Rhythm: Normal rate.   Pulmonary:      Effort: No respiratory distress.      Breath sounds: No wheezing.   Abdominal:      General: There is no distension.      Tenderness: There is no abdominal tenderness. There is no guarding.   Neurological:      Mental Status: She is alert and oriented to person, place, and time. Mental status is at baseline.      Motor: No weakness.   Psychiatric:         Mood and Affect: Mood normal.         Behavior: Behavior normal.         Thought Content: Thought content normal.         Judgment: Judgment normal.          Significant Labs:  All pertinent lab results from the last 24 hours have been reviewed.    Significant Imaging:  Imaging results within the past 24 hours have been reviewed.

## 2025-07-30 NOTE — ASSESSMENT & PLAN NOTE
Katelyn Caba is a 85 yo F with PMHx of BCC, HTN, HLD, aortic atherosclerosis, hx of GI bleeds, ISAIAH requiring frequent transfusions, HFpEF, CAD, L MCA CVA who presented to ED for shortness of breath.She notes have black tarry stool for a few weeks now. No blood noted with her stool. No bleeding from any other orifice. Receives iron transfusions. Iron profile is normal. Most recent EGD 03/2025 with 1x bleeding angioectasia in stomach 2x nonbleeding angioectasia in the duodenum. Current hgb is 6.8, undergoing transfusion. Concern for UGIB.    Impression: possible UGIB for investigation    Plan:  - Plan for EGD/push enteroscopy tomorrow  - Keep NPO   - Trend Hgb and transfuse for goal Hgb > 7, unless otherwise indicated  - Maintain IV access with 2 large bore IV's  - Intravascular resuscitation/support with IVF's   - Hold all NSAIDs and anticoagulants, unless contraindicated  - Bolus IV pantoprazole 80 mg followed by 40 mg BID  - Please correct any coagulopathy with platelets and FFP for goal of platelets >50K and INR <2.0  - Please notify GI team if there is significant change in patient's clinical status

## 2025-07-30 NOTE — ASSESSMENT & PLAN NOTE
Malnutrition Type:  Context: chronic illness  Level: moderate    Related to (etiology):   Physiological causes resulting in anorexia or diminished intake    Signs and Symptoms (as evidenced by):   Muscle and fat depletion    Significant wt loss    Malnutrition Characteristic Summary:  Weight Loss (Malnutrition): greater than 20% in 1 year (26 lbs/21.8%)  Subcutaneous Fat (Malnutrition): moderate depletion  Muscle Mass (Malnutrition): moderate depletion    Interventions/Recommendations (treatment strategy):  Collaboration with other providers    Nutrition Diagnosis Status:   Continues

## 2025-07-30 NOTE — ED NOTES
Telemetry Verification   Patient placed on Telemetry Box  Verified with War Room  Box # [id number]   Monitor Tech     Rate     Rhythm

## 2025-07-30 NOTE — ED PROVIDER NOTES
Encounter Date: 7/29/2025       History     Chief Complaint   Patient presents with    Shortness of Breath     Pt states shortness of breath since earlier tonight while resting. Pt has history of heart failure and receives iron infusions, last one was yesterday, denies any pain     84-year-old female presents to the ER via POV for evaluation of shortness a breath.  Past medical history significantly detailed in the chart includes see ED, CHF, CVA, patient on Eliquis.  Patient was at home, resting in bed when she got short of breath few hours ago.  Denies chest pain.  No nausea or vomiting.  No abdominal pain.  Does not use home oxygen.  No recent illnesses, fatigue or weakness.  Vital signs normal on arrival, appears well, nontoxic and without distress.      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 02/18/2025     Past Surgical History:   Procedure Laterality Date    CARPAL TUNNEL RELEASE      ESOPHAGOGASTRODUODENOSCOPY N/A 8/22/2022    Procedure: EGD (ESOPHAGOGASTRODUODENOSCOPY);  Surgeon: Steven Lopez MD;  Location: [facility] ([address] [place] [address]);  Service: Endoscopy;  Laterality: N/A;    ESOPHAGOGASTRODUODENOSCOPY N/A 12/5/2022    Procedure: EGD (ESOPHAGOGASTRODUODENOSCOPY);  Surgeon: Steven Lopez MD;  Location: [address]);  Service: Endoscopy;  Laterality: N/A;  Has estimated pulmonary artery pressure 45, schedule location per protocol.   Please schedule with Dr. Darryl Lopez-  Plavix stopped 8/23/22  pt requested to schedule after [facility] portal-  pre call complete; [facility] 11/28/22    ESOPHAGOGASTRODUODENOSCOPY N/A 7/11/2023    Procedure: EGD (ESOPHAGOGASTRODUODENOSCOPY);  Surgeon: Natalie Fall MD;  Location: [facility];  Service: Endoscopy;  Laterality: N/A;    ESOPHAGOGASTRODUODENOSCOPY N/A  3/5/2025    Procedure: EGD (ESOPHAGOGASTRODUODENOSCOPY);  Surgeon: Roberto Salvador MD;  Location: [facility] ([address] [place] [address]);  Service: Endoscopy;  Laterality: N/A;    EYE SURGERY      left eye cataract    LEFT HEART CATHETERIZATION  4/17/2024    Procedure: Left heart cath;  Surgeon: Elisabeth De La Cruz MD;  Location: [facility] CATH;  Service: Cardiology;;    TONSILLECTOMY      TUBAL LIGATION       No family history on file.  Social History[1]  Review of Systems   Constitutional:  Negative for fever.   HENT:  Negative for sore throat.    Respiratory:  Positive for shortness of breath.    Cardiovascular:  Negative for chest pain.   Gastrointestinal:  Negative for nausea.   Genitourinary:  Negative for dysuria.   Musculoskeletal:  Negative for back pain.   Skin:  Negative for rash.   Neurological:  Negative for weakness.   Hematological:  Does not bruise/bleed easily.       Physical Exam     Initial Vitals [07/29/25 2114]   BP Pulse Resp Temp SpO2   139/65 74 18 97.4 °F (36.3 °C) 100 %      MAP       --         Physical Exam    Constitutional: Vital signs are normal. She appears well-developed and well-nourished.   HENT:   Head: Normocephalic and atraumatic.   Right Ear: Hearing normal.   Left Ear: Hearing normal.   Eyes: Conjunctivae are normal.   Cardiovascular:  Normal rate and regular rhythm.           No lower extremity edema, no JVD   Pulmonary/Chest:   Clear bilaterally, good air movement     Abdominal: Abdomen is soft. Bowel sounds are normal.   Musculoskeletal:         General: Normal range of motion.     Neurological: She is alert and oriented to person, place, and time.   Skin: Skin is warm and intact.   Psychiatric: She has a normal mood and affect. Her speech is normal and behavior is normal. Cognition and memory are normal.         ED Course   Procedures  Labs Reviewed   COMPREHENSIVE METABOLIC PANEL - Abnormal       Result Value    Sodium 134 (*)     Potassium 4.0      Chloride 94 (*)     CO2 28      Glucose 117 (*)      BUN 39 (*)     Creatinine 0.9      Calcium 9.7      Protein Total 6.7      Albumin 3.7      Bilirubin Total 0.4      ALP 99      AST 37      ALT 29      Anion Gap 12      eGFR >60     NT-PRO NATRIURETIC PEPTIDE - Abnormal    NT-proBNP 7,779 (*)     Narrative:     NOTE:  Access complete set of age - and/or gender-specific reference intervals for this test in the [facility] Collection Manual.   TROPONIN I HIGH SENSITIVITY - Abnormal    Troponin High Sensitive 384 (*)    CBC WITH DIFFERENTIAL - Abnormal    WBC 7.31      RBC 2.55 (*)     HGB 7.6 (*)     HCT 24.1 (*)     MCV 95      MCH 29.8      MCHC 31.5 (*)     RDW 19.4 (*)     Platelet Count 481 (*)     MPV 9.7      Nucleated RBC 0      Neut % 74.3 (*)     Lymph % 14.5 (*)     Mono % 9.7      Eos % 0.5      Basophil % 0.5      Imm Grans % 0.5      Neut # 5.42      Lymph # 1.06      Mono # 0.71      Eos # 0.04      Baso # 0.04      Imm Grans # 0.04     CBC W/ AUTO DIFFERENTIAL    Narrative:     The following orders were created for panel order CBC Auto Differential.  Procedure                               Abnormality         Status                     ---------                               -----------         ------                     CBC with Differential[[id number]]       Abnormal            Final result                 Please view results for these tests on the individual orders.     EKG Readings: (Independently Interpreted)   No acute ischemic findings, no STEMI       Imaging Results              X-Ray Chest AP Portable (In process)                   X-Rays:   Independently Interpreted Readings:   Other Readings:  Mild pulmonary congestion, no pneumonia    Medications   furosemide injection 80 mg (has no administration in time range)     Medical Decision Making  84-year-old female with shortness a breath   Differential:  Fluid overload, pneumonia, electrolyte derangement, anemia    12:45 a.m. assessment   Workup shows findings consistent with CHF  exacerbation.  BNP significantly elevated, mild troponin bump, I suspect this is right heart strain secondary to the fluid.  Patient without chest pain, EKG nonischemic.  We will get a 2nd troponin.  I reviewed the case with Hospital Medicine and case management.  We will start IV diuresis with Lasix and admit    Amount and/or Complexity of Data Reviewed  Labs: ordered.  Radiology: ordered.    Risk  Prescription drug management.  Decision regarding hospitalization.               ED Course as of 07/30/25 0043 Tue Jul 29, 2025 2122 EKG 12-lead  No STEMI [AC]      ED Course User Index  [AC] Matt Randhawa DO                               Clinical Impression:  Final diagnoses:  [R06.02] Shortness of breath  [R06.02] SOB (shortness of breath)          ED Disposition Condition    Admit                       [1]   Social History  Tobacco Use    Smoking status: Never     Passive exposure: Never    Smokeless tobacco: Never   Substance Use Topics    Alcohol use: No    Drug use: No        Vivek Kiran PA-C  07/30/25 0043

## 2025-07-30 NOTE — ASSESSMENT & PLAN NOTE
Nutrition consulted. Most recent weight and BMI monitored-     Measurements:  Wt Readings from Last 1 Encounters:   07/30/25 42.4 kg (93 lb 7.6 oz)   Body mass index is 17.1 kg/m².    Patient has been screened and assessed by RD.    Malnutrition Type:  Context: chronic illness  Level: moderate    Malnutrition Characteristic Summary:  Weight Loss (Malnutrition): greater than 20% in 1 year (26 lbs/21.8%)  Subcutaneous Fat (Malnutrition): moderate depletion  Muscle Mass (Malnutrition): moderate depletion    Interventions/Recommendations (treatment strategy):  1. Continue clear liquid diet as tolerated   - advance diet as tolerated   - please continue to document PO % intake via flowsheets   2. Recommend boost breeze TID  3. Continue boost plus TID when diet is advanced   4. Encourage good intake   5. RD to monitor weight, labs, meds, intake, tolerance

## 2025-07-30 NOTE — ASSESSMENT & PLAN NOTE
- Patient is identified as having Diastolic (HFpEF) and R sided  heart failure that is Chronic.   - CHF is currently controlled.   - Latest ECHO performed and demonstrates- Results for orders placed during the hospital encounter of 06/28/25    Echo    Interpretation Summary    Left Ventricle: The left ventricle is normal in size. Normal wall thickness. There is concentric remodeling. There is normal systolic function with a visually estimated ejection fraction of 60 - 65%. Grade III diastolic dysfunction.    Right Ventricle: The right ventricle is normal in size measuring 2.8 cm. Wall thickness is normal. Systolic function is reduced.    Left Atrium: The left atrium is severely dilated    Aortic Valve: The aortic valve is a trileaflet valve. There is mild aortic valve sclerosis.    Mitral Valve: There is mild regurgitation.    Pulmonary Artery: The estimated pulmonary artery systolic pressure is 37 mmHg.    IVC/SVC: Normal venous pressure at 3 mmHg.  .   - holding Lasix and Aldactone in the setting of probable hypovolemia.  Resume as indicated  - Monitor on telemetry.   - Monitor strict Is&Os and daily weights.    - Continue to stress to patient importance of self efficacy and  on diet for CHF.   - Last BNP reviewed- and noted below   Recent Labs   Lab 07/29/25  1695   BNP 7,779*     - given IV lasix 80 mg in ED. continue monitor volume status

## 2025-07-30 NOTE — PLAN OF CARE
07/30/25 1127   Rounds   Attendance Provider;;Charge nurse  (unit-based LUZ)   Discharge Plan A Home Health   Why the patient remains in the hospital Requires continued medical care   Transition of Care Barriers None     Pt getting blood transfusion for a GI bleed.  TERE 8/1.      Reema Head LMSW  [facility]  [phone]

## 2025-07-30 NOTE — ASSESSMENT & PLAN NOTE
- Patient is identified as having Diastolic (HFpEF) and R sided  heart failure that is Chronic.   - CHF is currently controlled.   - Latest ECHO performed and demonstrates- Results for orders placed during the hospital encounter of 06/28/25    Echo    Interpretation Summary    Left Ventricle: The left ventricle is normal in size. Normal wall thickness. There is concentric remodeling. There is normal systolic function with a visually estimated ejection fraction of 60 - 65%. Grade III diastolic dysfunction.    Right Ventricle: The right ventricle is normal in size measuring 2.8 cm. Wall thickness is normal. Systolic function is reduced.    Left Atrium: The left atrium is severely dilated    Aortic Valve: The aortic valve is a trileaflet valve. There is mild aortic valve sclerosis.    Mitral Valve: There is mild regurgitation.    Pulmonary Artery: The estimated pulmonary artery systolic pressure is 37 mmHg.    IVC/SVC: Normal venous pressure at 3 mmHg.  .   - Continue Furosemide Aldactone and monitor clinical status closely.   - Monitor on telemetry.   - Patient is on CHF pathway.    - Monitor strict Is&Os and daily weights.    - Place on fluid restriction of 1.5 L.   - Continue to stress to patient importance of self efficacy and  on diet for CHF.   - Last BNP reviewed- and noted below   Recent Labs   Lab 07/29/25  2325   BNP 7,779*   - despite BNP elevation, her CXR is clear and she is close to euvolemic on exam  - given IV lasix 80 mg in ED. Will monitor output and restart po dose in AM

## 2025-07-30 NOTE — CONSULTS
[facility] - Cardiology Stepdown  Gastroenterology  Consult Note    Patient Name: Katelyn Caba  MRN: [id number]  Admission Date: 7/29/2025  Hospital Length of Stay: 0 days  Code Status: Full Code   Attending Provider: Evan Muniz MD   Consulting Provider: Breanna Lares MD  Primary Care Physician: Brooklyn Burnham MD  Principal Problem:Shortness of breath    Inpatient consult to Gastroenterology  Consult performed by: Breanna Lares MD  Consult ordered by: Evan Muniz MD        Subjective:     HPI:  84F with hx of BCC, HTN, HLD, aortic atherosclerosis, hemorrhoids, hx of GI bleeds, ISAIAH requiring frequent transfusions, HFpEF, CAD, L MCA CVA presented to the hospital with shortness of breath. Patient denies any chest pain, lightheadedness, dizziness, or abdominal pain. She has had occasional black stools. Patient reports some constipation but states her most recent BM was yesterday and it consisted of dark brown pellets, she denies any bright red blood in her stool. Patient denies any use of NSAIDs. She receives blood/iron transfusions regularly for chronic normocytic anemia. Her last iron transfusion was 7/28/25. Patient is on eliquis, last dose was 7/29/25 around 2pm. Since admission patient has received 1unit pRBCs.    Hgb 6.8 from 8.5 on admission 7/28/25. Baseline ranges between 7-9. Iron studies show normal iron, TIBC, and transferrin. Ferritin 594 and iron saturation 17.  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   - 3 cm hiatal hernia.   - One non-bleeding angioectasia in the stomach. Treated with argon plasma coagulation (APC).   - Two non-bleeding angioectasias in the duodenum. Treated with argon plasma coagulation (APC).   - No specimens collected.     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   Please schedule with Dr. Darryl Lopez-  Plavix stopped 8/23/22  pt requested to schedule after Thanksgiving/ inst portal-RB  pre call complete; [facility] 11/28/22    ESOPHAGOGASTRODUODENOSCOPY N/A 7/11/2023    Procedure: EGD (ESOPHAGOGASTRODUODENOSCOPY);  Surgeon: Natalie Fall MD;  Location: [facility];  Service: Endoscopy;  Laterality: N/A;    ESOPHAGOGASTRODUODENOSCOPY N/A 3/5/2025    Procedure: EGD (ESOPHAGOGASTRODUODENOSCOPY);  Surgeon: Roberto Salvador MD;  [facility]  Service: Endoscopy;  Laterality: N/A;    EYE SURGERY      left eye cataract    LEFT HEART CATHETERIZATION  4/17/2024    Procedure: Left heart cath;  Surgeon: Elisabeth De La Cruz MD;  Location: [facility] CATH;  Service: Cardiology;;    TONSILLECTOMY      TUBAL LIGATION         Review of patient's allergies indicates:   Allergen Reactions    Jardiance [empagliflozin]      Family History    None       Tobacco Use    Smoking status: Never     Passive exposure: Never    Smokeless tobacco: Never   Substance and Sexual Activity    Alcohol use: No    Drug use: No    Sexual activity: Not on file     Review of Systems  Objective:     Vital Signs (Most Recent):  Temp: 97.4 °F (36.3 °C) (07/30/25 1030)  Pulse: 72 (07/30/25 1107)  Resp:  18 (07/30/25 1030)  BP: (!) 112/57 (07/30/25 1030)  SpO2: 97 % (07/30/25 1030) Vital Signs (24h Range):  Temp:  [96.7 °F (35.9 °C)-98 °F (36.7 °C)] 97.4 °F (36.3 °C)  Pulse:  [70-78] 72  Resp:  [17-20] 18  SpO2:  [92 %-100 %] 97 %  BP: ()/(53-65) 112/57     Weight: 42.4 kg (93 lb 7.6 oz) (07/30/25 0800)  Body mass index is 17.1 kg/m².      Intake/Output Summary (Last 24 hours) at 7/30/2025 1440  Last data filed at 7/30/2025 1343  Gross per 24 hour   Intake 1097 ml   Output 675 ml   Net 422 ml       Lines/Drains/Airways       Drain  Duration             Female External Urinary Catheter w/ Suction 07/30/25 0123 <1 day              Peripheral Intravenous Line  Duration             Peripheral IV Single Lumen 07/29/25 2325 20 G Left;Posterior Hand <1 day                     Physical Exam  Constitutional:       Comments: pale   HENT:      Head: Normocephalic and atraumatic.   Eyes:      Pupils: Pupils are equal, round, and reactive to light.   Cardiovascular:      Rate and Rhythm: Normal rate.   Pulmonary:      Effort: No respiratory distress.      Breath sounds: No wheezing.   Abdominal:      General: There is no distension.      Tenderness: There is no abdominal tenderness. There is no guarding.   Neurological:      Mental Status: She is alert and oriented to person, place, and time. Mental status is at baseline.      Motor: No weakness.   Psychiatric:         Mood and Affect: Mood normal.         Behavior: Behavior normal.         Thought Content: Thought content normal.         Judgment: Judgment normal.          Significant Labs:  All pertinent lab results from the last 24 hours have been reviewed.    Significant Imaging:  Imaging results within the past 24 hours have been reviewed.  Assessment/Plan:     Oncology  Anemia  Katelyn Caba is a 83 yo F with PMHx of BCC, HTN, HLD, aortic atherosclerosis, hx of GI bleeds, ISAIAH requiring frequent transfusions, HFpEF, CAD, L MCA CVA who presented to ED for shortness  of breath.She notes have black tarry stool for a few weeks now. No blood noted with her stool. No bleeding from any other orifice. Receives iron transfusions. Iron profile is normal. Most recent EGD 03/2025 with 1x bleeding angioectasia in stomach 2x nonbleeding angioectasia in the duodenum. Current hgb is 6.8, undergoing transfusion. Concern for UGIB.    Impression: possible UGIB for investigation    Plan:  - Plan for EGD/push enteroscopy tomorrow  - Keep NPO   - Trend Hgb and transfuse for goal Hgb > 7, unless otherwise indicated  - Maintain IV access with 2 large bore IV's  - Intravascular resuscitation/support with IVF's   - Hold all NSAIDs and anticoagulants, unless contraindicated  - Bolus IV pantoprazole 80 mg followed by 40 mg BID  - Please correct any coagulopathy with platelets and FFP for goal of platelets >50K and INR <2.0  - Please notify GI team if there is significant change in patient's clinical status         Thank you for your consult. I will follow-up with patient. Please contact us if you have any additional questions.    Breanna Lares MD  Gastroenterology  [facility] - Cardiology Stepdown

## 2025-07-30 NOTE — ACP (ADVANCE CARE PLANNING)
Advance Care Planning     Date: 07/30/2025    Code Status  In light of the patients advanced and life limiting illness,I engaged the the patient and family in a voluntary conversation about the patient's preferences for care  at the very end of life. The patient wishes to pursue all life-prolonging measures under those circumstances.  Along those lines, the patient does wish to have CPR or other invasive treatments performed when her heart and/or breathing stops. I communicated to the patient and family that her status would remain full code.    A total of 30 min was spent on advance care planning, goals of care discussion, emotional support, formulating and communicating prognosis and exploring burden/benefit of various approaches of treatment. This discussion occurred on a fully voluntary basis with the verbal consent of the patient and/or family.        Patient would like to continue discussing this issue with her sons and .  Currently wants to remain full code.

## 2025-07-30 NOTE — ASSESSMENT & PLAN NOTE
Endorsing SOB that started tonight and has since resolved. Denies cough or any associated symptoms. Hx of CHF and emphysema. CXR without edema or consolidation. Satting well on RA. BNP significantly elevated.     - Diuresed with IV lasix in ED. Appears close to euvolemic so will hold on further doses of IV diuretics

## 2025-07-30 NOTE — ASSESSMENT & PLAN NOTE
Patient's blood pressure range in the last 24 hours was: BP  Min: 99/53  Max: 139/65.The patient's inpatient anti-hypertensive regimen is listed below:  Current Antihypertensives       Plan  - BP is controlled, no changes needed to their regimen

## 2025-07-30 NOTE — SUBJECTIVE & OBJECTIVE
Interval History:  Dyspnea is somewhat improved.  Blood transfusion initiated at time of my exam.  Patient and family going to discuss decision making around Eliquis discontinuation and code status.    Review of Systems   Constitutional:  Positive for activity change. Negative for appetite change, chills and fever.   HENT:  Negative for congestion, sinus pain and sore throat.    Eyes:  Negative for photophobia, pain and visual disturbance.   Respiratory:  Positive for shortness of breath. Negative for cough, chest tightness and wheezing.    Cardiovascular:  Negative for chest pain, palpitations and leg swelling.   Gastrointestinal:  Negative for abdominal distention, abdominal pain, constipation, diarrhea, nausea and vomiting.   Endocrine: Negative for polydipsia and polyuria.   Genitourinary:  Negative for decreased urine volume, dysuria and frequency.   Musculoskeletal:  Negative for arthralgias, joint swelling and myalgias.   Skin:  Negative for color change, pallor, rash and wound.   Neurological:  Negative for dizziness, weakness, numbness and headaches.   Psychiatric/Behavioral:  Negative for behavioral problems, confusion and hallucinations. The patient is nervous/anxious.      Objective:     Vital Signs (Most Recent):  Temp: 98.5 °F (36.9 °C) (07/30/25 1519)  Pulse: 73 (07/30/25 1519)  Resp: 18 (07/30/25 1519)  BP: (!) 106/56 (07/30/25 1519)  SpO2: 98 % (07/30/25 1519) Vital Signs (24h Range):  Temp:  [96.7 °F (35.9 °C)-98.5 °F (36.9 °C)] 98.5 °F (36.9 °C)  Pulse:  [70-78] 73  Resp:  [17-20] 18  SpO2:  [92 %-100 %] 98 %  BP: ()/(53-65) 106/56     Weight: 42.4 kg (93 lb 7.6 oz)  Body mass index is 17.1 kg/m².    Intake/Output Summary (Last 24 hours) at 7/30/2025 1620  Last data filed at 7/30/2025 1548  Gross per 24 hour   Intake 1097 ml   Output 925 ml   Net 172 ml         Physical Exam  Vitals and nursing note reviewed.   Constitutional:       General: She is not in acute distress.     Appearance: She  is well-developed.      Comments: Underweight   HENT:      Head: Normocephalic and atraumatic.      Right Ear: External ear normal.      Left Ear: External ear normal.      Nose: Nose normal.   Eyes:      Conjunctiva/sclera: Conjunctivae normal.      Pupils: Pupils are equal, round, and reactive to light.   Cardiovascular:      Rate and Rhythm: Normal rate and regular rhythm.   Pulmonary:      Effort: Pulmonary effort is normal. No respiratory distress.      Breath sounds: Normal breath sounds. No wheezing.   Abdominal:      General: Bowel sounds are normal. There is no distension.      Palpations: Abdomen is soft.      Tenderness: There is no abdominal tenderness.   Musculoskeletal:         General: No tenderness. Normal range of motion.      Cervical back: Normal range of motion and neck supple.      Right lower leg: Edema present.      Left lower leg: Edema present.   Skin:     General: Skin is warm and dry.      Coloration: Skin is pale.   Neurological:      Mental Status: She is alert and oriented to person, place, and time.   Psychiatric:         Thought Content: Thought content normal.               Significant Labs: All pertinent labs within the past 24 hours have been reviewed.  CBC:   Recent Labs   Lab 07/29/25 2325 07/30/25  0531 07/30/25  1452   WBC 7.31 6.70 6.85   HGB 7.6* 6.8* 8.5*   HCT 24.1* 21.4* 25.9*   * 448 419     CMP:   Recent Labs   Lab 07/29/25 2325 07/30/25  0531   * 133*   K 4.0 3.9   CL 94* 91*   CO2 28 30*   * 115*   BUN 39* 37*   CREATININE 0.9 0.9   CALCIUM 9.7 9.0   PROT 6.7  --    ALBUMIN 3.7  --    BILITOT 0.4  --    ALKPHOS 99  --    AST 37  --    ALT 29  --    ANIONGAP 12 12       Significant Imaging: I have reviewed all pertinent imaging results/findings within the past 24 hours.

## 2025-07-30 NOTE — ASSESSMENT & PLAN NOTE
Anemia is likely due to chronic blood loss and Iron deficiency. Most recent hemoglobin and hematocrit are listed below.  Recent Labs     07/29/25  2325 07/30/25  0531 07/30/25  1452   HGB 7.6* 6.8* 8.5*   HCT 24.1* 21.4* 25.9*     Plan  - Monitor serial CBC: Daily  - Transfuse PRBC if patient becomes hemodynamically unstable, symptomatic or H/H drops below 7/21.  - Patient has received a transfusion of 1 unit of PRBCs on 07/30  - Patient's anemia is currently worsening  - Just received Iron transfusion on 07/28.  Providing additional IV iron on 07/30.  Should no longer need oral iron

## 2025-07-31 ENCOUNTER — ANESTHESIA (OUTPATIENT)
Dept: ENDOSCOPY | Facility: HOSPITAL | Age: 85
End: 2025-07-31
Payer: MEDICARE

## 2025-07-31 LAB
ANION GAP (OHS): 8 MMOL/L (ref 8–16)
BUN SERPL-MCNC: 31 MG/DL (ref 8–23)
CALCIUM SERPL-MCNC: 9.1 MG/DL (ref 8.7–10.5)
CHLORIDE SERPL-SCNC: 100 MMOL/L (ref 95–110)
CO2 SERPL-SCNC: 31 MMOL/L (ref 23–29)
CREAT SERPL-MCNC: 0.8 MG/DL (ref 0.5–1.4)
ERYTHROCYTE [DISTWIDTH] IN BLOOD BY AUTOMATED COUNT: 18.7 % (ref 11.5–14.5)
GFR SERPLBLD CREATININE-BSD FMLA CKD-EPI: >60 ML/MIN/1.73/M2
GLUCOSE SERPL-MCNC: 91 MG/DL (ref 70–110)
HCT VFR BLD AUTO: 25.7 % (ref 37–48.5)
HGB BLD-MCNC: 8.2 GM/DL (ref 12–16)
MAGNESIUM SERPL-MCNC: 2.3 MG/DL (ref 1.6–2.6)
MCH RBC QN AUTO: 30.4 PG (ref 27–31)
MCHC RBC AUTO-ENTMCNC: 31.9 G/DL (ref 32–36)
MCV RBC AUTO: 95 FL (ref 82–98)
PLATELET # BLD AUTO: 394 K/UL (ref 150–450)
PMV BLD AUTO: 9.7 FL (ref 9.2–12.9)
POTASSIUM SERPL-SCNC: 4.5 MMOL/L (ref 3.5–5.1)
RBC # BLD AUTO: 2.7 M/UL (ref 4–5.4)
SODIUM SERPL-SCNC: 139 MMOL/L (ref 136–145)
WBC # BLD AUTO: 6.35 K/UL (ref 3.9–12.7)

## 2025-07-31 PROCEDURE — 27202087 HC PROBE, APC: Performed by: INTERNAL MEDICINE

## 2025-07-31 PROCEDURE — 37000008 HC ANESTHESIA 1ST 15 MINUTES: Performed by: INTERNAL MEDICINE

## 2025-07-31 PROCEDURE — 25000003 PHARM REV CODE 250

## 2025-07-31 PROCEDURE — 44369 SMALL BOWEL ENDOSCOPY: CPT | Performed by: INTERNAL MEDICINE

## 2025-07-31 PROCEDURE — 36415 COLL VENOUS BLD VENIPUNCTURE: CPT

## 2025-07-31 PROCEDURE — 85027 COMPLETE CBC AUTOMATED: CPT

## 2025-07-31 PROCEDURE — 80048 BASIC METABOLIC PNL TOTAL CA: CPT

## 2025-07-31 PROCEDURE — 44369 SMALL BOWEL ENDOSCOPY: CPT | Mod: GC,,, | Performed by: INTERNAL MEDICINE

## 2025-07-31 PROCEDURE — 25000003 PHARM REV CODE 250: Performed by: HOSPITALIST

## 2025-07-31 PROCEDURE — 94761 N-INVAS EAR/PLS OXIMETRY MLT: CPT

## 2025-07-31 PROCEDURE — 0W3P8ZZ CONTROL BLEEDING IN GASTROINTESTINAL TRACT, VIA NATURAL OR ARTIFICIAL OPENING ENDOSCOPIC: ICD-10-PCS | Performed by: INTERNAL MEDICINE

## 2025-07-31 PROCEDURE — 30233N1 TRANSFUSION OF NONAUTOLOGOUS RED BLOOD CELLS INTO PERIPHERAL VEIN, PERCUTANEOUS APPROACH: ICD-10-PCS | Performed by: HOSPITALIST

## 2025-07-31 PROCEDURE — 83735 ASSAY OF MAGNESIUM: CPT

## 2025-07-31 PROCEDURE — 63600175 PHARM REV CODE 636 W HCPCS

## 2025-07-31 PROCEDURE — 37000009 HC ANESTHESIA EA ADD 15 MINS: Performed by: INTERNAL MEDICINE

## 2025-07-31 PROCEDURE — 11000001 HC ACUTE MED/SURG PRIVATE ROOM

## 2025-07-31 RX ORDER — LANOLIN ALCOHOL/MO/W.PET/CERES
1000 CREAM (GRAM) TOPICAL DAILY
Status: DISCONTINUED | OUTPATIENT
Start: 2025-07-31 | End: 2025-08-01 | Stop reason: HOSPADM

## 2025-07-31 RX ORDER — PANTOPRAZOLE SODIUM 40 MG/1
40 TABLET, DELAYED RELEASE ORAL 2 TIMES DAILY
Status: DISCONTINUED | OUTPATIENT
Start: 2025-07-31 | End: 2025-08-01 | Stop reason: HOSPADM

## 2025-07-31 RX ORDER — PROPOFOL 10 MG/ML
VIAL (ML) INTRAVENOUS
Status: DISCONTINUED | OUTPATIENT
Start: 2025-07-31 | End: 2025-07-31

## 2025-07-31 RX ORDER — ATORVASTATIN CALCIUM 20 MG/1
80 TABLET, FILM COATED ORAL DAILY
Status: DISCONTINUED | OUTPATIENT
Start: 2025-07-31 | End: 2025-08-01 | Stop reason: HOSPADM

## 2025-07-31 RX ORDER — LIDOCAINE HYDROCHLORIDE 20 MG/ML
INJECTION INTRAVENOUS
Status: DISCONTINUED | OUTPATIENT
Start: 2025-07-31 | End: 2025-07-31

## 2025-07-31 RX ORDER — GLUCAGON 1 MG
1 KIT INJECTION
Status: DISCONTINUED | OUTPATIENT
Start: 2025-07-31 | End: 2025-08-01 | Stop reason: HOSPADM

## 2025-07-31 RX ORDER — CHOLECALCIFEROL (VITAMIN D3) 25 MCG
1000 TABLET ORAL DAILY
Status: DISCONTINUED | OUTPATIENT
Start: 2025-07-31 | End: 2025-08-01 | Stop reason: HOSPADM

## 2025-07-31 RX ORDER — EZETIMIBE 10 MG/1
10 TABLET ORAL DAILY
Status: DISCONTINUED | OUTPATIENT
Start: 2025-07-31 | End: 2025-08-01 | Stop reason: HOSPADM

## 2025-07-31 RX ADMIN — LIDOCAINE HYDROCHLORIDE 80 MG: 20 INJECTION INTRAVENOUS at 10:07

## 2025-07-31 RX ADMIN — POLYETHYLENE GLYCOL 3350 17 G: 17 POWDER, FOR SOLUTION ORAL at 02:07

## 2025-07-31 RX ADMIN — PANTOPRAZOLE SODIUM 40 MG: 40 TABLET, DELAYED RELEASE ORAL at 09:07

## 2025-07-31 RX ADMIN — Medication 6 MG: at 09:07

## 2025-07-31 RX ADMIN — PROPOFOL 75 MCG/KG/MIN: 10 INJECTION, EMULSION INTRAVENOUS at 10:07

## 2025-07-31 RX ADMIN — PANTOPRAZOLE SODIUM 40 MG: 40 TABLET, DELAYED RELEASE ORAL at 02:07

## 2025-07-31 RX ADMIN — Medication 1000 UNITS: at 02:07

## 2025-07-31 RX ADMIN — CYANOCOBALAMIN TAB 1000 MCG 1000 MCG: 1000 TAB at 02:07

## 2025-07-31 RX ADMIN — PROPOFOL 50 MG: 10 INJECTION, EMULSION INTRAVENOUS at 10:07

## 2025-07-31 RX ADMIN — SODIUM CHLORIDE: 0.9 INJECTION, SOLUTION INTRAVENOUS at 10:07

## 2025-07-31 RX ADMIN — EZETIMIBE 10 MG: 10 TABLET ORAL at 02:07

## 2025-07-31 RX ADMIN — ATORVASTATIN CALCIUM 80 MG: 20 TABLET, FILM COATED ORAL at 02:07

## 2025-07-31 NOTE — NURSING
Pt returned to unit post EGD A&Ox3. Refer to flowsheet for VS. Pt denies N/V or abnormal pain. Gag reflex assessed. Diet changed from NPO to heart healthy. Plan of care ongoing

## 2025-07-31 NOTE — PLAN OF CARE
Problem: Hospitalized Older Adult  Goal: Optimal Cognitive Function  Outcome: Progressing  Goal: Effective Bowel Elimination  Outcome: Progressing  Goal: Optimal Coping  Outcome: Progressing  Goal: Fluid and Electrolyte Balance  Outcome: Progressing  Goal: Optimal Functional Ability  Outcome: Progressing  Goal: Improved Oral Intake  Outcome: Progressing  Goal: Adequate Sleep/Rest  Outcome: Progressing  Goal: Effective Urinary Elimination  Outcome: Progressing     Problem: Adult Inpatient Plan of Care  Goal: Plan of Care Review  Outcome: Progressing  Goal: Patient-Specific Goal (Individualized)  Outcome: Progressing  Goal: Absence of Hospital-Acquired Illness or Injury  Outcome: Progressing  Goal: Optimal Comfort and Wellbeing  Outcome: Progressing  Goal: Readiness for Transition of Care  Outcome: Progressing   Pt educated on fall risk and remained free from falls/trauma/injury. Denies chest pain, SOB, palpitations, dizziness, pain, or discomfort. Plan of care reviewed with pt, all questions answered. Bed locked in lowest position, call bell within reach, no acute distress noted, will continue to monitor. VSS, afebrile.

## 2025-07-31 NOTE — ASSESSMENT & PLAN NOTE
Patient's blood pressure range in the last 24 hours was: BP  Min: 98/53  Max: 122/59.The patient's inpatient anti-hypertensive regimen is listed below:  Current Antihypertensives       Plan  - BP is controlled, no changes needed to their regimen    7/31- /55

## 2025-07-31 NOTE — ASSESSMENT & PLAN NOTE
Anemia is likely due to chronic blood loss and Iron deficiency. Most recent hemoglobin and hematocrit are listed below.  Recent Labs     07/30/25  0531 07/30/25  1452 07/31/25  0232   HGB 6.8* 8.5* 8.2*   HCT 21.4* 25.9* 25.7*     Plan  - Monitor serial CBC: Daily  - Transfuse PRBC if patient becomes hemodynamically unstable, symptomatic or H/H drops below 7/21.  - Patient has received a transfusion of 1 unit of PRBCs on 07/30  - Patient's anemia is currently worsening  - Just received Iron transfusion on 07/28.  Providing additional IV iron on 07/30.  Should no longer need oral iron     7/31- here for recurrent GI bleeding, s/p Blood transfusion, while on eliquis for stroke prevention. Eliquis was stopped for now. Hs if previous stroke, CAD, HFpEF,  Patient and family going to discuss decision making around Eliquis discontinuation and code status. They are aware of risk. EGD planned.

## 2025-07-31 NOTE — ASSESSMENT & PLAN NOTE
- Patient is identified as having Diastolic (HFpEF) and R sided  heart failure that is Chronic.   - CHF is currently controlled.   - Latest ECHO performed and demonstrates- Results for orders placed during the hospital encounter of 06/28/25    Echo    Interpretation Summary    Left Ventricle: The left ventricle is normal in size. Normal wall thickness. There is concentric remodeling. There is normal systolic function with a visually estimated ejection fraction of 60 - 65%. Grade III diastolic dysfunction.    Right Ventricle: The right ventricle is normal in size measuring 2.8 cm. Wall thickness is normal. Systolic function is reduced.    Left Atrium: The left atrium is severely dilated    Aortic Valve: The aortic valve is a trileaflet valve. There is mild aortic valve sclerosis.    Mitral Valve: There is mild regurgitation.    Pulmonary Artery: The estimated pulmonary artery systolic pressure is 37 mmHg.    IVC/SVC: Normal venous pressure at 3 mmHg.  .   - holding Lasix and Aldactone in the setting of probable hypovolemia.  Resume as indicated  - Monitor on telemetry.   - Monitor strict Is&Os and daily weights.    - Continue to stress to patient importance of self efficacy and  on diet for CHF.   - Last BNP reviewed- and noted below   Recent Labs   Lab 07/29/25  2325   BNP 7,779*     - given IV lasix 80 mg in ED. continue monitor volume status  7/31- UO only 250 ml documented. Monitor accurate I&Os.

## 2025-07-31 NOTE — ANESTHESIA POSTPROCEDURE EVALUATION
Anesthesia Post Evaluation    Patient: Katelyn Caba    Procedure(s) Performed: Procedure(s) (LRB):  EGD (ESOPHAGOGASTRODUODENOSCOPY) (N/A)    Final Anesthesia Type: general      Patient location during evaluation: PACU  Patient participation: Yes- Able to Participate  Level of consciousness: awake and alert and oriented  Post-procedure vital signs: reviewed and stable  Pain management: adequate  Airway patency: patent    PONV status at discharge: No PONV  Anesthetic complications: no      Cardiovascular status: hemodynamically stable  Respiratory status: unassisted, spontaneous ventilation and room air  Hydration status: euvolemic  Follow-up not needed.              Vitals Value Taken Time   /56 07/31/25 11:36   Temp 36.6 °C (97.9 °F) 07/31/25 11:30   Pulse 63 07/31/25 11:38   Resp 26 07/31/25 11:38   SpO2 100 % 07/31/25 11:38   Vitals shown include unfiled device data.      Event Time   Out of Recovery 11:56:00         Pain/Salena Score: Salena Score: 10 (7/31/2025 11:30 AM)

## 2025-07-31 NOTE — SUBJECTIVE & OBJECTIVE
Interval History: see above    Review of Systems   HENT:  Negative for trouble swallowing.    Respiratory:  Negative for shortness of breath.    Cardiovascular:  Negative for chest pain and leg swelling.   Genitourinary:  Negative for difficulty urinating.   Neurological:  Negative for numbness.   Psychiatric/Behavioral:  Negative for behavioral problems.      Objective:     Vital Signs (Most Recent):  Temp: 97.7 °F (36.5 °C) (07/31/25 0720)  Pulse: 67 (07/31/25 0720)  Resp: 18 (07/31/25 0720)  BP: (!) 110/55 (07/31/25 0720)  SpO2: (!) 91 % (07/31/25 0720) Vital Signs (24h Range):  Temp:  [96.7 °F (35.9 °C)-98.5 °F (36.9 °C)] 97.7 °F (36.5 °C)  Pulse:  [67-77] 67  Resp:  [17-18] 18  SpO2:  [91 %-98 %] 91 %  BP: (106-120)/(51-58) 110/55     Weight: 42.4 kg (93 lb 7.6 oz)  Body mass index is 17.1 kg/m².    Intake/Output Summary (Last 24 hours) at 7/31/2025 0730  Last data filed at 7/30/2025 1832  Gross per 24 hour   Intake 1337 ml   Output 250 ml   Net 1087 ml         Physical Exam  Constitutional:       General: She is not in acute distress.     Appearance: Normal appearance.   HENT:      Head: Normocephalic and atraumatic.      Nose: Nose normal.      Mouth/Throat:      Mouth: Mucous membranes are moist.   Eyes:      General: No scleral icterus.     Extraocular Movements: Extraocular movements intact.      Pupils: Pupils are equal, round, and reactive to light.   Cardiovascular:      Rate and Rhythm: Normal rate and regular rhythm.      Pulses: Normal pulses.      Heart sounds: Normal heart sounds.   Pulmonary:      Effort: Pulmonary effort is normal.      Breath sounds: Normal breath sounds. No wheezing or rhonchi.   Chest:      Chest wall: No tenderness.   Abdominal:      General: Abdomen is flat. Bowel sounds are normal. There is no distension.      Palpations: Abdomen is soft.      Tenderness: There is no abdominal tenderness. There is no guarding or rebound.   Musculoskeletal:         General: No swelling,  tenderness or deformity. Normal range of motion.      Cervical back: Normal range of motion and neck supple. No rigidity or tenderness.   Skin:     General: Skin is warm and dry.      Coloration: Skin is not jaundiced or pale.      Findings: No erythema or rash.   Neurological:      General: No focal deficit present.      Mental Status: She is alert. Mental status is at baseline.      Cranial Nerves: No cranial nerve deficit.      Motor: No weakness.               Significant Labs: All pertinent labs within the past 24 hours have been reviewed.  CBC:   Recent Labs   Lab 07/30/25  0531 07/30/25  1452 07/31/25  0232   WBC 6.70 6.85 6.35   HGB 6.8* 8.5* 8.2*   HCT 21.4* 25.9* 25.7*    419 394     CMP:   Recent Labs   Lab 07/29/25  2325 07/30/25  0531 07/31/25  0232   * 133* 139   K 4.0 3.9 4.5   CL 94* 91* 100   CO2 28 30* 31*   * 115* 91   BUN 39* 37* 31*   CREATININE 0.9 0.9 0.8   CALCIUM 9.7 9.0 9.1   PROT 6.7  --   --    ALBUMIN 3.7  --   --    BILITOT 0.4  --   --    ALKPHOS 99  --   --    AST 37  --   --    ALT 29  --   --    ANIONGAP 12 12 8       Significant Imaging: I have reviewed all pertinent imaging results/findings within the past 24 hours.

## 2025-07-31 NOTE — PROGRESS NOTES
Thanh Williamson - Cardiology [facility] Medicine  Progress Note    Patient Name: Katelyn Caba  MRN: [id number]  Patient Class: IP- Inpatient   Admission Date: 7/29/2025  Length of Stay: 1 days  Attending Physician: Mahnaz Mcdowell MD  Primary Care Provider: Brooklyn Burnham MD    Subjective     Principal Problem:Symptomatic anemia    HPI:  Katelyn Caba is a 85 yo F with PMHx of BCC, HTN, HLD, aortic atherosclerosis, hx of GI bleeds, ISAIAH requiring frequent transfusions, HFpEF, CAD, L MCA CVA who presented to ED for shortness of breath. She reports being at home resting when she gradually became short of breath a few hours ago. Son at bedside report compliance with all home meds, including lasix and aldactone. Family has closely monitored her Na intake and she is at her dry weight. She denies melena or hematochezia. Endorses some mild intermittent abdominal cramping and feels like she is constipated. She took miralax yesterday and had minimal relief. She receives frequent blood/iron transfusions and follows closely with hematology. Last iron transfusion was 07/28. Denies fever, chills, chest pain, palpitations, cough, n/v/d, dysuria, headaches and LE swelling.    Of note, patient was hospitalized from - for acute respiratory failure with bilateral pleural effusions 2/2 CHF exacerbation. During that hospitalization, she suffered from an acute R MCA CVA with residual L hemiparesis. Her plavix was stopped and she was started on eliquis 2.5 mg BID. She was then at [facility] from 07/06-07/23.      In ED: Afebrile. HDS. Satting well on RA. No leukocytosis. Hgb 7.6, down from 8.5 the day prior. . CMP with Na 134 and . Pro BNP significantly elevated to 7779. HS trop elevated to 384. CXR notes fibrotic and emphysematous changes, but no acute process. Given IV lasix 80 mg x1. Admitted to .     Overview/Hospital Course:  Ms. 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  She was admitted with symptomatic anemia thought to be due to upper GI bleed.  Patient has had intermittent dark stools.  Patient has worsening anemia with hemoglobin down to 6.8 from 8.5 within a day.  Patient getting transfused 1 unit of PRBCs today.  She has elevated ferritin but low iron saturation.  Recently given 750 mg of Injectafer.  Will give another 510 mg of Feraheme.  I recommended permanent discontinuation of Eliquis given number of previous severe GI bleed episodes.  This is at least her 3rd, and it sounds like she has had some minor episodes as well.  Gastroenterology is consulted and planning endoscopy tomorrow.  Will continue to monitor CBC.  Holding Lasix and spironolactone in the setting of apparent volume contraction.  Patient has elevated BUN to creatinine ratio, evidence of contraction alkalosis, hypotension.    7/31- here for recurrent GI bleeding, s/p Blood transfusion, while on eliquis for stroke prevention. Eliquis was stopped for now. Hs if previous stroke, CAD, HFpEF,  Patient and family going to discuss decision making around Eliquis discontinuation and code status. They are aware of risk. EGD planned.     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 reached to the proximal jejunum. she could have more past where we were able to reach. There is no bleeding presently, If pt stops eliquis she may not have more significant bleeding. Would repeat procedure if she bleeds again.       Interval History: see above    Review of Systems   HENT:  Negative for trouble swallowing.    Respiratory:  Negative for shortness of breath.    Cardiovascular:  Negative for chest pain and leg swelling.   Genitourinary:  Negative for difficulty urinating.    Neurological:  Negative for numbness.   Psychiatric/Behavioral:  Negative for behavioral problems.      Objective:     Vital Signs (Most Recent):  Temp: 97.7 °F (36.5 °C) (07/31/25 0720)  Pulse: 67 (07/31/25 0720)  Resp: 18 (07/31/25 0720)  BP: (!) 110/55 (07/31/25 0720)  SpO2: (!) 91 % (07/31/25 0720) Vital Signs (24h Range):  Temp:  [96.7 °F (35.9 °C)-98.5 °F (36.9 °C)] 97.7 °F (36.5 °C)  Pulse:  [67-77] 67  Resp:  [17-18] 18  SpO2:  [91 %-98 %] 91 %  BP: (106-120)/(51-58) 110/55     Weight: 42.4 kg (93 lb 7.6 oz)  Body mass index is 17.1 kg/m².    Intake/Output Summary (Last 24 hours) at 7/31/2025 0730  Last data filed at 7/30/2025 1832  Gross per 24 hour   Intake 1337 ml   Output 250 ml   Net 1087 ml         Physical Exam  Constitutional:       General: She is not in acute distress.     Appearance: Normal appearance.   HENT:      Head: Normocephalic and atraumatic.      Nose: Nose normal.      Mouth/Throat:      Mouth: Mucous membranes are moist.   Eyes:      General: No scleral icterus.     Extraocular Movements: Extraocular movements intact.      Pupils: Pupils are equal, round, and reactive to light.   Cardiovascular:      Rate and Rhythm: Normal rate and regular rhythm.      Pulses: Normal pulses.      Heart sounds: Normal heart sounds.   Pulmonary:      Effort: Pulmonary effort is normal.      Breath sounds: Normal breath sounds. No wheezing or rhonchi.   Chest:      Chest wall: No tenderness.   Abdominal:      General: Abdomen is flat. Bowel sounds are normal. There is no distension.      Palpations: Abdomen is soft.      Tenderness: There is no abdominal tenderness. There is no guarding or rebound.   Musculoskeletal:         General: No swelling, tenderness or deformity. Normal range of motion.      Cervical back: Normal range of motion and neck supple. No rigidity or tenderness.   Skin:     General: Skin is warm and dry.      Coloration: Skin is not jaundiced or pale.      Findings: No erythema or  rash.   Neurological:      General: No focal deficit present.      Mental Status: She is alert. Mental status is at baseline.      Cranial Nerves: No cranial nerve deficit.      Motor: No weakness.               Significant Labs: All pertinent labs within the past 24 hours have been reviewed.  CBC:   Recent Labs   Lab 07/30/25  0531 07/30/25  1452 07/31/25  0232   WBC 6.70 6.85 6.35   HGB 6.8* 8.5* 8.2*   HCT 21.4* 25.9* 25.7*    419 394     CMP:   Recent Labs   Lab 07/29/25  2325 07/30/25  0531 07/31/25  0232   * 133* 139   K 4.0 3.9 4.5   CL 94* 91* 100   CO2 28 30* 31*   * 115* 91   BUN 39* 37* 31*   CREATININE 0.9 0.9 0.8   CALCIUM 9.7 9.0 9.1   PROT 6.7  --   --    ALBUMIN 3.7  --   --    BILITOT 0.4  --   --    ALKPHOS 99  --   --    AST 37  --   --    ALT 29  --   --    ANIONGAP 12 12 8       Significant Imaging: I have reviewed all pertinent imaging results/findings within the past 24 hours.      Assessment & Plan  Symptomatic anemia  Anemia is likely due to chronic blood loss and Iron deficiency. Most recent hemoglobin and hematocrit are listed below.  Recent Labs     07/30/25  0531 07/30/25  1452 07/31/25  0232   HGB 6.8* 8.5* 8.2*   HCT 21.4* 25.9* 25.7*     Plan  - Monitor serial CBC: Daily  - Transfuse PRBC if patient becomes hemodynamically unstable, symptomatic or H/H drops below 7/21.  - Patient has received a transfusion of 1 unit of PRBCs on 07/30  - Patient's anemia is currently worsening  - Just received Iron transfusion on 07/28.  Providing additional IV iron on 07/30.  Should no longer need oral iron     7/31- here for recurrent GI bleeding, s/p Blood transfusion, while on eliquis for stroke prevention. Eliquis was stopped for now. Hs if previous stroke, CAD, HFpEF,  Patient and family going to discuss decision making around Eliquis discontinuation and code status. They are aware of risk. EGD planned.   Rodriguez's esophagus  History of gastric ulcer  - continue ppi BID   -GI  consulted.  Planning endoscopy on 7/31   -currently on clear liquids diet.  will be NPO at midnight.  - continue ppi BID   -GI consulted.  Planning endoscopy on 7/31   -currently on clear liquids diet.  will be NPO at midnight.  (HFpEF) heart failure with preserved ejection fraction  - Patient is identified as having Diastolic (HFpEF) and R sided  heart failure that is Chronic.   - CHF is currently controlled.   - Latest ECHO performed and demonstrates- Results for orders placed during the hospital encounter of 06/28/25    Echo    Interpretation Summary    Left Ventricle: The left ventricle is normal in size. Normal wall thickness. There is concentric remodeling. There is normal systolic function with a visually estimated ejection fraction of 60 - 65%. Grade III diastolic dysfunction.    Right Ventricle: The right ventricle is normal in size measuring 2.8 cm. Wall thickness is normal. Systolic function is reduced.    Left Atrium: The left atrium is severely dilated    Aortic Valve: The aortic valve is a trileaflet valve. There is mild aortic valve sclerosis.    Mitral Valve: There is mild regurgitation.    Pulmonary Artery: The estimated pulmonary artery systolic pressure is 37 mmHg.    IVC/SVC: Normal venous pressure at 3 mmHg.  .   - holding Lasix and Aldactone in the setting of probable hypovolemia.  Resume as indicated  - Monitor on telemetry.   - Monitor strict Is&Os and daily weights.    - Continue to stress to patient importance of self efficacy and  on diet for CHF.   - Last BNP reviewed- and noted below   Recent Labs   Lab 07/29/25  2325   BNP 7,779*     - given IV lasix 80 mg in ED. continue monitor volume status  7/31- UO only 250 ml documented. Monitor accurate I&Os.     Elevated troponin  - chronically elevated, but more elevated than baseline on admit. HS trop 384 >> 317  - no acute ischemic changes on EKG  - denies any recent episodes of chest pain. Low suspicion for ACS  - monitor tele   H/O  ischemic left MCA stroke  Embolic stroke involving right middle cerebral artery  - continue statin, zetia.  Recommended permanent discontinuation of Eliquis given number of bleeding events  - recently completed [facility] and is current with HH    - continue statin, zetia.  Recommended permanent discontinuation of Eliquis given number of bleeding events  - recently completed [facility] and is current with HH    Mixed hyperlipidemia  - continue statin and zetia   Hypertension  Patient's blood pressure range in the last 24 hours was: BP  Min: 98/53  Max: 122/59.The patient's inpatient anti-hypertensive regimen is listed below:  Current Antihypertensives       Plan  - BP is controlled, no changes needed to their regimen    7/31- /55   Body mass index (BMI) less than 19  - start Boost BID   CAD (coronary artery disease)  Patient with known CAD, which is controlled Will continue zetia and Statin and monitor for S/Sx of angina/ACS. Continue to monitor on telemetry.   Moderate malnutrition  Nutrition consulted. Most recent weight and BMI monitored-     Measurements:  Wt Readings from Last 1 Encounters:   07/30/25 42.4 kg (93 lb 7.6 oz)   Body mass index is 17.1 kg/m².    Patient has been screened and assessed by RD.    Malnutrition Type:  Context: chronic illness  Level: moderate    Malnutrition Characteristic Summary:  Weight Loss (Malnutrition): greater than 20% in 1 year (26 lbs/21.8%)  Subcutaneous Fat (Malnutrition): moderate depletion  Muscle Mass (Malnutrition): moderate depletion    Interventions/Recommendations (treatment strategy):  1. Continue clear liquid diet as tolerated   - advance diet as tolerated   - please continue to document PO % intake via flowsheets   2. Recommend boost breeze TID  3. Continue boost plus TID when diet is advanced   4. Encourage good intake   5. RD to monitor weight, labs, meds, intake, tolerance      VTE Risk Mitigation (From admission, onward)           Ordered     IP VTE HIGH RISK  PATIENT  Once         07/30/25 0117     Reason for No Pharmacological VTE Prophylaxis  Once        Question:  Reasons:  Answer:  Already adequately anticoagulated on oral Anticoagulants    07/30/25 0117                    Discharge Planning   TERE: 8/1/2025     Code Status: Full Code   Medical Readiness for Discharge Date:   Discharge Plan A: Home Health              Mahnaz Mcdowell MD  Department of Hospital Medicine   [facility] - Cardiology Stepdown

## 2025-07-31 NOTE — ASSESSMENT & PLAN NOTE
- continue ppi BID   -GI consulted.  Planning endoscopy on 7/31   -currently on clear liquids diet.  will be NPO at midnight.  - continue ppi BID   -GI consulted.  Planning endoscopy on 7/31   -currently on clear liquids diet.  will be NPO at midnight.

## 2025-07-31 NOTE — INTERVAL H&P NOTE
The patient has been examined and the H&P has been reviewed:    Pre-Procedure H and P Addendum    Patient seen and examined.  History and exam unchanged from prior history and physical.      Procedure: EGD with push enteroscopy   Indication: Melena  ASA Class: per anesthesiology  Airway: normal  Neck Mobility: full range of motion  Mallampatti score: per anesthesia  History of anesthesia problems: no  Family history of anesthesia problems: no  Anesthesia Plan: MAC      Active Hospital Problems    Diagnosis  POA    *Symptomatic anemia [D64.9]  Yes    Moderate malnutrition [E44.0]  Yes    Elevated troponin [R79.89]  Yes    (HFpEF) heart failure with preserved ejection fraction [I50.30]  Yes    CAD (coronary artery disease) [I25.10]  Yes    Body mass index (BMI) less than 19 [Z68.1]  Not Applicable     Underweight        Embolic stroke involving right middle cerebral artery [I63.411]  Yes    History of gastric ulcer [Z87.11]  Not Applicable    Rodriguez's esophagus [K22.70]  Yes     7-2023 EGD  - Esophageal mucosal changes secondary to                          established long-segment, non-dysplastic Rodriguez's                          disease.                          - Small hiatal hernia.                          - Gastritis. Biopsied.                          - Multiple gastric polyps, consistent with fundic                          gland polyps.                          - Normal examined duodenum.       Hypertension [I10]  Yes    Mixed hyperlipidemia [E78.2]  Yes    H/O ischemic left MCA stroke [Z86.73]  Not Applicable      Resolved Hospital Problems    Diagnosis Date Resolved POA    Shortness of breath [R06.02] 07/30/2025 Yes

## 2025-07-31 NOTE — [employer]OVATION PATIENT INSTRUCTIONS
Discharge Summary/Instructions after an Endoscopic Procedure  Patient Name: Katelyn Caba  Patient MRN: [id number]  Patient YOB: 1940  Thursday, July 31, 2025  Steven Lopez MD  Dear patient,  As a result of recent federal legislation (The Federal Cures Act), you may   receive lab or pathology results from your procedure in your [facility]   account before your physician is able to contact you. Your physician or   their representative will relay the results to you with their   recommendations at their soonest availability.  Thank you,  RESTRICTIONS:  During your procedure today, you received medications for sedation.  These   medications may affect your judgment, balance and coordination.  Therefore,   for 24 hours, you have the following restrictions:   - DO NOT drive a car, operate machinery, make legal/financial decisions,   sign important papers or drink alcohol.    ACTIVITY:  Today: no heavy lifting, straining or running due to procedural   sedation/anesthesia.  The following day: return to full activity including work.  DIET:  Eat and drink normally unless instructed otherwise.     TREATMENT FOR COMMON SIDE EFFECTS:  - Mild abdominal pain, nausea, belching, bloating or excessive gas:  rest,   eat lightly and use a heating pad.  - Sore Throat: treat with throat lozenges and/or gargle with warm salt   water.  - Because air was used during the procedure, expelling large amounts of air   from your rectum or belching is normal.  - If a bowel prep was taken, you may not have a bowel movement for 1-3 days.    This is normal.  SYMPTOMS TO WATCH FOR AND REPORT TO YOUR PHYSICIAN:  1. Abdominal pain or bloating, other than gas cramps.  2. Chest pain.  3. Back pain.  4. Signs of infection such as: chills or fever occurring within 24 hours   after the procedure.  5. Rectal bleeding, which would show as bright red, maroon, or black stools.   (A tablespoon of blood from the rectum is not serious, especially if    hemorrhoids are present.)  6. Vomiting.  7. Weakness or dizziness.  GO DIRECTLY TO THE NEAREST EMERGENCY ROOM IF YOU HAVE ANY OF THE FOLLOWING:      Difficulty breathing              Chills and/or fever over 101 F   Persistent vomiting and/or vomiting blood   Severe abdominal pain   Severe chest pain   Black, tarry stools   Bleeding- more than one tablespoon   Any other symptom or condition that you feel may need urgent attention  Your doctor recommends these additional instructions:  If any biopsies were taken, your doctors clinic will contact you in 1 to 2   weeks with any results.  - Return patient to hospital diop for ongoing care.   - Resume previous diet.   - Recommend risk vs benefit discussion regarding resuming Eliquis.  For questions, problems or results please call your physician - Steven Lopez MD at Work:  [phone].  [facility], EMERGENCY ROOM PHONE NUMBER: [phone]  IF A COMPLICATION OR EMERGENCY SITUATION ARISES AND YOU ARE UNABLE TO REACH   YOUR PHYSICIAN - GO DIRECTLY TO THE EMERGENCY ROOM.  Steven Lopez MD  7/31/2025 11:56:34 AM  This report has been verified and signed electronically.  Dear patient,  As a result of recent federal legislation (The Federal Cures Act), you may   receive lab or pathology results from your procedure in your [facility]   account before your physician is able to contact you. Your physician or   their representative will relay the results to you with their   recommendations at their soonest availability.  Thank you,  PROVATION

## 2025-07-31 NOTE — PLAN OF CARE
07/31/25 1125   Rounds   Attendance Provider;;Charge nurse  (unit-based LUZ)   Discharge Plan A Home Health   Why the patient remains in the hospital Requires continued medical care   Transition of Care Barriers None     Endoscopy today.  TERE 8/1.      Reema Head LMSW  [facility]  [id number]

## 2025-07-31 NOTE — TRANSFER OF CARE
"Anesthesia Transfer of Care Note    Patient: Katelyn Caba    Procedure(s) Performed: Procedure(s) (LRB):  EGD (ESOPHAGOGASTRODUODENOSCOPY) (N/A)    Patient location: PACU    Anesthesia Type: general    Transport from OR: Transported from OR on 2-3 L/min O2 by NC with adequate spontaneous ventilation    Post pain: adequate analgesia    Post assessment: no apparent anesthetic complications    Post vital signs: stable    Level of consciousness: sedated    Nausea/Vomiting: no nausea/vomiting    Complications: none    Transfer of care protocol was followed      Last vitals: Visit Vitals  BP (!) 98/53 (BP Location: Right arm, Patient Position: Lying)   Pulse 65   Temp 36.7 °C (98.1 °F) (Temporal)   Resp 15   Ht 5' 2" (1.575 m)   Wt 42.4 kg (93 lb 7.6 oz)   SpO2 98%   Breastfeeding No   BMI 17.10 kg/m²     "

## 2025-07-31 NOTE — PLAN OF CARE
Plan of care discussed with patient.  Patient ambulating with 1 person assist, fall precautions in place. Patient has no complaints of pain. Discussed medications and care. Patient has no questions at this time. Plan of care ongoing      Problem: Hospitalized Older Adult  Goal: Optimal Cognitive Function  Outcome: Progressing  Goal: Effective Bowel Elimination  Outcome: Progressing  Goal: Optimal Coping  Outcome: Progressing  Goal: Fluid and Electrolyte Balance  Outcome: Progressing  Goal: Optimal Functional Ability  Outcome: Progressing  Goal: Improved Oral Intake  Outcome: Progressing  Goal: Adequate Sleep/Rest  Outcome: Progressing  Goal: Effective Urinary Elimination  Outcome: Progressing     Problem: Adult Inpatient Plan of Care  Goal: Plan of Care Review  Outcome: Progressing  Goal: Patient-Specific Goal (Individualized)  Outcome: Progressing  Goal: Absence of Hospital-Acquired Illness or Injury  Outcome: Progressing  Goal: Optimal Comfort and Wellbeing  Outcome: Progressing  Goal: Readiness for Transition of Care  Outcome: Progressing     Problem: Infection  Goal: Absence of Infection Signs and Symptoms  Outcome: Progressing     Problem: Fall Injury Risk  Goal: Absence of Fall and Fall-Related Injury  Outcome: Progressing     Problem: Skin Injury Risk Increased  Goal: Skin Health and Integrity  Outcome: Progressing

## 2025-07-31 NOTE — ASSESSMENT & PLAN NOTE
- continue statin, zetia.  Recommended permanent discontinuation of Eliquis given number of bleeding events  - recently completed [facility] and is current with     - continue statin, zetia.  Recommended permanent discontinuation of Eliquis given number of bleeding events  - recently completed [facility] and is current with HH

## 2025-07-31 NOTE — NURSING TRANSFER
Nursing Transfer Note      7/31/2025   11:41 AM    Transfer To: Room 306    Transfer via stretcher    Transfer with cardiac monitoring    Transported by PCT    Telemetry: Rate 65  Order for Tele Monitor? Yes    4eyes on Skin: yes    Medicines sent: None    Any special needs or follow-up needed: Telemetry box placed back on pt.    Patient belongings transferred with patient: Yes    Chart send with patient: Yes    Notified: multiple family members    Patient reassessed at: 7/31/2025  11:40 (date, time)

## 2025-08-01 VITALS
TEMPERATURE: 98 F | OXYGEN SATURATION: 97 % | WEIGHT: 93.5 LBS | SYSTOLIC BLOOD PRESSURE: 100 MMHG | RESPIRATION RATE: 18 BRPM | BODY MASS INDEX: 17.21 KG/M2 | HEIGHT: 62 IN | DIASTOLIC BLOOD PRESSURE: 59 MMHG | HEART RATE: 74 BPM

## 2025-08-01 LAB
ANION GAP (OHS): 8 MMOL/L (ref 8–16)
BUN SERPL-MCNC: 25 MG/DL (ref 8–23)
CALCIUM SERPL-MCNC: 8.7 MG/DL (ref 8.7–10.5)
CHLORIDE SERPL-SCNC: 101 MMOL/L (ref 95–110)
CO2 SERPL-SCNC: 27 MMOL/L (ref 23–29)
CREAT SERPL-MCNC: 0.8 MG/DL (ref 0.5–1.4)
ERYTHROCYTE [DISTWIDTH] IN BLOOD BY AUTOMATED COUNT: 18.8 % (ref 11.5–14.5)
GFR SERPLBLD CREATININE-BSD FMLA CKD-EPI: >60 ML/MIN/1.73/M2
GLUCOSE SERPL-MCNC: 96 MG/DL (ref 70–110)
HCT VFR BLD AUTO: 24.5 % (ref 37–48.5)
HGB BLD-MCNC: 8 GM/DL (ref 12–16)
MAGNESIUM SERPL-MCNC: 2.2 MG/DL (ref 1.6–2.6)
MCH RBC QN AUTO: 31.3 PG (ref 27–31)
MCHC RBC AUTO-ENTMCNC: 32.7 G/DL (ref 32–36)
MCV RBC AUTO: 96 FL (ref 82–98)
PLATELET # BLD AUTO: 383 K/UL (ref 150–450)
PMV BLD AUTO: 9.6 FL (ref 9.2–12.9)
POTASSIUM SERPL-SCNC: 4.2 MMOL/L (ref 3.5–5.1)
RBC # BLD AUTO: 2.56 M/UL (ref 4–5.4)
RETICS/RBC NFR AUTO: 5.3 % (ref 0.5–2.5)
SODIUM SERPL-SCNC: 136 MMOL/L (ref 136–145)
WBC # BLD AUTO: 14.8 K/UL (ref 3.9–12.7)

## 2025-08-01 PROCEDURE — 25000003 PHARM REV CODE 250: Performed by: HOSPITALIST

## 2025-08-01 PROCEDURE — 85027 COMPLETE CBC AUTOMATED: CPT

## 2025-08-01 PROCEDURE — 83735 ASSAY OF MAGNESIUM: CPT

## 2025-08-01 PROCEDURE — 80048 BASIC METABOLIC PNL TOTAL CA: CPT

## 2025-08-01 PROCEDURE — 85045 AUTOMATED RETICULOCYTE COUNT: CPT | Performed by: HOSPITALIST

## 2025-08-01 PROCEDURE — 99232 SBSQ HOSP IP/OBS MODERATE 35: CPT | Mod: ,,,

## 2025-08-01 PROCEDURE — 94761 N-INVAS EAR/PLS OXIMETRY MLT: CPT

## 2025-08-01 PROCEDURE — 99900035 HC TECH TIME PER 15 MIN (STAT)

## 2025-08-01 PROCEDURE — 36415 COLL VENOUS BLD VENIPUNCTURE: CPT

## 2025-08-01 RX ORDER — FUROSEMIDE 40 MG/1
40 TABLET ORAL DAILY
Qty: 30 TABLET | Refills: 11 | Status: SHIPPED | OUTPATIENT
Start: 2025-08-01 | End: 2026-08-01

## 2025-08-01 RX ORDER — POLYETHYLENE GLYCOL 3350 17 G/17G
17 POWDER, FOR SOLUTION ORAL 2 TIMES DAILY PRN
Qty: 510 G | Refills: 11 | Status: SHIPPED | OUTPATIENT
Start: 2025-08-01

## 2025-08-01 RX ORDER — FUROSEMIDE 40 MG/1
40 TABLET ORAL DAILY
Status: DISCONTINUED | OUTPATIENT
Start: 2025-08-01 | End: 2025-08-01 | Stop reason: HOSPADM

## 2025-08-01 RX ADMIN — POLYETHYLENE GLYCOL 3350 17 G: 17 POWDER, FOR SOLUTION ORAL at 09:08

## 2025-08-01 RX ADMIN — FUROSEMIDE 40 MG: 40 TABLET ORAL at 09:08

## 2025-08-01 RX ADMIN — Medication 1000 UNITS: at 09:08

## 2025-08-01 RX ADMIN — PANTOPRAZOLE SODIUM 40 MG: 40 TABLET, DELAYED RELEASE ORAL at 09:08

## 2025-08-01 RX ADMIN — EZETIMIBE 10 MG: 10 TABLET ORAL at 09:08

## 2025-08-01 RX ADMIN — CYANOCOBALAMIN TAB 1000 MCG 1000 MCG: 1000 TAB at 09:08

## 2025-08-01 RX ADMIN — ATORVASTATIN CALCIUM 80 MG: 20 TABLET, FILM COATED ORAL at 09:08

## 2025-08-01 NOTE — TREATMENT PLAN
GI Treatment Plan:    Impression:              - Esophageal mucosal changes consistent with long-segment Rodriguez's esophagus, classified as Rodriguez's stage C7-M7 per [place] criteria.   - 3 cm hiatal hernia.   - Multiple fundic gland polyps.   - Six non-bleeding angioectasias in the duodenum. Treated with argon plasma coagulation (APC).    - No specimens collected.     Recommendation:  - Return patient to hospital diop for ongoing care.   - Resume previous diet.   - Recommend risk vs benefit discussion regarding resuming Eliquis.     GI to follow.     Gio Osullivan MD  Gastroenterology Fellow PGY IV  [facility]

## 2025-08-01 NOTE — ASSESSMENT & PLAN NOTE
- Patient is identified as having Diastolic (HFpEF) and R sided  heart failure that is Chronic.   - CHF is currently controlled.   - Latest ECHO performed and demonstrates- Results for orders placed during the hospital encounter of 06/28/25    Echo    Interpretation Summary    Left Ventricle: The left ventricle is normal in size. Normal wall thickness. There is concentric remodeling. There is normal systolic function with a visually estimated ejection fraction of 60 - 65%. Grade III diastolic dysfunction.    Right Ventricle: The right ventricle is normal in size measuring 2.8 cm. Wall thickness is normal. Systolic function is reduced.    Left Atrium: The left atrium is severely dilated    Aortic Valve: The aortic valve is a trileaflet valve. There is mild aortic valve sclerosis.    Mitral Valve: There is mild regurgitation.    Pulmonary Artery: The estimated pulmonary artery systolic pressure is 37 mmHg.    IVC/SVC: Normal venous pressure at 3 mmHg.  .   - holding Lasix and Aldactone in the setting of probable hypovolemia.  Resume as indicated  - Monitor on telemetry.   - Monitor strict Is&Os and daily weights.    - Continue to stress to patient importance of self efficacy and  on diet for CHF.   - Last BNP reviewed- and noted below   Recent Labs   Lab 07/29/25  2325   BNP 7,779*     - given IV lasix 80 mg in ED. continue monitor volume status  7/31- UO only 250 ml documented. Monitor accurate I&Os.   8/1-  UO 350cc.  Resume lasix po 40  Once daily. Setting up HF Transition Cardiac clinic for monitoring

## 2025-08-01 NOTE — DISCHARGE SUMMARY
Offered to schedule mammo. She declined at this time. Said she would need to call back. States she has several other appts she needs to do first then will call to schedule.     [facility] - Cardiology [facility] Medicine  Discharge Summary      Patient Name: Katelyn Caba  MRN: [id number]  BRY: [id number]  Patient Class: IP- Inpatient  Admission Date: 7/29/2025  Hospital Length of Stay: 2 days  Discharge Date and Time: No discharge date for patient encounter.  Attending Physician: Mahnaz Mcdowell MD   Discharging Provider: Mahnaz Mcdowell MD  Primary Care Provider: Brooklyn Burnham MD  [facility] Medicine Team: [facility]  Mahnaz Mcdowell MD  Primary Care Team: [facility] MED     HPI:   Katelyn Caba is a 83 yo F with PMHx of BCC, HTN, HLD, aortic atherosclerosis, hx of GI bleeds, ISAIAH requiring frequent transfusions, HFpEF, CAD, L MCA CVA who presented to ED for shortness of breath. She reports being at home resting when she gradually became short of breath a few hours ago. Son at bedside report compliance with all home meds, including lasix and aldactone. Family has closely monitored her Na intake and she is at her dry weight. She denies melena or hematochezia. Endorses some mild intermittent abdominal cramping and feels like she is constipated. She took miralax yesterday and had minimal relief. She receives frequent blood/iron transfusions and follows closely with hematology. Last iron transfusion was 07/28. Denies fever, chills, chest pain, palpitations, cough, n/v/d, dysuria, headaches and LE swelling.    Of note, patient was hospitalized from - for acute respiratory failure with bilateral pleural effusions 2/2 CHF exacerbation. During that hospitalization, she suffered from an acute R MCA CVA with residual L hemiparesis. Her plavix was stopped and she was started on eliquis 2.5 mg BID. She was then at [facility] from 07/06-07/23.      In ED: Afebrile. HDS. Satting well on RA. No leukocytosis. Hgb 7.6, down from 8.5 the day prior. . CMP with Na 134 and . Pro BNP significantly elevated to 7779. HS trop elevated to 384. CXR notes fibrotic and emphysematous  changes, but no acute process. Given IV lasix 80 mg x1. Admitted to .     Procedure(s) (LRB):  EGD (ESOPHAGOGASTRODUODENOSCOPY) (N/A)      Hospital Course:   Ms. 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  She was admitted with symptomatic anemia thought to be due to upper GI bleed.  Patient has had intermittent dark stools.  Patient has worsening anemia with hemoglobin down to 6.8 from 8.5 within a day.  Patient getting transfused 1 unit of PRBCs today.  She has elevated ferritin but low iron saturation.  Recently given 750 mg of Injectafer.  Will give another 510 mg of Feraheme.  I recommended permanent discontinuation of Eliquis given number of previous severe GI bleed episodes.  This is at least her 3rd, and it sounds like she has had some minor episodes as well.  Gastroenterology is consulted and planning endoscopy tomorrow.  Will continue to monitor CBC.  Holding Lasix and spironolactone in the setting of apparent volume contraction.  Patient has elevated BUN to creatinine ratio, evidence of contraction alkalosis, hypotension.    7/31- here for recurrent GI bleeding, s/p Blood transfusion, while on eliquis for stroke prevention. Eliquis was stopped for now. Hs if previous stroke, CAD, HFpEF,  Patient and family going to discuss decision making around Eliquis discontinuation and code status. They are aware of risk. EGD planned.     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 reached to the proximal jejunum. she could have more past where we were able to reach. There is no bleeding presently, If pt stops eliquis she may not have more significant bleeding. Would repeat procedure if she bleeds again.     8/1- Long discussion  yesterday afternoon and today with pt, her  and son about outpt needs. They have had difficulty getting into the Hematology clinic. Receiving weekly IV Iron  infusions, Received one in hospital 7/29. This pt may need periodic blood transfusions due to chronic intermittent GI bleeding. They have elected to hold eliquis for now, but may resume once anemia, strength and CHF symptoms resolved. Pt has lost physical function. CM to arrange secure appointments. BP 91/53 , Pulse 72  Hb 8.0. retic cnt 5.3 ( high), indicating presence of immature cells. ECHO is near Nl. Pt likely had mild high output CHF due to anemia with hx of HFpEF as cause of volume overload. No longer on lasix or spironolactone. Will need to gradually resume po lasix  F/u GI, Hematology, PCP.  with serial  weekly lab.   - Passed walk test: during walk Sats only decreased to 92% and after sitting came back up to 97% quickly.      Goals of Care Treatment Preferences:  Code Status: Full Code         Consults:   Consults (From admission, onward)          Status Ordering Provider     Inpatient consult to Hematology  Once        Provider:  (Not yet assigned)    Acknowledged VERONIQUE HART     Inpatient consult to Gastroenterology  Once        Provider:  (Not yet assigned)    Completed RADHA SÁNCHEZ            Assessment & Plan  Symptomatic anemia  Anemia is likely due to chronic blood loss and Iron deficiency. Most recent hemoglobin and hematocrit are listed below.  Recent Labs     07/30/25  1452 07/31/25  0232 08/01/25  0235   HGB 8.5* 8.2* 8.0*   HCT 25.9* 25.7* 24.5*     Plan  - Monitor serial CBC: Daily  - Transfuse PRBC if patient becomes hemodynamically unstable, symptomatic or H/H drops below 7/21.  - Patient has received a transfusion of 1 unit of PRBCs on 07/30  - Patient's anemia is currently worsening  - Just received Iron transfusion on 07/28.  Providing additional IV iron on 07/30.  Should no longer need oral iron     7/31- here  for recurrent GI bleeding, s/p Blood transfusion, while on eliquis for stroke prevention. Eliquis was stopped for now. Hs if previous stroke, CAD, HFpEF,  Patient and family going to discuss decision making around Eliquis discontinuation and code status. They are aware of risk. EGD planned.     8/1- Long discussion yesterday afternoon with pt, her  and son about outpt needs. They have had difficulty getting into the Hematology clinic. Receiving weekly IV Iron  infusions, Received one in hospital 7/29. This pt may need periodic blood transfusions due to chronic intermittent GI bleeding. They have elected to hold eliquis for now, but may resume once anemia, strength and CHF symptoms resolved. Pt has lost physical function. CM to arrange appointments. BP 91/53 , Pulse 72  Hb 8.0. retic cnt 5.3 ( high), indicating presence of immature cells. ECHO is near Nl. Pt likely had mild high output CHF due to anemia with hx of HFpEF.  No longer on lasix or spironolactone. Will need to gradually resume.  Setting up serial lab with HH upon discharge.  F/u with PCP and hematology, Luis Lujan NP on Wednesday. Setting up lab on Mondays with HH.  Next iron infusion is scheduled for Monday,       Rodriguez's esophagus  History of gastric ulcer  - continue ppi BID   -GI consulted.  Planning endoscopy on 7/31   -currently on heart healthy diet.  -  (HFpEF) heart failure with preserved ejection fraction  - Patient is identified as having Diastolic (HFpEF) and R sided  heart failure that is Chronic.   - CHF is currently controlled.   - Latest ECHO performed and demonstrates- Results for orders placed during the hospital encounter of 06/28/25    Echo    Interpretation Summary    Left Ventricle: The left ventricle is normal in size. Normal wall thickness. There is concentric remodeling. There is normal systolic function with a visually estimated ejection fraction of 60 - 65%. Grade III diastolic dysfunction.    Right Ventricle: The  right ventricle is normal in size measuring 2.8 cm. Wall thickness is normal. Systolic function is reduced.    Left Atrium: The left atrium is severely dilated    Aortic Valve: The aortic valve is a trileaflet valve. There is mild aortic valve sclerosis.    Mitral Valve: There is mild regurgitation.    Pulmonary Artery: The estimated pulmonary artery systolic pressure is 37 mmHg.    IVC/SVC: Normal venous pressure at 3 mmHg.  .   - holding Lasix and Aldactone in the setting of probable hypovolemia.  Resume as indicated  - Monitor on telemetry.   - Monitor strict Is&Os and daily weights.    - Continue to stress to patient importance of self efficacy and  on diet for CHF.   - Last BNP reviewed- and noted below   Recent Labs   Lab 07/29/25  2325   BNP 7,779*     - given IV lasix 80 mg in ED. continue monitor volume status  7/31- UO only 250 ml documented. Monitor accurate I&Os.   8/1-  UO 350cc.  Resume lasix po 40  Once daily. Setting up HF Transition Cardiac clinic for monitoring  Elevated troponin  - chronically elevated, but more elevated than baseline on admit. HS trop 384 >> 317  - no acute ischemic changes on EKG  - denies any recent episodes of chest pain. Low suspicion for ACS  - monitor tele   H/O ischemic left MCA stroke  Embolic stroke involving right middle cerebral artery  - continue statin, zetia.  Recommended permanent discontinuation of Eliquis given number of bleeding events  - recently completed [facility] and is current with HH    -   Mixed hyperlipidemia  - continue statin and zetia   Hypertension  Patient's blood pressure range in the last 24 hours was: BP  Min: 91/53  Max: 118/57.The patient's inpatient anti-hypertensive regimen is listed below:  Current Antihypertensives  furosemide tablet 40 mg, Daily, Oral  furosemide (LASIX) tablet, Daily, Oral    Plan  - BP is controlled, no changes needed to their regimen    7/31- /55   8/1- resume lasix with parameters  Body mass index (BMI) less  than 19  - start Boost BID   CAD (coronary artery disease)  Patient with known CAD, which is controlled Will continue zetia and Statin and monitor for S/Sx of angina/ACS. Continue to monitor on telemetry.   Moderate malnutrition  Nutrition consulted. Most recent weight and BMI monitored-     Measurements:  Wt Readings from Last 1 Encounters:   07/30/25 42.4 kg (93 lb 7.6 oz)   Body mass index is 17.1 kg/m².    Patient has been screened and assessed by RD.    Malnutrition Type:  Context: chronic illness  Level: moderate    Malnutrition Characteristic Summary:  Weight Loss (Malnutrition): greater than 20% in 1 year (26 lbs/21.8%)  Subcutaneous Fat (Malnutrition): moderate depletion  Muscle Mass (Malnutrition): moderate depletion    Interventions/Recommendations (treatment strategy):  1. Continue clear liquid diet as tolerated   - advance diet as tolerated   - please continue to document PO % intake via flowsheets   2. Recommend boost breeze TID  3. Continue boost plus TID when diet is advanced   4. Encourage good intake   5. RD to monitor weight, labs, meds, intake, tolerance    Final Active Diagnoses:    Diagnosis Date Noted POA    PRINCIPAL PROBLEM:  Symptomatic anemia [D64.9] 03/26/2024 Yes    (HFpEF) heart failure with preserved ejection fraction [I50.30] 03/25/2025 Yes    Rodriguez's esophagus [K22.70] 03/26/2024 Yes    H/O ischemic left MCA stroke [Z86.73] 07/20/2022 Not Applicable    Embolic stroke involving right middle cerebral artery [I63.411] 02/18/2025 Yes    History of gastric ulcer [Z87.11] 03/26/2024 Not Applicable    Hypertension [I10] 03/26/2024 Yes    CAD (coronary artery disease) [I25.10] 03/25/2025 Yes    Mixed hyperlipidemia [E78.2] 07/21/2022 Yes    Body mass index (BMI) less than 19 [Z68.1] 02/20/2025 Not Applicable    Moderate malnutrition [E44.0] 07/30/2025 Yes    Elevated troponin [R79.89] 06/28/2025 Yes      Problems Resolved During this Admission:    Diagnosis Date Noted Date Resolved POA     Shortness of breath [R06.02] 07/30/2025 07/30/2025 Yes       Discharged Condition: good    Disposition: Home or Self Care    Follow Up:   Contact information for after-discharge care       Home Medical Care       [facility] .    Service: Home Health Services  Contact information:  [address], [address]  [facility] [address]  [phone]                                 Patient Instructions:      ACCEPT - Ambulatory referral/consult to Heart Failure Transitional Care Clinic   Standing Status: Future   Referral Priority: Routine Referral Type: Consultation   Referral Reason: Specialty Services Required   Requested Specialty: Cardiology   Number of Visits Requested: 1       Significant Diagnostic Studies: Labs: CMP   Recent Labs   Lab 07/31/25  0232 08/01/25  0235    136   K 4.5 4.2    101   CO2 31* 27   GLU 91 96   BUN 31* 25*   CREATININE 0.8 0.8   CALCIUM 9.1 8.7   ANIONGAP 8 8    and CBC   Recent Labs   Lab 07/31/25  0232 08/01/25  0235   WBC 6.35 14.80*   HGB 8.2* 8.0*   HCT 25.7* 24.5*    383       Pending Diagnostic Studies:       None           Medications:  Reconciled Home Medications:      Medication List        PAUSE taking these medications      apixaban 2.5 mg Tab  Wait to take this until your doctor or other care provider tells you to start again.  Commonly known as: ELIQUIS  Take 1 tablet (2.5 mg total) by mouth 2 (two) times daily.     spironolactone 25 MG tablet  Wait to take this until your doctor or other care provider tells you to start again.  Commonly known as: ALDACTONE  Take 1 tablet (25 mg total) by mouth once daily.            START taking these medications      polyethylene glycol 17 gram/dose powder  Commonly known as: GLYCOLAX  Use to cap to measure 17g, mix with liquid, and take by mouth 2 (two) times daily as needed for Constipation.            CONTINUE taking these medications      atorvastatin 80 MG tablet  Commonly known as:  LIPITOR  Take 1 tablet (80 mg total) by mouth once daily.     cyanocobalamin 1000 MCG tablet  Commonly known as: VITAMIN B-12  Take 1,000 mcg by mouth once daily.     ezetimibe 10 mg tablet  Commonly known as: ZETIA  Take 1 tablet (10 mg total) by mouth once daily.     furosemide 40 MG tablet  Commonly known as: LASIX  Take 1 tablet (40 mg total) by mouth once daily.     pantoprazole 40 MG tablet  Commonly known as: PROTONIX  Take 1 tablet (40 mg total) by mouth once daily. Take 1 tablet by mouth every day     potassium chloride SA 20 MEQ tablet  Commonly known as: K-DUR,KLOR-CON  Take 1 tablet (20 mEq total) by mouth once daily.     vitamin D 1000 units Tab  Commonly known as: VITAMIN D3  Take 1,000 Units by mouth once daily.              Indwelling Lines/Drains at time of discharge:   Lines/Drains/Airways       Drain  Duration             Female External Urinary Catheter w/ Suction 07/30/25 0123 2 days                        Time spent on the discharge of patient: 35 minutes         Mahnaz Mcdowell MD  Department of Hospital Medicine  [facility] - Cardiology Stepdown

## 2025-08-01 NOTE — ASSESSMENT & PLAN NOTE
- continue ppi BID   -GI consulted.  Planning endoscopy on 7/31   -currently on heart healthy diet.  -

## 2025-08-01 NOTE — PLAN OF CARE
Problem: Hospitalized Older Adult  Goal: Optimal Cognitive Function  Outcome: Progressing  Goal: Effective Bowel Elimination  Outcome: Progressing  Goal: Optimal Coping  Outcome: Progressing  Goal: Fluid and Electrolyte Balance  Outcome: Progressing  Goal: Optimal Functional Ability  Outcome: Progressing  Goal: Improved Oral Intake  Outcome: Progressing  Goal: Adequate Sleep/Rest  Outcome: Progressing  Goal: Effective Urinary Elimination  Outcome: Progressing     Problem: Adult Inpatient Plan of Care  Goal: Plan of Care Review  Outcome: Progressing  Goal: Patient-Specific Goal (Individualized)  Outcome: Progressing  Goal: Absence of Hospital-Acquired Illness or Injury  Outcome: Progressing  Goal: Optimal Comfort and Wellbeing  Outcome: Progressing  Goal: Readiness for Transition of Care  Outcome: Progressing     Problem: Infection  Goal: Absence of Infection Signs and Symptoms  Outcome: Progressing     Problem: Fall Injury Risk  Goal: Absence of Fall and Fall-Related Injury  Outcome: Progressing     Problem: Skin Injury Risk Increased  Goal: Skin Health and Integrity  Outcome: Progressing

## 2025-08-01 NOTE — ASSESSMENT & PLAN NOTE
Patient's blood pressure range in the last 24 hours was: BP  Min: 91/53  Max: 118/57.The patient's inpatient anti-hypertensive regimen is listed below:  Current Antihypertensives  furosemide tablet 40 mg, Daily, Oral  furosemide (LASIX) tablet, Daily, Oral    Plan  - BP is controlled, no changes needed to their regimen    7/31- /55   8/1- resume lasix with parameters

## 2025-08-01 NOTE — PLAN OF CARE
Pt educated on fall risk and remained free from falls/trauma/injury. Denies chest pain, SOB, palpitations, dizziness, pain, or discomfort. Plan of care reviewed with pt, all questions answered. Bed locked in lowest position, call bell within reach, no acute distress noted, will continue to monitor. VSS, afebrile.   +  Problem: Hospitalized Older Adult  Goal: Optimal Cognitive Function  Outcome: Progressing  Goal: Effective Bowel Elimination  Outcome: Progressing  Goal: Optimal Coping  Outcome: Progressing  Goal: Fluid and Electrolyte Balance  Outcome: Progressing  Goal: Optimal Functional Ability  Outcome: Progressing  Goal: Improved Oral Intake  Outcome: Progressing  Goal: Adequate Sleep/Rest  Outcome: Progressing  Goal: Effective Urinary Elimination  Outcome: Progressing     Problem: Adult Inpatient Plan of Care  Goal: Plan of Care Review  Outcome: Progressing  Goal: Patient-Specific Goal (Individualized)  Outcome: Progressing  Goal: Absence of Hospital-Acquired Illness or Injury  Outcome: Progressing  Goal: Optimal Comfort and Wellbeing  Outcome: Progressing  Goal: Readiness for Transition of Care  Outcome: Progressing     Problem: Infection  Goal: Absence of Infection Signs and Symptoms  Outcome: Progressing     Problem: Fall Injury Risk  Goal: Absence of Fall and Fall-Related Injury  Outcome: Progressing     Problem: Skin Injury Risk Increased  Goal: Skin Health and Integrity  Outcome: Progressing

## 2025-08-01 NOTE — PLAN OF CARE
08/01/25 0949   Medicare Message   Important Message from Medicare regarding Discharge Appeal Rights Given to patient/caregiver;Explained to patient/caregiver;Signed/date by patient/caregiver  (signed by ana lilia Allison at bedside)   Date IMM was signed 08/01/25   Time IMM was signed 0942      met with pt and sampson Allison and Serafin at bedside and reviewed IMM.  All verbalized understanding of appeal rights.  IMM signed by ana lilia Allison.      Reema Head LMSW  [facility]  [phone]

## 2025-08-01 NOTE — PROGRESS NOTES
[facility] - Cardiology Stepdown  Gastroenterology  Progress Note    Patient Name: Katelyn Caba  MRN: [id number]  Admission Date: 7/29/2025  Hospital Length of Stay: 2 days  Code Status: Full Code   Attending Provider: Mahnaz Mcdowell MD  Consulting Provider: Raya Mccain NP  Primary Care Physician: Brooklyn Burnham MD  Principal Problem: Symptomatic anemia    Subjective:     Interval History: Followed up with patient s/p EGD. Multiple AVMs noted treated with APC. Blood counts are stable this AM. Patient eating her breakfast this morning. Denies any BMs overnight.     Review of Systems   Constitutional:  Positive for activity change. Negative for diaphoresis, fever and unexpected weight change.   HENT:  Negative for sore throat and trouble swallowing.    Eyes:  Negative for redness.   Gastrointestinal:  Negative for abdominal distention, anal bleeding, blood in stool, constipation, diarrhea, nausea, rectal pain and vomiting.   Skin:  Negative for color change, pallor and rash.   Neurological:  Positive for weakness. Negative for dizziness, syncope and headaches.     Objective:     Vital Signs (Most Recent):  Temp: 98.9 °F (37.2 °C) (08/01/25 0742)  Pulse: 75 (08/01/25 0742)  Resp: 18 (08/01/25 0742)  BP: (!) 102/54 (08/01/25 0742)  SpO2: 96 % (08/01/25 0742) Vital Signs (24h Range):  Temp:  [97.6 °F (36.4 °C)-98.9 °F (37.2 °C)] 98.9 °F (37.2 °C)  Pulse:  [59-78] 75  Resp:  [15-23] 18  SpO2:  [65 %-100 %] 96 %  BP: ()/(51-62) 102/54     Weight: 42.4 kg (93 lb 7.6 oz) (07/30/25 0800)  Body mass index is 17.1 kg/m².      Intake/Output Summary (Last 24 hours) at 8/1/2025 0917  Last data filed at 8/1/2025 0846  Gross per 24 hour   Intake 490 ml   Output 1190 ml   Net -700 ml       Lines/Drains/Airways       Drain  Duration             Female External Urinary Catheter w/ Suction 07/30/25 0123 2 days              Peripheral Intravenous Line  Duration             Peripheral IV Single Lumen 07/29/25 2325 20 G  "Left;Posterior Hand 2 days                     Physical Exam  Vitals reviewed.   Constitutional:       General: She is not in acute distress.     Appearance: Normal appearance. She is ill-appearing.   HENT:      Mouth/Throat:      Mouth: Mucous membranes are moist.      Pharynx: Oropharynx is clear. No oropharyngeal exudate.   Eyes:      General: No scleral icterus.  Abdominal:      General: Abdomen is flat. Bowel sounds are normal. There is no distension.      Palpations: Abdomen is soft. There is no mass.      Tenderness: There is no abdominal tenderness.      Hernia: No hernia is present.   Skin:     General: Skin is warm and dry.      Capillary Refill: Capillary refill takes less than 2 seconds.      Coloration: Skin is not jaundiced or pale.      Findings: No bruising or erythema.   Neurological:      Mental Status: She is alert and oriented to person, place, and time. Mental status is at baseline.          Significant Labs:  CBC:   Recent Labs   Lab 07/30/25  1452 07/31/25  0232 08/01/25  0235   WBC 6.85 6.35 14.80*   HGB 8.5* 8.2* 8.0*   HCT 25.9* 25.7* 24.5*    394 383     CMP:   Recent Labs   Lab 08/01/25  0235   GLU 96   CALCIUM 8.7      K 4.2   CO2 27      BUN 25*   CREATININE 0.8     Coagulation: No results for input(s): "PT", "INR", "APTT" in the last 48 hours.      Significant Imaging:  Imaging results within the past 24 hours have been reviewed.  Assessment/Plan:     Oncology  * Symptomatic anemia   >>ASSESSMENT AND PLAN FOR ANEMIA WRITTEN ON 7/30/2025  2:40 PM BY VASQUEZ MEYER MD    Katelyn Caba is a 83 yo F with PMHx of BCC, HTN, HLD, aortic atherosclerosis, hx of GI bleeds, ISAIAH requiring frequent transfusions, HFpEF, CAD, L MCA CVA who presented to ED for shortness of breath.She notes have black tarry stool for a few weeks now. No blood noted with her stool. No bleeding from any other orifice. Receives iron transfusions. Iron profile is normal. Most recent EGD 03/2025 " with 1x bleeding angioectasia in stomach 2x nonbleeding angioectasia in the duodenum. Repeat EGD 7/30 with angiectasias x 6 treated with APC.     Plan:  - Diet per primary team.  - GI followup in the coming weeks. Message sent to [facility] to assist with scheduling.   - Close PCP followup.   - Please consider risk vs. Benefit of restarting patients anticoagulation due to increased risk of bleeding.  - Please correct any coagulopathy with platelets and FFP for goal of platelets >50K and INR <2.0  - Please notify GI team if there is significant change in patient's clinical status         Thank you for your consult. After careful review of labs and patient current status with the GI staff and fellow, it has been decided that I will sign off. Please contact us if you have any additional questions.    Raya Mccain NP  Gastroenterology  [facility] - Cardiology Stepdown

## 2025-08-01 NOTE — ASSESSMENT & PLAN NOTE
- Patient is identified as having Diastolic (HFpEF) and R sided  heart failure that is Chronic.   - CHF is currently controlled.   - Latest ECHO performed and demonstrates- Results for orders placed during the hospital encounter of 06/28/25    Echo    Interpretation Summary    Left Ventricle: The left ventricle is normal in size. Normal wall thickness. There is concentric remodeling. There is normal systolic function with a visually estimated ejection fraction of 60 - 65%. Grade III diastolic dysfunction.    Right Ventricle: The right ventricle is normal in size measuring 2.8 cm. Wall thickness is normal. Systolic function is reduced.    Left Atrium: The left atrium is severely dilated    Aortic Valve: The aortic valve is a trileaflet valve. There is mild aortic valve sclerosis.    Mitral Valve: There is mild regurgitation.    Pulmonary Artery: The estimated pulmonary artery systolic pressure is 37 mmHg.    IVC/SVC: Normal venous pressure at 3 mmHg.  .   - holding Lasix and Aldactone in the setting of probable hypovolemia.  Resume as indicated  - Monitor on telemetry.   - Monitor strict Is&Os and daily weights.    - Continue to stress to patient importance of self efficacy and  on diet for CHF.   - Last BNP reviewed- and noted below   Recent Labs   Lab 07/29/25  2325   BNP 7,779*     - given IV lasix 80 mg in ED. continue monitor volume status  7/31- UO only 250 ml documented. Monitor accurate I&Os.   8/1-  UO 350cc.  Resume lasix po 40  Once daily

## 2025-08-01 NOTE — ASSESSMENT & PLAN NOTE
- continue statin, zetia.  Recommended permanent discontinuation of Eliquis given number of bleeding events  - recently completed [facility] and is current with HH    -

## 2025-08-01 NOTE — ASSESSMENT & PLAN NOTE
Anemia is likely due to chronic blood loss and Iron deficiency. Most recent hemoglobin and hematocrit are listed below.  Recent Labs     07/30/25  1452 07/31/25  0232 08/01/25  0235   HGB 8.5* 8.2* 8.0*   HCT 25.9* 25.7* 24.5*     Plan  - Monitor serial CBC: Daily  - Transfuse PRBC if patient becomes hemodynamically unstable, symptomatic or H/H drops below 7/21.  - Patient has received a transfusion of 1 unit of PRBCs on 07/30  - Patient's anemia is currently worsening  - Just received Iron transfusion on 07/28.  Providing additional IV iron on 07/30.  Should no longer need oral iron     7/31- here for recurrent GI bleeding, s/p Blood transfusion, while on eliquis for stroke prevention. Eliquis was stopped for now. Hs if previous stroke, CAD, HFpEF,  Patient and family going to discuss decision making around Eliquis discontinuation and code status. They are aware of risk. EGD planned.     8/1- Long discussion yesterday afternoon with pt, her  and son about outpt needs. They have had difficulty getting into the Hematology clinic. Receiving weekly IV Iron  infusions, Received one in hospital 7/29. This pt may need periodic blood transfusions due to chronic intermittent GI bleeding. They have elected to hold eliquis for now, but may resume once anemia, strength and CHF symptoms resolved. Pt has lost physical function. CM to arrange appointments. BP 91/53 , Pulse 72  Hb 8.0. retic cnt 5.3 ( high), indicating presence of immature cells. ECHO is near Nl. Pt likely had mild high output CHF due to anemia with hx of HFpEF.  No longer on lasix or spironolactone. Will need to gradually resume.  Setting up serial lab with HH upon discharge.  F/u with PCP and hematology, Luis Lujan NP on Wednesday. Setting up lab on Mondays with HH.  Next iron infusion is scheduled for Monday,

## 2025-08-01 NOTE — PLAN OF CARE
[facility] - Cardiology Stepdown      HOME HEALTH ORDERS  FACE TO FACE ENCOUNTER    Patient Name: Katelyn Caba  YOB: 1940    PCP: Brooklyn Burnham MD   PCP Address: [address] / [place] [address]  PCP Phone Number: [phone]  PCP Fax: [phone]    Encounter Date: 7/29/25    Admit to Home Health    Diagnoses:  Active Hospital Problems    Diagnosis  POA    *Symptomatic anemia [D64.9]  Yes     Priority: 1 - High    (HFpEF) heart failure with preserved ejection fraction [I50.30]  Yes     Priority: 3     Rodriguez's esophagus [K22.70]  Yes     Priority: 3      7-2023 EGD  - Esophageal mucosal changes secondary to                          established long-segment, non-dysplastic Rodriguez's                          disease.                          - Small hiatal hernia.                          - Gastritis. Biopsied.                          - Multiple gastric polyps, consistent with fundic                          gland polyps.                          - Normal examined duodenum.       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]  Yes     Priority: 5     History of gastric ulcer [Z87.11]  Not Applicable     Priority: 5     Hypertension [I10]  Yes     Priority: 6     CAD (coronary artery disease) [I25.10]  Yes     Priority: 8     Mixed hyperlipidemia [E78.2]  Yes     Priority: 11     Body mass index (BMI) less than 19 [Z68.1]  Not Applicable     Priority: 14      Underweight        Moderate malnutrition [E44.0]  Yes    Elevated troponin [R79.89]  Yes      Resolved Hospital Problems    Diagnosis Date Resolved POA    Shortness of breath [R06.02] 07/30/2025 Yes       Follow Up Appointments:  Future Appointments   Date Time Provider Department Center   8/4/2025  2:30 PM CHAIR 30, [facility] INFUSION [facility]   8/6/2025  3:00 PM [facility] INFUSION SPECIMEN [facility] LAB [facility]    8/6/2025  3:30 PM Luis Lujan, NP [facility] at [facility]   9/10/2025  3:00 PM Shanelle Lewis PA-C [facility] STROKE8 [facility]   9/15/2025  9:00 AM LAB, [facility] LAB [facility]   9/17/2025  3:00 PM Luis Lujan, NP [facility] at [facility]       Allergies:  Review of patient's allergies indicates:   Allergen Reactions    Jardiance [empagliflozin]        Medications: Review discharge medications with patient and family and provide education.    Current Medications[1]     Medication List        PAUSE taking these medications      apixaban 2.5 mg Tab  Wait to take this until your doctor or other care provider tells you to start again.  Commonly known as: ELIQUIS  Take 1 tablet (2.5 mg total) by mouth 2 (two) times daily.     spironolactone 25 MG tablet  Wait to take this until your doctor or other care provider tells you to start again.  Commonly known as: ALDACTONE  Take 1 tablet (25 mg total) by mouth once daily.            START taking these medications      polyethylene glycol 17 gram Pwpk  Commonly known as: GLYCOLAX  Take 17 g by mouth 2 (two) times daily as needed for Constipation.            CONTINUE taking these medications      atorvastatin 80 MG tablet  Commonly known as: LIPITOR  Take 1 tablet (80 mg total) by mouth once daily.     cyanocobalamin 1000 MCG tablet  Commonly known as: VITAMIN B-12  Take 1,000 mcg by mouth once daily.     ezetimibe 10 mg tablet  Commonly known as: ZETIA  Take 1 tablet (10 mg total) by mouth once daily.     furosemide 40 MG tablet  Commonly known as: LASIX  Take 1 tablet (40 mg total) by mouth once daily.     pantoprazole 40 MG tablet  Commonly known as: PROTONIX  Take 1 tablet (40 mg total) by mouth once daily. Take 1 tablet by mouth every day     potassium chloride SA 20 MEQ tablet  Commonly known as: K-DUR,KLOR-CON  Take 1 tablet (20 mEq total) by mouth once daily.     vitamin D 1000 units Tab  Commonly known as: VITAMIN D3  Take 1,000 Units by mouth once daily.                 I have seen and examined this patient within the last 30 days. My clinical findings that support the need for the home health skilled services and home bound status are the following:no   Weakness/numbness causing balance and gait disturbance due to Heart Failure, Weakness/Debility, and Anemia making it taxing to leave home.     Diet:   cardiac diet    Labs:  SN to perform labs:  BMP. CBC: Weekly; 12 week(s)  Report Lab results to PCP., and Hematologist   Send results to Luis Lujan - [phone] and PCP.     Referrals/ Consults  Physical Therapy to evaluate and treat. Evaluate for home safety and equipment needs; Establish/upgrade home exercise program. Perform / instruct on therapeutic exercises, gait training, transfer training, and Range of Motion.  Occupational Therapy to evaluate and treat. Evaluate home environment for safety and equipment needs. Perform/Instruct on transfers, ADL training, ROM, and therapeutic exercises.  Aide to provide assistance with personal care, ADLs, and vital signs.    Activities:   ambulate in house with assistance    FALL precautions      Nursing:   Agency to admit patient within 24 hours of hospital discharge unless specified on physician order or at patient request    SN to complete comprehensive assessment including routine vital signs. Instruct on disease process and s/s of complications to report to MD. Review/verify medication list sent home with the patient at time of discharge  and instruct patient/caregiver as needed. Frequency may be adjusted depending on start of care date.     Skilled nurse to perform up to 3 visits PRN for symptoms related to diagnosis    Notify MD if SBP > 160 or < 90; DBP > 90 or < 50; HR > 120 or < 50; Temp > 101; O2 < 88%; Other:         Ok to schedule additional visits based on staff availability and patient request on consecutive days within the home health episode.    When multiple disciplines ordered:    Start of Care occurs  on Sunday - Wednesday schedule remaining discipline evaluations as ordered on separate consecutive days following the start of care.    Thursday SOC -schedule subsequent evaluations Friday and Monday the following week.     Friday - Saturday SOC - schedule subsequent discipline evaluations on consecutive days starting Monday of the following week.    For all post-discharge communication and subsequent orders please contact patient's primary care physician. If unable to reach primary care physician or do not receive response within 30 minutes, please contact on call med for clinical staff order clarification    Miscellaneous   Routine Skin for Bedridden Patients: Instruct patient/caregiver to apply moisture barrier cream to all skin folds and wet areas in perineal area daily and after baths and all bowel movements.  Heart Failure:      SN to instruct on the following:    Instruct on the definition of CHF.   Instruct on the signs/sympoms of CHF to be reported.   Instruct on and monitor daily weights.   Instruct on factors that cause exacerbation.   Instruct on action, dose, schedule, and side effects of medications.   Instruct on diet as prescribed.   Instruct on activity allowed.   Instruct on life-style modifications for life long management of CHF   SN to assess compliance with daily weights, diet, medications, fluid retention,    safety precautions, activities permitted and life-style modifications.   Additional 1-2 SN visits per week as needed for signs and symptoms     of CHF exacerbation.      For Weight Gain > 2-3 lbs in 1 day or 4-6 lbs over 1 week notify PCP:  CHF DIURETIC SLIDING SCALE     Skilled nurse visits daily to instruct and monitor medication adherence until target weight. Then resume prior order frequency.     If weight gain exceeds 5 lbs over target weight, call MD  If weight gain of 3-4 lbs over target weight, then:     Increase Furosemide - current dose (mg/day) to 40 mg double dose and  frequency of twice daily until target weight achieved or maximum 3 days.     If already on max oral daily dose, see IV diuretic instructions.    After 3 days of increased oral diuretic dose, get BMP. If patient is on increased oral diuretic dose greater than 5 days, repeat BMP at day 7 of increased dose.     Potassium supplementation   Scr > 1.5 mg/dl  Scr  1.5 mg/dl    K < 3.0 - NOTIFY MD and 40 mEq bid  40 mEq tid   K- 3.1-3.3  20 mEq bid  20 mEq tid    K 3.4-3.7  10 mEq bid  10 mEq tid      If target weight not reached after 5 days of increased oral diuretic, proceed to IV diuretic with daily patient contact to include face-to-face visit and telephone encounters.       Home Health Aide:  Nursing Three times weekly, Physical Therapy Three times weekly, Occupational Therapy Three times weekly, and Home Health Aide Three times weekly    Wound Care Orders  no    I certify that this patient is confined to her home and needs intermittent skilled nursing care, physical therapy, and occupational therapy.               [1]   Current Facility-Administered Medications   Medication Dose Route Frequency Provider Last Rate Last Admin    acetaminophen tablet 1,000 mg  1,000 mg Oral Q8H PRN Elvia Walters PA-C        acetaminophen tablet 650 mg  650 mg Oral Q4H PRN Elvia Walters PA-C        albuterol-ipratropium 2.5 mg-0.5 mg/3 mL nebulizer solution 3 mL  3 mL Nebulization Q4H PRN Elvia Walters PA-C        aluminum-magnesium hydroxide-simethicone 200-200-20 mg/5 mL suspension 30 mL  30 mL Oral QID PRN Elvia Walters PA-C        atorvastatin tablet 80 mg  80 mg Oral Daily Mahnaz Mcdowell MD        cyanocobalamin tablet 1,000 mcg  1,000 mcg Oral Daily Mahnaz Mcdowell MD        dextrose 50% injection 12.5 g  12.5 g Intravenous PRN Luis Hearn MD        ezetimibe tablet 10 mg  10 mg Oral Daily Mahnaz Mcdowell MD        furosemide tablet 40 mg  40 mg Oral Daily Mahnaz Mcdowell MD        glucagon (human  recombinant) injection 1 mg  1 mg Intramuscular PRN Luis Hearn MD        melatonin tablet 6 mg  6 mg Oral Nightly PRN Elvia Walters PA-C   6 mg at 07/31/25 2141    naloxone 0.4 mg/mL injection 0.02 mg  0.02 mg Intravenous PRN Elvia Walters PA-C        ondansetron disintegrating tablet 8 mg  8 mg Oral Q8H PRN Elvia Walters PA-C        pantoprazole EC tablet 40 mg  40 mg Oral BID Mahnaz Mcdowell MD   40 mg at 07/31/25 2141    polyethylene glycol packet 17 g  17 g Oral BID PRN Elvia Walters PA-C   17 g at 07/30/25 0923    polyethylene glycol packet 17 g  17 g Oral Daily Mahnaz Mcdowell MD   17 g at 07/31/25 1406    prochlorperazine injection Soln 5 mg  5 mg Intravenous Q6H PRN Elvia Walters PA-C        simethicone chewable tablet 80 mg  1 tablet Oral QID PRN Elvia Walters PA-C        sodium chloride 0.9% flush 5 mL  5 mL Intravenous PRN Elvia Walters PA-C        vitamin D 1000 units tablet 1,000 Units  1,000 Units Oral Daily Mahnaz Mcdowell MD

## 2025-08-01 NOTE — PLAN OF CARE
[facility] - Cardiology Stepdown  Discharge Final Note    Primary Care Provider: Brooklyn Burnham MD    Expected Discharge Date: 8/1/2025    Final Discharge Note (most recent)       Final Note - 08/01/25 1539          Final Note    Assessment Type Final Discharge Note     Anticipated Discharge Disposition Home-Health Care [facility]     Hospital Resources/Appts/Education Provided Post-Acute resouces added to AVS;Appointments scheduled and added to AVS        Post-Acute Status    Post-Acute Authorization [facility]     Home Health Status Set-up Complete/Auth obtained                 Pt accepted to resume care with [facility] (not [facility]).      Reema Head LMSW  [facility]  [id number]      Important Message from Medicare  Important Message from Medicare regarding Discharge Appeal Rights: Given to patient/caregiver, Explained to patient/caregiver, Signed/date by patient/caregiver (signed by ana lilia Allison at bedside)     Date IMM was signed: 08/01/25  Time IMM was signed: 0942      Future Appointments   Date Time Provider Department Center   8/4/2025  2:30 PM CHAIR 30, [facility] INFUSION [facility] INF [facility] at [facility]   8/6/2025  3:00 PM [facility] OHS INFUSION SPECIMEN OST LAB [facility] at [facility]   8/6/2025  3:30 PM Luis Lujan, NP [facility] at [facility]   8/8/2025 11:30 AM Ophelia Head, NP [facility]   8/28/2025 11:30 AM Raya Mccain NP [facility]STRO [facility]   9/10/2025  3:00 PM Shanelle Lewis PA-C [facility] STROKE8 [facility]   9/15/2025  9:00 AM LAB, [facility] LAB [facility]   9/17/2025  3:00 PM [facility]       After-discharge care                Home Medical Care       *[facility]   Service: Home Health Services    [address], [facility] [address]  [place] [address]   Phone: [phone]

## 2025-08-01 NOTE — SUBJECTIVE & OBJECTIVE
Interval History: see above    Review of Systems   Constitutional:  Positive for activity change. Negative for fever.   HENT:  Negative for trouble swallowing.    Respiratory:  Negative for cough and shortness of breath.    Cardiovascular:  Negative for chest pain and leg swelling.   Gastrointestinal:  Negative for constipation, diarrhea and nausea.   Genitourinary:  Negative for difficulty urinating and pelvic pain.   Musculoskeletal: Negative.  Negative for back pain.   Neurological:  Negative for numbness.   Psychiatric/Behavioral:  Negative for behavioral problems and confusion.      Objective:     Vital Signs (Most Recent):  Temp: 97.8 °F (36.6 °C) (08/01/25 0402)  Pulse: 67 (08/01/25 0700)  Resp: 18 (08/01/25 0402)  BP: (!) 91/53 (08/01/25 0402)  SpO2: 97 % (08/01/25 0402) Vital Signs (24h Range):  Temp:  [97.6 °F (36.4 °C)-98.6 °F (37 °C)] 97.8 °F (36.6 °C)  Pulse:  [59-78] 67  Resp:  [15-23] 18  SpO2:  [65 %-100 %] 97 %  BP: ()/(51-62) 91/53     Weight: 42.4 kg (93 lb 7.6 oz)  Body mass index is 17.1 kg/m².    Intake/Output Summary (Last 24 hours) at 8/1/2025 0721  Last data filed at 8/1/2025 0441  Gross per 24 hour   Intake 490 ml   Output 350 ml   Net 140 ml         Physical Exam  Constitutional:       General: She is not in acute distress.     Appearance: Normal appearance.      Comments: Elderly, frail   HENT:      Head: Normocephalic and atraumatic.      Nose: Nose normal.      Mouth/Throat:      Mouth: Mucous membranes are moist.   Eyes:      General: No scleral icterus.     Extraocular Movements: Extraocular movements intact.      Pupils: Pupils are equal, round, and reactive to light.   Cardiovascular:      Rate and Rhythm: Normal rate and regular rhythm.      Pulses: Normal pulses.      Heart sounds: Normal heart sounds.   Pulmonary:      Effort: Pulmonary effort is normal.      Breath sounds: Normal breath sounds. No wheezing or rhonchi.   Chest:      Chest wall: No tenderness.   Abdominal:       General: Abdomen is flat. Bowel sounds are normal. There is no distension.      Palpations: Abdomen is soft.      Tenderness: There is no abdominal tenderness. There is no right CVA tenderness, left CVA tenderness, guarding or rebound.   Musculoskeletal:         General: No swelling, tenderness or deformity. Normal range of motion.      Cervical back: Normal range of motion and neck supple. No rigidity or tenderness.   Skin:     General: Skin is warm and dry.      Coloration: Skin is not jaundiced or pale.      Findings: No erythema or rash.   Neurological:      General: No focal deficit present.      Mental Status: She is alert. Mental status is at baseline.      Cranial Nerves: No cranial nerve deficit.      Motor: No weakness.               Significant Labs: All pertinent labs within the past 24 hours have been reviewed.  CBC:   Recent Labs   Lab 07/30/25  1452 07/31/25  0232 08/01/25  0235   WBC 6.85 6.35 14.80*   HGB 8.5* 8.2* 8.0*   HCT 25.9* 25.7* 24.5*    394 383     CMP:   Recent Labs   Lab 07/31/25  0232 08/01/25  0235    136   K 4.5 4.2    101   CO2 31* 27   GLU 91 96   BUN 31* 25*   CREATININE 0.8 0.8   CALCIUM 9.1 8.7   ANIONGAP 8 8       Significant Imaging: I have reviewed all pertinent imaging results/findings within the past 24 hours.

## 2025-08-01 NOTE — PROGRESS NOTES
[facility] - Cardiology [facility] Medicine  Progress Note    Patient Name: Katelyn Caba  MRN: [id number]  Patient Class: IP- Inpatient   Admission Date: 7/29/2025  Length of Stay: 2 days  Attending Physician: Mahnaz Mcdowell MD  Primary Care Provider: Brooklyn Burnham MD    Subjective     Principal Problem:Symptomatic anemia    HPI:  Katelyn Caba is a 85 yo F with PMHx of BCC, HTN, HLD, aortic atherosclerosis, hx of GI bleeds, ISAIAH requiring frequent transfusions, HFpEF, CAD, L MCA CVA who presented to ED for shortness of breath. She reports being at home resting when she gradually became short of breath a few hours ago. Son at bedside report compliance with all home meds, including lasix and aldactone. Family has closely monitored her Na intake and she is at her dry weight. She denies melena or hematochezia. Endorses some mild intermittent abdominal cramping and feels like she is constipated. She took miralax yesterday and had minimal relief. She receives frequent blood/iron transfusions and follows closely with hematology. Last iron transfusion was 07/28. Denies fever, chills, chest pain, palpitations, cough, n/v/d, dysuria, headaches and LE swelling.    Of note, patient was hospitalized from - for acute respiratory failure with bilateral pleural effusions 2/2 CHF exacerbation. During that hospitalization, she suffered from an acute R MCA CVA with residual L hemiparesis. Her plavix was stopped and she was started on eliquis 2.5 mg BID. She was then at [facility] from 07/06-07/23.      In ED: Afebrile. HDS. Satting well on RA. No leukocytosis. Hgb 7.6, down from 8.5 the day prior. . CMP with Na 134 and . Pro BNP significantly elevated to 7779. HS trop elevated to 384. CXR notes fibrotic and emphysematous changes, but no acute process. Given IV lasix 80 mg x1. Admitted to .     Overview/Hospital Course:  Ms. 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  She was admitted with symptomatic anemia thought to be due to upper GI bleed.  Patient has had intermittent dark stools.  Patient has worsening anemia with hemoglobin down to 6.8 from 8.5 within a day.  Patient getting transfused 1 unit of PRBCs today.  She has elevated ferritin but low iron saturation.  Recently given 750 mg of Injectafer.  Will give another 510 mg of Feraheme.  I recommended permanent discontinuation of Eliquis given number of previous severe GI bleed episodes.  This is at least her 3rd, and it sounds like she has had some minor episodes as well.  Gastroenterology is consulted and planning endoscopy tomorrow.  Will continue to monitor CBC.  Holding Lasix and spironolactone in the setting of apparent volume contraction.  Patient has elevated BUN to creatinine ratio, evidence of contraction alkalosis, hypotension.    7/31- here for recurrent GI bleeding, s/p Blood transfusion, while on eliquis for stroke prevention. Eliquis was stopped for now. Hs if previous stroke, CAD, HFpEF,  Patient and family going to discuss decision making around Eliquis discontinuation and code status. They are aware of risk. EGD planned.     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 reached to the proximal jejunum. she could have more past where we were able to reach. There is no bleeding presently, If pt stops eliquis she may not have more significant bleeding. Would repeat procedure if she bleeds again.     8/1- Long discussion yesterday afternoon and today with pt, her  and son about outpt needs. They have had difficulty getting into the Hematology clinic. Receiving weekly IV Iron  infusions, Received one in hospital 7/29. This pt may need periodic blood  transfusions due to chronic intermittent GI bleeding. They have elected to hold eliquis for now, but may resume once anemia, strength and CHF symptoms resolved. Pt has lost physical function. CM to arrange secure appointments. BP 91/53 , Pulse 72  Hb 8.0. retic cnt 5.3 ( high), indicating presence of immature cells. ECHO is near Nl. Pt likely had mild high output CHF due to anemia with hx of HFpEF as cause of volume overload. No longer on lasix or spironolactone. Will need to gradually resume po lasix  F/u GI, Hematology, PCP.  with serial  weekly lab.   - Passed walk test: during walk Sats only decreased to 92% and after sitting came back up to 97% quickly.     Interval History: see above    Review of Systems   Constitutional:  Positive for activity change. Negative for fever.   HENT:  Negative for trouble swallowing.    Respiratory:  Negative for cough and shortness of breath.    Cardiovascular:  Negative for chest pain and leg swelling.   Gastrointestinal:  Negative for constipation, diarrhea and nausea.   Genitourinary:  Negative for difficulty urinating and pelvic pain.   Musculoskeletal: Negative.  Negative for back pain.   Neurological:  Negative for numbness.   Psychiatric/Behavioral:  Negative for behavioral problems and confusion.      Objective:     Vital Signs (Most Recent):  Temp: 97.8 °F (36.6 °C) (08/01/25 0402)  Pulse: 67 (08/01/25 0700)  Resp: 18 (08/01/25 0402)  BP: (!) 91/53 (08/01/25 0402)  SpO2: 97 % (08/01/25 0402) Vital Signs (24h Range):  Temp:  [97.6 °F (36.4 °C)-98.6 °F (37 °C)] 97.8 °F (36.6 °C)  Pulse:  [59-78] 67  Resp:  [15-23] 18  SpO2:  [65 %-100 %] 97 %  BP: ()/(51-62) 91/53     Weight: 42.4 kg (93 lb 7.6 oz)  Body mass index is 17.1 kg/m².    Intake/Output Summary (Last 24 hours) at 8/1/2025 0721  Last data filed at 8/1/2025 0441  Gross per 24 hour   Intake 490 ml   Output 350 ml   Net 140 ml         Physical Exam  Constitutional:       General: She is not in acute  distress.     Appearance: Normal appearance.      Comments: Elderly, frail   HENT:      Head: Normocephalic and atraumatic.      Nose: Nose normal.      Mouth/Throat:      Mouth: Mucous membranes are moist.   Eyes:      General: No scleral icterus.     Extraocular Movements: Extraocular movements intact.      Pupils: Pupils are equal, round, and reactive to light.   Cardiovascular:      Rate and Rhythm: Normal rate and regular rhythm.      Pulses: Normal pulses.      Heart sounds: Normal heart sounds.   Pulmonary:      Effort: Pulmonary effort is normal.      Breath sounds: Normal breath sounds. No wheezing or rhonchi.   Chest:      Chest wall: No tenderness.   Abdominal:      General: Abdomen is flat. Bowel sounds are normal. There is no distension.      Palpations: Abdomen is soft.      Tenderness: There is no abdominal tenderness. There is no right CVA tenderness, left CVA tenderness, guarding or rebound.   Musculoskeletal:         General: No swelling, tenderness or deformity. Normal range of motion.      Cervical back: Normal range of motion and neck supple. No rigidity or tenderness.   Skin:     General: Skin is warm and dry.      Coloration: Skin is not jaundiced or pale.      Findings: No erythema or rash.   Neurological:      General: No focal deficit present.      Mental Status: She is alert. Mental status is at baseline.      Cranial Nerves: No cranial nerve deficit.      Motor: No weakness.               Significant Labs: All pertinent labs within the past 24 hours have been reviewed.  CBC:   Recent Labs   Lab 07/30/25  1452 07/31/25  0232 08/01/25  0235   WBC 6.85 6.35 14.80*   HGB 8.5* 8.2* 8.0*   HCT 25.9* 25.7* 24.5*    394 383     CMP:   Recent Labs   Lab 07/31/25  0232 08/01/25  0235    136   K 4.5 4.2    101   CO2 31* 27   GLU 91 96   BUN 31* 25*   CREATININE 0.8 0.8   CALCIUM 9.1 8.7   ANIONGAP 8 8       Significant Imaging: I have reviewed all pertinent imaging  results/findings within the past 24 hours.      Assessment & Plan  Symptomatic anemia  Anemia is likely due to chronic blood loss and Iron deficiency. Most recent hemoglobin and hematocrit are listed below.  Recent Labs     07/30/25  1452 07/31/25  0232 08/01/25  0235   HGB 8.5* 8.2* 8.0*   HCT 25.9* 25.7* 24.5*     Plan  - Monitor serial CBC: Daily  - Transfuse PRBC if patient becomes hemodynamically unstable, symptomatic or H/H drops below 7/21.  - Patient has received a transfusion of 1 unit of PRBCs on 07/30  - Patient's anemia is currently worsening  - Just received Iron transfusion on 07/28.  Providing additional IV iron on 07/30.  Should no longer need oral iron     7/31- here for recurrent GI bleeding, s/p Blood transfusion, while on eliquis for stroke prevention. Eliquis was stopped for now. Hs if previous stroke, CAD, HFpEF,  Patient and family going to discuss decision making around Eliquis discontinuation and code status. They are aware of risk. EGD planned.     8/1- Long discussion yesterday afternoon with pt, her  and son about outpt needs. They have had difficulty getting into the Hematology clinic. Receiving weekly IV Iron  infusions, Received one in hospital 7/29. This pt may need periodic blood transfusions due to chronic intermittent GI bleeding. They have elected to hold eliquis for now, but may resume once anemia, strength and CHF symptoms resolved. Pt has lost physical function. CM to arrange appointments. BP 91/53 , Pulse 72  Hb 8.0. retic cnt 5.3 ( high), indicating presence of immature cells. ECHO is near Nl. Pt likely had mild high output CHF due to anemia with hx of HFpEF.  No longer on lasix or spironolactone. Will need to gradually resume.  Setting up serial lab with HH upon discharge.  F/u with PCP and hematology, Luis Lujan NP on Wednesday. Setting up lab on Mondays with HH.  Next iron infusion is scheduled for Monday,       Rodriguez's esophagus  History of gastric ulcer  -  continue ppi BID   -GI consulted.  Planning endoscopy on 7/31   -currently on heart healthy diet.  -  (HFpEF) heart failure with preserved ejection fraction  - Patient is identified as having Diastolic (HFpEF) and R sided  heart failure that is Chronic.   - CHF is currently controlled.   - Latest ECHO performed and demonstrates- Results for orders placed during the hospital encounter of 06/28/25    Echo    Interpretation Summary    Left Ventricle: The left ventricle is normal in size. Normal wall thickness. There is concentric remodeling. There is normal systolic function with a visually estimated ejection fraction of 60 - 65%. Grade III diastolic dysfunction.    Right Ventricle: The right ventricle is normal in size measuring 2.8 cm. Wall thickness is normal. Systolic function is reduced.    Left Atrium: The left atrium is severely dilated    Aortic Valve: The aortic valve is a trileaflet valve. There is mild aortic valve sclerosis.    Mitral Valve: There is mild regurgitation.    Pulmonary Artery: The estimated pulmonary artery systolic pressure is 37 mmHg.    IVC/SVC: Normal venous pressure at 3 mmHg.  .   - holding Lasix and Aldactone in the setting of probable hypovolemia.  Resume as indicated  - Monitor on telemetry.   - Monitor strict Is&Os and daily weights.    - Continue to stress to patient importance of self efficacy and  on diet for CHF.   - Last BNP reviewed- and noted below   Recent Labs   Lab 07/29/25  2325   BNP 7,779*     - given IV lasix 80 mg in ED. continue monitor volume status  7/31- UO only 250 ml documented. Monitor accurate I&Os.   8/1-  UO 350cc.  Resume lasix po 40  Once daily  Elevated troponin  - chronically elevated, but more elevated than baseline on admit. HS trop 384 >> 317  - no acute ischemic changes on EKG  - denies any recent episodes of chest pain. Low suspicion for ACS  - monitor tele   H/O ischemic left MCA stroke  Embolic stroke involving right middle cerebral  artery  - continue statin, zetia.  Recommended permanent discontinuation of Eliquis given number of bleeding events  - recently completed [facility] and is current with HH    -   Mixed hyperlipidemia  - continue statin and zetia   Hypertension  Patient's blood pressure range in the last 24 hours was: BP  Min: 91/53  Max: 118/57.The patient's inpatient anti-hypertensive regimen is listed below:  Current Antihypertensives  furosemide tablet 40 mg, Daily, Oral  furosemide (LASIX) tablet, Daily, Oral    Plan  - BP is controlled, no changes needed to their regimen    7/31- /55   8/1- resume lasix with parameters  Body mass index (BMI) less than 19  - start Boost BID   CAD (coronary artery disease)  Patient with known CAD, which is controlled Will continue zetia and Statin and monitor for S/Sx of angina/ACS. Continue to monitor on telemetry.   Moderate malnutrition  Nutrition consulted. Most recent weight and BMI monitored-     Measurements:  Wt Readings from Last 1 Encounters:   07/30/25 42.4 kg (93 lb 7.6 oz)   Body mass index is 17.1 kg/m².    Patient has been screened and assessed by RD.    Malnutrition Type:  Context: chronic illness  Level: moderate    Malnutrition Characteristic Summary:  Weight Loss (Malnutrition): greater than 20% in 1 year (26 lbs/21.8%)  Subcutaneous Fat (Malnutrition): moderate depletion  Muscle Mass (Malnutrition): moderate depletion    Interventions/Recommendations (treatment strategy):  1. Continue clear liquid diet as tolerated   - advance diet as tolerated   - please continue to document PO % intake via flowsheets   2. Recommend boost breeze TID  3. Continue boost plus TID when diet is advanced   4. Encourage good intake   5. RD to monitor weight, labs, meds, intake, tolerance      VTE Risk Mitigation (From admission, onward)           Ordered     IP VTE HIGH RISK PATIENT  Once         07/30/25 0117     Reason for No Pharmacological VTE Prophylaxis  Once        Question:  Reasons:   Answer:  Already adequately anticoagulated on oral Anticoagulants    07/30/25 0117                    Discharge Planning   TERE: 8/1/2025     Code Status: Full Code   Medical Readiness for Discharge Date:   Discharge Plan A: Home Health            Mahnaz Mcdowell MD  Department of Hospital Medicine   [facility] - Cardiology Stepdown

## 2025-08-01 NOTE — NURSING
Home Oxygen Evaluation    Date Performed: 2025    1) Patient's Home O2 Sat on room air, while at rest: 98%        If O2 sats on room air at rest are 88% or below, patient qualifies. No additional testing needed. Document N/A in steps 2 and 3. If 89% or above, complete steps 2.      2) Patient's O2 Sat on room air while exercisin%

## 2025-08-01 NOTE — PHYSICIAN QUERY
Please clarify if there is any clinical correlation between GI Bleed and Eliquis.   Are the conditions:  Due to or associated with each other

## 2025-08-01 NOTE — SUBJECTIVE & OBJECTIVE
Subjective:     Interval History: Followed up with patient s/p EGD. Multiple AVMs noted treated with APC. Blood counts are stable this AM. Patient eating her breakfast this morning. Denies any BMs overnight.     Review of Systems   Constitutional:  Positive for activity change. Negative for diaphoresis, fever and unexpected weight change.   HENT:  Negative for sore throat and trouble swallowing.    Eyes:  Negative for redness.   Gastrointestinal:  Negative for abdominal distention, anal bleeding, blood in stool, constipation, diarrhea, nausea, rectal pain and vomiting.   Skin:  Negative for color change, pallor and rash.   Neurological:  Positive for weakness. Negative for dizziness, syncope and headaches.     Objective:     Vital Signs (Most Recent):  Temp: 98.9 °F (37.2 °C) (08/01/25 0742)  Pulse: 75 (08/01/25 0742)  Resp: 18 (08/01/25 0742)  BP: (!) 102/54 (08/01/25 0742)  SpO2: 96 % (08/01/25 0742) Vital Signs (24h Range):  Temp:  [97.6 °F (36.4 °C)-98.9 °F (37.2 °C)] 98.9 °F (37.2 °C)  Pulse:  [59-78] 75  Resp:  [15-23] 18  SpO2:  [65 %-100 %] 96 %  BP: ()/(51-62) 102/54     Weight: 42.4 kg (93 lb 7.6 oz) (07/30/25 0800)  Body mass index is 17.1 kg/m².      Intake/Output Summary (Last 24 hours) at 8/1/2025 0917  Last data filed at 8/1/2025 0846  Gross per 24 hour   Intake 490 ml   Output 1190 ml   Net -700 ml       Lines/Drains/Airways       Drain  Duration             Female External Urinary Catheter w/ Suction 07/30/25 0123 2 days              Peripheral Intravenous Line  Duration             Peripheral IV Single Lumen 07/29/25 2325 20 G Left;Posterior Hand 2 days                     Physical Exam  Vitals reviewed.   Constitutional:       General: She is not in acute distress.     Appearance: Normal appearance. She is ill-appearing.   HENT:      Mouth/Throat:      Mouth: Mucous membranes are moist.      Pharynx: Oropharynx is clear. No oropharyngeal exudate.   Eyes:      General: No scleral  "icterus.  Abdominal:      General: Abdomen is flat. Bowel sounds are normal. There is no distension.      Palpations: Abdomen is soft. There is no mass.      Tenderness: There is no abdominal tenderness.      Hernia: No hernia is present.   Skin:     General: Skin is warm and dry.      Capillary Refill: Capillary refill takes less than 2 seconds.      Coloration: Skin is not jaundiced or pale.      Findings: No bruising or erythema.   Neurological:      Mental Status: She is alert and oriented to person, place, and time. Mental status is at baseline.          Significant Labs:  CBC:   Recent Labs   Lab 07/30/25  1452 07/31/25  0232 08/01/25  0235   WBC 6.85 6.35 14.80*   HGB 8.5* 8.2* 8.0*   HCT 25.9* 25.7* 24.5*    394 383     CMP:   Recent Labs   Lab 08/01/25  0235   GLU 96   CALCIUM 8.7      K 4.2   CO2 27      BUN 25*   CREATININE 0.8     Coagulation: No results for input(s): "PT", "INR", "APTT" in the last 48 hours.      Significant Imaging:  Imaging results within the past 24 hours have been reviewed.  "

## 2025-08-03 LAB
ABO + RH BLD: NORMAL
BLD PROD TYP BPU: NORMAL
BLOOD UNIT EXPIRATION DATE: NORMAL
BLOOD UNIT TYPE CODE: 600
CROSSMATCH INTERPRETATION: NORMAL
DISPENSE STATUS: NORMAL
UNIT NUMBER: NORMAL

## 2025-08-04 ENCOUNTER — INFUSION (OUTPATIENT)
Dept: INFUSION THERAPY | Facility: HOSPITAL | Age: 85
End: 2025-08-04
Attending: NURSE PRACTITIONER
Payer: MEDICARE

## 2025-08-04 ENCOUNTER — TELEPHONE (OUTPATIENT)
Dept: HEMATOLOGY/ONCOLOGY | Facility: CLINIC | Age: 85
End: 2025-08-04
Payer: MEDICARE

## 2025-08-04 VITALS
WEIGHT: 102.75 LBS | TEMPERATURE: 99 F | HEART RATE: 75 BPM | RESPIRATION RATE: 16 BRPM | BODY MASS INDEX: 18.91 KG/M2 | OXYGEN SATURATION: 96 % | HEIGHT: 62 IN | DIASTOLIC BLOOD PRESSURE: 60 MMHG | SYSTOLIC BLOOD PRESSURE: 97 MMHG

## 2025-08-04 DIAGNOSIS — D50.0 IRON DEFICIENCY ANEMIA DUE TO CHRONIC BLOOD LOSS: Primary | ICD-10-CM

## 2025-08-04 DIAGNOSIS — Z87.19 HISTORY OF GI BLEED: ICD-10-CM

## 2025-08-04 PROCEDURE — 96365 THER/PROPH/DIAG IV INF INIT: CPT | Mod: PN

## 2025-08-04 PROCEDURE — 25000003 PHARM REV CODE 250: Mod: PN | Performed by: NURSE PRACTITIONER

## 2025-08-04 PROCEDURE — 63600175 PHARM REV CODE 636 W HCPCS: Mod: JZ,TB,PN | Performed by: NURSE PRACTITIONER

## 2025-08-04 RX ORDER — SODIUM CHLORIDE 0.9 % (FLUSH) 0.9 %
10 SYRINGE (ML) INJECTION
OUTPATIENT
Start: 2025-08-04

## 2025-08-04 RX ORDER — HEPARIN 100 UNIT/ML
500 SYRINGE INTRAVENOUS
OUTPATIENT
Start: 2025-08-04

## 2025-08-04 RX ORDER — EPINEPHRINE 0.3 MG/.3ML
0.3 INJECTION SUBCUTANEOUS ONCE AS NEEDED
OUTPATIENT
Start: 2025-08-04

## 2025-08-04 RX ADMIN — SODIUM CHLORIDE: 9 INJECTION, SOLUTION INTRAVENOUS at 02:08

## 2025-08-04 RX ADMIN — FERRIC CARBOXYMALTOSE INJECTION 750 MG: 50 INJECTION, SOLUTION INTRAVENOUS at 02:08

## 2025-08-04 NOTE — PLAN OF CARE
Problem: Adult Inpatient Plan of Care  Goal: Plan of Care Review  Outcome: Progressing  Flowsheets (Taken 8/4/2025 1607)  Plan of Care Reviewed With:   patient   spouse  Goal: Patient-Specific Goal (Individualized)  Outcome: Progressing  Flowsheets (Taken 8/4/2025 1607)  Individualized Care Needs: blanket, recliner,  at chairsdie  Anxieties, Fears or Concerns: getting the IV on first try  Patient/Family-Specific Goals (Include Timeframe): no reaction to tx  Goal: Absence of Hospital-Acquired Illness or Injury  Outcome: Progressing  Goal: Optimal Comfort and Wellbeing  Outcome: Progressing  Goal: Readiness for Transition of Care  Outcome: Progressing     Problem: Fatigue  Goal: Improved Activity Tolerance  Outcome: Progressing  Intervention: Promote Improved Energy  Flowsheets (Taken 8/4/2025 1607)  Fatigue Management:   frequent rest breaks encouraged   paced activity encouraged  Sleep/Rest Enhancement: regular sleep/rest pattern promoted  Activity Management: Walk with assistive devise and /or staff member - L3  Environmental Support:   calm environment promoted   distractions minimized   Pt tolerated Injectafer infusion well.  Pt stayed for 30 minutes post infusion.  No s/s of reaction noted.  Instructed to call MD with any problems

## 2025-08-04 NOTE — TELEPHONE ENCOUNTER
Copied from CRM [id number]. Topic: General Inquiry - Patient Advice  >> Aug 4, 2025  8:10 AM Gina wrote:  Pt's son Serafin is calling to speak with someone about receiving labs at GP location in [facility] instead of coming to [facility] [place]. Please c/b to advise      Felix @[phone]

## 2025-08-05 ENCOUNTER — OUTPATIENT CASE MANAGEMENT (OUTPATIENT)
Dept: ADMINISTRATIVE | Facility: OTHER | Age: 85
End: 2025-08-05
Payer: MEDICARE

## 2025-08-05 NOTE — PROGRESS NOTES
Outpatient Care Management  Plan of Care Follow Up Visit    Patient: Katelyn Caba  MRN: [id number]  Date of Service: 08/05/2025  Completed by: Mayra Pop RN  Referral Date: 02/21/2025    Reason for Visit   Patient presents with    OPCM Post Discharge       Brief Summary: Patient was admitted to the hospital from 6/28-7/6 with shortness of breath, bilateral pleural effusions on CXR.  On 7/1 she had a stroke with severe left sided weakness and left facial droop.  She had a right MCA stroke.  She went to [facility] from 7/6-7/23 and was discharged home.  She then was hospitalized from 7/29-8/1 for shortness of breath.  BNP was 7779, HS troponin was 384.  She was given Lasix 80 mg in the ED.  She received an iron infusion on 7/29.  On 7/30 she received 1 unit of PRBC's for a Hgb of 6.8.  It was recommended that she permanently discontinue her Eliquis due to her history of GI bleeds.  She was discharged home with [facility] with her Eliquis on hold until her follow up appointment with Ophelia Head NP on 8/8 at 11:30.  Home Health was resumed without any problems and the son purchased a transport chair and shower chair for the patient for home use.

## 2025-08-06 ENCOUNTER — OFFICE VISIT (OUTPATIENT)
Dept: HEMATOLOGY/ONCOLOGY | Facility: CLINIC | Age: 85
End: 2025-08-06
Payer: MEDICARE

## 2025-08-06 ENCOUNTER — OUTPATIENT CASE MANAGEMENT (OUTPATIENT)
Dept: ADMINISTRATIVE | Facility: OTHER | Age: 85
End: 2025-08-06
Payer: MEDICARE

## 2025-08-06 ENCOUNTER — LAB VISIT (OUTPATIENT)
Dept: LAB | Facility: HOSPITAL | Age: 85
End: 2025-08-06
Payer: MEDICARE

## 2025-08-06 ENCOUNTER — EPISODE CHANGES (OUTPATIENT)
Dept: TRANSPLANT | Facility: CLINIC | Age: 85
End: 2025-08-06

## 2025-08-06 VITALS
RESPIRATION RATE: 16 BRPM | HEIGHT: 62 IN | OXYGEN SATURATION: 98 % | TEMPERATURE: 98 F | BODY MASS INDEX: 18.86 KG/M2 | HEART RATE: 73 BPM | WEIGHT: 102.5 LBS | SYSTOLIC BLOOD PRESSURE: 105 MMHG | DIASTOLIC BLOOD PRESSURE: 56 MMHG

## 2025-08-06 DIAGNOSIS — D50.0 IRON DEFICIENCY ANEMIA DUE TO CHRONIC BLOOD LOSS: Primary | ICD-10-CM

## 2025-08-06 DIAGNOSIS — K22.719 BARRETT'S ESOPHAGUS WITH DYSPLASIA: ICD-10-CM

## 2025-08-06 DIAGNOSIS — D50.0 IRON DEFICIENCY ANEMIA DUE TO CHRONIC BLOOD LOSS: ICD-10-CM

## 2025-08-06 DIAGNOSIS — K31.819 ANGIECTASIA OF GASTROINTESTINAL TRACT: ICD-10-CM

## 2025-08-06 LAB
ABSOLUTE EOSINOPHIL (OHS): 0.16 K/UL
ABSOLUTE MONOCYTE (OHS): 0.85 K/UL (ref 0.3–1)
ABSOLUTE NEUTROPHIL COUNT (OHS): 4.73 K/UL (ref 1.8–7.7)
BASOPHILS # BLD AUTO: 0.06 K/UL
BASOPHILS NFR BLD AUTO: 0.9 %
ERYTHROCYTE [DISTWIDTH] IN BLOOD BY AUTOMATED COUNT: 19.1 % (ref 11.5–14.5)
HCT VFR BLD AUTO: 28.3 % (ref 37–48.5)
HGB BLD-MCNC: 9.3 GM/DL (ref 12–16)
IMM GRANULOCYTES # BLD AUTO: 0.02 K/UL (ref 0–0.04)
IMM GRANULOCYTES NFR BLD AUTO: 0.3 % (ref 0–0.5)
LYMPHOCYTES # BLD AUTO: 1.06 K/UL (ref 1–4.8)
MCH RBC QN AUTO: 32.1 PG (ref 27–31)
MCHC RBC AUTO-ENTMCNC: 32.9 G/DL (ref 32–36)
MCV RBC AUTO: 98 FL (ref 82–98)
NUCLEATED RBC (/100WBC) (OHS): 0 /100 WBC
PLATELET # BLD AUTO: 437 K/UL (ref 150–450)
PMV BLD AUTO: 9.6 FL (ref 9.2–12.9)
RBC # BLD AUTO: 2.9 M/UL (ref 4–5.4)
RELATIVE EOSINOPHIL (OHS): 2.3 %
RELATIVE LYMPHOCYTE (OHS): 15.4 % (ref 18–48)
RELATIVE MONOCYTE (OHS): 12.4 % (ref 4–15)
RELATIVE NEUTROPHIL (OHS): 68.7 % (ref 38–73)
WBC # BLD AUTO: 6.88 K/UL (ref 3.9–12.7)

## 2025-08-06 PROCEDURE — 99999 PR PBB SHADOW E&M-EST. PATIENT-LVL IV: CPT | Mod: PBBFAC,,, | Performed by: NURSE PRACTITIONER

## 2025-08-06 PROCEDURE — 36415 COLL VENOUS BLD VENIPUNCTURE: CPT | Mod: PN

## 2025-08-06 PROCEDURE — 99215 OFFICE O/P EST HI 40 MIN: CPT | Mod: S$PBB,,, | Performed by: NURSE PRACTITIONER

## 2025-08-06 PROCEDURE — 99214 OFFICE O/P EST MOD 30 MIN: CPT | Mod: PBBFAC,PN | Performed by: NURSE PRACTITIONER

## 2025-08-06 PROCEDURE — 85025 COMPLETE CBC W/AUTO DIFF WBC: CPT | Mod: PN

## 2025-08-06 NOTE — PROGRESS NOTES
Subjective:        [facility] - Established patient    Reason for visit: follow up on ISAIAH    Anemia  2023: Iron Def Anemia, received IV iron 9/2022 - 1/2023 05/23/2024:  Injectafer 750 mg IV  10/14 & 10/21/24:  Injectafer 750 mg IV each  03/18/25:  Injectafer 750 mg (hospitalization)    05/13/25:  2 units PRBC's Hgb 7.0; 5/16: Hgb 10.2    06/28/25 - 07/06/25:  Hospitalized for ARF with bilateral pleural effusions, CHF exacerbation; Acute Right MCA/CVA with residual hemiparesis. Plavix stopped & started on Eliquis 2.5 mg bid. Sent to [facility] 07/06/25 - 07/23/25 07/28/25:  Injectafer 750 mg    07/30/25:  Feraheme 510 mg + (1) unit of PRBC's (Hospitalization 07/29/25 - 08/01/25) admitted for SOB. Hgb 6.8; received (1) unit of PRBC's & IV iron; Small bowel enteroscopy on 07/31/25 with noted Rodriguez's, angioectasias in gastric/small intestine, non-bleeding; several areas cauterized.    08/04/25:  Injectafer 750 mg       HPI: Katelyn Caba is a 84 y.o. female with history of BCC, HTN, HLD, Aortic atherosclerosis, CVA, GI bleed presents to the clinic today with son & S/O for follow up of recent hospitalization & ISAIAH.  Since her last visit, she was admitted to [facility] on 07/29 - 07/31/25 for c/o SOB.  Hgb 6.8 & transfused with (1) unit of PRBC's and IV iron. Small bowel enteroscopy done.  Eliquis placed on hold (had been reduced to 2.5 mg bid).  Patient is weak. Endorses fatigue & SOB with exertion. Eating better.  Arranged for weekly CBC's to be drawn by Home Health. PCP, Dr. Brooklyn Burnham:  visit Friday, 08/08/25.  Will need Cardiology to decide treatment as Eliquis is paused. Denies any CP, palpitations, bleeding, melena, cold extremities, HA's, etc.      History:  Patient hospitalized 3/26 - 3/27/24 with Hgb 6.6 sp 1u pRBC. GI evaluation plan for outpatient colonoscopy but patient does not wish to proceed with colonoscopy. Patient recalls fall around [place] with pelvic fracture.  Patient was taking iron supplement daily and increasing iron rich foods. Weight is stable. 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 In ED, troponin elevated x3 and an inferolateral STEMI was confirmed on EKG. LHC with evidence of moderate nonobstructive CAD for which no intervention was required. Patient placed on GDMT with atorvastatin 40 mg qd and metoprolol tartrate 12.5 mg TID. Antiplatelet therapy with ticagrelor recommended, however, patient apprehensive of medication secondary to history of GI bleed. Patient agreed to medication regimen. Patient without anginal equivalents and R radial access site well healed upon discharge.    Presented to ED on 2/18/25 for evaluation of left-sided facial droop with left upper extremity weakness. Brain MRI noted acute ischemia right MCA territory involving the frontal, parietal, and temporal lobes inclusive of the right insular cortex and CTA stroke multi-phase noted occlusion of the MCA M1 segment distal aspect and approximately 50% stenosis at the origin of the left cervical ICA. She was transferred via flight [facility] to [facility] for thrombectomy after TNK initiated and a Cardene drip started due to blood pressure elevation. Thrombectomy not performed due to lesion migrating to distal MCA branches after TNK administered. Neuro exams were stable for remainder of stay. She continues on cardiac monitor.    Presented to ED on 3/3/25 with shortness of breath noting hypoxia, placed on CPAP. She reported black colored stool in addition to fatigue. BNP elevated to 1500 and troponin in the 200s. CXR with bilateral vascular congestion, Hgb of 5.6 g/dL and received 3U PRBCs. She was able to be weaned to 2L NS and saturating 97-98% noting her breathing was much better with d/c.  She also received IV iron with this admission. Patient with h/o Rodriguez's esophagus and EGD noted non-bleeding angiectasias.    03/20-03/23/25:  presented with SOB; acidotic & in respiratory distress; placed on BiPAP. Was weaned down to high-flow nasal cannula at 15 L for past hour without hypoxia or changes in mentation. Work of breathing improved since arrival. Troponin elevated at 82 but downtrending from 2 weeks ago, BNP elevated at 1200 but also downtrending from 2 weeks ago. Chest x-ray showing bilateral pleural effusions. Patient received 40 mg Lasix with roughly 700 cc urine output at this point.      Patient presents with female family friend.  ECOG PS is 1.  History has been obtained by chart review and discussion with the patient.    ROS:   Review of Systems   Constitutional:  Positive for malaise/fatigue. Negative for fever.   HENT:  Negative for nosebleeds.    Respiratory:  Negative for hemoptysis and shortness of breath (with exertion).    Cardiovascular:  Negative for chest pain, palpitations and leg swelling.   Gastrointestinal:  Negative for abdominal pain, blood in stool, diarrhea and vomiting.   Genitourinary:  Negative for hematuria.   Neurological:  Negative for dizziness and headaches.   Endo/Heme/Allergies:  Does not bruise/bleed easily.   All other systems reviewed and are negative.    Past Medical History:   Past Medical History:   Diagnosis Date    Cancer     basal cell carcinoma    Stroke due to embolism of right middle cerebral artery 02/18/2025        Past Surgical History:   Past Surgical History:   Procedure Laterality Date    CARPAL TUNNEL RELEASE      ESOPHAGOGASTRODUODENOSCOPY N/A 8/22/2022    Procedure: EGD (ESOPHAGOGASTRODUODENOSCOPY);  Surgeon: Steven Lopez MD;  Location: [facility] ([address] [place] [address]);  Service: Endoscopy;  Laterality: N/A;    ESOPHAGOGASTRODUODENOSCOPY N/A 12/5/2022    Procedure: EGD (ESOPHAGOGASTRODUODENOSCOPY);  Surgeon: Steven Lopez MD;  Location: [facility]  ENDO (2ND FLR);  Service: Endoscopy;  Laterality: N/A;  Has estimated pulmonary artery pressure 45, schedule location per protocol.   Please schedule with Dr. Darryl Lopez-  Plavix stopped 8/23/22  pt requested to schedule after Thanksgiving/ inst portal-RB  pre call complete; [facility] 11/28/22    ESOPHAGOGASTRODUODENOSCOPY N/A 7/11/2023    Procedure: EGD (ESOPHAGOGASTRODUODENOSCOPY);  Surgeon: Natalie Fall MD;  Location: [facility] ENDO;  Service: Endoscopy;  Laterality: N/A;    ESOPHAGOGASTRODUODENOSCOPY N/A 3/5/2025    Procedure: EGD (ESOPHAGOGASTRODUODENOSCOPY);  Surgeon: Roberto Salvador MD;  Location: [facility] ENDO (2ND FLR);  Service: Endoscopy;  Laterality: N/A;    ESOPHAGOGASTRODUODENOSCOPY N/A 7/31/2025    Procedure: EGD (ESOPHAGOGASTRODUODENOSCOPY);  Surgeon: Steven Lopez MD;  Location: [facility] (2ND FLR);  Service: Endoscopy;  Laterality: N/A;    EYE SURGERY      left eye cataract    LEFT HEART CATHETERIZATION  4/17/2024    Procedure: Left heart cath;  Surgeon: Elisabeth De La Cruz MD;  Location: [facility] CATH;  Service: Cardiology;;    TONSILLECTOMY      TUBAL LIGATION          Family History:   No family history on file.     Social History:   Social History     Tobacco Use    Smoking status: Never     Passive exposure: Never    Smokeless tobacco: Never   Substance Use Topics    Alcohol use: No      Patient splits time between [address] and apartment in [place]    Allergies:   Review of patient's allergies indicates:   Allergen Reactions    Jardiance [empagliflozin]         Medications:   Current Outpatient Medications   Medication Sig Dispense Refill    atorvastatin (LIPITOR) 80 MG tablet Take 1 tablet (80 mg total) by mouth once daily. 90 tablet 3    cyanocobalamin (VITAMIN B-12) 1000 MCG tablet Take 1,000 mcg by mouth once daily.      ezetimibe (ZETIA) 10 mg tablet Take 1 tablet (10 mg total) by mouth once daily. 90 tablet 3    furosemide (LASIX) 40 MG tablet Take 1 tablet (40 mg total)  "by mouth once daily. 30 tablet 11    pantoprazole (PROTONIX) 40 MG tablet Take 1 tablet (40 mg total) by mouth once daily. Take 1 tablet by mouth every day 90 tablet 3    polyethylene glycol (GLYCOLAX) 17 gram/dose powder Use to cap to measure 17g, mix with liquid, and take by mouth 2 (two) times daily as needed for Constipation. 510 g 11    potassium chloride SA (K-DUR,KLOR-CON) 20 MEQ tablet Take 1 tablet (20 mEq total) by mouth once daily. 30 tablet 11    vitamin D (VITAMIN D3) 1000 units Tab Take 1,000 Units by mouth once daily.      [Paused] apixaban (ELIQUIS) 2.5 mg Tab Take 1 tablet (2.5 mg total) by mouth 2 (two) times daily. (Patient not taking: Reported on 8/6/2025)      [Paused] spironolactone (ALDACTONE) 25 MG tablet Take 1 tablet (25 mg total) by mouth once daily. (Patient not taking: Reported on 8/6/2025)       No current facility-administered medications for this visit.        Physical Exam:   BP (!) 105/56 (BP Location: Left arm, Patient Position: Sitting)   Pulse 73   Temp 97.6 °F (36.4 °C) (Temporal)   Resp 16   Ht 5' 2" (1.575 m)   Wt 46.5 kg (102 lb 8.2 oz)   SpO2 98%   BMI 18.75 kg/m²    Wt Readings from Last 3 Encounters:   08/06/25 46.5 kg (102 lb 8.2 oz)   08/04/25 46.6 kg (102 lb 11.8 oz)   07/30/25 42.4 kg (93 lb 7.6 oz)               Physical Exam  Vitals reviewed.   Constitutional:       General: She is not in acute distress.     Comments: Thin, frail   HENT:      Head: Normocephalic and atraumatic.      Mouth/Throat:      Pharynx: Oropharynx is clear.   Eyes:      Conjunctiva/sclera: Conjunctivae normal.   Cardiovascular:      Rate and Rhythm: Normal rate and regular rhythm.      Pulses: Normal pulses.      Heart sounds: No murmur heard.  Pulmonary:      Effort: Pulmonary effort is normal. No respiratory distress.      Breath sounds: No wheezing or rales.   Abdominal:      General: Abdomen is flat. There is no distension.      Palpations: Abdomen is soft.      Tenderness: " There is no abdominal tenderness.   Musculoskeletal:         General: Normal range of motion.      Cervical back: Neck supple.      Right lower leg: No edema.      Left lower leg: No edema.   Lymphadenopathy:      Cervical: No cervical adenopathy.   Skin:     General: Skin is warm and dry.      Coloration: Skin is not jaundiced or pale.   Neurological:      General: No focal deficit present.      Mental Status: She is alert and oriented to person, place, and time.      Motor: Weakness present.   Psychiatric:         Mood and Affect: Mood normal.         Behavior: Behavior normal.         Thought Content: Thought content normal.         Labs:   Lab Results   Component Value Date    WBC 6.88 08/06/2025    HGB 9.3 (L) 08/06/2025    HCT 28.3 (L) 08/06/2025    MCV 98 08/06/2025     08/06/2025      Lab Results   Component Value Date    IRON 56 07/28/2025    TRANSFERRIN 218 07/28/2025    TIBC 323 07/28/2025    LABIRON 17 (L) 07/28/2025    FESATURATED 11 (L) 02/24/2025      Lab Results   Component Value Date    FERRITIN 594.0 (H) 07/28/2025     Lab Results   Component Value Date     08/01/2025    K 4.2 08/01/2025     08/01/2025    CO2 27 08/01/2025    BUN 25 (H) 08/01/2025    CREATININE 0.8 08/01/2025    ALBUMIN 3.7 07/29/2025    BILITOT 0.4 07/29/2025    ALKPHOS 99 07/29/2025    AST 37 07/29/2025    ALT 29 07/29/2025       Imaging:   X-Ray Chest AP Portable  Narrative: EXAMINATION:  XR CHEST AP PORTABLE    CLINICAL HISTORY:  Shortness of breath    TECHNIQUE:  Single frontal view of the chest was performed.    COMPARISON:  07/02/2025    FINDINGS:  Heart mediastinal contours appear normal in size and configuration with the trachea midline.  The lungs and pleural spaces appear clear.  Fibrotic changes and emphysematous changes remain.  Degenerative shoulder and spine changes in the bones.  Impression: No evidence of acute chest disease.    Electronically signed by: Serafin  Jami  Date:    07/30/2025  Time:    00:59       Procedures:   Upper GI endoscopy 3/5/25    - Esophageal mucosal changes secondary to established long-segment Rodriguez's disease, classified as Rodriguez's stage C10-M10 per [place] criteria.   - 3 cm hiatal hernia.   - One non-bleeding angioectasia in the stomach. Treated with argon plasma coagulation (APC).   - Two non-bleeding angioectasias in the duodenum. Treated with argon plasma coagulation (APC).   - No specimens collected.   Roberto Salvador MD   3/5/2025 12:07:18 PM     07/31/25: Small bowel Enteroscopy   Impression:            - Esophageal mucosal changes consistent with                          long-segment Rodriguez's esophagus, classified as                          Rodriguez's stage C7-M7 per [place] criteria.                          - 3 cm hiatal hernia.                          - Multiple fundic gland polyps.                          - Six non-bleeding angioectasias in the duodenum.                          Treated with argon plasma coagulation (APC).     Assessment:       1. Iron deficiency anemia due to chronic blood loss    2. Angiectasia of gastrointestinal tract    3. Rodriguez's esophagus with dysplasia            Plan:             # Iron Def Anemia - Never had an abnormal colonoscopy, no history of GIB with extensive previous ISAIAH work-up. Most likely cause following recent injury to pelvis, causing consumption with bruising + decreased nutrition with bedrest. Hgb is now improved after 1u pRBC and symptomatically patient feels better. Recommend to proceed with additional dose of IV iron to correct deficit, ~750 mg.   - injectafer x1  - return to clinic in 3-4m with repeat iron serology   07/09/24:  Arrange for additional Injectafer 750 mg on Monday, 07/15 @ 2:30 pm; f/u in 2 months with CBC, iron studies/ferritin prior.  09/23/24:  Symptomatic; Injectafer 750 mg weekly x 2; f/u in 3 months with CBC, BMP, iron studies/ferritin prior.  01/10/25:  S/P  Injectafer 750 mg x 2 since last visit; Ferritin 113; improved Hgb from 9.9 to 11.9 g/dl, iron studies low; asymptomatic; Labs in 2 months; phone review  3/12/25: Patient with 2 hospitalizations since the last visit, CVA and GI bleed. Patient will require and additional Injectafer 750 mg; she will need to follow up in 2 months with hematologist at her request.  04/29/25:  s/p Injectafer 750 mg 03/18/25; hospitalization 03/20- 03/23/25; hgb 8.3 g/dl; hx of VM of stomach; Labs now:  CBC, T & S, iron studies/ferritin; discussed for possible transfusion of blood if Hgb is found < 7 g/d.  In light of ferritin 203 & improved Hgb to 8.9; hold on iron & f/u with Dr. Novoa as scheduled 05/19/25 05/12/25:  Symptomatic anemia with Hgb down to 7.0 g/dl; 2 units of PRBC's ordered; Lasix post 1st & 2nd unit; scheduled for [facility] 05/13 @ 0700.  Recheck CBC in 2 weeks: 05/26 with T & S. F/U with Dr. Novoa on 06/12/25.  05/28/25:  Symptomatic anemia; Hgb down to 7.3 from 10.2 on last visit 05/16; 2 units of PRBC's ordered with premeds; Lasix 20 mg IV post 1st & 2nd unit; [facility] to call in re for infusion on Friday, 05/30.  Ambulatory referral to GI placed; f/u with Dr. Novoa as scheduled on 06/12 with labs prior.  08/06/25:  STable; Hgb 9.3 g/dl; continue weekly CBC; f/u with PCP on Friday, 08/08/25    #STEMI/heart cath - 04/17/24, Cath per Dr. De La Cruz; presently off of Plavix & Eliquis 2.5 mg bid paused since last hospitalization.    # TIA - Patient took anticoagulation and noted worsening of symptoms and has since discontinued. Most recent CVA noted on 2/18/25.    # GIB - Upcoming visit with GI to rule out bleeding, patient is not currently on anticoagulation  07/09/24:  On Brillinta s/p cath for STEMI  3/12/25: Patient remains on Brillinta  05/28/25:  Patient on Plavix  08/06/25:  off all anticoagulation at this time; needs f/u with Cardiology asap    CHF  -followed by Cardiology; Lasix paused    # Pelvic Fracture (12/2023) - Improved  with physical therapy, no surgical intervention.     The above information has been reviewed with the patient and all questions have been answered to their apparent satisfaction.  They understand that they can call the clinic with any questions.     GABBY Ruth, CHOCOP-C  [facility]  45 minutes were spent in coordination of patient's care, record review and counseling.         Med Onc Chart Routing      Follow up with physician    Follow up with LUZ . F/u as scheduled; continue weekly monitoring of CBC   Infusion scheduling note    Injection scheduling note    Labs    Imaging    Pharmacy appointment    Other referrals

## 2025-08-06 NOTE — LETTER
Katelyn Caba  [address] [address] [address]      Dear Katelyn Caba,     I am your nurse with [facility]s Out[facility] Management Department. I was unsuccessful in reaching you today. At your earliest convenience, I would like to discuss your healthcare progress.      Please contact me at [phone] from 8:00AM to 4:30 PM on Monday thru Friday.     As you know, [facility] is a program offered to you through [facility] where a nurse is available 24/7 to answer questions or provide medical advice, their number is [phone].    Thanks,    Ana Cristina Pop RN  Outpatient Care Management

## 2025-08-11 ENCOUNTER — LAB VISIT (OUTPATIENT)
Dept: LAB | Facility: HOSPITAL | Age: 85
End: 2025-08-11
Attending: NURSE PRACTITIONER
Payer: MEDICARE

## 2025-08-11 ENCOUNTER — OFFICE VISIT (OUTPATIENT)
Dept: CARDIOLOGY | Facility: CLINIC | Age: 85
End: 2025-08-11
Payer: MEDICARE

## 2025-08-11 VITALS
DIASTOLIC BLOOD PRESSURE: 54 MMHG | WEIGHT: 101 LBS | BODY MASS INDEX: 18.58 KG/M2 | HEIGHT: 62 IN | OXYGEN SATURATION: 96 % | SYSTOLIC BLOOD PRESSURE: 99 MMHG | HEART RATE: 70 BPM

## 2025-08-11 DIAGNOSIS — I70.0 AORTIC ATHEROSCLEROSIS: ICD-10-CM

## 2025-08-11 DIAGNOSIS — E78.2 MIXED HYPERLIPIDEMIA: ICD-10-CM

## 2025-08-11 DIAGNOSIS — I25.10 CORONARY ARTERY DISEASE INVOLVING NATIVE CORONARY ARTERY OF NATIVE HEART WITHOUT ANGINA PECTORIS: ICD-10-CM

## 2025-08-11 DIAGNOSIS — I63.411 EMBOLIC STROKE INVOLVING RIGHT MIDDLE CEREBRAL ARTERY: ICD-10-CM

## 2025-08-11 DIAGNOSIS — I50.32 CHRONIC HEART FAILURE WITH PRESERVED EJECTION FRACTION: Primary | ICD-10-CM

## 2025-08-11 DIAGNOSIS — I51.7 ENLARGED LA (LEFT ATRIUM): ICD-10-CM

## 2025-08-11 DIAGNOSIS — Z86.73 HISTORY OF STROKE: ICD-10-CM

## 2025-08-11 DIAGNOSIS — I10 PRIMARY HYPERTENSION: ICD-10-CM

## 2025-08-11 DIAGNOSIS — Z87.19 HISTORY OF GI BLEED: ICD-10-CM

## 2025-08-11 DIAGNOSIS — Z86.73 H/O ISCHEMIC LEFT MCA STROKE: ICD-10-CM

## 2025-08-11 DIAGNOSIS — D50.0 IRON DEFICIENCY ANEMIA DUE TO CHRONIC BLOOD LOSS: ICD-10-CM

## 2025-08-11 PROBLEM — R79.89 ELEVATED TROPONIN: Status: RESOLVED | Noted: 2025-06-28 | Resolved: 2025-08-11

## 2025-08-11 LAB
ABSOLUTE EOSINOPHIL (OHS): 0.14 K/UL
ABSOLUTE MONOCYTE (OHS): 0.63 K/UL (ref 0.3–1)
ABSOLUTE NEUTROPHIL COUNT (OHS): 4.23 K/UL (ref 1.8–7.7)
BASOPHILS # BLD AUTO: 0.07 K/UL
BASOPHILS NFR BLD AUTO: 1.2 %
ERYTHROCYTE [DISTWIDTH] IN BLOOD BY AUTOMATED COUNT: 17.6 % (ref 11.5–14.5)
HCT VFR BLD AUTO: 32.6 % (ref 37–48.5)
HGB BLD-MCNC: 10.2 GM/DL (ref 12–16)
IMM GRANULOCYTES # BLD AUTO: 0.03 K/UL (ref 0–0.04)
IMM GRANULOCYTES NFR BLD AUTO: 0.5 % (ref 0–0.5)
LYMPHOCYTES # BLD AUTO: 0.61 K/UL (ref 1–4.8)
MCH RBC QN AUTO: 31.8 PG (ref 27–31)
MCHC RBC AUTO-ENTMCNC: 31.3 G/DL (ref 32–36)
MCV RBC AUTO: 102 FL (ref 82–98)
NUCLEATED RBC (/100WBC) (OHS): 0 /100 WBC
PLATELET # BLD AUTO: 382 K/UL (ref 150–450)
PMV BLD AUTO: 10.5 FL (ref 9.2–12.9)
RBC # BLD AUTO: 3.21 M/UL (ref 4–5.4)
RELATIVE EOSINOPHIL (OHS): 2.5 %
RELATIVE LYMPHOCYTE (OHS): 10.7 % (ref 18–48)
RELATIVE MONOCYTE (OHS): 11 % (ref 4–15)
RELATIVE NEUTROPHIL (OHS): 74.1 % (ref 38–73)
WBC # BLD AUTO: 5.71 K/UL (ref 3.9–12.7)

## 2025-08-11 PROCEDURE — 36415 COLL VENOUS BLD VENIPUNCTURE: CPT | Mod: PO

## 2025-08-11 PROCEDURE — 99215 OFFICE O/P EST HI 40 MIN: CPT | Mod: PBBFAC | Performed by: PHYSICIAN ASSISTANT

## 2025-08-11 PROCEDURE — 85025 COMPLETE CBC W/AUTO DIFF WBC: CPT

## 2025-08-11 PROCEDURE — 99214 OFFICE O/P EST MOD 30 MIN: CPT | Mod: S$PBB,,, | Performed by: PHYSICIAN ASSISTANT

## 2025-08-11 PROCEDURE — 99999 PR PBB SHADOW E&M-EST. PATIENT-LVL V: CPT | Mod: PBBFAC,,, | Performed by: PHYSICIAN ASSISTANT

## 2025-08-11 RX ORDER — ASPIRIN 81 MG/1
81 TABLET ORAL DAILY
Start: 2025-08-11 | End: 2026-08-11

## 2025-08-12 ENCOUNTER — OFFICE VISIT (OUTPATIENT)
Dept: FAMILY MEDICINE | Facility: CLINIC | Age: 85
End: 2025-08-12
Payer: MEDICARE

## 2025-08-12 VITALS
DIASTOLIC BLOOD PRESSURE: 60 MMHG | OXYGEN SATURATION: 100 % | HEIGHT: 62 IN | WEIGHT: 101 LBS | SYSTOLIC BLOOD PRESSURE: 103 MMHG | BODY MASS INDEX: 18.58 KG/M2 | HEART RATE: 71 BPM

## 2025-08-12 DIAGNOSIS — I63.411 EMBOLIC STROKE INVOLVING RIGHT MIDDLE CEREBRAL ARTERY: ICD-10-CM

## 2025-08-12 DIAGNOSIS — Z87.19 HISTORY OF GI BLEED: ICD-10-CM

## 2025-08-12 DIAGNOSIS — K22.719 BARRETT'S ESOPHAGUS WITH DYSPLASIA: ICD-10-CM

## 2025-08-12 DIAGNOSIS — D50.0 IRON DEFICIENCY ANEMIA DUE TO CHRONIC BLOOD LOSS: Primary | ICD-10-CM

## 2025-08-12 DIAGNOSIS — I50.32 CHRONIC HEART FAILURE WITH PRESERVED EJECTION FRACTION: ICD-10-CM

## 2025-08-12 DIAGNOSIS — Z86.73 H/O ISCHEMIC LEFT MCA STROKE: ICD-10-CM

## 2025-08-12 PROCEDURE — 99213 OFFICE O/P EST LOW 20 MIN: CPT | Mod: PBBFAC,PO | Performed by: NURSE PRACTITIONER

## 2025-08-12 PROCEDURE — 99214 OFFICE O/P EST MOD 30 MIN: CPT | Mod: S$PBB,,, | Performed by: NURSE PRACTITIONER

## 2025-08-12 PROCEDURE — 99999 PR PBB SHADOW E&M-EST. PATIENT-LVL III: CPT | Mod: PBBFAC,,, | Performed by: NURSE PRACTITIONER

## 2025-08-13 ENCOUNTER — PATIENT MESSAGE (OUTPATIENT)
Dept: HEMATOLOGY/ONCOLOGY | Facility: CLINIC | Age: 85
End: 2025-08-13
Payer: MEDICARE

## 2025-08-18 ENCOUNTER — LAB VISIT (OUTPATIENT)
Dept: LAB | Facility: HOSPITAL | Age: 85
End: 2025-08-18
Attending: NURSE PRACTITIONER
Payer: MEDICARE

## 2025-08-18 DIAGNOSIS — D50.0 IRON DEFICIENCY ANEMIA DUE TO CHRONIC BLOOD LOSS: ICD-10-CM

## 2025-08-18 LAB
ABSOLUTE EOSINOPHIL (OHS): 0.16 K/UL
ABSOLUTE MONOCYTE (OHS): 0.73 K/UL (ref 0.3–1)
ABSOLUTE NEUTROPHIL COUNT (OHS): 3.85 K/UL (ref 1.8–7.7)
BASOPHILS # BLD AUTO: 0.08 K/UL
BASOPHILS NFR BLD AUTO: 1.4 %
ERYTHROCYTE [DISTWIDTH] IN BLOOD BY AUTOMATED COUNT: 16.1 % (ref 11.5–14.5)
HCT VFR BLD AUTO: 30.7 % (ref 37–48.5)
HGB BLD-MCNC: 9.6 GM/DL (ref 12–16)
IMM GRANULOCYTES # BLD AUTO: 0.02 K/UL (ref 0–0.04)
IMM GRANULOCYTES NFR BLD AUTO: 0.4 % (ref 0–0.5)
LYMPHOCYTES # BLD AUTO: 0.83 K/UL (ref 1–4.8)
MCH RBC QN AUTO: 31.4 PG (ref 27–31)
MCHC RBC AUTO-ENTMCNC: 31.3 G/DL (ref 32–36)
MCV RBC AUTO: 100 FL (ref 82–98)
NUCLEATED RBC (/100WBC) (OHS): 0 /100 WBC
PLATELET # BLD AUTO: 396 K/UL (ref 150–450)
PMV BLD AUTO: 10.2 FL (ref 9.2–12.9)
RBC # BLD AUTO: 3.06 M/UL (ref 4–5.4)
RELATIVE EOSINOPHIL (OHS): 2.8 %
RELATIVE LYMPHOCYTE (OHS): 14.6 % (ref 18–48)
RELATIVE MONOCYTE (OHS): 12.9 % (ref 4–15)
RELATIVE NEUTROPHIL (OHS): 67.9 % (ref 38–73)
WBC # BLD AUTO: 5.67 K/UL (ref 3.9–12.7)

## 2025-08-18 PROCEDURE — 85025 COMPLETE CBC W/AUTO DIFF WBC: CPT

## 2025-08-18 PROCEDURE — 36415 COLL VENOUS BLD VENIPUNCTURE: CPT | Mod: PO

## 2025-08-25 ENCOUNTER — LAB VISIT (OUTPATIENT)
Dept: LAB | Facility: HOSPITAL | Age: 85
End: 2025-08-25
Attending: NURSE PRACTITIONER
Payer: MEDICARE

## 2025-08-25 DIAGNOSIS — D50.0 IRON DEFICIENCY ANEMIA DUE TO CHRONIC BLOOD LOSS: ICD-10-CM

## 2025-08-25 LAB
ABSOLUTE EOSINOPHIL (OHS): 0.16 K/UL
ABSOLUTE MONOCYTE (OHS): 0.82 K/UL (ref 0.3–1)
ABSOLUTE NEUTROPHIL COUNT (OHS): 4.26 K/UL (ref 1.8–7.7)
ANION GAP (OHS): 12 MMOL/L (ref 8–16)
BASOPHILS # BLD AUTO: 0.08 K/UL
BASOPHILS NFR BLD AUTO: 1.3 %
BUN SERPL-MCNC: 18 MG/DL (ref 8–23)
CALCIUM SERPL-MCNC: 9 MG/DL (ref 8.7–10.5)
CHLORIDE SERPL-SCNC: 100 MMOL/L (ref 95–110)
CO2 SERPL-SCNC: 28 MMOL/L (ref 23–29)
CREAT SERPL-MCNC: 0.7 MG/DL (ref 0.5–1.4)
ERYTHROCYTE [DISTWIDTH] IN BLOOD BY AUTOMATED COUNT: 15.5 % (ref 11.5–14.5)
GFR SERPLBLD CREATININE-BSD FMLA CKD-EPI: >60 ML/MIN/1.73/M2
GLUCOSE SERPL-MCNC: 96 MG/DL (ref 70–110)
HCT VFR BLD AUTO: 31.1 % (ref 37–48.5)
HGB BLD-MCNC: 9.7 GM/DL (ref 12–16)
IMM GRANULOCYTES # BLD AUTO: 0.03 K/UL (ref 0–0.04)
IMM GRANULOCYTES NFR BLD AUTO: 0.5 % (ref 0–0.5)
LYMPHOCYTES # BLD AUTO: 0.97 K/UL (ref 1–4.8)
MCH RBC QN AUTO: 31.4 PG (ref 27–31)
MCHC RBC AUTO-ENTMCNC: 31.2 G/DL (ref 32–36)
MCV RBC AUTO: 101 FL (ref 82–98)
NUCLEATED RBC (/100WBC) (OHS): 0 /100 WBC
PLATELET # BLD AUTO: 463 K/UL (ref 150–450)
PMV BLD AUTO: 9.9 FL (ref 9.2–12.9)
POTASSIUM SERPL-SCNC: 3.8 MMOL/L (ref 3.5–5.1)
RBC # BLD AUTO: 3.09 M/UL (ref 4–5.4)
RELATIVE EOSINOPHIL (OHS): 2.5 %
RELATIVE LYMPHOCYTE (OHS): 15.3 % (ref 18–48)
RELATIVE MONOCYTE (OHS): 13 % (ref 4–15)
RELATIVE NEUTROPHIL (OHS): 67.4 % (ref 38–73)
SODIUM SERPL-SCNC: 140 MMOL/L (ref 136–145)
WBC # BLD AUTO: 6.32 K/UL (ref 3.9–12.7)

## 2025-08-25 PROCEDURE — 85025 COMPLETE CBC W/AUTO DIFF WBC: CPT

## 2025-08-25 PROCEDURE — 36415 COLL VENOUS BLD VENIPUNCTURE: CPT | Mod: PO

## 2025-08-25 PROCEDURE — 82310 ASSAY OF CALCIUM: CPT

## 2025-08-26 ENCOUNTER — TELEPHONE (OUTPATIENT)
Dept: PHYSICAL MEDICINE AND REHAB | Facility: CLINIC | Age: 85
End: 2025-08-26
Payer: MEDICARE

## 2025-08-26 RX ORDER — SPIRONOLACTONE 25 MG/1
25 TABLET ORAL EVERY MORNING
COMMUNITY

## 2025-08-26 RX ORDER — SPIRONOLACTONE 25 MG/1
25 TABLET ORAL EVERY MORNING
Status: CANCELLED | OUTPATIENT
Start: 2025-08-26

## 2025-08-27 ENCOUNTER — OUTPATIENT CASE MANAGEMENT (OUTPATIENT)
Dept: ADMINISTRATIVE | Facility: OTHER | Age: 85
End: 2025-08-27
Payer: MEDICARE

## 2025-08-28 ENCOUNTER — OFFICE VISIT (OUTPATIENT)
Dept: GASTROENTEROLOGY | Facility: CLINIC | Age: 85
End: 2025-08-28
Payer: MEDICARE

## 2025-08-28 VITALS
WEIGHT: 100 LBS | HEIGHT: 62 IN | SYSTOLIC BLOOD PRESSURE: 99 MMHG | DIASTOLIC BLOOD PRESSURE: 57 MMHG | BODY MASS INDEX: 18.4 KG/M2 | HEART RATE: 73 BPM

## 2025-08-28 DIAGNOSIS — K22.70 BARRETT'S ESOPHAGUS WITHOUT DYSPLASIA: ICD-10-CM

## 2025-08-28 DIAGNOSIS — D50.9 IRON DEFICIENCY ANEMIA, UNSPECIFIED IRON DEFICIENCY ANEMIA TYPE: Primary | ICD-10-CM

## 2025-08-28 DIAGNOSIS — K31.819 AVM (ARTERIOVENOUS MALFORMATION) OF STOMACH, ACQUIRED: ICD-10-CM

## 2025-08-28 PROCEDURE — 99213 OFFICE O/P EST LOW 20 MIN: CPT | Mod: PBBFAC

## 2025-08-28 PROCEDURE — 99214 OFFICE O/P EST MOD 30 MIN: CPT | Mod: S$PBB,,,

## 2025-08-28 PROCEDURE — G2211 COMPLEX E/M VISIT ADD ON: HCPCS | Mod: ,,,

## 2025-08-28 PROCEDURE — 99999 PR PBB SHADOW E&M-EST. PATIENT-LVL III: CPT | Mod: PBBFAC,,,

## 2025-09-02 ENCOUNTER — LAB VISIT (OUTPATIENT)
Dept: LAB | Facility: HOSPITAL | Age: 85
End: 2025-09-02
Attending: NURSE PRACTITIONER
Payer: MEDICARE

## 2025-09-02 DIAGNOSIS — D50.0 IRON DEFICIENCY ANEMIA DUE TO CHRONIC BLOOD LOSS: ICD-10-CM

## 2025-09-02 LAB
ABSOLUTE EOSINOPHIL (OHS): 0.16 K/UL
ABSOLUTE MONOCYTE (OHS): 0.85 K/UL (ref 0.3–1)
ABSOLUTE NEUTROPHIL COUNT (OHS): 4.41 K/UL (ref 1.8–7.7)
ANION GAP (OHS): 8 MMOL/L (ref 8–16)
BASOPHILS # BLD AUTO: 0.06 K/UL
BASOPHILS NFR BLD AUTO: 0.9 %
BUN SERPL-MCNC: 18 MG/DL (ref 8–23)
CALCIUM SERPL-MCNC: 9 MG/DL (ref 8.7–10.5)
CHLORIDE SERPL-SCNC: 97 MMOL/L (ref 95–110)
CO2 SERPL-SCNC: 31 MMOL/L (ref 23–29)
CREAT SERPL-MCNC: 0.7 MG/DL (ref 0.5–1.4)
ERYTHROCYTE [DISTWIDTH] IN BLOOD BY AUTOMATED COUNT: 15.3 % (ref 11.5–14.5)
GFR SERPLBLD CREATININE-BSD FMLA CKD-EPI: >60 ML/MIN/1.73/M2
GLUCOSE SERPL-MCNC: 100 MG/DL (ref 70–110)
HCT VFR BLD AUTO: 31.3 % (ref 37–48.5)
HGB BLD-MCNC: 9.9 GM/DL (ref 12–16)
IMM GRANULOCYTES # BLD AUTO: 0.02 K/UL (ref 0–0.04)
IMM GRANULOCYTES NFR BLD AUTO: 0.3 % (ref 0–0.5)
LYMPHOCYTES # BLD AUTO: 1.08 K/UL (ref 1–4.8)
MCH RBC QN AUTO: 31.7 PG (ref 27–31)
MCHC RBC AUTO-ENTMCNC: 31.6 G/DL (ref 32–36)
MCV RBC AUTO: 100 FL (ref 82–98)
NUCLEATED RBC (/100WBC) (OHS): 0 /100 WBC
PLATELET # BLD AUTO: 392 K/UL (ref 150–450)
PMV BLD AUTO: 10.3 FL (ref 9.2–12.9)
POTASSIUM SERPL-SCNC: 3.6 MMOL/L (ref 3.5–5.1)
RBC # BLD AUTO: 3.12 M/UL (ref 4–5.4)
RELATIVE EOSINOPHIL (OHS): 2.4 %
RELATIVE LYMPHOCYTE (OHS): 16.4 % (ref 18–48)
RELATIVE MONOCYTE (OHS): 12.9 % (ref 4–15)
RELATIVE NEUTROPHIL (OHS): 67.1 % (ref 38–73)
SODIUM SERPL-SCNC: 136 MMOL/L (ref 136–145)
WBC # BLD AUTO: 6.58 K/UL (ref 3.9–12.7)

## 2025-09-02 PROCEDURE — 36415 COLL VENOUS BLD VENIPUNCTURE: CPT | Mod: PO

## 2025-09-02 PROCEDURE — 80048 BASIC METABOLIC PNL TOTAL CA: CPT

## 2025-09-02 PROCEDURE — 85025 COMPLETE CBC W/AUTO DIFF WBC: CPT
